# Patient Record
Sex: MALE | Race: WHITE | Employment: OTHER | URBAN - METROPOLITAN AREA
[De-identification: names, ages, dates, MRNs, and addresses within clinical notes are randomized per-mention and may not be internally consistent; named-entity substitution may affect disease eponyms.]

---

## 2017-11-09 ENCOUNTER — HOSPITAL ENCOUNTER (OUTPATIENT)
Dept: GENERAL RADIOLOGY | Age: 37
Discharge: HOME | End: 2017-11-09
Attending: PODIATRIST | Admitting: PODIATRIST

## 2017-11-09 DIAGNOSIS — L97.511 RIGHT FOOT ULCER, LIMITED TO BREAKDOWN OF SKIN (HCC): ICD-10-CM

## 2017-11-09 DIAGNOSIS — M79.671 RIGHT FOOT PAIN: ICD-10-CM

## 2017-11-09 DIAGNOSIS — M77.41 METATARSALGIA OF RIGHT FOOT: ICD-10-CM

## 2017-12-16 PROBLEM — N49.3 FOURNIER'S DISEASE: Status: ACTIVE | Noted: 2017-12-16

## 2018-02-22 NOTE — ANESTHESIA PRE-OP
CARDIOVASCULAR: denies dizziness, palpitations, or CP    GASTROINTESTINAL:  Occ Reflux    GENITOURINARY:  Neg     INTEGUMENT: abscess on left anus. 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 HEMATOLOGIC/LYMPHATIC: neg     ENDOCRINE: hx uncontrolled DM. Recent admission started on insulin, last A1c  10.5, 12/2017. A1c today: 5.9. MUSCULOSKELETAL: right foot metatarsal deformity with over pronation. Wearing a boot. NEUROLOGICAL:  Neg     BEHAVIOR/PSYCH:  Alert and oriented x 4. Questions answered. Understanding verbalized    PHYSICAL EXAM:  Airway Exam:  Head/Neck movement: full  Thyromental Distance: >3 FB  History of difficult intubation:  None  Mallampati Classification:  Class II  Teeth: missing upper and lower molars. Chipped and broken molars. Denies loose teeth        LUNGS:  No increased work of breathing, good air exchange, clear to auscultation bilaterally, no crackles or wheezing  CARDIOVASCULAR:  Normal apical impulse, regular rate and rhythm, normal S1 and S2, no S3 or S4, and no murmur noted    Testing Results:    EKG:  None     LABS:    CBC  Lab Results   Component Value Date/Time    WBC 7.0 02/23/2018 01:26 PM    HGB 12.2 (L) 02/23/2018 01:26 PM    HCT 37.2 (L) 02/23/2018 01:26 PM     02/23/2018 01:26 PM     RENAL  Lab Results   Component Value Date/Time     02/23/2018 01:26 PM    K 4.9 02/23/2018 01:26 PM    CL 99 02/23/2018 01:26 PM    CO2 28 02/23/2018 01:26 PM    BUN 13 02/23/2018 01:26 PM    CREATININE 0.7 (L) 02/23/2018 01:26 PM    GLUCOSE 120 (H) 02/23/2018 01:26 PM       Summary:  Chart reviewed, pt. Interviewed and examined. Planned surgical procedure:  Achilles Tendon Lengthening Right Foot per Dr. Miladys Mayer    1. No known cardiac condition. Denies CP or SOB. Limited activity r/t foot pain. 2.  Current smoker, hx 1ppd x 23 years. Counseled on smoking cessation. Able to lie flat. 3.  Uncontrolled reflux. 4.  Abscess on left anus.   oli's

## 2018-02-23 ENCOUNTER — HOSPITAL ENCOUNTER (OUTPATIENT)
Dept: PREADMISSION TESTING | Age: 38
Discharge: HOME | End: 2018-02-23
Attending: PODIATRIST | Admitting: PODIATRIST

## 2018-02-23 VITALS
HEIGHT: 71 IN | TEMPERATURE: 97.8 F | SYSTOLIC BLOOD PRESSURE: 118 MMHG | HEART RATE: 72 BPM | DIASTOLIC BLOOD PRESSURE: 71 MMHG | WEIGHT: 195 LBS | OXYGEN SATURATION: 100 % | RESPIRATION RATE: 16 BRPM | BODY MASS INDEX: 27.3 KG/M2

## 2018-02-23 LAB
ANION GAP SERPL CALCULATED.3IONS-SCNC: 9 MMOL/L (ref 4–16)
BUN BLDV-MCNC: 13 MG/DL (ref 6–23)
CALCIUM SERPL-MCNC: 10 MG/DL (ref 8.3–10.6)
CHLORIDE BLD-SCNC: 99 MMOL/L (ref 99–110)
CO2: 28 MMOL/L (ref 21–32)
CREAT SERPL-MCNC: 0.7 MG/DL (ref 0.9–1.3)
ESTIMATED AVERAGE GLUCOSE: 123 MG/DL
GFR AFRICAN AMERICAN: >60 ML/MIN/1.73M2
GFR NON-AFRICAN AMERICAN: >60 ML/MIN/1.73M2
GLUCOSE BLD-MCNC: 120 MG/DL (ref 70–99)
HBA1C MFR BLD: 5.9 % (ref 4.2–6.3)
HCT VFR BLD CALC: 37.2 % (ref 42–52)
HEMOGLOBIN: 12.2 GM/DL (ref 13.5–18)
MCH RBC QN AUTO: 31.3 PG (ref 27–31)
MCHC RBC AUTO-ENTMCNC: 32.8 % (ref 32–36)
MCV RBC AUTO: 95.4 FL (ref 78–100)
PDW BLD-RTO: 13.5 % (ref 11.7–14.9)
PLATELET # BLD: 226 K/CU MM (ref 140–440)
PMV BLD AUTO: 10.3 FL (ref 7.5–11.1)
POTASSIUM SERPL-SCNC: 4.9 MMOL/L (ref 3.5–5.1)
RBC # BLD: 3.9 M/CU MM (ref 4.6–6.2)
SODIUM BLD-SCNC: 136 MMOL/L (ref 135–145)
WBC # BLD: 7 K/CU MM (ref 4–10.5)

## 2018-02-23 RX ORDER — KETOROLAC TROMETHAMINE 30 MG/ML
20 INJECTION, SOLUTION INTRAMUSCULAR; INTRAVENOUS ONCE
Status: CANCELLED | OUTPATIENT
Start: 2018-02-28 | End: 2018-03-05

## 2018-02-23 NOTE — PRE-PROCEDURE INSTRUCTIONS
See scanned instruction form, instructed to take half dose Lantus Insulin night before surgery per anesthesia department

## 2018-04-05 ENCOUNTER — HOSPITAL ENCOUNTER (OUTPATIENT)
Dept: GENERAL RADIOLOGY | Age: 38
Discharge: HOME | End: 2018-04-05
Attending: PODIATRIST | Admitting: PODIATRIST

## 2018-04-05 DIAGNOSIS — M77.41 METATARSALGIA OF RIGHT FOOT: ICD-10-CM

## 2018-04-05 DIAGNOSIS — M79.671 RIGHT FOOT PAIN: ICD-10-CM

## 2019-04-22 ENCOUNTER — HOSPITAL ENCOUNTER (EMERGENCY)
Age: 39
Discharge: HOME | End: 2019-04-22
Attending: EMERGENCY MEDICINE
Payer: COMMERCIAL

## 2019-04-22 VITALS
HEIGHT: 72 IN | HEART RATE: 80 BPM | OXYGEN SATURATION: 100 % | TEMPERATURE: 97.8 F | RESPIRATION RATE: 17 BRPM | DIASTOLIC BLOOD PRESSURE: 85 MMHG | WEIGHT: 170 LBS | SYSTOLIC BLOOD PRESSURE: 141 MMHG | BODY MASS INDEX: 23.03 KG/M2

## 2019-04-22 DIAGNOSIS — R10.13 ABDOMINAL PAIN, EPIGASTRIC: Primary | ICD-10-CM

## 2019-04-22 LAB
ALBUMIN SERPL-MCNC: 3.4 GM/DL (ref 3.4–5)
ALP BLD-CCNC: 131 IU/L (ref 40–128)
ALT SERPL-CCNC: 11 U/L (ref 10–40)
ANION GAP SERPL CALCULATED.3IONS-SCNC: 11 MMOL/L (ref 4–16)
AST SERPL-CCNC: 12 IU/L (ref 15–37)
BACTERIA: ABNORMAL /HPF
BASOPHILS ABSOLUTE: 0.1 K/CU MM
BASOPHILS RELATIVE PERCENT: 0.9 % (ref 0–1)
BILIRUB SERPL-MCNC: 0.4 MG/DL (ref 0–1)
BILIRUBIN URINE: NEGATIVE MG/DL
BLOOD, URINE: ABNORMAL
BUN BLDV-MCNC: 11 MG/DL (ref 6–23)
CALCIUM SERPL-MCNC: 10.2 MG/DL (ref 8.3–10.6)
CHLORIDE BLD-SCNC: 95 MMOL/L (ref 99–110)
CLARITY: CLEAR
CO2: 23 MMOL/L (ref 21–32)
COLOR: YELLOW
CREAT SERPL-MCNC: 0.8 MG/DL (ref 0.9–1.3)
DIFFERENTIAL TYPE: ABNORMAL
EOSINOPHILS ABSOLUTE: 0.1 K/CU MM
EOSINOPHILS RELATIVE PERCENT: 1.7 % (ref 0–3)
GFR AFRICAN AMERICAN: >60 ML/MIN/1.73M2
GFR NON-AFRICAN AMERICAN: >60 ML/MIN/1.73M2
GLUCOSE BLD-MCNC: 263 MG/DL (ref 70–99)
GLUCOSE, URINE: >500 MG/DL
HCT VFR BLD CALC: 37.4 % (ref 42–52)
HEMOGLOBIN: 12 GM/DL (ref 13.5–18)
HYALINE CASTS: 5 /LPF
IMMATURE NEUTROPHIL %: 0.3 % (ref 0–0.43)
KETONES, URINE: NEGATIVE MG/DL
LEUKOCYTE ESTERASE, URINE: NEGATIVE
LIPASE: 58 IU/L (ref 13–60)
LYMPHOCYTES ABSOLUTE: 1.1 K/CU MM
LYMPHOCYTES RELATIVE PERCENT: 18.3 % (ref 24–44)
MCH RBC QN AUTO: 29.1 PG (ref 27–31)
MCHC RBC AUTO-ENTMCNC: 32.1 % (ref 32–36)
MCV RBC AUTO: 90.8 FL (ref 78–100)
MONOCYTES ABSOLUTE: 0.4 K/CU MM
MONOCYTES RELATIVE PERCENT: 6.4 % (ref 0–4)
MUCUS: ABNORMAL HPF
NITRITE URINE, QUANTITATIVE: NEGATIVE
NUCLEATED RBC %: 0 %
PDW BLD-RTO: 12.1 % (ref 11.7–14.9)
PH, URINE: 5 (ref 5–8)
PLATELET # BLD: 275 K/CU MM (ref 140–440)
PMV BLD AUTO: 9.3 FL (ref 7.5–11.1)
POTASSIUM SERPL-SCNC: 4.4 MMOL/L (ref 3.5–5.1)
PROTEIN UA: 30 MG/DL
RBC # BLD: 4.12 M/CU MM (ref 4.6–6.2)
RBC URINE: 1 /HPF (ref 0–3)
SEGMENTED NEUTROPHILS ABSOLUTE COUNT: 4.2 K/CU MM
SEGMENTED NEUTROPHILS RELATIVE PERCENT: 72.4 % (ref 36–66)
SODIUM BLD-SCNC: 129 MMOL/L (ref 135–145)
SPECIFIC GRAVITY UA: 1.02 (ref 1–1.03)
TOTAL IMMATURE NEUTOROPHIL: 0.02 K/CU MM
TOTAL NUCLEATED RBC: 0 K/CU MM
TOTAL PROTEIN: 7.8 GM/DL (ref 6.4–8.2)
TRICHOMONAS: ABNORMAL /HPF
TROPONIN T: <0.01 NG/ML
UROBILINOGEN, URINE: NORMAL MG/DL (ref 0.2–1)
WBC # BLD: 5.8 K/CU MM (ref 4–10.5)
WBC UA: 2 /HPF (ref 0–2)

## 2019-04-22 PROCEDURE — 84484 ASSAY OF TROPONIN QUANT: CPT

## 2019-04-22 PROCEDURE — 81001 URINALYSIS AUTO W/SCOPE: CPT

## 2019-04-22 PROCEDURE — 80053 COMPREHEN METABOLIC PANEL: CPT

## 2019-04-22 PROCEDURE — 93010 ELECTROCARDIOGRAM REPORT: CPT | Performed by: INTERNAL MEDICINE

## 2019-04-22 PROCEDURE — 85025 COMPLETE CBC W/AUTO DIFF WBC: CPT

## 2019-04-22 PROCEDURE — 99284 EMERGENCY DEPT VISIT MOD MDM: CPT

## 2019-04-22 PROCEDURE — 93005 ELECTROCARDIOGRAM TRACING: CPT | Performed by: EMERGENCY MEDICINE

## 2019-04-22 PROCEDURE — 83690 ASSAY OF LIPASE: CPT
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
## 2019-04-22 NOTE — LETTER
[facility] Emergency Department  [address]  Phone: [phone]  Fax: [phone]    No name on file. April 22, 2019     Patient: Lowell Levy   YOB: 1980   Date of Visit: 4/22/2019       To Whom it May Concern:    Gopal Doan was seen in my clinic on 4/22/2019. He may return to work on 4/22/19. If you have any questions or concerns, please don't hesitate to call. Sincerely,         No name on file.

## 2019-04-22 NOTE — LETTER
[facility] Emergency Department  [address] [address]  Phone: [phone]  Fax: [phone]               April 22, 2019    Patient: Stephany Mckay   YOB: 1980   Date of Visit: 4/22/2019       To Whom It May Concern:    Leonid Salcido was seen and treated in our emergency department on 4/22/2019. He may return to work on 4/23/19.       Sincerely,       Abhishek Lilly RN         Signature:__________________________________

## 2019-04-22 NOTE — ED NOTES
Dr. Tyler Blount at bedside.       Worley Dandy, RN  04/22/19 [address], [place]  04/22/19 7550

## 2019-04-23 LAB
EKG ATRIAL RATE: 81 BPM
EKG DIAGNOSIS: NORMAL
EKG P AXIS: 68 DEGREES
EKG P-R INTERVAL: 158 MS
EKG Q-T INTERVAL: 358 MS
EKG QRS DURATION: 90 MS
EKG QTC CALCULATION (BAZETT): 415 MS
EKG R AXIS: 71 DEGREES
EKG T AXIS: 41 DEGREES
EKG VENTRICULAR RATE: 81 BPM

## 2019-04-24 ENCOUNTER — OFFICE VISIT (OUTPATIENT)
Dept: FAMILY MEDICINE CLINIC | Age: 39
End: 2019-04-24
Payer: COMMERCIAL

## 2019-04-24 VITALS
WEIGHT: 179.4 LBS | HEIGHT: 73 IN | HEART RATE: 91 BPM | OXYGEN SATURATION: 98 % | SYSTOLIC BLOOD PRESSURE: 94 MMHG | BODY MASS INDEX: 23.78 KG/M2 | DIASTOLIC BLOOD PRESSURE: 64 MMHG | TEMPERATURE: 97.6 F

## 2019-04-24 DIAGNOSIS — L08.9 INFECTED CYST OF SKIN: Primary | ICD-10-CM

## 2019-04-24 DIAGNOSIS — L72.9 INFECTED CYST OF SKIN: Primary | ICD-10-CM

## 2019-04-24 DIAGNOSIS — K21.9 GASTROESOPHAGEAL REFLUX DISEASE, ESOPHAGITIS PRESENCE NOT SPECIFIED: ICD-10-CM

## 2019-04-24 PROCEDURE — 99214 OFFICE O/P EST MOD 30 MIN: CPT | Performed by: NURSE PRACTITIONER

## 2019-04-24 RX ORDER — OMEPRAZOLE 20 MG/1
20 CAPSULE, DELAYED RELEASE ORAL
Qty: 30 CAPSULE | Refills: 0 | Status: SHIPPED | OUTPATIENT
Start: 2019-04-24 | End: 2019-04-29 | Stop reason: SDUPTHER

## 2019-04-24 RX ORDER — SULFAMETHOXAZOLE AND TRIMETHOPRIM 800; 160 MG/1; MG/1
1 TABLET ORAL 2 TIMES DAILY
Qty: 20 TABLET | Refills: 0 | Status: SHIPPED | OUTPATIENT
Start: 2019-04-24 | End: 2019-05-04

## 2019-04-24 ASSESSMENT — ENCOUNTER SYMPTOMS
COUGH: 0
NAUSEA: 0
SORE THROAT: 0
ABDOMINAL PAIN: 0

## 2019-04-24 ASSESSMENT — PATIENT HEALTH QUESTIONNAIRE - PHQ9
1. LITTLE INTEREST OR PLEASURE IN DOING THINGS: 0
SUM OF ALL RESPONSES TO PHQ QUESTIONS 1-9: 0
2. FEELING DOWN, DEPRESSED OR HOPELESS: 0
SUM OF ALL RESPONSES TO PHQ9 QUESTIONS 1 & 2: 0
SUM OF ALL RESPONSES TO PHQ QUESTIONS 1-9: 0
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
## 2019-04-24 NOTE — LETTER
[address] [place] [address]  [address] [facility] [address]  Phone: [phone]  Fax: [phone]    BARBARA Willis CNP        April 24, 2019     Patient: Angela Gonzalez   YOB: 1980   Date of Visit: 4/24/2019       To Whom it May Concern:    Aamir Rico was seen in my clinic on 4/24/2019. He may return to work on Monday, April 29, 2019. If you have any questions or concerns, please don't hesitate to call.     Sincerely,           BARBARA Willis CNP

## 2019-04-25 ASSESSMENT — ENCOUNTER SYMPTOMS
CHOKING: 0
CHEST TIGHTNESS: 0
SHORTNESS OF BREATH: 0
BACK PAIN: 0
TROUBLE SWALLOWING: 0

## 2019-04-27 LAB
GRAM STAIN RESULT: ABNORMAL
ORGANISM: ABNORMAL
WOUND/ABSCESS: ABNORMAL
WOUND/ABSCESS: ABNORMAL

## 2019-04-29 ENCOUNTER — OFFICE VISIT (OUTPATIENT)
Dept: INTERNAL MEDICINE CLINIC | Age: 39
End: 2019-04-29
Payer: COMMERCIAL

## 2019-04-29 VITALS
HEART RATE: 85 BPM | WEIGHT: 174 LBS | SYSTOLIC BLOOD PRESSURE: 118 MMHG | BODY MASS INDEX: 23.06 KG/M2 | DIASTOLIC BLOOD PRESSURE: 64 MMHG | HEIGHT: 73 IN | OXYGEN SATURATION: 98 %

## 2019-04-29 DIAGNOSIS — Z00.00 ENCOUNTER FOR MEDICAL EXAMINATION TO ESTABLISH CARE: Primary | ICD-10-CM

## 2019-04-29 DIAGNOSIS — K21.9 GASTROESOPHAGEAL REFLUX DISEASE, ESOPHAGITIS PRESENCE NOT SPECIFIED: ICD-10-CM

## 2019-04-29 DIAGNOSIS — Z79.4 TYPE 2 DIABETES MELLITUS WITHOUT COMPLICATION, WITH LONG-TERM CURRENT USE OF INSULIN (HCC): ICD-10-CM

## 2019-04-29 DIAGNOSIS — Z23 NEED FOR PNEUMOCOCCAL VACCINATION: ICD-10-CM

## 2019-04-29 DIAGNOSIS — E11.9 TYPE 2 DIABETES MELLITUS WITHOUT COMPLICATION, WITH LONG-TERM CURRENT USE OF INSULIN (HCC): ICD-10-CM

## 2019-04-29 PROCEDURE — 90471 IMMUNIZATION ADMIN: CPT | Performed by: NURSE PRACTITIONER

## 2019-04-29 PROCEDURE — 99203 OFFICE O/P NEW LOW 30 MIN: CPT | Performed by: NURSE PRACTITIONER

## 2019-04-29 PROCEDURE — 90732 PPSV23 VACC 2 YRS+ SUBQ/IM: CPT | Performed by: NURSE PRACTITIONER

## 2019-04-29 RX ORDER — BLOOD-GLUCOSE METER
1 EACH MISCELLANEOUS ONCE
Qty: 1 KIT | Refills: 0 | Status: SHIPPED | OUTPATIENT
Start: 2019-04-29 | End: 2020-04-16 | Stop reason: SDUPTHER

## 2019-04-29 RX ORDER — ASPIRIN 325 MG
325 TABLET ORAL DAILY
COMMUNITY
End: 2021-11-22 | Stop reason: ALTCHOICE

## 2019-04-29 RX ORDER — OMEPRAZOLE 20 MG/1
20 CAPSULE, DELAYED RELEASE ORAL
Qty: 30 CAPSULE | Refills: 0 | Status: SHIPPED | OUTPATIENT
Start: 2019-04-29 | End: 2019-05-13 | Stop reason: SDUPTHER

## 2019-04-29 RX ORDER — INSULIN GLARGINE 100 [IU]/ML
45 INJECTION, SOLUTION SUBCUTANEOUS NIGHTLY
Qty: 1 VIAL | Refills: 3 | Status: SHIPPED | OUTPATIENT
Start: 2019-04-29 | End: 2019-05-29

## 2019-04-29 ASSESSMENT — ENCOUNTER SYMPTOMS
ABDOMINAL DISTENTION: 0
SHORTNESS OF BREATH: 0
WHEEZING: 0
SINUS PAIN: 0
CONSTIPATION: 0
COLOR CHANGE: 0
SINUS PRESSURE: 0
COUGH: 0
NAUSEA: 0
ALLERGIC/IMMUNOLOGIC NEGATIVE: 1
CHEST TIGHTNESS: 0
DIARRHEA: 0
SORE THROAT: 0
ABDOMINAL PAIN: 0
EYES NEGATIVE: 1
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
## 2019-04-29 NOTE — LETTER
[facility]  [place] [address]  Phone: [phone]  Fax: [phone]    BARBARA Lawson CNP        April 29, 2019     Patient: Sonia Calhoun   YOB: 1980   Date of Visit: 4/29/2019       To Whom it May Concern:    Vicente Duque was seen in my clinic on 4/29/2019. He may return to work on 4/30/2019. If you have any questions or concerns, please don't hesitate to call.     Sincerely,         BARBARA Lawson CNP

## 2019-05-13 ENCOUNTER — OFFICE VISIT (OUTPATIENT)
Dept: INTERNAL MEDICINE CLINIC | Age: 39
End: 2019-05-13
Payer: COMMERCIAL

## 2019-05-13 VITALS
HEART RATE: 103 BPM | DIASTOLIC BLOOD PRESSURE: 62 MMHG | SYSTOLIC BLOOD PRESSURE: 102 MMHG | WEIGHT: 179.4 LBS | BODY MASS INDEX: 23.98 KG/M2 | OXYGEN SATURATION: 99 %

## 2019-05-13 DIAGNOSIS — K21.9 GASTROESOPHAGEAL REFLUX DISEASE, ESOPHAGITIS PRESENCE NOT SPECIFIED: ICD-10-CM

## 2019-05-13 DIAGNOSIS — Z79.4 TYPE 2 DIABETES MELLITUS WITHOUT COMPLICATION, WITH LONG-TERM CURRENT USE OF INSULIN (HCC): ICD-10-CM

## 2019-05-13 DIAGNOSIS — L03.115 CELLULITIS OF RIGHT LOWER EXTREMITY: ICD-10-CM

## 2019-05-13 DIAGNOSIS — E11.9 TYPE 2 DIABETES MELLITUS WITHOUT COMPLICATION, WITH LONG-TERM CURRENT USE OF INSULIN (HCC): ICD-10-CM

## 2019-05-13 DIAGNOSIS — S99.921A INJURY OF RIGHT FOOT, INITIAL ENCOUNTER: ICD-10-CM

## 2019-05-13 DIAGNOSIS — M79.89 RIGHT LEG SWELLING: Primary | ICD-10-CM

## 2019-05-13 PROCEDURE — 99213 OFFICE O/P EST LOW 20 MIN: CPT | Performed by: NURSE PRACTITIONER

## 2019-05-13 RX ORDER — OMEPRAZOLE 20 MG/1
20 CAPSULE, DELAYED RELEASE ORAL
Qty: 30 CAPSULE | Refills: 0 | Status: SHIPPED | OUTPATIENT
Start: 2019-05-13 | End: 2020-04-27 | Stop reason: SDUPTHER

## 2019-05-13 RX ORDER — IBUPROFEN 200 MG
1 TABLET ORAL DAILY
Qty: 100 EACH | Refills: 3 | Status: SHIPPED | OUTPATIENT
Start: 2019-05-13 | End: 2019-06-05 | Stop reason: SDUPTHER

## 2019-05-13 RX ORDER — CLINDAMYCIN HYDROCHLORIDE 300 MG/1
300 CAPSULE ORAL 3 TIMES DAILY
Qty: 30 CAPSULE | Refills: 0 | Status: ON HOLD | OUTPATIENT
Start: 2019-05-13 | End: 2019-05-22 | Stop reason: HOSPADM

## 2019-05-13 RX ORDER — GLUCOSAMINE HCL/CHONDROITIN SU 500-400 MG
CAPSULE ORAL
Qty: 270 STRIP | Refills: 2 | Status: SHIPPED | OUTPATIENT
Start: 2019-05-13 | End: 2020-04-16 | Stop reason: SDUPTHER

## 2019-05-13 ASSESSMENT — ENCOUNTER SYMPTOMS
ABDOMINAL DISTENTION: 0
WHEEZING: 0
SINUS PRESSURE: 0
SINUS PAIN: 0
COUGH: 0
SHORTNESS OF BREATH: 0
CONSTIPATION: 0
ABDOMINAL PAIN: 0
COLOR CHANGE: 1
SORE THROAT: 0
NAUSEA: 0
DIARRHEA: 0
ALLERGIC/IMMUNOLOGIC NEGATIVE: 1
CHEST TIGHTNESS: 0
EYES NEGATIVE: 1

## 2019-05-13 NOTE — PROGRESS NOTES
Mike Holder  1980 05/13/19    Chief Complaint   Patient presents with    Foot Swelling     Right foot is swollen and red, it is moving up the ankle, back of the calf. States that he fell about three wks ago.  Medication Problem     Patient needs syringes, testing strips, prilosec is not on file at pharmacy. Humalog and Lantus took four days to get. Prescription are becoming to expensive. SUBJECTIVE:      Maribell Roe patients the office this afternoon for complications with his right lower extremity. He states over the last week and a half he has been experiencing significant redness and swelling to this extremity. Of note he does report falling and injuring this foot approximately 3 weeks ago and does have a history of surgery to this same foot 1 year ago with metal melissa placement to correct a tarsal fracture. He denies any chest pain, fevers, chills, shortness of breath, or palpitations. He states over the last few days he has developed significant pain is his calf, however. Unfortunately, the patient does continue to smoke. He has not yet had his lab work completed from his last appointment. He also states that he needs assistance with his insulin as the costs are currently more than he can afford. I have offered to send his information to the care coordinator, Janusz Jones, and he is agreeable to this. He needs more test strips and insulin syringes. Patient's reflux well controlled on current medications. Patient denies any blood in stool black stool, heartburn, reflux. Denies issues with frequent coughing or reflux while sleeping. Able to eat and drink appropriately without complications. Review of Systems   Constitutional: Negative for activity change, appetite change, fatigue and fever. HENT: Negative for congestion, sinus pressure, sinus pain and sore throat. Eyes: Negative. Respiratory: Negative for cough, chest tightness, shortness of breath and wheezing.     Cardiovascular: Positive for leg swelling. Negative for chest pain and palpitations. Gastrointestinal: Negative for abdominal distention, abdominal pain, constipation, diarrhea and nausea. Endocrine: Negative. Genitourinary: Negative for difficulty urinating, dysuria, flank pain, frequency and hematuria. Musculoskeletal: Positive for arthralgias (right foot). Negative for gait problem, joint swelling and myalgias. Skin: Positive for color change. Negative for rash. Allergic/Immunologic: Negative. Neurological: Negative for dizziness, light-headedness and numbness. Hematological: Negative. Psychiatric/Behavioral: Negative. OBJECTIVE:    /62   Pulse 103   Wt 179 lb 6.4 oz (81.4 kg)   SpO2 99%   BMI 23.98 kg/m²     Physical Exam   Constitutional: He is oriented to person, place, and time. He appears well-developed and well-nourished. No distress. HENT:   Head: Normocephalic and atraumatic. Eyes: Pupils are equal, round, and reactive to light. Conjunctivae and EOM are normal.   Neck: Normal range of motion. Neck supple. Cardiovascular: Normal rate, regular rhythm and normal heart sounds. No murmur heard.  +2 pitting of RLE   Pulmonary/Chest: Effort normal and breath sounds normal.   Abdominal: Soft. Bowel sounds are normal. He exhibits no distension. There is no tenderness. Musculoskeletal: Normal range of motion. He exhibits tenderness. Right lower leg: He exhibits tenderness and swelling. Right foot: There is tenderness and swelling. Neurological: He is alert and oriented to person, place, and time. No cranial nerve deficit or sensory deficit. Coordination normal. GCS eye subscore is 4. GCS verbal subscore is 5. GCS motor subscore is 6. Skin: Skin is warm and dry. Capillary refill takes less than 2 seconds. No rash noted. He is not diaphoretic. Psychiatric: He has a normal mood and affect. His behavior is normal. Thought content normal.       ASSESSMENT:    1.  Right leg swelling    2. Cellulitis of right lower extremity    3. Injury of right foot, initial encounter    4. Gastroesophageal reflux disease, esophagitis presence not specified    5. Type 2 diabetes mellitus without complication, with long-term current use of insulin ([address]        PLAN:    Marcial Rios was seen today for foot swelling and medication problem. Diagnoses and all orders for this visit:    Right leg swelling- ultimately I feel there is obvious cellulitis to extremity and will treat empirically with course of Clindamycin. However, given swelling, smoking history, and pain in calf region, I must obtain US to rule out DVT. In addition, with unmanaged diabetes and previous surgery, I will order X-ray to r/o osteo and/or hardware rejection. Ultimately, if fails oral ATB therapy, may need IV treatment. -     VL LOWER EXTREMITY VENOUS RIGHT; Future    Cellulitis of right lower extremity  -     clindamycin (CLEOCIN) 300 MG capsule; Take 1 capsule by mouth 3 times daily for 10 days    Injury of right foot, initial encounter  -     XR FOOT RIGHT (MIN 3 VIEWS); Future    Gastroesophageal reflux disease, esophagitis presence not specified  -     omeprazole (PRILOSEC) 20 MG delayed release capsule; Take 1 capsule by mouth every morning (before breakfast)    Type 2 diabetes mellitus without complication, with long-term current use of insulin ([address]- I have forwarded patient's information to Kuldip Mendiola our care coordinator for information about financial assistance and general information for better diabetes management. -     blood glucose monitor strips; Test 3 times a day & as needed for symptoms of irregular blood glucose. -     INSULIN SYRINGE .5CC/29G ([employer] INS SYR .5CC/29G) 29G X 1/2\" 0.5 ML MISC; 1 each by Does not apply route daily    Course of treatment, including any medications, possible imaging, referrals, and follow ups discussed with patient.  All risks and benefits and possible side effects discussed with patient who agrees to plan of care and verbalizes understanding. All labs and imaging reviewed. No flowsheet data found. Return in about 1 week (around 5/20/2019).

## 2019-05-15 ENCOUNTER — HOSPITAL ENCOUNTER (OUTPATIENT)
Dept: GENERAL RADIOLOGY | Age: 39
Discharge: HOME | End: 2019-05-15
Payer: COMMERCIAL

## 2019-05-15 ENCOUNTER — HOSPITAL ENCOUNTER (OUTPATIENT)
Age: 39
Discharge: HOME | End: 2019-05-15
Payer: COMMERCIAL

## 2019-05-15 DIAGNOSIS — S99.921A INJURY OF RIGHT FOOT, INITIAL ENCOUNTER: ICD-10-CM

## 2019-05-17 ENCOUNTER — HOSPITAL ENCOUNTER (OUTPATIENT)
Age: 39
Discharge: HOME | DRG: 443 | End: 2019-05-17
Payer: COMMERCIAL

## 2019-05-17 ENCOUNTER — HOSPITAL ENCOUNTER (OUTPATIENT)
Dept: GENERAL RADIOLOGY | Age: 39
Discharge: HOME | DRG: 443 | End: 2019-05-17
Payer: COMMERCIAL

## 2019-05-17 ENCOUNTER — HOSPITAL ENCOUNTER (OUTPATIENT)
Dept: ULTRASOUND IMAGING | Age: 39
Discharge: HOME | DRG: 443 | End: 2019-05-17
Payer: COMMERCIAL

## 2019-05-17 DIAGNOSIS — M79.89 RIGHT LEG SWELLING: ICD-10-CM

## 2019-05-17 DIAGNOSIS — S99.921A INJURY OF RIGHT FOOT, INITIAL ENCOUNTER: ICD-10-CM

## 2019-05-17 DIAGNOSIS — Z00.00 ENCOUNTER FOR MEDICAL EXAMINATION TO ESTABLISH CARE: ICD-10-CM

## 2019-05-17 DIAGNOSIS — Z79.4 TYPE 2 DIABETES MELLITUS WITHOUT COMPLICATION, WITH LONG-TERM CURRENT USE OF INSULIN (HCC): ICD-10-CM

## 2019-05-17 DIAGNOSIS — E11.9 TYPE 2 DIABETES MELLITUS WITHOUT COMPLICATION, WITH LONG-TERM CURRENT USE OF INSULIN (HCC): ICD-10-CM

## 2019-05-17 LAB
ALBUMIN SERPL-MCNC: 3.2 GM/DL (ref 3.4–5)
ALP BLD-CCNC: 447 IU/L (ref 40–128)
ALT SERPL-CCNC: 1424 U/L (ref 10–40)
ANION GAP SERPL CALCULATED.3IONS-SCNC: 11 MMOL/L (ref 4–16)
AST SERPL-CCNC: 1224 IU/L (ref 15–37)
BANDED NEUTROPHILS ABSOLUTE COUNT: 0.27 K/CU MM
BANDED NEUTROPHILS RELATIVE PERCENT: 5 % (ref 5–11)
BASOPHILS ABSOLUTE: 0.1 K/CU MM
BASOPHILS RELATIVE PERCENT: 1 % (ref 0–1)
BILIRUB SERPL-MCNC: 2.3 MG/DL (ref 0–1)
BUN BLDV-MCNC: 9 MG/DL (ref 6–23)
CALCIUM SERPL-MCNC: 8.6 MG/DL (ref 8.3–10.6)
CHLORIDE BLD-SCNC: 95 MMOL/L (ref 99–110)
CHOLESTEROL, FASTING: 70 MG/DL
CO2: 25 MMOL/L (ref 21–32)
CREAT SERPL-MCNC: 0.7 MG/DL (ref 0.9–1.3)
DIFFERENTIAL TYPE: ABNORMAL
EOSINOPHILS ABSOLUTE: 0.1 K/CU MM
EOSINOPHILS RELATIVE PERCENT: 1 % (ref 0–3)
ESTIMATED AVERAGE GLUCOSE: 194 MG/DL
GFR AFRICAN AMERICAN: >60 ML/MIN/1.73M2
GFR NON-AFRICAN AMERICAN: >60 ML/MIN/1.73M2
GLUCOSE BLD-MCNC: 173 MG/DL (ref 70–99)
HBA1C MFR BLD: 8.4 % (ref 4.2–6.3)
HCT VFR BLD CALC: 36.6 % (ref 42–52)
HDLC SERPL-MCNC: 18 MG/DL
HEMOGLOBIN: 11.3 GM/DL (ref 13.5–18)
LDL CHOLESTEROL DIRECT: 15 MG/DL
LYMPHOCYTES ABSOLUTE: 1.7 K/CU MM
LYMPHOCYTES RELATIVE PERCENT: 32 % (ref 24–44)
MCH RBC QN AUTO: 28 PG (ref 27–31)
MCHC RBC AUTO-ENTMCNC: 30.9 % (ref 32–36)
MCV RBC AUTO: 90.6 FL (ref 78–100)
MONOCYTES ABSOLUTE: 0.3 K/CU MM
MONOCYTES RELATIVE PERCENT: 6 % (ref 0–4)
PDW BLD-RTO: 13.7 % (ref 11.7–14.9)
PLATELET # BLD: 253 K/CU MM (ref 140–440)
PLT MORPHOLOGY: ABNORMAL
PMV BLD AUTO: 10.1 FL (ref 7.5–11.1)
POTASSIUM SERPL-SCNC: 4.2 MMOL/L (ref 3.5–5.1)
RBC # BLD: 4.04 M/CU MM (ref 4.6–6.2)
SEGMENTED NEUTROPHILS ABSOLUTE COUNT: 2.9 K/CU MM
SEGMENTED NEUTROPHILS RELATIVE PERCENT: 55 % (ref 36–66)
SODIUM BLD-SCNC: 131 MMOL/L (ref 135–145)
TOTAL PROTEIN: 7.3 GM/DL (ref 6.4–8.2)
TOXIC GRANULATION: PRESENT
TRIGLYCERIDE, FASTING: 118 MG/DL
TSH HIGH SENSITIVITY: 0.67 UIU/ML (ref 0.27–4.2)
WBC # BLD: 5.4 K/CU MM (ref 4–10.5)

## 2019-05-17 PROCEDURE — 93971 EXTREMITY STUDY: CPT

## 2019-05-17 PROCEDURE — 83036 HEMOGLOBIN GLYCOSYLATED A1C: CPT

## 2019-05-17 PROCEDURE — 85007 BL SMEAR W/DIFF WBC COUNT: CPT

## 2019-05-17 PROCEDURE — 80053 COMPREHEN METABOLIC PANEL: CPT

## 2019-05-17 PROCEDURE — 36415 COLL VENOUS BLD VENIPUNCTURE: CPT

## 2019-05-17 PROCEDURE — 84443 ASSAY THYROID STIM HORMONE: CPT

## 2019-05-17 PROCEDURE — 80061 LIPID PANEL: CPT

## 2019-05-17 PROCEDURE — 73630 X-RAY EXAM OF FOOT: CPT

## 2019-05-17 PROCEDURE — 85027 COMPLETE CBC AUTOMATED: CPT

## 2019-05-18 ENCOUNTER — HOSPITAL ENCOUNTER (INPATIENT)
Age: 39
LOS: 4 days | Discharge: HOME | DRG: 443 | End: 2019-05-22
Attending: EMERGENCY MEDICINE | Admitting: HOSPITALIST
Payer: COMMERCIAL

## 2019-05-18 DIAGNOSIS — B15.9 VIRAL HEPATITIS A WITHOUT HEPATIC COMA: Primary | ICD-10-CM

## 2019-05-18 PROBLEM — Z72.0 TOBACCO ABUSE: Status: ACTIVE | Noted: 2019-05-18

## 2019-05-18 PROBLEM — K85.00 IDIOPATHIC ACUTE PANCREATITIS: Status: ACTIVE | Noted: 2019-05-18

## 2019-05-18 LAB
ACETAMINOPHEN LEVEL: <5 UG/ML (ref 15–30)
ALBUMIN SERPL-MCNC: 3.2 GM/DL (ref 3.4–5)
ALP BLD-CCNC: 458 IU/L (ref 40–128)
ALT SERPL-CCNC: 2458 U/L (ref 10–40)
AMMONIA: 38 UMOL/L (ref 16–60)
ANION GAP SERPL CALCULATED.3IONS-SCNC: 15 MMOL/L (ref 4–16)
AST SERPL-CCNC: 2313 IU/L (ref 15–37)
ATYPICAL LYMPHOCYTE ABSOLUTE COUNT: ABNORMAL
BANDED NEUTROPHILS ABSOLUTE COUNT: 0.4 K/CU MM
BANDED NEUTROPHILS RELATIVE PERCENT: 8 % (ref 5–11)
BILIRUB SERPL-MCNC: 3.5 MG/DL (ref 0–1)
BUN BLDV-MCNC: 11 MG/DL (ref 6–23)
CALCIUM SERPL-MCNC: 8.7 MG/DL (ref 8.3–10.6)
CHLORIDE BLD-SCNC: 93 MMOL/L (ref 99–110)
CO2: 26 MMOL/L (ref 21–32)
CREAT SERPL-MCNC: 0.7 MG/DL (ref 0.9–1.3)
DIFFERENTIAL TYPE: ABNORMAL
DOSE AMOUNT: ABNORMAL
DOSE TIME: ABNORMAL
EOSINOPHILS ABSOLUTE: 0.1 K/CU MM
EOSINOPHILS RELATIVE PERCENT: 1 % (ref 0–3)
GFR AFRICAN AMERICAN: >60 ML/MIN/1.73M2
GFR NON-AFRICAN AMERICAN: >60 ML/MIN/1.73M2
GLUCOSE BLD-MCNC: 110 MG/DL (ref 70–99)
GLUCOSE BLD-MCNC: 82 MG/DL (ref 70–99)
GLUCOSE BLD-MCNC: 92 MG/DL (ref 70–99)
GLUCOSE BLD-MCNC: 94 MG/DL (ref 70–99)
HAV IGM SER IA-ACNC: ABNORMAL
HCT VFR BLD CALC: 38.3 % (ref 42–52)
HEMOGLOBIN: 12 GM/DL (ref 13.5–18)
HEPATITIS B CORE IGM ANTIBODY: NON REACTIVE
HEPATITIS B SURFACE ANTIGEN: NON REACTIVE
HEPATITIS C ANTIBODY: NON REACTIVE
INR BLD: 1.21 INDEX
LIPASE: 64 IU/L (ref 13–60)
LYMPHOCYTES ABSOLUTE: 2.3 K/CU MM
LYMPHOCYTES RELATIVE PERCENT: 49 % (ref 24–44)
MCH RBC QN AUTO: 28.4 PG (ref 27–31)
MCHC RBC AUTO-ENTMCNC: 31.3 % (ref 32–36)
MCV RBC AUTO: 90.5 FL (ref 78–100)
MONOCYTES ABSOLUTE: 0.2 K/CU MM
MONOCYTES RELATIVE PERCENT: 3 % (ref 0–4)
PDW BLD-RTO: 13.8 % (ref 11.7–14.9)
PLATELET # BLD: 247 K/CU MM (ref 140–440)
PMV BLD AUTO: 9.8 FL (ref 7.5–11.1)
POLYCHROMASIA: ABNORMAL
POTASSIUM SERPL-SCNC: 3.8 MMOL/L (ref 3.5–5.1)
PROTHROMBIN TIME: 13.8 SECONDS (ref 9.12–12.5)
RBC # BLD: 4.23 M/CU MM (ref 4.6–6.2)
SEGMENTED NEUTROPHILS ABSOLUTE COUNT: 2 K/CU MM
SEGMENTED NEUTROPHILS RELATIVE PERCENT: 39 % (ref 36–66)
SODIUM BLD-SCNC: 134 MMOL/L (ref 135–145)
TOTAL PROTEIN: 7.8 GM/DL (ref 6.4–8.2)
TOXIC GRANULATION: PRESENT
TSH HIGH SENSITIVITY: 1.51 UIU/ML (ref 0.27–4.2)
WBC # BLD: 5 K/CU MM (ref 4–10.5)

## 2019-05-18 PROCEDURE — 85610 PROTHROMBIN TIME: CPT

## 2019-05-18 PROCEDURE — 85027 COMPLETE CBC AUTOMATED: CPT

## 2019-05-18 PROCEDURE — 36415 COLL VENOUS BLD VENIPUNCTURE: CPT

## 2019-05-18 PROCEDURE — 6360000002 HC RX W HCPCS: Performed by: NURSE PRACTITIONER

## 2019-05-18 PROCEDURE — 2500000003 HC RX 250 WO HCPCS: Performed by: NURSE PRACTITIONER

## 2019-05-18 PROCEDURE — 96374 THER/PROPH/DIAG INJ IV PUSH: CPT

## 2019-05-18 PROCEDURE — 80074 ACUTE HEPATITIS PANEL: CPT

## 2019-05-18 PROCEDURE — 2580000003 HC RX 258: Performed by: EMERGENCY MEDICINE

## 2019-05-18 PROCEDURE — G0480 DRUG TEST DEF 1-7 CLASSES: HCPCS

## 2019-05-18 PROCEDURE — 6360000002 HC RX W HCPCS: Performed by: HOSPITALIST

## 2019-05-18 PROCEDURE — C9113 INJ PANTOPRAZOLE SODIUM, VIA: HCPCS | Performed by: EMERGENCY MEDICINE

## 2019-05-18 PROCEDURE — 84443 ASSAY THYROID STIM HORMONE: CPT

## 2019-05-18 PROCEDURE — 6360000002 HC RX W HCPCS: Performed by: EMERGENCY MEDICINE

## 2019-05-18 PROCEDURE — 99284 EMERGENCY DEPT VISIT MOD MDM: CPT

## 2019-05-18 PROCEDURE — 85007 BL SMEAR W/DIFF WBC COUNT: CPT

## 2019-05-18 PROCEDURE — 82962 GLUCOSE BLOOD TEST: CPT

## 2019-05-18 PROCEDURE — 6370000000 HC RX 637 (ALT 250 FOR IP): Performed by: NURSE PRACTITIONER

## 2019-05-18 PROCEDURE — 82140 ASSAY OF AMMONIA: CPT

## 2019-05-18 PROCEDURE — 83690 ASSAY OF LIPASE: CPT

## 2019-05-18 PROCEDURE — 80053 COMPREHEN METABOLIC PANEL: CPT

## 2019-05-18 PROCEDURE — 2580000003 HC RX 258: Performed by: NURSE PRACTITIONER

## 2019-05-18 PROCEDURE — 1200000000 HC SEMI PRIVATE

## 2019-05-18 RX ORDER — DEXTROSE MONOHYDRATE 50 MG/ML
100 INJECTION, SOLUTION INTRAVENOUS PRN
Status: DISCONTINUED | OUTPATIENT
Start: 2019-05-18 | End: 2019-05-22 | Stop reason: HOSPADM

## 2019-05-18 RX ORDER — INSULIN GLARGINE 100 [IU]/ML
45 INJECTION, SOLUTION SUBCUTANEOUS NIGHTLY
Status: DISCONTINUED | OUTPATIENT
Start: 2019-05-18 | End: 2019-05-18

## 2019-05-18 RX ORDER — SODIUM CHLORIDE 0.9 % (FLUSH) 0.9 %
10 SYRINGE (ML) INJECTION PRN
Status: DISCONTINUED | OUTPATIENT
Start: 2019-05-18 | End: 2019-05-22 | Stop reason: HOSPADM

## 2019-05-18 RX ORDER — SODIUM CHLORIDE, SODIUM LACTATE, POTASSIUM CHLORIDE, CALCIUM CHLORIDE 600; 310; 30; 20 MG/100ML; MG/100ML; MG/100ML; MG/100ML
INJECTION, SOLUTION INTRAVENOUS CONTINUOUS
Status: DISCONTINUED | OUTPATIENT
Start: 2019-05-18 | End: 2019-05-22 | Stop reason: HOSPADM

## 2019-05-18 RX ORDER — ONDANSETRON 2 MG/ML
4 INJECTION INTRAMUSCULAR; INTRAVENOUS EVERY 6 HOURS PRN
Status: DISCONTINUED | OUTPATIENT
Start: 2019-05-18 | End: 2019-05-22 | Stop reason: HOSPADM

## 2019-05-18 RX ORDER — SODIUM CHLORIDE 9 MG/ML
INJECTION, SOLUTION INTRAVENOUS CONTINUOUS
Status: CANCELLED | OUTPATIENT
Start: 2019-05-18

## 2019-05-18 RX ORDER — INSULIN GLARGINE 100 [IU]/ML
45 INJECTION, SOLUTION SUBCUTANEOUS NIGHTLY
Status: DISCONTINUED | OUTPATIENT
Start: 2019-05-19 | End: 2019-05-19

## 2019-05-18 RX ORDER — 0.9 % SODIUM CHLORIDE 0.9 %
1000 INTRAVENOUS SOLUTION INTRAVENOUS ONCE
Status: COMPLETED | OUTPATIENT
Start: 2019-05-18 | End: 2019-05-18

## 2019-05-18 RX ORDER — ASPIRIN 325 MG
325 TABLET ORAL DAILY
Status: DISCONTINUED | OUTPATIENT
Start: 2019-05-18 | End: 2019-05-22 | Stop reason: HOSPADM

## 2019-05-18 RX ORDER — DEXTROSE MONOHYDRATE 25 G/50ML
12.5 INJECTION, SOLUTION INTRAVENOUS PRN
Status: DISCONTINUED | OUTPATIENT
Start: 2019-05-18 | End: 2019-05-22 | Stop reason: HOSPADM

## 2019-05-18 RX ORDER — KETOROLAC TROMETHAMINE 30 MG/ML
30 INJECTION, SOLUTION INTRAMUSCULAR; INTRAVENOUS ONCE
Status: COMPLETED | OUTPATIENT
Start: 2019-05-18 | End: 2019-05-18

## 2019-05-18 RX ORDER — NICOTINE POLACRILEX 4 MG
15 LOZENGE BUCCAL PRN
Status: DISCONTINUED | OUTPATIENT
Start: 2019-05-18 | End: 2019-05-22 | Stop reason: HOSPADM

## 2019-05-18 RX ORDER — SODIUM CHLORIDE 0.9 % (FLUSH) 0.9 %
10 SYRINGE (ML) INJECTION EVERY 12 HOURS SCHEDULED
Status: DISCONTINUED | OUTPATIENT
Start: 2019-05-18 | End: 2019-05-22 | Stop reason: HOSPADM

## 2019-05-18 RX ORDER — PANTOPRAZOLE SODIUM 40 MG/10ML
40 INJECTION, POWDER, LYOPHILIZED, FOR SOLUTION INTRAVENOUS ONCE
Status: COMPLETED | OUTPATIENT
Start: 2019-05-18 | End: 2019-05-18

## 2019-05-18 RX ORDER — NICOTINE 21 MG/24HR
1 PATCH, TRANSDERMAL 24 HOURS TRANSDERMAL DAILY
Status: DISCONTINUED | OUTPATIENT
Start: 2019-05-18 | End: 2019-05-22 | Stop reason: HOSPADM

## 2019-05-18 RX ADMIN — ENOXAPARIN SODIUM 40 MG: 40 INJECTION SUBCUTANEOUS at 15:49

## 2019-05-18 RX ADMIN — ASPIRIN 325 MG ORAL TABLET 325 MG: 325 PILL ORAL at 15:49

## 2019-05-18 RX ADMIN — PANTOPRAZOLE SODIUM 40 MG: 40 INJECTION, POWDER, FOR SOLUTION INTRAVENOUS at 14:48

## 2019-05-18 RX ADMIN — SODIUM CHLORIDE 1000 ML: 9 INJECTION, SOLUTION INTRAVENOUS at 14:48

## 2019-05-18 RX ADMIN — SODIUM CHLORIDE, POTASSIUM CHLORIDE, SODIUM LACTATE AND CALCIUM CHLORIDE: 600; 310; 30; 20 INJECTION, SOLUTION INTRAVENOUS at 17:45

## 2019-05-18 RX ADMIN — KETOROLAC TROMETHAMINE 30 MG: 30 INJECTION, SOLUTION INTRAMUSCULAR; INTRAVENOUS at 22:39

## 2019-05-18 RX ADMIN — ONDANSETRON 4 MG: 2 INJECTION INTRAMUSCULAR; INTRAVENOUS at 15:48

## 2019-05-18 RX ADMIN — FAMOTIDINE 20 MG: 10 INJECTION, SOLUTION INTRAVENOUS at 20:26

## 2019-05-18 ASSESSMENT — PAIN SCALES - GENERAL
PAINLEVEL_OUTOF10: 6
PAINLEVEL_OUTOF10: 8
PAINLEVEL_OUTOF10: 6
PAINLEVEL_OUTOF10: 8

## 2019-05-18 ASSESSMENT — PAIN DESCRIPTION - LOCATION
LOCATION: BACK
LOCATION: ABDOMEN

## 2019-05-18 ASSESSMENT — PAIN - FUNCTIONAL ASSESSMENT: PAIN_FUNCTIONAL_ASSESSMENT: ACTIVITIES ARE NOT PREVENTED

## 2019-05-18 ASSESSMENT — PAIN DESCRIPTION - PAIN TYPE
TYPE: ACUTE PAIN
TYPE: ACUTE PAIN

## 2019-05-18 ASSESSMENT — PAIN DESCRIPTION - FREQUENCY: FREQUENCY: CONTINUOUS

## 2019-05-18 ASSESSMENT — PAIN DESCRIPTION - ORIENTATION
ORIENTATION: RIGHT
ORIENTATION: RIGHT

## 2019-05-18 ASSESSMENT — PAIN DESCRIPTION - DESCRIPTORS: DESCRIPTORS: CONSTANT;SHARP

## 2019-05-18 ASSESSMENT — PAIN DESCRIPTION - ONSET: ONSET: ON-GOING
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
## 2019-05-18 NOTE — ED NOTES
Phone report called to Bea Swan RN for Room 9019.      Ext. [phone]     Melina Harmon RN  05/18/19 2201

## 2019-05-18 NOTE — ED TRIAGE NOTES
Pt presents to ED for abnormal liver enzymes. Blood work was drawn yesterday and called by PCP and told to come to ED to be admitted.
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
## 2019-05-18 NOTE — ED NOTES
[place],lactic acid,type & screen and ammonia was drawn on patient     Rose Mary CLEMENTE  Karen  05/18/19 3368

## 2019-05-19 LAB
ALBUMIN SERPL-MCNC: 2.6 GM/DL (ref 3.4–5)
ALP BLD-CCNC: 355 IU/L (ref 40–129)
ALT SERPL-CCNC: 2967 U/L (ref 10–40)
ANION GAP SERPL CALCULATED.3IONS-SCNC: 9 MMOL/L (ref 4–16)
AST SERPL-CCNC: 2992 IU/L (ref 15–37)
ATYPICAL LYMPHOCYTE ABSOLUTE COUNT: ABNORMAL
BANDED NEUTROPHILS ABSOLUTE COUNT: 0.84 K/CU MM
BANDED NEUTROPHILS RELATIVE PERCENT: 21 % (ref 5–11)
BILIRUB SERPL-MCNC: 3.4 MG/DL (ref 0–1)
BILIRUBIN DIRECT: 2.9 MG/DL (ref 0–0.3)
BILIRUBIN, INDIRECT: 0.5 MG/DL (ref 0–0.7)
BUN BLDV-MCNC: 11 MG/DL (ref 6–23)
CALCIUM SERPL-MCNC: 7.9 MG/DL (ref 8.3–10.6)
CHLORIDE BLD-SCNC: 100 MMOL/L (ref 99–110)
CHOLESTEROL: 72 MG/DL
CO2: 24 MMOL/L (ref 21–32)
CREAT SERPL-MCNC: 0.7 MG/DL (ref 0.9–1.3)
DIFFERENTIAL TYPE: ABNORMAL
GFR AFRICAN AMERICAN: >60 ML/MIN/1.73M2
GFR NON-AFRICAN AMERICAN: >60 ML/MIN/1.73M2
GLUCOSE BLD-MCNC: 63 MG/DL (ref 70–99)
GLUCOSE BLD-MCNC: 70 MG/DL (ref 70–99)
GLUCOSE BLD-MCNC: 71 MG/DL (ref 70–99)
GLUCOSE BLD-MCNC: 82 MG/DL (ref 70–99)
GLUCOSE BLD-MCNC: 83 MG/DL (ref 70–99)
GLUCOSE BLD-MCNC: 83 MG/DL (ref 70–99)
GLUCOSE BLD-MCNC: 89 MG/DL (ref 70–99)
HCT VFR BLD CALC: 34.5 % (ref 42–52)
HDLC SERPL-MCNC: 16 MG/DL
HEMOGLOBIN: 10.6 GM/DL (ref 13.5–18)
LDL CHOLESTEROL DIRECT: 8 MG/DL
LYMPHOCYTES ABSOLUTE: 1.1 K/CU MM
LYMPHOCYTES RELATIVE PERCENT: 27 % (ref 24–44)
MCH RBC QN AUTO: 28 PG (ref 27–31)
MCHC RBC AUTO-ENTMCNC: 30.7 % (ref 32–36)
MCV RBC AUTO: 91.3 FL (ref 78–100)
MONOCYTES ABSOLUTE: 0.2 K/CU MM
MONOCYTES RELATIVE PERCENT: 6 % (ref 0–4)
PDW BLD-RTO: 14.1 % (ref 11.7–14.9)
PLATELET # BLD: 196 K/CU MM (ref 140–440)
PLT MORPHOLOGY: ABNORMAL
PMV BLD AUTO: 9.8 FL (ref 7.5–11.1)
POLYCHROMASIA: ABNORMAL
POTASSIUM SERPL-SCNC: 4.2 MMOL/L (ref 3.5–5.1)
RBC # BLD: 3.78 M/CU MM (ref 4.6–6.2)
SEGMENTED NEUTROPHILS ABSOLUTE COUNT: 1.9 K/CU MM
SEGMENTED NEUTROPHILS RELATIVE PERCENT: 46 % (ref 36–66)
SODIUM BLD-SCNC: 133 MMOL/L (ref 135–145)
TOTAL PROTEIN: 6.1 GM/DL (ref 6.4–8.2)
TOXIC GRANULATION: PRESENT
TRIGL SERPL-MCNC: 164 MG/DL
WBC # BLD: 4 K/CU MM (ref 4–10.5)

## 2019-05-19 PROCEDURE — 1200000000 HC SEMI PRIVATE

## 2019-05-19 PROCEDURE — 2580000003 HC RX 258: Performed by: NURSE PRACTITIONER

## 2019-05-19 PROCEDURE — 80061 LIPID PANEL: CPT

## 2019-05-19 PROCEDURE — 6370000000 HC RX 637 (ALT 250 FOR IP): Performed by: NURSE PRACTITIONER

## 2019-05-19 PROCEDURE — 6360000002 HC RX W HCPCS: Performed by: HOSPITALIST

## 2019-05-19 PROCEDURE — 82248 BILIRUBIN DIRECT: CPT

## 2019-05-19 PROCEDURE — 82962 GLUCOSE BLOOD TEST: CPT

## 2019-05-19 PROCEDURE — 36415 COLL VENOUS BLD VENIPUNCTURE: CPT

## 2019-05-19 PROCEDURE — 2500000003 HC RX 250 WO HCPCS: Performed by: NURSE PRACTITIONER

## 2019-05-19 PROCEDURE — 6360000002 HC RX W HCPCS: Performed by: NURSE PRACTITIONER

## 2019-05-19 PROCEDURE — 83721 ASSAY OF BLOOD LIPOPROTEIN: CPT

## 2019-05-19 PROCEDURE — 85027 COMPLETE CBC AUTOMATED: CPT

## 2019-05-19 PROCEDURE — 80053 COMPREHEN METABOLIC PANEL: CPT

## 2019-05-19 PROCEDURE — 85007 BL SMEAR W/DIFF WBC COUNT: CPT

## 2019-05-19 RX ORDER — INSULIN GLARGINE 100 [IU]/ML
30 INJECTION, SOLUTION SUBCUTANEOUS NIGHTLY
Status: DISCONTINUED | OUTPATIENT
Start: 2019-05-19 | End: 2019-05-22 | Stop reason: HOSPADM

## 2019-05-19 RX ORDER — MORPHINE SULFATE 4 MG/ML
4 INJECTION, SOLUTION INTRAMUSCULAR; INTRAVENOUS EVERY 4 HOURS PRN
Status: DISCONTINUED | OUTPATIENT
Start: 2019-05-19 | End: 2019-05-22 | Stop reason: HOSPADM

## 2019-05-19 RX ADMIN — ENOXAPARIN SODIUM 40 MG: 40 INJECTION SUBCUTANEOUS at 08:21

## 2019-05-19 RX ADMIN — MORPHINE SULFATE 4 MG: 4 INJECTION, SOLUTION INTRAMUSCULAR; INTRAVENOUS at 01:41

## 2019-05-19 RX ADMIN — ASPIRIN 325 MG ORAL TABLET 325 MG: 325 PILL ORAL at 08:20

## 2019-05-19 RX ADMIN — SODIUM CHLORIDE, POTASSIUM CHLORIDE, SODIUM LACTATE AND CALCIUM CHLORIDE: 600; 310; 30; 20 INJECTION, SOLUTION INTRAVENOUS at 13:41

## 2019-05-19 RX ADMIN — SODIUM CHLORIDE, POTASSIUM CHLORIDE, SODIUM LACTATE AND CALCIUM CHLORIDE: 600; 310; 30; 20 INJECTION, SOLUTION INTRAVENOUS at 03:42

## 2019-05-19 RX ADMIN — MORPHINE SULFATE 4 MG: 4 INJECTION, SOLUTION INTRAMUSCULAR; INTRAVENOUS at 17:25

## 2019-05-19 RX ADMIN — DEXTROSE 15 G: 15 GEL ORAL at 16:28

## 2019-05-19 RX ADMIN — FAMOTIDINE 20 MG: 10 INJECTION, SOLUTION INTRAVENOUS at 08:21

## 2019-05-19 RX ADMIN — FAMOTIDINE 20 MG: 10 INJECTION, SOLUTION INTRAVENOUS at 20:48

## 2019-05-19 ASSESSMENT — PAIN DESCRIPTION - FREQUENCY: FREQUENCY: CONTINUOUS

## 2019-05-19 ASSESSMENT — PAIN DESCRIPTION - DESCRIPTORS: DESCRIPTORS: HEADACHE

## 2019-05-19 ASSESSMENT — PAIN SCALES - GENERAL
PAINLEVEL_OUTOF10: 0
PAINLEVEL_OUTOF10: 8
PAINLEVEL_OUTOF10: 8
PAINLEVEL_OUTOF10: 0

## 2019-05-19 ASSESSMENT — PAIN DESCRIPTION - LOCATION: LOCATION: ABDOMEN;HEAD

## 2019-05-19 ASSESSMENT — PAIN DESCRIPTION - PAIN TYPE: TYPE: ACUTE PAIN

## 2019-05-19 ASSESSMENT — PAIN DESCRIPTION - PROGRESSION: CLINICAL_PROGRESSION: GRADUALLY WORSENING

## 2019-05-19 NOTE — PROGRESS NOTES
HOSPITALIST PROGRESS NOTE  Date: 5/19/2019   Name: Tin Paiz   MRN: [id number]   YOB: 1980      Subjective/Interval Hx:   Patient is sleeping and stated that he is doing better but he still feels very tired and weak at this time. His abdominal pain no nausea vomiting.     Objective:   Physical Exam:   /62   Pulse 76   Temp 98.3 °F (36.8 °C) (Oral)   Resp 14   Ht 6' 1\" (1.854 m)   Wt 175 lb (79.4 kg)   SpO2 97%   BMI 23.09 kg/m²   General: no acute distress, well nourished and well hydrated  HEENT: NCAT  Heart: S1S2 RRR  Lungs: Clear to ascultation bilaterally, respiratory effort normal  Abdomen: soft, NT/ND, positive bowel sounds  Extremities: no pitting edema, nontender   Neuro: patient is awake, alert and orientated times 3, no gross deficits  Skin: no rashes or ecchymosis        Meds:   Meds:    sodium chloride flush  10 mL Intravenous 2 times per day    enoxaparin  40 mg Subcutaneous Daily    nicotine  1 patch Transdermal Daily    insulin lispro  0-6 Units Subcutaneous Q4H    aspirin  325 mg Oral Daily    famotidine (PEPCID) injection  20 mg Intravenous BID    insulin glargine  45 Units Subcutaneous Nightly      Infusions:    dextrose      lactated ringers 100 mL/hr at 05/19/19 0342     PRN Meds:   morphine 4 mg Q4H PRN   sodium chloride flush 10 mL PRN   ondansetron 4 mg Q6H PRN   glucose 15 g PRN   dextrose 12.5 g PRN   glucagon (rDNA) 1 mg PRN   dextrose 100 mL/hr PRN       Data/Labs:     Recent Labs     05/17/19 1700 05/18/19  1132 05/19/19  0619   WBC 5.4 5.0 4.0   HGB 11.3* 12.0* 10.6*   HCT 36.6* 38.3* 34.5*    247 196      Recent Labs     05/17/19 1700 05/18/19  1132 05/19/19  0619   * 134* 133*   K 4.2 3.8 4.2   CL 95* 93* 100   CO2 25 26 24   BUN 9 11 11   CREATININE 0.7* 0.7* 0.7*     Recent Labs     05/17/19 1700 05/18/19  1132 05/19/19  0619   AST 1,224* 2,313* 2,992*   ALT 1,424* 2,458* 2,967*   BILIDIR  --   --  2.9*   BILITOT 2.3*

## 2019-05-19 NOTE — CARE COORDINATION
CM provided list of tobacco and marijuana cessation programs in AVS.  Pt has PCP and health insurance that covers meds. CM available PRN.   Electronically signed by KYMBERLY Finley on 5/19/2019 at 8:17 AM

## 2019-05-19 NOTE — PLAN OF CARE
Problem:  Activity:  Goal: Risk for activity intolerance will decrease  Description  Risk for activity intolerance will decrease  Outcome: Ongoing     Problem: Health Behavior:  Goal: Ability to state signs and symptoms to report to health care provider will improve  Description  Ability to state signs and symptoms to report to health care provider will improve  Outcome: Ongoing     Problem: Physical Regulation:  Goal: Complications related to the disease process, condition or treatment will be avoided or minimized  Description  Complications related to the disease process, condition or treatment will be avoided or minimized  Outcome: Ongoing  Goal: Ability to maintain clinical measurements within normal limits will improve  Description  Ability to maintain clinical measurements within normal limits will improve  Outcome: Ongoing     Problem: Pain:  Goal: Pain level will decrease  Description  Pain level will decrease  Outcome: Ongoing  Goal: Control of acute pain  Description  Control of acute pain  Outcome: Ongoing  Goal: Control of chronic pain  Description  Control of chronic pain  Outcome: Ongoing
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
## 2019-05-20 LAB
ALBUMIN SERPL-MCNC: 2.7 GM/DL (ref 3.4–5)
ALP BLD-CCNC: 366 IU/L (ref 40–129)
ALT SERPL-CCNC: 3856 U/L (ref 10–40)
AST SERPL-CCNC: 3838 IU/L (ref 15–37)
BILIRUB SERPL-MCNC: 4.9 MG/DL (ref 0–1)
BILIRUBIN DIRECT: 4.3 MG/DL (ref 0–0.3)
BILIRUBIN, INDIRECT: 0.6 MG/DL (ref 0–0.7)
GLUCOSE BLD-MCNC: 142 MG/DL (ref 70–99)
GLUCOSE BLD-MCNC: 152 MG/DL (ref 70–99)
GLUCOSE BLD-MCNC: 170 MG/DL (ref 70–99)
GLUCOSE BLD-MCNC: 93 MG/DL (ref 70–99)
GLUCOSE BLD-MCNC: 93 MG/DL (ref 70–99)
INR BLD: 1.25 INDEX
LIPASE: 25 IU/L (ref 13–60)
PROTHROMBIN TIME: 14.2 SECONDS (ref 9.12–12.5)
TOTAL PROTEIN: 6.3 GM/DL (ref 6.4–8.2)

## 2019-05-20 PROCEDURE — 6360000002 HC RX W HCPCS: Performed by: HOSPITALIST

## 2019-05-20 PROCEDURE — 36415 COLL VENOUS BLD VENIPUNCTURE: CPT

## 2019-05-20 PROCEDURE — 83690 ASSAY OF LIPASE: CPT

## 2019-05-20 PROCEDURE — 6370000000 HC RX 637 (ALT 250 FOR IP): Performed by: NURSE PRACTITIONER

## 2019-05-20 PROCEDURE — 82962 GLUCOSE BLOOD TEST: CPT

## 2019-05-20 PROCEDURE — 85610 PROTHROMBIN TIME: CPT

## 2019-05-20 PROCEDURE — 2580000003 HC RX 258

## 2019-05-20 PROCEDURE — 6370000000 HC RX 637 (ALT 250 FOR IP): Performed by: SPECIALIST

## 2019-05-20 PROCEDURE — 1200000000 HC SEMI PRIVATE

## 2019-05-20 PROCEDURE — 2580000003 HC RX 258: Performed by: NURSE PRACTITIONER

## 2019-05-20 PROCEDURE — 80076 HEPATIC FUNCTION PANEL: CPT

## 2019-05-20 PROCEDURE — 6370000000 HC RX 637 (ALT 250 FOR IP): Performed by: HOSPITALIST

## 2019-05-20 PROCEDURE — 6360000002 HC RX W HCPCS: Performed by: NURSE PRACTITIONER

## 2019-05-20 RX ORDER — FAMOTIDINE 20 MG/1
20 TABLET, FILM COATED ORAL 2 TIMES DAILY
Status: DISCONTINUED | OUTPATIENT
Start: 2019-05-20 | End: 2019-05-22 | Stop reason: HOSPADM

## 2019-05-20 RX ADMIN — MORPHINE SULFATE 4 MG: 4 INJECTION, SOLUTION INTRAMUSCULAR; INTRAVENOUS at 17:14

## 2019-05-20 RX ADMIN — SODIUM CHLORIDE, POTASSIUM CHLORIDE, SODIUM LACTATE AND CALCIUM CHLORIDE: 600; 310; 30; 20 INJECTION, SOLUTION INTRAVENOUS at 21:18

## 2019-05-20 RX ADMIN — ENOXAPARIN SODIUM 40 MG: 40 INJECTION SUBCUTANEOUS at 08:24

## 2019-05-20 RX ADMIN — INSULIN GLARGINE 30 UNITS: 100 INJECTION, SOLUTION SUBCUTANEOUS at 21:20

## 2019-05-20 RX ADMIN — MORPHINE SULFATE 4 MG: 4 INJECTION, SOLUTION INTRAMUSCULAR; INTRAVENOUS at 23:17

## 2019-05-20 RX ADMIN — SODIUM CHLORIDE, POTASSIUM CHLORIDE, SODIUM LACTATE AND CALCIUM CHLORIDE: 600; 310; 30; 20 INJECTION, SOLUTION INTRAVENOUS at 01:35

## 2019-05-20 RX ADMIN — MORPHINE SULFATE 4 MG: 4 INJECTION, SOLUTION INTRAMUSCULAR; INTRAVENOUS at 02:00

## 2019-05-20 RX ADMIN — SODIUM CHLORIDE, PRESERVATIVE FREE 10 ML: 5 INJECTION INTRAVENOUS at 08:25

## 2019-05-20 RX ADMIN — FAMOTIDINE 20 MG: 20 TABLET ORAL at 08:24

## 2019-05-20 RX ADMIN — ASPIRIN 325 MG ORAL TABLET 325 MG: 325 PILL ORAL at 08:24

## 2019-05-20 RX ADMIN — FAMOTIDINE 20 MG: 20 TABLET ORAL at 21:18

## 2019-05-20 ASSESSMENT — PAIN DESCRIPTION - DESCRIPTORS: DESCRIPTORS: ACHING;DISCOMFORT;DULL

## 2019-05-20 ASSESSMENT — PAIN DESCRIPTION - PROGRESSION: CLINICAL_PROGRESSION: GRADUALLY IMPROVING

## 2019-05-20 ASSESSMENT — PAIN SCALES - GENERAL
PAINLEVEL_OUTOF10: 8
PAINLEVEL_OUTOF10: 0
PAINLEVEL_OUTOF10: 9
PAINLEVEL_OUTOF10: 0
PAINLEVEL_OUTOF10: 6

## 2019-05-20 ASSESSMENT — PAIN DESCRIPTION - LOCATION: LOCATION: BACK;ABDOMEN

## 2019-05-20 ASSESSMENT — PAIN DESCRIPTION - ORIENTATION: ORIENTATION: LOWER;MID

## 2019-05-20 ASSESSMENT — PAIN DESCRIPTION - PAIN TYPE: TYPE: ACUTE PAIN

## 2019-05-20 ASSESSMENT — PAIN DESCRIPTION - ONSET: ONSET: ON-GOING

## 2019-05-20 ASSESSMENT — PAIN - FUNCTIONAL ASSESSMENT: PAIN_FUNCTIONAL_ASSESSMENT: PREVENTS OR INTERFERES SOME ACTIVE ACTIVITIES AND ADLS

## 2019-05-20 ASSESSMENT — PAIN DESCRIPTION - FREQUENCY: FREQUENCY: CONTINUOUS

## 2019-05-20 NOTE — PLAN OF CARE
Problem:  Activity:  Goal: Risk for activity intolerance will decrease  Description  Risk for activity intolerance will decrease  5/19/2019 2222 by Javier Yuan RN  Outcome: Ongoing  5/19/2019 1424 by Pepe Natarajan RN  Outcome: Ongoing     Problem: Health Behavior:  Goal: Ability to state signs and symptoms to report to health care provider will improve  Description  Ability to state signs and symptoms to report to health care provider will improve  5/19/2019 2222 by Javier Yuan RN  Outcome: Ongoing  5/19/2019 1424 by Pepe Natarajan RN  Outcome: Ongoing     Problem: Physical Regulation:  Goal: Complications related to the disease process, condition or treatment will be avoided or minimized  Description  Complications related to the disease process, condition or treatment will be avoided or minimized  5/19/2019 2222 by Javier Yuan RN  Outcome: Ongoing  5/19/2019 1424 by Pepe Natarajan RN  Outcome: Ongoing  Goal: Ability to maintain clinical measurements within normal limits will improve  Description  Ability to maintain clinical measurements within normal limits will improve  5/19/2019 2222 by Javier Yuan RN  Outcome: Ongoing  5/19/2019 1424 by Pepe Natarajan RN  Outcome: Ongoing     Problem: Pain:  Goal: Pain level will decrease  Description  Pain level will decrease  5/19/2019 2222 by Javier Yuan RN  Outcome: Ongoing  5/19/2019 1424 by Pepe Natarajan RN  Outcome: Ongoing  Goal: Control of acute pain  Description  Control of acute pain  5/19/2019 2222 by Javier Yuan RN  Outcome: Ongoing  5/19/2019 1424 by Pepe Natarajan RN  Outcome: Ongoing  Goal: Control of chronic pain  Description  Control of chronic pain  5/19/2019 2222 by Javier Yuan RN  Outcome: Ongoing  5/19/2019 1424 by Pepe Natarajan RN  Outcome: Ongoing

## 2019-05-20 NOTE — CARE COORDINATION
CM into see pt to initiate a safe discharge plan. Cm into see, introduced self and explained role of CM. Pt and his mother in the room. Permission obtained to speak in her presence  Pt is kind, alert and oriented. Pt lives alone. Pt is able to drive, independent. Pt has a PCP. Pt has insurance prescriptions are obtainable most of the time. Pt shared that at times has a difficult time with co- pays with meter supplies. Pt also shared that he plans to change his pharmacy after being questioned as to why he needs needles. CM collaborated with Danny Ramsey RN regarding having the diabetic educator to see pt regarding his supplies and the financial strain. Pt is well supported by his mother. CM provided card and encouraged to call for any needs or concern. CM is available if any needs arise.    [address]

## 2019-05-20 NOTE — PROGRESS NOTES
Clinically he is feeling better and appetite improving  LFT's have not yet peaked though  INR 1.25  Impression:    1) acute Hep A    2) doubt significant pancreatitis      Suggest:   1) regular diet   2) keep in house at least another day

## 2019-05-20 NOTE — PROGRESS NOTES
HOSPITALIST PROGRESS NOTE  Date: 5/20/2019   Name: Dann Womack   MRN: [id number]   YOB: 1980      Subjective/Interval Hx:   Patient is awake and a lot more alert, states that he has been eating and has not had any abdominal pain or nausea vomiting he is becoming more jaundiced, and informed patient that we need to continue to watch his liver function tests since still trending up at this time.   Objective:   Physical Exam:   BP (!) 99/58   Pulse 78   Temp 98.4 °F (36.9 °C)   Resp 16   Ht 6' 1\" (1.854 m)   Wt 175 lb (79.4 kg)   SpO2 98%   BMI 23.09 kg/m²   General: no acute distress, well nourished and well hydrated  HEENT: NCAT  Heart: S1S2 RRR  Lungs: Clear to ascultation bilaterally, respiratory effort normal  Abdomen: soft, NT/ND, positive bowel sounds  Extremities: no pitting edema, nontender   Neuro: patient is awake, alert and orientated times 3, no gross deficits  Skin: no rashes or ecchymosis        Meds:   Meds:    famotidine  20 mg Oral BID    insulin lispro  0-6 Units Subcutaneous 4x Daily AC & HS    insulin glargine  30 Units Subcutaneous Nightly    sodium chloride flush  10 mL Intravenous 2 times per day    enoxaparin  40 mg Subcutaneous Daily    nicotine  1 patch Transdermal Daily    aspirin  325 mg Oral Daily      Infusions:    dextrose      lactated ringers 100 mL/hr at 05/20/19 0135     PRN Meds:     morphine 4 mg Q4H PRN   sodium chloride flush 10 mL PRN   ondansetron 4 mg Q6H PRN   glucose 15 g PRN   dextrose 12.5 g PRN   glucagon (rDNA) 1 mg PRN   dextrose 100 mL/hr PRN       Data/Labs:     Recent Labs     05/17/19  1700 05/18/19  1132 05/19/19  0619   WBC 5.4 5.0 4.0   HGB 11.3* 12.0* 10.6*   HCT 36.6* 38.3* 34.5*    247 196      Recent Labs     05/17/19  1700 05/18/19  1132 05/19/19  0619   * 134* 133*   K 4.2 3.8 4.2   CL 95* 93* 100   CO2 25 26 24   BUN 9 11 11   CREATININE 0.7* 0.7* 0.7*     Recent Labs     05/18/19  1132 05/19/19  9549 05/20/19  0544   AST 2,313* 2,992* 3,838*   ALT 2,458* 2,967* 3,856*   BILIDIR  --  2.9* 4.3*   BILITOT 3.5* 3.4* 4.9*   ALKPHOS 458* 355* 366*     Recent Labs     05/18/19  1132 05/20/19  0544   INR 1.21 1.25     No results for input(s): CKTOTAL, CKMB, CKMBINDEX, TROPONINT in the last 72 hours. I/O last 3 completed shifts: In: 9479 [I.V.:5345]  Out: 400 [Urine:400]    Intake/Output Summary (Last 24 hours) at 5/20/2019 1412  Last data filed at 5/20/2019 0957  Gross per 24 hour   Intake 5465 ml   Output 400 ml   Net 5065 ml        Assessment/Plan:    Acute hepatitis A - elevated liver enzymes + positive Hep A IgM. - admit inpatient, telemetry monitoring          - consult GI         - IVF, LR         - NPO effective now         - symptomatic care         - prn Zofran         - check lab works in AM  05/20/19-patient liver enzymes are continuing to elevate every day, GI is following, patient is feeling better today denies any nausea or vomiting or abdominal  pain is able to tolerate regular foods. If LFT continues to increase may need to have patient transferred to liver specialist at  or [facility] tomorrow        Suspected, acute pancreatitis - idiopathic, cont treatment above, GI for further evaluation.      DM type 2 - c/w Lantus + Sliding scale. Hypoglycemia protocol. Accucheck. Hold oral hypoglycemic agents for now.       Tobacco abuse - reports smoking cigarettes 1 PPD; Tobacco cessation education, Nicotine patch.         DVT Prophylaxis: lovenox  Diet: DIET GENERAL; Carb Control: 4 carb choices (60 gms)/meal  Code Status: Full Code    Dispo:may need to be transferred to liver specialist at [facility] or  if LFT continues to elevated    Electronically signed by Skip Louis MD on 5/20/2019 at 2:12 PM

## 2019-05-21 LAB
ALBUMIN SERPL-MCNC: 2.6 GM/DL (ref 3.4–5)
ALP BLD-CCNC: 304 IU/L (ref 40–129)
ALT SERPL-CCNC: 3631 U/L (ref 10–40)
AST SERPL-CCNC: 3092 IU/L (ref 15–37)
BILIRUB SERPL-MCNC: 5.5 MG/DL (ref 0–1)
BILIRUBIN DIRECT: 4.7 MG/DL (ref 0–0.3)
BILIRUBIN, INDIRECT: 0.8 MG/DL (ref 0–0.7)
GLUCOSE BLD-MCNC: 133 MG/DL (ref 70–99)
GLUCOSE BLD-MCNC: 159 MG/DL (ref 70–99)
GLUCOSE BLD-MCNC: 162 MG/DL (ref 70–99)
GLUCOSE BLD-MCNC: 254 MG/DL (ref 70–99)
INR BLD: 1.36 INDEX
PROTHROMBIN TIME: 15.5 SECONDS (ref 9.12–12.5)
TOTAL PROTEIN: 6.2 GM/DL (ref 6.4–8.2)

## 2019-05-21 PROCEDURE — 2580000003 HC RX 258: Performed by: NURSE PRACTITIONER

## 2019-05-21 PROCEDURE — 82962 GLUCOSE BLOOD TEST: CPT

## 2019-05-21 PROCEDURE — 6370000000 HC RX 637 (ALT 250 FOR IP): Performed by: HOSPITALIST

## 2019-05-21 PROCEDURE — 2580000003 HC RX 258

## 2019-05-21 PROCEDURE — 1200000000 HC SEMI PRIVATE

## 2019-05-21 PROCEDURE — 6360000002 HC RX W HCPCS: Performed by: HOSPITALIST

## 2019-05-21 PROCEDURE — 6370000000 HC RX 637 (ALT 250 FOR IP): Performed by: NURSE PRACTITIONER

## 2019-05-21 PROCEDURE — 36415 COLL VENOUS BLD VENIPUNCTURE: CPT

## 2019-05-21 PROCEDURE — 6360000002 HC RX W HCPCS: Performed by: NURSE PRACTITIONER

## 2019-05-21 PROCEDURE — 85610 PROTHROMBIN TIME: CPT

## 2019-05-21 PROCEDURE — 80076 HEPATIC FUNCTION PANEL: CPT

## 2019-05-21 PROCEDURE — 6370000000 HC RX 637 (ALT 250 FOR IP): Performed by: SPECIALIST

## 2019-05-21 RX ADMIN — FAMOTIDINE 20 MG: 20 TABLET ORAL at 21:15

## 2019-05-21 RX ADMIN — MORPHINE SULFATE 4 MG: 4 INJECTION, SOLUTION INTRAMUSCULAR; INTRAVENOUS at 14:09

## 2019-05-21 RX ADMIN — SODIUM CHLORIDE, POTASSIUM CHLORIDE, SODIUM LACTATE AND CALCIUM CHLORIDE: 600; 310; 30; 20 INJECTION, SOLUTION INTRAVENOUS at 06:44

## 2019-05-21 RX ADMIN — FAMOTIDINE 20 MG: 20 TABLET ORAL at 09:26

## 2019-05-21 RX ADMIN — SODIUM CHLORIDE, PRESERVATIVE FREE 10 ML: 5 INJECTION INTRAVENOUS at 21:16

## 2019-05-21 RX ADMIN — INSULIN LISPRO 1 UNITS: 100 INJECTION, SOLUTION INTRAVENOUS; SUBCUTANEOUS at 18:53

## 2019-05-21 RX ADMIN — MORPHINE SULFATE 4 MG: 4 INJECTION, SOLUTION INTRAMUSCULAR; INTRAVENOUS at 21:15

## 2019-05-21 RX ADMIN — ASPIRIN 325 MG ORAL TABLET 325 MG: 325 PILL ORAL at 09:26

## 2019-05-21 RX ADMIN — INSULIN GLARGINE 30 UNITS: 100 INJECTION, SOLUTION SUBCUTANEOUS at 21:16

## 2019-05-21 RX ADMIN — INSULIN LISPRO 1 UNITS: 100 INJECTION, SOLUTION INTRAVENOUS; SUBCUTANEOUS at 14:11

## 2019-05-21 RX ADMIN — INSULIN LISPRO 3 UNITS: 100 INJECTION, SOLUTION INTRAVENOUS; SUBCUTANEOUS at 21:15

## 2019-05-21 RX ADMIN — ENOXAPARIN SODIUM 40 MG: 40 INJECTION SUBCUTANEOUS at 09:26

## 2019-05-21 RX ADMIN — MORPHINE SULFATE 4 MG: 4 INJECTION, SOLUTION INTRAMUSCULAR; INTRAVENOUS at 09:27

## 2019-05-21 ASSESSMENT — PAIN - FUNCTIONAL ASSESSMENT
PAIN_FUNCTIONAL_ASSESSMENT: PREVENTS OR INTERFERES SOME ACTIVE ACTIVITIES AND ADLS
PAIN_FUNCTIONAL_ASSESSMENT: PREVENTS OR INTERFERES SOME ACTIVE ACTIVITIES AND ADLS

## 2019-05-21 ASSESSMENT — PAIN DESCRIPTION - PROGRESSION
CLINICAL_PROGRESSION: GRADUALLY IMPROVING
CLINICAL_PROGRESSION: RAPIDLY IMPROVING
CLINICAL_PROGRESSION: GRADUALLY IMPROVING
CLINICAL_PROGRESSION: NOT CHANGED
CLINICAL_PROGRESSION: GRADUALLY IMPROVING

## 2019-05-21 ASSESSMENT — PAIN DESCRIPTION - FREQUENCY
FREQUENCY: CONTINUOUS

## 2019-05-21 ASSESSMENT — PAIN SCALES - GENERAL
PAINLEVEL_OUTOF10: 7
PAINLEVEL_OUTOF10: 3
PAINLEVEL_OUTOF10: 3
PAINLEVEL_OUTOF10: 6
PAINLEVEL_OUTOF10: 6

## 2019-05-21 ASSESSMENT — PAIN DESCRIPTION - LOCATION
LOCATION: LEG
LOCATION: ABDOMEN;BACK
LOCATION: ABDOMEN;BACK

## 2019-05-21 ASSESSMENT — PAIN DESCRIPTION - DESCRIPTORS
DESCRIPTORS: ACHING;DISCOMFORT;DULL
DESCRIPTORS: ACHING
DESCRIPTORS: ACHING;DISCOMFORT;DULL

## 2019-05-21 ASSESSMENT — PAIN DESCRIPTION - ORIENTATION
ORIENTATION: LOWER;MID
ORIENTATION: LOWER;MID

## 2019-05-21 ASSESSMENT — PAIN DESCRIPTION - ONSET
ONSET: ON-GOING

## 2019-05-21 ASSESSMENT — PAIN DESCRIPTION - PAIN TYPE
TYPE: ACUTE PAIN

## 2019-05-21 NOTE — PROGRESS NOTES
.Progress Note (Hospitalist, Internal Medicine)  IDENTIFYING INFORMATION   PATIENT:  Jann Lucio  MRN:  [id number]  ADMIT DATE: 5/18/2019  TIME OF EVALUATION: 5/21/2019 9:39 AM      HISTORY OF PRESENT ILLNESS   Jann Lucio is a 44 y.o. male who presents tto the ED with abnormal liver enzymes, nausea and vomiting after a referral from his PCP. He then tested positive for hepatitis A. He initially had no abdominal pain but subsequently developed right  Upper quadrants pain which is beginning to subside. Patient also demonstrating progressively worsening transaminitis with worsening generalized weakness and poor appetite  SUBJECTIVE     He complains of malaise,, weakness, poor appetite and cramping in his lower extremities. MEDICATIONS   Medications Prior to Admission  Medications Prior to Admission: clindamycin (CLEOCIN) 300 MG capsule, Take 1 capsule by mouth 3 times daily for 10 days  omeprazole (PRILOSEC) 20 MG delayed release capsule, Take 1 capsule by mouth every morning (before breakfast)  aspirin 325 MG tablet, Take 325 mg by mouth daily  insulin glargine (LANTUS) 100 UNIT/ML injection vial, Inject 45 Units into the skin nightly  metFORMIN (GLUCOPHAGE) 1000 MG tablet, Take 1 tablet by mouth 2 times daily (with meals)  insulin lispro (HUMALOG) 100 UNIT/ML injection vial, Inject 15 Units into the skin 3 times daily (with meals)  insulin lispro (HUMALOG) 100 UNIT/ML injection vial, Inject 0-12 Units into the skin 3 times daily (with meals)  blood glucose monitor strips, Test 3 times a day & as needed for symptoms of irregular blood glucose.   INSULIN SYRINGE .5CC/29G ([employer] INS SYR .5CC/29G) 29G X 1/2\" 0.5 ML MISC, 1 each by Does not apply route daily  Blood Glucose Monitoring Suppl (ONE TOUCH ULTRA 2) w/Device KIT, 1 kit by Does not apply route once for 1 dose    Current Medications  Current Facility-Administered Medications   Medication Dose Route Frequency Provider Last Rate Last Dose    famotidine (PEPCID) tablet 20 mg  20 mg Oral BID Jayla Mott MD   20 mg at 05/21/19 0926    morphine sulfate (PF) injection 4 mg  4 mg Intravenous Q4H PRN Leydi Lemons MD   4 mg at 05/21/19 0927    insulin lispro (HUMALOG) injection vial 0-6 Units  0-6 Units Subcutaneous 4x Daily AC & HS Som Talbert MD   Stopped at 05/20/19 2124    insulin glargine (LANTUS) injection vial 30 Units  30 Units Subcutaneous Nightly Som Talbert MD   30 Units at 05/20/19 2120    sodium chloride flush 0.9 % injection 10 mL  10 mL Intravenous 2 times per day BARBARA Brown CNP   Stopped at 05/20/19 2216    sodium chloride flush 0.9 % injection 10 mL  10 mL Intravenous PRN BARBARA Brown CNP        ondansetron (ZOFRAN) injection 4 mg  4 mg Intravenous Q6H PRN BARBARA Brown CNP   4 mg at 05/18/19 1548    enoxaparin (LOVENOX) injection 40 mg  40 mg Subcutaneous Daily Ninfa Leblanc APRN - CNP   40 mg at 05/21/19 0926    nicotine (NICODERM CQ) 21 MG/24HR 1 patch  1 patch Transdermal Daily BARBARA Brown CNP   1 patch at 05/21/19 0933    glucose (GLUTOSE) 40 % oral gel 15 g  15 g Oral PRN BARBARA Brown - CNP   15 g at 05/19/19 1628    dextrose 50 % solution 12.5 g  12.5 g Intravenous PRN Ninfa Leblanc APRN - CNP        glucagon (rDNA) injection 1 mg  1 mg Intramuscular PRN Ninfa Leblanc APRN - CNP        dextrose 5 % solution  100 mL/hr Intravenous PRN BARBARA Brown - CNP        aspirin tablet 325 mg  325 mg Oral Daily Ninfa LeblancMAISHA casasN - CNP   325 mg at 05/21/19 5002    lactated ringers infusion   Intravenous Continuous BARBARA Escobar -  mL/hr at 05/21/19 1763           Allergies  No Known Allergies    REVIEW OF SYSTEMS     Within above limitations. 14 point review of systems reviewed. Pertinent positive or negative as per HPI or otherwise negative per 14 point systems review.      Reviewed 5/21/2019 at 9:39 AM    PHYSICAL EXAM       Blood pressure 119/66, pulse 83, temperature 98.6 °F (37 °C), temperature source Oral, resp. rate 14, height 6' 1\" (1.854 m), weight 175 lb (79.4 kg), SpO2 96 %. General - AAO x 3  Psych - Appropriate affect/speech. No agitation  Eyes - Eye lids intact. scleral icterus present  ENT - Lips wnl. External ear clear/dry/intact. No thyromegaly on inspection  Neuro - No gross peripheral or central neuro deficits on inspection  Heart - Sinus. RRR. S1 and S2 present. No added HS/murmurs appreciated. No elevated JVD appreciated  Lung - Adequate air entry b/l, No crackles/wheezes appreciated  GI -  Soft. No guarding/rigidity. No hepatosplenomegaly/ascites. BS+   - No CVA/suprapubic tenderness or palpable bladder distension  Skin - Intact. No rash/petechiae/ecchymosis. Warm extremities, general icterus  MSK - Joints with normal ROM.  No joint swellings      Lines/Drains/Airways/Wounds:  [unfilled]    LABS AND IMAGING   CBC  [unfilled]    Last 3 Hemoglobin  Lab Results   Component Value Date    HGB 10.6 05/19/2019    HGB 12.0 05/18/2019    HGB 11.3 05/17/2019     Last 3 WBC/ANC  Lab Results   Component Value Date    WBC 4.0 05/19/2019    WBC 5.0 05/18/2019    WBC 5.4 05/17/2019     No components found for: GRNLOCTYABS  Last 3 Platelets  No results found for: PLATELET  Chemistry  [unfilled]  [unfilled]  No results found for: LDH  Coagulation Studies  Lab Results   Component Value Date    INR 1.25 05/20/2019     Liver Function Studies  Lab Results   Component Value Date    ALT 3,856 05/20/2019    AST 3,838 05/20/2019    ALKPHOS 366 05/20/2019       Recent Imaging        Relevant labs and imaging reviewed    ASSESSMENT AND PLAN     Hepatitis A  Continue conservative management  Continue IV fluids  Starts by mouth diet as tolerated  When necessary Zofran for nausea  Transaminitis monitoring  Also monitor INR    Acute pancreatitis  Abdominal pain improved  Continue IV fluids and pain control    DM type II  Continue blood sugar management with insulin.     DVT prophylaxis  [facility], Internal Medicine  5/21/2019 at 9:39 AM

## 2019-05-21 NOTE — CONSULTS
Reason for Consult? needs assistance with supplies and insurance issues  Patient seen for diabetic educational needs. Tin Paiz is a 44y.o. year old male admitted with Acute hepatitis A  Patient Active Problem List   Diagnosis    Indu's disease    Type 2 diabetes mellitus without complication, with long-term current use of insulin ([facility] [address]    Gastroesophageal reflux disease    Acute hepatitis A    Idiopathic acute pancreatitis    Tobacco abuse      HgBA1c:    Lab Results   Component Value Date    LABA1C 8.4 05/17/2019   Reports previous Dx Type 2. Reports that BG was under better control at one time but has recently had problems with  covering cost of test strips. States that he has meter but needs test strips and lancets. \"Can't afford\"  Reports that he does have vials of Lantus and Humalog at home. Has \"10 insulin syringes\" Reports that pharmacy questions \"if he really needs them\". Reports that he was injecting 45 units of Humalog at bedtime and 15 units of Lantus 3 times a day with meals. Reports that he is  certain that was the directions for insulin dosing from [facility]. Has not attended Diabetes education and receptive to educator visit. Diabetes Self Management Education provided.:  Insulin administration: Discussed insulin with patient. Explained normal insulin physiology. Explained Lantus and Humalog. Explained prandial and correction insulin with Humalog. Explained Lantus as basal insulin. Discussed insulin peaks and duration. Discussed correction scale for appropriate dosage.  Discussed current insulin regimen (Basal correction) and how meals/BG monitoring and insulin dosing will be coordinated. Discussed that he will be provided clear instructions on how to Manage insulin at discharge  Identification of appropriate insulin injection sites by the patient verabized.   · Glucose Monitoring:Verbalizesd understanding of BG targets A1C targets and Sick day mgt.  Verbalized understanding of when to call provider. · Meal planning: Reviewed importance of eating three meals per day and plate method for consistent carb intake. Provided copy \"Carbohydrate Counting For People With Diabetes/Reading Food Labels\"  Verbalized understanding. · Hypoglycemia: Reviewed symptoms, prevention and treatment. Verbalized understanding. · Hyperglycemia:  Reviewed symptoms, prevention and treatment. Verbalized understanding. · Discussed strategies for obtaining affordable medications and Diabetes supplies. Med Assist consulted. CAse Mgt following. Printed information Diabetes,  contact number for Diabetes Educator and resources for ongoing education and support provided. Verbalized interest in attending diabetes classes as outpatient. Jeannette Green RN, BSN, CDE

## 2019-05-22 ENCOUNTER — TELEPHONE (OUTPATIENT)
Dept: INTERNAL MEDICINE CLINIC | Age: 39
End: 2019-05-22

## 2019-05-22 VITALS
HEIGHT: 73 IN | RESPIRATION RATE: 19 BRPM | BODY MASS INDEX: 23.19 KG/M2 | SYSTOLIC BLOOD PRESSURE: 115 MMHG | TEMPERATURE: 98.6 F | WEIGHT: 175 LBS | OXYGEN SATURATION: 98 % | DIASTOLIC BLOOD PRESSURE: 69 MMHG | HEART RATE: 78 BPM

## 2019-05-22 LAB
ALBUMIN SERPL-MCNC: 2.4 GM/DL (ref 3.4–5)
ALP BLD-CCNC: 270 IU/L (ref 40–129)
ALT SERPL-CCNC: 3340 U/L (ref 10–40)
AST SERPL-CCNC: 2557 IU/L (ref 15–37)
BILIRUB SERPL-MCNC: 5.6 MG/DL (ref 0–1)
BILIRUBIN DIRECT: 4.9 MG/DL (ref 0–0.3)
BILIRUBIN, INDIRECT: 0.7 MG/DL (ref 0–0.7)
GLUCOSE BLD-MCNC: 116 MG/DL (ref 70–99)
GLUCOSE BLD-MCNC: 155 MG/DL (ref 70–99)
GLUCOSE BLD-MCNC: 97 MG/DL (ref 70–99)
INR BLD: 1.39 INDEX
PROTHROMBIN TIME: 15.8 SECONDS (ref 9.12–12.5)
TOTAL PROTEIN: 6.1 GM/DL (ref 6.4–8.2)

## 2019-05-22 PROCEDURE — 2580000003 HC RX 258: Performed by: NURSE PRACTITIONER

## 2019-05-22 PROCEDURE — 6370000000 HC RX 637 (ALT 250 FOR IP): Performed by: HOSPITALIST

## 2019-05-22 PROCEDURE — 6360000002 HC RX W HCPCS: Performed by: NURSE PRACTITIONER

## 2019-05-22 PROCEDURE — 82962 GLUCOSE BLOOD TEST: CPT

## 2019-05-22 PROCEDURE — 80076 HEPATIC FUNCTION PANEL: CPT

## 2019-05-22 PROCEDURE — 36415 COLL VENOUS BLD VENIPUNCTURE: CPT

## 2019-05-22 PROCEDURE — 94761 N-INVAS EAR/PLS OXIMETRY MLT: CPT

## 2019-05-22 PROCEDURE — 6360000002 HC RX W HCPCS: Performed by: HOSPITALIST

## 2019-05-22 PROCEDURE — 6370000000 HC RX 637 (ALT 250 FOR IP): Performed by: NURSE PRACTITIONER

## 2019-05-22 PROCEDURE — 85610 PROTHROMBIN TIME: CPT

## 2019-05-22 PROCEDURE — 6370000000 HC RX 637 (ALT 250 FOR IP): Performed by: SPECIALIST

## 2019-05-22 RX ADMIN — INSULIN LISPRO 1 UNITS: 100 INJECTION, SOLUTION INTRAVENOUS; SUBCUTANEOUS at 13:41

## 2019-05-22 RX ADMIN — FAMOTIDINE 20 MG: 20 TABLET ORAL at 08:54

## 2019-05-22 RX ADMIN — ASPIRIN 325 MG ORAL TABLET 325 MG: 325 PILL ORAL at 08:54

## 2019-05-22 RX ADMIN — MORPHINE SULFATE 4 MG: 4 INJECTION, SOLUTION INTRAMUSCULAR; INTRAVENOUS at 05:24

## 2019-05-22 RX ADMIN — ENOXAPARIN SODIUM 40 MG: 40 INJECTION SUBCUTANEOUS at 08:54

## 2019-05-22 RX ADMIN — SODIUM CHLORIDE, POTASSIUM CHLORIDE, SODIUM LACTATE AND CALCIUM CHLORIDE: 600; 310; 30; 20 INJECTION, SOLUTION INTRAVENOUS at 04:51

## 2019-05-22 ASSESSMENT — PAIN DESCRIPTION - DESCRIPTORS: DESCRIPTORS: ACHING

## 2019-05-22 ASSESSMENT — PAIN DESCRIPTION - PROGRESSION
CLINICAL_PROGRESSION: GRADUALLY IMPROVING
CLINICAL_PROGRESSION: RAPIDLY WORSENING
CLINICAL_PROGRESSION: GRADUALLY IMPROVING
CLINICAL_PROGRESSION: GRADUALLY IMPROVING

## 2019-05-22 ASSESSMENT — PAIN DESCRIPTION - FREQUENCY: FREQUENCY: CONTINUOUS

## 2019-05-22 ASSESSMENT — PAIN DESCRIPTION - LOCATION: LOCATION: ABDOMEN

## 2019-05-22 ASSESSMENT — PAIN SCALES - GENERAL: PAINLEVEL_OUTOF10: 8

## 2019-05-22 ASSESSMENT — PAIN DESCRIPTION - PAIN TYPE: TYPE: ACUTE PAIN

## 2019-05-22 ASSESSMENT — PAIN DESCRIPTION - ONSET: ONSET: ON-GOING

## 2019-05-22 NOTE — PROGRESS NOTES
LFT's have peaked and he is feeling better  OK with me to discharge  Suggested he have his PCP follow LFT's every week until normalization  He does not need to see me unless having a problem

## 2019-05-22 NOTE — PROGRESS NOTES
Pt is discharged and his Mom is his ride home, she lives two hours away and he is waiting for her to arrive.

## 2019-05-24 ENCOUNTER — TELEPHONE (OUTPATIENT)
Dept: INTERNAL MEDICINE CLINIC | Age: 39
End: 2019-05-24

## 2019-05-29 ENCOUNTER — CARE COORDINATION (OUTPATIENT)
Dept: CARE COORDINATION | Age: 39
End: 2019-05-29

## 2019-05-29 ENCOUNTER — HOSPITAL ENCOUNTER (OUTPATIENT)
Age: 39
Discharge: HOME | End: 2019-05-29
Payer: COMMERCIAL

## 2019-05-29 ENCOUNTER — OFFICE VISIT (OUTPATIENT)
Dept: INTERNAL MEDICINE CLINIC | Age: 39
End: 2019-05-29
Payer: COMMERCIAL

## 2019-05-29 VITALS
WEIGHT: 175 LBS | HEIGHT: 73 IN | DIASTOLIC BLOOD PRESSURE: 72 MMHG | SYSTOLIC BLOOD PRESSURE: 112 MMHG | BODY MASS INDEX: 23.19 KG/M2 | HEART RATE: 71 BPM | RESPIRATION RATE: 22 BRPM | OXYGEN SATURATION: 99 %

## 2019-05-29 DIAGNOSIS — B15.9 ACUTE HEPATITIS A: ICD-10-CM

## 2019-05-29 DIAGNOSIS — Z76.89 ENCOUNTER FOR SUPPORT AND COORDINATION OF TRANSITION OF CARE: Primary | ICD-10-CM

## 2019-05-29 DIAGNOSIS — Z79.4 TYPE 2 DIABETES MELLITUS WITHOUT COMPLICATION, WITH LONG-TERM CURRENT USE OF INSULIN (HCC): ICD-10-CM

## 2019-05-29 DIAGNOSIS — E11.9 TYPE 2 DIABETES MELLITUS WITHOUT COMPLICATION, WITH LONG-TERM CURRENT USE OF INSULIN (HCC): ICD-10-CM

## 2019-05-29 LAB
ALBUMIN SERPL-MCNC: 2.9 GM/DL (ref 3.4–5)
ALP BLD-CCNC: 328 IU/L (ref 40–129)
ALT SERPL-CCNC: 447 U/L (ref 10–40)
AST SERPL-CCNC: 131 IU/L (ref 15–37)
BILIRUB SERPL-MCNC: 5.8 MG/DL (ref 0–1)
BILIRUBIN DIRECT: 4.1 MG/DL (ref 0–0.3)
BILIRUBIN, INDIRECT: 1.7 MG/DL (ref 0–0.7)
INR BLD: 1.02 INDEX
PROTHROMBIN TIME: 11.8 SECONDS (ref 9.12–12.5)
TOTAL PROTEIN: 8.3 GM/DL (ref 6.4–8.2)

## 2019-05-29 PROCEDURE — 85610 PROTHROMBIN TIME: CPT

## 2019-05-29 PROCEDURE — 36415 COLL VENOUS BLD VENIPUNCTURE: CPT

## 2019-05-29 PROCEDURE — 80076 HEPATIC FUNCTION PANEL: CPT

## 2019-05-29 PROCEDURE — 1111F DSCHRG MED/CURRENT MED MERGE: CPT | Performed by: NURSE PRACTITIONER

## 2019-05-29 PROCEDURE — 99495 TRANSJ CARE MGMT MOD F2F 14D: CPT | Performed by: NURSE PRACTITIONER

## 2019-05-29 RX ORDER — INSULIN GLARGINE 100 [IU]/ML
25 INJECTION, SOLUTION SUBCUTANEOUS NIGHTLY
Qty: 1 VIAL | Refills: 3 | Status: SHIPPED | OUTPATIENT
Start: 2019-05-29 | End: 2020-03-24

## 2019-05-29 NOTE — PROGRESS NOTES
Post-Discharge Transitional Care Management Services or Hospital Follow Up      Anish Cornejo   YOB: 1980    Date of Office Visit:  5/29/2019  Date of Hospital Admission: 5/18/19  Date of Hospital Discharge: 5/22/19  Risk of hospital readmission (high >=14%. Medium >=10%) :Readmission Risk Score: 10      Care management risk score Rising risk (score 2-5) and Complex Care (Scores >=6): 2     Non face to face  following discharge, date last encounter closed (first attempt may have been earlier): 5/24/2019  3:51 PM    Call initiated 2 business days of discharge: Yes     Patient was recently admitted to [facility] through ED after being sent by our office due to severely elevated LFT's. Over the course of the patient's stay he was found to have Hepatitis A and his LFT's spiked as high as nearly 4000. Exact cause is unknown but patient does report eating frequently at [facility] on [facility] and [facility]. They considered transfer to [facility], however, his LFTs peaked and are now slowly declining back towards normal. He was followed closely by Dr Catia Mccormick, giving strict clear liquid diet precautions and hydrated by IV. He continues to remains fatigued and mildly jaundiced, however, states he is feeling better overall. His appetite is poor, but improving. We need to continue monitoring his liver function weekly until his levels normalize. Patient also states his blood sugars have been averaging 120-150 first thing in the morning while holding his Lantus. It was resumed at 45 units which is what he was on previously. He also needs education on properly using his sliding scale.      Patient Active Problem List   Diagnosis    Indu's disease    Type 2 diabetes mellitus without complication, with long-term current use of insulin ([facility]    Gastroesophageal reflux disease    Acute hepatitis A    Idiopathic acute pancreatitis    Tobacco abuse       No Known Allergies    Medications listed as ordered at the time of discharge from hospital   Caty Schuler   Home Medication Instructions HASMUKH:    Printed on:05/29/19 0995   Medication Information                      aspirin 325 MG tablet  Take 325 mg by mouth daily             blood glucose monitor strips  Test 3 times a day & as needed for symptoms of irregular blood glucose. Blood Glucose Monitoring Suppl (ONE TOUCH ULTRA 2) w/Device KIT  1 kit by Does not apply route once for 1 dose             insulin glargine (LANTUS) 100 UNIT/ML injection vial  Inject 25 Units into the skin nightly             insulin lispro (HUMALOG) 100 UNIT/ML injection vial  Inject 15 Units into the skin 3 times daily (with meals)             insulin lispro (HUMALOG) 100 UNIT/ML injection vial  Inject 0-12 Units into the skin 3 times daily (with meals)             INSULIN SYRINGE .5CC/29G ([employer] INS SYR .5CC/29G) 29G X 1/2\" 0.5 ML MISC  1 each by Does not apply route daily             metFORMIN (GLUCOPHAGE) 1000 MG tablet  Take 1 tablet by mouth 2 times daily (with meals)             omeprazole (PRILOSEC) 20 MG delayed release capsule  Take 1 capsule by mouth every morning (before breakfast)                   Medications marked \"taking\" at this time  Outpatient Medications Marked as Taking for the 5/29/19 encounter (Office Visit) with BARBARA Damon CNP   Medication Sig Dispense Refill    insulin glargine (LANTUS) 100 UNIT/ML injection vial Inject 25 Units into the skin nightly 1 vial 3    omeprazole (PRILOSEC) 20 MG delayed release capsule Take 1 capsule by mouth every morning (before breakfast) 30 capsule 0    blood glucose monitor strips Test 3 times a day & as needed for symptoms of irregular blood glucose.  270 strip 2    INSULIN SYRINGE .5CC/29G ([employer] INS SYR .5CC/29G) 29G X 1/2\" 0.5 ML MISC 1 each by Does not apply route daily 100 each 3    aspirin 325 MG tablet Take 325 mg by mouth daily      metFORMIN (GLUCOPHAGE) 1000 MG tablet Take 1 tablet by mouth 2 times daily (with meals) 60 tablet 3    insulin lispro (HUMALOG) 100 UNIT/ML injection vial Inject 15 Units into the skin 3 times daily (with meals) 1 vial 3    insulin lispro (HUMALOG) 100 UNIT/ML injection vial Inject 0-12 Units into the skin 3 times daily (with meals) 1 vial 3        Medications patient taking as of now reconciled against medications ordered at time of hospital discharge: Yes    Chief Complaint   Patient presents with    Follow-Up from Hospital     fatigue     Diabetes     discuss medications        History of Present illness - Follow up of Hospital diagnosis(es): Acute Hepatitis A    Inpatient course: Discharge summary reviewed- see chart. Interval history/Current status: improving    A comprehensive review of systems was negative except for what was noted in the HPI. Vitals:    05/29/19 0836   BP: 112/72   Pulse: 71   Resp: 22   SpO2: 99%   Weight: 175 lb (79.4 kg)   Height: 6' 1\" (1.854 m)     Body mass index is 23.09 kg/m².    Wt Readings from Last 3 Encounters:   05/29/19 175 lb (79.4 kg)   05/18/19 175 lb (79.4 kg)   05/13/19 179 lb 6.4 oz (81.4 kg)     BP Readings from Last 3 Encounters:   05/29/19 112/72   05/22/19 115/69   05/13/19 102/62        Physical Exam:  General Appearance: alert and oriented to person, place and time, well developed and well- nourished, in no acute distress  Skin: warm and dry, no rash or erythema  Head: normocephalic and atraumatic  Eyes: pupils equal, round, and reactive to light, extraocular eye movements intact, conjunctivae normal  ENT: tympanic membrane, external ear and ear canal normal bilaterally, nose without deformity, nasal mucosa and turbinates normal without polyps  Neck: supple and non-tender without mass, no thyromegaly or thyroid nodules, no cervical lymphadenopathy  Pulmonary/Chest: clear to auscultation bilaterally- no wheezes, rales or rhonchi, normal air movement, no respiratory distress  Cardiovascular: normal rate, regular rhythm, normal S1 and S2, no murmurs, rubs, clicks, or gallops, distal pulses intact, no carotid bruits  Abdomen: soft, non-tender, non-distended, normal bowel sounds, no masses or organomegaly  Extremities: no cyanosis, clubbing or edema  Musculoskeletal: normal range of motion, no joint swelling, deformity or tenderness  Neurologic: reflexes normal and symmetric, no cranial nerve deficit, gait, coordination and speech normal    Assessment/Plan:  1. Encounter for support and coordination of transition of care  - NM DISCHARGE MEDS RECONCILED W/ CURRENT OUTPATIENT MED LIST    2. Acute hepatitis A- will continue to monitor liver function test, as well as synthetic function weekly until normalizes. If they elevate again, will have him see Dr Neto Platt. - HEPATIC FUNCTION PANEL; Future  - Protime-INR; Future    3. Type 2 diabetes mellitus without complication, with long-term current use of insulin (HCC)- 45 units of Lantus may be too much and will decrease this down to 25 units at this time. Will also ask Dr Yari Lema to see patient on consultation for closer management of insulin titration and also send patient's information to our diabetic educator for classes on proper insulin self-administration.   - insulin glargine (LANTUS) 100 UNIT/ML injection vial; Inject 25 Units into the skin nightly  Dispense: 1 vial; Refill: 3  - External Referral To Endocrinology        Medical Decision Making: moderate complexity

## 2019-05-29 NOTE — LETTER
5/29/2019    [address] [facility] [address]  [address]      Dear Angela Gonzalez,    My name is Nicci Llanes and I am a registered nurse who partners with BARBARA Kerns CNP to improve patients' health. BARBARA Kerns CNP believes you would benefit from working with me. As a member of your health care team, I would work with other providers involved in your care, offer education for your specific health conditions, and connect you with additional resources as needed. I will collaborate with BARBARA Kerns CNP to support you in following your treatment plan. The additional support I provide is no additional cost to you. My primary focus is to help you achieve specific goals and improve your health. We are committed to walk with you on this journey and look forward to working with you. Please call me to further discuss your healthcare needs. I am available by phone or for appointments at the office. You can reach me at [phone]. In good health,           Nicci Llanes, [address] [place] [address]  [phone] [email or web address]

## 2019-05-29 NOTE — CARE COORDINATION
ACC received referral from PCP to assist with diabetes management. Will initiate Introduction letter this date & will also send flyer for Nutrition Class. Will continue to attempt outreach. Eldon Nicholson RN  Nurse Care Coordinator  [phone] [email or web address]

## 2019-06-05 ENCOUNTER — OFFICE VISIT (OUTPATIENT)
Dept: INTERNAL MEDICINE CLINIC | Age: 39
End: 2019-06-05
Payer: COMMERCIAL

## 2019-06-05 VITALS
BODY MASS INDEX: 22.26 KG/M2 | WEIGHT: 168 LBS | SYSTOLIC BLOOD PRESSURE: 112 MMHG | RESPIRATION RATE: 14 BRPM | DIASTOLIC BLOOD PRESSURE: 78 MMHG | HEART RATE: 76 BPM | HEIGHT: 73 IN | OXYGEN SATURATION: 99 %

## 2019-06-05 DIAGNOSIS — Z79.4 TYPE 2 DIABETES MELLITUS WITHOUT COMPLICATION, WITH LONG-TERM CURRENT USE OF INSULIN (HCC): ICD-10-CM

## 2019-06-05 DIAGNOSIS — B15.9 ACUTE HEPATITIS A: Primary | ICD-10-CM

## 2019-06-05 DIAGNOSIS — B15.9 ACUTE HEPATITIS A: ICD-10-CM

## 2019-06-05 DIAGNOSIS — E11.9 TYPE 2 DIABETES MELLITUS WITHOUT COMPLICATION, WITH LONG-TERM CURRENT USE OF INSULIN (HCC): ICD-10-CM

## 2019-06-05 LAB
ALBUMIN SERPL-MCNC: 3.7 G/DL (ref 3.4–5)
ALP BLD-CCNC: 232 U/L (ref 40–129)
ALT SERPL-CCNC: 117 U/L (ref 10–40)
AST SERPL-CCNC: 66 U/L (ref 15–37)
BILIRUB SERPL-MCNC: 2.1 MG/DL (ref 0–1)
BILIRUBIN DIRECT: 1.2 MG/DL (ref 0–0.3)
BILIRUBIN, INDIRECT: 0.9 MG/DL (ref 0–1)
INR BLD: 0.94 (ref 0.86–1.14)
PROTHROMBIN TIME: 10.7 SEC (ref 9.8–13)
TOTAL PROTEIN: 9.5 G/DL (ref 6.4–8.2)

## 2019-06-05 PROCEDURE — 99213 OFFICE O/P EST LOW 20 MIN: CPT | Performed by: NURSE PRACTITIONER

## 2019-06-05 RX ORDER — IBUPROFEN 200 MG
1 TABLET ORAL DAILY
Qty: 100 EACH | Refills: 3 | Status: SHIPPED | OUTPATIENT
Start: 2019-06-05 | End: 2020-04-03 | Stop reason: SDUPTHER

## 2019-06-05 ASSESSMENT — ENCOUNTER SYMPTOMS
ABDOMINAL PAIN: 0
COUGH: 0
SORE THROAT: 0
CONSTIPATION: 0
ALLERGIC/IMMUNOLOGIC NEGATIVE: 1
COLOR CHANGE: 0
SHORTNESS OF BREATH: 0
NAUSEA: 0
SINUS PAIN: 0
SINUS PRESSURE: 0
WHEEZING: 0
ABDOMINAL DISTENTION: 0
EYES NEGATIVE: 1
CHEST TIGHTNESS: 0
DIARRHEA: 0

## 2019-06-05 NOTE — PROGRESS NOTES
Danis Karlo  1980 06/05/19    Chief Complaint   Patient presents with    Follow-up     1 week       SUBJECTIVE:      Patient presents for weekly follow up of his liver function studies d/t recent Hepatitis A. While inpatient his AST/ALT peaked just around 4000 and since discharge has continued to slowly return to normal. Two weeks ago AST/ALT were 2557/3340, then 1 week ago were 131/447. Synthetic function has been normal. Overall, he is non-jaundiced, he appetite has been better, and his energy levels are slowly improving. He feels ready to return to work. He also has been unable to afford his insulin recently and has been out for the last week. I did put him in contact with our , Rockford Saint, and he is calling her back today to schedule for diabetic education class and discuss financial assistance options available to him. He did start the paperwork for [employer] while inpatient and he is calling today as well to follow up on this. Review of Systems   Constitutional: Positive for fatigue (mild, but improving). Negative for activity change, appetite change and fever. HENT: Negative for congestion, sinus pressure, sinus pain and sore throat. Eyes: Negative. Respiratory: Negative for cough, chest tightness, shortness of breath and wheezing. Cardiovascular: Negative for chest pain and palpitations. Gastrointestinal: Negative for abdominal distention, abdominal pain, constipation, diarrhea and nausea. Endocrine: Negative. Genitourinary: Negative for difficulty urinating, dysuria, flank pain, frequency and hematuria. Musculoskeletal: Negative for arthralgias, gait problem, joint swelling and myalgias. Skin: Negative for color change and rash. Allergic/Immunologic: Negative. Neurological: Negative for dizziness, light-headedness and numbness. Hematological: Negative. Psychiatric/Behavioral: Negative.         OBJECTIVE:    /78 (Site: Left Upper Arm, Position: Sitting, Cuff Size: Medium Adult)   Pulse 76   Resp 14   Ht 6' 1\" (1.854 m)   Wt 168 lb (76.2 kg)   SpO2 99%   BMI 22.16 kg/m²     Physical Exam   Constitutional: He is oriented to person, place, and time. He appears well-developed and well-nourished. No distress. HENT:   Head: Normocephalic and atraumatic. Eyes: Pupils are equal, round, and reactive to light. Conjunctivae and EOM are normal.   Neck: Normal range of motion. Neck supple. Cardiovascular: Normal rate, regular rhythm and normal heart sounds. No murmur heard. Pulmonary/Chest: Effort normal and breath sounds normal.   Abdominal: Soft. Bowel sounds are normal. He exhibits no distension. There is no tenderness. There is no guarding. Musculoskeletal: Normal range of motion. Neurological: He is alert and oriented to person, place, and time. Coordination normal. GCS eye subscore is 4. GCS verbal subscore is 5. GCS motor subscore is 6. Skin: Skin is warm and dry. Capillary refill takes less than 2 seconds. No rash noted. He is not diaphoretic. Psychiatric: He has a normal mood and affect. His behavior is normal. Thought content normal.       ASSESSMENT:    1. Acute hepatitis A    2. Type 2 diabetes mellitus without complication, with long-term current use of insulin ([facility]        PLAN:    Amanda Sanders was seen today for follow-up. Diagnoses and all orders for this visit:    Acute hepatitis A- LFT's improving and synthetic function holding steady. If steady/continually improving, he can return to work. -     Protime-INR; Future  -     HEPATIC FUNCTION PANEL; Future    Type 2 diabetes mellitus without complication, with long-term current use of insulin (HCC)  -     INSULIN SYRINGE .5CC/29G ([employer] INS SYR .5CC/29G) 29G X 1/2\" 0.5 ML MISC; 1 each by Does not apply route daily    Course of treatment, including any medications, possible imaging, referrals, and follow ups discussed with patient.  All risks and benefits and possible side effects discussed with patient who agrees to plan of care and verbalizes understanding. All labs and imaging reviewed. No flowsheet data found. Return in about 1 week (around 6/12/2019).

## 2019-06-10 DIAGNOSIS — Z79.4 TYPE 2 DIABETES MELLITUS WITHOUT COMPLICATION, WITH LONG-TERM CURRENT USE OF INSULIN (HCC): Primary | ICD-10-CM

## 2019-06-10 DIAGNOSIS — E11.9 TYPE 2 DIABETES MELLITUS WITHOUT COMPLICATION, WITH LONG-TERM CURRENT USE OF INSULIN (HCC): Primary | ICD-10-CM

## 2019-06-12 ENCOUNTER — CARE COORDINATION (OUTPATIENT)
Dept: CARE COORDINATION | Age: 39
End: 2019-06-12

## 2019-06-12 ENCOUNTER — OFFICE VISIT (OUTPATIENT)
Dept: INTERNAL MEDICINE CLINIC | Age: 39
End: 2019-06-12
Payer: COMMERCIAL

## 2019-06-12 VITALS
HEIGHT: 73 IN | BODY MASS INDEX: 22.87 KG/M2 | DIASTOLIC BLOOD PRESSURE: 60 MMHG | WEIGHT: 172.54 LBS | SYSTOLIC BLOOD PRESSURE: 110 MMHG | OXYGEN SATURATION: 98 % | HEART RATE: 94 BPM

## 2019-06-12 DIAGNOSIS — B15.9 ACUTE HEPATITIS A: ICD-10-CM

## 2019-06-12 DIAGNOSIS — B15.9 ACUTE HEPATITIS A: Primary | ICD-10-CM

## 2019-06-12 LAB
ALBUMIN SERPL-MCNC: 3.9 G/DL (ref 3.4–5)
ALP BLD-CCNC: 195 U/L (ref 40–129)
ALT SERPL-CCNC: 73 U/L (ref 10–40)
AST SERPL-CCNC: 54 U/L (ref 15–37)
BILIRUB SERPL-MCNC: 1.6 MG/DL (ref 0–1)
BILIRUBIN DIRECT: 0.9 MG/DL (ref 0–0.3)
BILIRUBIN, INDIRECT: 0.7 MG/DL (ref 0–1)
INR BLD: 0.92 (ref 0.86–1.14)
PROTHROMBIN TIME: 10.5 SEC (ref 9.8–13)
TOTAL PROTEIN: 9.9 G/DL (ref 6.4–8.2)

## 2019-06-12 PROCEDURE — 99213 OFFICE O/P EST LOW 20 MIN: CPT | Performed by: NURSE PRACTITIONER

## 2019-06-12 ASSESSMENT — ENCOUNTER SYMPTOMS
COUGH: 0
SHORTNESS OF BREATH: 0
COLOR CHANGE: 0
EYES NEGATIVE: 1
WHEEZING: 0
ALLERGIC/IMMUNOLOGIC NEGATIVE: 1
NAUSEA: 0
ABDOMINAL PAIN: 0
ABDOMINAL DISTENTION: 0
CONSTIPATION: 0
DIARRHEA: 0
CHEST TIGHTNESS: 0

## 2019-06-12 NOTE — PROGRESS NOTES
Ramy Orion  1980 06/12/19    Chief Complaint   Patient presents with    Other     1 week follow up       SUBJECTIVE:      Patient presents to the office this morning as a 1 week follow up for routine monitoring of his liver function s/p his recent Dx of Hepatitis A. Overall his ALT/AST levels have been steadily trending down, almost to normal limits. His appetite has improved significantly, he is no longer jaundiced, and his energy is significantly improved. He is ready to return to work this Sunday. His ALT/AST have trended as below:    5/22/19:   2557/3340  5/29/19:   131/447  6/5/19:     117/66    Review of Systems   Constitutional: Negative for activity change, appetite change, fatigue and fever. HENT: Negative. Eyes: Negative. Respiratory: Negative for cough, chest tightness, shortness of breath and wheezing. Cardiovascular: Negative for chest pain and palpitations. Gastrointestinal: Negative for abdominal distention, abdominal pain, constipation, diarrhea and nausea. Endocrine: Negative. Genitourinary: Negative for difficulty urinating, dysuria, flank pain, frequency and hematuria. Musculoskeletal: Negative for arthralgias, gait problem, joint swelling and myalgias. Skin: Negative for color change and rash. Allergic/Immunologic: Negative. Neurological: Negative for dizziness, light-headedness and numbness. Hematological: Negative. Psychiatric/Behavioral: Negative. OBJECTIVE:    /60 (Site: Left Upper Arm, Position: Sitting, Cuff Size: Medium Adult)   Pulse 94   Ht 6' 0.99\" (1.854 m)   Wt 172 lb 8.6 oz (78.3 kg)   SpO2 98%   BMI 22.77 kg/m²     Physical Exam   Constitutional: He is oriented to person, place, and time. He appears well-developed and well-nourished. No distress. HENT:   Head: Normocephalic and atraumatic. Eyes: Pupils are equal, round, and reactive to light. Conjunctivae and EOM are normal.   Neck: Normal range of motion.  Neck supple. Cardiovascular: Normal rate, regular rhythm and normal heart sounds. No murmur heard. Pulmonary/Chest: Effort normal and breath sounds normal.   Abdominal: Soft. Bowel sounds are normal. He exhibits no distension. There is no tenderness. There is no rebound and no guarding. Musculoskeletal: Normal range of motion. Neurological: He is alert and oriented to person, place, and time. Coordination normal. GCS eye subscore is 4. GCS verbal subscore is 5. GCS motor subscore is 6. Skin: Skin is warm and dry. Capillary refill takes less than 2 seconds. No rash noted. He is not diaphoretic. Psychiatric: He has a normal mood and affect. His behavior is normal. Thought content normal.       ASSESSMENT:    1. Acute hepatitis A        PLAN:    Mohit Carlson was seen today for other. Diagnoses and all orders for this visit:    Acute hepatitis A- continues to steadily improve. Energy levels and appetite are back to baseline and patient feels ready to return to work this Sunday and I will allow this. Will recheck LFT and synthetic function today. I suspect this will be within normal range, if not very close to it.   -     HEPATIC FUNCTION PANEL; Future  -     Protime-INR; Future    Course of treatment, including any medications, possible imaging, referrals, and follow ups discussed with patient. All risks and benefits and possible side effects discussed with patient who agrees to plan of care and verbalizes understanding. All labs and imaging reviewed. No flowsheet data found. Return in about 2 months (around 8/12/2019).

## 2019-06-12 NOTE — CARE COORDINATION
Ambulatory Care Coordination Note  6/12/2019  CM Risk Score: 2  Riri Mortality Risk Score:      ACC: Katalina Caraballo RN    Summary Note: Pt is agreeable to CCP and ACC interaction and education assistance. Pt's A1C in 5/19 was 8.4. Pt states he does eat small frequent meals but does not watch carb intake. Pt states he has been drinking Ensure at times. ACC advised pt to drink Glucerna to keep sugar down. ACC gave pt 4 sample bottles of Glucerna Shakes. ACC provided pt with info for counting carbs. ACC provided contact info and encouraged pt to call w/ any questions or needs. Katalina Caraballo RN  Ambulatory Care Coordinator  [phone]  [email or web address]        Ambulatory Care Coordination Assessment    Care Coordination Protocol  Program Enrollment:  Rising Risk  Referral from Primary Care Provider:  Yes  Week 1 - Initial Assessment     Do you have all of your prescriptions and are they filled?:  Yes  Are you able to afford your medications?:  Yes  How often do you have trouble taking your medications the way you have been told to take them?:  I always take them as prescribed.      Do you have Home O2 Therapy?:  No      Is patient able to live independently?:  Yes     Current Housing:  Apartment        Per the Fall Risk Screening, did the patient have 2 or more falls or 1 fall with injury in the past year?:  No     Frequent urination at night?:  No  Do you use rails/bars?:  No  Do you have a non-slip tub mat?:  No     Are you experiencing loss of meaning?:  No  Are you experiencing loss of hope and peace?:  No     Thinking about your patient's physical health needs, are there any symptoms or problems (risk indicators) you are unsure about that require further investigation?:  No identified areas of uncertainly or problems already being investigated   Are the patients physical health problems impacting on their mental well-being?:  No identified areas of concern   Are there any problems with your patients lifestyle 1/2\" 0.5 ML MISC 1 each by Does not apply route daily 6/5/19   BARBARA Kay CNP   insulin glargine (LANTUS) 100 UNIT/ML injection vial Inject 25 Units into the skin nightly 5/29/19   BARBARA Kay CNP   omeprazole (PRILOSEC) 20 MG delayed release capsule Take 1 capsule by mouth every morning (before breakfast) 5/13/19   BARBARA Kay CNP   blood glucose monitor strips Test 3 times a day & as needed for symptoms of irregular blood glucose. 5/13/19   BARBARA Kay CNP   aspirin 325 MG tablet Take 325 mg by mouth daily    Historical Provider, MD   metFORMIN (GLUCOPHAGE) 1000 MG tablet Take 1 tablet by mouth 2 times daily (with meals) 4/29/19   BARBARA Kay CNP   insulin lispro (HUMALOG) 100 UNIT/ML injection vial Inject 15 Units into the skin 3 times daily (with meals) 4/29/19   BARBARA Kay CNP   insulin lispro (HUMALOG) 100 UNIT/ML injection vial Inject 0-12 Units into the skin 3 times daily (with meals) 4/29/19   BARBARA Kay CNP   Blood Glucose Monitoring Suppl (ONE TOUCH ULTRA 2) w/Device KIT 1 kit by Does not apply route once for 1 dose 4/29/19 4/29/19  BARBARA Kay CNP       Future Appointments   Date Time Provider Kamaljit Henry   8/12/2019 11:00 AM BARBARA Kay  [facility]      and   Diabetes Assessment    Medic Alert ID:  No  Meal Planning:  Avoidance of concentrated sweets   How often do you test your blood sugar?:  Meals   Do you have barriers with adherence to non-pharmacologic self-management interventions?  (Nutrition/Exercise/Self-Monitoring):  No   Have you ever had to go to the ED for symptoms of low blood sugar?:  No       No patient-reported symptoms

## 2019-07-10 DIAGNOSIS — E11.9 TYPE 2 DIABETES MELLITUS WITHOUT COMPLICATION, WITH LONG-TERM CURRENT USE OF INSULIN (HCC): ICD-10-CM

## 2019-07-10 DIAGNOSIS — Z79.4 TYPE 2 DIABETES MELLITUS WITHOUT COMPLICATION, WITH LONG-TERM CURRENT USE OF INSULIN (HCC): ICD-10-CM

## 2019-08-12 ENCOUNTER — OFFICE VISIT (OUTPATIENT)
Dept: INTERNAL MEDICINE CLINIC | Age: 39
End: 2019-08-12
Payer: COMMERCIAL

## 2019-08-12 VITALS
OXYGEN SATURATION: 99 % | DIASTOLIC BLOOD PRESSURE: 71 MMHG | HEART RATE: 79 BPM | HEIGHT: 72 IN | WEIGHT: 181.4 LBS | BODY MASS INDEX: 24.57 KG/M2 | RESPIRATION RATE: 20 BRPM | SYSTOLIC BLOOD PRESSURE: 120 MMHG

## 2019-08-12 DIAGNOSIS — E11.9 TYPE 2 DIABETES MELLITUS WITHOUT COMPLICATION, WITH LONG-TERM CURRENT USE OF INSULIN (HCC): ICD-10-CM

## 2019-08-12 DIAGNOSIS — Z23 NEED FOR TDAP VACCINATION: ICD-10-CM

## 2019-08-12 DIAGNOSIS — B15.9 ACUTE HEPATITIS A: Primary | ICD-10-CM

## 2019-08-12 DIAGNOSIS — Z79.4 TYPE 2 DIABETES MELLITUS WITHOUT COMPLICATION, WITH LONG-TERM CURRENT USE OF INSULIN (HCC): ICD-10-CM

## 2019-08-12 DIAGNOSIS — K21.9 GASTROESOPHAGEAL REFLUX DISEASE, ESOPHAGITIS PRESENCE NOT SPECIFIED: ICD-10-CM

## 2019-08-12 PROCEDURE — 99214 OFFICE O/P EST MOD 30 MIN: CPT | Performed by: NURSE PRACTITIONER

## 2019-08-12 PROCEDURE — 90715 TDAP VACCINE 7 YRS/> IM: CPT | Performed by: NURSE PRACTITIONER

## 2019-08-12 PROCEDURE — 90471 IMMUNIZATION ADMIN: CPT | Performed by: NURSE PRACTITIONER

## 2019-08-12 ASSESSMENT — ENCOUNTER SYMPTOMS
SHORTNESS OF BREATH: 0
ABDOMINAL DISTENTION: 0
WHEEZING: 0
ABDOMINAL PAIN: 0
ALLERGIC/IMMUNOLOGIC NEGATIVE: 1
CHEST TIGHTNESS: 0
EYES NEGATIVE: 1
SINUS PRESSURE: 0
NAUSEA: 0
CONSTIPATION: 0
SORE THROAT: 0
COUGH: 0
DIARRHEA: 0
SINUS PAIN: 0
COLOR CHANGE: 0

## 2019-08-12 NOTE — PROGRESS NOTES
Negative. Psychiatric/Behavioral: Negative. OBJECTIVE:    /71   Pulse 79   Resp 20   Ht 6' (1.829 m)   Wt 181 lb 6.4 oz (82.3 kg)   SpO2 99%   BMI 24.60 kg/m²     Physical Exam   Constitutional: He is oriented to person, place, and time. He appears well-developed and well-nourished. No distress. HENT:   Head: Normocephalic and atraumatic. Eyes: Pupils are equal, round, and reactive to light. Conjunctivae and EOM are normal.   Neck: Normal range of motion. Neck supple. No thyromegaly present. Cardiovascular: Normal rate, regular rhythm and normal heart sounds. No murmur heard. Pulmonary/Chest: Effort normal and breath sounds normal.   Abdominal: Soft. Bowel sounds are normal. He exhibits no distension. There is no tenderness. Musculoskeletal: Normal range of motion. Lymphadenopathy:     He has no cervical adenopathy. Neurological: He is alert and oriented to person, place, and time. No sensory deficit. Coordination normal. GCS eye subscore is 4. GCS verbal subscore is 5. GCS motor subscore is 6. Skin: Skin is warm and dry. Capillary refill takes less than 2 seconds. No rash noted. He is not diaphoretic. Psychiatric: He has a normal mood and affect. His behavior is normal. Thought content normal.       ASSESSMENT:    1. Acute hepatitis A    2. Type 2 diabetes mellitus without complication, with long-term current use of insulin ([address]    3. Gastroesophageal reflux disease, esophagitis presence not specified    4. Need for Tdap vaccination        PLAN:    Yady Santana was seen today for follow-up. Diagnoses and all orders for this visit:    Acute hepatitis A- LFT's almost to normal, will recheck today. -     Comprehensive Metabolic Panel; Future  -     Lipid, Fasting; Future  -     CBC Auto Differential; Future    Type 2 diabetes mellitus without complication, with long-term current use of insulin ([address]- overall, sugars and trending more in normal range.  Will recheck A1C and adjust

## 2019-08-18 DIAGNOSIS — E11.9 TYPE 2 DIABETES MELLITUS WITHOUT COMPLICATION, WITH LONG-TERM CURRENT USE OF INSULIN (HCC): ICD-10-CM

## 2019-08-18 DIAGNOSIS — Z79.4 TYPE 2 DIABETES MELLITUS WITHOUT COMPLICATION, WITH LONG-TERM CURRENT USE OF INSULIN (HCC): ICD-10-CM

## 2020-01-23 ENCOUNTER — CARE COORDINATION (OUTPATIENT)
Dept: CARE COORDINATION | Age: 40
End: 2020-01-23

## 2020-01-23 NOTE — CARE COORDINATION
Ambulatory Care Coordination Note  1/23/2020  CM Risk Score: 2  Charlson 10 Year Mortality Risk Score: 4%     ACC: Edin Mari RN    Summary Note: ACM rec'd referral from Kelly Pham NP to speak to pt for medication assistance options. ACM attempted to call the pt but his VM box was full. ACM sent pt My Chart message with info regarding [employer] and [address]. ACM encouraged pt to call ACM with any questions or other needs. Contact number provided in message. Plan:  F/U with pt with his contacting [facility] and CM Med Assist.  F/U on blood sugars and monitor as he may run out of his meds,  [address] [facility]  [phone]  [email or web address]        Ambulatory Care Coordination Assessment    Care Coordination Protocol  Program Enrollment:  Complex Care  Referral from Primary Care Provider:  Yes  Week 1 - Initial Assessment     Do you have all of your prescriptions and are they filled?:  No  Are you able to afford your medications?:  No  How often do you have trouble taking your medications the way you have been told to take them?:  I always take them as prescribed.      Do you have Home O2 Therapy?:  No      Is patient able to live independently?:  Yes     Current Housing:  Apartment        Per the Fall Risk Screening, did the patient have 2 or more falls or 1 fall with injury in the past year?:  No     Frequent urination at night?:  No  Do you use rails/bars?:  No  Do you have a non-slip tub mat?:  No     Are you experiencing loss of meaning?:  No  Are you experiencing loss of hope and peace?:  No     Thinking about your patient's physical health needs, are there any symptoms or problems (risk indicators) you are unsure about that require further investigation?:  No identified areas of uncertainly or problems already being investigated   Are the patients physical health problems impacting on their mental well-being?:  Mild impact on mental well-being e.g. and [employer]  Diabetes Education: In Process (Comment: 1/23/20)    Other Services or Interventions:  1/23/20 Advised pt to go to [employer] and apply for Medicaid. Medication Assistance Program:  In Process   Zone Management Tools:  Completed         Set up/Review an Education Plan, Set up/Review Goals              Prior to Admission medications    Medication Sig Start Date End Date Taking? Authorizing Provider   metFORMIN (GLUCOPHAGE) 1000 MG tablet Take 1 tablet by mouth 2 times daily (with meals) 11/11/19   BARBARA Powell CNP   insulin lispro (HUMALOG) 100 UNIT/ML injection vial Inject 0-12 Units into the skin 3 times daily (with meals) 8/19/19   BARBARA Powell CNP   Insulin Syringes, Disposable, U-100 1 ML MISC 1 each by Does not apply route daily 6/10/19   BARBARA Powell CNP   INSULIN SYRINGE .5CC/29G ([employer] INS SYR .5CC/29G) 29G X 1/2\" 0.5 ML MISC 1 each by Does not apply route daily 6/5/19   BARBARA Powell CNP   insulin glargine (LANTUS) 100 UNIT/ML injection vial Inject 25 Units into the skin nightly 5/29/19   BARBARA Powell CNP   omeprazole (PRILOSEC) 20 MG delayed release capsule Take 1 capsule by mouth every morning (before breakfast) 5/13/19   BARBARA Powell CNP   blood glucose monitor strips Test 3 times a day & as needed for symptoms of irregular blood glucose. 5/13/19   BARBARA Powell CNP   aspirin 325 MG tablet Take 325 mg by mouth daily    Historical Provider, MD   insulin lispro (HUMALOG) 100 UNIT/ML injection vial Inject 15 Units into the skin 3 times daily (with meals) 4/29/19   BARBARA Powell CNP   Blood Glucose Monitoring Suppl (ONE TOUCH ULTRA 2) w/Device KIT 1 kit by Does not apply route once for 1 dose 4/29/19 4/29/19  BARBARA Powell CNP       No future appointments.   ,   Diabetes Assessment    Medic Alert ID:  No  Meal Planning:  Avoidance of concentrated sweets, Carb counting   How often do you test

## 2020-01-23 NOTE — LETTER
1/23/2020    [address] [facility] [address]  [address]    Dear Jeni Saleh,    I enjoyed speaking with you and wanted to send some additional information. BARBARA Gomez CNP and I will work together to ensure your needs are met and help you achieve your health goals. We are committed to walk with you on this journey and look forward to working with you. Please feel free to contact me with any questions or concerns. I am available by phone or for appointments at the office. You can reach me at [phone].       In good health,     Robby Angel RN

## 2020-03-23 ENCOUNTER — OFFICE VISIT (OUTPATIENT)
Dept: INTERNAL MEDICINE CLINIC | Age: 40
End: 2020-03-23
Payer: MEDICAID

## 2020-03-23 VITALS
SYSTOLIC BLOOD PRESSURE: 122 MMHG | HEIGHT: 73 IN | RESPIRATION RATE: 20 BRPM | OXYGEN SATURATION: 99 % | HEART RATE: 112 BPM | BODY MASS INDEX: 24.65 KG/M2 | WEIGHT: 186 LBS | DIASTOLIC BLOOD PRESSURE: 88 MMHG

## 2020-03-23 PROBLEM — K85.00 IDIOPATHIC ACUTE PANCREATITIS: Status: RESOLVED | Noted: 2019-05-18 | Resolved: 2020-03-23

## 2020-03-23 LAB
A/G RATIO: 0.6 (ref 1.1–2.2)
ALBUMIN SERPL-MCNC: 3.3 G/DL (ref 3.4–5)
ALP BLD-CCNC: 124 U/L (ref 40–129)
ALT SERPL-CCNC: 14 U/L (ref 10–40)
ANION GAP SERPL CALCULATED.3IONS-SCNC: 12 MMOL/L (ref 3–16)
AST SERPL-CCNC: 15 U/L (ref 15–37)
BASOPHILS ABSOLUTE: 0 K/UL (ref 0–0.2)
BASOPHILS RELATIVE PERCENT: 0.6 %
BILIRUB SERPL-MCNC: 0.7 MG/DL (ref 0–1)
BUN BLDV-MCNC: 17 MG/DL (ref 7–20)
C-REACTIVE PROTEIN: 89.2 MG/L (ref 0–5.1)
CALCIUM SERPL-MCNC: 9.3 MG/DL (ref 8.3–10.6)
CHLORIDE BLD-SCNC: 92 MMOL/L (ref 99–110)
CO2: 24 MMOL/L (ref 21–32)
CREAT SERPL-MCNC: 0.7 MG/DL (ref 0.9–1.3)
EOSINOPHILS ABSOLUTE: 0.1 K/UL (ref 0–0.6)
EOSINOPHILS RELATIVE PERCENT: 1 %
GFR AFRICAN AMERICAN: >60
GFR NON-AFRICAN AMERICAN: >60
GLOBULIN: 5.6 G/DL
GLUCOSE BLD-MCNC: 187 MG/DL (ref 70–99)
HCT VFR BLD CALC: 36.1 % (ref 40.5–52.5)
HEMOGLOBIN: 12.4 G/DL (ref 13.5–17.5)
LYMPHOCYTES ABSOLUTE: 1 K/UL (ref 1–5.1)
LYMPHOCYTES RELATIVE PERCENT: 15.8 %
MCH RBC QN AUTO: 28.8 PG (ref 26–34)
MCHC RBC AUTO-ENTMCNC: 34.3 G/DL (ref 31–36)
MCV RBC AUTO: 84.1 FL (ref 80–100)
MONOCYTES ABSOLUTE: 0.4 K/UL (ref 0–1.3)
MONOCYTES RELATIVE PERCENT: 5.6 %
NEUTROPHILS ABSOLUTE: 5 K/UL (ref 1.7–7.7)
NEUTROPHILS RELATIVE PERCENT: 77 %
PDW BLD-RTO: 13.1 % (ref 12.4–15.4)
PLATELET # BLD: 277 K/UL (ref 135–450)
PMV BLD AUTO: 7.9 FL (ref 5–10.5)
POTASSIUM SERPL-SCNC: 5.2 MMOL/L (ref 3.5–5.1)
RBC # BLD: 4.3 M/UL (ref 4.2–5.9)
SEDIMENTATION RATE, ERYTHROCYTE: 109 MM/HR (ref 0–15)
SODIUM BLD-SCNC: 128 MMOL/L (ref 136–145)
TOTAL PROTEIN: 8.9 G/DL (ref 6.4–8.2)
WBC # BLD: 6.5 K/UL (ref 4–11)

## 2020-03-23 PROCEDURE — 36415 COLL VENOUS BLD VENIPUNCTURE: CPT | Performed by: NURSE PRACTITIONER

## 2020-03-23 PROCEDURE — 99214 OFFICE O/P EST MOD 30 MIN: CPT | Performed by: NURSE PRACTITIONER

## 2020-03-23 RX ORDER — AMOXICILLIN AND CLAVULANATE POTASSIUM 875; 125 MG/1; MG/1
1 TABLET, FILM COATED ORAL 2 TIMES DAILY
Qty: 14 TABLET | Refills: 0 | Status: ON HOLD | OUTPATIENT
Start: 2020-03-23 | End: 2020-03-31 | Stop reason: HOSPADM

## 2020-03-23 RX ORDER — CLINDAMYCIN HYDROCHLORIDE 300 MG/1
300 CAPSULE ORAL 3 TIMES DAILY
Qty: 21 CAPSULE | Refills: 0 | Status: ON HOLD | OUTPATIENT
Start: 2020-03-23 | End: 2020-03-31 | Stop reason: HOSPADM

## 2020-03-23 RX ORDER — PRAVASTATIN SODIUM 20 MG
20 TABLET ORAL DAILY
Qty: 30 TABLET | Refills: 3 | Status: SHIPPED | OUTPATIENT
Start: 2020-03-23 | End: 2020-04-03 | Stop reason: SDUPTHER

## 2020-03-23 RX ORDER — HYDROCODONE BITARTRATE AND ACETAMINOPHEN 5; 325 MG/1; MG/1
1 TABLET ORAL EVERY 8 HOURS PRN
Qty: 15 TABLET | Refills: 0 | Status: ON HOLD | OUTPATIENT
Start: 2020-03-23 | End: 2020-03-31 | Stop reason: HOSPADM

## 2020-03-23 ASSESSMENT — ENCOUNTER SYMPTOMS
SINUS PAIN: 0
SHORTNESS OF BREATH: 0
EYES NEGATIVE: 1
ABDOMINAL DISTENTION: 0
NAUSEA: 0
WHEEZING: 0
CHEST TIGHTNESS: 0
DIARRHEA: 0
ABDOMINAL PAIN: 0
COUGH: 0
CONSTIPATION: 0
SORE THROAT: 0
COLOR CHANGE: 1
SINUS PRESSURE: 0

## 2020-03-23 ASSESSMENT — PATIENT HEALTH QUESTIONNAIRE - PHQ9
SUM OF ALL RESPONSES TO PHQ9 QUESTIONS 1 & 2: 0
1. LITTLE INTEREST OR PLEASURE IN DOING THINGS: 0
2. FEELING DOWN, DEPRESSED OR HOPELESS: 0
DEPRESSION UNABLE TO ASSESS: FUNCTIONAL CAPACITY MOTIVATION LIMITS ACCURACY
SUM OF ALL RESPONSES TO PHQ QUESTIONS 1-9: 0
SUM OF ALL RESPONSES TO PHQ QUESTIONS 1-9: 0

## 2020-03-24 LAB
ESTIMATED AVERAGE GLUCOSE: 208.7 MG/DL
HBA1C MFR BLD: 8.9 %

## 2020-03-24 RX ORDER — INSULIN GLARGINE 100 [IU]/ML
30 INJECTION, SOLUTION SUBCUTANEOUS NIGHTLY
Qty: 1 VIAL | Refills: 3 | Status: SHIPPED | OUTPATIENT
Start: 2020-03-24 | End: 2020-04-03 | Stop reason: SDUPTHER

## 2020-03-25 ENCOUNTER — HOSPITAL ENCOUNTER (OUTPATIENT)
Age: 40
Discharge: HOME | DRG: 314 | End: 2020-03-25
Payer: MEDICAID

## 2020-03-25 ENCOUNTER — HOSPITAL ENCOUNTER (INPATIENT)
Age: 40
LOS: 5 days | Discharge: HOME HEALTH | DRG: 314 | End: 2020-03-31
Attending: EMERGENCY MEDICINE | Admitting: HOSPITALIST
Payer: MEDICAID

## 2020-03-25 ENCOUNTER — HOSPITAL ENCOUNTER (OUTPATIENT)
Dept: GENERAL RADIOLOGY | Age: 40
Discharge: HOME | DRG: 314 | End: 2020-03-25
Payer: MEDICAID

## 2020-03-25 ENCOUNTER — APPOINTMENT (OUTPATIENT)
Dept: GENERAL RADIOLOGY | Age: 40
DRG: 314 | End: 2020-03-25
Payer: MEDICAID

## 2020-03-25 LAB
BASOPHILS ABSOLUTE: 0 K/CU MM
BASOPHILS RELATIVE PERCENT: 0.4 % (ref 0–1)
DIFFERENTIAL TYPE: ABNORMAL
EOSINOPHILS ABSOLUTE: 0.1 K/CU MM
EOSINOPHILS RELATIVE PERCENT: 1 % (ref 0–3)
HCT VFR BLD CALC: 35.4 % (ref 42–52)
HEMOGLOBIN: 11.8 GM/DL (ref 13.5–18)
IMMATURE NEUTROPHIL %: 0.1 % (ref 0–0.43)
LYMPHOCYTES ABSOLUTE: 1.2 K/CU MM
LYMPHOCYTES RELATIVE PERCENT: 16.9 % (ref 24–44)
MCH RBC QN AUTO: 28.5 PG (ref 27–31)
MCHC RBC AUTO-ENTMCNC: 33.3 % (ref 32–36)
MCV RBC AUTO: 85.5 FL (ref 78–100)
MONOCYTES ABSOLUTE: 0.4 K/CU MM
MONOCYTES RELATIVE PERCENT: 5.6 % (ref 0–4)
NUCLEATED RBC %: 0 %
PDW BLD-RTO: 12.2 % (ref 11.7–14.9)
PLATELET # BLD: 289 K/CU MM (ref 140–440)
PMV BLD AUTO: 9.6 FL (ref 7.5–11.1)
RBC # BLD: 4.14 M/CU MM (ref 4.6–6.2)
SEGMENTED NEUTROPHILS ABSOLUTE COUNT: 5.4 K/CU MM
SEGMENTED NEUTROPHILS RELATIVE PERCENT: 76 % (ref 36–66)
TOTAL IMMATURE NEUTOROPHIL: 0.01 K/CU MM
TOTAL NUCLEATED RBC: 0 K/CU MM
WBC # BLD: 7.2 K/CU MM (ref 4–10.5)

## 2020-03-25 PROCEDURE — 99284 EMERGENCY DEPT VISIT MOD MDM: CPT

## 2020-03-25 PROCEDURE — 85025 COMPLETE CBC W/AUTO DIFF WBC: CPT

## 2020-03-25 PROCEDURE — 36415 COLL VENOUS BLD VENIPUNCTURE: CPT

## 2020-03-25 PROCEDURE — 73630 X-RAY EXAM OF FOOT: CPT

## 2020-03-25 PROCEDURE — 73620 X-RAY EXAM OF FOOT: CPT

## 2020-03-25 PROCEDURE — 80053 COMPREHEN METABOLIC PANEL: CPT

## 2020-03-25 PROCEDURE — 87040 BLOOD CULTURE FOR BACTERIA: CPT

## 2020-03-25 PROCEDURE — 96375 TX/PRO/DX INJ NEW DRUG ADDON: CPT

## 2020-03-25 PROCEDURE — 85652 RBC SED RATE AUTOMATED: CPT

## 2020-03-25 PROCEDURE — 86141 C-REACTIVE PROTEIN HS: CPT

## 2020-03-25 PROCEDURE — 6360000002 HC RX W HCPCS: Performed by: EMERGENCY MEDICINE

## 2020-03-25 RX ORDER — ONDANSETRON 2 MG/ML
4 INJECTION INTRAMUSCULAR; INTRAVENOUS ONCE
Status: COMPLETED | OUTPATIENT
Start: 2020-03-25 | End: 2020-03-25

## 2020-03-25 RX ORDER — FENTANYL CITRATE 50 UG/ML
25 INJECTION, SOLUTION INTRAMUSCULAR; INTRAVENOUS ONCE
Status: COMPLETED | OUTPATIENT
Start: 2020-03-25 | End: 2020-03-25

## 2020-03-25 RX ADMIN — ONDANSETRON 4 MG: 2 INJECTION INTRAMUSCULAR; INTRAVENOUS at 23:43

## 2020-03-25 RX ADMIN — FENTANYL CITRATE 25 MCG: 50 INJECTION INTRAMUSCULAR; INTRAVENOUS at 23:43

## 2020-03-25 ASSESSMENT — PAIN SCALES - GENERAL
PAINLEVEL_OUTOF10: 9
PAINLEVEL_OUTOF10: 9

## 2020-03-26 ENCOUNTER — APPOINTMENT (OUTPATIENT)
Dept: MRI IMAGING | Age: 40
DRG: 314 | End: 2020-03-26
Payer: MEDICAID

## 2020-03-26 ENCOUNTER — HOSPITAL ENCOUNTER (OUTPATIENT)
Dept: WOUND CARE | Age: 40
Discharge: HOME | End: 2020-03-26
Payer: MEDICAID

## 2020-03-26 ENCOUNTER — APPOINTMENT (OUTPATIENT)
Dept: ULTRASOUND IMAGING | Age: 40
DRG: 314 | End: 2020-03-26
Payer: MEDICAID

## 2020-03-26 PROBLEM — L97.509 DIABETIC FOOT ULCER (HCC): Status: ACTIVE | Noted: 2020-03-25

## 2020-03-26 PROBLEM — M86.172 ACUTE OSTEOMYELITIS OF PHALANX OF LEFT FOOT (HCC): Status: ACTIVE | Noted: 2020-03-26

## 2020-03-26 PROBLEM — E11.621 DIABETIC FOOT ULCER (HCC): Status: ACTIVE | Noted: 2020-03-25

## 2020-03-26 PROBLEM — M86.9 OSTEOMYELITIS OF THIRD TOE OF LEFT FOOT (HCC): Status: ACTIVE | Noted: 2020-03-25

## 2020-03-26 LAB
ALBUMIN SERPL-MCNC: 3.3 GM/DL (ref 3.4–5)
ALP BLD-CCNC: 111 IU/L (ref 40–129)
ALT SERPL-CCNC: 11 U/L (ref 10–40)
ANION GAP SERPL CALCULATED.3IONS-SCNC: 12 MMOL/L (ref 4–16)
AST SERPL-CCNC: 13 IU/L (ref 15–37)
BILIRUB SERPL-MCNC: 0.4 MG/DL (ref 0–1)
BUN BLDV-MCNC: 19 MG/DL (ref 6–23)
CALCIUM SERPL-MCNC: 8.7 MG/DL (ref 8.3–10.6)
CHLORIDE BLD-SCNC: 93 MMOL/L (ref 99–110)
CO2: 22 MMOL/L (ref 21–32)
CREAT SERPL-MCNC: 0.9 MG/DL (ref 0.9–1.3)
ERYTHROCYTE SEDIMENTATION RATE: 111 MM/HR (ref 0–15)
GFR AFRICAN AMERICAN: >60 ML/MIN/1.73M2
GFR NON-AFRICAN AMERICAN: >60 ML/MIN/1.73M2
GLUCOSE BLD-MCNC: 151 MG/DL (ref 70–99)
GLUCOSE BLD-MCNC: 169 MG/DL (ref 70–99)
GLUCOSE BLD-MCNC: 175 MG/DL (ref 70–99)
GLUCOSE BLD-MCNC: 254 MG/DL (ref 70–99)
GLUCOSE BLD-MCNC: 68 MG/DL (ref 70–99)
GRAM STAIN RESULT: ABNORMAL
HIGH SENSITIVE C-REACTIVE PROTEIN: 125 MG/L
ORGANISM: ABNORMAL
POTASSIUM SERPL-SCNC: 4.2 MMOL/L (ref 3.5–5.1)
SODIUM BLD-SCNC: 127 MMOL/L (ref 135–145)
TOTAL PROTEIN: 8.6 GM/DL (ref 6.4–8.2)
WOUND/ABSCESS: ABNORMAL
WOUND/ABSCESS: ABNORMAL

## 2020-03-26 PROCEDURE — 94761 N-INVAS EAR/PLS OXIMETRY MLT: CPT

## 2020-03-26 PROCEDURE — 99211 OFF/OP EST MAY X REQ PHY/QHP: CPT

## 2020-03-26 PROCEDURE — 2580000003 HC RX 258: Performed by: HOSPITALIST

## 2020-03-26 PROCEDURE — A9579 GAD-BASE MR CONTRAST NOS,1ML: HCPCS | Performed by: HOSPITALIST

## 2020-03-26 PROCEDURE — 99253 IP/OBS CNSLTJ NEW/EST LOW 45: CPT | Performed by: SURGERY

## 2020-03-26 PROCEDURE — 2580000003 HC RX 258: Performed by: EMERGENCY MEDICINE

## 2020-03-26 PROCEDURE — 6360000002 HC RX W HCPCS: Performed by: HOSPITALIST

## 2020-03-26 PROCEDURE — 87040 BLOOD CULTURE FOR BACTERIA: CPT

## 2020-03-26 PROCEDURE — 6360000004 HC RX CONTRAST MEDICATION: Performed by: HOSPITALIST

## 2020-03-26 PROCEDURE — 6370000000 HC RX 637 (ALT 250 FOR IP): Performed by: HOSPITALIST

## 2020-03-26 PROCEDURE — 82962 GLUCOSE BLOOD TEST: CPT

## 2020-03-26 PROCEDURE — 6370000000 HC RX 637 (ALT 250 FOR IP): Performed by: NURSE PRACTITIONER

## 2020-03-26 PROCEDURE — 6360000002 HC RX W HCPCS: Performed by: EMERGENCY MEDICINE

## 2020-03-26 PROCEDURE — 2500000003 HC RX 250 WO HCPCS: Performed by: NURSE PRACTITIONER

## 2020-03-26 PROCEDURE — 96365 THER/PROPH/DIAG IV INF INIT: CPT

## 2020-03-26 PROCEDURE — 93926 LOWER EXTREMITY STUDY: CPT

## 2020-03-26 PROCEDURE — 96375 TX/PRO/DX INJ NEW DRUG ADDON: CPT

## 2020-03-26 PROCEDURE — 73720 MRI LWR EXTREMITY W/O&W/DYE: CPT

## 2020-03-26 PROCEDURE — 99254 IP/OBS CNSLTJ NEW/EST MOD 60: CPT | Performed by: INTERNAL MEDICINE

## 2020-03-26 PROCEDURE — 1200000000 HC SEMI PRIVATE

## 2020-03-26 PROCEDURE — APPSS60 APP SPLIT SHARED TIME 46-60 MINUTES: Performed by: NURSE PRACTITIONER

## 2020-03-26 PROCEDURE — 6370000000 HC RX 637 (ALT 250 FOR IP): Performed by: STUDENT IN AN ORGANIZED HEALTH CARE EDUCATION/TRAINING PROGRAM

## 2020-03-26 RX ORDER — ASPIRIN 325 MG
325 TABLET ORAL DAILY
Status: DISCONTINUED | OUTPATIENT
Start: 2020-03-26 | End: 2020-03-31 | Stop reason: HOSPADM

## 2020-03-26 RX ORDER — HYDROMORPHONE HCL 110MG/55ML
0.5 PATIENT CONTROLLED ANALGESIA SYRINGE INTRAVENOUS ONCE
Status: COMPLETED | OUTPATIENT
Start: 2020-03-26 | End: 2020-03-26

## 2020-03-26 RX ORDER — DEXTROSE MONOHYDRATE 50 MG/ML
100 INJECTION, SOLUTION INTRAVENOUS PRN
Status: DISCONTINUED | OUTPATIENT
Start: 2020-03-26 | End: 2020-03-31 | Stop reason: HOSPADM

## 2020-03-26 RX ORDER — SODIUM CHLORIDE 0.9 % (FLUSH) 0.9 %
10 SYRINGE (ML) INJECTION EVERY 12 HOURS SCHEDULED
Status: DISCONTINUED | OUTPATIENT
Start: 2020-03-26 | End: 2020-03-31 | Stop reason: HOSPADM

## 2020-03-26 RX ORDER — DEXTROSE MONOHYDRATE 25 G/50ML
12.5 INJECTION, SOLUTION INTRAVENOUS PRN
Status: DISCONTINUED | OUTPATIENT
Start: 2020-03-26 | End: 2020-03-31 | Stop reason: HOSPADM

## 2020-03-26 RX ORDER — ACETAMINOPHEN 325 MG/1
650 TABLET ORAL EVERY 6 HOURS PRN
Status: DISCONTINUED | OUTPATIENT
Start: 2020-03-26 | End: 2020-03-31 | Stop reason: HOSPADM

## 2020-03-26 RX ORDER — SODIUM CHLORIDE 9 MG/ML
INJECTION, SOLUTION INTRAVENOUS CONTINUOUS
Status: ACTIVE | OUTPATIENT
Start: 2020-03-26 | End: 2020-03-26

## 2020-03-26 RX ORDER — ONDANSETRON 2 MG/ML
4 INJECTION INTRAMUSCULAR; INTRAVENOUS EVERY 6 HOURS PRN
Status: DISCONTINUED | OUTPATIENT
Start: 2020-03-26 | End: 2020-03-31 | Stop reason: HOSPADM

## 2020-03-26 RX ORDER — SODIUM CHLORIDE 0.9 % (FLUSH) 0.9 %
10 SYRINGE (ML) INJECTION PRN
Status: DISCONTINUED | OUTPATIENT
Start: 2020-03-26 | End: 2020-03-31 | Stop reason: HOSPADM

## 2020-03-26 RX ORDER — PRAVASTATIN SODIUM 10 MG
20 TABLET ORAL DAILY
Status: DISCONTINUED | OUTPATIENT
Start: 2020-03-26 | End: 2020-03-31 | Stop reason: HOSPADM

## 2020-03-26 RX ORDER — NICOTINE POLACRILEX 4 MG
15 LOZENGE BUCCAL PRN
Status: DISCONTINUED | OUTPATIENT
Start: 2020-03-26 | End: 2020-03-31 | Stop reason: HOSPADM

## 2020-03-26 RX ORDER — PROMETHAZINE HYDROCHLORIDE 12.5 MG/1
12.5 TABLET ORAL EVERY 6 HOURS PRN
Status: DISCONTINUED | OUTPATIENT
Start: 2020-03-26 | End: 2020-03-31 | Stop reason: HOSPADM

## 2020-03-26 RX ORDER — POLYETHYLENE GLYCOL 3350 17 G/17G
17 POWDER, FOR SOLUTION ORAL DAILY PRN
Status: DISCONTINUED | OUTPATIENT
Start: 2020-03-26 | End: 2020-03-31 | Stop reason: HOSPADM

## 2020-03-26 RX ORDER — ACETAMINOPHEN 650 MG/1
650 SUPPOSITORY RECTAL EVERY 6 HOURS PRN
Status: DISCONTINUED | OUTPATIENT
Start: 2020-03-26 | End: 2020-03-31 | Stop reason: HOSPADM

## 2020-03-26 RX ORDER — HYDROCODONE BITARTRATE AND ACETAMINOPHEN 5; 325 MG/1; MG/1
1 TABLET ORAL EVERY 8 HOURS PRN
Status: DISCONTINUED | OUTPATIENT
Start: 2020-03-26 | End: 2020-03-31 | Stop reason: HOSPADM

## 2020-03-26 RX ORDER — HYDROCODONE BITARTRATE AND ACETAMINOPHEN 5; 325 MG/1; MG/1
1 TABLET ORAL ONCE
Status: COMPLETED | OUTPATIENT
Start: 2020-03-27 | End: 2020-03-26

## 2020-03-26 RX ORDER — NICOTINE 21 MG/24HR
1 PATCH, TRANSDERMAL 24 HOURS TRANSDERMAL DAILY
Status: DISCONTINUED | OUTPATIENT
Start: 2020-03-26 | End: 2020-03-31 | Stop reason: HOSPADM

## 2020-03-26 RX ORDER — INSULIN GLARGINE 100 [IU]/ML
30 INJECTION, SOLUTION SUBCUTANEOUS NIGHTLY
Status: DISCONTINUED | OUTPATIENT
Start: 2020-03-26 | End: 2020-03-31 | Stop reason: HOSPADM

## 2020-03-26 RX ADMIN — CEFEPIME 2 G: 2 INJECTION, POWDER, FOR SOLUTION INTRAVENOUS at 23:58

## 2020-03-26 RX ADMIN — HYDROCODONE BITARTRATE AND ACETAMINOPHEN 1 TABLET: 5; 325 TABLET ORAL at 10:54

## 2020-03-26 RX ADMIN — HYDROCODONE BITARTRATE AND ACETAMINOPHEN 1 TABLET: 5; 325 TABLET ORAL at 18:41

## 2020-03-26 RX ADMIN — ASPIRIN 325 MG ORAL TABLET 325 MG: 325 PILL ORAL at 08:41

## 2020-03-26 RX ADMIN — SODIUM CHLORIDE: 9 INJECTION, SOLUTION INTRAVENOUS at 04:05

## 2020-03-26 RX ADMIN — VANCOMYCIN HYDROCHLORIDE 1250 MG: 5 INJECTION, POWDER, LYOPHILIZED, FOR SOLUTION INTRAVENOUS at 12:36

## 2020-03-26 RX ADMIN — GADOTERIDOL 15 ML: 279.3 INJECTION, SOLUTION INTRAVENOUS at 10:20

## 2020-03-26 RX ADMIN — HYDROCODONE BITARTRATE AND ACETAMINOPHEN 1 TABLET: 5; 325 TABLET ORAL at 23:58

## 2020-03-26 RX ADMIN — VANCOMYCIN HYDROCHLORIDE 2000 MG: 1 INJECTION, POWDER, LYOPHILIZED, FOR SOLUTION INTRAVENOUS at 01:23

## 2020-03-26 RX ADMIN — HYDROCODONE BITARTRATE AND ACETAMINOPHEN 1 TABLET: 5; 325 TABLET ORAL at 04:04

## 2020-03-26 RX ADMIN — CEFEPIME HYDROCHLORIDE 2 G: 2 INJECTION, POWDER, FOR SOLUTION INTRAVENOUS at 00:18

## 2020-03-26 RX ADMIN — METRONIDAZOLE 500 MG: 500 INJECTION, SOLUTION INTRAVENOUS at 17:18

## 2020-03-26 RX ADMIN — PRAVASTATIN SODIUM 20 MG: 10 TABLET ORAL at 08:41

## 2020-03-26 RX ADMIN — CEFEPIME 2 G: 2 INJECTION, POWDER, FOR SOLUTION INTRAVENOUS at 11:44

## 2020-03-26 RX ADMIN — ENOXAPARIN SODIUM 40 MG: 40 INJECTION SUBCUTANEOUS at 08:40

## 2020-03-26 RX ADMIN — HYDROMORPHONE HYDROCHLORIDE 0.5 MG: 2 INJECTION, SOLUTION INTRAMUSCULAR; INTRAVENOUS; SUBCUTANEOUS at 01:00

## 2020-03-26 RX ADMIN — INSULIN GLARGINE 30 UNITS: 100 INJECTION, SOLUTION SUBCUTANEOUS at 20:53

## 2020-03-26 ASSESSMENT — ENCOUNTER SYMPTOMS
EYE REDNESS: 0
ABDOMINAL PAIN: 0
BACK PAIN: 0
ABDOMINAL DISTENTION: 0
COLOR CHANGE: 0
DIARRHEA: 0
BLOOD IN STOOL: 0
NAUSEA: 0
SORE THROAT: 0
CHEST TIGHTNESS: 0
RHINORRHEA: 0
EYE PAIN: 0
EYE ITCHING: 0
COUGH: 0
SHORTNESS OF BREATH: 0
EYE DISCHARGE: 0
VOMITING: 0

## 2020-03-26 ASSESSMENT — PAIN SCALES - GENERAL
PAINLEVEL_OUTOF10: 10
PAINLEVEL_OUTOF10: 8
PAINLEVEL_OUTOF10: 9
PAINLEVEL_OUTOF10: 8
PAINLEVEL_OUTOF10: 5
PAINLEVEL_OUTOF10: 8
PAINLEVEL_OUTOF10: 9

## 2020-03-26 ASSESSMENT — PAIN DESCRIPTION - ORIENTATION: ORIENTATION: LEFT

## 2020-03-26 ASSESSMENT — PAIN DESCRIPTION - LOCATION: LOCATION: FOOT

## 2020-03-26 ASSESSMENT — PAIN DESCRIPTION - DESCRIPTORS: DESCRIPTORS: CONSTANT;THROBBING

## 2020-03-26 ASSESSMENT — PAIN DESCRIPTION - PAIN TYPE: TYPE: ACUTE PAIN;CHRONIC PAIN

## 2020-03-26 NOTE — ED TRIAGE NOTES
Pt presents to ED c/o left foot pain. States he has a diabetic ulcer on the bottom of it. States he had an xray done. They called him back with results stating that the infection had gone to the bone and that he needed to be admitted to the hospital. Pt rates pain 9/10. Pt is alert and oriented.

## 2020-03-26 NOTE — PROGRESS NOTES
Nutrition Assessment (Low Risk)    Type and Reason for Visit: Initial, Positive Nutrition Screen(wound)    Nutrition Recommendations:   · Modify Carb Control 4 to 5 to meet energy needs d/t pt's stature of 6' 1\"   · Begin wound healing oral nutrition supplement bid  · Begin low taty high protein oral nutrition supplement daily to optimize intake dos    Nutrition Assessment:  Diabetic foot infection for possible toe amputation noted. No reported decreased intake nor wt loss PTA. Wt appears stable over past yr per Epic history, BMI 22.1. Patient assessed for nutritional risk. Deemed to be at low risk at this time. Will continue to monitor for changes in status.     Malnutrition Assessment:  · Malnutrition Status: No malnutrition    Nutrition Risk Level   Risk Level: Low    Nutrition Diagnosis:   · Problem: Increased nutrient needs  · Etiology: Acute injury/trauma, Endocrine dysfunction    Signs and symptoms: Presence of wounds    Nutrition Intervention:  Food and/or Delivery: Modify current diet, Start ONS  Nutrition Education/Counseling/Coordination of Care:  Continued Inpatient Monitoring, Education not appropriate at this time, Coordination of Care      Electronically signed by Urban Menjivar RD, LD on 3/26/20 at 1:23 PM EDT    Contact Number: [phone]

## 2020-03-26 NOTE — H&P
Admission medications    Medication Sig Start Date End Date Taking? Authorizing Provider   insulin glargine (LANTUS) 100 UNIT/ML injection vial Inject 30 Units into the skin nightly 3/24/20  Yes BARBARA Quinn CNP   pravastatin (PRAVACHOL) 20 MG tablet Take 1 tablet by mouth daily 3/23/20  Yes BARBARA Quinn CNP   clindamycin (CLEOCIN) 300 MG capsule Take 1 capsule by mouth 3 times daily for 7 days 3/23/20 3/30/20 Yes BARBARA Quinn CNP   HYDROcodone-acetaminophen (NORCO) 5-325 MG per tablet Take 1 tablet by mouth every 8 hours as needed for Pain for up to 5 days. Intended supply: 5 days. Take lowest dose possible to manage pain 3/23/20 3/28/20 Yes BARBARA Quinn CNP   amoxicillin-clavulanate (AUGMENTIN) 875-125 MG per tablet Take 1 tablet by mouth 2 times daily for 7 days 3/23/20 3/30/20 Yes BARBARA Quinn CNP   metFORMIN (GLUCOPHAGE) 1000 MG tablet Take 1 tablet by mouth 2 times daily (with meals) 11/11/19  Yes BARBARA Quinn CNP   insulin lispro (HUMALOG) 100 UNIT/ML injection vial Inject 0-12 Units into the skin 3 times daily (with meals) 8/19/19  Yes BARBARA Quinn CNP   Insulin Syringes, Disposable, U-100 1 ML MISC 1 each by Does not apply route daily 6/10/19  Yes BARBARA Quinn CNP   INSULIN SYRINGE .5CC/29G ([employer] INS SYR .5CC/29G) 29G X 1/2\" 0.5 ML MISC 1 each by Does not apply route daily 6/5/19  Yes BARBARA Quinn CNP   omeprazole (PRILOSEC) 20 MG delayed release capsule Take 1 capsule by mouth every morning (before breakfast) 5/13/19  Yes BARBARA Quinn CNP   blood glucose monitor strips Test 3 times a day & as needed for symptoms of irregular blood glucose.  5/13/19  Yes BARBARA Quinn CNP   insulin lispro (HUMALOG) 100 UNIT/ML injection vial Inject 15 Units into the skin 3 times daily (with meals) 4/29/19  Yes BARBARA Quinn - CNP   aspirin 325 MG tablet Take 325 mg by mouth daily    Historical Provider, MD   Blood CREATININE 0.7* 0.9     LFT'S  Recent Labs     03/23/20  1545 03/25/20  2253   AST 15 13*   ALT 14 11   BILITOT 0.7 0.4   ALKPHOS 124 111     COAG  No results for input(s): INR in the last 72 hours. CARDIAC ENZYMES  No results for input(s): CKTOTAL, CKMB, CKMBINDEX, TROPONINT in the last 72 hours. U/A:    Lab Results   Component Value Date    COLORU YELLOW 04/22/2019    WBCUA 2 04/22/2019    RBCUA 1 04/22/2019    MUCUS MANY 04/22/2019    BACTERIA RARE 04/22/2019    CLARITYU CLEAR 04/22/2019    SPECGRAV 1.018 04/22/2019    LEUKOCYTESUR NEGATIVE 04/22/2019    BLOODU SMALL 04/22/2019       ABG  No results found for: RCQ5MTH, BEART, R5KCJMMS, PHART, THGBART, ZDG3FYU, PO2ART, TSL3JQO        Active Hospital Problems    Diagnosis Date Noted    Acute osteomyelitis of phalanx of left foot [facility]) [M86.172] 87/60/9178       CERTIFICATION    I certify that Ramona Whaley is expected to be hospitalized for >2 midnights based on the following assessment and plan:    ASSESSMENT/PLAN:    1. Osteomyelitis of left third phalanx  - start vancomycin and cefepime. Wound culture 3/23 from foot appears to show staph aureus. Sensitivities are still pending. Consult ID. Consult general surgery. Check arterial Doppler for circulation and foot. Check MRI with contrast of left foot. Continue Norco for pain. Serial sed rates to check for improvement. 2. Diabetes mellitus  -hemoglobin A1c 3/23: 8.9. Resume home Lantus and prandial insulin. Placed on sliding scale insulin. Diabetic diet. 3. Hyponatremia  -may be due to dehydration. Patient denies alcohol use. Patient appears to have dry mucous membranes. Give 1 L of normal saline. Recheck sodium levels tomorrow. 4. Tobacco use  - cessation education.       DVT Prophylaxis: Lovenox  Diet: No diet orders on file  Code Status: Full Code   POA: Viji Hooker MD

## 2020-03-26 NOTE — CONSULTS
(76 kg)   SpO2 99%   BMI 22.10 kg/m²   Bryon Risk Score: Bryon Scale Score: 20    LABS    CBC:   Lab Results   Component Value Date    WBC 7.2 03/25/2020    RBC 4.14 03/25/2020    HGB 11.8 03/25/2020    HCT 35.4 03/25/2020    MCV 85.5 03/25/2020    MCH 28.5 03/25/2020    MCHC 33.3 03/25/2020    RDW 12.2 03/25/2020     03/25/2020    MPV 9.6 03/25/2020     CMP:    Lab Results   Component Value Date     03/25/2020    K 4.2 03/25/2020    CL 93 03/25/2020    CO2 22 03/25/2020    BUN 19 03/25/2020    CREATININE 0.9 03/25/2020    GFRAA >60 03/25/2020    AGRATIO 0.6 03/23/2020    LABGLOM >60 03/25/2020    GLUCOSE 254 03/25/2020    PROT 8.6 03/25/2020    LABALBU 3.3 03/25/2020    CALCIUM 8.7 03/25/2020    BILITOT 0.4 03/25/2020    ALKPHOS 111 03/25/2020    AST 13 03/25/2020    ALT 11 03/25/2020     Albumin:    Lab Results   Component Value Date    LABALBU 3.3 03/25/2020     PT/INR:    Lab Results   Component Value Date    PROTIME 10.5 06/12/2019    INR 0.92 06/12/2019     HgBA1c:    Lab Results   Component Value Date    LABA1C 8.9 03/23/2020         Assessment:     Patient Active Problem List   Diagnosis    Indu's disease    Type 2 diabetes mellitus without complication, with long-term current use of insulin ([facility] [address]    Gastroesophageal reflux disease    Acute hepatitis A    Tobacco abuse    Acute osteomyelitis of phalanx of left foot ([facility] [address] [facility]    Diabetic foot infection ([facility] [address]       Measurements:  Wound 12/16/17 Other (Comment) Foot Right;Dorsal unstageable, round  (Active)   Number of days: 831       Incision 12/16/17 Perineum (Active)   Number of days: 831       Incision 02/28/18 Foot Right (Active)   Number of days: 757       Wound 03/26/20 Foot Left;Plantar (Active)   Wound Diabetic [place] 3 3/26/2020  3:00 PM   Dressing Status Changed 3/26/2020  3:00 PM   Dressing Changed Changed/New 3/26/2020  3:00 PM   Wound Cleansed Rinsed/Irrigated with saline 3/26/2020  3:00 PM   Wound Length (cm) 1.2 cm 3/26/2020  3:00 PM   Wound Width (cm) 0.8 cm 3/26/2020  3:00 PM   Wound Depth (cm) 1.4 cm 3/26/2020  3:00 PM   Wound Surface Area (cm^2) 0.96 cm^2 3/26/2020  3:00 PM   Wound Volume (cm^3) 1.34 cm^3 3/26/2020  3:00 PM   Distance Tunneling (cm) 0 cm 3/26/2020  3:00 PM   Tunneling Position ___ O'Clock 0 3/26/2020  3:00 PM   Undermining Starts ___ O'Clock 12 3/26/2020  3:00 PM   Undermining Ends___ O'Clock 12 3/26/2020  3:00 PM   Undermining Maxium Distance (cm) 1.3 3/26/2020  3:00 PM   Wound Assessment Red;Black 3/26/2020  3:00 PM   Drainage Amount Moderate 3/26/2020  3:00 PM   Drainage Description Serosanguinous 3/26/2020  3:00 PM   Odor Strong 3/26/2020  3:00 PM   Margins Defined edges 3/26/2020  3:00 PM   Dee-wound Assessment Black; Maceration 3/26/2020  3:00 PM   Non-staged Wound Description Full thickness 3/26/2020  3:00 PM   [place]%Wound Bed 10 3/26/2020  4:35 AM   Red%Wound Bed 40 3/26/2020  3:00 PM   Yellow%Wound Bed 30 3/26/2020  3:00 PM   Black%Wound Bed 30 3/26/2020  3:00 PM   Number of days: 0       Response to treatment:  Well tolerated by patient. Pain Assessment:  Severity:  0  Quality of pain: none  Wound Pain Timing/Severity:   Premedicated:no    Plan:     Plan of Care: Wound 03/26/20 Foot Left;Plantar-Dressing/Treatment: (painted with betadine abd kerlix tape )     Left plantar diabetic wound assessed and pictured wound has strong odor area cleansed with ns dressing as above. Dr Kike Anne saw pt today and told pt he will likely need left 3rd toe amputation done has osteomyelits. Rt foot is Intact. Pt is generally not at risk for skin breakdown AEB semaj score.      Specialty Bed Required : no  [] Low Air Loss   [] Pressure Redistribution  [] Fluid Immersion  [] Bariatric  [] Total Pressure Relief  [] Other:     Discharge Plan:  Placement for patient upon discharge: no  Hospice Care: no  Patient appropriate for Outpatient [address]: [facility]    Patient/Caregiver Teaching:  Level of patient/caregiver understanding able to:  Cares explained as given pt voiced understanding. Electronically signed by Analisa Barber.  OWEN Moulton,  on 3/26/2020 at 3:49 PM

## 2020-03-26 NOTE — CONSULTS
Infectious Disease Consult Note  3/26/2020   Patient Name: Anthony Sun : 1980   Impression   Left Foot OM with DFI -Abscess:  · Afebrile, no leukocytosis  · Blood cultures 3/25-pending  · Left foot DFU culture 3/26-pending  · Outpatient Left foot wound culture 3/23-Staph Aureus   · CRP 3/  · ESR 3/  · General surgery onboard, Dr. Jose Feliciano, DW dependent upon MRI, will likely need amputation left LE   Type I DM:  AIC 8.9 on 3/23/20   Multi-morbidity: per PMHx:  DM, Kidney stones, Marijuana use, Tobacco abuse, ETOH use,  Plan:   Continue vancomycin and cefepime  · Start Flagyl   Follow with Dr. Jose Feliciano for surgery plans tentative possible amputation LE    Thank you for allowing me to consult in the care of this patient.  ------------------------  REASON FOR CONSULT: Infective syndrome     Requested by: Dr. Jeovany Cuevas is a 44 y.o.  male who was admitted 3/25/2020 for further evaluation and management of left foot pain. States noticed having pain starting 2 weeks prior on the bottom of his left foot. He had a prior callus. He did visit his PCP who prescribed oral ABX therapy of clindamycin and Augmentin. The callused area then opened up into an ulceration which then started draining. He then obtained an XR ordered by his PCP which showed an impression of possible OM and he was then sent to the ED. States he had fever/chills prior to arrival.  ?  Infectious diseases service was consulted to evaluate the pt, and recommend further investigative and therapeutic measures. ROS: Other systems reviewed Including eyes, ENT, respiratory, cardiovascular, GI, , dermatologic, neurologic, psych, hem/lymphatic, musculoskeletal and endocrine were negative other than what is mentioned above.      Patient Active Problem List    Diagnosis Date Noted    Acute osteomyelitis of phalanx of left foot ([facility] [address] 2020    Acute hepatitis A 2019    Tobacco abuse 2019

## 2020-03-26 NOTE — PLAN OF CARE
Problem: Falls - Risk of:  Goal: Will remain free from falls  Description: Will remain free from falls  Outcome: Ongoing  Goal: Absence of physical injury  Description: Absence of physical injury  Outcome: Ongoing     Problem: Pain:  Goal: Pain level will decrease  Description: Pain level will decrease  Outcome: Ongoing  Goal: Control of acute pain  Description: Control of acute pain  Outcome: Ongoing

## 2020-03-26 NOTE — ED PROVIDER NOTES
Yes BARBARA Cueto CNP   insulin lispro (HUMALOG) 100 UNIT/ML injection vial Inject 0-12 Units into the skin 3 times daily (with meals) 8/19/19  Yes BARBARA Cueto CNP   Insulin Syringes, Disposable, U-100 1 ML MISC 1 each by Does not apply route daily 6/10/19  Yes BARBARA Cueto CNP   INSULIN SYRINGE .5CC/29G ([employer] INS SYR .5CC/29G) 29G X 1/2\" 0.5 ML MISC 1 each by Does not apply route daily 6/5/19  Yes BARBARA Cueto CNP   omeprazole (PRILOSEC) 20 MG delayed release capsule Take 1 capsule by mouth every morning (before breakfast) 5/13/19  Yes BARBARA Cueto CNP   blood glucose monitor strips Test 3 times a day & as needed for symptoms of irregular blood glucose.  5/13/19  Yes BARBARA Cueto CNP   insulin lispro (HUMALOG) 100 UNIT/ML injection vial Inject 15 Units into the skin 3 times daily (with meals) 4/29/19  Yes BARBARA Cueto CNP   aspirin 325 MG tablet Take 325 mg by mouth daily    Historical Provider, MD   Blood Glucose Monitoring Suppl (ONE TOUCH ULTRA 2) w/Device KIT 1 kit by Does not apply route once for 1 dose 4/29/19 4/29/19  BARBARA Cueto CNP        Patient Active Problem List   Diagnosis    Indu's disease    Type 2 diabetes mellitus without complication, with long-term current use of insulin ([facility] [address]    Gastroesophageal reflux disease    Acute hepatitis A    Tobacco abuse         SURGICAL HISTORY       Past Surgical History:   Procedure Laterality Date    OTHER SURGICAL HISTORY  12/16/2017    I & D Perineal Abscess    OTHER SURGICAL HISTORY Right 02/28/2018    tendoachilles lengthenig of right foot dorsiflexory 3rd metarsal right foot debridement of hyperkerototic lesion     WISDOM TOOTH EXTRACTION      4 [place] Teeth Extracted In Past         CURRENT MEDICATIONS       Previous Medications    AMOXICILLIN-CLAVULANATE (AUGMENTIN) 875-125 MG PER TABLET    Take 1 tablet by mouth 2 times daily for 7 days    ASPIRIN 325 MG TABLET    Take 325 mg by mouth daily    BLOOD GLUCOSE MONITOR STRIPS    Test 3 times a day & as needed for symptoms of irregular blood glucose. BLOOD GLUCOSE MONITORING SUPPL (ONE TOUCH ULTRA 2) W/DEVICE KIT    1 kit by Does not apply route once for 1 dose    CLINDAMYCIN (CLEOCIN) 300 MG CAPSULE    Take 1 capsule by mouth 3 times daily for 7 days    HYDROCODONE-ACETAMINOPHEN (NORCO) 5-325 MG PER TABLET    Take 1 tablet by mouth every 8 hours as needed for Pain for up to 5 days. Intended supply: 5 days. Take lowest dose possible to manage pain    INSULIN GLARGINE (LANTUS) 100 UNIT/ML INJECTION VIAL    Inject 30 Units into the skin nightly    INSULIN LISPRO (HUMALOG) 100 UNIT/ML INJECTION VIAL    Inject 15 Units into the skin 3 times daily (with meals)    INSULIN LISPRO (HUMALOG) 100 UNIT/ML INJECTION VIAL    Inject 0-12 Units into the skin 3 times daily (with meals)    INSULIN SYRINGE .5CC/29G ([employer] INS SYR .5CC/29G) 29G X 1/2\" 0.5 ML MISC    1 each by Does not apply route daily    INSULIN SYRINGES, DISPOSABLE, U-100 1 ML MISC    1 each by Does not apply route daily    METFORMIN (GLUCOPHAGE) 1000 MG TABLET    Take 1 tablet by mouth 2 times daily (with meals)    OMEPRAZOLE (PRILOSEC) 20 MG DELAYED RELEASE CAPSULE    Take 1 capsule by mouth every morning (before breakfast)    PRAVASTATIN (PRAVACHOL) 20 MG TABLET    Take 1 tablet by mouth daily       ALLERGIES     Patient has no known allergies.     FAMILY HISTORY       Family History   Problem Relation Age of Onset    Obesity Mother     Heart Disease Father         Open Heart Surgery    Diabetes Father     Allergy (Severe) Brother           SOCIAL HISTORY       Social History     Socioeconomic History    Marital status: Single     Spouse name: None    Number of children: None    Years of education: None    Highest education level: None   Occupational History    None   Social Needs    Financial resource strain: None    Food insecurity     Worry: None     Inability: None prescribed by his primary care physician. He does endorse some chills. No fevers. Upon presentation emergency department, he is mildly tachycardic. He is afebrile. Vitals are otherwise unremarkable. On exam he does have an ulcer on the plantar surface of his left foot with some surrounding erythema. No significant malodor or drainage noted at this time. I did obtain labs including inflammatory markers. I did review the work-up from his primary care physician. X-ray was obtained and is concerning for osteomyelitis. He will be started on IV vancomycin and cefepime. He is given pain control. He will be admitted for further evaluation and management. Amount and/or Complexity of Data Reviewed  Clinical lab tests: reviewed  Tests in the radiology section of CPT®: reviewed        CONSULTS:  PHARMACY TO DOSE VANCOMYCIN  IP CONSULT TO HOSPITALIST    PROCEDURES:  None performed unless otherwise noted below     Procedures        FINAL IMPRESSION      1. Diabetic foot infection ([facility] [address]          DISPOSITION/PLAN   DISPOSITION        PATIENT REFERRED TO:  No follow-up provider specified. DISCHARGE MEDICATIONS:  New Prescriptions    No medications on file       ED Provider Disposition Time  DISPOSITION        The Patient was instructed to read the package inserts with any medication that was prescribed. Major potential reactions and medication interactions were discussed. The Patient understands that there are numerous possible adverse reactions not covered. The patient was also instructed to arrange follow-up with his or her primary care provider for review of any pending labwork or incidental findings on any radiology results that were obtained. All efforts were made to discuss any incidental findings that require further monitoring. Controlled Substances Monitoring:     No flowsheet data found.     (Please note that portions of this note were completed with a voice recognition program.  Efforts were made to edit the dictations but occasionally words are mis-transcribed.)    Ramez Lim MD (electronically signed)  Attending Emergency Physician           Ramez Lim MD  03/26/20 [id number]       Ramez Lim MD  03/26/20 [id number]
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
## 2020-03-27 ENCOUNTER — ANESTHESIA EVENT (OUTPATIENT)
Dept: OPERATING ROOM | Age: 40
DRG: 314 | End: 2020-03-27
Payer: MEDICAID

## 2020-03-27 ENCOUNTER — ANESTHESIA (OUTPATIENT)
Dept: OPERATING ROOM | Age: 40
DRG: 314 | End: 2020-03-27
Payer: MEDICAID

## 2020-03-27 VITALS
OXYGEN SATURATION: 100 % | TEMPERATURE: 98.6 F | SYSTOLIC BLOOD PRESSURE: 107 MMHG | RESPIRATION RATE: 3 BRPM | DIASTOLIC BLOOD PRESSURE: 64 MMHG

## 2020-03-27 LAB
ANION GAP SERPL CALCULATED.3IONS-SCNC: 11 MMOL/L (ref 4–16)
BASOPHILS ABSOLUTE: 0 K/CU MM
BASOPHILS RELATIVE PERCENT: 0.5 % (ref 0–1)
BUN BLDV-MCNC: 10 MG/DL (ref 6–23)
CALCIUM SERPL-MCNC: 8.4 MG/DL (ref 8.3–10.6)
CHLORIDE BLD-SCNC: 96 MMOL/L (ref 99–110)
CO2: 22 MMOL/L (ref 21–32)
CREAT SERPL-MCNC: 0.6 MG/DL (ref 0.9–1.3)
DIFFERENTIAL TYPE: ABNORMAL
EOSINOPHILS ABSOLUTE: 0.1 K/CU MM
EOSINOPHILS RELATIVE PERCENT: 3.1 % (ref 0–3)
ERYTHROCYTE SEDIMENTATION RATE: 118 MM/HR (ref 0–15)
GFR AFRICAN AMERICAN: >60 ML/MIN/1.73M2
GFR NON-AFRICAN AMERICAN: >60 ML/MIN/1.73M2
GLUCOSE BLD-MCNC: 136 MG/DL (ref 70–99)
GLUCOSE BLD-MCNC: 138 MG/DL (ref 70–99)
GLUCOSE BLD-MCNC: 170 MG/DL (ref 70–99)
GLUCOSE BLD-MCNC: 217 MG/DL (ref 70–99)
GLUCOSE BLD-MCNC: 276 MG/DL (ref 70–99)
HCT VFR BLD CALC: 31.4 % (ref 42–52)
HEMOGLOBIN: 10.4 GM/DL (ref 13.5–18)
IMMATURE NEUTROPHIL %: 0.3 % (ref 0–0.43)
LYMPHOCYTES ABSOLUTE: 0.9 K/CU MM
LYMPHOCYTES RELATIVE PERCENT: 22.8 % (ref 24–44)
MCH RBC QN AUTO: 28 PG (ref 27–31)
MCHC RBC AUTO-ENTMCNC: 33.1 % (ref 32–36)
MCV RBC AUTO: 84.6 FL (ref 78–100)
MONOCYTES ABSOLUTE: 0.2 K/CU MM
MONOCYTES RELATIVE PERCENT: 6.1 % (ref 0–4)
NUCLEATED RBC %: 0 %
PDW BLD-RTO: 12 % (ref 11.7–14.9)
PLATELET # BLD: 239 K/CU MM (ref 140–440)
PMV BLD AUTO: 8.9 FL (ref 7.5–11.1)
POTASSIUM SERPL-SCNC: 4.1 MMOL/L (ref 3.5–5.1)
RBC # BLD: 3.71 M/CU MM (ref 4.6–6.2)
SEGMENTED NEUTROPHILS ABSOLUTE COUNT: 2.6 K/CU MM
SEGMENTED NEUTROPHILS RELATIVE PERCENT: 67.2 % (ref 36–66)
SODIUM BLD-SCNC: 129 MMOL/L (ref 135–145)
TOTAL IMMATURE NEUTOROPHIL: 0.01 K/CU MM
TOTAL NUCLEATED RBC: 0 K/CU MM
WBC # BLD: 3.9 K/CU MM (ref 4–10.5)

## 2020-03-27 PROCEDURE — 87186 SC STD MICRODIL/AGAR DIL: CPT

## 2020-03-27 PROCEDURE — 7100000001 HC PACU RECOVERY - ADDTL 15 MIN: Performed by: SURGERY

## 2020-03-27 PROCEDURE — 94761 N-INVAS EAR/PLS OXIMETRY MLT: CPT

## 2020-03-27 PROCEDURE — 99232 SBSQ HOSP IP/OBS MODERATE 35: CPT | Performed by: NURSE PRACTITIONER

## 2020-03-27 PROCEDURE — 6360000002 HC RX W HCPCS: Performed by: SURGERY

## 2020-03-27 PROCEDURE — 2580000003 HC RX 258: Performed by: SURGERY

## 2020-03-27 PROCEDURE — 2580000003 HC RX 258: Performed by: HOSPITALIST

## 2020-03-27 PROCEDURE — 97607 NEG PRS WND THR NDME<=50SQCM: CPT | Performed by: SURGERY

## 2020-03-27 PROCEDURE — 7100000000 HC PACU RECOVERY - FIRST 15 MIN: Performed by: SURGERY

## 2020-03-27 PROCEDURE — 88311 DECALCIFY TISSUE: CPT

## 2020-03-27 PROCEDURE — 2709999900 HC NON-CHARGEABLE SUPPLY: Performed by: SURGERY

## 2020-03-27 PROCEDURE — 87071 CULTURE AEROBIC QUANT OTHER: CPT

## 2020-03-27 PROCEDURE — 6370000000 HC RX 637 (ALT 250 FOR IP): Performed by: HOSPITALIST

## 2020-03-27 PROCEDURE — 3600000002 HC SURGERY LEVEL 2 BASE: Performed by: SURGERY

## 2020-03-27 PROCEDURE — 6360000002 HC RX W HCPCS: Performed by: NURSE ANESTHETIST, CERTIFIED REGISTERED

## 2020-03-27 PROCEDURE — 2720000010 HC SURG SUPPLY STERILE: Performed by: SURGERY

## 2020-03-27 PROCEDURE — 6370000000 HC RX 637 (ALT 250 FOR IP): Performed by: SURGERY

## 2020-03-27 PROCEDURE — 36415 COLL VENOUS BLD VENIPUNCTURE: CPT

## 2020-03-27 PROCEDURE — 6370000000 HC RX 637 (ALT 250 FOR IP): Performed by: STUDENT IN AN ORGANIZED HEALTH CARE EDUCATION/TRAINING PROGRAM

## 2020-03-27 PROCEDURE — 3700000001 HC ADD 15 MINUTES (ANESTHESIA): Performed by: SURGERY

## 2020-03-27 PROCEDURE — 3700000000 HC ANESTHESIA ATTENDED CARE: Performed by: SURGERY

## 2020-03-27 PROCEDURE — 88305 TISSUE EXAM BY PATHOLOGIST: CPT

## 2020-03-27 PROCEDURE — 87073 CULTURE BACTERIA ANAEROBIC: CPT

## 2020-03-27 PROCEDURE — 02HV33Z INSERTION OF INFUSION DEVICE INTO SUPERIOR VENA CAVA, PERCUTANEOUS APPROACH: ICD-10-PCS | Performed by: STUDENT IN AN ORGANIZED HEALTH CARE EDUCATION/TRAINING PROGRAM

## 2020-03-27 PROCEDURE — 0JBR0ZZ EXCISION OF LEFT FOOT SUBCUTANEOUS TISSUE AND FASCIA, OPEN APPROACH: ICD-10-PCS | Performed by: SURGERY

## 2020-03-27 PROCEDURE — 85652 RBC SED RATE AUTOMATED: CPT

## 2020-03-27 PROCEDURE — 82565 ASSAY OF CREATININE: CPT

## 2020-03-27 PROCEDURE — 2580000003 HC RX 258: Performed by: NURSE PRACTITIONER

## 2020-03-27 PROCEDURE — 11044 DBRDMT BONE 1ST 20 SQ CM/<: CPT | Performed by: SURGERY

## 2020-03-27 PROCEDURE — 2500000003 HC RX 250 WO HCPCS: Performed by: NURSE ANESTHETIST, CERTIFIED REGISTERED

## 2020-03-27 PROCEDURE — 1200000000 HC SEMI PRIVATE

## 2020-03-27 PROCEDURE — 36569 INSJ PICC 5 YR+ W/O IMAGING: CPT

## 2020-03-27 PROCEDURE — 28810 AMPUTATION TOE & METATARSAL: CPT | Performed by: SURGERY

## 2020-03-27 PROCEDURE — 80048 BASIC METABOLIC PNL TOTAL CA: CPT

## 2020-03-27 PROCEDURE — 2500000003 HC RX 250 WO HCPCS: Performed by: NURSE PRACTITIONER

## 2020-03-27 PROCEDURE — 3600000012 HC SURGERY LEVEL 2 ADDTL 15MIN: Performed by: SURGERY

## 2020-03-27 PROCEDURE — 82962 GLUCOSE BLOOD TEST: CPT

## 2020-03-27 PROCEDURE — 80202 ASSAY OF VANCOMYCIN: CPT

## 2020-03-27 PROCEDURE — 76937 US GUIDE VASCULAR ACCESS: CPT

## 2020-03-27 PROCEDURE — C1751 CATH, INF, PER/CENT/MIDLINE: HCPCS

## 2020-03-27 PROCEDURE — 87205 SMEAR GRAM STAIN: CPT

## 2020-03-27 PROCEDURE — 0Y6U0Z0 DETACHMENT AT LEFT 3RD TOE, COMPLETE, OPEN APPROACH: ICD-10-PCS | Performed by: SURGERY

## 2020-03-27 PROCEDURE — 2500000003 HC RX 250 WO HCPCS: Performed by: SURGERY

## 2020-03-27 PROCEDURE — 6360000002 HC RX W HCPCS: Performed by: HOSPITALIST

## 2020-03-27 PROCEDURE — 85025 COMPLETE CBC W/AUTO DIFF WBC: CPT

## 2020-03-27 RX ORDER — ONDANSETRON 2 MG/ML
4 INJECTION INTRAMUSCULAR; INTRAVENOUS
Status: DISCONTINUED | OUTPATIENT
Start: 2020-03-27 | End: 2020-03-27 | Stop reason: HOSPADM

## 2020-03-27 RX ORDER — OXYCODONE HYDROCHLORIDE 5 MG/1
5 TABLET ORAL EVERY 4 HOURS PRN
Status: DISCONTINUED | OUTPATIENT
Start: 2020-03-27 | End: 2020-03-31 | Stop reason: HOSPADM

## 2020-03-27 RX ORDER — GLYCOPYRROLATE 1 MG/5 ML
SYRINGE (ML) INTRAVENOUS PRN
Status: DISCONTINUED | OUTPATIENT
Start: 2020-03-27 | End: 2020-03-27 | Stop reason: SDUPTHER

## 2020-03-27 RX ORDER — HYDROMORPHONE HCL 110MG/55ML
0.25 PATIENT CONTROLLED ANALGESIA SYRINGE INTRAVENOUS EVERY 5 MIN PRN
Status: DISCONTINUED | OUTPATIENT
Start: 2020-03-27 | End: 2020-03-27 | Stop reason: HOSPADM

## 2020-03-27 RX ORDER — LIDOCAINE HYDROCHLORIDE 10 MG/ML
5 INJECTION, SOLUTION EPIDURAL; INFILTRATION; INTRACAUDAL; PERINEURAL ONCE
Status: COMPLETED | OUTPATIENT
Start: 2020-03-27 | End: 2020-03-27

## 2020-03-27 RX ORDER — SODIUM CHLORIDE, SODIUM LACTATE, POTASSIUM CHLORIDE, CALCIUM CHLORIDE 600; 310; 30; 20 MG/100ML; MG/100ML; MG/100ML; MG/100ML
INJECTION, SOLUTION INTRAVENOUS CONTINUOUS
Status: DISCONTINUED | OUTPATIENT
Start: 2020-03-27 | End: 2020-03-27

## 2020-03-27 RX ORDER — SODIUM CHLORIDE 0.9 % (FLUSH) 0.9 %
10 SYRINGE (ML) INJECTION PRN
Status: DISCONTINUED | OUTPATIENT
Start: 2020-03-27 | End: 2020-03-31 | Stop reason: HOSPADM

## 2020-03-27 RX ORDER — FENTANYL CITRATE 50 UG/ML
INJECTION, SOLUTION INTRAMUSCULAR; INTRAVENOUS PRN
Status: DISCONTINUED | OUTPATIENT
Start: 2020-03-27 | End: 2020-03-27 | Stop reason: SDUPTHER

## 2020-03-27 RX ORDER — HYDRALAZINE HYDROCHLORIDE 20 MG/ML
5 INJECTION INTRAMUSCULAR; INTRAVENOUS EVERY 10 MIN PRN
Status: DISCONTINUED | OUTPATIENT
Start: 2020-03-27 | End: 2020-03-27 | Stop reason: HOSPADM

## 2020-03-27 RX ORDER — PROPOFOL 10 MG/ML
INJECTION, EMULSION INTRAVENOUS PRN
Status: DISCONTINUED | OUTPATIENT
Start: 2020-03-27 | End: 2020-03-27 | Stop reason: SDUPTHER

## 2020-03-27 RX ORDER — OXYCODONE HYDROCHLORIDE 10 MG/1
10 TABLET ORAL EVERY 4 HOURS PRN
Status: DISCONTINUED | OUTPATIENT
Start: 2020-03-27 | End: 2020-03-31 | Stop reason: HOSPADM

## 2020-03-27 RX ORDER — PHENYLEPHRINE HYDROCHLORIDE 10 MG/ML
INJECTION INTRAVENOUS PRN
Status: DISCONTINUED | OUTPATIENT
Start: 2020-03-27 | End: 2020-03-27 | Stop reason: SDUPTHER

## 2020-03-27 RX ORDER — ROCURONIUM BROMIDE 10 MG/ML
INJECTION, SOLUTION INTRAVENOUS PRN
Status: DISCONTINUED | OUTPATIENT
Start: 2020-03-27 | End: 2020-03-27 | Stop reason: SDUPTHER

## 2020-03-27 RX ORDER — ONDANSETRON 2 MG/ML
INJECTION INTRAMUSCULAR; INTRAVENOUS PRN
Status: DISCONTINUED | OUTPATIENT
Start: 2020-03-27 | End: 2020-03-27 | Stop reason: SDUPTHER

## 2020-03-27 RX ORDER — SODIUM CHLORIDE 0.9 % (FLUSH) 0.9 %
10 SYRINGE (ML) INJECTION EVERY 12 HOURS SCHEDULED
Status: DISCONTINUED | OUTPATIENT
Start: 2020-03-27 | End: 2020-03-31 | Stop reason: HOSPADM

## 2020-03-27 RX ORDER — SODIUM CHLORIDE 0.9 % (FLUSH) 0.9 %
10 SYRINGE (ML) INJECTION EVERY 12 HOURS SCHEDULED
Status: DISCONTINUED | OUTPATIENT
Start: 2020-03-27 | End: 2020-03-27 | Stop reason: HOSPADM

## 2020-03-27 RX ORDER — DEXAMETHASONE SODIUM PHOSPHATE 4 MG/ML
INJECTION, SOLUTION INTRA-ARTICULAR; INTRALESIONAL; INTRAMUSCULAR; INTRAVENOUS; SOFT TISSUE PRN
Status: DISCONTINUED | OUTPATIENT
Start: 2020-03-27 | End: 2020-03-27 | Stop reason: SDUPTHER

## 2020-03-27 RX ORDER — FENTANYL CITRATE 50 UG/ML
25 INJECTION, SOLUTION INTRAMUSCULAR; INTRAVENOUS EVERY 5 MIN PRN
Status: DISCONTINUED | OUTPATIENT
Start: 2020-03-27 | End: 2020-03-27 | Stop reason: HOSPADM

## 2020-03-27 RX ORDER — FENTANYL CITRATE 50 UG/ML
50 INJECTION, SOLUTION INTRAMUSCULAR; INTRAVENOUS EVERY 5 MIN PRN
Status: DISCONTINUED | OUTPATIENT
Start: 2020-03-27 | End: 2020-03-27 | Stop reason: HOSPADM

## 2020-03-27 RX ORDER — LIDOCAINE HYDROCHLORIDE 20 MG/ML
INJECTION, SOLUTION INTRAVENOUS PRN
Status: DISCONTINUED | OUTPATIENT
Start: 2020-03-27 | End: 2020-03-27 | Stop reason: SDUPTHER

## 2020-03-27 RX ORDER — HYDROMORPHONE HCL 110MG/55ML
0.5 PATIENT CONTROLLED ANALGESIA SYRINGE INTRAVENOUS EVERY 5 MIN PRN
Status: DISCONTINUED | OUTPATIENT
Start: 2020-03-27 | End: 2020-03-27 | Stop reason: HOSPADM

## 2020-03-27 RX ORDER — PROMETHAZINE HYDROCHLORIDE 25 MG/ML
6.25 INJECTION, SOLUTION INTRAMUSCULAR; INTRAVENOUS
Status: DISCONTINUED | OUTPATIENT
Start: 2020-03-27 | End: 2020-03-27 | Stop reason: HOSPADM

## 2020-03-27 RX ORDER — LABETALOL 20 MG/4 ML (5 MG/ML) INTRAVENOUS SYRINGE
5 EVERY 10 MIN PRN
Status: DISCONTINUED | OUTPATIENT
Start: 2020-03-27 | End: 2020-03-27 | Stop reason: HOSPADM

## 2020-03-27 RX ORDER — NEOSTIGMINE METHYLSULFATE 5 MG/5 ML
SYRINGE (ML) INTRAVENOUS PRN
Status: DISCONTINUED | OUTPATIENT
Start: 2020-03-27 | End: 2020-03-27 | Stop reason: SDUPTHER

## 2020-03-27 RX ORDER — SODIUM CHLORIDE 0.9 % (FLUSH) 0.9 %
10 SYRINGE (ML) INJECTION PRN
Status: DISCONTINUED | OUTPATIENT
Start: 2020-03-27 | End: 2020-03-27 | Stop reason: HOSPADM

## 2020-03-27 RX ADMIN — PRAVASTATIN SODIUM 20 MG: 10 TABLET ORAL at 13:38

## 2020-03-27 RX ADMIN — METRONIDAZOLE 500 MG: 500 INJECTION, SOLUTION INTRAVENOUS at 02:05

## 2020-03-27 RX ADMIN — PHENYLEPHRINE HYDROCHLORIDE 100 MCG: 10 INJECTION INTRAVENOUS at 10:19

## 2020-03-27 RX ADMIN — Medication 2 MG: at 10:54

## 2020-03-27 RX ADMIN — CEFEPIME 2 G: 2 INJECTION, POWDER, FOR SOLUTION INTRAVENOUS at 16:15

## 2020-03-27 RX ADMIN — SODIUM CHLORIDE, PRESERVATIVE FREE 10 ML: 5 INJECTION INTRAVENOUS at 13:42

## 2020-03-27 RX ADMIN — Medication 0.4 MG: at 10:54

## 2020-03-27 RX ADMIN — PROPOFOL 200 MG: 10 INJECTION, EMULSION INTRAVENOUS at 09:50

## 2020-03-27 RX ADMIN — OXYCODONE HYDROCHLORIDE 10 MG: 10 TABLET ORAL at 19:12

## 2020-03-27 RX ADMIN — INSULIN GLARGINE 30 UNITS: 100 INJECTION, SOLUTION SUBCUTANEOUS at 21:39

## 2020-03-27 RX ADMIN — PHENYLEPHRINE HYDROCHLORIDE 150 MCG: 10 INJECTION INTRAVENOUS at 09:59

## 2020-03-27 RX ADMIN — FENTANYL CITRATE 100 MCG: 50 INJECTION INTRAMUSCULAR; INTRAVENOUS at 09:48

## 2020-03-27 RX ADMIN — METRONIDAZOLE 500 MG: 500 INJECTION, SOLUTION INTRAVENOUS at 10:00

## 2020-03-27 RX ADMIN — SODIUM CHLORIDE, PRESERVATIVE FREE 10 ML: 5 INJECTION INTRAVENOUS at 21:39

## 2020-03-27 RX ADMIN — ASPIRIN 325 MG ORAL TABLET 325 MG: 325 PILL ORAL at 13:41

## 2020-03-27 RX ADMIN — BISACODYL 5 MG: 5 TABLET, COATED ORAL at 16:24

## 2020-03-27 RX ADMIN — ONDANSETRON 4 MG: 2 INJECTION INTRAMUSCULAR; INTRAVENOUS at 10:29

## 2020-03-27 RX ADMIN — VANCOMYCIN HYDROCHLORIDE 1250 MG: 5 INJECTION, POWDER, LYOPHILIZED, FOR SOLUTION INTRAVENOUS at 13:41

## 2020-03-27 RX ADMIN — VANCOMYCIN HYDROCHLORIDE 1250 MG: 5 INJECTION, POWDER, LYOPHILIZED, FOR SOLUTION INTRAVENOUS at 00:35

## 2020-03-27 RX ADMIN — LIDOCAINE HYDROCHLORIDE 5 ML: 10 INJECTION, SOLUTION EPIDURAL; INFILTRATION; INTRACAUDAL; PERINEURAL at 13:11

## 2020-03-27 RX ADMIN — METRONIDAZOLE 500 MG: 500 INJECTION, SOLUTION INTRAVENOUS at 17:44

## 2020-03-27 RX ADMIN — LIDOCAINE HYDROCHLORIDE ANHYDROUS 5 ML: 10 INJECTION, SOLUTION INFILTRATION at 15:45

## 2020-03-27 RX ADMIN — PHENYLEPHRINE HYDROCHLORIDE 100 MCG: 10 INJECTION INTRAVENOUS at 10:44

## 2020-03-27 RX ADMIN — CEFEPIME HYDROCHLORIDE 2 G: 2 INJECTION, POWDER, FOR SOLUTION INTRAVENOUS at 09:57

## 2020-03-27 RX ADMIN — LIDOCAINE HYDROCHLORIDE 100 MG: 20 INJECTION, SOLUTION INTRAVENOUS at 09:50

## 2020-03-27 RX ADMIN — ROCURONIUM BROMIDE 50 MG: 10 INJECTION INTRAVENOUS at 09:50

## 2020-03-27 RX ADMIN — HYDROMORPHONE HYDROCHLORIDE 0.5 MG: 1 INJECTION, SOLUTION INTRAMUSCULAR; INTRAVENOUS; SUBCUTANEOUS at 16:16

## 2020-03-27 RX ADMIN — SODIUM CHLORIDE, POTASSIUM CHLORIDE, SODIUM LACTATE AND CALCIUM CHLORIDE: 600; 310; 30; 20 INJECTION, SOLUTION INTRAVENOUS at 08:26

## 2020-03-27 RX ADMIN — FENTANYL CITRATE 25 MCG: 50 INJECTION INTRAMUSCULAR; INTRAVENOUS at 10:53

## 2020-03-27 RX ADMIN — FENTANYL CITRATE 25 MCG: 50 INJECTION INTRAMUSCULAR; INTRAVENOUS at 10:29

## 2020-03-27 RX ADMIN — DEXAMETHASONE SODIUM PHOSPHATE 4 MG: 4 INJECTION, SOLUTION INTRAMUSCULAR; INTRAVENOUS at 09:54

## 2020-03-27 RX ADMIN — HYDROMORPHONE HYDROCHLORIDE 0.5 MG: 1 INJECTION, SOLUTION INTRAMUSCULAR; INTRAVENOUS; SUBCUTANEOUS at 20:57

## 2020-03-27 ASSESSMENT — PULMONARY FUNCTION TESTS
PIF_VALUE: 14
PIF_VALUE: 0
PIF_VALUE: 0
PIF_VALUE: 8
PIF_VALUE: 14
PIF_VALUE: 7
PIF_VALUE: 0
PIF_VALUE: 1
PIF_VALUE: 13
PIF_VALUE: 14
PIF_VALUE: 1
PIF_VALUE: 14
PIF_VALUE: 3
PIF_VALUE: 9
PIF_VALUE: 14
PIF_VALUE: 7
PIF_VALUE: 13
PIF_VALUE: 5
PIF_VALUE: 0
PIF_VALUE: 14
PIF_VALUE: 13
PIF_VALUE: 8
PIF_VALUE: 14
PIF_VALUE: 3
PIF_VALUE: 13
PIF_VALUE: 14
PIF_VALUE: 2
PIF_VALUE: 7
PIF_VALUE: 9
PIF_VALUE: 9
PIF_VALUE: 7
PIF_VALUE: 2
PIF_VALUE: 9
PIF_VALUE: 9
PIF_VALUE: 14
PIF_VALUE: 2
PIF_VALUE: 14
PIF_VALUE: 14
PIF_VALUE: 9
PIF_VALUE: 2
PIF_VALUE: 14
PIF_VALUE: 7
PIF_VALUE: 14
PIF_VALUE: 6
PIF_VALUE: 7
PIF_VALUE: 14
PIF_VALUE: 14
PIF_VALUE: 2
PIF_VALUE: 9
PIF_VALUE: 2
PIF_VALUE: 14
PIF_VALUE: 6
PIF_VALUE: 9
PIF_VALUE: 7
PIF_VALUE: 14
PIF_VALUE: 13
PIF_VALUE: 3
PIF_VALUE: 2
PIF_VALUE: 9
PIF_VALUE: 7
PIF_VALUE: 0
PIF_VALUE: 7
PIF_VALUE: 9
PIF_VALUE: 2
PIF_VALUE: 7
PIF_VALUE: 14
PIF_VALUE: 0
PIF_VALUE: 5
PIF_VALUE: 9
PIF_VALUE: 14
PIF_VALUE: 13
PIF_VALUE: 7
PIF_VALUE: 3
PIF_VALUE: 1
PIF_VALUE: 14
PIF_VALUE: 7
PIF_VALUE: 9
PIF_VALUE: 7
PIF_VALUE: 9
PIF_VALUE: 7

## 2020-03-27 ASSESSMENT — LIFESTYLE VARIABLES: SMOKING_STATUS: 0

## 2020-03-27 ASSESSMENT — PAIN SCALES - GENERAL
PAINLEVEL_OUTOF10: 7
PAINLEVEL_OUTOF10: 6
PAINLEVEL_OUTOF10: 10
PAINLEVEL_OUTOF10: 7

## 2020-03-27 ASSESSMENT — PAIN - FUNCTIONAL ASSESSMENT: PAIN_FUNCTIONAL_ASSESSMENT: 0-10

## 2020-03-27 NOTE — PROGRESS NOTES
1117 - transferred from [address] [place] [address] on bed, monitor applied, alarms on and verified, bedside handoff received from [facility], Margie WEAVER, and [employer] [place]. Patient turned, linens changed, and repositioned. Heels elevated off bed on pillow. Patient tolerated movements well. 1145 - patient awakened from sleep, denies pain but states feels pressure from wound vac.  1158 - patient given an ice chip for complaint of dry mouth  1205 - phase one care complete, attempt made to call report to floor, awaiting return call prior to transport.   1225 - report called to [place] [id number] - transferred to [id number] on bed

## 2020-03-27 NOTE — CARE COORDINATION
CM reviewed notes and pt is poss medicaid pending. Concerns for difficulty obtaining home care due to no insurance. Also concerns for IV antibiotics at home due to not having insurance. ? PO antibiotics or the infusion clinic. Poss for wound care clinic? CM will continue to follow to help with discharge plan.  [address]

## 2020-03-27 NOTE — OP NOTE
[facility]    PATIENT: Marie Shaw 1980   MRN: [id number]    DATE: 3/27/2020    SURGEON: Fransico Kanner, MD    CASE ID: [id number]     PROCEDURE(S) PERFORMED:      TOE AMPUTATION - LEFT THIRD TOE AND METATARSAL HEAD, DEBRIDEMENT PLANTAR FOOT ULCER, WOUND VAC PLACEMENT: 51667 (CPT®)    PREOPERATIVE DIAGNOSIS:  osteomyelitis left 3rd toe, diabetic foot ulcer    POSTOPERATIVE DIAGNOSIS:   same    INDICATIONS: Marie Shaw is a 44 y.o. male presenting with a diabetic foot ulcer with associated osteomyelitis of the left 3rd toe for which amputation of the left third toe and metatarsal with debridement of the ulcer and wound vac placement has been discussed. The risks, benefits, potential complications and possible alternatives of the procedure were discussed in detail, including complications of but not limited to infection, bleeding, anesthesia-related complications, death, injury to surrounding structures, pain, recurrent or continued infection, gait instability, or need for additional interventions. All questions were answered. The patient wished to proceed and consent was documented in the medical record. FINDINGS:   Diabetic foot ulcer with osteomyelitis of the left 3rd toe    ANESTHESIA:   General endotracheal anesthesia  Anesthesiologist: Carrie Callaway MD  CRNA: BARBARA Cordero CRNA;  BARBARA Tim CRNA    STAFF:   Scrub Person First: Rubin Torres  Scrub Person Second: Eveline Johnson RN; Nickie Perez RN    ESTIMATED BLOOD LOSS: Minimal    SPECIMEN(S):   ID Type Source Tests Collected by Time Destination   1 : fluid from third left toe Body Fluid Fluid CULTURE, SURGICAL Author MD Lalitha 3/27/2020 1027    B : third left toe and metatarsal head Specimen Toe SURGICAL PATHOLOGY Author MD Lalitha 3/27/2020 1019        DRAINS & IMPLANTS:  * No implants in log *     COMPLICATIONS: none    DISPOSITION: PACU - hemodynamically stable. CONDITION: stable    PROCEDURE IN DETAIL:  Prior to beginning the procedure, informed consent was obtained and consent was documented in the medical record. The patient was brought to the operating room and positioned supine on the operating table. Anesthesia was initiated and a time out was performed in which all were in agreement. Appropriate perioperative antibiotics were administered and the patient was prepped and draped in the usual sterile fashion. The left foot was inspected. There was an approximately 2 x 1.5cm ulcer on the plantar foot near the metatarsal head of the 3rd toe with purulent drainage. Local anesthetic was used medially and laterally along the toe with good effect. An elliptical incision was made near the base of the 3rd toe, and dissection was carried down to the metatarsal head, using a periosteal elevator to expose the bone. The proximal aspect of the phalanx and the metatarsophalangeal joint appeared to be involved in the infection, therefore the metatarsal was divided proximal to the metatarsal head in unaffected bone. A bone cutter was used to transect the metatarsal, and the toe and metatarsal head were sent for pathology. A wound culture was also taken. Sharp edges were smoothed with a rongeur and file, bone wax was applied, and hemostasis was verified. The wound was thoroughly irrigated and dried. There did not appear to be any residual infection. The ulcer was partially excised with the toe, and the remaining tissue was debrided into the subcutaneous tissue and fascia using a curette. The area of debridement was approximately 2x1.5cm, and extending to the bone in depth. Fascia and deep tissue was closed over the bone stump with interrupted 3-0 Vicryl sutures. A silver wound vac sponge was cut to size and was placed subcutaneously into the wound.  The drape film was used to cover, and a bridge was made to facilitate track pad placement on the dorsum

## 2020-03-27 NOTE — ANESTHESIA PRE PROCEDURE
Department of Anesthesiology  Preprocedure Note       Name:  Carrington Collins   Age:  44 y.o.  :  1980                                          MRN:  [id number]         Date:  3/27/2020      Surgeon: Ignacio Kapoor):  Steve Hauser MD    Procedure: TOE AMPUTATION - LEFT THIRD TOE AND METATARSAL HEAD, DEBRIDEMENT PLANTAR FOOT ULCER, POSSIBLE WOUND VAC PLACEMENT (Left )    Medications prior to admission:   Prior to Admission medications    Medication Sig Start Date End Date Taking? Authorizing Provider   insulin glargine (LANTUS) 100 UNIT/ML injection vial Inject 30 Units into the skin nightly 3/24/20  Yes BARBARA Meredith CNP   pravastatin (PRAVACHOL) 20 MG tablet Take 1 tablet by mouth daily 3/23/20  Yes BARBARA Meredith CNP   clindamycin (CLEOCIN) 300 MG capsule Take 1 capsule by mouth 3 times daily for 7 days 3/23/20 3/30/20 Yes BARBARA Meredith CNP   HYDROcodone-acetaminophen (NORCO) 5-325 MG per tablet Take 1 tablet by mouth every 8 hours as needed for Pain for up to 5 days. Intended supply: 5 days.  Take lowest dose possible to manage pain 3/23/20 3/28/20 Yes BARBARA Meredith CNP   amoxicillin-clavulanate (AUGMENTIN) 875-125 MG per tablet Take 1 tablet by mouth 2 times daily for 7 days 3/23/20 3/30/20 Yes BARBARA Meredith CNP   metFORMIN (GLUCOPHAGE) 1000 MG tablet Take 1 tablet by mouth 2 times daily (with meals) 19  Yes BARBARA Meredith CNP   insulin lispro (HUMALOG) 100 UNIT/ML injection vial Inject 0-12 Units into the skin 3 times daily (with meals) 19  Yes BARBARA Meredith CNP   Insulin Syringes, Disposable, U-100 1 ML MISC 1 each by Does not apply route daily 6/10/19  Yes BARBARA Meredith CNP   INSULIN SYRINGE .5CC/29G (Fredderick Knack INS SYR .5CC/29G) 29G X 1/2\" 0.5 ML MISC 1 each by Does not apply route daily 19  Yes Rubia Maidens, APRN - CNP   omeprazole (PRILOSEC) 20 MG delayed release capsule Take 1 capsule by mouth every morning (before 650 mg Oral Q6H PRN Roland Walker MD        Or   Delfino Fruits acetaminophen (TYLENOL) suppository 650 mg  650 mg Rectal Q6H PRN Roland Walker MD        polyethylene glycol (GLYCOLAX) packet 17 g  17 g Oral Daily PRN Roland Walker MD        promethazine (PHENERGAN) tablet 12.5 mg  12.5 mg Oral Q6H PRN Roland Walker MD        Or    ondansetron [facility]) injection 4 mg  4 mg Intravenous Q6H PRN Roland Walker MD        [Held by provider] enoxaparin (LOVENOX) injection 40 mg  40 mg Subcutaneous Daily Roland Walker MD   40 mg at 03/26/20 0840    vancomycin (VANCOCIN) 1,250 mg in dextrose 5 % 250 mL IVPB  15 mg/kg Intravenous Q12H Roland Walker MD   Stopped at 03/27/20 0205    cefepime (MAXIPIME) 2 g IVPB minibag  2 g Intravenous Q12H Roland Walker MD   Stopped at 03/27/20 0035    HYDROcodone-acetaminophen (NORCO) 5-325 MG per tablet 1 tablet  1 tablet Oral Q8H PRN Roland Walker MD   1 tablet at 03/26/20 1841    glucose (GLUTOSE) 40 % oral gel 15 g  15 g Oral PRN Roland Walker MD        dextrose 50 % IV solution  12.5 g Intravenous PRN Roland Walker MD        glucagon (rDNA) injection 1 mg  1 mg Intramuscular PRN Roland Walker MD        dextrose 5 % solution  100 mL/hr Intravenous PRN Roland Walker MD        nicotine (NICODERM CQ) 21 MG/24HR 1 patch  1 patch Transdermal Daily Jamia Bauman DO   1 patch at 03/26/20 1219    metronidazole (FLAGYL) 500 mg in NaCl 100 mL IVPB premix  500 mg Intravenous Q8H Alyssa Laguna - CNP   Stopped at 03/27/20 0633       Allergies:  No Known Allergies    Problem List:    Patient Active Problem List   Diagnosis Code    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    Type 2 diabetes mellitus without complication, with long-term current use of insulin (HCC) E11.9, Z79.4    Gastroesophageal reflux disease K21.9    Acute hepatitis A B15.9    Tobacco abuse Z72.0    Acute osteomyelitis of phalanx of left foot ([address] M86.172    Diabetic foot infection ([facility] E11.628, L08.9    Osteomyelitis of third toe of Time of last solid consumption: 0716                        Date of last liquid consumption: 03/26/20                        Date of last solid food consumption: 03/26/20    BMI:   Wt Readings from Last 3 Encounters:   03/27/20 176 lb (79.8 kg)   03/23/20 186 lb (84.4 kg)   08/12/19 181 lb 6.4 oz (82.3 kg)     Body mass index is 23.22 kg/m². CBC:   Lab Results   Component Value Date    WBC 3.9 03/27/2020    RBC 3.71 03/27/2020    HGB 10.4 03/27/2020    HCT 31.4 03/27/2020    MCV 84.6 03/27/2020    RDW 12.0 03/27/2020     03/27/2020       CMP:   Lab Results   Component Value Date     03/27/2020    K 4.1 03/27/2020    CL 96 03/27/2020    CO2 22 03/27/2020    BUN 10 03/27/2020    CREATININE 0.6 03/27/2020    GFRAA >60 03/27/2020    AGRATIO 0.6 03/23/2020    LABGLOM >60 03/27/2020    GLUCOSE 136 03/27/2020    PROT 8.6 03/25/2020    CALCIUM 8.4 03/27/2020    BILITOT 0.4 03/25/2020    ALKPHOS 111 03/25/2020    AST 13 03/25/2020    ALT 11 03/25/2020       POC Tests:   Recent Labs     03/26/20 2041   POCGLU 151*       Coags:   Lab Results   Component Value Date    PROTIME 10.5 06/12/2019    INR 0.92 06/12/2019    APTT 27.8 12/16/2017       HCG (If Applicable): No results found for: PREGTESTUR, PREGSERUM, HCG, HCGQUANT     ABGs: No results found for: PHART, PO2ART, CUU1FTF, CUP6JEZ, BEART, C8FLYSNV     Type & Screen (If Applicable):  No results found for: LABABO, [address]    Anesthesia Evaluation  Patient summary reviewed and Nursing notes reviewed  Airway: Mallampati: II  TM distance: >3 FB   Neck ROM: full  Mouth opening: > = 3 FB Dental:          Pulmonary:       (-) COPD, asthma and not a current smoker          Patient did not smoke on day of surgery.                  Cardiovascular:  Exercise tolerance: good (>4 METS),       (-) hypertension       Beta Blocker:  Not on Beta Blocker         Neuro/Psych:               GI/Hepatic/Renal:   (+) GERD:, hepatitis:, liver disease:,           Endo/Other: (+) DiabetesType II DM, , .                 Abdominal:           Vascular:                                      Anesthesia Plan      general     ASA 2       Induction: intravenous. Anesthetic plan and risks discussed with patient. Plan discussed with CRNA and attending.     Attending anesthesiologist reviewed and agrees with Pre Eval content              Doug Iglesias MD   3/27/2020

## 2020-03-27 NOTE — BRIEF OP NOTE
GENERAL SURGERY BRIEF OPERATIVE NOTE  [facility]    PATIENT: Chely Harkins 1980   MRN: [id number]    DATE: 3/27/2020    SURGEON: Will Hernadez MD    CASE ID: [id number]     PROCEDURE(S) PERFORMED:      TOE AMPUTATION - LEFT THIRD TOE AND METATARSAL HEAD, DEBRIDEMENT PLANTAR FOOT ULCER, WOUND VAC PLACEMENT: 92081 (CPT®)    PREOPERATIVE DIAGNOSIS:  osteomyelitis left 3rd toe, diabetic foot ulcer    POSTOPERATIVE DIAGNOSIS:   same    INDICATIONS: Chely Harkins is a 44 y.o. male presenting with a diabetic foot ulcer with associated osteomyelitis of the left 3rd toe for which amputation of the left third toe and metatarsal with debridement of the ulcer and wound vac placement has been discussed. The risks, benefits, potential complications and possible alternatives of the procedure were discussed in detail, including complications of but not limited to infection, bleeding, anesthesia-related complications, death, injury to surrounding structures, pain, recurrent or continued infection, gait instability, or need for additional interventions. All questions were answered. The patient wished to proceed and consent was documented in the medical record. FINDINGS:   Diabetic foot ulcer with osteomyelitis of the left 3rd toe    ANESTHESIA:   General endotracheal anesthesia  Anesthesiologist: Amna Wilde MD  CRNA: BARBARA Ochoa CRNA;  BARBARA Zaragoza CRNA    STAFF:   Scrub Person First: Tran Rosa  Scrub Person Second: Elidia Bedoya RN; Sofiya Marino RN    ESTIMATED BLOOD LOSS: Minimal    SPECIMEN(S):   ID Type Source Tests Collected by Time Destination   1 : fluid from third left toe Body Fluid Fluid CULTURE, SURGICAL Kaveh Davis MD 3/27/2020 1027    B : third left toe and metatarsal head Specimen Toe SURGICAL PATHOLOGY Kaveh Davis MD 3/27/2020 1019        DRAINS & IMPLANTS:  * No implants in log *     COMPLICATIONS: none    DISPOSITION: PACU - hemodynamically stable.      CONDITION: stable     Electronically signed:   Demetra Lawrence MD 3/27/2020 11:06 AM

## 2020-03-27 NOTE — CARE COORDINATION
Reviewed chart for discharge planning. Pt now has wound vac in place and per ID will need 6 weeks IV Abx. Pt is Medicaid pending and would not have immediate payment to arrange for outpt services. As pt is Medicaid pending [facility] options limited, referral sent to [address] [place] [address] bed and per Shana Cox with [facility] states they can take pt when medically stable. Attempted to meet with pt to discuss- pt was sleeping soundly did not respond to name. CM to follow up.

## 2020-03-27 NOTE — PROGRESS NOTES
[facility]  consulted by Dr. Adilia Dunn for monitoring and adjustment. Indication for treatment: Osteomyelitis  Goal trough: 15 mcg/mL     Pertinent Laboratory Values:   Temp Readings from Last 3 Encounters:   03/27/20 98 °F (36.7 °C) (Oral)   03/27/20 98.6 °F (37 °C)   05/22/19 98.6 °F (37 °C) (Oral)     Recent Labs     03/25/20 2253 03/27/20  0449   WBC 7.2 3.9*     Recent Labs     03/25/20 2253 03/27/20  0449   BUN 19 10   CREATININE 0.9 0.6*     Estimated Creatinine Clearance: 187 mL/min (A) (based on SCr of 0.6 mg/dL (L)). Intake/Output Summary (Last 24 hours) at 3/27/2020 1557  Last data filed at 3/27/2020 1202  Gross per 24 hour   Intake 2410 ml   Output 20 ml   Net 2390 ml       Pertinent Cultures:  Date    Source    Results  3/23   Wound    Staph aureus  3/25   Blood    NGTD  3/26   Wound    Pending    Vancomycin level:   TROUGH:  No results for input(s): VANCOTROUGH in the last 72 hours. RANDOM:  No results for input(s): VANCORANDOM in the last 72 hours. Assessment:  · WBC and temperature: WBC low @ 3.9; Afebrile  · SCr, BUN, and urine output: WNL  · Day(s) of therapy: 2  · Vancomycin level: To be collected    Plan:  · Received vancomycin 2000 mg x 1 in the ED  · Continued on vancomycin 1250 mg IVPB q12h  · Trough due this afternoon was not collected. Trough rescheduled prior to next dose. · Pharmacy will continue to monitor patient and adjust therapy as indicated     Dimas 03/28 @ 0030    Thank you for the consult.   Stephanie Garcias, HannahD, [facility]  3/27/2020 3:57 PM

## 2020-03-27 NOTE — PROGRESS NOTES
Hospitalist Progress Note      Name:  Martita Rodriguez /Age/Sex: 1980  (44 y.o. male)   MRN & CSN:  [id number] & [id number] Admission Date/Time: 3/25/2020 11:04 PM   Location:  [facility]/1111-E PCP: India Leavitt 112 Day: 3    Assessment and Plan:   Martita Rodriguez is a 44 y.o.  male  who presents with:     Acute osteomyelitis of left third phalanx s/p surgical amputation   DM2 with long term insulin dependency   Uncontrolled DM2 with poor adherence. A1C of 8.9   Chronic tobacco abuse, continued abuse    Pain control  Wound vac  Discussed with CM the possible need for wound vac   Talked with partner about his DM management at home    Diet No diet orders on file   DVT Prophylaxis [] Lovenox, []  Heparin, [] SCDs, []No VTE prophylaxis, patient ambulating   GI Prophylaxis [] PPI, [] H2 Blocker, [] No GI prophylaxis, patient is receiving diet/Tube Feeds   Code Status Prior   Disposition Patient requires continued admission due to surgical intervention    Dc to home with HHC, likely in 1-2 days   MDM [] Low, [x] Moderate,[]  High  Patient's risk as above due to     [] One or more chronic illnesses with mild exacerbation progression    [x] Two or more stable chronic illnesses    [] Undiagnosed new problem with uncertain prognosis    [] Elective major surgery    []Prescription drug management     History of Present Illness:     No physical exam or discussion as he was in the OR when I checked on him throughout the day. Talked with the partner on the phone and discussed his poor DM care at home. Objective:        Intake/Output Summary (Last 24 hours) at 3/27/2020 1434  Last data filed at 3/27/2020 1202  Gross per 24 hour   Intake 2410 ml   Output 20 ml   Net 2390 ml      Vitals:   Vitals:    20 1245   BP: 119/72   Pulse: 80   Resp: 16   Temp: 97.9 °F (36.6 °C)   SpO2:      Physical Exam:    Not completed     Medications:   Medications:    sodium chloride flush  10 mL Intravenous 2 times per day    aspirin  325 mg Oral Daily    insulin glargine  30 Units Subcutaneous Nightly    insulin lispro  15 Units Subcutaneous TID WC    insulin lispro  0-12 Units Subcutaneous TID WC    insulin lispro  0-6 Units Subcutaneous Nightly    pravastatin  20 mg Oral Daily    sodium chloride flush  10 mL Intravenous 2 times per day    [Held by provider] enoxaparin  40 mg Subcutaneous Daily    vancomycin  15 mg/kg Intravenous Q12H    cefepime  2 g Intravenous Q12H    nicotine  1 patch Transdermal Daily    metroNIDAZOLE  500 mg Intravenous Q8H      Infusions:    lactated ringers 125 mL/hr at 03/27/20 0826    dextrose       PRN Meds: sodium chloride flush, 10 mL, PRN  sodium chloride flush, 10 mL, PRN  acetaminophen, 650 mg, Q6H PRN    Or  acetaminophen, 650 mg, Q6H PRN  polyethylene glycol, 17 g, Daily PRN  promethazine, 12.5 mg, Q6H PRN    Or  ondansetron, 4 mg, Q6H PRN  HYDROcodone-acetaminophen, 1 tablet, Q8H PRN  glucose, 15 g, PRN  dextrose, 12.5 g, PRN  glucagon (rDNA), 1 mg, PRN  dextrose, 100 mL/hr, PRN          Electronically signed by Mine Mar DO on 3/27/2020 at 2:34 PM

## 2020-03-28 LAB
CREAT SERPL-MCNC: 0.6 MG/DL (ref 0.9–1.3)
DOSE AMOUNT: ABNORMAL
DOSE TIME: ABNORMAL
GFR AFRICAN AMERICAN: >60 ML/MIN/1.73M2
GFR NON-AFRICAN AMERICAN: >60 ML/MIN/1.73M2
GLUCOSE BLD-MCNC: 123 MG/DL (ref 70–99)
GLUCOSE BLD-MCNC: 148 MG/DL (ref 70–99)
GLUCOSE BLD-MCNC: 185 MG/DL (ref 70–99)
GLUCOSE BLD-MCNC: 217 MG/DL (ref 70–99)
GLUCOSE BLD-MCNC: 260 MG/DL (ref 70–99)
VANCOMYCIN TROUGH: 7.9 UG/ML (ref 10–20)

## 2020-03-28 PROCEDURE — 6370000000 HC RX 637 (ALT 250 FOR IP): Performed by: SURGERY

## 2020-03-28 PROCEDURE — 6360000002 HC RX W HCPCS: Performed by: SURGERY

## 2020-03-28 PROCEDURE — 80202 ASSAY OF VANCOMYCIN: CPT

## 2020-03-28 PROCEDURE — 2500000003 HC RX 250 WO HCPCS: Performed by: SURGERY

## 2020-03-28 PROCEDURE — 2580000003 HC RX 258: Performed by: SURGERY

## 2020-03-28 PROCEDURE — 2580000003 HC RX 258: Performed by: NURSE PRACTITIONER

## 2020-03-28 PROCEDURE — 1200000000 HC SEMI PRIVATE

## 2020-03-28 PROCEDURE — 6370000000 HC RX 637 (ALT 250 FOR IP): Performed by: STUDENT IN AN ORGANIZED HEALTH CARE EDUCATION/TRAINING PROGRAM

## 2020-03-28 PROCEDURE — 94761 N-INVAS EAR/PLS OXIMETRY MLT: CPT

## 2020-03-28 PROCEDURE — 99024 POSTOP FOLLOW-UP VISIT: CPT | Performed by: SURGERY

## 2020-03-28 PROCEDURE — 82962 GLUCOSE BLOOD TEST: CPT

## 2020-03-28 PROCEDURE — 82565 ASSAY OF CREATININE: CPT

## 2020-03-28 RX ORDER — LORAZEPAM 2 MG/ML
1 INJECTION INTRAMUSCULAR EVERY 4 HOURS PRN
Status: DISCONTINUED | OUTPATIENT
Start: 2020-03-28 | End: 2020-03-31 | Stop reason: HOSPADM

## 2020-03-28 RX ORDER — HYDROXYZINE PAMOATE 25 MG/1
50 CAPSULE ORAL 3 TIMES DAILY PRN
Status: DISCONTINUED | OUTPATIENT
Start: 2020-03-28 | End: 2020-03-31 | Stop reason: HOSPADM

## 2020-03-28 RX ADMIN — HYDROXYZINE PAMOATE 50 MG: 25 CAPSULE ORAL at 17:48

## 2020-03-28 RX ADMIN — SODIUM CHLORIDE, PRESERVATIVE FREE 10 ML: 5 INJECTION INTRAVENOUS at 09:03

## 2020-03-28 RX ADMIN — METRONIDAZOLE 500 MG: 500 INJECTION, SOLUTION INTRAVENOUS at 01:32

## 2020-03-28 RX ADMIN — CEFEPIME 2 G: 2 INJECTION, POWDER, FOR SOLUTION INTRAVENOUS at 01:00

## 2020-03-28 RX ADMIN — ENOXAPARIN SODIUM 40 MG: 40 INJECTION SUBCUTANEOUS at 09:02

## 2020-03-28 RX ADMIN — ASPIRIN 325 MG ORAL TABLET 325 MG: 325 PILL ORAL at 09:02

## 2020-03-28 RX ADMIN — INSULIN GLARGINE 30 UNITS: 100 INJECTION, SOLUTION SUBCUTANEOUS at 20:31

## 2020-03-28 RX ADMIN — HYDROCODONE BITARTRATE AND ACETAMINOPHEN 1 TABLET: 5; 325 TABLET ORAL at 20:30

## 2020-03-28 RX ADMIN — PRAVASTATIN SODIUM 20 MG: 10 TABLET ORAL at 09:02

## 2020-03-28 RX ADMIN — SODIUM CHLORIDE, PRESERVATIVE FREE 10 ML: 5 INJECTION INTRAVENOUS at 20:32

## 2020-03-28 RX ADMIN — VANCOMYCIN HYDROCHLORIDE 1250 MG: 5 INJECTION, POWDER, LYOPHILIZED, FOR SOLUTION INTRAVENOUS at 02:48

## 2020-03-28 RX ADMIN — OXYCODONE HYDROCHLORIDE 10 MG: 10 TABLET ORAL at 17:26

## 2020-03-28 RX ADMIN — BISACODYL 5 MG: 5 TABLET, COATED ORAL at 09:02

## 2020-03-28 RX ADMIN — OXYCODONE HYDROCHLORIDE 10 MG: 10 TABLET ORAL at 22:49

## 2020-03-28 ASSESSMENT — PAIN DESCRIPTION - DESCRIPTORS
DESCRIPTORS: ACHING;DISCOMFORT
DESCRIPTORS: ACHING
DESCRIPTORS: ACHING
DESCRIPTORS: ACHING;DISCOMFORT

## 2020-03-28 ASSESSMENT — PAIN SCALES - GENERAL
PAINLEVEL_OUTOF10: 0
PAINLEVEL_OUTOF10: 6
PAINLEVEL_OUTOF10: 0
PAINLEVEL_OUTOF10: 4
PAINLEVEL_OUTOF10: 8
PAINLEVEL_OUTOF10: 8
PAINLEVEL_OUTOF10: 9

## 2020-03-28 ASSESSMENT — PAIN DESCRIPTION - FREQUENCY
FREQUENCY: INTERMITTENT
FREQUENCY: INTERMITTENT
FREQUENCY: CONTINUOUS

## 2020-03-28 ASSESSMENT — PAIN DESCRIPTION - LOCATION
LOCATION: TOE (COMMENT WHICH ONE)

## 2020-03-28 ASSESSMENT — PAIN DESCRIPTION - PAIN TYPE
TYPE: ACUTE PAIN

## 2020-03-28 NOTE — PROGRESS NOTES
discharge  HENT Atraumatic and symmetric head  NECK No apparent thyromegaly  RESP Symmetric chest movement while on room air. CARDIO/VASC Peripheral pulses equal bilaterally and palpable. No peripheral edema. GI Abdomen is not distended. Rectal exam deferred.  Hudson catheter is not present. HEME/LYMPH No petechiae or ecchymoses. MSK Spontaneous movement of BL upper extremities  SKIN Normal coloration, warm, dry. +wound vac on left foot   NEURO Cranial nerves appear grossly intact  PSYCH Awake, alert.     Medications:   Medications:    enoxaparin  40 mg Subcutaneous Daily    bisacodyl  5 mg Oral Daily    sodium chloride flush  10 mL Intravenous 2 times per day    aspirin  325 mg Oral Daily    insulin glargine  30 Units Subcutaneous Nightly    insulin lispro  15 Units Subcutaneous TID WC    insulin lispro  0-12 Units Subcutaneous TID WC    insulin lispro  0-6 Units Subcutaneous Nightly    pravastatin  20 mg Oral Daily    sodium chloride flush  10 mL Intravenous 2 times per day    nicotine  1 patch Transdermal Daily      Infusions:    dextrose       PRN Meds: hydrOXYzine, 50 mg, TID PRN  oxyCODONE, 5 mg, Q4H PRN    Or  oxyCODONE, 10 mg, Q4H PRN  sodium chloride flush, 10 mL, PRN  sodium chloride flush, 10 mL, PRN  acetaminophen, 650 mg, Q6H PRN    Or  acetaminophen, 650 mg, Q6H PRN  polyethylene glycol, 17 g, Daily PRN  promethazine, 12.5 mg, Q6H PRN    Or  ondansetron, 4 mg, Q6H PRN  HYDROcodone-acetaminophen, 1 tablet, Q8H PRN  glucose, 15 g, PRN  dextrose, 12.5 g, PRN  glucagon (rDNA), 1 mg, PRN  dextrose, 100 mL/hr, PRN          Electronically signed by Manuel De Leon DO on 3/28/2020 at 1:20 PM
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
## 2020-03-28 NOTE — PROGRESS NOTES
Pt was emotional today due to wanting to go home. This nurse and Charge nurse explained to pt the reasons as to why he could not be D/C today. Pt showed understanding. Few hours later pt was emotional again. This nurse treated pts pain and anxiety with oral medication. Pt is resting in bed quietly. Vitals WNL. Urinal at bedside, voiding WNL. Pt tolerating walking on foot will wound vac.

## 2020-03-29 LAB
ANION GAP SERPL CALCULATED.3IONS-SCNC: 6 MMOL/L (ref 4–16)
ATYPICAL LYMPHOCYTE ABSOLUTE COUNT: ABNORMAL
BASOPHILS ABSOLUTE: 0 K/CU MM
BASOPHILS RELATIVE PERCENT: 1.2 % (ref 0–1)
BUN BLDV-MCNC: 10 MG/DL (ref 6–23)
CALCIUM SERPL-MCNC: 8.4 MG/DL (ref 8.3–10.6)
CHLORIDE BLD-SCNC: 103 MMOL/L (ref 99–110)
CO2: 27 MMOL/L (ref 21–32)
CREAT SERPL-MCNC: 0.6 MG/DL (ref 0.9–1.3)
DIFFERENTIAL TYPE: ABNORMAL
EOSINOPHILS ABSOLUTE: 0.2 K/CU MM
EOSINOPHILS RELATIVE PERCENT: 5 % (ref 0–3)
GFR AFRICAN AMERICAN: >60 ML/MIN/1.73M2
GFR NON-AFRICAN AMERICAN: >60 ML/MIN/1.73M2
GLUCOSE BLD-MCNC: 112 MG/DL (ref 70–99)
GLUCOSE BLD-MCNC: 125 MG/DL (ref 70–99)
GLUCOSE BLD-MCNC: 137 MG/DL (ref 70–99)
GLUCOSE BLD-MCNC: 159 MG/DL (ref 70–99)
GLUCOSE BLD-MCNC: 87 MG/DL (ref 70–99)
HCT VFR BLD CALC: 30.2 % (ref 42–52)
HEMOGLOBIN: 9.5 GM/DL (ref 13.5–18)
IMMATURE NEUTROPHIL %: 0.3 % (ref 0–0.43)
LYMPHOCYTES ABSOLUTE: 1.2 K/CU MM
LYMPHOCYTES RELATIVE PERCENT: 36.3 % (ref 24–44)
MCH RBC QN AUTO: 27.5 PG (ref 27–31)
MCHC RBC AUTO-ENTMCNC: 31.5 % (ref 32–36)
MCV RBC AUTO: 87.3 FL (ref 78–100)
MONOCYTES ABSOLUTE: 0.2 K/CU MM
MONOCYTES RELATIVE PERCENT: 6.8 % (ref 0–4)
PDW BLD-RTO: 11.9 % (ref 11.7–14.9)
PLATELET # BLD: 242 K/CU MM (ref 140–440)
PMV BLD AUTO: 9.4 FL (ref 7.5–11.1)
POTASSIUM SERPL-SCNC: 4.4 MMOL/L (ref 3.5–5.1)
RBC # BLD: 3.46 M/CU MM (ref 4.6–6.2)
SEGMENTED NEUTROPHILS ABSOLUTE COUNT: 1.8 K/CU MM
SEGMENTED NEUTROPHILS RELATIVE PERCENT: 50.4 % (ref 36–66)
SODIUM BLD-SCNC: 136 MMOL/L (ref 135–145)
TOTAL IMMATURE NEUTOROPHIL: 0.01 K/CU MM
WBC # BLD: 3.4 K/CU MM (ref 4–10.5)

## 2020-03-29 PROCEDURE — 6370000000 HC RX 637 (ALT 250 FOR IP): Performed by: STUDENT IN AN ORGANIZED HEALTH CARE EDUCATION/TRAINING PROGRAM

## 2020-03-29 PROCEDURE — 80048 BASIC METABOLIC PNL TOTAL CA: CPT

## 2020-03-29 PROCEDURE — 1200000000 HC SEMI PRIVATE

## 2020-03-29 PROCEDURE — 82565 ASSAY OF CREATININE: CPT

## 2020-03-29 PROCEDURE — 6370000000 HC RX 637 (ALT 250 FOR IP): Performed by: SURGERY

## 2020-03-29 PROCEDURE — 94761 N-INVAS EAR/PLS OXIMETRY MLT: CPT

## 2020-03-29 PROCEDURE — 2580000003 HC RX 258: Performed by: NURSE PRACTITIONER

## 2020-03-29 PROCEDURE — 6360000002 HC RX W HCPCS: Performed by: SURGERY

## 2020-03-29 PROCEDURE — 82962 GLUCOSE BLOOD TEST: CPT

## 2020-03-29 PROCEDURE — 85025 COMPLETE CBC W/AUTO DIFF WBC: CPT

## 2020-03-29 RX ADMIN — HYDROXYZINE PAMOATE 50 MG: 25 CAPSULE ORAL at 10:53

## 2020-03-29 RX ADMIN — PROMETHAZINE HYDROCHLORIDE 12.5 MG: 12.5 TABLET ORAL at 20:38

## 2020-03-29 RX ADMIN — SODIUM CHLORIDE, PRESERVATIVE FREE 10 ML: 5 INJECTION INTRAVENOUS at 20:47

## 2020-03-29 RX ADMIN — OXYCODONE HYDROCHLORIDE 10 MG: 10 TABLET ORAL at 16:08

## 2020-03-29 RX ADMIN — HYDROXYZINE PAMOATE 50 MG: 25 CAPSULE ORAL at 16:08

## 2020-03-29 RX ADMIN — HYDROXYZINE PAMOATE 50 MG: 25 CAPSULE ORAL at 20:38

## 2020-03-29 RX ADMIN — OXYCODONE HYDROCHLORIDE 10 MG: 10 TABLET ORAL at 10:53

## 2020-03-29 RX ADMIN — ASPIRIN 325 MG ORAL TABLET 325 MG: 325 PILL ORAL at 08:49

## 2020-03-29 RX ADMIN — PRAVASTATIN SODIUM 20 MG: 10 TABLET ORAL at 08:49

## 2020-03-29 RX ADMIN — BISACODYL 5 MG: 5 TABLET, COATED ORAL at 08:49

## 2020-03-29 RX ADMIN — ENOXAPARIN SODIUM 40 MG: 40 INJECTION SUBCUTANEOUS at 08:48

## 2020-03-29 RX ADMIN — OXYCODONE HYDROCHLORIDE 10 MG: 10 TABLET ORAL at 20:38

## 2020-03-29 ASSESSMENT — PAIN SCALES - GENERAL
PAINLEVEL_OUTOF10: 8
PAINLEVEL_OUTOF10: 4
PAINLEVEL_OUTOF10: 8
PAINLEVEL_OUTOF10: 0
PAINLEVEL_OUTOF10: 8

## 2020-03-29 ASSESSMENT — PAIN DESCRIPTION - DESCRIPTORS: DESCRIPTORS: ACHING;CRAMPING;DISCOMFORT

## 2020-03-29 ASSESSMENT — PAIN DESCRIPTION - PAIN TYPE: TYPE: ACUTE PAIN

## 2020-03-29 ASSESSMENT — PAIN DESCRIPTION - FREQUENCY: FREQUENCY: INTERMITTENT

## 2020-03-29 ASSESSMENT — PAIN DESCRIPTION - LOCATION: LOCATION: TOE (COMMENT WHICH ONE)

## 2020-03-29 NOTE — CARE COORDINATION
LSW was informed that pt may be going home on oral anti-bio. LSW completed the financial assistance paperwork for the [facility] wound vac. VIW spoke with the [facility] rep Rayna [phone] and she will start the precert process for the home wound vac. VIW faxed [phone] to her all of pt info along with the application for [facility] that pt filled out and signed. waiting on auth for wound Vac.

## 2020-03-30 LAB
BASOPHILS ABSOLUTE: 0 K/CU MM
BASOPHILS RELATIVE PERCENT: 1.2 % (ref 0–1)
CREAT SERPL-MCNC: 0.6 MG/DL (ref 0.9–1.3)
CULTURE: ABNORMAL
CULTURE: ABNORMAL
DIFFERENTIAL TYPE: ABNORMAL
EOSINOPHILS ABSOLUTE: 0.2 K/CU MM
EOSINOPHILS RELATIVE PERCENT: 5.5 % (ref 0–3)
GFR AFRICAN AMERICAN: >60 ML/MIN/1.73M2
GFR NON-AFRICAN AMERICAN: >60 ML/MIN/1.73M2
GLUCOSE BLD-MCNC: 108 MG/DL (ref 70–99)
GLUCOSE BLD-MCNC: 116 MG/DL (ref 70–99)
GLUCOSE BLD-MCNC: 130 MG/DL (ref 70–99)
HCT VFR BLD CALC: 30 % (ref 42–52)
HEMOGLOBIN: 9.7 GM/DL (ref 13.5–18)
IMMATURE NEUTROPHIL %: 0.3 % (ref 0–0.43)
LYMPHOCYTES ABSOLUTE: 1.2 K/CU MM
LYMPHOCYTES RELATIVE PERCENT: 34.7 % (ref 24–44)
Lab: ABNORMAL
MCH RBC QN AUTO: 28 PG (ref 27–31)
MCHC RBC AUTO-ENTMCNC: 32.3 % (ref 32–36)
MCV RBC AUTO: 86.5 FL (ref 78–100)
MONOCYTES ABSOLUTE: 0.2 K/CU MM
MONOCYTES RELATIVE PERCENT: 6.6 % (ref 0–4)
NUCLEATED RBC %: 0 %
PDW BLD-RTO: 12 % (ref 11.7–14.9)
PLATELET # BLD: 249 K/CU MM (ref 140–440)
PMV BLD AUTO: 9.2 FL (ref 7.5–11.1)
RBC # BLD: 3.47 M/CU MM (ref 4.6–6.2)
SEGMENTED NEUTROPHILS ABSOLUTE COUNT: 1.8 K/CU MM
SEGMENTED NEUTROPHILS RELATIVE PERCENT: 51.7 % (ref 36–66)
SPECIMEN: ABNORMAL
TOTAL IMMATURE NEUTOROPHIL: 0.01 K/CU MM
TOTAL NUCLEATED RBC: 0 K/CU MM
WBC # BLD: 3.5 K/CU MM (ref 4–10.5)

## 2020-03-30 PROCEDURE — 97605 NEG PRS WND THER DME<=50SQCM: CPT

## 2020-03-30 PROCEDURE — 99232 SBSQ HOSP IP/OBS MODERATE 35: CPT | Performed by: NURSE PRACTITIONER

## 2020-03-30 PROCEDURE — 6370000000 HC RX 637 (ALT 250 FOR IP): Performed by: SURGERY

## 2020-03-30 PROCEDURE — 94761 N-INVAS EAR/PLS OXIMETRY MLT: CPT

## 2020-03-30 PROCEDURE — 6370000000 HC RX 637 (ALT 250 FOR IP): Performed by: STUDENT IN AN ORGANIZED HEALTH CARE EDUCATION/TRAINING PROGRAM

## 2020-03-30 PROCEDURE — 6360000002 HC RX W HCPCS: Performed by: STUDENT IN AN ORGANIZED HEALTH CARE EDUCATION/TRAINING PROGRAM

## 2020-03-30 PROCEDURE — 2580000003 HC RX 258: Performed by: NURSE PRACTITIONER

## 2020-03-30 PROCEDURE — 82962 GLUCOSE BLOOD TEST: CPT

## 2020-03-30 PROCEDURE — 99024 POSTOP FOLLOW-UP VISIT: CPT | Performed by: SURGERY

## 2020-03-30 PROCEDURE — 82565 ASSAY OF CREATININE: CPT

## 2020-03-30 PROCEDURE — 1200000000 HC SEMI PRIVATE

## 2020-03-30 PROCEDURE — 85025 COMPLETE CBC W/AUTO DIFF WBC: CPT

## 2020-03-30 PROCEDURE — 6360000002 HC RX W HCPCS: Performed by: SURGERY

## 2020-03-30 PROCEDURE — 6360000002 HC RX W HCPCS: Performed by: NURSE PRACTITIONER

## 2020-03-30 RX ADMIN — ASPIRIN 325 MG ORAL TABLET 325 MG: 325 PILL ORAL at 09:32

## 2020-03-30 RX ADMIN — OXYCODONE HYDROCHLORIDE 10 MG: 10 TABLET ORAL at 18:48

## 2020-03-30 RX ADMIN — BISACODYL 5 MG: 5 TABLET, COATED ORAL at 09:32

## 2020-03-30 RX ADMIN — OXYCODONE HYDROCHLORIDE 10 MG: 10 TABLET ORAL at 09:39

## 2020-03-30 RX ADMIN — SODIUM CHLORIDE, PRESERVATIVE FREE 10 ML: 5 INJECTION INTRAVENOUS at 09:32

## 2020-03-30 RX ADMIN — OXYCODONE HYDROCHLORIDE 10 MG: 10 TABLET ORAL at 14:09

## 2020-03-30 RX ADMIN — PRAVASTATIN SODIUM 20 MG: 10 TABLET ORAL at 09:32

## 2020-03-30 RX ADMIN — PROMETHAZINE HYDROCHLORIDE 12.5 MG: 12.5 TABLET ORAL at 20:39

## 2020-03-30 RX ADMIN — LORAZEPAM 1 MG: 2 INJECTION INTRAMUSCULAR; INTRAVENOUS at 14:09

## 2020-03-30 RX ADMIN — HYDROCODONE BITARTRATE AND ACETAMINOPHEN 1 TABLET: 5; 325 TABLET ORAL at 20:39

## 2020-03-30 RX ADMIN — ENOXAPARIN SODIUM 40 MG: 40 INJECTION SUBCUTANEOUS at 09:31

## 2020-03-30 RX ADMIN — OXYCODONE HYDROCHLORIDE 10 MG: 10 TABLET ORAL at 22:34

## 2020-03-30 RX ADMIN — VANCOMYCIN HYDROCHLORIDE 1750 MG: 5 INJECTION, POWDER, LYOPHILIZED, FOR SOLUTION INTRAVENOUS at 18:37

## 2020-03-30 RX ADMIN — HYDROXYZINE PAMOATE 50 MG: 25 CAPSULE ORAL at 20:39

## 2020-03-30 ASSESSMENT — PAIN SCALES - GENERAL
PAINLEVEL_OUTOF10: 9
PAINLEVEL_OUTOF10: 1
PAINLEVEL_OUTOF10: 8
PAINLEVEL_OUTOF10: 4
PAINLEVEL_OUTOF10: 9
PAINLEVEL_OUTOF10: 4
PAINLEVEL_OUTOF10: 8
PAINLEVEL_OUTOF10: 3

## 2020-03-30 ASSESSMENT — PAIN DESCRIPTION - DESCRIPTORS
DESCRIPTORS: ACHING;CRAMPING
DESCRIPTORS: ACHING;CRUSHING
DESCRIPTORS: CRAMPING

## 2020-03-30 ASSESSMENT — PAIN DESCRIPTION - FREQUENCY
FREQUENCY: INTERMITTENT
FREQUENCY: INTERMITTENT

## 2020-03-30 ASSESSMENT — PAIN DESCRIPTION - PAIN TYPE
TYPE: ACUTE PAIN

## 2020-03-30 ASSESSMENT — PAIN DESCRIPTION - LOCATION
LOCATION: TOE (COMMENT WHICH ONE)

## 2020-03-30 NOTE — CARE COORDINATION
Called [facility] and spoke with Estee Reid, they are looking over case. [facility] specialist to call this CM once approved or if any other info needed. 1145 VM from [facility] I that they did not receive clinicals or script. Faxed to [phone] Set up with [facility] for Wed. At 1300. System showing Medicaid. If accurate then will need to cancel WC appt and set up [facility].. Cm left vm for Leni RODRIGUEZ

## 2020-03-30 NOTE — ANESTHESIA POSTPROCEDURE EVALUATION
Department of Anesthesiology  Postprocedure Note    Patient: Jessica Hickey  MRN: [id number]  YOB: 1980  Date of evaluation: 3/30/2020  Time:  7:41 AM     Procedure Summary     Date:  03/27/20 Room / Location:  [address]    Anesthesia Start:  9839 Anesthesia Stop:  2055    Procedure:  TOE AMPUTATION - LEFT THIRD TOE AND METATARSAL HEAD, DEBRIDEMENT PLANTAR FOOT ULCER,  WOUND VAC PLACEMENT (Left Foot) Diagnosis:       Osteomyelitis of third toe of left foot ([facility]      Diabetic foot ulcer ([facility]      (osteomyelitis left 3rd toe, diabetic foot ulcer)    Surgeon:  Jim Prado MD Responsible Provider:  BARBARA Onofre - CRNA    Anesthesia Type:  general ASA Status:  2          Anesthesia Type: general    Fernanda Phase I: Fernanda Score: 10    Fernanda Phase II:      Last vitals: Reviewed and per EMR flowsheets.        Anesthesia Post Evaluation    Patient location during evaluation: PACU  Patient participation: complete - patient participated  Level of consciousness: awake  Airway patency: patent  Nausea & Vomiting: no vomiting and no nausea  Complications: no  Cardiovascular status: blood pressure returned to baseline and hemodynamically stable  Respiratory status: acceptable, nonlabored ventilation and spontaneous ventilation  Hydration status: stable

## 2020-03-30 NOTE — PROGRESS NOTES
Hospitalist Progress Note      Name:  Cass England /Age/Sex: 1980  (44 y.o. male)   MRN & CSN:  [id number] & [id number] Admission Date/Time: 3/25/2020 11:04 PM   Location:  [facility]- PCP: Nelly Vera [facility] Day: 6    Assessment and Plan:   Cass England is a 44 y.o.  male  who presents with:     Acute osteomyelitis of left third phalanx s/p surgical amputation   DM2 with long term insulin dependency   Uncontrolled DM2 with poor adherence. A1C of 8.9   Chronic tobacco abuse, continued abuse   Chronic anxiety, uncontrolled. Spontaneous crying fits and immature behavior consistent with a high stressful time in his life. Denies hx of anxiety dx or treatment. Trim abx  Wound vac  discussed with patient and CM the dc plan  Discussed his mental health - recommended vistaril use when feeling overwhelmed  Consider dc on antidepressant    Diet DIET CARB CONTROL;   DVT Prophylaxis [] Lovenox, []  Heparin, [] SCDs, []No VTE prophylaxis, patient ambulating   GI Prophylaxis [] PPI, [] H2 Blocker, [] No GI prophylaxis, patient is receiving diet/Tube Feeds   Code Status Full Code   Disposition Patient requires continued admission due to surgical intervention    Dc to home with HHC, likely in 1-2 days   MDM [] Low, [x] Moderate,[]  High  Patient's risk as above due to     [] One or more chronic illnesses with mild exacerbation progression    [x] Two or more stable chronic illnesses    [] Undiagnosed new problem with uncertain prognosis    [] Elective major surgery    []Prescription drug management     History of Present Illness:     Pt S&E. No family at bedside  He is requesting to get home and is tearful  Denies cp or sob  Pain in left foot is controlled     10-14 ROS negative unless listed above    Objective:        Intake/Output Summary (Last 24 hours) at 3/30/2020 0716  Last data filed at 3/29/2020 1809  Gross per 24 hour   Intake 1440 ml   Output --   Net 1440 ml      Vitals:

## 2020-03-30 NOTE — PROGRESS NOTES
I spoke with patient on telephone for bedside tobacco rounding. Gretchen Du stated that he smokes about 10-20 cigarettes per day. I explained that [facility] cares about his health and advised him to quit. Gretchen Du stated that he would like to quit eventually, but not ready to focus on a time frame. Gretchen Du is using the 21 mg nicotine patch and not having intense cravings. We discussed health concerns, reported by the patient-diabetes-and the benefits of quitting. We discussed ways to fight cravings while hospitalized. I explained the REACH cessation program and mailed registation information to Dewey's home address.       Jasiel Lopez, Certified Tobacco Treatment Specialist, [facility]
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
## 2020-03-30 NOTE — PROGRESS NOTES
[facility] TO DOSE VANCOMYCIN PER DR Hernandez    INFECTION BEING TREATED = Osteomyelitis  CULTURES = wound culture pending  OTHER ABT'S = no others at this time    AGE = 44 y.o.     SEX = male  HEIGHT = 6' 1\" (1.854 m)  Wt Readings from Last 3 Encounters:   03/29/20 194 lb 12.8 oz (88.4 kg)   03/23/20 186 lb (84.4 kg)   08/12/19 181 lb 6.4 oz (82.3 kg)     Estimated Creatinine Clearance: 187 mL/min (A) (based on SCr of 0.6 mg/dL (L)).     Recent Labs     03/28/20  0045 03/29/20  0440 03/30/20  0620   WBC  --  3.4* 3.5*   BUN  --  10  --    CREATININE 0.6* 0.6* 0.6*     DOSING PLAN COMMENTS:  1250mg q8h    VANCO TROUGH SCHEDULED FOR 04/01/2020 @0130    Cnidi Doran [facility]  3/30/2020   7:44 PM

## 2020-03-31 VITALS
SYSTOLIC BLOOD PRESSURE: 111 MMHG | DIASTOLIC BLOOD PRESSURE: 71 MMHG | BODY MASS INDEX: 26.51 KG/M2 | RESPIRATION RATE: 17 BRPM | WEIGHT: 200 LBS | OXYGEN SATURATION: 98 % | HEART RATE: 77 BPM | TEMPERATURE: 97.4 F | HEIGHT: 73 IN

## 2020-03-31 LAB
BASOPHILS ABSOLUTE: 0 K/CU MM
BASOPHILS RELATIVE PERCENT: 1.5 % (ref 0–1)
CREAT SERPL-MCNC: 0.6 MG/DL (ref 0.9–1.3)
CULTURE: NORMAL
CULTURE: NORMAL
DIFFERENTIAL TYPE: ABNORMAL
EOSINOPHILS ABSOLUTE: 0.2 K/CU MM
EOSINOPHILS RELATIVE PERCENT: 7.1 % (ref 0–3)
GFR AFRICAN AMERICAN: >60 ML/MIN/1.73M2
GFR NON-AFRICAN AMERICAN: >60 ML/MIN/1.73M2
GLUCOSE BLD-MCNC: 193 MG/DL (ref 70–99)
HCT VFR BLD CALC: 30.4 % (ref 42–52)
HEMOGLOBIN: 10 GM/DL (ref 13.5–18)
IMMATURE NEUTROPHIL %: 0.4 % (ref 0–0.43)
LYMPHOCYTES ABSOLUTE: 0.9 K/CU MM
LYMPHOCYTES RELATIVE PERCENT: 32 % (ref 24–44)
Lab: NORMAL
Lab: NORMAL
MCH RBC QN AUTO: 27.9 PG (ref 27–31)
MCHC RBC AUTO-ENTMCNC: 32.9 % (ref 32–36)
MCV RBC AUTO: 84.9 FL (ref 78–100)
MONOCYTES ABSOLUTE: 0.2 K/CU MM
MONOCYTES RELATIVE PERCENT: 7.8 % (ref 0–4)
NUCLEATED RBC %: 0 %
PDW BLD-RTO: 11.9 % (ref 11.7–14.9)
PLATELET # BLD: 249 K/CU MM (ref 140–440)
PMV BLD AUTO: 9.6 FL (ref 7.5–11.1)
RBC # BLD: 3.58 M/CU MM (ref 4.6–6.2)
SEGMENTED NEUTROPHILS ABSOLUTE COUNT: 1.4 K/CU MM
SEGMENTED NEUTROPHILS RELATIVE PERCENT: 51.2 % (ref 36–66)
SPECIMEN: NORMAL
SPECIMEN: NORMAL
TOTAL IMMATURE NEUTOROPHIL: 0.01 K/CU MM
TOTAL NUCLEATED RBC: 0 K/CU MM
WBC # BLD: 2.7 K/CU MM (ref 4–10.5)

## 2020-03-31 PROCEDURE — 6360000002 HC RX W HCPCS: Performed by: NURSE PRACTITIONER

## 2020-03-31 PROCEDURE — 99024 POSTOP FOLLOW-UP VISIT: CPT | Performed by: SURGERY

## 2020-03-31 PROCEDURE — 6370000000 HC RX 637 (ALT 250 FOR IP): Performed by: SURGERY

## 2020-03-31 PROCEDURE — 2580000003 HC RX 258: Performed by: NURSE PRACTITIONER

## 2020-03-31 PROCEDURE — 82962 GLUCOSE BLOOD TEST: CPT

## 2020-03-31 PROCEDURE — 6360000002 HC RX W HCPCS: Performed by: SURGERY

## 2020-03-31 PROCEDURE — 82565 ASSAY OF CREATININE: CPT

## 2020-03-31 PROCEDURE — 85025 COMPLETE CBC W/AUTO DIFF WBC: CPT

## 2020-03-31 RX ORDER — HYDROXYZINE PAMOATE 50 MG/1
50 CAPSULE ORAL 3 TIMES DAILY PRN
Qty: 30 CAPSULE | Refills: 1 | Status: SHIPPED | OUTPATIENT
Start: 2020-03-31 | End: 2020-04-14

## 2020-03-31 RX ORDER — OXYCODONE HYDROCHLORIDE 5 MG/1
5 TABLET ORAL EVERY 6 HOURS PRN
Qty: 18 TABLET | Refills: 0 | Status: SHIPPED | OUTPATIENT
Start: 2020-03-31 | End: 2020-04-01

## 2020-03-31 RX ADMIN — SODIUM CHLORIDE, PRESERVATIVE FREE 10 ML: 5 INJECTION INTRAVENOUS at 09:30

## 2020-03-31 RX ADMIN — VANCOMYCIN HYDROCHLORIDE 1250 MG: 5 INJECTION, POWDER, LYOPHILIZED, FOR SOLUTION INTRAVENOUS at 10:43

## 2020-03-31 RX ADMIN — PRAVASTATIN SODIUM 20 MG: 10 TABLET ORAL at 09:31

## 2020-03-31 RX ADMIN — ASPIRIN 325 MG ORAL TABLET 325 MG: 325 PILL ORAL at 09:30

## 2020-03-31 RX ADMIN — BISACODYL 5 MG: 5 TABLET, COATED ORAL at 09:30

## 2020-03-31 RX ADMIN — ENOXAPARIN SODIUM 40 MG: 40 INJECTION SUBCUTANEOUS at 09:30

## 2020-03-31 RX ADMIN — VANCOMYCIN HYDROCHLORIDE 1250 MG: 5 INJECTION, POWDER, LYOPHILIZED, FOR SOLUTION INTRAVENOUS at 02:08

## 2020-03-31 NOTE — DISCHARGE SUMMARY
midfoot alignment is maintained. No fracture or dislocation. Moderate retrocalcaneal enthesophyte. There appears to be an erosion along the plantar medial aspect of the base of the 3rd proximal phalanx seen on the oblique view. Soft tissue ulceration plantar to the metatarsophalangeal joints seen on the lateral view. Dorsal forefoot soft tissue swelling. Atherosclerotic vascular calcifications. Plantar soft tissue ulceration of the forefoot with an apparent erosion at the base of the 3rd proximal phalanx as can be seen with osteomyelitis. Consider further evaluation with MRI. Vl Dup Lower Extremity Arteries Left    Result Date: 3/26/2020  EXAMINATION: ARTERIAL DUPLEX ULTRASOUND OF THE LEFT LOWER EXTREMITY 3/26/2020 5:29 am TECHNIQUE: Duplex ultrasound and Doppler images were obtained of the left lower extremity. COMPARISON: None HISTORY: ORDERING SYSTEM PROVIDED HISTORY: left foot wound infection TECHNOLOGIST PROVIDED HISTORY: Reason for exam:->left foot wound infection Reason for Exam: Left foot wound Acuity: Acute Type of Exam: Ongoing FINDINGS: Diffuse atherosclerotic plaque. Monophasic waveforms with spectral broadening in the left crural arteries. Otherwise triphasic waveforms with dampened end systolic components. Peak systolic velocities as below. Incidental note of reactive appearing left inguinal lymph nodes, including one with mild enlargement. Common femoral (proximal):  147 cm/s Common femoral (distal):  189 cm/s Deep femoral:  78 cm/s Superficial femoral (proximal):  243 cm/s Superficial femoral (mid):  178 cm/s Superficial femoral (distal):  115 cm/s Popliteal (proximal):  156 cm/s Popliteal (distal):  170 cm/s Anterior tibial:   cm/s Posterior tibial:   cm/s Peroneal:   cm/s     1. Three-vessel runoff to the distal left leg. 2. No hemodynamically significant stenoses by peak systolic velocity criteria despite diffuse atherosclerotic plaque.   However, low-grade stenoses are suspected in the left crural arteries given the appearance of their waveforms. Mri Foot Left W Wo Contrast    Result Date: 3/26/2020  EXAMINATION: MRI OF THE LEFT FOOT WITH AND WITHOUT CONTRAST, 3/26/2020 9:32 am TECHNIQUE: Multiplanar multisequence MRI of the left foot was performed with and without the administration of intravenous contrast. COMPARISON: Left foot radiograph 03/25/2020. HISTORY: ORDERING SYSTEM PROVIDED HISTORY: osteomyelitis of left 3rd phalanx base, wound on plantar surface TECHNOLOGIST PROVIDED HISTORY: Reason for exam:->osteomyelitis of left 3rd phalanx base, wound on plantar surface What is the area of interest?->Forefoot Reason for Exam: osteomyelitis base of 3rd phalanx x 2 weeks, hx Diabetes, No Surg, 15ml ProHance, best images obtained FINDINGS: Soft tissues: The 2nd and 3rd digits demonstrate circumferential enhancing skin thickening and amorphous subcutaneous edema consistent with cellulitis. The dorsal lateral forefoot demonstrates mild-moderate amorphous enhancing subcutaneous edema consistent with cellulitis. There is some circumferential mild subcutaneous amorphous nonenhancing edema consistent with bland edema. The dorsal musculature demonstrates nonenhancing interstitial edema with no focal abnormality of the extensor tendons. Intraosseous and plantar compartments demonstrate amorphous edema-like increased T2 signal intensity with diffuse enhancement which is most intense involving the 2nd and 3rd intraosseous compartments as well as the plantar lateral and lateral aspect of the plantar central compartments. There is no intramuscular rim enhancing fluid collection. The 3rd intraosseous compartment at the level the metatarsal heads there is a well-circumscribed undulating rim enhancing 1.0 x 2.2 x 3.3 cm fluid collection which may represent a soft tissue abscess or infectious intermetatarsal bursitis.   Extending from the lateral margin of the 3rd digit to the plantar

## 2020-03-31 NOTE — PROGRESS NOTES
[address] [facility] Liaison spoke with pt and pt is agreeable to [facility] at discharge.  Please place inpatient consult to home health needs order in [facility] [place]. Pt lives with pt and has no pets. Infusion all set-up. Delivery between 4 & 6 tonight. Spoke with [facility] Gilliland [facility] to plan a visit tonight. Pt in agreement and   co-pay is $0. Jesús Pac aware & pt nurse aware pt has to leave no later than 2p for IV delivery.

## 2020-04-01 RX ORDER — OXYCODONE HYDROCHLORIDE 5 MG/1
5 TABLET ORAL EVERY 6 HOURS PRN
Qty: 9 TABLET | Refills: 0 | Status: SHIPPED | OUTPATIENT
Start: 2020-04-01 | End: 2020-04-04

## 2020-04-03 ENCOUNTER — TELEPHONE (OUTPATIENT)
Dept: SURGERY | Age: 40
End: 2020-04-03

## 2020-04-03 NOTE — TELEPHONE ENCOUNTER
Yoli Egan called from [facility] asking if we called switch wound vac foams from white and silver to white and black due to silver being on backorder from [facility]. Allowed for the use of both white and black.

## 2020-04-06 ENCOUNTER — TELEMEDICINE (OUTPATIENT)
Dept: INTERNAL MEDICINE CLINIC | Age: 40
End: 2020-04-06
Payer: MEDICAID

## 2020-04-06 ENCOUNTER — HOSPITAL ENCOUNTER (OUTPATIENT)
Age: 40
Setting detail: SPECIMEN
Discharge: HOME | End: 2020-04-06
Payer: MEDICAID

## 2020-04-06 PROBLEM — B15.9 ACUTE HEPATITIS A: Status: RESOLVED | Noted: 2019-05-18 | Resolved: 2020-04-06

## 2020-04-06 LAB
ALBUMIN SERPL-MCNC: 3.6 GM/DL (ref 3.4–5)
ALP BLD-CCNC: 115 IU/L (ref 40–128)
ALT SERPL-CCNC: 27 U/L (ref 10–40)
ANION GAP SERPL CALCULATED.3IONS-SCNC: 12 MMOL/L (ref 4–16)
AST SERPL-CCNC: 21 IU/L (ref 15–37)
BASOPHILS ABSOLUTE: 0.1 K/CU MM
BASOPHILS RELATIVE PERCENT: 1.3 % (ref 0–1)
BILIRUB SERPL-MCNC: 0.3 MG/DL (ref 0–1)
BUN BLDV-MCNC: 15 MG/DL (ref 6–23)
C-REACTIVE PROTEIN, HIGH SENSITIVITY: 1.9 MG/L
CALCIUM SERPL-MCNC: 9.4 MG/DL (ref 8.3–10.6)
CHLORIDE BLD-SCNC: 97 MMOL/L (ref 99–110)
CO2: 25 MMOL/L (ref 21–32)
CREAT SERPL-MCNC: 0.7 MG/DL (ref 0.9–1.3)
DIFFERENTIAL TYPE: ABNORMAL
DOSE AMOUNT: NORMAL
DOSE TIME: NORMAL
EOSINOPHILS ABSOLUTE: 0.3 K/CU MM
EOSINOPHILS RELATIVE PERCENT: 6.2 % (ref 0–3)
ERYTHROCYTE SEDIMENTATION RATE: 112 MM/HR (ref 0–15)
GFR AFRICAN AMERICAN: >60 ML/MIN/1.73M2
GFR NON-AFRICAN AMERICAN: >60 ML/MIN/1.73M2
GLUCOSE BLD-MCNC: 235 MG/DL (ref 70–99)
HCT VFR BLD CALC: 32.3 % (ref 42–52)
HEMOGLOBIN: 10.3 GM/DL (ref 13.5–18)
IMMATURE NEUTROPHIL %: 0.4 % (ref 0–0.43)
LYMPHOCYTES ABSOLUTE: 1.4 K/CU MM
LYMPHOCYTES RELATIVE PERCENT: 29.1 % (ref 24–44)
MCH RBC QN AUTO: 27.8 PG (ref 27–31)
MCHC RBC AUTO-ENTMCNC: 31.9 % (ref 32–36)
MCV RBC AUTO: 87.3 FL (ref 78–100)
MONOCYTES ABSOLUTE: 0.3 K/CU MM
MONOCYTES RELATIVE PERCENT: 6.2 % (ref 0–4)
NUCLEATED RBC %: 0 %
PDW BLD-RTO: 12.8 % (ref 11.7–14.9)
PLATELET # BLD: 241 K/CU MM (ref 140–440)
PMV BLD AUTO: 10.3 FL (ref 7.5–11.1)
POTASSIUM SERPL-SCNC: 4.8 MMOL/L (ref 3.5–5.1)
RBC # BLD: 3.7 M/CU MM (ref 4.6–6.2)
SEGMENTED NEUTROPHILS ABSOLUTE COUNT: 2.7 K/CU MM
SEGMENTED NEUTROPHILS RELATIVE PERCENT: 56.8 % (ref 36–66)
SODIUM BLD-SCNC: 134 MMOL/L (ref 135–145)
TOTAL IMMATURE NEUTOROPHIL: 0.02 K/CU MM
TOTAL NUCLEATED RBC: 0 K/CU MM
TOTAL PROTEIN: 8.2 GM/DL (ref 6.4–8.2)
VANCOMYCIN TROUGH: 11.6 UG/ML (ref 10–20)
WBC # BLD: 4.7 K/CU MM (ref 4–10.5)

## 2020-04-06 PROCEDURE — 99215 OFFICE O/P EST HI 40 MIN: CPT | Performed by: NURSE PRACTITIONER

## 2020-04-06 PROCEDURE — 80202 ASSAY OF VANCOMYCIN: CPT

## 2020-04-06 PROCEDURE — 86140 C-REACTIVE PROTEIN: CPT

## 2020-04-06 PROCEDURE — 86141 C-REACTIVE PROTEIN HS: CPT

## 2020-04-06 PROCEDURE — 85025 COMPLETE CBC W/AUTO DIFF WBC: CPT

## 2020-04-06 PROCEDURE — 80053 COMPREHEN METABOLIC PANEL: CPT

## 2020-04-06 PROCEDURE — 85652 RBC SED RATE AUTOMATED: CPT

## 2020-04-06 PROCEDURE — 36415 COLL VENOUS BLD VENIPUNCTURE: CPT

## 2020-04-06 PROCEDURE — 1111F DSCHRG MED/CURRENT MED MERGE: CPT | Performed by: NURSE PRACTITIONER

## 2020-04-06 NOTE — PROGRESS NOTES
(PRAVACHOL) 20 MG tablet  Take 1 tablet by mouth daily             vancomycin (VANCOCIN) infusion  Infuse 1,250 mg intravenously every 8 hours Compound per protocol. Medications marked \"taking\" at this time  No outpatient medications have been marked as taking for the 4/6/20 encounter (Telemedicine) with BARBARA Lindo CNP. Medications patient taking as of now reconciled against medications ordered at time of hospital discharge: Yes    No chief complaint on file. History of Present illness - Follow up of Hospital diagnosis(es): osteomyelitis of third toe of left foot; diabetic foot ulcer    Inpatient course: Discharge summary reviewed- see chart. Interval history/Current status: stable    A comprehensive review of systems was negative except for what was noted in the HPI. There were no vitals filed for this visit. There is no height or weight on file to calculate BMI. Wt Readings from Last 3 Encounters:   03/31/20 200 lb (90.7 kg)   03/23/20 186 lb (84.4 kg)   08/12/19 181 lb 6.4 oz (82.3 kg)     BP Readings from Last 3 Encounters:   03/31/20 111/71   03/27/20 107/64   03/23/20 122/88        Physical Exam:  General Appearance: alert and oriented to person, place and time, well developed and well- nourished, in no acute distress  Head: normocephalic and atraumatic  ENT: tympanic membrane, external ear and ear canal normal bilaterally, nose without deformity,   Neck: supple without obvious mass  Pulmonary/Chest: no apparent respiratory distress; respirations equal and unlabored  Abdomen:  non-distended  Musculoskeletal: normal range of motion; wound vac in place-left foot  Neurologic: reflexes normal and symmetric, no cranial nerve deficit, gait, coordination and speech normal    Assessment/Plan:  1. Encounter for support and coordination of transition of care- overall, patient is transitioning well back into the home setting.  He is able to administer home IV ATB ball without issue and is compliant with [facility] recommendation/dressing changes/wound vac settings. He has appointment for follow up with Gen Surg tomorrow, however, I have noticed he does not have ID follow up yet. Per their last note, they want labs (as below) drawn weekly with office follow up in 2 week. I personally called ID office and they are contacting the doc for further info to arrange this. I will forward lab results to their office as well. - [place] DISCHARGE MEDS RECONCILED W/ CURRENT OUTPATIENT MED LIST  - CBC; Future  - COMPREHENSIVE METABOLIC PANEL; Future  - SEDIMENTATION RATE; Future    2. Osteomyelitis of third toe of left foot ([facility]- as per gen surg. Continue IV ATB, wound vac without issue thus far. - CBC; Future  - COMPREHENSIVE METABOLIC PANEL; Future  - SEDIMENTATION RATE; Future  - C-REACTIVE PROTEIN; Future  - VANCOMYCIN, TROUGH; Future    3. Diabetic ulcer of left foot associated with type 2 diabetes mellitus, unspecified part of foot, unspecified ulcer stage ([facility]- as above    4. Type 2 diabetes mellitus with other specified complication, with long-term current use of insulin ([facility]- overall, the biggest barrier is adherance 2/2 financial limitations. He is in the process of setting of [place] [address] with [employer] services to get insulin covered. If any issues he is STRONGLY encouraged to get ahold of us for further assistance. It is imperative he not go without his insulin given uncontrolled state. Medical Decision Making: moderate complexity      Karma Garcia is a 44year old male being evaluated by a Virtual Visit (video visit) encounter to address concerns as mentioned above. A caregiver was present when appropriate. Due to this being a TeleHealth encounter ([facility]), evaluation of the following organ systems was limited: Vitals/Constitutional/EENT/Resp/CV/GI//MS/Neuro/Skin/Heme-Lymph-Imm.   Pursuant to the emergency declaration under the

## 2020-04-07 ENCOUNTER — OFFICE VISIT (OUTPATIENT)
Dept: SURGERY | Age: 40
End: 2020-04-07

## 2020-04-07 ENCOUNTER — TELEPHONE (OUTPATIENT)
Dept: SURGERY | Age: 40
End: 2020-04-07

## 2020-04-07 VITALS
TEMPERATURE: 97.4 F | DIASTOLIC BLOOD PRESSURE: 80 MMHG | BODY MASS INDEX: 24.65 KG/M2 | HEART RATE: 83 BPM | WEIGHT: 186 LBS | SYSTOLIC BLOOD PRESSURE: 124 MMHG | HEIGHT: 73 IN | OXYGEN SATURATION: 98 %

## 2020-04-07 PROCEDURE — 99024 POSTOP FOLLOW-UP VISIT: CPT | Performed by: SURGERY

## 2020-04-07 RX ORDER — OXYCODONE HYDROCHLORIDE AND ACETAMINOPHEN 5; 325 MG/1; MG/1
1 TABLET ORAL EVERY 6 HOURS PRN
Qty: 28 TABLET | Refills: 0 | Status: SHIPPED | OUTPATIENT
Start: 2020-04-07 | End: 2020-04-14

## 2020-04-07 ASSESSMENT — ENCOUNTER SYMPTOMS
SHORTNESS OF BREATH: 0
NAUSEA: 0
ABDOMINAL PAIN: 0
VOMITING: 0

## 2020-04-07 NOTE — PROGRESS NOTES
No current facility-administered medications for this visit. Allergies:  Patient has no known allergies. Social History:   Social History     Socioeconomic History    Marital status: Single     Spouse name: None    Number of children: None    Years of education: None    Highest education level: None   Occupational History    None   Social Needs    Financial resource strain: None    Food insecurity     Worry: None     Inability: None    Transportation needs     Medical: None     Non-medical: None   Tobacco Use    Smoking status: Current Every Day Smoker     Packs/day: 0.50     Years: 24.00     Pack years: 12.00     Types: Cigarettes, Cigars     Start date: 1995    Smokeless tobacco: Never Used   Substance and Sexual Activity    Alcohol use: Yes     Comment: once a year    Drug use: Yes     Types: Marijuana     Comment: pt states once/week    Sexual activity: Yes     Partners: Male   Lifestyle    Physical activity     Days per week: None     Minutes per session: None    Stress: None   Relationships    Social connections     Talks on phone: None     Gets together: None     Attends Yazidism service: None     Active member of club or organization: None     Attends meetings of clubs or organizations: None     Relationship status: None    Intimate partner violence     Fear of current or ex partner: None     Emotionally abused: None     Physically abused: None     Forced sexual activity: None   Other Topics Concern    None   Social History Narrative    None       Family History:   Family History   Problem Relation Age of Onset    Obesity Mother     Heart Disease Father         Open Heart Surgery    Diabetes Father     Allergy (Severe) Brother        REVIEW OF SYSTEMS:    Review of Systems   Constitutional: Negative for chills and fever. Respiratory: Negative for shortness of breath. Cardiovascular: Negative for chest pain.    Gastrointestinal: Negative for abdominal pain, nausea and  Please  correlate with clinical and operative findings. Labs:  Lab Results   Component Value Date    WBC 4.7 04/06/2020    HGB 10.3 (L) 04/06/2020    HCT 32.3 (L) 04/06/2020     04/06/2020     (L) 04/06/2020    K 4.8 04/06/2020    CL 97 (L) 04/06/2020    CO2 25 04/06/2020    BUN 15 04/06/2020    CREATININE 0.7 (L) 04/06/2020    GLUCOSE 235 (H) 04/06/2020    CALCIUM 9.4 04/06/2020    PROT 8.2 04/06/2020    BILITOT 0.3 04/06/2020    AST 21 04/06/2020    ALT 27 04/06/2020    ALKPHOS 115 04/06/2020    LIPASE 25 05/20/2019    INR 0.92 06/12/2019    LABA1C 8.9 03/23/2020       Imaging:   Pertinent laboratory and imaging studies were personally reviewed if available. IMPRESSION:    Ruby Pino is a 44 y.o. male following-up postoperatively from 3/27/20 amputation of the left 3rd toe and metatarsal head with debridement of plantar ulcer and wound vac placement. Visit Diagnoses:  1. Acute osteomyelitis of phalanx of left foot (HCC)    2. 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 Refilled x1. PDMP checked. Discussed risks/benefits of narcotic pain medication, cautions with use such as not taking concurrently with Tylenol and risk of constipation, addictive potential, and advised to take the minimum amount necessary for adequate pain control. He expressed understanding and agreement with this plan.     Increase activity as

## 2020-04-07 NOTE — PROGRESS NOTES
Radiology completed.
Haley Beverly MD, Wound Care, [place]  -     HYDROcodone-acetaminophen [facility]) 5-325 MG per tablet; Take 1 tablet by mouth every 8 hours as needed for Pain for up to 5 days. Intended supply: 5 days. Take lowest dose possible to manage pain  -     XR FOOT LEFT (MIN 3 VIEWS); Future  -     amoxicillin-clavulanate (AUGMENTIN) 875-125 MG per tablet; Take 1 tablet by mouth 2 times daily for 7 days  -     Culture, Wound  -     C-REACTIVE PROTEIN; Future  -     SEDIMENTATION RATE; Future    Type 2 diabetes mellitus without complication, with long-term current use of insulin ([facility]- check labs and adjust therapy as warranted. Please see above for current acute concerns. -     pravastatin (PRAVACHOL) 20 MG tablet; Take 1 tablet by mouth daily    Course of treatment, including any medications, possible imaging, referrals, and follow ups discussed with patient. All risks and benefits and possible side effects discussed with patient who agrees to plan of care and verbalizes understanding. All labs and imaging reviewed. No flowsheet data found. Return in about 1 week (around 3/30/2020).

## 2020-04-13 ENCOUNTER — HOSPITAL ENCOUNTER (OUTPATIENT)
Age: 40
Setting detail: SPECIMEN
Discharge: HOME | End: 2020-04-13
Payer: MEDICAID

## 2020-04-13 LAB
ALBUMIN SERPL-MCNC: 4 GM/DL (ref 3.4–5)
ALP BLD-CCNC: 153 IU/L (ref 40–128)
ALT SERPL-CCNC: 26 U/L (ref 10–40)
ANION GAP SERPL CALCULATED.3IONS-SCNC: 8 MMOL/L (ref 4–16)
AST SERPL-CCNC: 24 IU/L (ref 15–37)
BASOPHILS ABSOLUTE: 0 K/CU MM
BASOPHILS RELATIVE PERCENT: 0.9 % (ref 0–1)
BILIRUB SERPL-MCNC: 0.4 MG/DL (ref 0–1)
BUN BLDV-MCNC: 10 MG/DL (ref 6–23)
C-REACTIVE PROTEIN, HIGH SENSITIVITY: 3 MG/L
CALCIUM SERPL-MCNC: 10.2 MG/DL (ref 8.3–10.6)
CHLORIDE BLD-SCNC: 100 MMOL/L (ref 99–110)
CO2: 22 MMOL/L (ref 21–32)
CREAT SERPL-MCNC: 0.7 MG/DL (ref 0.9–1.3)
DIFFERENTIAL TYPE: ABNORMAL
DOSE AMOUNT: NORMAL
DOSE TIME: NORMAL
EOSINOPHILS ABSOLUTE: 0.2 K/CU MM
EOSINOPHILS RELATIVE PERCENT: 4 % (ref 0–3)
ERYTHROCYTE SEDIMENTATION RATE: 102 MM/HR (ref 0–15)
GFR AFRICAN AMERICAN: >60 ML/MIN/1.73M2
GFR NON-AFRICAN AMERICAN: >60 ML/MIN/1.73M2
GLUCOSE BLD-MCNC: 119 MG/DL (ref 70–99)
HCT VFR BLD CALC: 34.8 % (ref 42–52)
HEMOGLOBIN: 11.1 GM/DL (ref 13.5–18)
IMMATURE NEUTROPHIL %: 0.2 % (ref 0–0.43)
LYMPHOCYTES ABSOLUTE: 1.4 K/CU MM
LYMPHOCYTES RELATIVE PERCENT: 30.3 % (ref 24–44)
MCH RBC QN AUTO: 27.9 PG (ref 27–31)
MCHC RBC AUTO-ENTMCNC: 31.9 % (ref 32–36)
MCV RBC AUTO: 87.4 FL (ref 78–100)
MONOCYTES ABSOLUTE: 0.3 K/CU MM
MONOCYTES RELATIVE PERCENT: 6.2 % (ref 0–4)
NUCLEATED RBC %: 0 %
PDW BLD-RTO: 13.4 % (ref 11.7–14.9)
PLATELET # BLD: 189 K/CU MM (ref 140–440)
PMV BLD AUTO: 11 FL (ref 7.5–11.1)
POTASSIUM SERPL-SCNC: 4.8 MMOL/L (ref 3.5–5.1)
RBC # BLD: 3.98 M/CU MM (ref 4.6–6.2)
SEGMENTED NEUTROPHILS ABSOLUTE COUNT: 2.6 K/CU MM
SEGMENTED NEUTROPHILS RELATIVE PERCENT: 58.4 % (ref 36–66)
SODIUM BLD-SCNC: 130 MMOL/L (ref 135–145)
TOTAL IMMATURE NEUTOROPHIL: 0.01 K/CU MM
TOTAL NUCLEATED RBC: 0 K/CU MM
TOTAL PROTEIN: 8.4 GM/DL (ref 6.4–8.2)
VANCOMYCIN TROUGH: 19.1 UG/ML (ref 10–20)
WBC # BLD: 4.5 K/CU MM (ref 4–10.5)

## 2020-04-13 PROCEDURE — 80053 COMPREHEN METABOLIC PANEL: CPT

## 2020-04-13 PROCEDURE — 85025 COMPLETE CBC W/AUTO DIFF WBC: CPT

## 2020-04-13 PROCEDURE — 85652 RBC SED RATE AUTOMATED: CPT

## 2020-04-13 PROCEDURE — 80202 ASSAY OF VANCOMYCIN: CPT

## 2020-04-13 PROCEDURE — 86140 C-REACTIVE PROTEIN: CPT

## 2020-04-16 RX ORDER — IBUPROFEN 200 MG
1 TABLET ORAL DAILY
Qty: 100 EACH | Refills: 3 | Status: SHIPPED | OUTPATIENT
Start: 2020-04-16 | End: 2020-08-05 | Stop reason: SDUPTHER

## 2020-04-16 RX ORDER — BLOOD-GLUCOSE METER
1 EACH MISCELLANEOUS ONCE
Qty: 1 KIT | Refills: 0 | Status: SHIPPED | OUTPATIENT
Start: 2020-04-16 | End: 2022-08-01

## 2020-04-16 RX ORDER — GLUCOSAMINE HCL/CHONDROITIN SU 500-400 MG
CAPSULE ORAL
Qty: 270 STRIP | Refills: 2 | Status: SHIPPED | OUTPATIENT
Start: 2020-04-16

## 2020-04-17 ENCOUNTER — HOSPITAL ENCOUNTER (OUTPATIENT)
Dept: WOUND CARE | Age: 40
Discharge: HOME | End: 2020-04-17
Payer: MEDICAID

## 2020-04-17 PROCEDURE — 99423 OL DIG E/M SVC 21+ MIN: CPT | Performed by: NURSE PRACTITIONER

## 2020-04-17 PROCEDURE — 99214 OFFICE O/P EST MOD 30 MIN: CPT

## 2020-04-17 NOTE — PLAN OF CARE
Problem: Wound:  Goal: Will show signs of wound healing; wound closure and no evidence of infection  Description: Will show signs of wound healing; wound closure and no evidence of infection  Outcome: Ongoing  Note: See Flowsheet     Problem: Wound:  Intervention: Assess ankle, calf, or foot circumference blilaterally  Note: See Flowsheet  Intervention: Assess pain status  Note: See Flowsheet  Intervention: Assess wound size, appearance and drainage  Note: See Flowsheet  Intervention: Assess pedal pulses bilaterally if patient has a foot or leg ulcer  Note: See Flowsheet  Intervention: Doppler if unable to palpate pedal pulse  Note: See Flowsheet

## 2020-04-17 NOTE — PROGRESS NOTES
Images of the wound(s) is obtained and annotated along with completed description in [address] [place] [address]. []   3     Education Points   No Education completed by nursing staff.    []   0   Patient/caregiver is educated on 1-4 topics. Nursing staff identifies learner, confirms understanding of information (verbal, demonstration, written) and documents details. May include Discharge Instructions/AVS, available documents in My Chart, or Web-based learning.  []   1   Patient/caregiver is educated on 5-9 topics. Nursing staff identifies learner, confirms understanding of information (verbal, demonstration, written) and documents details. May include Discharge Instructions/AVS, available documents in My Chart, or Web-based learning. []   2   Patient/caregiver is educated on 10 or more topics. Nursing staff identifies learner, confirms understanding of information (verbal, demonstration, written) and documents details. May include Discharge Instructions/AVS, available documents in My Chart, or Web-based learning. [x]   3     Follow-up Virtual Visit Points   No contact with outside resources made. []   0   Nursing staff contacts 1-2 outside resource. For example, telephone call made to home health, primary care provider, pharmacy, or DME. May include filling out forms and writing letters, arranging transportation, communication with insurance , vendors, etc.  Discharge, instructions and/or After Visit Summary given to patient/caregiver and instructions completed. []   1   Nursing staff contacts 3-4 outside resource. For example, telephone calls made to home health, primary care provider, pharmacy, or DME. May include filling out forms and writing letters, arranging transportation, communication with insurance , vendors, etc.  Discharge, instructions and/or After Visit Summary given to patient/caregiver and instructions completed. [x]   2   Nursing contacts 5 or more outside resource.  For example,
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
## 2020-04-20 ENCOUNTER — HOSPITAL ENCOUNTER (OUTPATIENT)
Age: 40
Setting detail: SPECIMEN
Discharge: HOME | End: 2020-04-20
Payer: MEDICAID

## 2020-04-20 LAB
ALBUMIN SERPL-MCNC: 3.5 GM/DL (ref 3.4–5)
ALP BLD-CCNC: 142 IU/L (ref 40–128)
ALT SERPL-CCNC: 23 U/L (ref 10–40)
ANION GAP SERPL CALCULATED.3IONS-SCNC: 12 MMOL/L (ref 4–16)
AST SERPL-CCNC: 18 IU/L (ref 15–37)
BASOPHILS ABSOLUTE: 0 K/CU MM
BASOPHILS RELATIVE PERCENT: 0.8 % (ref 0–1)
BILIRUB SERPL-MCNC: 0.4 MG/DL (ref 0–1)
BUN BLDV-MCNC: 16 MG/DL (ref 6–23)
C-REACTIVE PROTEIN, HIGH SENSITIVITY: 57.6 MG/L
CALCIUM SERPL-MCNC: 9.1 MG/DL (ref 8.3–10.6)
CHLORIDE BLD-SCNC: 96 MMOL/L (ref 99–110)
CO2: 23 MMOL/L (ref 21–32)
CREAT SERPL-MCNC: 1 MG/DL (ref 0.9–1.3)
DIFFERENTIAL TYPE: ABNORMAL
DOSE AMOUNT: NORMAL
DOSE TIME: NORMAL
EOSINOPHILS ABSOLUTE: 0.4 K/CU MM
EOSINOPHILS RELATIVE PERCENT: 8.3 % (ref 0–3)
ERYTHROCYTE SEDIMENTATION RATE: 110 MM/HR (ref 0–15)
GFR AFRICAN AMERICAN: >60 ML/MIN/1.73M2
GFR NON-AFRICAN AMERICAN: >60 ML/MIN/1.73M2
GLUCOSE BLD-MCNC: 228 MG/DL (ref 70–99)
HCT VFR BLD CALC: 30.8 % (ref 42–52)
HEMOGLOBIN: 10 GM/DL (ref 13.5–18)
IMMATURE NEUTROPHIL %: 0.2 % (ref 0–0.43)
LYMPHOCYTES ABSOLUTE: 1.3 K/CU MM
LYMPHOCYTES RELATIVE PERCENT: 26.4 % (ref 24–44)
MCH RBC QN AUTO: 27.9 PG (ref 27–31)
MCHC RBC AUTO-ENTMCNC: 32.5 % (ref 32–36)
MCV RBC AUTO: 86 FL (ref 78–100)
MONOCYTES ABSOLUTE: 0.4 K/CU MM
MONOCYTES RELATIVE PERCENT: 9.1 % (ref 0–4)
NUCLEATED RBC %: 0 %
PDW BLD-RTO: 13.6 % (ref 11.7–14.9)
PLATELET # BLD: 187 K/CU MM (ref 140–440)
PMV BLD AUTO: 10.7 FL (ref 7.5–11.1)
POTASSIUM SERPL-SCNC: 4.5 MMOL/L (ref 3.5–5.1)
RBC # BLD: 3.58 M/CU MM (ref 4.6–6.2)
SEGMENTED NEUTROPHILS ABSOLUTE COUNT: 2.7 K/CU MM
SEGMENTED NEUTROPHILS RELATIVE PERCENT: 55.2 % (ref 36–66)
SODIUM BLD-SCNC: 131 MMOL/L (ref 135–145)
TOTAL IMMATURE NEUTOROPHIL: 0.01 K/CU MM
TOTAL NUCLEATED RBC: 0 K/CU MM
TOTAL PROTEIN: 7.7 GM/DL (ref 6.4–8.2)
VANCOMYCIN TROUGH: 14.6 UG/ML (ref 10–20)
WBC # BLD: 4.8 K/CU MM (ref 4–10.5)

## 2020-04-20 PROCEDURE — 80202 ASSAY OF VANCOMYCIN: CPT

## 2020-04-20 PROCEDURE — 80053 COMPREHEN METABOLIC PANEL: CPT

## 2020-04-20 PROCEDURE — 85025 COMPLETE CBC W/AUTO DIFF WBC: CPT

## 2020-04-20 PROCEDURE — 86140 C-REACTIVE PROTEIN: CPT

## 2020-04-20 PROCEDURE — 85652 RBC SED RATE AUTOMATED: CPT

## 2020-04-21 ENCOUNTER — OFFICE VISIT (OUTPATIENT)
Dept: SURGERY | Age: 40
End: 2020-04-21

## 2020-04-21 VITALS
HEART RATE: 89 BPM | SYSTOLIC BLOOD PRESSURE: 112 MMHG | BODY MASS INDEX: 24.66 KG/M2 | TEMPERATURE: 97.5 F | WEIGHT: 186.07 LBS | HEIGHT: 73 IN | OXYGEN SATURATION: 99 % | DIASTOLIC BLOOD PRESSURE: 64 MMHG

## 2020-04-21 PROCEDURE — 99024 POSTOP FOLLOW-UP VISIT: CPT | Performed by: SURGERY

## 2020-04-21 NOTE — PROGRESS NOTES
VAC PLACEMENT performed by Nano Tatum MD at [facility] EXTRACTION      4 [place] Teeth Extracted In Past       Current Medications:   Current Outpatient Medications   Medication Sig Dispense Refill    blood glucose monitor strips Test 3 times a day & as needed for symptoms of irregular blood glucose. 270 strip 2    INSULIN SYRINGE .5CC/29G ([employer] INS SYR .5CC/29G) 29G X 1/2\" 0.5 ML MISC 1 each by Does not apply route daily 100 each 3    Insulin Syringes, Disposable, U-100 1 ML MISC 1 each by Does not apply route daily 100 each 3    insulin lispro (HUMALOG) 100 UNIT/ML injection vial Inject 0-12 Units into the skin 3 times daily (with meals) 1 vial 3    metFORMIN (GLUCOPHAGE) 1000 MG tablet Take 1 tablet by mouth 2 times daily (with meals) 60 tablet 2    pravastatin (PRAVACHOL) 20 MG tablet Take 1 tablet by mouth daily 30 tablet 3    insulin glargine (LANTUS) 100 UNIT/ML injection vial Inject 30 Units into the skin nightly 1 vial 3    vancomycin (VANCOCIN) infusion Infuse 1,250 mg intravenously every 8 hours Compound per protocol. [id number] mg 1    omeprazole (PRILOSEC) 20 MG delayed release capsule Take 1 capsule by mouth every morning (before breakfast) 30 capsule 0    aspirin 325 MG tablet Take 325 mg by mouth daily      insulin lispro (HUMALOG) 100 UNIT/ML injection vial Inject 15 Units into the skin 3 times daily (with meals) 1 vial 3    Blood Glucose Monitoring Suppl (ONE TOUCH ULTRA 2) w/Device KIT 1 kit by Does not apply route once for 1 dose 1 kit 0     No current facility-administered medications for this visit. Allergies:  Patient has no known allergies.     Social History:   Social History     Socioeconomic History    Marital status: Single     Spouse name: None    Number of children: None    Years of education: None    Highest education level: None   Occupational History    None   Social Needs    Financial resource strain: None    Food insecurity     Worry: 1\" (1.854 m)       Physical Exam  Constitutional:       General: He is not in acute distress. Appearance: He is well-developed. He is not diaphoretic. HENT:      Head: Normocephalic and atraumatic. Eyes:      Pupils: Pupils are equal, round, and reactive to light. Cardiovascular:      Rate and Rhythm: Normal rate and regular rhythm. Pulmonary:      Effort: Pulmonary effort is normal. No respiratory distress. Abdominal:      Palpations: Abdomen is soft. Tenderness: There is no abdominal tenderness. Skin:     Comments: Wound vac to left 3rd toe amputation site. Film opened, and wound examined. Clean base with good granulation tissue, no purulence. Debrided a trace amount of fibrinous material. Vac sponge replaced and film repaired, good seal noted. Neurological:      Mental Status: He is alert and oriented to person, place, and time. Psychiatric:         Behavior: Behavior normal.         DATA:    Pathology Results:   Labs:  Lab Results   Component Value Date    WBC 4.8 04/20/2020    HGB 10.0 (L) 04/20/2020    HCT 30.8 (L) 04/20/2020     04/20/2020     (L) 04/20/2020    K 4.5 04/20/2020    CL 96 (L) 04/20/2020    CO2 23 04/20/2020    BUN 16 04/20/2020    CREATININE 1.0 04/20/2020    GLUCOSE 228 (H) 04/20/2020    CALCIUM 9.1 04/20/2020    PROT 7.7 04/20/2020    BILITOT 0.4 04/20/2020    AST 18 04/20/2020    ALT 23 04/20/2020    ALKPHOS 142 (H) 04/20/2020    LIPASE 25 05/20/2019    INR 0.92 06/12/2019    LABA1C 8.9 03/23/2020       Imaging:   Pertinent laboratory and imaging studies were personally reviewed if available. IMPRESSION:    Anum Araiza is a 44 y.o. male following-up postoperatively from amputation of the left 3rd toe for osteomyelitis and associated diabetic foot ulcer. Visit Diagnoses:  1. History of partial ray amputation of third toe of left foot ([address]    2. Acute osteomyelitis of phalanx of left foot (HCC)    3.  Postop check        Patient Active Problem

## 2020-04-22 ENCOUNTER — OFFICE VISIT (OUTPATIENT)
Dept: INFECTIOUS DISEASES | Age: 40
End: 2020-04-22
Payer: MEDICAID

## 2020-04-22 VITALS
HEART RATE: 84 BPM | RESPIRATION RATE: 18 BRPM | DIASTOLIC BLOOD PRESSURE: 78 MMHG | WEIGHT: 185.6 LBS | TEMPERATURE: 97.2 F | BODY MASS INDEX: 24.49 KG/M2 | SYSTOLIC BLOOD PRESSURE: 117 MMHG

## 2020-04-22 PROBLEM — Z79.2 RECEIVING INTRAVENOUS ANTIBIOTIC TREATMENT AS OUTPATIENT: Status: ACTIVE | Noted: 2020-04-22

## 2020-04-22 PROCEDURE — G8420 CALC BMI NORM PARAMETERS: HCPCS | Performed by: INTERNAL MEDICINE

## 2020-04-22 PROCEDURE — G8427 DOCREV CUR MEDS BY ELIG CLIN: HCPCS | Performed by: INTERNAL MEDICINE

## 2020-04-22 PROCEDURE — 3052F HG A1C>EQUAL 8.0%<EQUAL 9.0%: CPT | Performed by: INTERNAL MEDICINE

## 2020-04-22 PROCEDURE — 99214 OFFICE O/P EST MOD 30 MIN: CPT | Performed by: INTERNAL MEDICINE

## 2020-04-22 PROCEDURE — 4004F PT TOBACCO SCREEN RCVD TLK: CPT | Performed by: INTERNAL MEDICINE

## 2020-04-22 PROCEDURE — 2022F DILAT RTA XM EVC RTNOPTHY: CPT | Performed by: INTERNAL MEDICINE

## 2020-04-22 PROCEDURE — 1111F DSCHRG MED/CURRENT MED MERGE: CPT | Performed by: INTERNAL MEDICINE

## 2020-04-22 ASSESSMENT — ENCOUNTER SYMPTOMS
VOMITING: 0
NAUSEA: 0
SHORTNESS OF BREATH: 0
ABDOMINAL PAIN: 0

## 2020-04-23 ENCOUNTER — HOSPITAL ENCOUNTER (OUTPATIENT)
Dept: WOUND CARE | Age: 40
Discharge: HOME | End: 2020-04-23
Payer: MEDICAID

## 2020-04-23 VITALS
HEART RATE: 83 BPM | SYSTOLIC BLOOD PRESSURE: 119 MMHG | DIASTOLIC BLOOD PRESSURE: 74 MMHG | RESPIRATION RATE: 18 BRPM | TEMPERATURE: 97.5 F

## 2020-04-23 PROCEDURE — 11042 DBRDMT SUBQ TIS 1ST 20SQCM/<: CPT | Performed by: NURSE PRACTITIONER

## 2020-04-23 PROCEDURE — 97605 NEG PRS WND THER DME<=50SQCM: CPT

## 2020-04-23 PROCEDURE — 11042 DBRDMT SUBQ TIS 1ST 20SQCM/<: CPT

## 2020-04-23 NOTE — PROGRESS NOTES
Pressure Wound Therapy Foot Left (Active)   Wound Type Surgical 4/23/2020  2:34 PM   Dressing Type White foam;Black foam 4/23/2020  2:34 PM   Cycle Continuous 4/23/2020  2:34 PM   Target Pressure (mmHg) 125 4/23/2020  2:34 PM   Canister changed? No 3/30/2020 11:00 AM   Dressing Status Changed 3/30/2020 11:00 AM   Dressing Changed Changed/New 3/30/2020 11:00 AM   Drainage Amount Moderate 3/30/2020 11:00 AM   Drainage Description Serosanguinous 3/30/2020 11:00 AM   Output (ml) 0 ml 3/27/2020 12:02 PM   Wound Assessment Black; Pink 3/30/2020 11:00 AM   Dee-wound Assessment Calloused 3/30/2020 11:00 AM   Odor None 3/30/2020 11:00 AM   Number of days: 27           Wound 03/26/20 Left 3rd toe amp site  (Active)   Wound Image   4/17/2020  9:36 AM   Wound Non-Healing Surgical 4/23/2020  2:13 PM   Dressing Status Clean;Dry; Intact 4/23/2020  2:34 PM   Dressing Changed Changed/New 4/23/2020  2:34 PM   Wound Cleansed Rinsed/Irrigated with saline 4/23/2020  2:13 PM   Wound Length (cm) 4.6 cm 4/23/2020  2:13 PM   Wound Width (cm) 1.3 cm 4/23/2020  2:13 PM   Wound Depth (cm) 4.7 cm 4/23/2020  2:13 PM   Wound Surface Area (cm^2) 5.98 cm^2 4/23/2020  2:13 PM   Change in Wound Size % (l*w) -522.92 4/23/2020  2:13 PM   Wound Volume (cm^3) 28.11 cm^3 4/23/2020  2:13 PM   Wound Healing % -1998 4/23/2020  2:13 PM   Distance Tunneling (cm) 0 cm 4/23/2020  2:13 PM   Tunneling Position ___ O'Clock 0 4/23/2020  2:13 PM   Undermining Starts ___ O'Clock 0 4/23/2020  2:13 PM   Undermining Ends___ O'Clock 0 4/23/2020  2:13 PM   Undermining Maxium Distance (cm) 0 4/23/2020  2:13 PM   Wound Assessment Red;Yellow;Black 4/23/2020  2:13 PM   Drainage Amount Moderate 4/23/2020  2:13 PM   Drainage Description Serosanguinous 4/23/2020  2:13 PM   Odor None 4/23/2020  2:13 PM   Margins Defined edges 4/23/2020  2:13 PM   Exposed structure Bone 4/23/2020  2:13 PM   Dee-wound Assessment Maceration 4/23/2020  2:13 PM   Non-staged Wound Description Full thickness 4/23/2020  2:13 PM   [place]%Wound Bed 0 4/23/2020  2:13 PM   Red%Wound Bed 60 4/23/2020  2:13 PM   Yellow%Wound Bed 30 4/23/2020  2:13 PM   Black%Wound Bed 10 4/23/2020  2:13 PM   Purple%Wound Bed 0 4/23/2020  2:13 PM   Other%Wound Bed 0 4/23/2020  2:13 PM   Number of days: 28       Percent of Wound(s) Debrided: approximately 100%    Total  Area  Debrided:  5.98 sq cm     Bleeding:  Minimal    Hemostasis Achieved:  by pressure    Procedural Pain:  0  / 10     Post Procedural Pain:  0 / 10     Response to treatment:  Well tolerated by patient. Status of wound progress and description from last visit: Wound looks good, no measurements to compare as he has only had a virtual visit  4/17/2020 at the wound center. Plan:       Discharge Instructions       PHYSICIAN ORDERS AND DISCHARGE INSTRUCTIONS     NOTE: Upon discharge from the [address], you will receive a patient experience survey. We would be grateful if you would take the time to fill this survey out.     Wound care order history:                 BRITANY's   Right       Left               Date:              Cultures:                Grafts:                Antibiotics:           Continuing wound care orders and information:                 Residence:  Home              Continue home health care with: [facility]              Your wound-care supplies will be provided by:      Wound cleansing:               Do not scrub or use excessive force. Wash hands with soap and water before and after dressing changes. Prior to applying a clean dressing, cleanse wound with normal saline, wound cleanser, or mild soap and water. Ask the physician or nurse before getting the wound(s) wet in a shower     Daily Wound management:              Keep weight off wounds and reposition every 2 hours. Avoid standing for long periods of time. Apply wraps/stockings in AM and remove at bedtime.               If

## 2020-04-27 ENCOUNTER — HOSPITAL ENCOUNTER (OUTPATIENT)
Age: 40
Setting detail: SPECIMEN
Discharge: HOME | End: 2020-04-27
Payer: MEDICAID

## 2020-04-27 LAB
ALBUMIN SERPL-MCNC: 3.6 GM/DL (ref 3.4–5)
ALP BLD-CCNC: 146 IU/L (ref 40–128)
ALT SERPL-CCNC: 16 U/L (ref 10–40)
ANION GAP SERPL CALCULATED.3IONS-SCNC: 10 MMOL/L (ref 4–16)
AST SERPL-CCNC: 16 IU/L (ref 15–37)
BASOPHILS ABSOLUTE: 0 K/CU MM
BASOPHILS RELATIVE PERCENT: 0.5 % (ref 0–1)
BILIRUB SERPL-MCNC: 0.2 MG/DL (ref 0–1)
BUN BLDV-MCNC: 12 MG/DL (ref 6–23)
C-REACTIVE PROTEIN, HIGH SENSITIVITY: 11.3 MG/L
CALCIUM SERPL-MCNC: 9.3 MG/DL (ref 8.3–10.6)
CHLORIDE BLD-SCNC: 96 MMOL/L (ref 99–110)
CO2: 23 MMOL/L (ref 21–32)
CREAT SERPL-MCNC: 0.9 MG/DL (ref 0.9–1.3)
DIFFERENTIAL TYPE: ABNORMAL
DOSE AMOUNT: NORMAL
DOSE TIME: NORMAL
EOSINOPHILS ABSOLUTE: 0.2 K/CU MM
EOSINOPHILS RELATIVE PERCENT: 4.8 % (ref 0–3)
ERYTHROCYTE SEDIMENTATION RATE: 105 MM/HR (ref 0–15)
GFR AFRICAN AMERICAN: >60 ML/MIN/1.73M2
GFR NON-AFRICAN AMERICAN: >60 ML/MIN/1.73M2
GLUCOSE BLD-MCNC: 210 MG/DL (ref 70–99)
HCT VFR BLD CALC: 30.2 % (ref 42–52)
HEMOGLOBIN: 9.6 GM/DL (ref 13.5–18)
IMMATURE NEUTROPHIL %: 0.2 % (ref 0–0.43)
LYMPHOCYTES ABSOLUTE: 1.1 K/CU MM
LYMPHOCYTES RELATIVE PERCENT: 24.6 % (ref 24–44)
MCH RBC QN AUTO: 27.4 PG (ref 27–31)
MCHC RBC AUTO-ENTMCNC: 31.8 % (ref 32–36)
MCV RBC AUTO: 86 FL (ref 78–100)
MONOCYTES ABSOLUTE: 0.3 K/CU MM
MONOCYTES RELATIVE PERCENT: 6.2 % (ref 0–4)
NUCLEATED RBC %: 0 %
PDW BLD-RTO: 13.3 % (ref 11.7–14.9)
PLATELET # BLD: 213 K/CU MM (ref 140–440)
PMV BLD AUTO: 10.4 FL (ref 7.5–11.1)
POTASSIUM SERPL-SCNC: 4.8 MMOL/L (ref 3.5–5.1)
RBC # BLD: 3.51 M/CU MM (ref 4.6–6.2)
SEGMENTED NEUTROPHILS ABSOLUTE COUNT: 2.8 K/CU MM
SEGMENTED NEUTROPHILS RELATIVE PERCENT: 63.7 % (ref 36–66)
SODIUM BLD-SCNC: 129 MMOL/L (ref 135–145)
TOTAL IMMATURE NEUTOROPHIL: 0.01 K/CU MM
TOTAL NUCLEATED RBC: 0 K/CU MM
TOTAL PROTEIN: 7.6 GM/DL (ref 6.4–8.2)
VANCOMYCIN TROUGH: 17.5 UG/ML (ref 10–20)
WBC # BLD: 4.4 K/CU MM (ref 4–10.5)

## 2020-04-27 PROCEDURE — 80053 COMPREHEN METABOLIC PANEL: CPT

## 2020-04-27 PROCEDURE — 80202 ASSAY OF VANCOMYCIN: CPT

## 2020-04-27 PROCEDURE — 85025 COMPLETE CBC W/AUTO DIFF WBC: CPT

## 2020-04-27 PROCEDURE — 85652 RBC SED RATE AUTOMATED: CPT

## 2020-04-27 PROCEDURE — 86140 C-REACTIVE PROTEIN: CPT

## 2020-04-29 ENCOUNTER — OFFICE VISIT (OUTPATIENT)
Dept: PRIMARY CARE CLINIC | Age: 40
End: 2020-04-29
Payer: MEDICAID

## 2020-04-29 ENCOUNTER — OFFICE VISIT (OUTPATIENT)
Dept: INFECTIOUS DISEASES | Age: 40
End: 2020-04-29
Payer: MEDICAID

## 2020-04-29 ENCOUNTER — HOSPITAL ENCOUNTER (OUTPATIENT)
Age: 40
Setting detail: SPECIMEN
Discharge: HOME | End: 2020-04-29
Payer: MEDICAID

## 2020-04-29 VITALS
DIASTOLIC BLOOD PRESSURE: 67 MMHG | WEIGHT: 188.4 LBS | SYSTOLIC BLOOD PRESSURE: 118 MMHG | TEMPERATURE: 97.6 F | HEART RATE: 87 BPM | BODY MASS INDEX: 24.86 KG/M2

## 2020-04-29 VITALS — OXYGEN SATURATION: 97 % | TEMPERATURE: 97.1 F | HEART RATE: 60 BPM

## 2020-04-29 PROCEDURE — G8420 CALC BMI NORM PARAMETERS: HCPCS | Performed by: INTERNAL MEDICINE

## 2020-04-29 PROCEDURE — 1111F DSCHRG MED/CURRENT MED MERGE: CPT | Performed by: NURSE PRACTITIONER

## 2020-04-29 PROCEDURE — 4004F PT TOBACCO SCREEN RCVD TLK: CPT | Performed by: NURSE PRACTITIONER

## 2020-04-29 PROCEDURE — G8428 CUR MEDS NOT DOCUMENT: HCPCS | Performed by: NURSE PRACTITIONER

## 2020-04-29 PROCEDURE — 99215 OFFICE O/P EST HI 40 MIN: CPT | Performed by: INTERNAL MEDICINE

## 2020-04-29 PROCEDURE — 4004F PT TOBACCO SCREEN RCVD TLK: CPT | Performed by: INTERNAL MEDICINE

## 2020-04-29 PROCEDURE — 99213 OFFICE O/P EST LOW 20 MIN: CPT | Performed by: NURSE PRACTITIONER

## 2020-04-29 PROCEDURE — G8420 CALC BMI NORM PARAMETERS: HCPCS | Performed by: NURSE PRACTITIONER

## 2020-04-29 PROCEDURE — G8427 DOCREV CUR MEDS BY ELIG CLIN: HCPCS | Performed by: INTERNAL MEDICINE

## 2020-04-29 PROCEDURE — U0002 COVID-19 LAB TEST NON-CDC: HCPCS

## 2020-04-29 PROCEDURE — 1111F DSCHRG MED/CURRENT MED MERGE: CPT | Performed by: INTERNAL MEDICINE

## 2020-04-29 NOTE — PROGRESS NOTES
foot debridement of hyperkerototic lesion     TOE AMPUTATION Left 3/27/2020    TOE AMPUTATION - LEFT THIRD TOE AND METATARSAL HEAD, DEBRIDEMENT PLANTAR FOOT ULCER,  WOUND VAC PLACEMENT performed by Raul Marti MD at [facility] EXTRACTION      4 [place] Teeth Extracted In Past       Social History     Socioeconomic History    Marital status: Single     Spouse name: Not on file    Number of children: Not on file    Years of education: Not on file    Highest education level: Not on file   Occupational History    Not on file   Social Needs    Financial resource strain: Not on file    Food insecurity     Worry: Not on file     Inability: Not on file    Transportation needs     Medical: Not on file     Non-medical: Not on file   Tobacco Use    Smoking status: Current Every Day Smoker     Packs/day: 0.50     Years: 24.00     Pack years: 12.00     Types: Cigarettes, Cigars     Start date: 1995    Smokeless tobacco: Never Used   Substance and Sexual Activity    Alcohol use: Yes     Comment: once a year    Drug use: Yes     Types: Marijuana     Comment: pt states once/week    Sexual activity: Yes     Partners: Male   Lifestyle    Physical activity     Days per week: Not on file     Minutes per session: Not on file    Stress: Not on file   Relationships    Social connections     Talks on phone: Not on file     Gets together: Not on file     Attends [employer] service: Not on file     Active member of club or organization: Not on file     Attends meetings of clubs or organizations: Not on file     Relationship status: Not on file    Intimate partner violence     Fear of current or ex partner: Not on file     Emotionally abused: Not on file     Physically abused: Not on file     Forced sexual activity: Not on file   Other Topics Concern    Not on file   Social History Narrative    Not on file       Family History   Problem Relation Age of Onset    Obesity Mother     Heart Disease Father Open Heart Surgery    Diabetes Father     Allergy (Severe) Brother        Vital Signs:  Vitals:    04/29/20 1537   BP: 118/67   Site: Left Upper Arm   Position: Sitting   Cuff Size: Medium Adult   Pulse: 87   Temp: 97.6 °F (36.4 °C)   TempSrc: Tympanic   Weight: 188 lb 6.4 oz (85.5 kg)        Wt Readings from Last 3 Encounters:   04/29/20 188 lb 6.4 oz (85.5 kg)   04/22/20 185 lb 9.6 oz (84.2 kg)   04/21/20 186 lb 1.1 oz (84.4 kg)        Physical Exam:   Gen: alert and NAD  HEENT: sclera clear, pupils equal and reactive, extra ocular muscles intact, oropharynx clear, mucus membranes moist, tympanic membranes clear bilaterally, no cervical lymphadenopathy noted and neck supple  Neck: supple, no significant adenopathy  Chest: clear to auscultation, no wheezes, rales or rhonchi, symmetric air entry  Heart: regular rate and rhythm, no murmurs  ABD: abdomen is soft without significant tenderness, masses, organomegaly or guarding. EXT:peripheral pulses normal, no pedal edema, no clubbing or cyanosis. Right arm PICC line, no erythema or tenderness.    NEURO: alert, oriented, normal speech, no focal findings or movement disorder noted  Skin: well hydrated, no lesions, surgical site examined  Wounds: Left foot wound VAC  Labs:   WBC   Date Value Ref Range Status   04/27/2020 4.4 4.0 - 10.5 K/CU MM Final   04/20/2020 4.8 4.0 - 10.5 K/CU MM Final   04/13/2020 4.5 4.0 - 10.5 K/CU MM Final     CREATININE   Date Value Ref Range Status   04/27/2020 0.9 0.9 - 1.3 MG/DL Final   04/20/2020 1.0 0.9 - 1.3 MG/DL Final   04/13/2020 0.7 (L) 0.9 - 1.3 MG/DL Final       Cultures:  Culture   Date Value Ref Range Status   03/27/2020 STAPHYLOCOCCUS-COAG NEG VERY SCANTY GROWTH (A)  Final   03/27/2020 NO ANAEROBIC GROWTH AT 72 HOURS  Final   03/26/2020 NO GROWTH AT 5 DAYS  Final       Imaging Studies:

## 2020-04-29 NOTE — PATIENT INSTRUCTIONS
aren't available, use an alcohol-based hand . · Avoid touching your mouth, nose, and eyes. What can you do to avoid spreading the virus to others? To help avoid spreading the virus to others:  · Cover your mouth with a tissue when you cough or sneeze. Then throw the tissue in the trash. · Use a disinfectant to clean things that you touch often. · Wear a cloth face cover if you have to go to public areas. · Stay home if you are sick or have been exposed to the virus. Don't go to school, work, or public areas. And don't use public transportation. · If you are sick:  ? Leave your home only if you need to get medical care. But call the doctor's office first so they know you're coming. And wear a face cover. ? Wear the face cover whenever you're around other people. It can help stop the spread of the virus when you cough or sneeze. ? Clean and disinfect your home every day. Use household  and disinfectant wipes or sprays. Take special care to clean things that you grab with your hands. These include doorknobs, remote controls, phones, and handles on your refrigerator and microwave. And don't forget countertops, tabletops, bathrooms, and computer keyboards. When to call for help  [id number] anytime you think you may need emergency care. For example, call if:  · You have severe trouble breathing. (You can't talk at all.)  · You have constant chest pain or pressure. · You are severely dizzy or lightheaded. · You are confused or can't think clearly. · Your face and lips have a blue color. · You pass out (lose consciousness) or are very hard to wake up. Call your doctor now if you develop symptoms such as:  · Shortness of breath. · Fever. · Cough. If you need to get care, call ahead to the doctor's office for instructions before you go. Make sure you wear a face cover to prevent exposing other people to the virus. Where can you get the latest information?   The following health organizations constant chest pain or pressure. · You are severely dizzy or lightheaded. · You are confused or can't think clearly. · Your face and lips have a blue color. · You pass out (lose consciousness) or are very hard to wake up. Call your doctor now or seek immediate medical care if:  · You have moderate trouble breathing. (You can't speak a full sentence.)  · You are coughing up blood (more than about 1 teaspoon). · You have signs of low blood pressure. These include feeling lightheaded; being too weak to stand; and having cold, pale, clammy skin. Watch closely for changes in your health, and be sure to contact your doctor if:  · Your symptoms get worse. · You are not getting better as expected. Call before you go to the doctor's office. Follow their instructions. And wear a cloth face cover. Current as of: April 24, 2020               Content Version: 12.4  © 3803-0440 [employer]. Care instructions adapted under license by your healthcare professional. If you have questions about a medical condition or this instruction, always ask your healthcare professional. [address] any warranty or liability for your use of this information.

## 2020-05-02 LAB
SARS-COV-2: NOT DETECTED
SOURCE: NORMAL

## 2020-05-03 PROBLEM — R53.1 WEAKNESS: Status: ACTIVE | Noted: 2020-05-03

## 2020-05-03 PROBLEM — M79.671 RIGHT FOOT PAIN: Status: ACTIVE | Noted: 2020-05-03

## 2020-05-04 ENCOUNTER — HOSPITAL ENCOUNTER (OUTPATIENT)
Dept: WOUND CARE | Age: 40
Discharge: HOME | End: 2020-05-04
Payer: MEDICAID

## 2020-05-04 ENCOUNTER — HOSPITAL ENCOUNTER (OUTPATIENT)
Dept: GENERAL RADIOLOGY | Age: 40
Discharge: HOME | End: 2020-05-04
Payer: MEDICAID

## 2020-05-04 ENCOUNTER — HOSPITAL ENCOUNTER (OUTPATIENT)
Age: 40
Discharge: HOME | End: 2020-05-04
Payer: MEDICAID

## 2020-05-04 ENCOUNTER — HOSPITAL ENCOUNTER (OUTPATIENT)
Age: 40
Setting detail: SPECIMEN
Discharge: HOME | End: 2020-05-04
Payer: MEDICAID

## 2020-05-04 VITALS
DIASTOLIC BLOOD PRESSURE: 69 MMHG | RESPIRATION RATE: 16 BRPM | HEART RATE: 87 BPM | SYSTOLIC BLOOD PRESSURE: 109 MMHG | TEMPERATURE: 97 F

## 2020-05-04 PROBLEM — L97.426 DIABETIC ULCER OF LEFT MIDFOOT ASSOCIATED WITH TYPE 2 DIABETES MELLITUS, WITH BONE INVOLVEMENT WITHOUT EVIDENCE OF NECROSIS (HCC): Status: ACTIVE | Noted: 2020-05-04

## 2020-05-04 PROBLEM — E11.621 DIABETIC ULCER OF LEFT MIDFOOT ASSOCIATED WITH TYPE 2 DIABETES MELLITUS, WITH BONE INVOLVEMENT WITHOUT EVIDENCE OF NECROSIS (HCC): Status: ACTIVE | Noted: 2020-05-04

## 2020-05-04 LAB
ALBUMIN SERPL-MCNC: 3.8 GM/DL (ref 3.4–5)
ALBUMIN SERPL-MCNC: 3.8 GM/DL (ref 3.4–5)
ALP BLD-CCNC: 150 IU/L (ref 40–128)
ALP BLD-CCNC: 150 IU/L (ref 40–129)
ALT SERPL-CCNC: 22 U/L (ref 10–40)
ALT SERPL-CCNC: 22 U/L (ref 10–40)
ANION GAP SERPL CALCULATED.3IONS-SCNC: 11 MMOL/L (ref 4–16)
AST SERPL-CCNC: 21 IU/L (ref 15–37)
AST SERPL-CCNC: 21 IU/L (ref 15–37)
BASOPHILS ABSOLUTE: 0 K/CU MM
BASOPHILS RELATIVE PERCENT: 0.6 % (ref 0–1)
BILIRUB SERPL-MCNC: 0.3 MG/DL (ref 0–1)
BILIRUB SERPL-MCNC: 0.3 MG/DL (ref 0–1)
BILIRUBIN DIRECT: 0.2 MG/DL (ref 0–0.3)
BILIRUBIN, INDIRECT: 0.1 MG/DL (ref 0–0.7)
BUN BLDV-MCNC: 20 MG/DL (ref 6–23)
C-REACTIVE PROTEIN, HIGH SENSITIVITY: 10.9 MG/L
CALCIUM SERPL-MCNC: 9.3 MG/DL (ref 8.3–10.6)
CHLORIDE BLD-SCNC: 96 MMOL/L (ref 99–110)
CO2: 24 MMOL/L (ref 21–32)
CREAT SERPL-MCNC: 1 MG/DL (ref 0.9–1.3)
DIFFERENTIAL TYPE: ABNORMAL
DOSE AMOUNT: NORMAL
DOSE TIME: NORMAL
EOSINOPHILS ABSOLUTE: 0.3 K/CU MM
EOSINOPHILS RELATIVE PERCENT: 4.9 % (ref 0–3)
ERYTHROCYTE SEDIMENTATION RATE: 82 MM/HR (ref 0–15)
GFR AFRICAN AMERICAN: >60 ML/MIN/1.73M2
GFR NON-AFRICAN AMERICAN: >60 ML/MIN/1.73M2
GLUCOSE BLD-MCNC: 232 MG/DL (ref 70–99)
HCT VFR BLD CALC: 27.7 % (ref 42–52)
HEMOGLOBIN: 9 GM/DL (ref 13.5–18)
IMMATURE NEUTROPHIL %: 0.4 % (ref 0–0.43)
LYMPHOCYTES ABSOLUTE: 1.4 K/CU MM
LYMPHOCYTES RELATIVE PERCENT: 27.1 % (ref 24–44)
MCH RBC QN AUTO: 27.8 PG (ref 27–31)
MCHC RBC AUTO-ENTMCNC: 32.5 % (ref 32–36)
MCV RBC AUTO: 85.5 FL (ref 78–100)
MONOCYTES ABSOLUTE: 0.4 K/CU MM
MONOCYTES RELATIVE PERCENT: 7.6 % (ref 0–4)
NUCLEATED RBC %: 0 %
PDW BLD-RTO: 13.4 % (ref 11.7–14.9)
PLATELET # BLD: 244 K/CU MM (ref 140–440)
PMV BLD AUTO: 10.3 FL (ref 7.5–11.1)
POTASSIUM SERPL-SCNC: 4.4 MMOL/L (ref 3.5–5.1)
RBC # BLD: 3.24 M/CU MM (ref 4.6–6.2)
SEGMENTED NEUTROPHILS ABSOLUTE COUNT: 3 K/CU MM
SEGMENTED NEUTROPHILS RELATIVE PERCENT: 59.4 % (ref 36–66)
SODIUM BLD-SCNC: 131 MMOL/L (ref 135–145)
TOTAL IMMATURE NEUTOROPHIL: 0.02 K/CU MM
TOTAL NUCLEATED RBC: 0 K/CU MM
TOTAL PROTEIN: 8 GM/DL (ref 6.4–8.2)
TOTAL PROTEIN: 8 GM/DL (ref 6.4–8.2)
TOTAL RETICULOCYTE COUNT: 0.03 K/CU MM
VANCOMYCIN RANDOM: 10.3 UG/ML
WBC # BLD: 5.1 K/CU MM (ref 4–10.5)

## 2020-05-04 PROCEDURE — 80202 ASSAY OF VANCOMYCIN: CPT

## 2020-05-04 PROCEDURE — 80053 COMPREHEN METABOLIC PANEL: CPT

## 2020-05-04 PROCEDURE — 11043 DBRDMT MUSC&/FSCA 1ST 20/<: CPT | Performed by: NURSE PRACTITIONER

## 2020-05-04 PROCEDURE — 97597 DBRDMT OPN WND 1ST 20 CM/<: CPT | Performed by: NURSE PRACTITIONER

## 2020-05-04 PROCEDURE — 73630 X-RAY EXAM OF FOOT: CPT

## 2020-05-04 PROCEDURE — 86140 C-REACTIVE PROTEIN: CPT

## 2020-05-04 PROCEDURE — 82248 BILIRUBIN DIRECT: CPT

## 2020-05-04 PROCEDURE — 11043 DBRDMT MUSC&/FSCA 1ST 20/<: CPT

## 2020-05-04 PROCEDURE — 85652 RBC SED RATE AUTOMATED: CPT

## 2020-05-04 PROCEDURE — 97597 DBRDMT OPN WND 1ST 20 CM/<: CPT

## 2020-05-04 PROCEDURE — 85025 COMPLETE CBC W/AUTO DIFF WBC: CPT

## 2020-05-04 RX ORDER — LIDOCAINE HYDROCHLORIDE 40 MG/ML
SOLUTION TOPICAL ONCE
Status: DISCONTINUED | OUTPATIENT
Start: 2020-05-04 | End: 2020-05-05 | Stop reason: HOSPADM

## 2020-05-04 NOTE — PROGRESS NOTES
Response to treatment:  Well tolerated by patient. Procedure Note    Indications:  Based on my examination of this patient's wound(s) today, sharp excision into necrotic devitalized tissue is required to promote healing and evaluate the extent of previous healing. Performed by: Pete Cushing, APRN - CNP    Consent obtained: Yes    Time out taken:  Yes    Pain Control: None needed    Debridement:Excisional Debridement    Using #15 blade scalpel the wound(s) was/were sharply debrided down through and including the removal of devitalized tissue. Devitalized Tissue Debrided:  callus    Pre Debridement Measurements:  Are located in the Wound Documentation Flow Sheet    All active wounds listed below with today's date are evaluated  Wound(s)    debrided this date include # :Right plantar foot  Post  Debridement Measurements:  Negative Pressure Wound Therapy Foot Left (Active)   Wound Type Surgical 4/23/2020  2:34 PM   Dressing Type White foam;Black foam 4/23/2020  2:34 PM   Cycle Continuous 4/23/2020  2:34 PM   Target Pressure (mmHg) 125 4/23/2020  2:34 PM   Number of days: 38       Wound 12/16/17 Other (Comment) Foot Right;Dorsal unstageable, round  (Active)   Number of days: 869       Incision 12/16/17 Perineum (Active)   Number of days: 686       Incision 02/28/18 Foot Right (Active)   Number of days: 376       Wound 03/26/20 Left  #1  3rd toe amp site  (Active)   Wound Image   5/4/2020 11:06 AM   Wound Non-Healing Surgical 5/4/2020 11:06 AM   Dressing Status Clean;Dry; Intact 4/23/2020  2:34 PM   Dressing Changed Changed/New 4/23/2020  2:34 PM   Wound Cleansed Rinsed/Irrigated with saline 4/23/2020  2:13 PM   Wound Length (cm) 4.6 cm 5/4/2020 11:06 AM   Wound Width (cm) 1 cm 5/4/2020 11:06 AM   Wound Depth (cm) 3.5 cm 5/4/2020 11:06 AM   Wound Surface Area (cm^2) 4.6 cm^2 5/4/2020 11:06 AM   Change in Wound Size % (l*w) -379.17 5/4/2020 11:06 AM   Wound Volume (cm^3) 16.1 cm^3 5/4/2020 11:06 AM

## 2020-05-04 NOTE — PROGRESS NOTES
use with the last 12 months. No current chemotherapy use. No history of chronic sinusitis or upper respiratory infections, no reconstructive ear surgery or significant auditory impairment. No history of recent or uncontrolled seizure disorder. No history of significant cognitive impairment or psychiatric illnesses. No history of severe COPD with CO2 retention or bullous emphysema. No history of recent chest surgery. No history of pneumothorax. No history of pacemaker or AICD. No history of sudden CHF. No spherocytoses, sickle cell disease, or other hemoglobinopathy known. No history of optic neuritis or significant visual impairment. Past Surgical History:        Procedure Laterality Date    OTHER SURGICAL HISTORY  12/16/2017    I & D Perineal Abscess    OTHER SURGICAL HISTORY Right 02/28/2018    tendoachilles lengthenig of right foot dorsiflexory 3rd metarsal right foot debridement of hyperkerototic lesion     TOE AMPUTATION Left 3/27/2020    TOE AMPUTATION - LEFT THIRD TOE AND METATARSAL HEAD, DEBRIDEMENT PLANTAR FOOT ULCER,  WOUND VAC PLACEMENT performed by Carola Duenas MD at [address]      4 [place] Teeth Extracted In Past         Medications Prior to Admission:    Prior to Admission medications    Medication Sig Start Date End Date Taking? Authorizing Provider   omeprazole (PRILOSEC) 20 MG delayed release capsule Take 1 capsule by mouth every morning (before breakfast) 4/27/20   BARBARA Pina CNP   Blood Glucose Monitoring Suppl (ONE TOUCH ULTRA 2) w/Device KIT 1 kit by Does not apply route once for 1 dose 4/16/20 4/16/20  BARBARA Pina CNP   blood glucose monitor strips Test 3 times a day & as needed for symptoms of irregular blood glucose.  4/16/20   BARBARA Pina CNP   INSULIN SYRINGE .5CC/29G ([employer] INS SYR .5CC/29G) 29G X 1/2\" 0.5 ML MISC 1 each by Does not apply route daily 4/16/20   BARBARA Pina CNP   Insulin Syringes, O'Clock 0 5/4/2020 11:06 AM   Undermining Ends___ O'Clock 0 5/4/2020 11:06 AM   Undermining Maxium Distance (cm) 0 5/4/2020 11:06 AM   Wound Assessment Red;Yellow 5/4/2020 11:06 AM   Drainage Amount Moderate 4/23/2020  2:13 PM   Drainage Description Serosanguinous 5/4/2020 11:06 AM   Odor None 5/4/2020 11:06 AM   Margins Defined edges 5/4/2020 11:06 AM   Exposed structure Bone 5/4/2020 11:06 AM   Dee-wound Assessment Maceration 5/4/2020 11:06 AM   Non-staged Wound Description Full thickness 5/4/2020 11:06 AM   [place]%Wound Bed 0 5/4/2020 11:06 AM   Red%Wound Bed 60 5/4/2020 11:06 AM   Yellow%Wound Bed 40 5/4/2020 11:06 AM   Black%Wound Bed 0 5/4/2020 11:06 AM   Purple%Wound Bed 0 5/4/2020 11:06 AM   Other%Wound Bed 0 5/4/2020 11:06 AM   Number of days: 39      Measurements shown are from today's visit. Assessment       Anthony Murrays  appears to have a non-healing wound of the  Left mid foot, status post amputation left 3rd toe. The etiology of the wound is felt to be diabetic welsh 3, nonhealing surgical. There are multiple complicating factors including  diabetes. Hyperbaric oxygen would be an appropriate and essential adjunct in the treatment and resolution of left foot wound. A comprehensive wound management program should also be continued. This patient has no contraindications to HBO therapy and would be considered appropriate candidate. This patient appears to have sufficient physiologic and psychologic stamina to undergo Hyperbaric Oxygen Therapy. Plan     Initiate HBO per order sheet        Diagnostic Impression:   Diabetic foot ulcer Welsh's stage 3  Non-healing wound of the  Left midfoot, amputation site left 3rd toe   Osteomyelitis of the left 3rd metatarsal head neck through the proximal phalanx neck.   Diabetes      Drew Kelly MD    5/4/2020

## 2020-05-05 ENCOUNTER — OFFICE VISIT (OUTPATIENT)
Dept: SURGERY | Age: 40
End: 2020-05-05

## 2020-05-05 VITALS
DIASTOLIC BLOOD PRESSURE: 74 MMHG | HEIGHT: 73 IN | WEIGHT: 188.49 LBS | TEMPERATURE: 97.8 F | OXYGEN SATURATION: 98 % | BODY MASS INDEX: 24.98 KG/M2 | HEART RATE: 76 BPM | SYSTOLIC BLOOD PRESSURE: 124 MMHG

## 2020-05-05 PROCEDURE — 99024 POSTOP FOLLOW-UP VISIT: CPT | Performed by: SURGERY

## 2020-05-05 ASSESSMENT — ENCOUNTER SYMPTOMS
SHORTNESS OF BREATH: 0
ABDOMINAL PAIN: 0
VOMITING: 0
NAUSEA: 0
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
## 2020-05-06 ENCOUNTER — OFFICE VISIT (OUTPATIENT)
Dept: INTERNAL MEDICINE CLINIC | Age: 40
End: 2020-05-06
Payer: MEDICAID

## 2020-05-06 ENCOUNTER — OFFICE VISIT (OUTPATIENT)
Dept: INFECTIOUS DISEASES | Age: 40
End: 2020-05-06
Payer: MEDICAID

## 2020-05-06 VITALS
HEIGHT: 73 IN | BODY MASS INDEX: 25.2 KG/M2 | DIASTOLIC BLOOD PRESSURE: 69 MMHG | SYSTOLIC BLOOD PRESSURE: 131 MMHG | HEART RATE: 96 BPM | OXYGEN SATURATION: 99 % | WEIGHT: 190.1 LBS | RESPIRATION RATE: 22 BRPM

## 2020-05-06 VITALS
HEART RATE: 92 BPM | WEIGHT: 192.6 LBS | DIASTOLIC BLOOD PRESSURE: 82 MMHG | SYSTOLIC BLOOD PRESSURE: 114 MMHG | BODY MASS INDEX: 25.41 KG/M2 | TEMPERATURE: 97.2 F

## 2020-05-06 PROCEDURE — 99214 OFFICE O/P EST MOD 30 MIN: CPT | Performed by: INTERNAL MEDICINE

## 2020-05-06 PROCEDURE — 2022F DILAT RTA XM EVC RTNOPTHY: CPT | Performed by: NURSE PRACTITIONER

## 2020-05-06 PROCEDURE — 4004F PT TOBACCO SCREEN RCVD TLK: CPT | Performed by: NURSE PRACTITIONER

## 2020-05-06 PROCEDURE — G8419 CALC BMI OUT NRM PARAM NOF/U: HCPCS | Performed by: NURSE PRACTITIONER

## 2020-05-06 PROCEDURE — 3052F HG A1C>EQUAL 8.0%<EQUAL 9.0%: CPT | Performed by: NURSE PRACTITIONER

## 2020-05-06 PROCEDURE — G8419 CALC BMI OUT NRM PARAM NOF/U: HCPCS | Performed by: INTERNAL MEDICINE

## 2020-05-06 PROCEDURE — 99214 OFFICE O/P EST MOD 30 MIN: CPT | Performed by: NURSE PRACTITIONER

## 2020-05-06 PROCEDURE — 4004F PT TOBACCO SCREEN RCVD TLK: CPT | Performed by: INTERNAL MEDICINE

## 2020-05-06 PROCEDURE — G8427 DOCREV CUR MEDS BY ELIG CLIN: HCPCS | Performed by: INTERNAL MEDICINE

## 2020-05-06 PROCEDURE — G8427 DOCREV CUR MEDS BY ELIG CLIN: HCPCS | Performed by: NURSE PRACTITIONER

## 2020-05-06 RX ORDER — DOXYCYCLINE HYCLATE 100 MG
100 TABLET ORAL 2 TIMES DAILY
Qty: 84 TABLET | Refills: 0 | Status: SHIPPED | OUTPATIENT
Start: 2020-05-06 | End: 2020-06-03 | Stop reason: SDUPTHER

## 2020-05-06 RX ORDER — INSULIN GLARGINE 100 [IU]/ML
40 INJECTION, SOLUTION SUBCUTANEOUS NIGHTLY
Qty: 1 VIAL | Refills: 3 | Status: SHIPPED | OUTPATIENT
Start: 2020-05-06 | End: 2021-01-20 | Stop reason: SDUPTHER

## 2020-05-06 RX ORDER — GABAPENTIN 100 MG/1
100 CAPSULE ORAL 3 TIMES DAILY
Qty: 90 CAPSULE | Refills: 1 | Status: SHIPPED | OUTPATIENT
Start: 2020-05-06 | End: 2020-06-03 | Stop reason: SDUPTHER

## 2020-05-06 ASSESSMENT — ENCOUNTER SYMPTOMS
SHORTNESS OF BREATH: 0
EYES NEGATIVE: 1
COUGH: 0
CONSTIPATION: 0
SINUS PRESSURE: 0
NAUSEA: 0
WHEEZING: 0
ABDOMINAL PAIN: 0
ABDOMINAL DISTENTION: 0
COLOR CHANGE: 0
CHEST TIGHTNESS: 0
DIARRHEA: 0
SORE THROAT: 0
SINUS PAIN: 0

## 2020-05-06 NOTE — PROGRESS NOTES
PLAN:    Kiet Doran was seen today for 1 month follow-up and foot pain. Diagnoses and all orders for this visit:    Type 2 diabetes mellitus without complication, with long-term current use of insulin ([address]- morning readings are improving, but still not at goal; we will continue Metformin and sliding scale, but also titrate up on basal to 40 units nightly and recheck A1C in 6-8 weeks. Pt to call in 1 week with morning sugar levels. -     insulin lispro (HUMALOG) 100 UNIT/ML injection vial; Inject 0-12 Units into the skin 3 times daily (with meals)  -     gabapentin (NEURONTIN) 100 MG capsule; Take 1 capsule by mouth 3 times daily for 30 days. Intended supply: 30 days  -     insulin glargine (LANTUS) 100 UNIT/ML injection vial; Inject 40 Units into the skin nightly    Bilateral foot pain- some post surgical pain, but with underlying features consistent with neuropathic pain. Given DM hx this is not surprising. We will work on closer glycemic control and also start on low dose gabapentin as below.   -     gabapentin (NEURONTIN) 100 MG capsule; Take 1 capsule by mouth 3 times daily for 30 days. 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 All risks and benefits and possible side effects discussed with patient who agrees to plan of care and verbalizes understanding. All labs and imaging reviewed. No flowsheet data found. Return in about 2 months (around 7/6/2020).

## 2020-05-06 NOTE — ASSESSMENT & PLAN NOTE
Pictures reviewed healing well. ESR and CRP are on a downward trend. Complete abx on 5/10/2020. PICC line could be removed. Plans for HBO noted. Will treat with six weeks of doxycycline as bone is still felt.

## 2020-05-06 NOTE — PROGRESS NOTES
5/30/2020         Referring Physician: No ref. provider found  Primary Care Physician: Cyril Jaimes, APRN - CNP    Impression/Plan:   Diagnosis Orders   1. Osteomyelitis of third toe of left foot (HCC)  doxycycline hyclate (VIBRA-TABS) 100 MG tablet   2. Receiving intravenous antibiotic treatment as outpatient  Remove central line   3. Weakness         Discussion:  Osteomyelitis of third toe of left foot ([address]  Pictures reviewed healing well. ESR and CRP are on a downward trend. Complete abx on 5/10/2020. PICC line could be removed. Plans for HBO noted. Will treat with six weeks of doxycycline as bone is still felt. Return in about 4 weeks (around 6/3/2020). History: Bindu Chery is a 36 y.o.  male presenting today for follow-up for left DFO MR-CoNS s/p left 3rd toe and metatarsal head, debridement and wound VAC placement on 3/27. Denies n/v/d/f. Tolerating IV vancomycin. He has a plantar callus and had a lot of pain walking on it. This has improved after it was shaved off. It feels  No issues with the PICC line. COVID test was negative. Review of Systems   All other systems reviewed and are negative.       No Known Allergies    Patient Active Problem List   Diagnosis    Indu's disease    Type 2 diabetes mellitus without complication, with long-term current use of insulin ([address]    Gastroesophageal reflux disease    Tobacco abuse    Acute osteomyelitis of phalanx of left foot ([address]    Diabetic foot infection ([address]    Osteomyelitis of third toe of left foot ([address]    Diabetic foot ulcer ([address]    Receiving intravenous antibiotic treatment as outpatient    Weakness    Right foot pain    HBO-Diabetic ulcer of left midfoot associated with type 2 diabetes mellitus, with bone involvement without evidence of necrosis (HCC), guerra 3       Current Outpatient Medications   Medication Sig Dispense Refill    insulin lispro (HUMALOG) 100 UNIT/ML injection vial Inject 0-12 Units Procedure Laterality Date    OTHER SURGICAL HISTORY  12/16/2017    I & D Perineal Abscess    OTHER SURGICAL HISTORY Right 02/28/2018    tendoachilles lengthenig of right foot dorsiflexory 3rd metarsal right foot debridement of hyperkerototic lesion     TOE AMPUTATION Left 3/27/2020    TOE AMPUTATION - LEFT THIRD TOE AND METATARSAL HEAD, DEBRIDEMENT PLANTAR FOOT ULCER,  WOUND VAC PLACEMENT performed by Angelica Lawson MD at [facility]      4 [place] Teeth Extracted In Past       Social History     Socioeconomic History    Marital status: Single     Spouse name: Not on file    Number of children: Not on file    Years of education: Not on file    Highest education level: Not on file   Occupational History    Not on file   Social Needs    Financial resource strain: Not on file    Food insecurity     Worry: Not on file     Inability: Not on file    Transportation needs     Medical: Not on file     Non-medical: Not on file   Tobacco Use    Smoking status: Current Every Day Smoker     Packs/day: 0.50     Years: 24.00     Pack years: 12.00     Types: Cigarettes, Cigars     Start date: 1995    Smokeless tobacco: Never Used   Substance and Sexual Activity    Alcohol use: Yes     Comment: once a year    Drug use: Yes     Types: Marijuana     Comment: pt states once/week    Sexual activity: Yes     Partners: Male   Lifestyle    Physical activity     Days per week: Not on file     Minutes per session: Not on file    Stress: Not on file   Relationships    Social connections     Talks on phone: Not on file     Gets together: Not on file     Attends Zoroastrian service: Not on file     Active member of club or organization: Not on file     Attends meetings of clubs or organizations: Not on file     Relationship status: Not on file    Intimate partner violence     Fear of current or ex partner: Not on file     Emotionally abused: Not on file     Physically abused: Not on file HOURS  Final   03/26/2020 NO GROWTH AT 5 DAYS  Final       Imaging Studies:

## 2020-05-11 ENCOUNTER — HOSPITAL ENCOUNTER (OUTPATIENT)
Dept: WOUND CARE | Age: 40
Discharge: HOME | End: 2020-05-11
Payer: MEDICAID

## 2020-05-11 ENCOUNTER — TELEPHONE (OUTPATIENT)
Dept: INFECTIOUS DISEASES | Age: 40
End: 2020-05-11

## 2020-05-11 VITALS
HEART RATE: 80 BPM | RESPIRATION RATE: 20 BRPM | DIASTOLIC BLOOD PRESSURE: 73 MMHG | SYSTOLIC BLOOD PRESSURE: 116 MMHG | TEMPERATURE: 97 F

## 2020-05-11 PROCEDURE — 97605 NEG PRS WND THER DME<=50SQCM: CPT

## 2020-05-11 PROCEDURE — 11042 DBRDMT SUBQ TIS 1ST 20SQCM/<: CPT

## 2020-05-11 PROCEDURE — 11042 DBRDMT SUBQ TIS 1ST 20SQCM/<: CPT | Performed by: NURSE PRACTITIONER
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
## 2020-05-12 ENCOUNTER — HOSPITAL ENCOUNTER (OUTPATIENT)
Age: 40
Discharge: HOME | End: 2020-05-12
Payer: MEDICAID

## 2020-05-12 ENCOUNTER — HOSPITAL ENCOUNTER (OUTPATIENT)
Dept: GENERAL RADIOLOGY | Age: 40
Discharge: HOME | End: 2020-05-12
Payer: MEDICAID

## 2020-05-12 LAB — PREALBUMIN: 24 MG/DL (ref 20–40)

## 2020-05-12 PROCEDURE — 36415 COLL VENOUS BLD VENIPUNCTURE: CPT

## 2020-05-12 PROCEDURE — 71046 X-RAY EXAM CHEST 2 VIEWS: CPT

## 2020-05-12 PROCEDURE — 84134 ASSAY OF PREALBUMIN: CPT

## 2020-05-13 ENCOUNTER — HOSPITAL ENCOUNTER (OUTPATIENT)
Dept: HYPERBARIC MEDICINE | Age: 40
Discharge: HOME | End: 2020-05-13
Payer: MEDICAID

## 2020-05-13 VITALS
DIASTOLIC BLOOD PRESSURE: 85 MMHG | HEART RATE: 87 BPM | TEMPERATURE: 97.4 F | SYSTOLIC BLOOD PRESSURE: 152 MMHG | RESPIRATION RATE: 18 BRPM

## 2020-05-13 LAB
GLUCOSE BLD-MCNC: 100 MG/DL (ref 70–99)
GLUCOSE BLD-MCNC: 139 MG/DL (ref 70–99)

## 2020-05-13 PROCEDURE — G0277 HBOT, FULL BODY CHAMBER, 30M: HCPCS

## 2020-05-13 PROCEDURE — 99183 HYPERBARIC OXYGEN THERAPY: CPT | Performed by: SURGERY

## 2020-05-13 PROCEDURE — 82962 GLUCOSE BLOOD TEST: CPT

## 2020-05-13 NOTE — PROGRESS NOTES
condition. Supervision and attendance of Hyperbaric Oxygen Therapy provided. Continue HBO treatment as outlined in the treatment plan. Hyperbaric Oxygen: Ruby Pino tolerated Treatment Number: 1 well today without complications.      Electronically signed by Israel Gibbons MD on 5/13/2020 at 9:43 AM

## 2020-05-14 ENCOUNTER — HOSPITAL ENCOUNTER (OUTPATIENT)
Dept: HYPERBARIC MEDICINE | Age: 40
Discharge: HOME | End: 2020-05-14
Payer: MEDICAID

## 2020-05-14 VITALS
HEART RATE: 75 BPM | DIASTOLIC BLOOD PRESSURE: 83 MMHG | RESPIRATION RATE: 16 BRPM | TEMPERATURE: 96.7 F | SYSTOLIC BLOOD PRESSURE: 132 MMHG

## 2020-05-14 LAB
GLUCOSE BLD-MCNC: 152 MG/DL (ref 70–99)
GLUCOSE BLD-MCNC: 64 MG/DL (ref 70–99)
GLUCOSE BLD-MCNC: 80 MG/DL (ref 70–99)

## 2020-05-14 PROCEDURE — G0277 HBOT, FULL BODY CHAMBER, 30M: HCPCS

## 2020-05-14 PROCEDURE — 99183 HYPERBARIC OXYGEN THERAPY: CPT | Performed by: NURSE PRACTITIONER

## 2020-05-14 PROCEDURE — 82962 GLUCOSE BLOOD TEST: CPT

## 2020-05-14 NOTE — PROGRESS NOTES
HBO therapy is  necessary at this time, given a threat to patient function, limb or life from the current condition. Supervision and attendance of Hyperbaric Oxygen Therapy provided. Continue HBO treatment as outlined in the treatment plan. Hyperbaric Oxygen: Anum Common tolerated Treatment Number: 2 well today without complications.      Electronically signed by BARBARA Cabrera CNP on 5/14/2020 at 5:52 PM

## 2020-05-15 ENCOUNTER — HOSPITAL ENCOUNTER (OUTPATIENT)
Dept: HYPERBARIC MEDICINE | Age: 40
Discharge: HOME | End: 2020-05-15
Payer: MEDICAID

## 2020-05-15 VITALS
SYSTOLIC BLOOD PRESSURE: 116 MMHG | TEMPERATURE: 98 F | RESPIRATION RATE: 16 BRPM | HEART RATE: 89 BPM | DIASTOLIC BLOOD PRESSURE: 76 MMHG

## 2020-05-15 LAB
GLUCOSE BLD-MCNC: 152 MG/DL (ref 70–99)
GLUCOSE BLD-MCNC: 86 MG/DL (ref 70–99)

## 2020-05-15 PROCEDURE — 82962 GLUCOSE BLOOD TEST: CPT

## 2020-05-15 PROCEDURE — G0277 HBOT, FULL BODY CHAMBER, 30M: HCPCS

## 2020-05-15 NOTE — PLAN OF CARE
Problem: Physical Regulation:  Goal: Tolerate HBO therapy and barotrauma will be prevented  Description: Tolerate HBO therapy and barotrauma will be prevented  Outcome: Ongoing     Problem: Physical Regulation:  Intervention: Assess for signs and symptoms of adverse events including confinement anxiety, pneumothorax, oxygen toxicity, and barotrauma  Note: Pt tolerated treatment well. Intervention: Assess knowledge and expectations of HBO therapy  Note: Pt tolerated treatment well. Intervention: Assess for history of confinement anxiety  Note: Pt tolerated treatment well. Intervention: Provide comfort related to the HBO environment and equalizaton of middle ear  Note: Pt tolerated treatment well. Intervention: Monitor for symptoms of seizure  Note: Pt tolerated treatment well.
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
## 2020-05-18 ENCOUNTER — HOSPITAL ENCOUNTER (OUTPATIENT)
Dept: WOUND CARE | Age: 40
Discharge: HOME | End: 2020-05-18
Payer: MEDICAID

## 2020-05-18 ENCOUNTER — HOSPITAL ENCOUNTER (OUTPATIENT)
Dept: HYPERBARIC MEDICINE | Age: 40
Discharge: HOME | End: 2020-05-18
Payer: MEDICAID

## 2020-05-18 VITALS
RESPIRATION RATE: 16 BRPM | DIASTOLIC BLOOD PRESSURE: 78 MMHG | SYSTOLIC BLOOD PRESSURE: 138 MMHG | HEART RATE: 96 BPM | TEMPERATURE: 98.5 F

## 2020-05-18 VITALS
RESPIRATION RATE: 16 BRPM | TEMPERATURE: 98.2 F | HEART RATE: 98 BPM | DIASTOLIC BLOOD PRESSURE: 83 MMHG | SYSTOLIC BLOOD PRESSURE: 130 MMHG

## 2020-05-18 LAB
GLUCOSE BLD-MCNC: 136 MG/DL (ref 70–99)
GLUCOSE BLD-MCNC: 66 MG/DL (ref 70–99)
GLUCOSE BLD-MCNC: 85 MG/DL (ref 70–99)

## 2020-05-18 PROCEDURE — 11042 DBRDMT SUBQ TIS 1ST 20SQCM/<: CPT

## 2020-05-18 PROCEDURE — G0277 HBOT, FULL BODY CHAMBER, 30M: HCPCS

## 2020-05-18 PROCEDURE — 82962 GLUCOSE BLOOD TEST: CPT

## 2020-05-18 PROCEDURE — 11042 DBRDMT SUBQ TIS 1ST 20SQCM/<: CPT | Performed by: NURSE PRACTITIONER

## 2020-05-18 PROCEDURE — 99183 HYPERBARIC OXYGEN THERAPY: CPT | Performed by: NURSE PRACTITIONER

## 2020-05-18 PROCEDURE — 97605 NEG PRS WND THER DME<=50SQCM: CPT

## 2020-05-18 NOTE — PROGRESS NOTES
Wound vac applied per discharge instructions and documented in flowsheet  Electronically signed by Sakshi Miles RN on 5/18/2020 at 1:55 PM

## 2020-05-18 NOTE — PROGRESS NOTES
Abelardo Rubio MD at [facility] EXTRACTION      4 [place] Teeth Extracted In Past       FAMILY HISTORY    Family History   Problem Relation Age of Onset    Obesity Mother     Heart Disease Father         Open Heart Surgery    Diabetes Father     Allergy (Severe) Brother        SOCIAL HISTORY    Social History     Tobacco Use    Smoking status: Current Every Day Smoker     Packs/day: 0.50     Years: 24.00     Pack years: 12.00     Types: Cigarettes, Cigars     Start date: 1995    Smokeless tobacco: Never Used   Substance Use Topics    Alcohol use: Yes     Comment: once a year    Drug use: Yes     Types: Marijuana     Comment: pt states once/week       ALLERGIES    No Known Allergies    MEDICATIONS    Current Outpatient Medications on File Prior to Encounter   Medication Sig Dispense Refill    insulin lispro (HUMALOG) 100 UNIT/ML injection vial Inject 0-12 Units into the skin 3 times daily (with meals) 1 vial 3    gabapentin (NEURONTIN) 100 MG capsule Take 1 capsule by mouth 3 times daily for 30 days. Intended supply: 30 days 90 capsule 1    insulin glargine (LANTUS) 100 UNIT/ML injection vial Inject 40 Units into the skin nightly 1 vial 3    doxycycline hyclate (VIBRA-TABS) 100 MG tablet Take 1 tablet by mouth 2 times daily 84 tablet 0    omeprazole (PRILOSEC) 20 MG delayed release capsule Take 1 capsule by mouth every morning (before breakfast) 30 capsule 2    blood glucose monitor strips Test 3 times a day & as needed for symptoms of irregular blood glucose.  270 strip 2    INSULIN SYRINGE .5CC/29G ([employer] INS SYR .5CC/29G) 29G X 1/2\" 0.5 ML MISC 1 each by Does not apply route daily 100 each 3    Insulin Syringes, Disposable, U-100 1 ML MISC 1 each by Does not apply route daily 100 each 3    metFORMIN (GLUCOPHAGE) 1000 MG tablet Take 1 tablet by mouth 2 times daily (with meals) 60 tablet 2    pravastatin (PRAVACHOL) 20 MG tablet Take 1 tablet by mouth daily 30 tablet 3    aspirin 325 MG Dressing Type White foam;Black foam 5/18/2020  1:39 PM   Cycle Continuous 5/18/2020  1:39 PM   Target Pressure (mmHg) 125 5/18/2020  1:39 PM   Dressing Status Clean;Dry; Intact 5/18/2020  1:39 PM   Dressing Changed Changed/New 5/18/2020  1:39 PM   Drainage Amount Moderate 5/18/2020  1:39 PM   Drainage Description Sanguinous; Serosanguinous 5/18/2020  1:39 PM   Dressing Change Due 05/20/20 5/18/2020  1:39 PM   Wound Assessment Clean;Dry; Intact 5/18/2020  1:39 PM   Number of days: 52       Wound 03/26/20 Left  #1  3rd toe amp site  (Active)   Wound Image   5/4/2020 11:06 AM   Wound Non-Healing Surgical 5/18/2020  1:15 PM   Dressing Status Clean;Dry; Intact 5/18/2020  1:39 PM   Dressing Changed Changed/New 5/18/2020  1:39 PM   Wound Cleansed Rinsed/Irrigated with saline 4/23/2020  2:13 PM   Wound Length (cm) 2.2 cm 5/18/2020  1:15 PM   Wound Width (cm) 1.5 cm 5/18/2020  1:15 PM   Wound Depth (cm) 2 cm 5/18/2020  1:15 PM   Wound Surface Area (cm^2) 3.3 cm^2 5/18/2020  1:15 PM   Change in Wound Size % (l*w) -243.75 5/18/2020  1:15 PM   Wound Volume (cm^3) 6.6 cm^3 5/18/2020  1:15 PM   Wound Healing % -393 5/18/2020  1:15 PM   Post-Procedure Length (cm) 2.2 cm 5/18/2020  1:29 PM   Post-Procedure Width (cm) 1.5 cm 5/18/2020  1:29 PM   Post-Procedure Depth (cm) 2 cm 5/18/2020  1:29 PM   Post-Procedure Surface Area (cm^2) 3.3 cm^2 5/18/2020  1:29 PM   Post-Procedure Volume (cm^3) 6.6 cm^3 5/18/2020  1:29 PM   Distance Tunneling (cm) 0 cm 5/18/2020  1:15 PM   Tunneling Position ___ O'Clock 0 5/18/2020  1:15 PM   Undermining Starts ___ O'Clock 0 5/18/2020  1:15 PM   Undermining Ends___ O'Clock 0 5/18/2020  1:15 PM   Undermining Maxium Distance (cm) 0 5/18/2020  1:15 PM   Wound Assessment Pink;Red;Yellow 5/18/2020  1:15 PM   Drainage Amount Moderate 5/18/2020  1:15 PM   Drainage Description Serosanguinous 5/18/2020  1:15 PM   Odor None 5/18/2020  1:15 PM   Margins Defined edges 5/18/2020  1:15 PM   Exposed structure Bone

## 2020-05-19 NOTE — PROGRESS NOTES
you have questions or develop any of the symptoms mentioned, please contact the Kylah Foster at [address] [place] [address] Place 8:00 am - 5:00 pm. If you need help outside these hours and cannot wait until we are again available, contact your PCP or go to the hospital emergency room. Patient Signature:_______________________Date:_________Time:________    [] Patient unable to sign Discharge Instructions given to ECF/Transportation/POA    The information contained in the After Visit Summary has been reviewed with me, the patient and/or responsible adult, by my health care provider(s). I had the opportunity to ask questions regarding this information.   I have elected to receive  [x]  After Visit Summary        Nurse Signature:_______________________Date:_________Time:_________    Electronically signed by Osmany Brand LPN on 9/09/7449 at 08:64 PM

## 2020-05-20 ENCOUNTER — HOSPITAL ENCOUNTER (OUTPATIENT)
Dept: HYPERBARIC MEDICINE | Age: 40
Discharge: HOME | End: 2020-05-20
Payer: MEDICAID

## 2020-05-20 VITALS
RESPIRATION RATE: 16 BRPM | DIASTOLIC BLOOD PRESSURE: 73 MMHG | HEART RATE: 82 BPM | TEMPERATURE: 97.6 F | SYSTOLIC BLOOD PRESSURE: 119 MMHG

## 2020-05-20 LAB
GLUCOSE BLD-MCNC: 218 MG/DL (ref 70–99)
GLUCOSE BLD-MCNC: 232 MG/DL (ref 70–99)

## 2020-05-20 PROCEDURE — G0277 HBOT, FULL BODY CHAMBER, 30M: HCPCS

## 2020-05-20 PROCEDURE — 82962 GLUCOSE BLOOD TEST: CPT

## 2020-05-20 PROCEDURE — 99183 HYPERBARIC OXYGEN THERAPY: CPT | Performed by: SURGERY

## 2020-05-20 RX ORDER — PRAVASTATIN SODIUM 20 MG
20 TABLET ORAL DAILY
Qty: 30 TABLET | Refills: 3 | Status: SHIPPED | OUTPATIENT
Start: 2020-05-20 | End: 2020-05-26

## 2020-05-20 RX ORDER — OMEPRAZOLE 20 MG/1
20 CAPSULE, DELAYED RELEASE ORAL
Qty: 30 CAPSULE | Refills: 2 | Status: SHIPPED | OUTPATIENT
Start: 2020-05-20 | End: 2020-06-21 | Stop reason: SDUPTHER

## 2020-05-20 NOTE — PROGRESS NOTES
[facility]  Hyperbaric Oxygen Therapy   Progress Note      NAME: Duke Yuen RECORD NUMBER:  [id number]  AGE: 36 y.o. GENDER: male  : 1980  EPISODE DATE:  2020     Subjective     HBO Treatment Number: 5 out of Total Treatments: 40    HBO Diagnosis:     Indications: Lower Extremity Diabetic Wound ___(site)(left foot)  Oleksandr Hou Catching 3     Safety checks performed prior to treatment. See doc flowsheets for documentation. Objective           Recent Labs     20  1254 20  1526   POCGLU 218* 232*       Pre treatment Vital Signs       Temp: 97.7 °F (36.5 °C)     Pulse: 88     Resp: 16     BP: 127/78       Post treatment Vital Signs  Temp: 97.6 °F (36.4 °C)  Pulse: 82  Resp: 16  BP: 119/73      Assessment        Physical Exam:  General Appearance:  alert and oriented to person, place and time, well-developed and well-nourished, in no acute distress    Pre Tympanic Membrane Assessment:  tympanic membranes intact bilaterally    Post Tympanic Membrane Assessment:  Right: Normal(Per Pt.)  Left: Normal(Per Pt.)    Pulmonary/Chest:  clear to auscultation bilaterally- no wheezes, rales or rhonchi, normal air movement, no respiratory distress    Cardiovascular:  normal    Chamber #: [id number]       Treatment Start Time: 1319     Pressure Reached Time: 1332  SHARRI : 2  Number of Air Breaks:  Treatment Status: No Air break      Decompression Time: 1502   Treatment End Time: 1510    Symptoms Noted During Treatment: None    Adverse Event: no      I was present on these premises and immediately available to furnish assistance & direction throughout the procedure. Veronika          Radha Mari is a 36 y.o. male  did successfully complete today's hyperbaric oxygen treatment at [address].     In my clinical judgement, ongoing HBO therapy is  necessary at this time, given a threat to patient function, limb or life from the current condition. Supervision and attendance of Hyperbaric Oxygen Therapy provided. Continue HBO treatment as outlined in the treatment plan. Hyperbaric Oxygen: Mario Mary Alice tolerated Treatment Number: 5 well today without complications.      Electronically signed by Sloan Cronin MD on 5/20/2020 at 4:49 PM

## 2020-05-21 ENCOUNTER — HOSPITAL ENCOUNTER (OUTPATIENT)
Dept: HYPERBARIC MEDICINE | Age: 40
Discharge: HOME | End: 2020-05-21
Payer: MEDICAID

## 2020-05-21 VITALS
RESPIRATION RATE: 16 BRPM | HEART RATE: 77 BPM | TEMPERATURE: 97.5 F | SYSTOLIC BLOOD PRESSURE: 116 MMHG | DIASTOLIC BLOOD PRESSURE: 78 MMHG

## 2020-05-21 LAB
GLUCOSE BLD-MCNC: 182 MG/DL (ref 70–99)
GLUCOSE BLD-MCNC: 190 MG/DL (ref 70–99)

## 2020-05-21 PROCEDURE — 82962 GLUCOSE BLOOD TEST: CPT

## 2020-05-21 PROCEDURE — 99183 HYPERBARIC OXYGEN THERAPY: CPT | Performed by: NURSE PRACTITIONER

## 2020-05-21 PROCEDURE — G0277 HBOT, FULL BODY CHAMBER, 30M: HCPCS

## 2020-05-22 ENCOUNTER — HOSPITAL ENCOUNTER (OUTPATIENT)
Dept: HYPERBARIC MEDICINE | Age: 40
Discharge: HOME | End: 2020-05-22
Payer: MEDICAID

## 2020-05-22 VITALS
TEMPERATURE: 97.7 F | HEART RATE: 84 BPM | DIASTOLIC BLOOD PRESSURE: 83 MMHG | SYSTOLIC BLOOD PRESSURE: 130 MMHG | RESPIRATION RATE: 16 BRPM

## 2020-05-22 LAB
GLUCOSE BLD-MCNC: 146 MG/DL (ref 70–99)
GLUCOSE BLD-MCNC: 173 MG/DL (ref 70–99)

## 2020-05-22 PROCEDURE — 82962 GLUCOSE BLOOD TEST: CPT

## 2020-05-22 PROCEDURE — G0277 HBOT, FULL BODY CHAMBER, 30M: HCPCS

## 2020-05-22 NOTE — PLAN OF CARE
Problem: Physical Regulation:  Goal: Tolerate HBO therapy and barotrauma will be prevented  Description: Tolerate HBO therapy and barotrauma will be prevented  Outcome: Ongoing     Problem: Physical Regulation:  Intervention: Assess for signs and symptoms of adverse events including confinement anxiety, pneumothorax, oxygen toxicity, and barotrauma  Note: Pt tolerated treatment well  Intervention: Assess for history of confinement anxiety  Note: Pt tolerated treatment well  Intervention: Provide comfort related to the HBO environment and equalizaton of middle ear  Note: Pt tolerated treatment well  Intervention: Monitor for symptoms of seizure  Note: Pt tolerated treatment well

## 2020-05-22 NOTE — PROGRESS NOTES
from the current condition. Supervision and attendance of Hyperbaric Oxygen Therapy provided. Continue HBO treatment as outlined in the treatment plan. Hyperbaric Oxygen: Cass England tolerated Treatment Number: 7 well today without complications.      Electronically signed by Gina Hdz MD on 5/22/2020 at 4:59 PM

## 2020-05-26 ENCOUNTER — HOSPITAL ENCOUNTER (OUTPATIENT)
Dept: HYPERBARIC MEDICINE | Age: 40
Discharge: HOME | End: 2020-05-26
Payer: MEDICAID

## 2020-05-26 ENCOUNTER — HOSPITAL ENCOUNTER (OUTPATIENT)
Dept: WOUND CARE | Age: 40
Discharge: HOME | End: 2020-05-26
Payer: MEDICAID

## 2020-05-26 VITALS
DIASTOLIC BLOOD PRESSURE: 71 MMHG | TEMPERATURE: 97.6 F | SYSTOLIC BLOOD PRESSURE: 121 MMHG | HEART RATE: 79 BPM | RESPIRATION RATE: 15 BRPM

## 2020-05-26 VITALS
HEART RATE: 78 BPM | DIASTOLIC BLOOD PRESSURE: 83 MMHG | RESPIRATION RATE: 16 BRPM | SYSTOLIC BLOOD PRESSURE: 131 MMHG | TEMPERATURE: 97.6 F

## 2020-05-26 LAB
GLUCOSE BLD-MCNC: 178 MG/DL (ref 70–99)
GLUCOSE BLD-MCNC: 209 MG/DL (ref 70–99)

## 2020-05-26 PROCEDURE — G0277 HBOT, FULL BODY CHAMBER, 30M: HCPCS

## 2020-05-26 PROCEDURE — 11042 DBRDMT SUBQ TIS 1ST 20SQCM/<: CPT | Performed by: NURSE PRACTITIONER

## 2020-05-26 PROCEDURE — 82962 GLUCOSE BLOOD TEST: CPT

## 2020-05-26 PROCEDURE — 99183 HYPERBARIC OXYGEN THERAPY: CPT | Performed by: NURSE PRACTITIONER

## 2020-05-26 PROCEDURE — 97605 NEG PRS WND THER DME<=50SQCM: CPT

## 2020-05-26 PROCEDURE — 11042 DBRDMT SUBQ TIS 1ST 20SQCM/<: CPT

## 2020-05-26 RX ORDER — LIDOCAINE HYDROCHLORIDE 40 MG/ML
SOLUTION TOPICAL ONCE
Status: DISCONTINUED | OUTPATIENT
Start: 2020-05-26 | End: 2020-05-27 | Stop reason: HOSPADM

## 2020-05-26 NOTE — PROGRESS NOTES
[facility]  Hyperbaric Oxygen Therapy   Progress Note      NAME: Duke Yuen RECORD NUMBER:  [id number]  AGE: 36 y.o. GENDER: male  : 1980  EPISODE DATE:  2020     Subjective     HBO Treatment Number: 8 out of Total Treatments: 40    HBO Diagnosis:     Indications: Lower Extremity Diabetic Wound ___(site)(left foot)  Gui Hargrove General 3     Safety checks performed prior to treatment. See doc flowsheets for documentation. Objective           Recent Labs     20  1358 20  1555   POCGLU 209* 178*       Pre treatment Vital Signs       Temp: 97.6 °F (36.4 °C)     Pulse: 79     Resp: 15     BP: 121/71     Blood Sugar 209    Post treatment Vital Signs  Temp: 97.6 °F (36.4 °C)  Pulse: 78  Resp: 16  BP: 131/83  Blood Sugar 178    Assessment        Physical Exam:  General Appearance:  alert and oriented to person, place and time, well-developed and well-nourished, in no acute distress    Pre Tympanic Membrane Assessment:  tympanic membranes intact bilaterally    Post Tympanic Membrane Assessment:  Right: Normal(per pt)  Left: Normal(per pt)    Pulmonary/Chest:  clear to auscultation bilaterally- no wheezes, rales or rhonchi, normal air movement, no respiratory distress    Cardiovascular:  normal, regular rate and rhythm    Chamber #: [id number]       Treatment Start Time: 1405     Pressure Reached Time: 1413  SHARRI : 2  Number of Air Breaks:  Treatment Status: No Air break      Decompression Time: 1543   Treatment End Time: 1551    Symptoms Noted During Treatment: None    Adverse Event: no    Total time in chamber: 106  Minutes at depth: 90    I was present on these premises and immediately available to furnish assistance & direction throughout the procedure. Veronika Cornejo is a 36 y.o. male  did successfully complete today's hyperbaric oxygen treatment at [address] [place] [address].     In my clinical judgement, ongoing HBO

## 2020-05-26 NOTE — PLAN OF CARE
Problem: Physical Regulation:  Goal: Tolerate HBO therapy and barotrauma will be prevented  Description: Tolerate HBO therapy and barotrauma will be prevented  Outcome: Ongoing     Problem: Physical Regulation:  Intervention: Assess for signs and symptoms of adverse events including confinement anxiety, pneumothorax, oxygen toxicity, and barotrauma  Note: Pt tolerated treatment well  Intervention: Assess knowledge and expectations of HBO therapy  Note: Pt tolerated treatment well  Intervention: Assess for history of confinement anxiety  Note: Pt tolerated treatment well  Intervention: Provide comfort related to the HBO environment and equalizaton of middle ear  Note: Pt tolerated treatment well
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
## 2020-05-26 NOTE — PROGRESS NOTES
Wound vac applied per discharge instructions. Operating at 125, with no leaks. Pt tolerated procedure well.      Electronically signed by Sal Wilson RN on 5/26/2020 at 1:44 PM

## 2020-05-27 ENCOUNTER — TELEPHONE (OUTPATIENT)
Dept: INTERNAL MEDICINE CLINIC | Age: 40
End: 2020-05-27

## 2020-05-27 ENCOUNTER — HOSPITAL ENCOUNTER (OUTPATIENT)
Dept: HYPERBARIC MEDICINE | Age: 40
Discharge: HOME | End: 2020-05-27
Payer: MEDICAID

## 2020-05-27 VITALS
TEMPERATURE: 98 F | DIASTOLIC BLOOD PRESSURE: 77 MMHG | RESPIRATION RATE: 16 BRPM | SYSTOLIC BLOOD PRESSURE: 117 MMHG | HEART RATE: 69 BPM

## 2020-05-27 LAB
GLUCOSE BLD-MCNC: 127 MG/DL (ref 70–99)
GLUCOSE BLD-MCNC: 195 MG/DL (ref 70–99)

## 2020-05-27 PROCEDURE — 99183 HYPERBARIC OXYGEN THERAPY: CPT | Performed by: SURGERY

## 2020-05-27 PROCEDURE — G0277 HBOT, FULL BODY CHAMBER, 30M: HCPCS

## 2020-05-27 PROCEDURE — 82962 GLUCOSE BLOOD TEST: CPT

## 2020-05-27 NOTE — PROGRESS NOTES
current condition. Supervision and attendance of Hyperbaric Oxygen Therapy provided. Continue HBO treatment as outlined in the treatment plan. Hyperbaric Oxygen: Anum Common tolerated Treatment Number: 9 well today without complications.      Electronically signed by Marilynn Garrido MD on 5/27/2020 at 3:39 PM

## 2020-05-28 ENCOUNTER — HOSPITAL ENCOUNTER (OUTPATIENT)
Dept: HYPERBARIC MEDICINE | Age: 40
Discharge: HOME | End: 2020-05-28
Payer: MEDICAID

## 2020-05-28 VITALS
HEART RATE: 71 BPM | RESPIRATION RATE: 16 BRPM | TEMPERATURE: 98.1 F | DIASTOLIC BLOOD PRESSURE: 80 MMHG | SYSTOLIC BLOOD PRESSURE: 138 MMHG

## 2020-05-28 LAB
GLUCOSE BLD-MCNC: 190 MG/DL (ref 70–99)
GLUCOSE BLD-MCNC: 250 MG/DL (ref 70–99)

## 2020-05-28 PROCEDURE — 99183 HYPERBARIC OXYGEN THERAPY: CPT | Performed by: NURSE PRACTITIONER

## 2020-05-28 PROCEDURE — 82962 GLUCOSE BLOOD TEST: CPT

## 2020-05-28 PROCEDURE — G0277 HBOT, FULL BODY CHAMBER, 30M: HCPCS

## 2020-05-29 ENCOUNTER — HOSPITAL ENCOUNTER (OUTPATIENT)
Dept: HYPERBARIC MEDICINE | Age: 40
Discharge: HOME | End: 2020-05-29
Payer: MEDICAID

## 2020-05-29 VITALS
SYSTOLIC BLOOD PRESSURE: 126 MMHG | TEMPERATURE: 97.4 F | RESPIRATION RATE: 16 BRPM | DIASTOLIC BLOOD PRESSURE: 81 MMHG | HEART RATE: 75 BPM

## 2020-05-29 LAB
GLUCOSE BLD-MCNC: 183 MG/DL (ref 70–99)
GLUCOSE BLD-MCNC: 196 MG/DL (ref 70–99)

## 2020-05-29 PROCEDURE — 82962 GLUCOSE BLOOD TEST: CPT

## 2020-05-29 PROCEDURE — G0277 HBOT, FULL BODY CHAMBER, 30M: HCPCS

## 2020-05-29 PROCEDURE — 99183 HYPERBARIC OXYGEN THERAPY: CPT | Performed by: NURSE PRACTITIONER

## 2020-05-29 RX ORDER — PRAVASTATIN SODIUM 20 MG
20 TABLET ORAL NIGHTLY
COMMUNITY
End: 2021-08-16

## 2020-05-29 ASSESSMENT — PAIN DESCRIPTION - LOCATION: LOCATION: FOOT

## 2020-05-29 ASSESSMENT — PAIN DESCRIPTION - ORIENTATION: ORIENTATION: LEFT

## 2020-05-29 ASSESSMENT — PAIN DESCRIPTION - PAIN TYPE: TYPE: ACUTE PAIN

## 2020-05-29 ASSESSMENT — PAIN DESCRIPTION - PROGRESSION: CLINICAL_PROGRESSION: NOT CHANGED

## 2020-05-29 ASSESSMENT — PAIN DESCRIPTION - FREQUENCY: FREQUENCY: INTERMITTENT

## 2020-05-29 ASSESSMENT — PAIN SCALES - GENERAL: PAINLEVEL_OUTOF10: 4

## 2020-05-29 ASSESSMENT — PAIN DESCRIPTION - DESCRIPTORS: DESCRIPTORS: ACHING

## 2020-05-29 ASSESSMENT — PAIN DESCRIPTION - ONSET: ONSET: GRADUAL

## 2020-05-29 ASSESSMENT — PAIN - FUNCTIONAL ASSESSMENT: PAIN_FUNCTIONAL_ASSESSMENT: ACTIVITIES ARE NOT PREVENTED

## 2020-06-01 ENCOUNTER — APPOINTMENT (OUTPATIENT)
Dept: GENERAL RADIOLOGY | Age: 40
End: 2020-06-01
Payer: MEDICAID

## 2020-06-01 ENCOUNTER — HOSPITAL ENCOUNTER (EMERGENCY)
Age: 40
Discharge: HOME | End: 2020-06-01
Attending: EMERGENCY MEDICINE
Payer: MEDICAID

## 2020-06-01 ENCOUNTER — HOSPITAL ENCOUNTER (OUTPATIENT)
Dept: HYPERBARIC MEDICINE | Age: 40
Discharge: HOME | End: 2020-06-01
Payer: MEDICAID

## 2020-06-01 VITALS
RESPIRATION RATE: 18 BRPM | TEMPERATURE: 97.7 F | HEIGHT: 73 IN | WEIGHT: 180 LBS | DIASTOLIC BLOOD PRESSURE: 87 MMHG | BODY MASS INDEX: 23.86 KG/M2 | OXYGEN SATURATION: 100 % | HEART RATE: 72 BPM | SYSTOLIC BLOOD PRESSURE: 138 MMHG

## 2020-06-01 VITALS
DIASTOLIC BLOOD PRESSURE: 87 MMHG | TEMPERATURE: 97.6 F | HEART RATE: 73 BPM | SYSTOLIC BLOOD PRESSURE: 144 MMHG | RESPIRATION RATE: 16 BRPM

## 2020-06-01 LAB
ALBUMIN SERPL-MCNC: 3.6 GM/DL (ref 3.4–5)
ALP BLD-CCNC: 115 IU/L (ref 40–128)
ALT SERPL-CCNC: 17 U/L (ref 10–40)
ANION GAP SERPL CALCULATED.3IONS-SCNC: 10 MMOL/L (ref 4–16)
AST SERPL-CCNC: 17 IU/L (ref 15–37)
BASOPHILS ABSOLUTE: 0 K/CU MM
BASOPHILS RELATIVE PERCENT: 1 % (ref 0–1)
BILIRUB SERPL-MCNC: 0.3 MG/DL (ref 0–1)
BUN BLDV-MCNC: 15 MG/DL (ref 6–23)
CALCIUM SERPL-MCNC: 9.1 MG/DL (ref 8.3–10.6)
CHLORIDE BLD-SCNC: 102 MMOL/L (ref 99–110)
CO2: 22 MMOL/L (ref 21–32)
CREAT SERPL-MCNC: 1 MG/DL (ref 0.9–1.3)
DIFFERENTIAL TYPE: ABNORMAL
EOSINOPHILS ABSOLUTE: 0.8 K/CU MM
EOSINOPHILS RELATIVE PERCENT: 20.2 % (ref 0–3)
GFR AFRICAN AMERICAN: >60 ML/MIN/1.73M2
GFR NON-AFRICAN AMERICAN: >60 ML/MIN/1.73M2
GLUCOSE BLD-MCNC: 138 MG/DL (ref 70–99)
GLUCOSE BLD-MCNC: 143 MG/DL (ref 70–99)
GLUCOSE BLD-MCNC: 213 MG/DL (ref 70–99)
HCT VFR BLD CALC: 28.3 % (ref 42–52)
HEMOGLOBIN: 8.8 GM/DL (ref 13.5–18)
HYPOCHROMIA: ABNORMAL
IMMATURE NEUTROPHIL %: 0.7 % (ref 0–0.43)
LACTIC ACID, SEPSIS: 0.9 MMOL/L (ref 0.5–1.9)
LYMPHOCYTES ABSOLUTE: 1 K/CU MM
LYMPHOCYTES RELATIVE PERCENT: 24.2 % (ref 24–44)
MCH RBC QN AUTO: 28.3 PG (ref 27–31)
MCHC RBC AUTO-ENTMCNC: 31.1 % (ref 32–36)
MCV RBC AUTO: 91 FL (ref 78–100)
MONOCYTES ABSOLUTE: 0.3 K/CU MM
MONOCYTES RELATIVE PERCENT: 6.2 % (ref 0–4)
OVALOCYTES: ABNORMAL
PDW BLD-RTO: 15.3 % (ref 11.7–14.9)
PLATELET # BLD: 136 K/CU MM (ref 140–440)
PMV BLD AUTO: 10.5 FL (ref 7.5–11.1)
POTASSIUM SERPL-SCNC: 4 MMOL/L (ref 3.5–5.1)
RBC # BLD: 3.11 M/CU MM (ref 4.6–6.2)
RBC # BLD: ABNORMAL 10*6/UL
SEGMENTED NEUTROPHILS ABSOLUTE COUNT: 2.1 K/CU MM
SEGMENTED NEUTROPHILS RELATIVE PERCENT: 47.7 % (ref 36–66)
SODIUM BLD-SCNC: 134 MMOL/L (ref 135–145)
TOTAL IMMATURE NEUTOROPHIL: 0.03 K/CU MM
TOTAL PROTEIN: 8 GM/DL (ref 6.4–8.2)
WBC # BLD: 4.2 K/CU MM (ref 4–10.5)
WBC # BLD: ABNORMAL 10*3/UL

## 2020-06-01 PROCEDURE — 6360000002 HC RX W HCPCS: Performed by: EMERGENCY MEDICINE

## 2020-06-01 PROCEDURE — 96375 TX/PRO/DX INJ NEW DRUG ADDON: CPT

## 2020-06-01 PROCEDURE — 6370000000 HC RX 637 (ALT 250 FOR IP): Performed by: EMERGENCY MEDICINE

## 2020-06-01 PROCEDURE — 82962 GLUCOSE BLOOD TEST: CPT

## 2020-06-01 PROCEDURE — 83605 ASSAY OF LACTIC ACID: CPT

## 2020-06-01 PROCEDURE — 85025 COMPLETE CBC W/AUTO DIFF WBC: CPT

## 2020-06-01 PROCEDURE — 96367 TX/PROPH/DG ADDL SEQ IV INF: CPT

## 2020-06-01 PROCEDURE — 99283 EMERGENCY DEPT VISIT LOW MDM: CPT

## 2020-06-01 PROCEDURE — 99183 HYPERBARIC OXYGEN THERAPY: CPT | Performed by: NURSE PRACTITIONER

## 2020-06-01 PROCEDURE — 73630 X-RAY EXAM OF FOOT: CPT

## 2020-06-01 PROCEDURE — 96365 THER/PROPH/DIAG IV INF INIT: CPT

## 2020-06-01 PROCEDURE — 87040 BLOOD CULTURE FOR BACTERIA: CPT

## 2020-06-01 PROCEDURE — G0277 HBOT, FULL BODY CHAMBER, 30M: HCPCS

## 2020-06-01 PROCEDURE — 93005 ELECTROCARDIOGRAM TRACING: CPT | Performed by: EMERGENCY MEDICINE

## 2020-06-01 PROCEDURE — 80053 COMPREHEN METABOLIC PANEL: CPT

## 2020-06-01 PROCEDURE — 2580000003 HC RX 258: Performed by: EMERGENCY MEDICINE

## 2020-06-01 RX ORDER — MORPHINE SULFATE 4 MG/ML
4 INJECTION, SOLUTION INTRAMUSCULAR; INTRAVENOUS EVERY 30 MIN PRN
Status: DISCONTINUED | OUTPATIENT
Start: 2020-06-01 | End: 2020-06-01 | Stop reason: HOSPADM

## 2020-06-01 RX ORDER — SODIUM CHLORIDE 9 MG/ML
INJECTION, SOLUTION INTRAVENOUS CONTINUOUS
Status: DISCONTINUED | OUTPATIENT
Start: 2020-06-01 | End: 2020-06-01 | Stop reason: HOSPADM

## 2020-06-01 RX ORDER — OXYCODONE HYDROCHLORIDE AND ACETAMINOPHEN 5; 325 MG/1; MG/1
1 TABLET ORAL ONCE
Status: COMPLETED | OUTPATIENT
Start: 2020-06-01 | End: 2020-06-01

## 2020-06-01 RX ADMIN — CEFEPIME 2 G: 2 INJECTION, POWDER, FOR SOLUTION INTRAVENOUS at 17:40

## 2020-06-01 RX ADMIN — SODIUM CHLORIDE: 9 INJECTION, SOLUTION INTRAVENOUS at 17:38

## 2020-06-01 RX ADMIN — VANCOMYCIN HYDROCHLORIDE 1750 MG: 5 INJECTION, POWDER, LYOPHILIZED, FOR SOLUTION INTRAVENOUS at 18:14

## 2020-06-01 RX ADMIN — OXYCODONE HYDROCHLORIDE AND ACETAMINOPHEN 1 TABLET: 5; 325 TABLET ORAL at 19:38

## 2020-06-01 RX ADMIN — MORPHINE SULFATE 4 MG: 4 INJECTION, SOLUTION INTRAMUSCULAR; INTRAVENOUS at 17:42

## 2020-06-01 ASSESSMENT — PAIN DESCRIPTION - FREQUENCY: FREQUENCY: INTERMITTENT

## 2020-06-01 ASSESSMENT — PAIN - FUNCTIONAL ASSESSMENT: PAIN_FUNCTIONAL_ASSESSMENT: PREVENTS OR INTERFERES SOME ACTIVE ACTIVITIES AND ADLS

## 2020-06-01 ASSESSMENT — PAIN DESCRIPTION - PROGRESSION: CLINICAL_PROGRESSION: GRADUALLY WORSENING

## 2020-06-01 ASSESSMENT — PAIN DESCRIPTION - ONSET: ONSET: GRADUAL

## 2020-06-01 ASSESSMENT — PAIN DESCRIPTION - ORIENTATION: ORIENTATION: LEFT

## 2020-06-01 ASSESSMENT — PAIN DESCRIPTION - LOCATION: LOCATION: FOOT

## 2020-06-01 ASSESSMENT — PAIN SCALES - GENERAL
PAINLEVEL_OUTOF10: 9
PAINLEVEL_OUTOF10: 8
PAINLEVEL_OUTOF10: 9

## 2020-06-01 ASSESSMENT — PAIN DESCRIPTION - PAIN TYPE: TYPE: ACUTE PAIN

## 2020-06-01 ASSESSMENT — PAIN DESCRIPTION - DESCRIPTORS: DESCRIPTORS: SHOOTING

## 2020-06-01 NOTE — ED PROVIDER NOTES
stones 2005    Passed Kidney Stones In 2005    Marijuana use     \"Maybe Twice A Year\"    Shortness of breath on exertion     Teeth missing     Upper And Lower    [place] teeth extracted     4 [place] Teeth Extracted In Past     Past Surgical History:   Procedure Laterality Date    OTHER SURGICAL HISTORY  12/16/2017    I & D Perineal Abscess    OTHER SURGICAL HISTORY Right 02/28/2018    tendoachilles lengthenig of right foot dorsiflexory 3rd metarsal right foot debridement of hyperkerototic lesion     TOE AMPUTATION Left 3/27/2020    TOE AMPUTATION - LEFT THIRD TOE AND METATARSAL HEAD, DEBRIDEMENT PLANTAR FOOT ULCER,  WOUND VAC PLACEMENT performed by Evans Castro MD [facility]      4 [place] Teeth Extracted In Past     Family History   Problem Relation Age of Onset    Obesity Mother     Heart Disease Father         Open Heart Surgery    Diabetes Father     Allergy (Severe) Brother      Social History     Socioeconomic History    Marital status: Single     Spouse name: Not on file    Number of children: Not on file    Years of education: Not on file    Highest education level: Not on file   Occupational History    Not on file   Social Needs    Financial resource strain: Not on file    Food insecurity     Worry: Not on file     Inability: Not on file    Transportation needs     Medical: Not on file     Non-medical: Not on file   Tobacco Use    Smoking status: Current Every Day Smoker     Packs/day: 0.50     Years: 24.00     Pack years: 12.00     Types: Cigarettes, Cigars     Start date: 1995    Smokeless tobacco: Never Used   Substance and Sexual Activity    Alcohol use: Yes     Comment: once a year    Drug use: Yes     Types: Marijuana     Comment: pt states once/week    Sexual activity: Yes     Partners: Male   Lifestyle    Physical activity     Days per week: Not on file     Minutes per session: Not on file    Stress: Not on file   Relationships    Social connections     Talks on phone: Not on file     Gets together: Not on file     Attends Jainism service: Not on file     Active member of club or organization: Not on file     Attends meetings of clubs or organizations: Not on file     Relationship status: Not on file    Intimate partner violence     Fear of current or ex partner: Not on file     Emotionally abused: Not on file     Physically abused: Not on file     Forced sexual activity: Not on file   Other Topics Concern    Not on file   Social History Narrative    Not on file     Current Facility-Administered Medications   Medication Dose Route Frequency Provider Last Rate Last Dose    0.9 % sodium chloride infusion   Intravenous Continuous Sylvester Coreas MD   Stopped at 06/01/20 1948    morphine sulfate (PF) injection 4 mg  4 mg Intravenous Q30 Min PRN Sylvester Coreas MD   4 mg at 06/01/20 1742     Current Outpatient Medications   Medication Sig Dispense Refill    pravastatin (PRAVACHOL) 20 MG tablet Take 20 mg by mouth nightly      metFORMIN (GLUCOPHAGE) 1000 MG tablet Take 1 tablet by mouth 2 times daily (with meals) 60 tablet 2    Insulin Syringes, Disposable, U-100 1 ML MISC 1 each by Does not apply route daily 100 each 3    omeprazole (PRILOSEC) 20 MG delayed release capsule Take 1 capsule by mouth every morning (before breakfast) 30 capsule 2    insulin lispro (HUMALOG) 100 UNIT/ML injection vial Inject 0-12 Units into the skin 3 times daily (with meals) 1 vial 3    gabapentin (NEURONTIN) 100 MG capsule Take 1 capsule by mouth 3 times daily for 30 days.  Intended supply: 30 days 90 capsule 1    insulin glargine (LANTUS) 100 UNIT/ML injection vial Inject 40 Units into the skin nightly 1 vial 3    doxycycline hyclate (VIBRA-TABS) 100 MG tablet Take 1 tablet by mouth 2 times daily 84 tablet 0    Blood Glucose Monitoring Suppl (ONE TOUCH ULTRA 2) w/Device KIT 1 kit by Does not apply route once for 1 dose 1 kit 0    blood glucose monitor strips Normal sinus rhythm  Normal ECG  When compared with ECG of 22-APR-2019 01:46,  premature ventricular complexes are no longer present  Nonspecific T wave abnormality now evident in Lateral leads        Radiographs (if obtained):  Radiologist's Report Reviewed:  No results found. EKG (if obtained): (All EKG's are interpreted by myself in the absence of a cardiologist)  Normal sinus rhythm with rate of 73 bpm, normal intervals. No ST elevation. No previous to compare. MDM:  51-year-old male with history as above presents with complaint of left foot pain. Is been ongoing for the last 2 days. Was sent from the wound center. He is in no acute distress. No evidence of infection on my exam on the outer aspect and when I reviewed the notes it appears that things have been improving well, he is on hyperbarics daily, following with the wound care center and actually has an appointment with his surgeon tomorrow. As he did have osteomyelitis and is continued on antibiotics at this time I did order a sepsis work-up with lactic acid and blood cultures. Lactic acid is normal, white count is normal.  X-ray does show what appears to be some chronic osteomyelitis changes, questionable periosteitis at the fourth metatarsal.  He is in no distress here, completely normal vitals. He received 1 dose of broad-spectrum IV antibiotics given his initial complaint. I called and spoke with Dr. Paris Joe who will see him in the clinic tomorrow for his already set up appointment, given normal labs, normal vitals, no drainage and no obvious changes to the wound to suggest worsening or acute infection plan would be to follow-up in their office tomorrow. He is agreeable to plan. Did not want to complete the dose of vancomycin. Discharged in stable condition. Clinical Impression:  1.  Chronic osteomyelitis ([facility]      Disposition referral (if applicable):  Nghia Padgett MD  [address] [place] [address]  [phone]    Go on 6/2/2020      [facility] Emergency Department  [phone]  [phone]    If symptoms worsen    Disposition medications (if applicable):  Discharge Medication List as of 6/1/2020  7:33 PM        ED Provider Disposition Time  DISPOSITION Decision To Discharge 06/01/2020 07:15:52 PM      Comment: Please note this report has been produced using speech recognition software and may contain errors related to that system including errors in grammar, punctuation, and spelling, as well as words and phrases that may be inappropriate. Efforts were made to edit the dictations.         Nehemias Randlal MD  06/01/20 8465

## 2020-06-02 ENCOUNTER — OFFICE VISIT (OUTPATIENT)
Dept: SURGERY | Age: 40
End: 2020-06-02

## 2020-06-02 VITALS
BODY MASS INDEX: 27.04 KG/M2 | SYSTOLIC BLOOD PRESSURE: 128 MMHG | WEIGHT: 204 LBS | HEART RATE: 82 BPM | HEIGHT: 73 IN | DIASTOLIC BLOOD PRESSURE: 76 MMHG | OXYGEN SATURATION: 98 %

## 2020-06-02 PROCEDURE — 99024 POSTOP FOLLOW-UP VISIT: CPT | Performed by: SURGERY

## 2020-06-02 RX ORDER — HYDROCODONE BITARTRATE AND ACETAMINOPHEN 5; 325 MG/1; MG/1
1 TABLET ORAL EVERY 6 HOURS PRN
Qty: 20 TABLET | Refills: 0 | Status: SHIPPED | OUTPATIENT
Start: 2020-06-02 | End: 2020-06-07

## 2020-06-02 RX ORDER — TRAZODONE HYDROCHLORIDE 50 MG/1
50 TABLET ORAL NIGHTLY PRN
Qty: 30 TABLET | Refills: 2 | Status: SHIPPED | OUTPATIENT
Start: 2020-06-02 | End: 2020-06-21 | Stop reason: SDUPTHER

## 2020-06-02 ASSESSMENT — ENCOUNTER SYMPTOMS
SHORTNESS OF BREATH: 0
VOMITING: 0
ABDOMINAL PAIN: 0
NAUSEA: 0

## 2020-06-02 NOTE — PROGRESS NOTES
foot pain. Evaluate for any abnormality given history of partial left foot amputation. FINDINGS: Stable orthopedic screw within the 3rd metatarsal.  Vascular calcifications. No acute fracture or dislocation. Mild degenerative changes in the midfoot. Joint spaces and alignment otherwise maintained. Calcaneal enthesophyte at the Achilles tendon insertion. Soft tissues are unremarkable. No acute osseous abnormality. Mild midfoot degenerative changes. Pertinent laboratory and imaging studies were personally reviewed if available. IMPRESSION:    Janna Baltazar is a 36 y.o. male following-up postoperatively from 4th visit for left 3rd toe amputation. Visit Diagnoses:  1. History of partial ray amputation of third toe of left foot ([address]    2. Postop check        Patient Active Problem List    Diagnosis Date Noted    HBO-Diabetic ulcer of left midfoot associated with type 2 diabetes mellitus, with bone involvement without evidence of necrosis ([address] guerra 3 05/04/2020    Weakness 05/03/2020    Right foot pain 05/03/2020    Receiving intravenous antibiotic treatment as outpatient 04/22/2020    Acute osteomyelitis of phalanx of left foot ([address] 03/26/2020    Diabetic foot infection ([address]     Osteomyelitis of third toe of left foot ([address] 03/25/2020    Diabetic foot ulcer ([address] 03/25/2020    Tobacco abuse 05/18/2019    Type 2 diabetes mellitus without complication, with long-term current use of insulin ([address] 04/29/2019    Gastroesophageal reflux disease 04/29/2019    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 Wound vac changes MWF, HBO per protocol.  Discussed XR findings and evaluation for his left foot pain. His pain may be neuropathic. Referral to PCP for possible Gabapentin (needs to be monitored and tapered on/off)   Norco for temporary pain relief. PDMP reviewed.  Discussed risks/benefits of narcotic pain medication, cautions with use such as not taking

## 2020-06-03 ENCOUNTER — HOSPITAL ENCOUNTER (OUTPATIENT)
Dept: HYPERBARIC MEDICINE | Age: 40
Discharge: HOME | End: 2020-06-03
Payer: MEDICAID

## 2020-06-03 ENCOUNTER — OFFICE VISIT (OUTPATIENT)
Dept: INFECTIOUS DISEASES | Age: 40
End: 2020-06-03
Payer: MEDICAID

## 2020-06-03 VITALS
TEMPERATURE: 97.3 F | BODY MASS INDEX: 27.05 KG/M2 | SYSTOLIC BLOOD PRESSURE: 135 MMHG | HEART RATE: 88 BPM | WEIGHT: 205 LBS | DIASTOLIC BLOOD PRESSURE: 84 MMHG

## 2020-06-03 LAB
GLUCOSE BLD-MCNC: 180 MG/DL (ref 70–99)
GLUCOSE BLD-MCNC: 196 MG/DL (ref 70–99)

## 2020-06-03 PROCEDURE — G0277 HBOT, FULL BODY CHAMBER, 30M: HCPCS

## 2020-06-03 PROCEDURE — 99183 HYPERBARIC OXYGEN THERAPY: CPT | Performed by: SURGERY

## 2020-06-03 PROCEDURE — 99213 OFFICE O/P EST LOW 20 MIN: CPT | Performed by: INTERNAL MEDICINE

## 2020-06-03 PROCEDURE — G8419 CALC BMI OUT NRM PARAM NOF/U: HCPCS | Performed by: INTERNAL MEDICINE

## 2020-06-03 PROCEDURE — 4004F PT TOBACCO SCREEN RCVD TLK: CPT | Performed by: INTERNAL MEDICINE

## 2020-06-03 PROCEDURE — 93010 ELECTROCARDIOGRAM REPORT: CPT | Performed by: INTERNAL MEDICINE

## 2020-06-03 PROCEDURE — G8427 DOCREV CUR MEDS BY ELIG CLIN: HCPCS | Performed by: INTERNAL MEDICINE

## 2020-06-03 PROCEDURE — 82962 GLUCOSE BLOOD TEST: CPT

## 2020-06-03 RX ORDER — DOXYCYCLINE HYCLATE 100 MG
100 TABLET ORAL 2 TIMES DAILY
Qty: 84 TABLET | Refills: 1 | Status: SHIPPED | OUTPATIENT
Start: 2020-06-18 | End: 2020-07-27 | Stop reason: SDUPTHER

## 2020-06-03 NOTE — ASSESSMENT & PLAN NOTE
6/1 foot x-ray showed findings suggestive of periostitis. Tolerating doxycycline. Will need to to extend therapy for 6 months. Abx to end in 9/2020. See monthly.

## 2020-06-03 NOTE — PROGRESS NOTES
6/7/2020         Referring Physician: No ref. provider found  Primary Care Physician: BARBARA Milner - CNP    Impression/Plan:   Diagnosis Orders   1. Osteomyelitis of third toe of left foot (HCC)  doxycycline hyclate (VIBRA-TABS) 100 MG tablet       Discussion:  Osteomyelitis of third toe of left foot ([address]  6/1 foot x-ray showed findings suggestive of periostitis. Tolerating doxycycline. Will need to to extend therapy for 6 months. Abx to end in 9/2020. See monthly. Return in about 4 weeks (around 7/1/2020). History: Alireza Severino is a 36 y.o.  male presenting today for follow-up for left DFO MR-CoNS s/p left 3rd toe and metatarsal head, debridement and wound VAC placement on 3/27. Denies n/v/d/f. Tolerating IV vancomycin. He has a plantar callus and had a lot of pain walking on it. This has improved after it was shaved off. It feels  No issues with the PICC line. COVID test was negative. 6/3: Afebrile. Was sent to ED by wound care  on 6/1 for increased pain. Labs were normal. Repeat X-ray showed periostitis and osteomyelitis at amputation site  Review of Systems   All other systems reviewed and are negative.       No Known Allergies    Patient Active Problem List   Diagnosis    Indu's disease    Type 2 diabetes mellitus without complication, with long-term current use of insulin ([address]    Gastroesophageal reflux disease    Tobacco abuse    Acute osteomyelitis of phalanx of left foot ([address]    Diabetic foot infection ([address]    Osteomyelitis of third toe of left foot ([address]    Diabetic foot ulcer ([address]    Receiving intravenous antibiotic treatment as outpatient    Weakness    Right foot pain    HBO-Diabetic ulcer of left midfoot associated with type 2 diabetes mellitus, with bone involvement without evidence of necrosis ([address] guerra 3       Current Outpatient Medications   Medication Sig Dispense Refill    [START ON 6/18/2020] doxycycline hyclate (VIBRA-TABS) 100 MG Judaism service: Not on file     Active member of club or organization: Not on file     Attends meetings of clubs or organizations: Not on file     Relationship status: Not on file    Intimate partner violence     Fear of current or ex partner: Not on file     Emotionally abused: Not on file     Physically abused: Not on file     Forced sexual activity: Not on file   Other Topics Concern    Not on file   Social History Narrative    Not on file       Family History   Problem Relation Age of Onset    Obesity Mother     Heart Disease Father         Open Heart Surgery    Diabetes Father     Allergy (Severe) Brother        Vital Signs:  Vitals:    06/03/20 1006   BP: 135/84   Site: Left Upper Arm   Position: Sitting   Cuff Size: Medium Adult   Pulse: 88   Temp: 97.3 °F (36.3 °C)   TempSrc: Infrared   Weight: 205 lb (93 kg)        Wt Readings from Last 3 Encounters:   06/03/20 205 lb (93 kg)   06/02/20 204 lb (92.5 kg)   06/01/20 180 lb (81.6 kg)        Physical Exam:   Gen: alert and NAD  HEENT: sclera clear, pupils equal and reactive, extra ocular muscles intact, oropharynx clear, mucus membranes moist, tympanic membranes clear bilaterally, no cervical lymphadenopathy noted and neck supple  Neck: supple, no significant adenopathy  Chest: clear to auscultation, no wheezes, rales or rhonchi, symmetric air entry  Heart: regular rate and rhythm, no murmurs  ABD: abdomen is soft without significant tenderness, masses, organomegaly or guarding. EXT:peripheral pulses normal, no pedal edema, no clubbing or cyanosis. Right arm PICC line, no erythema or tenderness.    NEURO: alert, oriented, normal speech, no focal findings or movement disorder noted  Skin: well hydrated, no lesions, surgical site examined  Wounds: Left foot dressing is C/D/I  Labs:   WBC   Date Value Ref Range Status   06/01/2020 4.2 4.0 - 10.5 K/CU MM Final   05/04/2020 5.1 4.0 - 10.5 K/CU MM Final   04/27/2020 4.4 4.0 - 10.5 K/CU MM Final CREATININE   Date Value Ref Range Status   06/01/2020 1.0 0.9 - 1.3 MG/DL Final   05/04/2020 1.0 0.9 - 1.3 MG/DL Final   04/27/2020 0.9 0.9 - 1.3 MG/DL Final       Cultures:  Culture   Date Value Ref Range Status   06/01/2020 NO GROWTH AT 6 DAYS  Final   06/01/2020 NO GROWTH AT 6 DAYS  Final   03/27/2020 STAPHYLOCOCCUS-COAG NEG VERY SCANTY GROWTH (A)  Final   03/27/2020 NO ANAEROBIC GROWTH AT 72 HOURS  Final       Imaging Studies:   Left foot x-ray on 6/1/2020  . Changes of interval transmetatarsal 3rd ray amputation with cortical erosion at the amputation site and exuberant surrounding heterotopic bone formation and/or periostitis concerning for osteomyelitis.  2. Subtle periostitis along the lateral aspect of the distal 4th metatarsal diaphysis concerning for underlying osteomyelitis, although there is no associated underlying cortical erosion

## 2020-06-03 NOTE — PROGRESS NOTES
Patient instructed to report signs and/or symptoms of chest pain, eye or ear pain, SOB, or any other issues or concerns. Patient verbalized understanding. Patient tolerated treatment well, no complaint of pain or discomfort pre or post treatment.

## 2020-06-03 NOTE — PROGRESS NOTES
[facility]  Hyperbaric Oxygen Therapy   Progress Note      NAME: Duke Yuen RECORD NUMBER:  [id number]  AGE: 36 y.o. GENDER: male  : 1980  EPISODE DATE:  6/3/2020     Subjective     HBO Treatment Number: 13 out of Total Treatments: 40    HBO Diagnosis:     Indications: Lower Extremity Diabetic Wound ___(site)(left foot)  Kristine LinaresEmilia [address]     Safety checks performed prior to treatment. See doc flowsheets for documentation. Objective           Recent Labs     20  1302 20  1519   POCGLU 196* 180*       Pre treatment Vital Signs       Temp: 98.1 °F (36.7 °C)     Pulse: 83     Resp: 16     BP: 133/81       Post treatment Vital Signs  Temp: 97.1 °F (36.2 °C)  Pulse: 76  Resp: 16  BP: 135/82      Assessment        Physical Exam:  General Appearance:  alert and oriented to person, place and time, well-developed and well-nourished, in no acute distress    Pre Tympanic Membrane Assessment:  tympanic membranes intact bilaterally    Post Tympanic Membrane Assessment:  Right: Normal(per pt)  Left: Normal(per pt)    Pulmonary/Chest:  clear to auscultation bilaterally- no wheezes, rales or rhonchi, normal air movement, no respiratory distress    Cardiovascular:  normal    Chamber #: [id number]       Treatment Start Time: 1307     Pressure Reached Time: 1316  SHARRI : 2  Number of Air Breaks:  Treatment Status: No Air break      Decompression Time: 1446   Treatment End Time: 1454    Symptoms Noted During Treatment: None    Adverse Event: no      I was present on these premises and immediately available to furnish assistance & direction throughout the procedure. Veronika Roberts is a 36 y.o. male  did successfully complete today's hyperbaric oxygen treatment at [address].     In my clinical judgement, ongoing HBO therapy is  necessary at this time, given a threat to patient function, limb or life from the current condition. Supervision and attendance of Hyperbaric Oxygen Therapy provided. Continue HBO treatment as outlined in the treatment plan. Hyperbaric Oxygen: Manpreet Lechuga tolerated Treatment Number: 21 well today without complications.      Electronically signed by William Sawyer MD on 6/3/2020 at 3:41 PM

## 2020-06-04 ENCOUNTER — HOSPITAL ENCOUNTER (OUTPATIENT)
Dept: HYPERBARIC MEDICINE | Age: 40
Discharge: HOME | End: 2020-06-04
Payer: MEDICAID

## 2020-06-04 VITALS
HEART RATE: 76 BPM | SYSTOLIC BLOOD PRESSURE: 135 MMHG | TEMPERATURE: 97.1 F | DIASTOLIC BLOOD PRESSURE: 82 MMHG | RESPIRATION RATE: 16 BRPM

## 2020-06-04 VITALS
HEART RATE: 76 BPM | DIASTOLIC BLOOD PRESSURE: 80 MMHG | RESPIRATION RATE: 16 BRPM | TEMPERATURE: 97.7 F | SYSTOLIC BLOOD PRESSURE: 132 MMHG

## 2020-06-04 LAB
GLUCOSE BLD-MCNC: 137 MG/DL (ref 70–99)
GLUCOSE BLD-MCNC: 159 MG/DL (ref 70–99)

## 2020-06-04 PROCEDURE — G0277 HBOT, FULL BODY CHAMBER, 30M: HCPCS

## 2020-06-04 PROCEDURE — 82962 GLUCOSE BLOOD TEST: CPT

## 2020-06-04 PROCEDURE — 99183 HYPERBARIC OXYGEN THERAPY: CPT | Performed by: NURSE PRACTITIONER

## 2020-06-04 RX ORDER — GABAPENTIN 100 MG/1
100 CAPSULE ORAL 3 TIMES DAILY
Qty: 90 CAPSULE | Refills: 1 | Status: SHIPPED | OUTPATIENT
Start: 2020-06-04 | End: 2020-06-27 | Stop reason: SDUPTHER

## 2020-06-04 NOTE — PROGRESS NOTES
therapy is  necessary at this time, given a threat to patient function, limb or life from the current condition. Supervision and attendance of Hyperbaric Oxygen Therapy provided. Continue HBO treatment as outlined in the treatment plan. Hyperbaric Oxygen: Yaw Reeder tolerated Treatment Number: 14 well today without complications.      Electronically signed by BARBARA Pedro CNP on 6/4/2020 at 5:50 PM

## 2020-06-05 ENCOUNTER — HOSPITAL ENCOUNTER (OUTPATIENT)
Dept: HYPERBARIC MEDICINE | Age: 40
Discharge: HOME | End: 2020-06-05
Payer: MEDICAID

## 2020-06-05 LAB
GLUCOSE BLD-MCNC: 115 MG/DL (ref 70–99)
GLUCOSE BLD-MCNC: 115 MG/DL (ref 70–99)

## 2020-06-05 PROCEDURE — G0277 HBOT, FULL BODY CHAMBER, 30M: HCPCS

## 2020-06-05 PROCEDURE — 82962 GLUCOSE BLOOD TEST: CPT

## 2020-06-05 PROCEDURE — 99183 HYPERBARIC OXYGEN THERAPY: CPT | Performed by: NURSE PRACTITIONER

## 2020-06-05 NOTE — PLAN OF CARE
Problem: Physical Regulation:  Goal: Tolerate HBO therapy and barotrauma will be prevented  Description: Tolerate HBO therapy and barotrauma will be prevented  Outcome: Ongoing     Problem: Physical Regulation:  Intervention: Assess for signs and symptoms of adverse events including confinement anxiety, pneumothorax, oxygen toxicity, and barotrauma  Note: Pt tolerated treatment well  Intervention: Assess for history of confinement anxiety  Note: Pt tolerated treatment well  Intervention: Provide comfort related to the HBO environment and equalizaton of middle ear  Note: Pt tolerated treatment well

## 2020-06-07 LAB
CULTURE: NORMAL
CULTURE: NORMAL
Lab: NORMAL
Lab: NORMAL
SPECIMEN: NORMAL
SPECIMEN: NORMAL

## 2020-06-08 ENCOUNTER — HOSPITAL ENCOUNTER (OUTPATIENT)
Dept: HYPERBARIC MEDICINE | Age: 40
Discharge: HOME | End: 2020-06-08
Payer: MEDICAID

## 2020-06-08 VITALS
HEART RATE: 74 BPM | RESPIRATION RATE: 16 BRPM | SYSTOLIC BLOOD PRESSURE: 134 MMHG | DIASTOLIC BLOOD PRESSURE: 84 MMHG | TEMPERATURE: 97.9 F

## 2020-06-08 LAB
GLUCOSE BLD-MCNC: 153 MG/DL (ref 70–99)
GLUCOSE BLD-MCNC: 153 MG/DL (ref 70–99)

## 2020-06-08 PROCEDURE — G0277 HBOT, FULL BODY CHAMBER, 30M: HCPCS

## 2020-06-08 PROCEDURE — 82962 GLUCOSE BLOOD TEST: CPT

## 2020-06-08 PROCEDURE — 99183 HYPERBARIC OXYGEN THERAPY: CPT | Performed by: NURSE PRACTITIONER

## 2020-06-09 ENCOUNTER — HOSPITAL ENCOUNTER (OUTPATIENT)
Dept: HYPERBARIC MEDICINE | Age: 40
Discharge: HOME | End: 2020-06-09
Payer: MEDICAID

## 2020-06-09 VITALS
HEART RATE: 76 BPM | RESPIRATION RATE: 16 BRPM | TEMPERATURE: 98.1 F | DIASTOLIC BLOOD PRESSURE: 87 MMHG | SYSTOLIC BLOOD PRESSURE: 135 MMHG

## 2020-06-09 LAB
EKG ATRIAL RATE: 73 BPM
EKG DIAGNOSIS: NORMAL
EKG P AXIS: 14 DEGREES
EKG P-R INTERVAL: 172 MS
EKG Q-T INTERVAL: 402 MS
EKG QRS DURATION: 88 MS
EKG QTC CALCULATION (BAZETT): 442 MS
EKG R AXIS: 31 DEGREES
EKG T AXIS: 64 DEGREES
EKG VENTRICULAR RATE: 73 BPM
GLUCOSE BLD-MCNC: 116 MG/DL (ref 70–99)
GLUCOSE BLD-MCNC: 127 MG/DL (ref 70–99)

## 2020-06-09 PROCEDURE — G0277 HBOT, FULL BODY CHAMBER, 30M: HCPCS

## 2020-06-09 PROCEDURE — 99183 HYPERBARIC OXYGEN THERAPY: CPT | Performed by: NURSE PRACTITIONER

## 2020-06-09 PROCEDURE — 82962 GLUCOSE BLOOD TEST: CPT

## 2020-06-09 NOTE — PROGRESS NOTES
[address]  [address]  Hyperbaric Oxygen Therapy   Progress Note      NAME: Duke Yuen RECORD NUMBER:  [id number]  AGE: 36 y.o. GENDER: male  : 1980  EPISODE DATE:  2020     Subjective     HBO Treatment Number: 16 out of Total Treatments: 40    HBO Diagnosis:     Indications: Lower Extremity Diabetic Wound ___(site)(left foot)  [employer] 3     Safety checks performed prior to treatment. See doc flowsheets for documentation. Objective           Recent Labs     20  0949 20  1223   POCGLU 153 153       Pre treatment Vital Signs       Temp: 97.9 °F (36.6 °C)     Pulse: 80     Resp: 16     BP: 128/81     Blood Sugar 153    Post treatment Vital Signs  Temp: 97.9 °F (36.6 °C)  Pulse: 74  Resp: 16  BP: 134/84  Blood Sugar 153    Assessment        Physical Exam:  General Appearance:  alert and oriented to person, place and time, well-developed and well-nourished, in no acute distress    Pre Tympanic Membrane Assessment:  tympanic membranes intact bilaterally    Post Tympanic Membrane Assessment:  Right: Normal(Per Pt.)  Left: Normal(Per Pt.)    Pulmonary/Chest:  clear to auscultation bilaterally- no wheezes, rales or rhonchi, normal air movement, no respiratory distress    Cardiovascular:  normal, regular rate and rhythm    Chamber #: [id number]       Treatment Start Time: 1022     Pressure Reached Time: 1031  SHARRI : 2  Number of Air Breaks:  Treatment Status: No Air break      Decompression Time: 1201   Treatment End Time: 1210    Symptoms Noted During Treatment: None    Adverse Event: no    Total time in chamber: 108  Minutes at depth: 90    I was present on these premises and immediately available to furnish assistance & direction throughout the procedure. Veronika Harkins is a 36 y.o. male  did successfully complete today's hyperbaric oxygen treatment at [address] [place] [address] [facility]     In my clinical judgement, ongoing HBO therapy is  necessary at this time, given a threat to patient function, limb or life from the current condition. Supervision and attendance of Hyperbaric Oxygen Therapy provided. Continue HBO treatment as outlined in the treatment plan. Hyperbaric Oxygen: Luz Lemos tolerated Treatment Number: 19 well today without complications.      Electronically signed by BARBARA Solorzano CNP on 6/8/2020 at 1:16 PM

## 2020-06-10 ENCOUNTER — HOSPITAL ENCOUNTER (OUTPATIENT)
Dept: HYPERBARIC MEDICINE | Age: 40
Discharge: HOME | End: 2020-06-10
Payer: MEDICAID

## 2020-06-10 VITALS
TEMPERATURE: 97.8 F | HEART RATE: 82 BPM | SYSTOLIC BLOOD PRESSURE: 128 MMHG | RESPIRATION RATE: 16 BRPM | DIASTOLIC BLOOD PRESSURE: 87 MMHG

## 2020-06-10 VITALS
TEMPERATURE: 97.5 F | SYSTOLIC BLOOD PRESSURE: 128 MMHG | RESPIRATION RATE: 16 BRPM | DIASTOLIC BLOOD PRESSURE: 85 MMHG | HEART RATE: 75 BPM

## 2020-06-10 LAB
GLUCOSE BLD-MCNC: 127 MG/DL (ref 70–99)
GLUCOSE BLD-MCNC: 129 MG/DL (ref 70–99)

## 2020-06-10 PROCEDURE — G0277 HBOT, FULL BODY CHAMBER, 30M: HCPCS

## 2020-06-10 PROCEDURE — 82962 GLUCOSE BLOOD TEST: CPT

## 2020-06-10 NOTE — PROGRESS NOTES
therapy is  necessary at this time, given a threat to patient function, limb or life from the current condition. Supervision and attendance of Hyperbaric Oxygen Therapy provided. Continue HBO treatment as outlined in the treatment plan. Hyperbaric Oxygen: Anthony Nails tolerated Treatment Number: 49 well today without complications.      Electronically signed by BARBARA Andrade CNP on 6/9/2020 at 7:13 AM

## 2020-06-11 ENCOUNTER — HOSPITAL ENCOUNTER (OUTPATIENT)
Dept: WOUND CARE | Age: 40
Discharge: HOME | End: 2020-06-11
Payer: MEDICAID

## 2020-06-11 ENCOUNTER — HOSPITAL ENCOUNTER (OUTPATIENT)
Dept: HYPERBARIC MEDICINE | Age: 40
Discharge: HOME | End: 2020-06-11
Payer: MEDICAID

## 2020-06-11 VITALS
HEART RATE: 77 BPM | DIASTOLIC BLOOD PRESSURE: 89 MMHG | TEMPERATURE: 97.5 F | RESPIRATION RATE: 16 BRPM | SYSTOLIC BLOOD PRESSURE: 125 MMHG

## 2020-06-11 LAB
GLUCOSE BLD-MCNC: 136 MG/DL (ref 70–99)
GLUCOSE BLD-MCNC: 188 MG/DL (ref 70–99)

## 2020-06-11 PROCEDURE — 11042 DBRDMT SUBQ TIS 1ST 20SQCM/<: CPT | Performed by: NURSE PRACTITIONER

## 2020-06-11 PROCEDURE — G0277 HBOT, FULL BODY CHAMBER, 30M: HCPCS

## 2020-06-11 PROCEDURE — 11042 DBRDMT SUBQ TIS 1ST 20SQCM/<: CPT

## 2020-06-11 NOTE — PROGRESS NOTES
Patient instructed to report signs and/or symptoms of chest pain, eye or ear pain, SOB, or any other issues or concerns. Patient verbalized understanding. Patient tolerated treatment well, no complaint of pain or discomfort pre or post treatment.
[place]. In my clinical judgement, ongoing HBO therapy is  necessary at this time, given a threat to patient function, limb or life from the current condition. Supervision and attendance of Hyperbaric Oxygen Therapy provided. Continue HBO treatment as outlined in the treatment plan. Hyperbaric Oxygen: Luz Lemos tolerated Treatment Number: 06 well today without complications.      Electronically signed by Mino Lock MD on 6/11/2020 at 12:18 PM

## 2020-06-11 NOTE — PROGRESS NOTES
[address] [place] [address],  [address]. In my clinical judgement, ongoing HBO therapy is  necessary at this time, given a threat to patient function, limb or life from the current condition. Supervision and attendance of Hyperbaric Oxygen Therapy provided. Continue HBO treatment as outlined in the treatment plan. Hyperbaric Oxygen: Luz Lemos tolerated Treatment Number: 68 well today without complications.      Electronically signed by Jairo Villanueva MD on 6/10/2020 at 4:11 PM

## 2020-06-11 NOTE — PROGRESS NOTES
Yuliana Rodriguez MD at [address] EXTRACTION      4 [place] Teeth Extracted In Past       FAMILY HISTORY    Family History   Problem Relation Age of Onset    Obesity Mother     Heart Disease Father         Open Heart Surgery    Diabetes Father     Allergy (Severe) Brother        SOCIAL HISTORY    Social History     Tobacco Use    Smoking status: Current Every Day Smoker     Packs/day: 0.50     Years: 24.00     Pack years: 12.00     Types: Cigarettes, Cigars     Start date: 1995    Smokeless tobacco: Never Used   Substance Use Topics    Alcohol use: Yes     Comment: once a year    Drug use: Yes     Types: Marijuana     Comment: pt states once/week       ALLERGIES    No Known Allergies    MEDICATIONS    Current Outpatient Medications on File Prior to Encounter   Medication Sig Dispense Refill    insulin lispro (HUMALOG) 100 UNIT/ML injection vial Inject 0-12 Units into the skin 3 times daily (with meals) 1 vial 3    gabapentin (NEURONTIN) 100 MG capsule Take 1 capsule by mouth 3 times daily for 30 days. Intended supply: 30 days 90 capsule 1    [START ON 6/18/2020] doxycycline hyclate (VIBRA-TABS) 100 MG tablet Take 1 tablet by mouth 2 times daily 84 tablet 1    traZODone (DESYREL) 50 MG tablet Take 1 tablet by mouth nightly as needed for Sleep 30 tablet 2    pravastatin (PRAVACHOL) 20 MG tablet Take 20 mg by mouth nightly      metFORMIN (GLUCOPHAGE) 1000 MG tablet Take 1 tablet by mouth 2 times daily (with meals) 60 tablet 2    Insulin Syringes, Disposable, U-100 1 ML MISC 1 each by Does not apply route daily 100 each 3    omeprazole (PRILOSEC) 20 MG delayed release capsule Take 1 capsule by mouth every morning (before breakfast) 30 capsule 2    insulin glargine (LANTUS) 100 UNIT/ML injection vial Inject 40 Units into the skin nightly 1 vial 3    blood glucose monitor strips Test 3 times a day & as needed for symptoms of irregular blood glucose.  270 strip 2    INSULIN SYRINGE .5CC/29G (Vane Corpus INS SYR .5CC/29G) 29G X 1/2\" 0.5 ML MISC 1 each by Does not apply route daily 100 each 3    aspirin 325 MG tablet Take 325 mg by mouth daily      insulin lispro (HUMALOG) 100 UNIT/ML injection vial Inject 15 Units into the skin 3 times daily (with meals) 1 vial 3    Blood Glucose Monitoring Suppl (ONE TOUCH ULTRA 2) w/Device KIT 1 kit by Does not apply route once for 1 dose 1 kit 0     No current facility-administered medications on file prior to encounter. REVIEW OF SYSTEMS    Pertinent items are noted in HPI. Constitutional: Negative for systemic symptoms including fever, chills and malaise. Objective: There were no vitals taken for this visit. PHYSICAL EXAM  General: The patient is in no acute distress. Mental status:  Patient is appropriate, is  oriented to place and plan of care. Dermatologic exam: Visual inspection of the periwound reveals the skin to be normal in turgor and texture  Wound exam: see wound description below in procedure note      Assessment:     Problem List Items Addressed This Visit     HBO-Diabetic ulcer of left midfoot associated with type 2 diabetes mellitus, with bone involvement without evidence of necrosis ([address] guerra 3 - Primary        Procedure Note    Indications:  Based on my examination of this patient's wound(s) today, sharp excision into necrotic subcutaneous tissue is required to promote healing and evaluate the extent of previous healing. Performed by: BARBARA Alicia - CNP    Consent obtained: Yes    Time out taken:  Yes    Pain Control: Anesthetic  Anesthetic: 4% Lidocaine Liquid Topical     Debridement:Excisional Debridement    Using curette the wound(s) was/were sharply debrided down through and including the removal of subcutaneous tissue.         Devitalized Tissue Debrided:  slough, exudate and callus    Pre Debridement Measurements:  Are located in the Wound Documentation Flow Sheet    All active wounds listed below with today's physician or nurse before getting the wound(s) wet in a shower     Daily Wound management:              Keep weight off wounds and reposition every 2 hours.              Avoid standing for long periods of time.              Apply wraps/stockings in AM and remove at bedtime.              If swelling is present, elevate legs to the level of the heart or above for 30 minutes 4-5 times a day and/or when sitting.                                      When taking antibiotics take entire prescription as ordered by physician do not stop taking until medicine is all gone.                                                         Orders for this week:  6/11/20                Left 3rd Toe--Clean with soap and water, pat dry, then     WOUND VAC THERAPY: L 3rd Toe      DUODERM TO PERIWOUND FOR PROTECTION. APPLY BLACK FOAM TO WOUND  BED. SECURE VAC DRESSING WITH DRAPE.     SET WOUND VAC  CONTINUOUS SUCTION. CANISTER CHANGE WITH EACH DRESSING CHANGE OR ACCORDING TO VOLUME OF DRAINAGE.     WOUND VAC DRESSING TO BE CHANGED MON, WED, FRI           Follow up with Chayito Guy CNP  Next Tuesday in the wound care center  Call [phone] for any questions or concerns.   Date__________   Time____________             Treatment Note      Written Patient Dismissal Instructions Given            Electronically signed by BARBARA Quiñonez CNP on 6/11/2020 at 1:34 PM

## 2020-06-15 ENCOUNTER — HOSPITAL ENCOUNTER (OUTPATIENT)
Dept: HYPERBARIC MEDICINE | Age: 40
Discharge: HOME | End: 2020-06-15
Payer: MEDICAID

## 2020-06-15 VITALS
DIASTOLIC BLOOD PRESSURE: 84 MMHG | RESPIRATION RATE: 16 BRPM | HEART RATE: 69 BPM | SYSTOLIC BLOOD PRESSURE: 126 MMHG | TEMPERATURE: 97 F

## 2020-06-15 LAB
GLUCOSE BLD-MCNC: 168 MG/DL (ref 70–99)
GLUCOSE BLD-MCNC: 83 MG/DL (ref 70–99)

## 2020-06-15 PROCEDURE — G0277 HBOT, FULL BODY CHAMBER, 30M: HCPCS

## 2020-06-15 PROCEDURE — 99183 HYPERBARIC OXYGEN THERAPY: CPT | Performed by: NURSE PRACTITIONER

## 2020-06-15 PROCEDURE — 82962 GLUCOSE BLOOD TEST: CPT

## 2020-06-15 RX ORDER — LANCETS 30 GAUGE
1 EACH MISCELLANEOUS 3 TIMES DAILY
Qty: 600 EACH | Refills: 1 | Status: SHIPPED | OUTPATIENT
Start: 2020-06-15

## 2020-06-15 NOTE — PROGRESS NOTES
[facility]  Hyperbaric Oxygen Therapy   Progress Note      NAME: Duke Yuen RECORD NUMBER:  [id number]  AGE: 36 y.o. GENDER: male  : 1980  EPISODE DATE:  6/15/2020     Subjective     HBO Treatment Number: 20 out of Total Treatments: 40    HBO Diagnosis:     Indications: Lower Extremity Diabetic Wound ___(site)(left foot)  [facility] [place] 3     Safety checks performed prior to treatment. See doc flowsheets for documentation. Objective           Recent Labs     06/15/20  1211   POCGLU 83       Pre treatment Vital Signs       Temp: 96.9 °F (36.1 °C)     Pulse: 79     Resp: 16     BP: (!) 143/79(SN will inform JAYME Camacho)     Blood Sugar 168    Post treatment Vital Signs  Temp: 97 °F (36.1 °C)  Pulse: 69  Resp: 16  BP: 126/84  Blood Sugar 83    Assessment        Physical Exam:  General Appearance:  alert and oriented to person, place and time, well-developed and well-nourished, in no acute distress    Pre Tympanic Membrane Assessment:  tympanic membranes intact bilaterally    Post Tympanic Membrane Assessment:  Right: Normal(per Pt.)  Left: Normal(per Pt.)    Pulmonary/Chest:  clear to auscultation bilaterally- no wheezes, rales or rhonchi, normal air movement, no respiratory distress    Cardiovascular:  normal, regular rate and rhythm    Chamber #: [id number]       Treatment Start Time: 1017     Pressure Reached Time: 1025  SHARRI : 2  Number of Air Breaks:  Treatment Status: No Air break      Decompression Time: 1155   Treatment End Time: 1204    Symptoms Noted During Treatment: None    Adverse Event: no    Total time in chamber: 107  Minutes at depth: 90    I was present on these premises and immediately available to furnish assistance & direction throughout the procedure. Veronika Rodrigeze Colin is a 36 y.o. male  did successfully complete today's hyperbaric oxygen treatment at [address] [place] [address].     In my clinical judgement,

## 2020-06-16 ENCOUNTER — HOSPITAL ENCOUNTER (OUTPATIENT)
Dept: HYPERBARIC MEDICINE | Age: 40
Discharge: HOME | End: 2020-06-16
Payer: MEDICAID

## 2020-06-16 ENCOUNTER — OFFICE VISIT (OUTPATIENT)
Dept: SURGERY | Age: 40
End: 2020-06-16

## 2020-06-16 ENCOUNTER — HOSPITAL ENCOUNTER (OUTPATIENT)
Dept: WOUND CARE | Age: 40
Discharge: HOME | End: 2020-06-16
Payer: MEDICAID

## 2020-06-16 VITALS
BODY MASS INDEX: 27.53 KG/M2 | RESPIRATION RATE: 16 BRPM | DIASTOLIC BLOOD PRESSURE: 72 MMHG | HEIGHT: 73 IN | SYSTOLIC BLOOD PRESSURE: 120 MMHG | TEMPERATURE: 97.9 F | OXYGEN SATURATION: 99 % | HEART RATE: 77 BPM | WEIGHT: 207.7 LBS

## 2020-06-16 VITALS
RESPIRATION RATE: 16 BRPM | DIASTOLIC BLOOD PRESSURE: 83 MMHG | HEART RATE: 68 BPM | TEMPERATURE: 97.5 F | SYSTOLIC BLOOD PRESSURE: 129 MMHG

## 2020-06-16 VITALS
SYSTOLIC BLOOD PRESSURE: 140 MMHG | RESPIRATION RATE: 15 BRPM | HEART RATE: 76 BPM | DIASTOLIC BLOOD PRESSURE: 82 MMHG | TEMPERATURE: 97.4 F

## 2020-06-16 LAB
GLUCOSE BLD-MCNC: 133 MG/DL (ref 70–99)
GLUCOSE BLD-MCNC: 154 MG/DL (ref 70–99)

## 2020-06-16 PROCEDURE — 15275 SKIN SUB GRAFT FACE/NK/HF/G: CPT | Performed by: NURSE PRACTITIONER

## 2020-06-16 PROCEDURE — 82962 GLUCOSE BLOOD TEST: CPT

## 2020-06-16 PROCEDURE — G0277 HBOT, FULL BODY CHAMBER, 30M: HCPCS

## 2020-06-16 PROCEDURE — 15275 SKIN SUB GRAFT FACE/NK/HF/G: CPT

## 2020-06-16 PROCEDURE — 15276 SKIN SUB GRAFT F/N/HF/G ADDL: CPT

## 2020-06-16 PROCEDURE — 99183 HYPERBARIC OXYGEN THERAPY: CPT | Performed by: NURSE PRACTITIONER

## 2020-06-16 PROCEDURE — 99024 POSTOP FOLLOW-UP VISIT: CPT | Performed by: SURGERY

## 2020-06-16 ASSESSMENT — PAIN - FUNCTIONAL ASSESSMENT: PAIN_FUNCTIONAL_ASSESSMENT: PREVENTS OR INTERFERES SOME ACTIVE ACTIVITIES AND ADLS

## 2020-06-16 ASSESSMENT — PAIN DESCRIPTION - ORIENTATION: ORIENTATION: RIGHT

## 2020-06-16 ASSESSMENT — ENCOUNTER SYMPTOMS
SHORTNESS OF BREATH: 0
VOMITING: 0
NAUSEA: 0
ABDOMINAL PAIN: 0

## 2020-06-16 ASSESSMENT — PAIN DESCRIPTION - DESCRIPTORS: DESCRIPTORS: NAGGING

## 2020-06-16 ASSESSMENT — PAIN DESCRIPTION - FREQUENCY: FREQUENCY: CONTINUOUS

## 2020-06-16 ASSESSMENT — PAIN DESCRIPTION - PAIN TYPE: TYPE: ACUTE PAIN

## 2020-06-16 ASSESSMENT — PAIN SCALES - GENERAL: PAINLEVEL_OUTOF10: 5

## 2020-06-16 ASSESSMENT — PAIN DESCRIPTION - LOCATION: LOCATION: FOOT

## 2020-06-16 ASSESSMENT — PAIN DESCRIPTION - PROGRESSION: CLINICAL_PROGRESSION: NOT CHANGED

## 2020-06-16 ASSESSMENT — PAIN DESCRIPTION - ONSET: ONSET: GRADUAL

## 2020-06-16 NOTE — PROGRESS NOTES
[facility],4[address]  Hyperbaric Oxygen Therapy   Progress Note      NAME: Duke Yuen RECORD NUMBER:  [id number]  AGE: 36 y.o. GENDER: male  : 1980  EPISODE DATE:  2020     Subjective     HBO Treatment Number: 21 out of Total Treatments: 40    HBO Diagnosis:     Indications: Lower Extremity Diabetic Wound ___(site)(left foot)  [facility] 3     Safety checks performed prior to treatment. See doc flowsheets for documentation. Objective           Recent Labs     20  1008 20  1212   POCGLU 154* 133*       Pre treatment Vital Signs       Temp: 97.6 °F (36.4 °C)     Pulse: 86     Resp: 16     BP: 134/84     Blood Sugar 154    Post treatment Vital Signs  Temp: 97.5 °F (36.4 °C)  Pulse: 68  Resp: 16  BP: 129/83  Blood Sugar 133    Assessment        Physical Exam:  General Appearance:  alert and oriented to person, place and time, well-developed and well-nourished, in no acute distress    Pre Tympanic Membrane Assessment:  tympanic membranes intact bilaterally    Post Tympanic Membrane Assessment:  Right: Normal(Per Pt.)  Left: Normal(Per Pt.)    Pulmonary/Chest:  clear to auscultation bilaterally- no wheezes, rales or rhonchi, normal air movement, no respiratory distress    Cardiovascular:  normal, regular rate and rhythm    Chamber #: [id number]       Treatment Start Time: 1017     Pressure Reached Time: 1026  SHARRI : 2  Number of Air Breaks:  Treatment Status: No Air break      Decompression Time: 1156   Treatment End Time: 1205    Symptoms Noted During Treatment: None    Adverse Event: no    Total time in chamber: 108  Minutes at depth: 90    I was present on these premises and immediately available to furnish assistance & direction throughout the procedure. Veronika          Alireza Severino is a 36 y.o. male  did successfully complete today's hyperbaric oxygen treatment at [address] [place] [address].     In my clinical judgement, ongoing HBO
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
## 2020-06-17 ENCOUNTER — HOSPITAL ENCOUNTER (OUTPATIENT)
Dept: HYPERBARIC MEDICINE | Age: 40
Discharge: HOME | End: 2020-06-17
Payer: MEDICAID

## 2020-06-17 VITALS
TEMPERATURE: 97.6 F | DIASTOLIC BLOOD PRESSURE: 83 MMHG | RESPIRATION RATE: 16 BRPM | HEART RATE: 76 BPM | SYSTOLIC BLOOD PRESSURE: 126 MMHG

## 2020-06-17 LAB
GLUCOSE BLD-MCNC: 141 MG/DL (ref 70–99)
GLUCOSE BLD-MCNC: 96 MG/DL (ref 70–99)

## 2020-06-17 PROCEDURE — G0277 HBOT, FULL BODY CHAMBER, 30M: HCPCS

## 2020-06-17 PROCEDURE — 82962 GLUCOSE BLOOD TEST: CPT

## 2020-06-17 NOTE — PROGRESS NOTES
[address] [place] [address],  [address]. In my clinical judgement, ongoing HBO therapy is  necessary at this time, given a threat to patient function, limb or life from the current condition. Supervision and attendance of Hyperbaric Oxygen Therapy provided. Continue HBO treatment as outlined in the treatment plan. Hyperbaric Oxygen: Chester Moran tolerated Treatment Number: 22 well today without complications.      Electronically signed by Isabela Mcdonnell MD on 6/17/2020 at 12:33 PM
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
## 2020-06-22 ENCOUNTER — HOSPITAL ENCOUNTER (OUTPATIENT)
Dept: HYPERBARIC MEDICINE | Age: 40
Discharge: HOME | End: 2020-06-22
Payer: MEDICAID

## 2020-06-22 VITALS
RESPIRATION RATE: 16 BRPM | SYSTOLIC BLOOD PRESSURE: 114 MMHG | TEMPERATURE: 97.8 F | DIASTOLIC BLOOD PRESSURE: 77 MMHG | HEART RATE: 82 BPM

## 2020-06-22 LAB
GLUCOSE BLD-MCNC: 111 MG/DL (ref 70–99)
GLUCOSE BLD-MCNC: 167 MG/DL (ref 70–99)
GLUCOSE BLD-MCNC: 169 MG/DL (ref 70–99)

## 2020-06-22 PROCEDURE — 82962 GLUCOSE BLOOD TEST: CPT

## 2020-06-22 PROCEDURE — G0277 HBOT, FULL BODY CHAMBER, 30M: HCPCS

## 2020-06-22 RX ORDER — OMEPRAZOLE 20 MG/1
20 CAPSULE, DELAYED RELEASE ORAL
Qty: 30 CAPSULE | Refills: 2 | Status: SHIPPED | OUTPATIENT
Start: 2020-06-22 | End: 2020-07-27 | Stop reason: SDUPTHER

## 2020-06-22 RX ORDER — TRAZODONE HYDROCHLORIDE 50 MG/1
50 TABLET ORAL NIGHTLY PRN
Qty: 30 TABLET | Refills: 2 | Status: SHIPPED | OUTPATIENT
Start: 2020-06-22 | End: 2020-08-17 | Stop reason: SDUPTHER

## 2020-06-22 NOTE — PROGRESS NOTES
General acute hospital  Hyperbaric Oxygen Therapy   Progress Note      NAME: Duke Yuen RECORD NUMBER:  [id number]  AGE: 36 y.o. GENDER: male  : 1980  EPISODE DATE:  2020     Subjective     HBO Treatment Number: 23 out of Total Treatments: 40    HBO Diagnosis:     Indications: Lower Extremity Diabetic Wound ___(site)(left foot)  [address]     Safety checks performed prior to treatment. See doc flowsheets for documentation. Objective           Recent Labs     20  1500 20  1657   POCGLU 169* 111*       Pre treatment Vital Signs       Temp: 98.1 °F (36.7 °C)     Pulse: 78     Resp: 16     BP: 122/75     Blood Sugar 167    Post treatment Vital Signs  Temp: 97.8 °F (36.6 °C)  Pulse: 82  Resp: 16  BP: 114/77  Blood Sugar 111    Assessment        Physical Exam:  General Appearance:  alert and oriented to person, place and time, well-developed and well-nourished, in no acute distress    Pre Tympanic Membrane Assessment:  tympanic membranes intact bilaterally, normal color    Post Tympanic Membrane Assessment:  Right: Normal(per Pt.)  Left: Normal(per Pt.)    Pulmonary/Chest:  clear to auscultation bilaterally- no wheezes, rales or rhonchi, normal air movement, no respiratory distress and no chest wall tenderness    Cardiovascular:  normal, regular rate and rhythm    Chamber #: [id number]       Treatment Start Time: 1503     Pressure Reached Time: 1512  SHARRI : 2  Number of Air Breaks:  Treatment Status: No Air break      Decompression Time: 1642   Treatment End Time: 1650    Symptoms Noted During Treatment: None    TOTAL TIME AT DEPTH-90    TOTAL TIME IN CHAMBER-107    Adverse Event: no      I was present on these premises and immediately available to furnish assistance & direction throughout the procedure.        Veronika De Guzman is a 36 y.o. male  did successfully complete today's hyperbaric oxygen treatment at [place] [address]

## 2020-06-23 ENCOUNTER — HOSPITAL ENCOUNTER (OUTPATIENT)
Dept: HYPERBARIC MEDICINE | Age: 40
Discharge: HOME | End: 2020-06-23
Payer: MEDICAID

## 2020-06-23 VITALS
TEMPERATURE: 96.9 F | HEART RATE: 74 BPM | RESPIRATION RATE: 16 BRPM | SYSTOLIC BLOOD PRESSURE: 112 MMHG | DIASTOLIC BLOOD PRESSURE: 76 MMHG

## 2020-06-23 LAB
GLUCOSE BLD-MCNC: 100 MG/DL (ref 70–99)
GLUCOSE BLD-MCNC: 96 MG/DL (ref 70–99)

## 2020-06-23 PROCEDURE — G0277 HBOT, FULL BODY CHAMBER, 30M: HCPCS

## 2020-06-23 PROCEDURE — 99183 HYPERBARIC OXYGEN THERAPY: CPT | Performed by: NURSE PRACTITIONER

## 2020-06-24 ENCOUNTER — HOSPITAL ENCOUNTER (OUTPATIENT)
Dept: HYPERBARIC MEDICINE | Age: 40
Discharge: HOME | End: 2020-06-24
Payer: MEDICAID

## 2020-06-24 VITALS
DIASTOLIC BLOOD PRESSURE: 71 MMHG | TEMPERATURE: 97.3 F | HEART RATE: 64 BPM | SYSTOLIC BLOOD PRESSURE: 111 MMHG | RESPIRATION RATE: 16 BRPM

## 2020-06-24 LAB
GLUCOSE BLD-MCNC: 127 MG/DL (ref 70–99)
GLUCOSE BLD-MCNC: 136 MG/DL (ref 70–99)

## 2020-06-24 PROCEDURE — 99183 HYPERBARIC OXYGEN THERAPY: CPT | Performed by: SURGERY

## 2020-06-24 PROCEDURE — G0277 HBOT, FULL BODY CHAMBER, 30M: HCPCS

## 2020-06-24 PROCEDURE — 82962 GLUCOSE BLOOD TEST: CPT

## 2020-06-25 ENCOUNTER — HOSPITAL ENCOUNTER (OUTPATIENT)
Dept: WOUND CARE | Age: 40
Discharge: HOME | End: 2020-06-25
Payer: MEDICAID

## 2020-06-25 ENCOUNTER — HOSPITAL ENCOUNTER (OUTPATIENT)
Dept: HYPERBARIC MEDICINE | Age: 40
Discharge: HOME | End: 2020-06-25
Payer: MEDICAID

## 2020-06-25 VITALS
RESPIRATION RATE: 16 BRPM | DIASTOLIC BLOOD PRESSURE: 78 MMHG | SYSTOLIC BLOOD PRESSURE: 118 MMHG | HEART RATE: 63 BPM | TEMPERATURE: 97.4 F

## 2020-06-25 LAB
GLUCOSE BLD-MCNC: 132 MG/DL (ref 70–99)
GLUCOSE BLD-MCNC: 161 MG/DL (ref 70–99)

## 2020-06-25 PROCEDURE — 15275 SKIN SUB GRAFT FACE/NK/HF/G: CPT

## 2020-06-25 PROCEDURE — G0277 HBOT, FULL BODY CHAMBER, 30M: HCPCS

## 2020-06-25 PROCEDURE — 82962 GLUCOSE BLOOD TEST: CPT

## 2020-06-25 PROCEDURE — 15275 SKIN SUB GRAFT FACE/NK/HF/G: CPT | Performed by: NURSE PRACTITIONER

## 2020-06-25 NOTE — PROGRESS NOTES
[facility]  Hyperbaric Oxygen Therapy   Progress Note      NAME: Duke Yuen RECORD NUMBER:  [id number]  AGE: 36 y.o. GENDER: male  : 1980  EPISODE DATE:  2020     Subjective     HBO Treatment Number: 26 out of Total Treatments: 40    HBO Diagnosis:     Indications: Lower Extremity Diabetic Wound ___(site)(Left foot)  [address]     Safety checks performed prior to treatment. See doc flowsheets for documentation. Objective           Recent Labs     20  1507 20  1010   POCGLU 127* 161*       Pre treatment Vital Signs       Temp: 97.5 °F (36.4 °C)     Pulse: 69     Resp: 16     BP: 124/80     Blood Sugar 161    Post treatment Vital Signs  Temp: 97.4 °F (36.3 °C)  Pulse: 63  Resp: 16  BP: 118/78  Blood Sugar 132    Assessment        Physical Exam:  General Appearance:  alert and oriented to person, place and time, well-developed and well-nourished, in no acute distress and alert and oriented to person, place and time    Pre Tympanic Membrane Assessment:  tympanic membranes intact bilaterally, normal color    Post Tympanic Membrane Assessment:  Right: Normal(per Pt.)  Left: Normal(per Pt.)    Pulmonary/Chest:  clear to auscultation bilaterally- no wheezes, rales or rhonchi, normal air movement, no respiratory distress    Cardiovascular:  normal, regular rate and rhythm    Chamber #: [id number]       Treatment Start Time: 1014     Pressure Reached Time: 1022  SHARRI : 2  Number of Air Breaks:  Treatment Status: No Air break      Decompression Time: 1152   Treatment End Time: 1201    Symptoms Noted During Treatment: None    TOTAL TIME AT DEPTH-90    TOTAL TIME IN CHAMBER-107    Adverse Event: no      I was present on these premises and immediately available to furnish assistance & direction throughout the procedure.        Veronika Moran is a 36 y.o. male  did successfully complete today's hyperbaric oxygen treatment at [facility]

## 2020-06-25 NOTE — PROGRESS NOTES
1 kit by Does not apply route once for 1 dose 1 kit 0    blood glucose monitor strips Test 3 times a day & as needed for symptoms of irregular blood glucose. 270 strip 2    INSULIN SYRINGE .5CC/29G ([employer] INS SYR .5CC/29G) 29G X 1/2\" 0.5 ML MISC 1 each by Does not apply route daily 100 each 3    aspirin 325 MG tablet Take 325 mg by mouth daily      insulin lispro (HUMALOG) 100 UNIT/ML injection vial Inject 15 Units into the skin 3 times daily (with meals) 1 vial 3     No current facility-administered medications on file prior to encounter. REVIEW OF SYSTEMS    Pertinent items are noted in HPI. Constitutional: Negative for systemic symptoms including fever, chills and malaise. Objective: There were no vitals taken for this visit. PHYSICAL EXAM  General: The patient is in no acute distress. Mental status:  Patient is appropriate, is  oriented to place and plan of care. Dermatologic exam: Visual inspection of the periwound reveals the skin to be normal in turgor and texture  Wound exam: see wound description below in procedure note      Assessment:     Problem List Items Addressed This Visit     Diabetic foot ulcer ([address]    Right foot pain    HBO-Diabetic ulcer of left midfoot associated with type 2 diabetes mellitus, with bone involvement without evidence of necrosis ([address] guerra 3 - Primary          Conditions above and those listed in the past history are being treated appropriately and are considered under adequate control so as not to preclude the use of a graft. SKIN SUBSTITUTES/GRAFT APPLICATIONS PROCEDURE NOTE    Indicaton:     Chronic ulcer(s) of the left foot 3rd toe amputation site that has(have) failed to heal with the usual wound protocol used for greater than 30 days. See Epic chart for previous modalities and details of previous treatment. The patient has no contraindications or evidence of infection.   The patient's competency and support system is appropriate for proper care and follow up for the use of this graft, therefore a graft was placed as below. Procedure: The wound bed was cleansed and prepared as necessary to accept the graft. Debridement was required. Consent obtained: Yes    Time out taken: Yes    Using curette sharp Excisional Debridement of the wound(s) was/were performed down through and including the removal of subcutaneous tissue. Devitalized Tissue Debrided:  slough, exudate and callus      Bleeding:  Minimal    Hemostasis Achieved:  by pressure    Procedural Pain:  0  / 10     Post Procedural Pain:  0 / 10     Having prepared the wound bed, the skin graft was completed as below.     Product Utilized:          [] APLIGRAF   []44 sq cm   []88 sq cm    []132 sq cm  []176 sq cm           [x] DERMAGRAFT  [x] 38 sq cm   []76 sq cm    []114 sq cm  []152 sq cm      [] NUSHIELD  [] 1.6 sq/cm disc   [] (2X3) 6 sq/cm    [] (2X4) 8 sq/cm         [] (3X4) 12 sq/cm  [] (4x4) 16 sq/cm   [] (4X6) 24 sq/cm         [] (6X6) 36 sq/cm        [] AFFINITY    [] (1.5 cm x 1.5cm) 2.25 cm   [] (2.5 cm x 2.5 cm) 6.25 cm         [] THERASKIN  [] 13 sq cm   []37.34 sq cm             [] EpiFix   [] 1.54 sq cm disc    [] 2 pieces (3.08 sq cm)    [] 3  pieces (4.62 sq  cm)   [] 6 sq/cm    [] 16 sq cm     [] 18 sq cm   Fenestrated        [] OASIS   [] 10.5 sq cm   []21 sq cm    []35 sq cm Tri-Matrix  []70 sq cm          Tri-Matrix                    [] PURAPLY   [] 1.6 sq cm disc     [] 4 sq cm   [] 8 sq cm   [] 25sq cm  [] 54 sq cm                  [] Grafix      [] 1.54 sq cm disc    [] 3 sq cm    [] 6 sq cm   [] 12 sq cm   [] 25 sq cm        Skin Substitute Applied:    Performed by: BARBARA Booker CNP    Consent obtained: Yes    Time out taken: Yes     Fenestrated: No    Skin Substitute was Applied to Wound Number(s):     1      Wound 03/26/20 Left  #1  3rd toe amp site  (Active)   Wound Image   6/16/2020 12:59 PM   Wound Non-Healing Surgical 6/25/2020  1:17 PM   Dressing Status Clean;Dry; Intact 6/16/2020  1:42 PM   Dressing Changed Changed/New 6/16/2020  1:42 PM   Wound Cleansed Soap and water 6/25/2020  1:17 PM   Wound Length (cm) 2 cm 6/25/2020  1:17 PM   Wound Width (cm) 0.4 cm 6/25/2020  1:17 PM   Wound Depth (cm) 1 cm 6/25/2020  1:17 PM   Wound Surface Area (cm^2) 0.8 cm^2 6/25/2020  1:17 PM   Change in Wound Size % (l*w) 16.67 6/25/2020  1:17 PM   Wound Volume (cm^3) 0.8 cm^3 6/25/2020  1:17 PM   Wound Healing % 40 6/25/2020  1:17 PM   Post-Procedure Length (cm) 2 cm 6/25/2020  1:40 PM   Post-Procedure Width (cm) 0.4 cm 6/25/2020  1:40 PM   Post-Procedure Depth (cm) 1 cm 6/25/2020  1:40 PM   Post-Procedure Surface Area (cm^2) 0.8 cm^2 6/25/2020  1:40 PM   Post-Procedure Volume (cm^3) 0.8 cm^3 6/25/2020  1:40 PM   Distance Tunneling (cm) 0 cm 6/25/2020  1:17 PM   Tunneling Position ___ O'Clock 0 6/25/2020  1:17 PM   Undermining Starts ___ O'Clock 0 6/25/2020  1:17 PM   Undermining Ends___ O'Clock 0 6/25/2020  1:17 PM   Undermining Maxium Distance (cm) 0 6/25/2020  1:17 PM   Wound Assessment Red;Pink 6/25/2020  1:17 PM   Drainage Amount Moderate 6/25/2020  1:17 PM   Drainage Description Serosanguinous 6/25/2020  1:17 PM   Odor None 6/25/2020  1:17 PM   Margins Defined edges 6/25/2020  1:17 PM   Exposed structure Bone 5/26/2020  1:01 PM   Dee-wound Assessment Pink 6/25/2020  1:17 PM   Non-staged Wound Description Full thickness 6/25/2020  1:17 PM   [place]%Wound Bed 50 6/25/2020  1:17 PM   Red%Wound Bed 50 6/25/2020  1:17 PM   Yellow%Wound Bed 0 6/25/2020  1:17 PM   Black%Wound Bed 0 6/25/2020  1:17 PM   Purple%Wound Bed 0 6/25/2020  1:17 PM   Other%Wound Bed 0 6/25/2020  1:17 PM   Number of days: 91         Total Surface Area Covered 0.8 sq/cm    Was the Product Layered  Yes      Amount Wasted 0 sq/cm    Reason for Waste: material provided was greater than wound size      Secured: Yes    Instruments used to complete placement of graft::scissors and forceps    Secured

## 2020-06-29 RX ORDER — GABAPENTIN 100 MG/1
100 CAPSULE ORAL 3 TIMES DAILY
Qty: 90 CAPSULE | Refills: 1 | Status: SHIPPED | OUTPATIENT
Start: 2020-06-29 | End: 2020-07-31 | Stop reason: SDUPTHER

## 2020-06-30 ENCOUNTER — HOSPITAL ENCOUNTER (OUTPATIENT)
Dept: HYPERBARIC MEDICINE | Age: 40
Discharge: HOME | End: 2020-06-30
Payer: MEDICAID

## 2020-06-30 ENCOUNTER — HOSPITAL ENCOUNTER (OUTPATIENT)
Dept: WOUND CARE | Age: 40
Discharge: HOME | End: 2020-06-30
Payer: MEDICAID

## 2020-06-30 VITALS
TEMPERATURE: 97.2 F | RESPIRATION RATE: 16 BRPM | DIASTOLIC BLOOD PRESSURE: 72 MMHG | SYSTOLIC BLOOD PRESSURE: 144 MMHG | HEART RATE: 88 BPM

## 2020-06-30 LAB
GLUCOSE BLD-MCNC: 69 MG/DL (ref 70–99)
GLUCOSE BLD-MCNC: 74 MG/DL (ref 70–99)
GLUCOSE BLD-MCNC: 85 MG/DL (ref 70–99)

## 2020-06-30 PROCEDURE — 82962 GLUCOSE BLOOD TEST: CPT

## 2020-06-30 PROCEDURE — 99212 OFFICE O/P EST SF 10 MIN: CPT

## 2020-06-30 ASSESSMENT — PAIN DESCRIPTION - PAIN TYPE: TYPE: ACUTE PAIN

## 2020-06-30 ASSESSMENT — PAIN DESCRIPTION - LOCATION: LOCATION: LEG

## 2020-06-30 ASSESSMENT — PAIN DESCRIPTION - FREQUENCY: FREQUENCY: CONTINUOUS

## 2020-06-30 ASSESSMENT — PAIN DESCRIPTION - ORIENTATION: ORIENTATION: RIGHT;LEFT

## 2020-06-30 ASSESSMENT — PAIN - FUNCTIONAL ASSESSMENT: PAIN_FUNCTIONAL_ASSESSMENT: PREVENTS OR INTERFERES SOME ACTIVE ACTIVITIES AND ADLS

## 2020-06-30 ASSESSMENT — PAIN DESCRIPTION - PROGRESSION: CLINICAL_PROGRESSION: GRADUALLY WORSENING

## 2020-06-30 ASSESSMENT — PAIN DESCRIPTION - ONSET: ONSET: GRADUAL

## 2020-06-30 ASSESSMENT — PAIN DESCRIPTION - DESCRIPTORS: DESCRIPTORS: TINGLING

## 2020-06-30 NOTE — PROGRESS NOTES
Pt. Arrived for HBO approximately 0948; changed clothes for treatment. SN obtained VS and first  BS-85  approximately 0956. Patient reports had a decent breakfast that did include protein; SN gave pt. O.J. & Peanutbutter crackers to try to raise Blood sugar for HBO ; JAYME Camacho informed. SN rechecked Blood sugar at 1025-74. Pt. Given more O.J., waited more time; patient reports had eaten at 0900; SN rechecked blood sugar again at 1048-69. Patient reports feels full ; discussed with patient possibilities of causes for BS  going down; patient reports he possibly took more Lantus last evening as he  was not given sliding scale dosage and maybe took more than needed. SN instructed patient to follow up with his PCP for clarification on this. Patient instructed and agreed; took sips of Glucerna before leaving the [facility] ; instructed patient  need to eat to keep BS form going too low. JAYME Camacho informed. Patient  brought up needing clarification on appointment times for podiatrist and ID doctor; SN called 's office to get the time . SN instructed and wrote down appointments for Deyvi Smith, Dr. Jenni Palomino, and  [address] for this week ; given to patient. SN called patient's S.O. approximately 1155 to inform Pt. Ready for . Pt. Denied the need for stand by assist. to ambulate to lobby to wait for ride; stated \" I'm okay, I'll see you tomorrow\". HBO treatment held today due to hypoglycemia as agreed per JAYME Camacho & this SN.

## 2020-07-06 ENCOUNTER — TELEPHONE (OUTPATIENT)
Dept: INTERNAL MEDICINE CLINIC | Age: 40
End: 2020-07-06

## 2020-07-06 ENCOUNTER — OFFICE VISIT (OUTPATIENT)
Dept: INTERNAL MEDICINE CLINIC | Age: 40
End: 2020-07-06
Payer: MEDICAID

## 2020-07-06 VITALS
DIASTOLIC BLOOD PRESSURE: 89 MMHG | SYSTOLIC BLOOD PRESSURE: 141 MMHG | OXYGEN SATURATION: 98 % | RESPIRATION RATE: 20 BRPM | BODY MASS INDEX: 25.31 KG/M2 | HEIGHT: 73 IN | HEART RATE: 101 BPM | WEIGHT: 191 LBS

## 2020-07-06 PROBLEM — R53.1 WEAKNESS: Status: RESOLVED | Noted: 2020-05-03 | Resolved: 2020-07-06

## 2020-07-06 LAB — HBA1C MFR BLD: 5.8 %

## 2020-07-06 PROCEDURE — G8419 CALC BMI OUT NRM PARAM NOF/U: HCPCS | Performed by: NURSE PRACTITIONER

## 2020-07-06 PROCEDURE — 83036 HEMOGLOBIN GLYCOSYLATED A1C: CPT | Performed by: NURSE PRACTITIONER

## 2020-07-06 PROCEDURE — G8427 DOCREV CUR MEDS BY ELIG CLIN: HCPCS | Performed by: NURSE PRACTITIONER

## 2020-07-06 PROCEDURE — 4004F PT TOBACCO SCREEN RCVD TLK: CPT | Performed by: NURSE PRACTITIONER

## 2020-07-06 PROCEDURE — 2022F DILAT RTA XM EVC RTNOPTHY: CPT | Performed by: NURSE PRACTITIONER

## 2020-07-06 PROCEDURE — 99214 OFFICE O/P EST MOD 30 MIN: CPT | Performed by: NURSE PRACTITIONER

## 2020-07-06 PROCEDURE — 3044F HG A1C LEVEL LT 7.0%: CPT | Performed by: NURSE PRACTITIONER

## 2020-07-06 RX ORDER — BUSPIRONE HYDROCHLORIDE 5 MG/1
5 TABLET ORAL 2 TIMES DAILY
Qty: 60 TABLET | Refills: 1 | Status: SHIPPED | OUTPATIENT
Start: 2020-07-06 | End: 2020-08-31

## 2020-07-06 ASSESSMENT — ENCOUNTER SYMPTOMS
CONSTIPATION: 0
ABDOMINAL PAIN: 0
WHEEZING: 0
COLOR CHANGE: 0
COUGH: 0
EYES NEGATIVE: 1
SHORTNESS OF BREATH: 0
SORE THROAT: 0
NAUSEA: 0
DIARRHEA: 0
ABDOMINAL DISTENTION: 0
CHEST TIGHTNESS: 0
SINUS PAIN: 0
SINUS PRESSURE: 0
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
## 2020-07-13 ENCOUNTER — HOSPITAL ENCOUNTER (OUTPATIENT)
Dept: WOUND CARE | Age: 40
Discharge: HOME | End: 2020-07-13
Payer: MEDICAID

## 2020-07-13 VITALS
RESPIRATION RATE: 20 BRPM | HEART RATE: 68 BPM | DIASTOLIC BLOOD PRESSURE: 75 MMHG | SYSTOLIC BLOOD PRESSURE: 134 MMHG | TEMPERATURE: 98.9 F

## 2020-07-13 PROCEDURE — 11042 DBRDMT SUBQ TIS 1ST 20SQCM/<: CPT

## 2020-07-13 PROCEDURE — 11042 DBRDMT SUBQ TIS 1ST 20SQCM/<: CPT | Performed by: NURSE PRACTITIONER
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
## 2020-07-14 ENCOUNTER — HOSPITAL ENCOUNTER (OUTPATIENT)
Dept: HYPERBARIC MEDICINE | Age: 40
Discharge: HOME | End: 2020-07-14
Payer: MEDICAID

## 2020-07-14 VITALS
HEART RATE: 61 BPM | RESPIRATION RATE: 16 BRPM | SYSTOLIC BLOOD PRESSURE: 120 MMHG | TEMPERATURE: 97.4 F | DIASTOLIC BLOOD PRESSURE: 80 MMHG

## 2020-07-14 LAB
GLUCOSE BLD-MCNC: 172 MG/DL (ref 70–99)
GLUCOSE BLD-MCNC: 220 MG/DL (ref 70–99)

## 2020-07-14 PROCEDURE — G0277 HBOT, FULL BODY CHAMBER, 30M: HCPCS

## 2020-07-14 PROCEDURE — 82962 GLUCOSE BLOOD TEST: CPT

## 2020-07-14 PROCEDURE — 99183 HYPERBARIC OXYGEN THERAPY: CPT | Performed by: NURSE PRACTITIONER

## 2020-07-14 NOTE — PLAN OF CARE
Patient tolerated treatment well; rested with eyes closed at times during the treatment with without signs of distress noted.

## 2020-07-15 ENCOUNTER — OFFICE VISIT (OUTPATIENT)
Dept: INFECTIOUS DISEASES | Age: 40
End: 2020-07-15
Payer: MEDICAID

## 2020-07-15 VITALS
BODY MASS INDEX: 26.36 KG/M2 | HEART RATE: 80 BPM | SYSTOLIC BLOOD PRESSURE: 135 MMHG | TEMPERATURE: 99.1 F | WEIGHT: 199.8 LBS | DIASTOLIC BLOOD PRESSURE: 78 MMHG

## 2020-07-15 PROBLEM — B35.3 TINEA PEDIS OF BOTH FEET: Status: ACTIVE | Noted: 2020-07-15

## 2020-07-15 PROCEDURE — 99213 OFFICE O/P EST LOW 20 MIN: CPT | Performed by: INTERNAL MEDICINE

## 2020-07-15 PROCEDURE — G8419 CALC BMI OUT NRM PARAM NOF/U: HCPCS | Performed by: INTERNAL MEDICINE

## 2020-07-15 PROCEDURE — G8427 DOCREV CUR MEDS BY ELIG CLIN: HCPCS | Performed by: INTERNAL MEDICINE

## 2020-07-15 PROCEDURE — 4004F PT TOBACCO SCREEN RCVD TLK: CPT | Performed by: INTERNAL MEDICINE

## 2020-07-15 RX ORDER — CLOTRIMAZOLE 1 %
CREAM (GRAM) TOPICAL
Qty: 60 G | Refills: 1 | Status: SHIPPED
Start: 2020-07-15 | End: 2021-06-11 | Stop reason: CLARIF

## 2020-07-15 NOTE — PROGRESS NOTES
[address] [facility],[address]  Hyperbaric Oxygen Therapy   Progress Note      NAME: Duke Yuen RECORD NUMBER:  [id number]  AGE: 36 y.o. GENDER: male  : 1980  EPISODE DATE:  2020     Subjective     HBO Treatment Number: 27 out of Total Treatments: 40    HBO Diagnosis:     Indications: Lower Extremity Diabetic Wound ___(site)(left foot)  [place] [address]     Safety checks performed prior to treatment. See doc flowsheets for documentation. Objective           Recent Labs     20  1013 20  1204   POCGLU 172* 220*       Pre treatment Vital Signs       Temp: 97.7 °F (36.5 °C)     Pulse: 69     Resp: 16     BP: 116/75     Blood Sugar 172    Post treatment Vital Signs  Temp: 97.4 °F (36.3 °C)  Pulse: 61  Resp: 16  BP: 120/80  Blood Sugar 220    Assessment        Physical Exam:  General Appearance:  alert and oriented to person, place and time, well-developed and well-nourished, in no acute distress    Pre Tympanic Membrane Assessment:  tympanic membranes intact bilaterally    Post Tympanic Membrane Assessment:  Right: Normal(per Pt.)  Left: Normal(per Pt.)    Pulmonary/Chest:  clear to auscultation bilaterally- no wheezes, rales or rhonchi, normal air movement, no respiratory distress    Cardiovascular:  normal, regular rate and rhythm    Chamber #: [id number]       Treatment Start Time: 1027(Treatment was started late because patient arrived late ,also waited for provider to check Pt. and clear for HBO.)     Pressure Reached Time: 1036  SHARRI : 2  Number of Air Breaks:  Treatment Status: No Air break      Decompression Time: 1147   Treatment End Time: 1156    Symptoms Noted During Treatment: None    Adverse Event: no    Total time in chamber: 89  Minutes at depth: 71    I was present on these premises and immediately available to furnish assistance & direction throughout the procedure.        Plan          Latonia Khan is a 36 y.o. male  did successfully complete today's hyperbaric oxygen treatment at [address] [place] [address] Vel Adams. In my clinical judgement, ongoing HBO therapy is  necessary at this time, given a threat to patient function, limb or life from the current condition. Supervision and attendance of Hyperbaric Oxygen Therapy provided. Continue HBO treatment as outlined in the treatment plan. Hyperbaric Oxygen: Monica Pedrazayared tolerated Treatment Number: 27 well today without complications.      Electronically signed by BARBARA Jackson CNP on 7/14/2020 at 6:54 AM

## 2020-07-15 NOTE — ASSESSMENT & PLAN NOTE
Left middle toe surgical site has healed but the crevice seems to be keeping moisture and clearly has tinea pedis. Start miconazole ointment.
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
## 2020-07-20 ENCOUNTER — HOSPITAL ENCOUNTER (OUTPATIENT)
Dept: WOUND CARE | Age: 40
Discharge: HOME | End: 2020-07-20
Payer: MEDICAID

## 2020-07-20 ENCOUNTER — HOSPITAL ENCOUNTER (OUTPATIENT)
Dept: HYPERBARIC MEDICINE | Age: 40
Discharge: HOME | End: 2020-07-20
Payer: MEDICAID

## 2020-07-20 VITALS
HEART RATE: 87 BPM | SYSTOLIC BLOOD PRESSURE: 152 MMHG | RESPIRATION RATE: 16 BRPM | DIASTOLIC BLOOD PRESSURE: 90 MMHG | TEMPERATURE: 96.1 F

## 2020-07-20 VITALS
TEMPERATURE: 97.6 F | SYSTOLIC BLOOD PRESSURE: 148 MMHG | RESPIRATION RATE: 18 BRPM | DIASTOLIC BLOOD PRESSURE: 83 MMHG | HEART RATE: 81 BPM

## 2020-07-20 LAB
GLUCOSE BLD-MCNC: 227 MG/DL (ref 70–99)
GLUCOSE BLD-MCNC: 244 MG/DL (ref 70–99)

## 2020-07-20 PROCEDURE — 82962 GLUCOSE BLOOD TEST: CPT

## 2020-07-20 PROCEDURE — G0277 HBOT, FULL BODY CHAMBER, 30M: HCPCS

## 2020-07-20 PROCEDURE — 99212 OFFICE O/P EST SF 10 MIN: CPT

## 2020-07-20 ASSESSMENT — PAIN DESCRIPTION - DESCRIPTORS
DESCRIPTORS: ACHING
DESCRIPTORS: BURNING

## 2020-07-20 ASSESSMENT — PAIN DESCRIPTION - ORIENTATION
ORIENTATION: LEFT;RIGHT
ORIENTATION: LEFT

## 2020-07-20 ASSESSMENT — PAIN SCALES - GENERAL
PAINLEVEL_OUTOF10: 2
PAINLEVEL_OUTOF10: 3

## 2020-07-20 ASSESSMENT — PAIN DESCRIPTION - PROGRESSION
CLINICAL_PROGRESSION: NOT CHANGED
CLINICAL_PROGRESSION: NOT CHANGED

## 2020-07-20 ASSESSMENT — PAIN DESCRIPTION - PAIN TYPE
TYPE: SURGICAL PAIN
TYPE: ACUTE PAIN

## 2020-07-20 ASSESSMENT — PAIN DESCRIPTION - ONSET
ONSET: ON-GOING
ONSET: ON-GOING

## 2020-07-20 ASSESSMENT — PAIN - FUNCTIONAL ASSESSMENT: PAIN_FUNCTIONAL_ASSESSMENT: ACTIVITIES ARE NOT PREVENTED

## 2020-07-20 ASSESSMENT — PAIN DESCRIPTION - FREQUENCY: FREQUENCY: CONTINUOUS

## 2020-07-20 ASSESSMENT — PAIN DESCRIPTION - LOCATION
LOCATION: FOOT
LOCATION: LEG;KNEE

## 2020-07-20 NOTE — PROGRESS NOTES
Betadine applied to healed wound to help protect new epithelization tissue from moisture. Pt tolerated well. Pt verbalized understanding of how to care for newly healed wound at home.     Electronically signed by Daija Rodriguez RN on 7/20/2020 at 2:17 PM

## 2020-07-21 ENCOUNTER — HOSPITAL ENCOUNTER (OUTPATIENT)
Dept: HYPERBARIC MEDICINE | Age: 40
Discharge: HOME | End: 2020-07-21
Payer: MEDICAID

## 2020-07-21 NOTE — PROGRESS NOTES
you have questions or develop any of the symptoms mentioned, please contact the [facility] at [address] [place] [address] Place 8:00 am - 5:00 pm. If you need help outside these hours and cannot wait until we are again available, contact your PCP or go to the hospital emergency room. Patient Signature:_______________________Date:_________Time:________    [] Patient unable to sign Discharge Instructions given to ECF/Transportation/POA    The information contained in the After Visit Summary has been reviewed with me, the patient and/or responsible adult, by my health care provider(s). I had the opportunity to ask questions regarding this information.   I have elected to receive  [x]  After Visit Summary        Nurse Signature:_______________________Date:_________Time:_________    Electronically signed by Miranda Garcia LPN on 0/82/1795 at 3:87 PM

## 2020-07-22 PROBLEM — L84 CORNS AND CALLOSITIES: Status: ACTIVE | Noted: 2020-07-22

## 2020-07-22 PROBLEM — E11.42 DIABETIC POLYNEUROPATHY (HCC): Status: ACTIVE | Noted: 2020-07-22
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
## 2020-07-22 NOTE — PROGRESS NOTES
[address] [facility],[address]  Hyperbaric Oxygen Therapy   Progress Note      NAME: Duke Yuen RECORD NUMBER:  [id number]  AGE: 36 y.o. GENDER: male  : 1980  EPISODE DATE:  2020     Subjective     HBO Treatment Number: 28 out of Total Treatments: 40    HBO Diagnosis:     Indications: Lower Extremity Diabetic Wound ___(site)(left foot)  Dino Lal Plate 3     Safety checks performed prior to treatment. See doc flowsheets for documentation.     Objective           Recent Labs     20  1020 20  1214   POCGLU 244* 227*       Pre treatment Vital Signs       Temp: 98 °F (36.7 °C)     Pulse: 97     Resp: 16     BP: (!) 137/92(SN will inform )     Blood Sugar 244    Post treatment Vital Signs  Temp: 96.1 °F (35.6 °C)  Pulse: 87  Resp: 16  BP: (!) 152/90  Blood Sugar 227    Assessment        Physical Exam:  General Appearance:  alert and oriented to person, place and time, well-developed and well-nourished, in no acute distress    Pre Tympanic Membrane Assessment:  tympanic membranes intact bilaterally, normal color, normal light reflex bilaterally    Post Tympanic Membrane Assessment:  Right: Normal(per Pt.)  Left: Normal(per Pt.)    Pulmonary/Chest:  clear to auscultation bilaterally- no wheezes, rales or rhonchi, normal air movement, no respiratory distress    Cardiovascular:  regular rate and rhythm, no murmurs rubs or gallops    Chamber #: [id number]       Treatment Start Time: 1040(Treatment was started late because patient arrived late , then waited for the provider ( was with a clinic Pt.) to check  for HBO.)     Pressure Reached Time: 1050  SHARRI : 2  Number of Air Breaks:  Treatment Status: No Air break      Decompression Time: 1200   Treatment End Time: 1209    Symptoms Noted During Treatment: None    TOTAL TIME AT DEPTH-70    TOTAL TIME IN CHAMBER-89    Adverse Event: no      I was present on these premises and immediately available to furnish assistance & direction throughout the procedure. Plan          Huey Babinski is a 36 y.o. male  did successfully complete today's hyperbaric oxygen treatment at [address] [place] [address]. In my clinical judgement, ongoing HBO therapy is  necessary at this time, given a threat to patient function, limb or life from the current condition. Supervision and attendance of Hyperbaric Oxygen Therapy provided. Continue HBO treatment as outlined in the treatment plan. Hyperbaric Oxygen: Huey Babinski tolerated Treatment Number: 28 well today without complications.      Electronically signed by Mario Singleton MD on 7/20/2020 at 12:21 PM

## 2020-07-27 DIAGNOSIS — M86.9 OSTEOMYELITIS OF THIRD TOE OF LEFT FOOT (HCC): ICD-10-CM

## 2020-07-27 LAB
ALBUMIN SERPL-MCNC: 4.2 G/DL (ref 3.4–5)
ALP BLD-CCNC: 90 U/L (ref 40–129)
ALT SERPL-CCNC: 16 U/L (ref 10–40)
ANION GAP SERPL CALCULATED.3IONS-SCNC: 13 MMOL/L (ref 3–16)
AST SERPL-CCNC: 20 U/L (ref 15–37)
BILIRUB SERPL-MCNC: 0.3 MG/DL (ref 0–1)
BILIRUBIN DIRECT: <0.2 MG/DL (ref 0–0.3)
BILIRUBIN, INDIRECT: NORMAL MG/DL (ref 0–1)
BUN BLDV-MCNC: 15 MG/DL (ref 7–20)
C-REACTIVE PROTEIN: 0.6 MG/L (ref 0–5.1)
CALCIUM SERPL-MCNC: 9.3 MG/DL (ref 8.3–10.6)
CHLORIDE BLD-SCNC: 101 MMOL/L (ref 99–110)
CO2: 24 MMOL/L (ref 21–32)
CREAT SERPL-MCNC: 0.9 MG/DL (ref 0.9–1.3)
GFR AFRICAN AMERICAN: >60
GFR NON-AFRICAN AMERICAN: >60
GLUCOSE BLD-MCNC: 159 MG/DL (ref 70–99)
HCT VFR BLD CALC: 33.3 % (ref 40.5–52.5)
HEMOGLOBIN: 11.3 G/DL (ref 13.5–17.5)
MCH RBC QN AUTO: 29.5 PG (ref 26–34)
MCHC RBC AUTO-ENTMCNC: 34.1 G/DL (ref 31–36)
MCV RBC AUTO: 86.7 FL (ref 80–100)
PDW BLD-RTO: 15.3 % (ref 12.4–15.4)
PLATELET # BLD: 147 K/UL (ref 135–450)
PMV BLD AUTO: 9.2 FL (ref 5–10.5)
POTASSIUM SERPL-SCNC: 4.4 MMOL/L (ref 3.5–5.1)
RBC # BLD: 3.84 M/UL (ref 4.2–5.9)
SEDIMENTATION RATE, ERYTHROCYTE: 50 MM/HR (ref 0–15)
SODIUM BLD-SCNC: 138 MMOL/L (ref 136–145)
TOTAL PROTEIN: 8.1 G/DL (ref 6.4–8.2)
WBC # BLD: 4.3 K/UL (ref 4–11)

## 2020-07-27 RX ORDER — DOXYCYCLINE HYCLATE 100 MG
100 TABLET ORAL 2 TIMES DAILY
Qty: 84 TABLET | Refills: 1 | Status: CANCELLED | OUTPATIENT
Start: 2020-07-27 | End: 2020-10-19

## 2020-07-27 RX ORDER — OMEPRAZOLE 20 MG/1
20 CAPSULE, DELAYED RELEASE ORAL
Qty: 30 CAPSULE | Refills: 2 | Status: SHIPPED | OUTPATIENT
Start: 2020-07-27 | End: 2020-08-14 | Stop reason: SDUPTHER

## 2020-07-28 RX ORDER — DOXYCYCLINE HYCLATE 100 MG
100 TABLET ORAL 2 TIMES DAILY
Qty: 84 TABLET | Refills: 1 | Status: SHIPPED | OUTPATIENT
Start: 2020-07-28 | End: 2020-09-01 | Stop reason: SDUPTHER

## 2020-08-03 RX ORDER — GABAPENTIN 100 MG/1
100 CAPSULE ORAL 3 TIMES DAILY
Qty: 90 CAPSULE | Refills: 1 | Status: SHIPPED | OUTPATIENT
Start: 2020-08-03 | End: 2020-08-14 | Stop reason: SDUPTHER

## 2020-08-04 ENCOUNTER — TELEPHONE (OUTPATIENT)
Dept: INTERNAL MEDICINE CLINIC | Age: 40
End: 2020-08-04

## 2020-08-04 NOTE — TELEPHONE ENCOUNTER
Pt states that he is still experiencing chest pain. Pt agreed to go to the ER for chest pain. Pt also made an appt to be seen tonight.

## 2020-08-04 NOTE — TELEPHONE ENCOUNTER
Dr. Garrett Dietrich   Since Friday evening I have had extreme pain in my back and shoulders that has gone all the way down my legs. It is difficult for me to move or even get out of bed. Yesterday and today I have had some pain in my chest. I have started using a curiel because of the pain I am in from my back and legs. Is there any way I can get in for an appointment? I have been in pain for almost a week and can't stand much more.    Thank you   Jose Paulino     Please advise

## 2020-08-04 NOTE — TELEPHONE ENCOUNTER
Yes we can see patient this week for his back pain. However, for acute chest pain he needs to be seen in ED if this returns.

## 2020-08-05 ENCOUNTER — OFFICE VISIT (OUTPATIENT)
Dept: INTERNAL MEDICINE CLINIC | Age: 40
End: 2020-08-05
Payer: MEDICAID

## 2020-08-05 ENCOUNTER — HOSPITAL ENCOUNTER (OUTPATIENT)
Dept: GENERAL RADIOLOGY | Age: 40
Discharge: HOME | End: 2020-08-05
Payer: MEDICAID

## 2020-08-05 ENCOUNTER — HOSPITAL ENCOUNTER (OUTPATIENT)
Age: 40
Discharge: HOME | End: 2020-08-05
Payer: MEDICAID

## 2020-08-05 VITALS
OXYGEN SATURATION: 98 % | WEIGHT: 212.6 LBS | BODY MASS INDEX: 28.18 KG/M2 | DIASTOLIC BLOOD PRESSURE: 79 MMHG | HEART RATE: 67 BPM | SYSTOLIC BLOOD PRESSURE: 128 MMHG | HEIGHT: 73 IN | RESPIRATION RATE: 22 BRPM

## 2020-08-05 PROCEDURE — 4004F PT TOBACCO SCREEN RCVD TLK: CPT | Performed by: NURSE PRACTITIONER

## 2020-08-05 PROCEDURE — 72100 X-RAY EXAM L-S SPINE 2/3 VWS: CPT

## 2020-08-05 PROCEDURE — G8427 DOCREV CUR MEDS BY ELIG CLIN: HCPCS | Performed by: NURSE PRACTITIONER

## 2020-08-05 PROCEDURE — G8419 CALC BMI OUT NRM PARAM NOF/U: HCPCS | Performed by: NURSE PRACTITIONER

## 2020-08-05 PROCEDURE — 73522 X-RAY EXAM HIPS BI 3-4 VIEWS: CPT

## 2020-08-05 PROCEDURE — 99213 OFFICE O/P EST LOW 20 MIN: CPT | Performed by: NURSE PRACTITIONER

## 2020-08-05 RX ORDER — IBUPROFEN 200 MG
1 TABLET ORAL DAILY
Qty: 100 EACH | Refills: 3 | Status: SHIPPED | OUTPATIENT
Start: 2020-08-05 | End: 2020-09-05 | Stop reason: SDUPTHER

## 2020-08-05 RX ORDER — HYDROCODONE BITARTRATE AND ACETAMINOPHEN 5; 325 MG/1; MG/1
1 TABLET ORAL EVERY 8 HOURS PRN
Qty: 15 TABLET | Refills: 0 | Status: SHIPPED | OUTPATIENT
Start: 2020-08-05 | End: 2020-08-12 | Stop reason: SDUPTHER

## 2020-08-05 RX ORDER — TIZANIDINE 2 MG/1
2 TABLET ORAL 3 TIMES DAILY PRN
Qty: 30 TABLET | Refills: 0 | Status: SHIPPED | OUTPATIENT
Start: 2020-08-05 | End: 2020-08-14 | Stop reason: SDUPTHER

## 2020-08-05 ASSESSMENT — ENCOUNTER SYMPTOMS
SORE THROAT: 0
NAUSEA: 0
ABDOMINAL DISTENTION: 0
CONSTIPATION: 0
SINUS PRESSURE: 0
COLOR CHANGE: 0
COUGH: 0
SINUS PAIN: 0
WHEEZING: 0
SHORTNESS OF BREATH: 0
BACK PAIN: 1
EYES NEGATIVE: 1
ABDOMINAL PAIN: 0
CHEST TIGHTNESS: 0
DIARRHEA: 0

## 2020-08-05 NOTE — PROGRESS NOTES
Mac Dubin  1980 08/05/20    Chief Complaint   Patient presents with    Back Pain     pain in both hips sx started 5 days ago Pt denies injury. Pt states that he cannot get comfortable at night and his feet and legs become cold to the touch     Shoulder Pain     Pt states that he is having pain in both shoulders and arms. SUBJECTIVE:      Estefany Lazar presents to the office this morning for c/o worsening lower back and bilateral hip pain which radiates down his BLE. This has been ongoing over the last 5-7 days. He denies any recent falls, injuries, or trauma. No loss of bowel or bladder control, or other red flag warning signs. Sensation intact and denies any weakness or other focal neuro deficits. Review of Systems   Constitutional: Negative for activity change, appetite change, fatigue and fever. HENT: Negative for congestion, sinus pressure, sinus pain and sore throat. Eyes: Negative. Respiratory: Negative for cough, chest tightness, shortness of breath and wheezing. Cardiovascular: Negative for chest pain and palpitations. Gastrointestinal: Negative for abdominal distention, abdominal pain, constipation, diarrhea and nausea. Genitourinary: Negative for difficulty urinating, dysuria, flank pain, frequency and hematuria. Musculoskeletal: Positive for arthralgias and back pain. Negative for gait problem, joint swelling and myalgias. Skin: Negative for color change and rash. Neurological: Negative for dizziness, light-headedness and numbness. Hematological: Negative. Psychiatric/Behavioral: Negative. OBJECTIVE:    /79   Pulse 67   Resp 22   Ht 6' 1\" (1.854 m)   Wt 212 lb 9.6 oz (96.4 kg)   SpO2 98%   BMI 28.05 kg/m²     Physical Exam  Constitutional:       General: He is not in acute distress. Appearance: He is well-developed. HENT:      Head: Normocephalic and atraumatic. Neck:      Thyroid: No thyromegaly.    Cardiovascular:      Rate and Rhythm: Normal rate and regular rhythm. Pulmonary:      Effort: Pulmonary effort is normal.      Breath sounds: Normal breath sounds. Abdominal:      General: Bowel sounds are normal.      Palpations: Abdomen is soft. Musculoskeletal: Normal range of motion. General: Tenderness present. Lumbar back: He exhibits pain and spasm. Comments: High sensitivity Neuro Exam for Lumbar spine  -(L1-L2)  inner thigh sensation intact bilaterally  -(S3,4,5) No loss of bowel/bladder control     -Lower extremity ROM:       -L2: Adduct thighs       -L3/S1: extend/ flex knees       -L4: dorsiflex ankles       -L5: point great toes upward & plantar flex toes (S2)       -L2,3,4: Knee reflexes intact bilaterally     Skin:     General: Skin is warm and dry. Capillary Refill: Capillary refill takes less than 2 seconds. Neurological:      Mental Status: He is alert and oriented to person, place, and time. GCS: GCS eye subscore is 4. GCS verbal subscore is 5. GCS motor subscore is 6. Cranial Nerves: No cranial nerve deficit. Sensory: No sensory deficit. Coordination: Coordination normal.      Deep Tendon Reflexes: Reflexes normal.   Psychiatric:         Behavior: Behavior normal.         Thought Content: Thought content normal.         ASSESSMENT:    1. Acute midline low back pain with bilateral sciatica        PLAN:    Robe Roger was seen today for back pain and shoulder pain. Diagnoses and all orders for this visit:    Acute midline low back pain with bilateral sciatica- seems muscular in nature although I am concerned with the amount of tenderness on exam; no focal neuro deficits noted; however, given this and his hx, will obtain imaging to r/o acute issue and osteo. PRN Zanflex and Norco briefly for pain. Possibly fibro and could consider increased Gabapentin later on.  Also if improvement slight over next several days, PT would likely be beneficial.   -     XR LUMBAR SPINE (2-3 VIEWS); Future  -     XR HIP 3-4 VW W PELVIS BILATERAL; Future  -     HYDROcodone-acetaminophen (NORCO) 5-325 MG per tablet; Take 1 tablet by mouth every 8 hours as needed for Pain for up to 5 days. Intended supply: 5 days. Take lowest dose possible to manage pain  -     tiZANidine (ZANAFLEX) 2 MG tablet; Take 1 tablet by mouth 3 times daily as needed (back pain/spasm)    Course of treatment, including any medications, possible imaging, referrals, and follow ups discussed with patient. All risks and benefits and possible side effects discussed with patient who agrees to plan of care and verbalizes understanding. All labs and imaging reviewed. No flowsheet data found. No follow-ups on file.

## 2020-08-12 ENCOUNTER — OFFICE VISIT (OUTPATIENT)
Dept: INFECTIOUS DISEASES | Age: 40
End: 2020-08-12
Payer: MEDICAID

## 2020-08-12 ENCOUNTER — TELEPHONE (OUTPATIENT)
Dept: INTERNAL MEDICINE CLINIC | Age: 40
End: 2020-08-12

## 2020-08-12 VITALS
TEMPERATURE: 97.5 F | WEIGHT: 210 LBS | BODY MASS INDEX: 27.71 KG/M2 | SYSTOLIC BLOOD PRESSURE: 136 MMHG | DIASTOLIC BLOOD PRESSURE: 78 MMHG | HEART RATE: 70 BPM

## 2020-08-12 DIAGNOSIS — M86.9 OSTEOMYELITIS OF THIRD TOE OF LEFT FOOT (HCC): ICD-10-CM

## 2020-08-12 PROBLEM — M54.50 ACUTE MIDLINE LOW BACK PAIN WITHOUT SCIATICA: Status: ACTIVE | Noted: 2020-08-12

## 2020-08-12 PROCEDURE — 4004F PT TOBACCO SCREEN RCVD TLK: CPT | Performed by: INTERNAL MEDICINE

## 2020-08-12 PROCEDURE — 99213 OFFICE O/P EST LOW 20 MIN: CPT | Performed by: INTERNAL MEDICINE

## 2020-08-12 PROCEDURE — G8419 CALC BMI OUT NRM PARAM NOF/U: HCPCS | Performed by: INTERNAL MEDICINE

## 2020-08-12 PROCEDURE — G8427 DOCREV CUR MEDS BY ELIG CLIN: HCPCS | Performed by: INTERNAL MEDICINE

## 2020-08-12 RX ORDER — HYDROCODONE BITARTRATE AND ACETAMINOPHEN 5; 325 MG/1; MG/1
1 TABLET ORAL EVERY 8 HOURS PRN
Qty: 15 TABLET | Refills: 0 | Status: SHIPPED | OUTPATIENT
Start: 2020-08-12 | End: 2020-08-21 | Stop reason: SDUPTHER
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
## 2020-08-12 NOTE — ASSESSMENT & PLAN NOTE
Labs from 7/27/2020 and 7/29/2020 were reviewed. CRP and sed rate sounded clear downward trend. Sed rate is 50.  Continue doxycycline (end-date 10/20/2020)

## 2020-08-12 NOTE — ASSESSMENT & PLAN NOTE
Unlikely to be infectious, but it is worthy of consideration. Check labs. If ESR and CRP are elevated then will get MRI. ? Spondyloarthropathy.

## 2020-08-12 NOTE — TELEPHONE ENCOUNTER
Dr. Crow Cueva   Is there anyway you could give me a referral to the physical therapy you were talking about whenever I was in your office last week? I am also still experiencing pain in my back and feet. Is there any way I could get something for pain until I could be assessed by physical therapy?    Thank you   Marisol Santamaria     Please advise

## 2020-08-13 LAB
ALBUMIN SERPL-MCNC: 4.2 G/DL (ref 3.4–5)
ALP BLD-CCNC: 96 U/L (ref 40–129)
ALT SERPL-CCNC: 18 U/L (ref 10–40)
ANION GAP SERPL CALCULATED.3IONS-SCNC: 11 MMOL/L (ref 3–16)
AST SERPL-CCNC: 22 U/L (ref 15–37)
BASOPHILS ABSOLUTE: 0.1 K/UL (ref 0–0.2)
BASOPHILS RELATIVE PERCENT: 1.3 %
BILIRUB SERPL-MCNC: 0.3 MG/DL (ref 0–1)
BILIRUBIN DIRECT: <0.2 MG/DL (ref 0–0.3)
BILIRUBIN, INDIRECT: NORMAL MG/DL (ref 0–1)
BUN BLDV-MCNC: 14 MG/DL (ref 7–20)
C-REACTIVE PROTEIN: 1.1 MG/L (ref 0–5.1)
CALCIUM SERPL-MCNC: 8.9 MG/DL (ref 8.3–10.6)
CHLORIDE BLD-SCNC: 102 MMOL/L (ref 99–110)
CO2: 26 MMOL/L (ref 21–32)
CREAT SERPL-MCNC: 0.9 MG/DL (ref 0.9–1.3)
EOSINOPHILS ABSOLUTE: 1.4 K/UL (ref 0–0.6)
EOSINOPHILS RELATIVE PERCENT: 29 %
GFR AFRICAN AMERICAN: >60
GFR NON-AFRICAN AMERICAN: >60
GLUCOSE BLD-MCNC: 110 MG/DL (ref 70–99)
HCT VFR BLD CALC: 32.6 % (ref 40.5–52.5)
HEMOGLOBIN: 11 G/DL (ref 13.5–17.5)
LYMPHOCYTES ABSOLUTE: 1.1 K/UL (ref 1–5.1)
LYMPHOCYTES RELATIVE PERCENT: 22.7 %
MCH RBC QN AUTO: 29.8 PG (ref 26–34)
MCHC RBC AUTO-ENTMCNC: 33.7 G/DL (ref 31–36)
MCV RBC AUTO: 88.6 FL (ref 80–100)
MONOCYTES ABSOLUTE: 0.3 K/UL (ref 0–1.3)
MONOCYTES RELATIVE PERCENT: 5.6 %
NEUTROPHILS ABSOLUTE: 2.1 K/UL (ref 1.7–7.7)
NEUTROPHILS RELATIVE PERCENT: 41.4 %
PDW BLD-RTO: 14.6 % (ref 12.4–15.4)
PLATELET # BLD: 147 K/UL (ref 135–450)
PMV BLD AUTO: 9.2 FL (ref 5–10.5)
POTASSIUM SERPL-SCNC: 4.4 MMOL/L (ref 3.5–5.1)
RBC # BLD: 3.69 M/UL (ref 4.2–5.9)
SEDIMENTATION RATE, ERYTHROCYTE: 41 MM/HR (ref 0–15)
SODIUM BLD-SCNC: 139 MMOL/L (ref 136–145)
TOTAL PROTEIN: 7.9 G/DL (ref 6.4–8.2)
WBC # BLD: 5 K/UL (ref 4–11)

## 2020-08-14 RX ORDER — TIZANIDINE 2 MG/1
2 TABLET ORAL 3 TIMES DAILY PRN
Qty: 30 TABLET | Refills: 0 | Status: SHIPPED | OUTPATIENT
Start: 2020-08-14 | End: 2020-09-01 | Stop reason: SDUPTHER

## 2020-08-14 RX ORDER — OMEPRAZOLE 20 MG/1
20 CAPSULE, DELAYED RELEASE ORAL
Qty: 30 CAPSULE | Refills: 2 | Status: SHIPPED | OUTPATIENT
Start: 2020-08-14 | End: 2020-09-14 | Stop reason: SDUPTHER

## 2020-08-14 RX ORDER — GABAPENTIN 100 MG/1
100 CAPSULE ORAL 3 TIMES DAILY
Qty: 90 CAPSULE | Refills: 1 | Status: SHIPPED | OUTPATIENT
Start: 2020-08-14 | End: 2020-08-17 | Stop reason: SDUPTHER

## 2020-08-17 RX ORDER — TRAZODONE HYDROCHLORIDE 50 MG/1
50 TABLET ORAL NIGHTLY PRN
Qty: 30 TABLET | Refills: 2 | Status: SHIPPED | OUTPATIENT
Start: 2020-08-17 | End: 2020-09-05 | Stop reason: SDUPTHER

## 2020-08-17 RX ORDER — GABAPENTIN 100 MG/1
100 CAPSULE ORAL 3 TIMES DAILY
Qty: 90 CAPSULE | Refills: 1 | Status: SHIPPED | OUTPATIENT
Start: 2020-08-17 | End: 2020-08-18

## 2020-08-18 ENCOUNTER — TELEPHONE (OUTPATIENT)
Dept: INTERNAL MEDICINE CLINIC | Age: 40
End: 2020-08-18

## 2020-08-18 RX ORDER — GABAPENTIN 300 MG/1
300 CAPSULE ORAL 3 TIMES DAILY
Qty: 90 CAPSULE | Refills: 2 | Status: SHIPPED | OUTPATIENT
Start: 2020-08-18 | End: 2020-09-16 | Stop reason: SDUPTHER

## 2020-08-18 NOTE — TELEPHONE ENCOUNTER
I will increase his dose of the Gabapentin and a new script will be sent today. Please clarify issue with PT as this will be beneficial. If pain fails to improve, we will need to consider MRI, possibly pain management.

## 2020-08-20 ENCOUNTER — CARE COORDINATION (OUTPATIENT)
Dept: CARE COORDINATION | Age: 40
End: 2020-08-20

## 2020-08-20 ENCOUNTER — HOSPITAL ENCOUNTER (OUTPATIENT)
Dept: PHYSICAL THERAPY | Age: 40
Setting detail: THERAPIES SERIES
Discharge: HOME | End: 2020-08-20
Payer: MEDICAID

## 2020-08-20 PROCEDURE — 97110 THERAPEUTIC EXERCISES: CPT

## 2020-08-20 PROCEDURE — 97162 PT EVAL MOD COMPLEX 30 MIN: CPT

## 2020-08-20 PROCEDURE — G0283 ELEC STIM OTHER THAN WOUND: HCPCS

## 2020-08-20 ASSESSMENT — PAIN DESCRIPTION - ORIENTATION: ORIENTATION: RIGHT;LEFT

## 2020-08-20 ASSESSMENT — PAIN - FUNCTIONAL ASSESSMENT: PAIN_FUNCTIONAL_ASSESSMENT: PREVENTS OR INTERFERES WITH ALL ACTIVE AND SOME PASSIVE ACTIVITIES

## 2020-08-20 ASSESSMENT — PAIN SCALES - GENERAL: PAINLEVEL_OUTOF10: 8

## 2020-08-20 ASSESSMENT — PAIN DESCRIPTION - ONSET: ONSET: PROGRESSIVE

## 2020-08-20 ASSESSMENT — PAIN DESCRIPTION - FREQUENCY: FREQUENCY: CONTINUOUS

## 2020-08-20 ASSESSMENT — PAIN DESCRIPTION - PAIN TYPE: TYPE: ACUTE PAIN

## 2020-08-20 ASSESSMENT — PAIN DESCRIPTION - PROGRESSION: CLINICAL_PROGRESSION: GRADUALLY WORSENING

## 2020-08-20 ASSESSMENT — PAIN DESCRIPTION - LOCATION: LOCATION: BACK

## 2020-08-20 NOTE — FLOWSHEET NOTE
Outpatient Physical Therapy  [facility]           [x] Phone: [phone]   Fax: [phone]  [facility]           [] Phone: [phone]   Fax: [phone]        Physical Therapy Daily Treatment Note  Date:  2020    Patient Name:  Alton Montano    :  1980  MRN: [id number]  Restrictions/Precautions: Other position/activity restrictions: none  Diagnosis:   Diagnosis: back pain  Date of Injury/Surgery:   Treatment Diagnosis: Treatment Diagnosis: back pain, radicular pain, numbness B LE's    Insurance/Certification information: PT Insurance Information: [facility] [place] required --POC approved but no Estim   Referring Physician:  Referring Practitioner: Nancy Gregory  Next Doctor Visit:    Plan of care signed (Y/N):  pending  Outcome Measure: Oswestry 56%  Visit# / total visits:       Pain level: 8/10   Goals:       Short term goals  Time Frame for Short term goals: 3 WEEKS  Short term goal 1: Pt will be able to report at least 10% reduction in pain or will return to doc  Short term goal 2: Pt will be able to tolerate hip and trunk ROM and knee ext seated w/o increased pain  Short term goal 3: Pt will be able to report improved sleep  Long term goals  Time Frame for Long term goals : 6 WEEKS  Long term goal 1: Pt will be independent w/ home managment of pain  Long term goal 2: Pt will be able to sleep w/ min pain  Long term goal 3: Pt will report min numbness in leg, but feet may not improve d/t neuropathy  Long term goal 4: Pt will have improved Oswestry score by 10% or more    Summary of Evaluation: Assessment: Pt is a 35 yo male who presents w/ back and B LE pain with numbness and tingling. He demonstrates very limited hip and trunk mobility B w/ decreased DTR's and significant sensation loss in B lower legs. He denies any fever, chills, night sweats. He demonstrates + SLR and slump B w/ significant neural tension and is very TTP in B lumbar paraspinals.  He will benefit from PT to help reduce pain and improve mobility and then restore function, but will return to doc if not making any progress. Pt's limitations are significantly limiting his usual functional activity. Subjective:  See eval         Any changes in Ambulatory Summary Sheet? None        Objective:  See eval   Prior to today's treatment session, patient was screened for signs and symptoms related to COVID-19 including but not limited to verbally answering questions related to feeling ill, cough, or SOB, along with taking temperature via forehead thermometer. Patient presented with all negative signs and symptoms and had no fever >100 degrees Fahrenheit this date. Exercises: (No more than 4 columns)   Exercise/Equipment 8/20/20  #1 Date Date           WARM UP                     TABLE      abdom hold 5x2\"     Glut set  3x2\"     LTR AAROM 3xea           LAQ    instruct                        STANDING                                                     PROPRIOCEPTION                                    MODALITIES IFC                     Other Therapeutic Activities/Education:  8/20/2020: Patient received education on their current pathology and how their condition effects them with their functional activities. Patient understood discussion and questions were answered. Patient understands their activity limitations and understands rational for treatment progression. Home Exercise Program:  Issued, practiced and pt demo ability to perform 8/20/2020         Manual Treatments:        Modalities:  IFC w/ CP to lumbar region w/ pt seated, 10'  (min change LBP but some improved numbness)  (8/25/20: Estim not approved so don't repeat stim)     Communication with other providers:  POC set for cosign 8/20/20      Assessment:  Pt is a 35 yo male who presents w/ back and B LE pain with numbness and tingling. He demonstrates very limited hip and trunk mobility B w/ decreased DTR's and significant sensation loss in B lower legs. He denies any fever, chills, night sweats. He demonstrates + SLR and slump B w/ significant neural tension and is very TTP in B lumbar paraspinals. He will benefit from PT to help reduce pain and improve mobility and then restore function, but will return to doc if not making any progress. Pt's limitations are significantly limiting his usual functional activity.       Plan for Next Session:  ASK ABOUT LOSS OF BOWEL OR BLADDER, NUMBNESS IN GROIN?    work on hip and trunk mobility/ROM, basic core and pain control       Time In / Time Out:     1115/1215          Timed Code/Total Treatment Minutes: (Estim not approved 8/25/20)   20/40   1 TE 10', 1 Estim 10', 1 eval 40'      Next Progress Note due:  Visit 10 or return to doc      Plan of Care Interventions:  [x] Therapeutic Exercise  [x] Modalities:  [x] Therapeutic Activity     [] Ultrasound  [] Estim  [] Gait Training      [] Cervical Traction [x] Lumbar Traction  [x] Neuromuscular Re-education    [x] Cold/hotpack [] Iontophoresis   [x] Instruction in HEP      [] Vasopneumatic   [] Dry Needling    [x] Manual Therapy               [] Aquatic Therapy              Electronically signed by:  Alisa Douglas,  PT, MPT, ATC  8/20/2020, 1:02 PM    8/20/2020, 1:03 PM

## 2020-08-20 NOTE — CARE COORDINATION
ACM called pt w/o succes for f/u on DM. ACM left VM with request to return call. Contact info provided. Mela Bagley RN  Ambulatory Care Manager  [phone]  [email or web address]

## 2020-08-20 NOTE — PROGRESS NOTES
Physical Therapy  Initial Assessment  Date: 2020  Patient Name: Anamika Valdes  MRN: [id number]  : 1980     Treatment Diagnosis: back pain, radicular pain, numbness B LE's    Restrictions  Position Activity Restriction  Other position/activity restrictions: none    Subjective   General  Chart Reviewed: Yes  Patient assessed for rehabilitation services?: Yes  Additional Pertinent Hx: DM Type 1 since  yoa w/ B neuropathy more recently Dx, toe amputation L foot March of this year,started as callous, wound vac needed, in 2018 had surgery R foot for a bone deformity, put pin in and lengthened achilles. Had to go back to work and did ok. The doc thinks altered gait led to back pain which started in about July of this year, had xray which showed some degeneration in lumbar region. No prior Hx back pain, but has also had MRSA and hepatitis this year too. Referring Practitioner: Issa  Diagnosis: back pain  PT Visit Information  PT Insurance Information: [facility], Precert required  Subjective  Subjective: pain is increased first thing in am when gets up, R LE will buckle at times, cannot sit 30 mins, legs go numb, does wake him at night also but meds help. Gets relief w/ meds some, stretching helps some in prone, also fetal position at night time. cannot sleep on stomach. Standing and walking are ok for a little bit but not long. Ice helps some too and Icy Hot type cream(lidocaine)  Pain Screening  Patient Currently in Pain: Yes  Pain Assessment  Pain Assessment: 0-10  Pain Level: 8(max 10/10, every night at least.)  Patient's Stated Pain Goal: No pain  Pain Type: Acute pain  Pain Location: Back  Pain Orientation: Right;Left  Pain Descriptors: Tingling; Sharp;Radiating;Numbness; Aching  Pain Frequency: Continuous  Pain Onset: Progressive  Clinical Progression: Gradually worsening  Functional Pain Assessment: Prevents or interferes with all active and some passive activities  Vital Signs  Patient Currently in Pain: Yes    Vision/Hearing  Vision  Vision: Within Functional Limits    Orientation  Orientation  Overall Orientation Status: Within Normal Limits    Social/Functional History  Social/Functional History  Lives With: Significant other  Type of Home: House  Home Layout: One level  Home Access: Stairs to enter with rails  Type of occupation: applying for disability    Objective     Observation/Palpation  Posture: Poor  Observation: pt visibly uncomfortable sittiing w/ antalgic gait, mostly level pelvis    Spine  Lumbar: flex only reaches distal knee w/ pain and difficulty rising as well, ext only 10° but min pain, rotation 50% limited. Strength RLE  R Hip Flexion: 4+/5  R Knee Flexion: 4-/5  R Knee Extension: 4/5  R Ankle Dorsiflexion: 3+/5  R Ankle Plantar flexion: 3+/5  R Great Toe Extension: 1/5  Strength LLE  L Hip Flexion: 4+/5  L Knee Flexion: 4/5  L Knee Extension: 4/5  L Ankle Dorsiflexion: 4/5  L Ankle Plantar Flexion: 4/5  L Great Toe Extension: 5/5     Additional Measures  Special Tests: + slump B but worse on R.  + SLR B, unable to get KTC or KRIS, FADIR d/t pain and hip hypomobility. Prone some decrease back pain at first, prop is very painful in LB. Other: DTR dim B pat, absent B achilles, neg clonus and Babinski  Sensation  Overall Sensation Status: Impaired  Light Touch: Severe deficits in the LLE; Severe deficits in the RLE     Assessment   Conditions Requiring Skilled Therapeutic Intervention  Body structures, Functions, Activity limitations: Decreased functional mobility ; Decreased ADL status; Decreased ROM; Decreased strength; Increased pain;Decreased sensation  Assessment: Pt is a 37 yo male who presents w/ back and B LE pain with numbness and tingling. He demonstrates very limited hip and trunk mobility B w/ decreased DTR's and significant sensation loss in B lower legs. He denies any fever, chills, night sweats.   He demonstrates + SLR and slump B w/ significant neural tension and is very TTP in B lumbar paraspinals. He will benefit from PT to help reduce pain and improve mobility and then restore function, but will return to doc if not making any progress. Pt's limitations are significantly limiting his usual functional activity. Patient agrees with established plan of care and assisted in the development of their short term and long term goals. Patient had no adverse reaction with initial treatment and there are no barriers to learning. Demonstrates no mental or cognitive disorder. Treatment Diagnosis: back pain, radicular pain, numbness B LE's  Prognosis: Good  Decision Making: Medium Complexity  Barriers to Learning: none  REQUIRES PT FOLLOW UP: Yes  Treatment Initiated : see flow sheet         Plan   Plan  Times per week: 2x/week  Plan weeks: 6 weeks  Current Treatment Recommendations: Strengthening, ROM, Functional Mobility Training, Pain Management, Modalities, Manual Therapy - Joint Manipulation, Patient/Caregiver Education & Training, Neuromuscular Re-education, Home Exercise Program, Manual Therapy - Soft Tissue Mobilization       OutComes Score   Oswestry 56%      Goals  Short term goals  Time Frame for Short term goals: 3 WEEKS  Short term goal 1: Pt will be able to report at least 10% reduction in pain or will return to doc  Short term goal 2: Pt will be able to tolerate hip and trunk ROM and knee ext seated w/o increased pain  Short term goal 3: Pt will be able to report improved sleep  Long term goals  Time Frame for Long term goals : 6 WEEKS  Long term goal 1: Pt will be independent w/ home managment of pain  Long term goal 2: Pt will be able to sleep w/ min pain  Long term goal 3: Pt will report min numbness in leg, but feet may not improve d/t neuropathy  Long term goal 4: Pt will have improved Oswestry score by 10% or more  Patient Goals   Patient goals : get rid of pain and numbness.           Edgardo Mercado, PT  PT, MPT, ATC     8/20/2020, 12:59 PM

## 2020-08-20 NOTE — PLAN OF CARE
Outpatient Physical Therapy           [place]           [x] Phone: [phone]   Fax: [phone]  [facility]           [] Phone: [phone]   Fax: [phone]     To: Referring Practitioner: Ata Lizarraga    From: Lucy Lubin, PT     Patient: Oma Carlin       : 1980  Diagnosis: Diagnosis: back pain   Treatment Diagnosis: Treatment Diagnosis: back pain, radicular pain, numbness B LE's   Date: 2020    Physical Therapy Certification/Re-Certification Form  Dear Basilia Guzman,   The following patient has been evaluated for physical therapy services and for therapy to continue, insurance requires physician review of the treatment plan initially and every 90 days. Please review the attached evaluation and/or summary of the patient's plan of care, and verify that you agree therapy should continue by signing the attached document and sending it back to our office. Assessment:    Assessment: Pt is a 37 yo male who presents w/ back and B LE pain with numbness and tingling. He demonstrates very limited hip and trunk mobility B w/ decreased DTR's and significant sensation loss in B lower legs. He denies any fever, chills, night sweats. He demonstrates + SLR and slump B w/ significant neural tension and is very TTP in B lumbar paraspinals. He will benefit from PT to help reduce pain and improve mobility and then restore function, but will return to doc if not making any progress. Pt's limitations are significantly limiting his usual functional activity. Patient agrees with established plan of care and assisted in the development of their short term and long term goals. Patient had no adverse reaction with initial treatment and there are no barriers to learning. Demonstrates no mental or cognitive disorder.       Plan of Care/Treatment to date:  [x] Therapeutic Exercise  [x] Modalities:  [x] Therapeutic Activity     [] Ultrasound  [x] Electrical Stimulation  [] Gait Training      [] Cervical Traction [x] Lumbar Traction  [x] Neuromuscular Re-education    [x] Cold/hotpack [] Iontophoresis   [x] Instruction in HEP      [] Vasopneumatic     [x] Manual Therapy               [] Aquatic Therapy       Other:          Frequency/Duration:  # Days per week: [] 1 day # Weeks: [] 1 week [] 5 weeks     [x] 2 days   [] 2 weeks [x] 6 weeks     [] 3 days   [] 3 weeks [] 7 weeks     [] 4 days   [] 4 weeks [] 8 weeks         [] 9 weeks [] 10 weeks         [] 11 weeks [] 12 weeks    Rehab Potential/Progress: [] Excellent [x] Good [] Fair  [] Poor     Goals:      Short term goals  Time Frame for Short term goals: 3 WEEKS  Short term goal 1: Pt will be able to report at least 10% reduction in pain or will return to doc  Short term goal 2: Pt will be able to tolerate hip and trunk ROM and knee ext seated w/o increased pain  Short term goal 3: Pt will be able to report improved sleep  Long term goals  Time Frame for Long term goals : 6 WEEKS  Long term goal 1: Pt will be independent w/ home managment of pain  Long term goal 2: Pt will be able to sleep w/ min pain  Long term goal 3: Pt will report min numbness in leg, but feet may not improve d/t neuropathy  Long term goal 4: Pt will have improved Oswestry score by 10% or more      Electronically signed by:  Lane Velez, PT, MPT, ATC  8/20/2020, 1:00 PM    8/20/2020, 1:00 PM  If you have any questions or concerns, please don't hesitate to call.   Thank you for your referral.      Physician Signature:________________________________Date:_________ TIME: _____  By signing above, therapists plan is approved by physician

## 2020-08-21 ENCOUNTER — TELEPHONE (OUTPATIENT)
Dept: INTERNAL MEDICINE CLINIC | Age: 40
End: 2020-08-21

## 2020-08-21 RX ORDER — HYDROCODONE BITARTRATE AND ACETAMINOPHEN 5; 325 MG/1; MG/1
1 TABLET ORAL EVERY 8 HOURS PRN
Qty: 15 TABLET | Refills: 0 | Status: SHIPPED | OUTPATIENT
Start: 2020-08-21 | End: 2020-08-26

## 2020-08-21 NOTE — TELEPHONE ENCOUNTER
1 refill of Norco provided, however, I cannot continue to prescribe narcotics routinely. Continue the increased gabapentin as well and I have also placed a referral to [facility]. Please fax.

## 2020-08-21 NOTE — TELEPHONE ENCOUNTER
Oleg Gupta  To   [facility] Internal Medicine Practice Staff  Sent   8/20/2020  1:06 PM    Dr. Blayne Lopez,   I went to my physical therapy appointment today for my evaluation. My therapist Demetra De Leon said I have a lot of trouble in my hips and legs. She said they have to send their notes to my insurance before they can schedule my for my therapy which could be the end of next week. I asked get what I could do for my pain until then and she said to reach out to you for a script of pain pills until then or ask about pain management. Please let me know if you could do either of these. I have tried Tylenol, ibuprofen, lidocaine and ice packs and am still in a lot of pain.    Thank you   Naresh Lambert     Please advise

## 2020-08-25 NOTE — FLOWSHEET NOTE
Received approval from [facility] on approved # of visits per POC,  However does not cover E stim.   Approved from 8/21/2020-10/2/2020

## 2020-08-27 ENCOUNTER — OFFICE VISIT (OUTPATIENT)
Dept: INFECTIOUS DISEASES | Age: 40
End: 2020-08-27
Payer: MEDICAID

## 2020-08-27 VITALS
TEMPERATURE: 97.7 F | WEIGHT: 212.4 LBS | DIASTOLIC BLOOD PRESSURE: 77 MMHG | HEART RATE: 82 BPM | SYSTOLIC BLOOD PRESSURE: 130 MMHG | BODY MASS INDEX: 28.02 KG/M2

## 2020-08-27 PROCEDURE — 4004F PT TOBACCO SCREEN RCVD TLK: CPT | Performed by: INTERNAL MEDICINE

## 2020-08-27 PROCEDURE — G8427 DOCREV CUR MEDS BY ELIG CLIN: HCPCS | Performed by: INTERNAL MEDICINE

## 2020-08-27 PROCEDURE — G8419 CALC BMI OUT NRM PARAM NOF/U: HCPCS | Performed by: INTERNAL MEDICINE

## 2020-08-27 PROCEDURE — 99213 OFFICE O/P EST LOW 20 MIN: CPT | Performed by: INTERNAL MEDICINE

## 2020-08-27 NOTE — ASSESSMENT & PLAN NOTE
CRP not elevated. ESR elevated but not relatively so. Unlikely to be infectious. Reports that he has started PT and it seems to help.

## 2020-08-27 NOTE — PROGRESS NOTES
8/29/2020         Referring Physician: No ref. provider found  Primary Care Physician: BARBARA Hart - CNP    Impression/Plan:   Diagnosis Orders   1. Osteomyelitis of third toe of left foot ([address]     2. Tinea pedis of both feet     3. Acute midline low back pain without sciatica         Discussion:  Tinea pedis of both feet  Improved. Osteomyelitis of third toe of left foot ([address]  Labs reviewed and are good. Doing well. Continue doxycycline through 10/20/2020. Acute midline low back pain without sciatica  CRP not elevated. ESR elevated but not relatively so. Unlikely to be infectious. Reports that he has started PT and it seems to help. Return in about 2 months (around 10/27/2020). History: Tasha Howard is a 36 y.o.  male presenting today for follow-up for left DFO MR-CoNS s/p left 3rd toe and metatarsal head, debridement and wound VAC placement on 3/27. Denies n/v/d/f. Tolerating IV vancomycin. He has a plantar callus and had a lot of pain walking on it. This has improved after it was shaved off. It feels  No issues with the PICC line. COVID test was negative. 6/3: Afebrile. Was sent to ED by wound care  on 6/1 for increased pain. Labs were normal. Repeat X-ray showed periostitis and osteomyelitis at amputation site  7/15: on hyperbaric and tolerating doxycycline. Reports that wound care is happy with his wound  8/12: has back pain and some lower extremity pain. X-ray of the lumbar spine, no acute findings   8/27: Patient is tolerating doxycycline. Denies nausea, vomiting, diarrhea or fever. Review of Systems   All other systems reviewed and are negative.       No Known Allergies    Patient Active Problem List   Diagnosis    Indu's disease    Type 2 diabetes mellitus without complication, with long-term current use of insulin ([address]    Gastroesophageal reflux disease    Tobacco abuse    Acute osteomyelitis of phalanx of left foot ([address]    Diabetic foot years: 12.00     Types: Cigarettes, Cigars     Start date: 18    Smokeless tobacco: Never Used   Substance and Sexual Activity    Alcohol use: Yes     Comment: once a year    Drug use: Yes     Types: Marijuana     Comment: pt states once/week    Sexual activity: Yes     Partners: Male   Lifestyle    Physical activity     Days per week: Not on file     Minutes per session: Not on file    Stress: Not on file   Relationships    Social connections     Talks on phone: Not on file     Gets together: Not on file     Attends Jew service: Not on file     Active member of club or organization: Not on file     Attends meetings of clubs or organizations: Not on file     Relationship status: Not on file    Intimate partner violence     Fear of current or ex partner: Not on file     Emotionally abused: Not on file     Physically abused: Not on file     Forced sexual activity: Not on file   Other Topics Concern    Not on file   Social History Narrative    Not on file       Family History   Problem Relation Age of Onset    Obesity Mother     Heart Disease Father         Open Heart Surgery    Diabetes Father     Allergy (Severe) Brother        Vital Signs:  Vitals:    08/27/20 1019   BP: 130/77   Site: Right Upper Arm   Position: Sitting   Cuff Size: Large Adult   Pulse: 82   Temp: 97.7 °F (36.5 °C)   TempSrc: Infrared   Weight: 212 lb 6.4 oz (96.3 kg)        Wt Readings from Last 3 Encounters:   08/27/20 212 lb 6.4 oz (96.3 kg)   08/12/20 210 lb (95.3 kg)   08/05/20 212 lb 9.6 oz (96.4 kg)        Physical Exam:   Gen: alert and NAD  HEENT: sclera clear, pupils equal and reactive, extra ocular muscles intact, oropharynx clear, mucus membranes moist, tympanic membranes clear bilaterally, no cervical lymphadenopathy noted and neck supple  Neck: supple, no significant adenopathy  Chest: clear to auscultation, no wheezes, rales or rhonchi, symmetric air entry  Heart: regular rate and rhythm, no murmurs  ABD: abdomen is soft without significant tenderness, masses, organomegaly or guarding. EXT:peripheral pulses normal, no pedal edema, no clubbing or cyanosis. Right arm PICC line, no erythema or tenderness. Lower spine tenderness. NEURO: alert, oriented, normal speech, no focal findings or movement disorder noted  Skin: well hydrated, no lesions, surgical site examined  Wounds: Left foot 3rd toe site is healed. Labs:   WBC   Date Value Ref Range Status   08/12/2020 5.0 4.0 - 11.0 K/uL Final   07/27/2020 4.3 4.0 - 11.0 K/uL Final   06/01/2020 4.2 4.0 - 10.5 K/CU MM Final     CREATININE   Date Value Ref Range Status   08/12/2020 0.9 0.9 - 1.3 mg/dL Final   07/27/2020 0.9 0.9 - 1.3 mg/dL Final   06/01/2020 1.0 0.9 - 1.3 MG/DL Final       Cultures:  Culture   Date Value Ref Range Status   06/01/2020 NO GROWTH AT 6 DAYS  Final   06/01/2020 NO GROWTH AT 6 DAYS  Final   03/27/2020 STAPHYLOCOCCUS-COAG NEG VERY SCANTY GROWTH (A)  Final   03/27/2020 NO ANAEROBIC GROWTH AT 72 HOURS  Final       Imaging Studies:   Left foot x-ray on 6/1/2020  . Changes of interval transmetatarsal 3rd ray amputation with cortical erosion at the amputation site and exuberant surrounding heterotopic bone formation and/or periostitis concerning for osteomyelitis. 2. Subtle periostitis along the lateral aspect of the distal 4th metatarsal diaphysis concerning for underlying osteomyelitis, although there is no associated underlying cortical erosion         This dictation was created with voice recognition software. While attempts have been made to review the dictation as it is transcribed, on occasion the spoken word can be misinterpreted by the technology leading to omissions or inappropriate words, phrases or sentences.     Electronically signed by: Electronically signed by Aurora Sneed MD on 8/29/2020 at 3:12 PM, 8/29/2020 3:12 PM

## 2020-08-28 ENCOUNTER — HOSPITAL ENCOUNTER (OUTPATIENT)
Dept: PHYSICAL THERAPY | Age: 40
Setting detail: THERAPIES SERIES
Discharge: HOME | End: 2020-08-28
Payer: MEDICAID

## 2020-08-28 ENCOUNTER — CARE COORDINATION (OUTPATIENT)
Dept: CARE COORDINATION | Age: 40
End: 2020-08-28

## 2020-08-28 PROCEDURE — 97110 THERAPEUTIC EXERCISES: CPT

## 2020-08-28 PROCEDURE — 97012 MECHANICAL TRACTION THERAPY: CPT

## 2020-08-28 NOTE — CARE COORDINATION
This is [facility]'s final attemp to call the pt in hopes to f/u with Chronic conditions. [facility] did leave the pt a VM. No further attempts will be made. Jerry Harkins RN  Ambulatory Care Manager  [phone]  [email or web address]

## 2020-08-28 NOTE — FLOWSHEET NOTE
Outpatient Physical Therapy  [place]           [x] Phone: [phone]   Fax: [phone]  [facility]           [] Phone: [phone]   Fax: [phone]        Physical Therapy Daily Treatment Note  Date:  2020    Patient Name:  Mac Dubin    :  1980  MRN: [id number]  Restrictions/Precautions: Other position/activity restrictions: none  Diagnosis:   Diagnosis: back pain  Date of Injury/Surgery:   Treatment Diagnosis: Treatment Diagnosis: back pain, radicular pain, numbness B LE's    Insurance/Certification information: PT Insurance Information: [facility], Precert required --POC approved but no Estim   Referring Physician:  Referring Practitioner: Celeste Davidson Doctor Visit:    Plan of care signed (Y/N):  pending  Outcome Measure: Oswestry 56%  Visit# / total visits:    approved precert   Pain level: 4-   Goals:       Short term goals  Time Frame for Short term goals: 3 WEEKS  Short term goal 1: Pt will be able to report at least 10% reduction in pain or will return to doc  Short term goal 2: Pt will be able to tolerate hip and trunk ROM and knee ext seated w/o increased pain  Short term goal 3: Pt will be able to report improved sleep  Long term goals  Time Frame for Long term goals : 6 WEEKS  Long term goal 1: Pt will be independent w/ home managment of pain  Long term goal 2: Pt will be able to sleep w/ min pain  Long term goal 3: Pt will report min numbness in leg, but feet may not improve d/t neuropathy  Long term goal 4: Pt will have improved Oswestry score by 10% or more    Summary of Evaluation: Assessment: Pt is a 37 yo male who presents w/ back and B LE pain with numbness and tingling. He demonstrates very limited hip and trunk mobility B w/ decreased DTR's and significant sensation loss in B lower legs. He denies any fever, chills, night sweats. He demonstrates + SLR and slump B w/ significant neural tension and is very TTP in B lumbar paraspinals.  He will benefit from PT to help reduce pain and improve mobility and then restore function, but will return to doc if not making any progress. Pt's limitations are significantly limiting his usual functional activity. Subjective:  Doctor did send him for pain management but hasn't heard from them yet. HEP went ok, stopped if hurt and can get knees a little straighter. Feet numb this am.  Denies any saddle paraesthesia or loss of bowel or bladder. Any changes in Ambulatory Summary Sheet? None        Objective:  Prior to today's treatment session, patient was screened for signs and symptoms related to COVID-19 including but not limited to verbally answering questions related to feeling ill, cough, or SOB, along with taking temperature via forehead thermometer. Patient presented with all negative signs and symptoms and had no fever >100 degrees Fahrenheit this date. Pt is more limited on L w/ supine nerve glides. He has good form w/ glut and ab holds w/ longer hold tolerated today. LTR still pretty limited ROM and painful if go too far. Exercises: (No more than 4 columns)   Exercise/Equipment 8/20/20  #1 8/28/20  #2 Date           WARM UP                     TABLE      abdom hold 5x2\" 5\" x10     Glut set  3x2\" 5\" x10     LTR AAROM 3xea AAROM 10xea           LAQ    instruct  HEP, manual nerve glides supine 20x2 ea LE                      STANDING                                                     PROPRIOCEPTION                                    MODALITIES IFC Lumb Tx                    Other Therapeutic Activities/Education:  8/20/2020: Patient received education on their current pathology and how their condition effects them with their functional activities. Patient understood discussion and questions were answered. Patient understands their activity limitations and understands rational for treatment progression.         Home Exercise Program:  Issued, practiced and pt demo ability to perform

## 2020-08-31 ENCOUNTER — HOSPITAL ENCOUNTER (OUTPATIENT)
Dept: PHYSICAL THERAPY | Age: 40
Setting detail: THERAPIES SERIES
Discharge: HOME | End: 2020-08-31
Payer: MEDICAID

## 2020-08-31 PROCEDURE — 97110 THERAPEUTIC EXERCISES: CPT

## 2020-08-31 PROCEDURE — 97012 MECHANICAL TRACTION THERAPY: CPT

## 2020-08-31 NOTE — FLOWSHEET NOTE
Outpatient Physical Therapy  [place]           [x] Phone: [phone]   Fax: [phone]  [facility]           [] Phone: [phone]   Fax: [phone]        Physical Therapy Daily Treatment Note  Date:  2020    Patient Name:  John Bergman    :  1980  MRN: [id number]  Restrictions/Precautions: Other position/activity restrictions: none  Diagnosis:   Diagnosis: back pain  Date of Injury/Surgery:   Treatment Diagnosis: Treatment Diagnosis: back pain, radicular pain, numbness B LE's    Insurance/Certification information: PT Insurance Information: [address] [facility], Precert required --POC approved but no Estim   Referring Physician:  Referring Practitioner: Jarett Davidson Doctor Visit:    Plan of care signed (Y/N):  pending  Outcome Measure: Oswestry 56%  Visit# / total visits:    approved precert   Pain level: 2-   Goals:       Short term goals  Time Frame for Short term goals: 3 WEEKS  Short term goal 1: Pt will be able to report at least 10% reduction in pain or will return to doc  Short term goal 2: Pt will be able to tolerate hip and trunk ROM and knee ext seated w/o increased pain  Short term goal 3: Pt will be able to report improved sleep  Long term goals  Time Frame for Long term goals : 6 WEEKS  Long term goal 1: Pt will be independent w/ home managment of pain  Long term goal 2: Pt will be able to sleep w/ min pain  Long term goal 3: Pt will report min numbness in leg, but feet may not improve d/t neuropathy  Long term goal 4: Pt will have improved Oswestry score by 10% or more    Summary of Evaluation: Assessment: Pt is a 37 yo male who presents w/ back and B LE pain with numbness and tingling. He demonstrates very limited hip and trunk mobility B w/ decreased DTR's and significant sensation loss in B lower legs. He denies any fever, chills, night sweats. He demonstrates + SLR and slump B w/ significant neural tension and is very TTP in B lumbar paraspinals.  He will benefit from PT to help reduce pain and improve mobility and then restore function, but will return to doc if not making any progress. Pt's limitations are significantly limiting his usual functional activity. Subjective:   Patient reports of 5-6/10 pain upon arrival and reports he's not heard from the pain management doctor but plans on calling today. Any changes in Ambulatory Summary Sheet? None        Objective:  Improved sleep by 15-20% since starting therapy       Prior to today's treatment session, patient was screened for signs and symptoms related to COVID-19 including but not limited to verbally answering questions related to feeling ill, cough, or SOB, along with taking temperature via forehead thermometer. Patient presented with all negative signs and symptoms and had no fever >100 degrees Fahrenheit this date. Exercises: (No more than 4 columns)   Exercise/Equipment 8/20/20  #1 8/28/20  #2 Date 8/31/2020 #3           WARM UP                     TABLE      abdom hold 5x2\" 5\" x10  10x5\"   Glut set  3x2\" 5\" x10  10x5\"   LTR AAROM 3xea AAROM 10xea  10x5\"         LAQ    instruct  HEP, manual nerve glides supine 20x2 ea LE 2x20 B                     STANDING                                                     PROPRIOCEPTION                                    MODALITIES IFC Lumb Tx Lumbar traction                   Other Therapeutic Activities/Education:  8/20/2020: Patient received education on their current pathology and how their condition effects them with their functional activities. Patient understood discussion and questions were answered. Patient understands their activity limitations and understands rational for treatment progression.         Home Exercise Program:  Issued, practiced and pt demo ability to perform 8/20/2020         Manual Treatments:        Modalities:  (pt weighs 210)  Mech lumbar traction, max 107#, min 75#, on 60 off 20, 3 steps up/down, 10 mins Rx so 15' total Tx    CP to lumbar region w/ pt seated, 10' - declined this date       Communication with other providers:  POC set for cosign 8/20/20      Assessment:  Pt tolerated Rx well and having some improvements. He continues w/ back pain and leg numbness and will benefit from PT to help reduce pain and numbness and restore functional activity tolerance.   Pain after Rx  4/10           Plan for Next Session:   work on hip and trunk mobility/ROM, basic core and pain control       Time In / Time Out:   0815/0900      Timed Code/Total Treatment Minutes:  45 25te 20trac      Next Progress Note due:  Visit 10 or return to doc      Plan of Care Interventions:  [x] Therapeutic Exercise  [x] Modalities:  [x] Therapeutic Activity     [] Ultrasound  [] Estim  [] Gait Training      [] Cervical Traction [x] Lumbar Traction  [x] Neuromuscular Re-education    [x] Cold/hotpack [] Iontophoresis   [x] Instruction in HEP      [] Vasopneumatic   [] Dry Needling    [x] Manual Therapy               [] Aquatic Therapy              Electronically signed by:  Maame Finn PTA  8/31/2020, 8:15 AM       8/31/2020, 8:15 AM

## 2020-09-01 RX ORDER — TIZANIDINE 2 MG/1
2 TABLET ORAL 3 TIMES DAILY PRN
Qty: 30 TABLET | Refills: 0 | Status: SHIPPED | OUTPATIENT
Start: 2020-09-01 | End: 2020-10-07 | Stop reason: SDUPTHER

## 2020-09-02 RX ORDER — DOXYCYCLINE HYCLATE 100 MG
100 TABLET ORAL 2 TIMES DAILY
Qty: 84 TABLET | Refills: 1 | Status: SHIPPED
Start: 2020-09-02 | End: 2020-09-09 | Stop reason: CLARIF

## 2020-09-04 ENCOUNTER — HOSPITAL ENCOUNTER (OUTPATIENT)
Dept: PHYSICAL THERAPY | Age: 40
Setting detail: THERAPIES SERIES
Discharge: HOME | End: 2020-09-04
Payer: MEDICAID

## 2020-09-04 PROCEDURE — 97012 MECHANICAL TRACTION THERAPY: CPT

## 2020-09-04 PROCEDURE — 97110 THERAPEUTIC EXERCISES: CPT

## 2020-09-04 NOTE — FLOWSHEET NOTE
Outpatient Physical Therapy  [place]           [x] Phone: [phone]   Fax: [phone]  [place]           [] Phone: [phone]   Fax: [phone]        Physical Therapy Daily Treatment Note  Date:  2020    Patient Name:  Yessi Osorio    :  1980  MRN: [id number]  Restrictions/Precautions: Other position/activity restrictions: none  Diagnosis:   Diagnosis: back pain  Date of Injury/Surgery:   Treatment Diagnosis: Treatment Diagnosis: back pain, radicular pain, numbness B LE's    Insurance/Certification information: PT Insurance Information: [address] [facility], Precert required --POC approved but no Estim   Referring Physician:  Referring Practitioner: Ori Davidson Doctor Visit:    Plan of care signed (Y/N):  pending  Outcome Measure: Oswestry 56%  Visit# / total visits:    approved precert   Pain level:    Goals:       Short term goals  Time Frame for Short term goals: 3 WEEKS  Short term goal 1: Pt will be able to report at least 10% reduction in pain or will return to doc  Short term goal 2: Pt will be able to tolerate hip and trunk ROM and knee ext seated w/o increased pain  Short term goal 3: Pt will be able to report improved sleep  Long term goals  Time Frame for Long term goals : 6 WEEKS  Long term goal 1: Pt will be independent w/ home managment of pain  Long term goal 2: Pt will be able to sleep w/ min pain  Long term goal 3: Pt will report min numbness in leg, but feet may not improve d/t neuropathy  Long term goal 4: Pt will have improved Oswestry score by 10% or more    Summary of Evaluation: Assessment: Pt is a 35 yo male who presents w/ back and B LE pain with numbness and tingling. He demonstrates very limited hip and trunk mobility B w/ decreased DTR's and significant sensation loss in B lower legs. He denies any fever, chills, night sweats. He demonstrates + SLR and slump B w/ significant neural tension and is very TTP in B lumbar paraspinals.  He will benefit from PT to help reduce pain and improve mobility and then restore function, but will return to doc if not making any progress. Pt's limitations are significantly limiting his usual functional activity. Subjective:   Patient reports of 4/10 pain upon arrival  today. Pt has appt with pain management doctor on 9/9/20 with [facility]. Pt notes that he feels looser today after being up and moving compared with earlier timed tx session last visit. Pt noted that he feels benefit from traction and looser and increased sensation. Any changes in Ambulatory Summary Sheet? None        Objective:  Improved sleep by 15-20% since starting therapy       Prior to today's treatment session, patient was screened for signs and symptoms related to COVID-19 including but not limited to verbally answering questions related to feeling ill, cough, or SOB, along with taking temperature via forehead thermometer. Patient presented with all negative signs and symptoms and had no fever >100 degrees Fahrenheit this date. Exercises: (No more than 4 columns)   Exercise/Equipment 8/20/20  #1 8/28/20  #2 Date 8/31/2020 #3 9/4/20  #4            WARM UP                        TABLE       abdom hold 5x2\" 5\" x10  10x5\" 10 x 5\"   Glut set  3x2\" 5\" x10  10x5\" 10x5\"   LTR AAROM 3xea AAROM 10xea  10x5\" 10 x 5\"          LAQ    instruct  HEP, manual nerve glides supine 20x2 ea LE 2x20 B 10 B   Supine alt UE with stab    10   Supine alt LE with stab    10          STANDING                                                             PROPRIOCEPTION                                          MODALITIES IFC Lumb Tx Lumbar traction Lumbar traction                     Other Therapeutic Activities/Education:  8/20/2020: Patient received education on their current pathology and how their condition effects them with their functional activities. Patient understood discussion and questions were answered.  Patient understands their activity limitations and understands rational for treatment progression. Home Exercise Program:  Issued, practiced and pt demo ability to perform 8/20/2020         Manual Treatments:        Modalities:  (pt weighs 210)  Mech lumbar traction, max 107#, min 75#, on 60 off 20, 3 steps up/down, 10 mins Rx so 15' total Tx    CP to lumbar region w/ pt seated, 10' - declined this date       Communication with other providers:  POC set for cosign 8/20/20      Assessment: Pt noted no pain with exercises today. He noted that previous numb areas in feet were becoming tingling in feet with traction. Pt also noted that LAQ with sciatic nerve glide was able to extend further. Pt educated in spinal stab with supine position. Tolerated these increased exercises well without any radicular symptoms. Pt performed additional supine abd stabs after traction with tilt x 10 and rested in position x 5 min. Pt reported as traction back to start that he noticed that improved sensation continued, but not as noticeable as when pulled during the traction. After session, pt reported pain level 2/10 and mostly in R hip. Pt amb following traction with slight antalgic gait. Pt noted that he has only slight numbness and mostly tingling in feet following session today.  Pt continues to require physical therapy to increase strength, core stability, decrease numbness, and improve gait to prevent falls and increase functional abilities for amb and ADLs      Plan for Next Session:   work on hip and trunk mobility/ROM, basic core and pain control, increase traction 112#      Time In / Time Out:   1605/1700      Timed Code/Total Treatment Minutes:  55 35te 20trac      Next Progress Note due:  Visit 10 or return to doc      Plan of Care Interventions:  [x] Therapeutic Exercise  [x] Modalities:  [x] Therapeutic Activity     [] Ultrasound  [] Estim  [] Gait Training      [] Cervical Traction [x] Lumbar Traction  [x] Neuromuscular Re-education    [x] Cold/hotpack [] Iontophoresis   [x] Instruction in HEP      [] Vasopneumatic   [] Dry Needling    [x] Manual Therapy               [] Aquatic Therapy              Electronically signed by:  Jose Salguero PTA  9/4/2020, 1:48 PM       9/4/2020, 1:48 PM

## 2020-09-08 ENCOUNTER — HOSPITAL ENCOUNTER (OUTPATIENT)
Dept: PHYSICAL THERAPY | Age: 40
Setting detail: THERAPIES SERIES
Discharge: HOME | End: 2020-09-08
Payer: MEDICAID

## 2020-09-08 PROCEDURE — 97012 MECHANICAL TRACTION THERAPY: CPT

## 2020-09-08 PROCEDURE — 97110 THERAPEUTIC EXERCISES: CPT

## 2020-09-08 RX ORDER — IBUPROFEN 200 MG
1 TABLET ORAL DAILY
Qty: 100 EACH | Refills: 3 | Status: SHIPPED | OUTPATIENT
Start: 2020-09-08 | End: 2021-02-03

## 2020-09-08 RX ORDER — TRAZODONE HYDROCHLORIDE 50 MG/1
50 TABLET ORAL NIGHTLY PRN
Qty: 30 TABLET | Refills: 2 | Status: SHIPPED | OUTPATIENT
Start: 2020-09-08 | End: 2020-10-07 | Stop reason: SDUPTHER

## 2020-09-08 NOTE — FLOWSHEET NOTE
Outpatient Physical Therapy  [place]           [x] Phone: [phone]   Fax: [phone]  [place]           [] Phone: [phone]   Fax: [phone]        Physical Therapy Daily Treatment Note  Date:  2020    Patient Name:  Tasha Howard    :  1980  MRN: [id number]  Restrictions/Precautions: Other position/activity restrictions: none  Diagnosis:   Diagnosis: back pain  Date of Injury/Surgery:   Treatment Diagnosis: Treatment Diagnosis: back pain, radicular pain, numbness B LE's    Insurance/Certification information: PT Insurance Information: [address] [facility], Precert required --POC approved but no Estim   Referring Physician:  Referring Practitioner: Philly Davidson Doctor Visit:    Plan of care signed (Y/N):  pending  Outcome Measure: Oswestry 56%  Visit# / total visits:    approved precert   Pain level: -   Goals:       Short term goals  Time Frame for Short term goals: 3 WEEKS  Short term goal 1: Pt will be able to report at least 10% reduction in pain or will return to doc  Short term goal 2: Pt will be able to tolerate hip and trunk ROM and knee ext seated w/o increased pain  Short term goal 3: Pt will be able to report improved sleep  Long term goals  Time Frame for Long term goals : 6 WEEKS  Long term goal 1: Pt will be independent w/ home managment of pain  Long term goal 2: Pt will be able to sleep w/ min pain  Long term goal 3: Pt will report min numbness in leg, but feet may not improve d/t neuropathy  Long term goal 4: Pt will have improved Oswestry score by 10% or more    Summary of Evaluation: Assessment: Pt is a 35 yo male who presents w/ back and B LE pain with numbness and tingling. He demonstrates very limited hip and trunk mobility B w/ decreased DTR's and significant sensation loss in B lower legs. He denies any fever, chills, night sweats. He demonstrates + SLR and slump B w/ significant neural tension and is very TTP in B lumbar paraspinals.  He will benefit from PT to help reduce pain and improve mobility and then restore function, but will return to doc if not making any progress. Pt's limitations are significantly limiting his usual functional activity. Subjective:   Patient states his pain is staying around 3-4/10. Pt has appt with pain management doctor on 9/9/20 with [facility]. Any changes in Ambulatory Summary Sheet? None        Objective:  Improved sleep by 15-20% since starting therapy  Pain has decreased 20-25% since starting therapy     Prior to today's treatment session, patient was screened for signs and symptoms related to COVID-19 including but not limited to verbally answering questions related to feeling ill, cough, or SOB, along with taking temperature via forehead thermometer. Patient presented with all negative signs and symptoms and had no fever >100 degrees Fahrenheit this date. Exercises: (No more than 4 columns)   Exercise/Equipment 8/28/20  #2 Date 8/31/2020 #3 9/4/20  #4 9/8/20 #5            WARM UP                        TABLE       abdom hold 5\" x10  10x5\" 10 x 5\" 10 x 5\"   Glut set  5\" x10  10x5\" 10x5\" 10x5\"   LTR AAROM 10xea  10x5\" 10 x 5\" 10 x 5\"          LAQ    HEP, manual nerve glides supine 20x2 ea LE 2x20 B 10 B  10x R/L   Supine alt UE with stab   10 10x   Supine alt LE with stab   10 10x   Supine alt UE/LE with stab    10x   STANDING                                                             PROPRIOCEPTION                                          MODALITIES Lumb Tx Lumbar traction Lumbar traction Lumbar traction                     Other Therapeutic Activities/Education:  8/20/2020: Patient received education on their current pathology and how their condition effects them with their functional activities. Patient understood discussion and questions were answered. Patient understands their activity limitations and understands rational for treatment progression.         Home Exercise Program:  Issued, practiced and pt demo ability to perform 8/20/2020         Manual Treatments:        Modalities:  (pt weighs 210)  [facility] lumbar traction, max 107#, min 75#, on 60 off 20, 3 steps up/down, 10 mins Rx so 15' total Tx    CP to lumbar region w/ pt seated, 10' - declined this date       Communication with other providers:  POC set for cosign 8/20/20      Assessment: Pt noted no pain with exercises today. He noted that previous numb areas in feet were becoming tingling in feet with traction. Pt also noted that LAQ with sciatic nerve glide was able to extend further. Continuing with spinal stab in supine position. Pt performed additional supine abd stabs after traction with tilt x 10 and rested in position ~3 min. After session, pt reported pain level 2/10 and mostly in R hip. Also noted that he has only slight numbness and mostly tingling in feet following session today.  Pt continues to require physical therapy to increase strength, core stability, decrease numbness, and improve gait to prevent falls and increase functional abilities for amb and ADLs      Plan for Next Session:   work on hip and trunk mobility/ROM, basic core and pain control, increase traction 112#      Time In / Time Out:   0815/0855      Timed Code/Total Treatment Minutes:  35/55 (35)TE, (20)Traction      Next Progress Note due:  Visit 10 or return to doc      Plan of Care Interventions:  [x] Therapeutic Exercise  [x] Modalities:  [x] Therapeutic Activity     [] Ultrasound  [] Estim  [] Gait Training      [] Cervical Traction [x] Lumbar Traction  [x] Neuromuscular Re-education    [x] Cold/hotpack [] Iontophoresis   [x] Instruction in HEP      [] Vasopneumatic   [] Dry Needling    [x] Manual Therapy               [] Aquatic Therapy              Electronically signed by:  Cristian Ac PTA  9/8/2020, 8:15 AM       9/8/2020, 8:15 AM

## 2020-09-09 ENCOUNTER — OFFICE VISIT (OUTPATIENT)
Dept: INTERNAL MEDICINE CLINIC | Age: 40
End: 2020-09-09
Payer: MEDICAID

## 2020-09-09 VITALS
SYSTOLIC BLOOD PRESSURE: 125 MMHG | BODY MASS INDEX: 27.91 KG/M2 | DIASTOLIC BLOOD PRESSURE: 80 MMHG | HEIGHT: 73 IN | RESPIRATION RATE: 18 BRPM | HEART RATE: 70 BPM | OXYGEN SATURATION: 98 % | WEIGHT: 210.6 LBS

## 2020-09-09 PROCEDURE — 99214 OFFICE O/P EST MOD 30 MIN: CPT | Performed by: NURSE PRACTITIONER

## 2020-09-09 PROCEDURE — G8419 CALC BMI OUT NRM PARAM NOF/U: HCPCS | Performed by: NURSE PRACTITIONER

## 2020-09-09 PROCEDURE — 3044F HG A1C LEVEL LT 7.0%: CPT | Performed by: NURSE PRACTITIONER

## 2020-09-09 PROCEDURE — 2022F DILAT RTA XM EVC RTNOPTHY: CPT | Performed by: NURSE PRACTITIONER

## 2020-09-09 PROCEDURE — 4004F PT TOBACCO SCREEN RCVD TLK: CPT | Performed by: NURSE PRACTITIONER

## 2020-09-09 PROCEDURE — G8427 DOCREV CUR MEDS BY ELIG CLIN: HCPCS | Performed by: NURSE PRACTITIONER

## 2020-09-09 ASSESSMENT — ENCOUNTER SYMPTOMS
CONSTIPATION: 0
CHEST TIGHTNESS: 0
ABDOMINAL PAIN: 0
SINUS PRESSURE: 0
EYES NEGATIVE: 1
BACK PAIN: 1
COUGH: 0
SORE THROAT: 0
ABDOMINAL DISTENTION: 0
SHORTNESS OF BREATH: 0
WHEEZING: 0
COLOR CHANGE: 0
DIARRHEA: 0
SINUS PAIN: 0
NAUSEA: 0

## 2020-09-09 NOTE — PROGRESS NOTES
referrals, and follow ups discussed with patient. All risks and benefits and possible side effects discussed with patient who agrees to plan of care and verbalizes understanding. All labs and imaging reviewed. No flowsheet data found. Return in about 3 months (around 12/9/2020).

## 2020-09-10 ENCOUNTER — HOSPITAL ENCOUNTER (OUTPATIENT)
Dept: PHYSICAL THERAPY | Age: 40
Setting detail: THERAPIES SERIES
Discharge: HOME | End: 2020-09-10
Payer: MEDICAID

## 2020-09-10 PROCEDURE — 97110 THERAPEUTIC EXERCISES: CPT

## 2020-09-10 PROCEDURE — 97012 MECHANICAL TRACTION THERAPY: CPT

## 2020-09-10 NOTE — FLOWSHEET NOTE
Outpatient Physical Therapy  [place]           [x] Phone: [phone]   Fax: [phone]  [facility]           [] Phone: [phone]   Fax: [phone]        Physical Therapy Daily Treatment Note  Date:  9/10/2020    Patient Name:  John Bergman    :  1980  MRN: [id number]  Restrictions/Precautions: Other position/activity restrictions: none  Diagnosis:   Diagnosis: back pain  Date of Injury/Surgery:   Treatment Diagnosis: Treatment Diagnosis: back pain, radicular pain, numbness B LE's    Insurance/Certification information: PT Insurance Information: [address] [facility], Precert required --POC approved but no Estim   Referring Physician:  Referring Practitioner: Jarett Davidson Doctor Visit:    Plan of care signed (Y/N):  pending  Outcome Measure: Oswestry 56%  Visit# / total visits:    approved precert   Pain level: 3/83   Goals:       Short term goals  Time Frame for Short term goals: 3 WEEKS  Short term goal 1: Pt will be able to report at least 10% reduction in pain or will return to doc/ 9/10/20 pain has improved 20-25%  Short term goal 2: Pt will be able to tolerate hip and trunk ROM and knee ext seated w/o increased pain/ 9/10/20 improved seated knee ext ROM with only pulling sensation back of leg  Short term goal 3: Pt will be able to report improved sleep/ 9/10/20 sleeping better 15-20%  Long term goals  Time Frame for Long term goals : 6 WEEKS  Long term goal 1: Pt will be independent w/ home managment of pain  Long term goal 2: Pt will be able to sleep w/ min pain  Long term goal 3: Pt will report min numbness in leg, but feet may not improve d/t neuropathy  Long term goal 4: Pt will have improved Oswestry score by 10% or more    Summary of Evaluation: Assessment: Pt is a 37 yo male who presents w/ back and B LE pain with numbness and tingling. He demonstrates very limited hip and trunk mobility B w/ decreased DTR's and significant sensation loss in B lower legs.   He denies any fever, chills, night sweats. He demonstrates + SLR and slump B w/ significant neural tension and is very TTP in B lumbar paraspinals. He will benefit from PT to help reduce pain and improve mobility and then restore function, but will return to doc if not making any progress. Pt's limitations are significantly limiting his usual functional activity. Subjective:   Patient states he is a little more sore today from having to do a lot of unexpected walking yesterday. Slept fairly well last night. Any changes in Ambulatory Summary Sheet? None        Objective:  Improved sleep by 15-20% since starting therapy  Pain has decreased 20-25% since starting therapy     Prior to today's treatment session, patient was screened for signs and symptoms related to COVID-19 including but not limited to verbally answering questions related to feeling ill, cough, or SOB, along with taking temperature via forehead thermometer. Patient presented with all negative signs and symptoms and had no fever >100 degrees Fahrenheit this date. Exercises: (No more than 4 columns)   Exercise/Equipment Date 8/31/2020 #3 9/4/20  #4 9/8/20 #5 9/10/20 #6            WARM UP                        TABLE       abdom hold 10x5\" 10 x 5\" 10 x 5\" 10 x 5\"   Glut set  10x5\" 10x5\" 10x5\" 10x5\"   LTR 10x5\" 10 x 5\" 10 x 5\" 10 x 5\"          LAQ    2x20 B 10 B  10x R/L 10x R/L   Supine alt UE with stab  10 10x 10x   Supine alt LE with stab  10 10x 10x   Supine alt UE/LE with stab   10x 10x   STANDING                                                             PROPRIOCEPTION                                          MODALITIES Lumbar traction Lumbar traction Lumbar traction Lumbar traction                     Other Therapeutic Activities/Education:  8/20/2020: Patient received education on their current pathology and how their condition effects them with their functional activities. Patient understood discussion and questions were answered.  Patient understands their activity limitations and understands rational for treatment progression. Home Exercise Program:  Issued, practiced and pt demo ability to perform 8/20/2020         Manual Treatments:        Modalities:  (pt weighs 210)  Mech lumbar traction, max 107#, min 75#, on 60 off 20, 3 steps up/down, 10 mins Rx so 15' total Tx    CP to lumbar region w/ pt seated, 10' - declined this date       Communication with other providers:  POC set for cosign 8/20/20      Assessment: Pt noted no pain with exercises today. He noted that previous numb areas in feet were becoming tingling in feet with traction. Pt also noted that LAQ with sciatic nerve glide was able to extend further. Continuing with spinal stab in supine position. Pt performed additional supine abd stabs after traction with tilt x 10 and rested in position ~3 min. After session, pt reported pain level 3/10 and mostly in R hip. Also noted that he mostly just has some tingling in feet following session today.  Pt continues to require physical therapy to increase strength, core stability, decrease numbness, and improve gait to prevent falls and increase functional abilities for amb and ADLs      Plan for Next Session:   work on hip and trunk mobility/ROM, basic core and pain control, increase traction 112#      Time In / Time Out:   0816/0906      Timed Code/Total Treatment Minutes:  30/50 (30)TE, (20)Traction      Next Progress Note due:  Visit 10 or return to doc      Plan of Care Interventions:  [x] Therapeutic Exercise  [x] Modalities:  [x] Therapeutic Activity     [] Ultrasound  [] Estim  [] Gait Training      [] Cervical Traction [x] Lumbar Traction  [x] Neuromuscular Re-education    [x] Cold/hotpack [] Iontophoresis   [x] Instruction in HEP      [] Vasopneumatic   [] Dry Needling    [x] Manual Therapy               [] Aquatic Therapy              Electronically signed by:  Shonda Lau,  PTA  9/10/2020, 8:16 AM       9/10/2020, 8:16 AM

## 2020-09-14 ENCOUNTER — HOSPITAL ENCOUNTER (OUTPATIENT)
Dept: PHYSICAL THERAPY | Age: 40
Setting detail: THERAPIES SERIES
Discharge: HOME | End: 2020-09-14
Payer: MEDICAID

## 2020-09-14 PROCEDURE — 97012 MECHANICAL TRACTION THERAPY: CPT

## 2020-09-14 PROCEDURE — 97110 THERAPEUTIC EXERCISES: CPT

## 2020-09-14 RX ORDER — OMEPRAZOLE 20 MG/1
20 CAPSULE, DELAYED RELEASE ORAL
Qty: 30 CAPSULE | Refills: 2 | Status: SHIPPED | OUTPATIENT
Start: 2020-09-14 | End: 2020-10-07 | Stop reason: SDUPTHER

## 2020-09-14 NOTE — FLOWSHEET NOTE
Outpatient Physical Therapy  [place]           [x] Phone: [phone]   Fax: [phone]  [facility]           [] Phone: [phone]   Fax: [phone]        Physical Therapy Daily Treatment Note  Date:  2020    Patient Name:  Monica Mercado    :  1980  MRN: [id number]  Restrictions/Precautions: Other position/activity restrictions: none  Diagnosis:   Diagnosis: back pain  Date of Injury/Surgery:   Treatment Diagnosis: Treatment Diagnosis: back pain, radicular pain, numbness B LE's    Insurance/Certification information: PT Insurance Information: [id number] [facility], Precert required --POC approved but no Estim   Referring Physician:  Referring Practitioner: Jesus Roque  Next Doctor Visit:    Plan of care signed (Y/N):  pending  Outcome Measure: Oswestry 56%  Visit# / total visits:    approved precert   Pain level: 5-   Goals:       Short term goals  Time Frame for Short term goals: 3 WEEKS  Short term goal 1: Pt will be able to report at least 10% reduction in pain or will return to doc/ 9/10/20 pain has improved 20-25%  Short term goal 2: Pt will be able to tolerate hip and trunk ROM and knee ext seated w/o increased pain/ 9/10/20 improved seated knee ext ROM with only pulling sensation back of leg  Short term goal 3: Pt will be able to report improved sleep/ 9/10/20 sleeping better 15-20%  Long term goals  Time Frame for Long term goals : 6 WEEKS  Long term goal 1: Pt will be independent w/ home managment of pain  Long term goal 2: Pt will be able to sleep w/ min pain  Long term goal 3: Pt will report min numbness in leg, but feet may not improve d/t neuropathy  Long term goal 4: Pt will have improved Oswestry score by 10% or more    Summary of Evaluation: Assessment: Pt is a 37 yo male who presents w/ back and B LE pain with numbness and tingling. He demonstrates very limited hip and trunk mobility B w/ decreased DTR's and significant sensation loss in B lower legs.   He denies any fever, chills, night sweats. He demonstrates + SLR and slump B w/ significant neural tension and is very TTP in B lumbar paraspinals. He will benefit from PT to help reduce pain and improve mobility and then restore function, but will return to doc if not making any progress. Pt's limitations are significantly limiting his usual functional activity. Subjective:   Patient states he is going to be scheduled for injections in his back. Does not have a date yet. Has been having a little less pain since starting new meds that his pain doctor started him on, Naproxen and Norco. No tingling in his legs this morning. States he slept good over the weekend. Any changes in Ambulatory Summary Sheet? Now taking Naproxen and Norco        Objective:  Improved sleep by 15-20% since starting therapy  Pain has decreased 20-25% since starting therapy     Prior to today's treatment session, patient was screened for signs and symptoms related to COVID-19 including but not limited to verbally answering questions related to feeling ill, cough, or SOB, along with taking temperature via forehead thermometer. Patient presented with all negative signs and symptoms and had no fever >100 degrees Fahrenheit this date.              Exercises: (No more than 4 columns)   Exercise/Equipment 9/8/20 #5 9/10/20 #6 9/14/20 #7           WARM UP                     TABLE      abdom hold 10 x 5\" 10 x 5\" 10 x 5\"   Glut set  10x5\" 10x5\" 10x5\"   LTR 10 x 5\" 10 x 5\" 10 x 5\"         LAQ     10x R/L 10x R/L 10x R/L   Supine alt UE with stab 10x 10x 10x   Supine alt LE with stab 10x 10x 10x   Supine alt UE/LE with stab 10x 10x 10x   Bridging   Partial lift 10x         STANDING                                                     PROPRIOCEPTION                                    MODALITIES Lumbar traction Lumbar traction Lumbar traction - increased poundage                    Other Therapeutic Activities/Education:  8/20/2020: Patient received education on their current pathology and how their condition effects them with their functional activities. Patient understood discussion and questions were answered. Patient understands their activity limitations and understands rational for treatment progression. Home Exercise Program:  Issued, practiced and pt demo ability to perform 8/20/2020         Manual Treatments:        Modalities:  (pt weighs 210)  Mech lumbar traction, max 109#, min 75#, on 60 off 20, 3 steps up/down, 10 mins Rx so 15' total Tx    CP to lumbar region w/ pt seated, 10' - declined this date       Communication with other providers:  POC set for cosign 8/20/20      Assessment:   Pt demonstrating improved ROM with LAQ with sciatic nerve glide. Continuing with spinal stab in supine position. Tolerated addition of bridging, partial lift, fairly well. Pt performed additional supine abd stabs after traction with tilt x 10 and rested in position ~3 min. Pain following session 3/10 across LB. Very little to no LE symptoms.    Pt continues to require physical therapy to increase strength, core stability, decrease numbness, and improve gait to prevent falls and increase functional abilities for amb and ADLs      Plan for Next Session:   work on hip and trunk mobility/ROM, basic core and pain control, increase traction 112#      Time In / Time Out:   0816/0906      Timed Code/Total Treatment Minutes:  30/50 (30)TE, (20)Traction      Next Progress Note due:  Visit 10 or return to doc      Plan of Care Interventions:  [x] Therapeutic Exercise  [x] Modalities:  [x] Therapeutic Activity     [] Ultrasound  [] Estim  [] Gait Training      [] Cervical Traction [x] Lumbar Traction  [x] Neuromuscular Re-education    [x] Cold/hotpack [] Iontophoresis   [x] Instruction in HEP      [] Vasopneumatic   [] Dry Needling    [x] Manual Therapy               [] Aquatic Therapy              Electronically signed by:  Livia Sacks, PTA  9/14/2020, 8:16 AM       9/14/2020, 8:16 AM

## 2020-09-16 RX ORDER — GABAPENTIN 300 MG/1
300 CAPSULE ORAL 3 TIMES DAILY
Qty: 90 CAPSULE | Refills: 2 | Status: SHIPPED | OUTPATIENT
Start: 2020-09-16 | End: 2021-02-03

## 2020-09-17 ENCOUNTER — HOSPITAL ENCOUNTER (OUTPATIENT)
Dept: PHYSICAL THERAPY | Age: 40
Setting detail: THERAPIES SERIES
Discharge: HOME | End: 2020-09-17
Payer: MEDICAID

## 2020-09-17 PROCEDURE — 97110 THERAPEUTIC EXERCISES: CPT

## 2020-09-17 PROCEDURE — 97012 MECHANICAL TRACTION THERAPY: CPT

## 2020-09-17 NOTE — FLOWSHEET NOTE
Outpatient Physical Therapy  [place]           [x] Phone: [phone]   Fax: [phone]  [facility] [facility]           [] Phone: [phone]   Fax: [phone]        Physical Therapy Daily Treatment Note  Date:  2020    Patient Name:  John Bergman    :  1980  MRN: [id number]  Restrictions/Precautions: Other position/activity restrictions: none  Diagnosis:   Diagnosis: back pain  Date of Injury/Surgery:   Treatment Diagnosis: Treatment Diagnosis: back pain, radicular pain, numbness B LE's    Insurance/Certification information: PT Insurance Information: [address] [facility], Precert required --POC approved but no Estim   Referring Physician:  Referring Practitioner: Jarett Davidson Doctor Visit:    Plan of care signed (Y/N):  pending  Outcome Measure: Oswestry 56%  Visit# / total visits:    approved precert   Pain level:  \"numbness left foot, 3/10 LB  Goals:       Short term goals  Time Frame for Short term goals: 3 WEEKS  Short term goal 1: Pt will be able to report at least 10% reduction in pain or will return to doc/ 9/10/20 pain has improved 20-25%  Short term goal 2: Pt will be able to tolerate hip and trunk ROM and knee ext seated w/o increased pain/ 9/10/20 improved seated knee ext ROM with only pulling sensation back of leg  Short term goal 3: Pt will be able to report improved sleep/ 9/10/20 sleeping better 15-20%  Long term goals  Time Frame for Long term goals : 6 WEEKS  Long term goal 1: Pt will be independent w/ home managment of pain  Long term goal 2: Pt will be able to sleep w/ min pain  Long term goal 3: Pt will report min numbness in leg, but feet may not improve d/t neuropathy  Long term goal 4: Pt will have improved Oswestry score by 10% or more    Summary of Evaluation: Assessment: Pt is a 35 yo male who presents w/ back and B LE pain with numbness and tingling.   He demonstrates very limited hip and trunk mobility B w/ decreased DTR's and significant sensation loss in B lower legs.  He denies any fever, chills, night sweats. He demonstrates + SLR and slump B w/ significant neural tension and is very TTP in B lumbar paraspinals. He will benefit from PT to help reduce pain and improve mobility and then restore function, but will return to doc if not making any progress. Pt's limitations are significantly limiting his usual functional activity. Subjective:   Patient states he has felt pretty good since previous session but then walked a lot yesterday which he thinks has increased his numbness L foot. Any changes in Ambulatory Summary Sheet? Now taking Naproxen and Norco        Objective:  Improved sleep by 20% since starting therapy  Pain has decreased 20-25% since starting therapy     Prior to today's treatment session, patient was screened for signs and symptoms related to COVID-19 including but not limited to verbally answering questions related to feeling ill, cough, or SOB, along with taking temperature via forehead thermometer. Patient presented with all negative signs and symptoms and had no fever >100 degrees Fahrenheit this date.              Exercises: (No more than 4 columns)   Exercise/Equipment 9/8/20 #5 9/10/20 #6 9/14/20 #7 9/17/20 #8            WARM UP                        TABLE       abdom hold 10 x 5\" 10 x 5\" 10 x 5\" 10 x 5\"   Glut set  10x5\" 10x5\" 10x5\" 10x5\"   LTR 10 x 5\" 10 x 5\" 10 x 5\" 10 x 5\"          LAQ     10x R/L 10x R/L 10x R/L 10x R/L   Supine alt UE with stab 10x 10x 10x 10x   Supine alt LE with stab 10x 10x 10x 10x   Supine alt UE/LE with stab 10x 10x 10x 10x   Bridging   Partial lift 10x Partial lift 10x          STANDING                                                             PROPRIOCEPTION                                          MODALITIES Lumbar traction Lumbar traction Lumbar traction - increased poundage  Lumbar traction - increased poundage                     Other Therapeutic Activities/Education:  8/20/2020: Patient received education on their current pathology and how their condition effects them with their functional activities. Patient understood discussion and questions were answered. Patient understands their activity limitations and understands rational for treatment progression. Home Exercise Program:  Issued, practiced and pt demo ability to perform 8/20/2020         Manual Treatments:        Modalities:  (pt weighs 210)  Mech lumbar traction, max 110#, min 75#, on 60 off 20, 3 steps up/down, 10 mins Rx so 15' total Tx    CP to lumbar region w/ pt seated, 10' - declined this date       Communication with other providers:  POC set for cosign 8/20/20      Assessment:   Pt continuing to demonstrate improved ROM with LAQ with sciatic nerve glide. Sleeping has improved. Continuing with spinal stab in supine position. Pt performed additional supine abd stabs after traction with tilt x 10 and rested in position ~3 min. Reports of only min numbness L foot rating 1/10 following session.     Pt continues to require physical therapy to increase strength, core stability, decrease numbness, and improve gait to prevent falls and increase functional abilities for amb and ADLs      Plan for Next Session:  GOOD-- HAVE Pt SCHEDULE LAST 2 RX PLEASE, LAST ONE W/ ME IF CAN  work on hip and trunk mobility/ROM, basic core and pain control, increase traction 112#      Time In / Time Out:   0900/0948      Timed Code/Total Treatment Minutes:  28/48 (28)TE, (20)Traction      Next Progress Note due:  Visit 10 or return to doc      Plan of Care Interventions:  [x] Therapeutic Exercise  [x] Modalities:  [x] Therapeutic Activity     [] Ultrasound  [] Estim  [] Gait Training      [] Cervical Traction [x] Lumbar Traction  [x] Neuromuscular Re-education    [x] Cold/hotpack [] Iontophoresis   [x] Instruction in HEP      [] Vasopneumatic   [] Dry Needling    [x] Manual Therapy               [] Aquatic Therapy              Electronically signed by:  Sumeet Christianson BENITO Sprague  9/17/2020, 9:00 AM       9/17/2020, 9:00 AM

## 2020-09-23 ENCOUNTER — HOSPITAL ENCOUNTER (OUTPATIENT)
Dept: PHYSICAL THERAPY | Age: 40
Setting detail: THERAPIES SERIES
Discharge: HOME | End: 2020-09-23
Payer: MEDICAID

## 2020-09-23 PROCEDURE — 97110 THERAPEUTIC EXERCISES: CPT

## 2020-09-23 PROCEDURE — 97012 MECHANICAL TRACTION THERAPY: CPT

## 2020-09-23 NOTE — FLOWSHEET NOTE
legs.  He denies any fever, chills, night sweats. He demonstrates + SLR and slump B w/ significant neural tension and is very TTP in B lumbar paraspinals. He will benefit from PT to help reduce pain and improve mobility and then restore function, but will return to doc if not making any progress. Pt's limitations are significantly limiting his usual functional activity. Subjective:   Patient states his pain is about the same as previous session. Did well with traction last visit. Has been sleeping better. Any changes in Ambulatory Summary Sheet? Now taking Naproxen and Norco        Objective:  Improved sleep by 25% since starting therapy  Pain has decreased 20-25% since starting therapy     Prior to today's treatment session, patient was screened for signs and symptoms related to COVID-19 including but not limited to verbally answering questions related to feeling ill, cough, or SOB, along with taking temperature via forehead thermometer. Patient presented with all negative signs and symptoms and had no fever >100 degrees Fahrenheit this date.              Exercises: (No more than 4 columns)   Exercise/Equipment 9/10/20 #6 9/14/20 #7 9/17/20 #8 9/23/20 #9            WARM UP                        TABLE       abdom hold 10 x 5\" 10 x 5\" 10 x 5\" 10 x 5\"   Glut set  10x5\" 10x5\" 10x5\" 10x5\"   LTR 10 x 5\" 10 x 5\" 10 x 5\" 10 x 5\"          LAQ    10x R/L 10x R/L 10x R/L 10x R/L   Supine alt UE with stab 10x 10x 10x 10x   Supine alt LE with stab 10x 10x 10x 10x   Supine alt UE/LE with stab 10x 10x 10x 10x   Bridging  Partial lift 10x Partial lift 10x Partial lift 10x   Heel taps    10x                        STANDING                                                             PROPRIOCEPTION                                          MODALITIES Lumbar traction Lumbar traction - increased poundage  Lumbar traction - increased poundage Lumbar traction                      Other Therapeutic Activities/Education: 8/20/2020: Patient received education on their current pathology and how their condition effects them with their functional activities. Patient understood discussion and questions were answered. Patient understands their activity limitations and understands rational for treatment progression. Home Exercise Program:  Issued, practiced and pt demo ability to perform 8/20/2020         Manual Treatments:        Modalities:  (pt weighs 210)  Mech lumbar traction, max 110#, min 75#, on 60 off 20, 3 steps up/down, x 15 min    CP to lumbar region w/ pt seated, 10' - declined this date       Communication with other providers:  POC set for cosign 8/20/20      Assessment:   Pt continuing to note improvement in symptoms. Currently sleeping better and demonstrating improved ROM with LAQ. Have increased traction to 110# with good tolerance. Continuing with spinal stab in supine position. Pt performed additional supine abd stabs after traction with tilt x 10 and rested in position ~2 min. Reports of 3/10 pain and very little tingling in his feet following session.     Pt continues to require physical therapy to increase strength, core stability, decrease numbness, and improve gait to prevent falls and increase functional abilities for amb and ADLs      Plan for Next Session:    work on hip and trunk mobility/ROM, basic core and pain control, increase traction 112#      Time In / Time Out:   0818/0905      Timed Code/Total Treatment Minutes:  27/47 (27)TE, (20)Traction       Next Progress Note due:  Visit 10 or return to doc      Plan of Care Interventions:  [x] Therapeutic Exercise  [x] Modalities:  [x] Therapeutic Activity     [] Ultrasound  [] Estim  [] Gait Training      [] Cervical Traction [x] Lumbar Traction  [x] Neuromuscular Re-education    [x] Cold/hotpack [] Iontophoresis   [x] Instruction in HEP      [] Vasopneumatic   [] Dry Needling    [x] Manual Therapy               [] Aquatic Therapy Electronically signed by:  Claudene Maywood, PTA  9/23/2020, 8:19 AM       9/23/2020, 8:19 AM

## 2020-09-25 ENCOUNTER — HOSPITAL ENCOUNTER (OUTPATIENT)
Dept: PHYSICAL THERAPY | Age: 40
Setting detail: THERAPIES SERIES
Discharge: HOME | End: 2020-09-25
Payer: MEDICAID

## 2020-09-25 PROCEDURE — 97012 MECHANICAL TRACTION THERAPY: CPT

## 2020-09-25 PROCEDURE — 97530 THERAPEUTIC ACTIVITIES: CPT

## 2020-09-25 NOTE — PROGRESS NOTES
Outpatient Physical Therapy           [place]           [x] Phone: [phone]   Fax: [phone]  [place]           [] Phone: [phone]   Fax: [phone]      To: Choco Funes        From: Hollie Hodgson PT     Patient: Coby Gaucher                  : 1980  Diagnosis: back pain      Date: 2020  Treatment Diagnosis:back pain, radicular pain, numbness B LE's       [x]  Progress Note                []  Discharge Note    Evaluation Date:  2020  Total Visits to date:  10  Cancels/No-shows to date:      Subjective:  Pt feels like he is getting better, overall maybe 25-30% better, can straighten knees better and is having less symptoms. He reports new diabetic shoes and wearing them in and doing ok with this but doesn't seem to impact back or leg symptoms. Plan of Care/Treatment to date:  [x] Therapeutic Exercise    [x] Modalities:  [x] Therapeutic Activity     [] Ultrasound  [] Electrical Stimulation  [] Gait Training      [] Cervical Traction   [x] Lumbar Traction  [x] Neuromuscular Re-education  [] Cold/hotpack [] Iontophoresis  [x] Instruction in HEP      Other:  [] Manual Therapy       []  Vasopneumatic  [] Aquatic Therapy       []                          Objective/Significant Findings At Last Visit/Comments:  Prior to today's treatment session, patient was screened for signs and symptoms related to COVID-19 including but not limited to verbally answering questions related to feeling ill, cough, or SOB, along with taking temperature via forehead thermometer. Patient presented with all negative signs and symptoms and had no fever >100 degrees Fahrenheit this date.      AROM lumbar flex to mid shin, ext WFL, rot 25% limited but all w/ min to no pain now. Seated knee ext R 18 from 0, L 20 from 0 w/ slight increase tingle in feet. MMT: min pain, mostly pulling on muscles.   Ankle DF 4+/5 R, 5/5 L  Ankle PF 5/5 B  Knee flex 4+/5 B w/ some ratcheting  Knee ext 4/5 R, 4+/5 L at eval, 9.25: 46%    Frequency/Duration:  # Days per week: [] 1 day # Weeks: [] 1 week [] 4 weeks [] 8 weeks     [x] 2 days   [] 2 weeks [] 5 weeks [] 10 weeks     [] 3 days   [] 3 weeks [x] 6 weeks [] 12 weeks       Rehab Potential: [] Excellent [x] Good [] Fair  [] Poor         Patient Status: [x] Continue per initial plan of Care     [] Patient now discharged     [x] Additional visits requested, Please re-certify for additional visits:      Requested frequency/duration:  2/week for 4 weeks, pending insurance also    If we are requesting more visits, we fully anticipate the patient's condition is expected to improve within the treatment timeframe we are requesting. Electronically signed by:  Jose David Buckner PT, MPT, ATC  9/25/2020, 6:36 AM    9/25/2020, 10:38 AM    If you have any questions or concerns, please don't hesitate to call.   Thank you for your referral.    Physician Signature:______________________ Date:______ Time: ________  By signing above, therapists plan is approved by physician

## 2020-09-25 NOTE — FLOWSHEET NOTE
Outpatient Physical Therapy  [place]           [x] Phone: [phone]   Fax: [phone]  [facility] [place]           [] Phone: [phone]   Fax: [phone]        Physical Therapy Daily Treatment Note  Date:  2020    Patient Name:  Jimbo Galaviz    :  1980  MRN: [id number]  Restrictions/Precautions:  Other position/activity restrictions: none  Diagnosis:   Diagnosis: back pain  Date of Injury/Surgery:   Treatment Diagnosis: Treatment Diagnosis: back pain, radicular pain, numbness B LE's    Insurance/Certification information: PT Insurance Information: [id number] [facility], Precert required --POC approved but no Estim   Referring Physician:  Referring Practitioner: Claudia Marrero  Next Doctor Visit:    Plan of care signed (Y/N): yes  Outcome Measure: Oswestry 56%, : 46%  Visit# / total visits:   87/ approved precert, pending request for more as of   Pain level: 4/10 \"numbness\" left foot, 3/10 LB  Goals:       Short term goals  Time Frame for Short term goals: 3 WEEKS  Short term goal 1: Pt will be able to report at least 10% reduction in pain or will return to doc/ Met   Short term goal 2: Pt will be able to tolerate hip and trunk ROM and knee ext seated w/o increased pain  Mostly met   Short term goal 3: Pt will be able to report improved sleep/  reports 30% better too, can get 4-5 hours at a time now    Long term goals  Time Frame for Long term goals : 6 WEEKS  Long term goal 1: Pt will be independent w/ home managment of pain Helps some, but not as much as PT does  Long term goal 2: Pt will be able to sleep w/ min pain  Progressing   Long term goal 3: Pt will report min numbness in leg, but feet may not improve d/t neuropathy  Partially met, legs min numbness now  Long term goal 4: Pt will have improved Oswestry score by 10% or more  Met, reset goal for another 5-10%   Oswestry 56% at eval, 25: 46%      Summary of Evaluation: Assessment: Pt is a 37 yo male who presents w/ back and B LE pain with numbness and tingling. He demonstrates very limited hip and trunk mobility B w/ decreased DTR's and significant sensation loss in B lower legs. He denies any fever, chills, night sweats. He demonstrates + SLR and slump B w/ significant neural tension and is very TTP in B lumbar paraspinals. He will benefit from PT to help reduce pain and improve mobility and then restore function, but will return to doc if not making any progress. Pt's limitations are significantly limiting his usual functional activity. Subjective:  Pt feels like he is getting better, overall maybe 25-30% better, can straighten knees better and is having less symptoms. He reports new diabetic shoes and wearing them in and doing ok with this but doesn't seem to impact back or leg symptoms. Any changes in Ambulatory Summary Sheet? Now taking Naproxen and Norco        Objective:  Prior to today's treatment session, patient was screened for signs and symptoms related to COVID-19 including but not limited to verbally answering questions related to feeling ill, cough, or SOB, along with taking temperature via forehead thermometer. Patient presented with all negative signs and symptoms and had no fever >100 degrees Fahrenheit this date. AROM lumbar flex to mid shin, ext WFL, rot 25% limited but all w/ min to no pain now. Seated knee ext R 18 from 0, L 20 from 0 w/ slight increase tingle in feet. MMT: min pain, mostly pulling on muscles. Ankle DF 4+/5 R, 5/5 L  Ankle PF 5/5 B  Knee flex 4+/5 B w/ some ratcheting  Knee ext 4/5 R, 4+/5 L   Hip Flex 4+/5 B  Pt still w/ + slump test B, but can achieve more motion before onset now. + SLR B as well and also noted anterior hip tightness, pain in hip flexor w/ laying supine legs down. KTC still painful anterior hip on R but min on L w/ painful hip rotation as well R > L. Pt lacks full hip ext in S/L B LE's, very tight hip flexors.           Exercises: (No more than 4 columns)   Exercise/Equipment 9/17/20 #8 9/23/20 #9 9/25/20   #10             WARM UP             Recheck today, didn't get to exercises           TABLE       abdom hold 10 x 5\" 10 x 5\" - w/ stab exercies   Glut set  10x5\" 10x5\" - W/ bridge   LTR 10 x 5\" 10 x 5\" -    Sciatic nerve glides   NEXT    LAQ    10x R/L 10x R/L Try more sciatic nerve glides now --   Supine alt UE with stab 10x 10x -    Supine alt LE with stab 10x 10x -    Supine alt UE/LE with stab 10x 10x -    Bridging Partial lift 10x Partial lift 10x -    Heel taps  10x -    Hip flexor stretching   NEXT    Prone prop or press up? NEXT            STANDING                                                             PROPRIOCEPTION                                          MODALITIES Lumbar traction - increased poundage Lumbar traction  Lumbar traction                       Other Therapeutic Activities/Education:  8/20/2020: Patient received education on their current pathology and how their condition effects them with their functional activities. Patient understood discussion and questions were answered. Patient understands their activity limitations and understands rational for treatment progression. Home Exercise Program:  Issued, practiced and pt demo ability to perform 8/20/2020         Manual Treatments:        Modalities:  (pt weighs 210)  [facility] max 110#, min 75#, on 60 off 20, 3 steps up/down, x 15 min     - declined this date       Communication with other providers:  POC set for cosign 8/20/20, See PN 9/25/20,       Assessment:   Pt has been seen for 10 sessions of PT focusing on decreasing neural tension and symptoms as well as core strength and pain reduction. Pt continues to note improvement in symptoms. Currently sleeping better and demonstrating improved ROM with LAQ. He is progressing towards goals but he continues w/ neural symptoms, pain, joint and muscle stiffness.   Reports of 3/10 pain and very little tingling in his feet following session.     Pt continues to require physical therapy to increase strength, core stability, decrease numbness, and improve gait to prevent falls and increase functional abilities for amb and ADLs      Plan for Next Session:    work on hip and trunk mobility/ROM, basic core and pain control, increase traction 112#      Time In / Time Out:   0835/0930       Timed Code/Total Treatment Minutes:   30/50        (30)TA-2, (20)Traction-1       Next Progress Note due:  See PN 9/25/20      Plan of Care Interventions:  [x] Therapeutic Exercise  [x] Modalities:  [x] Therapeutic Activity     [] Ultrasound  [] Estim  [] Gait Training      [] Cervical Traction [x] Lumbar Traction  [x] Neuromuscular Re-education    [x] Cold/hotpack [] Iontophoresis   [x] Instruction in HEP      [] Vasopneumatic   [] Dry Needling    [x] Manual Therapy               [] Aquatic Therapy              Electronically signed by:  Swapna Ross  PT, MPT, ATC   9/25/2020, 6:33 AM       9/25/2020, 10:38 AM

## 2020-10-05 ENCOUNTER — HOSPITAL ENCOUNTER (OUTPATIENT)
Dept: PHYSICAL THERAPY | Age: 40
Setting detail: THERAPIES SERIES
Discharge: HOME | End: 2020-10-05
Payer: MEDICAID

## 2020-10-05 PROCEDURE — 97012 MECHANICAL TRACTION THERAPY: CPT

## 2020-10-05 PROCEDURE — 97530 THERAPEUTIC ACTIVITIES: CPT

## 2020-10-05 PROCEDURE — 97110 THERAPEUTIC EXERCISES: CPT

## 2020-10-05 NOTE — FLOWSHEET NOTE
Outpatient Physical Therapy  [place]           [x] Phone: [phone]   Fax: [phone]  [place]           [] Phone: [phone]   Fax: [phone]        Physical Therapy Daily Treatment Note  Date:  10/5/2020    Patient Name:  Arianna Odonnell    :  1980  MRN: [id number]  Restrictions/Precautions: Other position/activity restrictions: none  Diagnosis:   Diagnosis: back pain  Date of Injury/Surgery:   Treatment Diagnosis: Treatment Diagnosis: back pain, radicular pain, numbness B LE's    Insurance/Certification information: PT Insurance Information: [id number] [facility], Precert required --POC approved but no Estim   Referring Physician:  Referring Practitioner: Enoc Davidson Doctor Visit:    Plan of care signed (Y/N): yes  Outcome Measure: Oswestry 56%, : 46%  Visit# / total visits:    approved precert,   Pain level:  \"numbness\" left foot, 3/10 LB  Goals:       Short term goals  Time Frame for Short term goals: 3 WEEKS  Short term goal 1: Pt will be able to report at least 10% reduction in pain or will return to doc/ Met   Short term goal 2: Pt will be able to tolerate hip and trunk ROM and knee ext seated w/o increased pain  Mostly met   Short term goal 3: Pt will be able to report improved sleep/  reports 30% better too, can get 4-5 hours at a time now    Long term goals  Time Frame for Long term goals : 6 WEEKS  Long term goal 1: Pt will be independent w/ home managment of pain Helps some, but not as much as PT does  Long term goal 2: Pt will be able to sleep w/ min pain  Progressing   Long term goal 3: Pt will report min numbness in leg, but feet may not improve d/t neuropathy  Partially met, legs min numbness now  Long term goal 4: Pt will have improved Oswestry score by 10% or more  Met, reset goal for another 5-10%   Oswestry 56% at eval, : 46%      Summary of Evaluation: Assessment: Pt is a 35 yo male who presents w/ back and B LE pain with numbness and tingling.   He demonstrates very limited hip and trunk mobility B w/ decreased DTR's and significant sensation loss in B lower legs. He denies any fever, chills, night sweats. He demonstrates + SLR and slump B w/ significant neural tension and is very TTP in B lumbar paraspinals. He will benefit from PT to help reduce pain and improve mobility and then restore function, but will return to doc if not making any progress. Pt's limitations are significantly limiting his usual functional activity. Subjective:  Pt continuing to note improvement. Symptoms will increase if he over does it. Overall sleeping better but did not sleep well last night. Any changes in Ambulatory Summary Sheet? Now taking Naproxen and Norco        Objective:  Prior to today's treatment session, patient was screened for signs and symptoms related to COVID-19 including but not limited to verbally answering questions related to feeling ill, cough, or SOB, along with taking temperature via forehead thermometer. Patient presented with all negative signs and symptoms and had no fever >100 degrees Fahrenheit this date. Pt requires cues w/ exercises at times. Exercises: (No more than 4 columns)   Exercise/Equipment 9/17/20 #8 9/23/20 #9 9/25/20   #10 10/5/20 #11            WARM UP             Recheck today, didn't get to exercises           TABLE       abdom hold 10 x 5\" 10 x 5\" - ---   Glut set  10x5\" 10x5\" - ---   LTR 10 x 5\" 10 x 5\" - 10 x 5\"   Sciatic nerve glides   NEXT 10x   LAQ    10x R/L 10x R/L Try more sciatic nerve glides now --   Supine alt UE with stab 10x 10x - 10x   Supine alt LE with stab 10x 10x - ----   Supine alt UE/LE with stab 10x 10x - 10x   Bridging Partial lift 10x Partial lift 10x - 3ct 10x   Heel taps  10x - 10x   Hip flexor stretching   NEXT 30sec x 2 R/L   Prone prop or press up?    NEXT  Prop x 3 min          STANDING                                                             PROPRIOCEPTION 10/5/2020, 2:33 PM

## 2020-10-07 RX ORDER — TRAZODONE HYDROCHLORIDE 50 MG/1
50 TABLET ORAL NIGHTLY PRN
Qty: 30 TABLET | Refills: 2 | Status: SHIPPED | OUTPATIENT
Start: 2020-10-07 | End: 2021-02-03

## 2020-10-07 RX ORDER — TIZANIDINE 2 MG/1
2 TABLET ORAL 3 TIMES DAILY PRN
Qty: 30 TABLET | Refills: 0 | Status: SHIPPED | OUTPATIENT
Start: 2020-10-07 | End: 2020-10-27 | Stop reason: SDUPTHER

## 2020-10-07 RX ORDER — OMEPRAZOLE 20 MG/1
20 CAPSULE, DELAYED RELEASE ORAL
Qty: 30 CAPSULE | Refills: 2 | Status: SHIPPED | OUTPATIENT
Start: 2020-10-07 | End: 2021-03-05

## 2020-10-07 RX ORDER — BUSPIRONE HYDROCHLORIDE 5 MG/1
5 TABLET ORAL 2 TIMES DAILY
Qty: 60 TABLET | Refills: 1 | Status: SHIPPED | OUTPATIENT
Start: 2020-10-07 | End: 2020-12-07

## 2020-10-08 ENCOUNTER — HOSPITAL ENCOUNTER (OUTPATIENT)
Dept: PHYSICAL THERAPY | Age: 40
Setting detail: THERAPIES SERIES
Discharge: HOME | End: 2020-10-08
Payer: MEDICAID

## 2020-10-08 PROCEDURE — 97012 MECHANICAL TRACTION THERAPY: CPT

## 2020-10-08 PROCEDURE — 97530 THERAPEUTIC ACTIVITIES: CPT

## 2020-10-08 PROCEDURE — 97110 THERAPEUTIC EXERCISES: CPT

## 2020-10-08 NOTE — FLOWSHEET NOTE
Outpatient Physical Therapy  [place]           [x] Phone: [phone]   Fax: [phone]  [place]           [] Phone: [phone]   Fax: [phone]        Physical Therapy Daily Treatment Note  Date:  10/8/2020    Patient Name:  Isis Coe    :  1980  MRN: [id number]  Restrictions/Precautions: Other position/activity restrictions: none  Diagnosis:   Diagnosis: back pain  Date of Injury/Surgery:   Treatment Diagnosis: Treatment Diagnosis: back pain, radicular pain, numbness B LE's    Insurance/Certification information: PT Insurance Information: [id number] [facility], Precert required --POC approved but no Estim   Referring Physician:  Referring Practitioner: Cheyanne Oscar  Next Doctor Visit:    Plan of care signed (Y/N): yes  Outcome Measure: Oswestry 56%, : 46%  Visit# / total visits:   52/00 approved precert,   Pain level: 38-24% \"numbness\" B lower legs, 4/10 LB  Goals:       Short term goals  Time Frame for Short term goals: 3 WEEKS  Short term goal 1: Pt will be able to report at least 10% reduction in pain or will return to doc/ Met   Short term goal 2: Pt will be able to tolerate hip and trunk ROM and knee ext seated w/o increased pain  Mostly met   Short term goal 3: Pt will be able to report improved sleep/  reports 30% better too, can get 4-5 hours at a time now    Long term goals  Time Frame for Long term goals : 6 WEEKS  Long term goal 1: Pt will be independent w/ home managment of pain Helps some, but not as much as PT does  Long term goal 2: Pt will be able to sleep w/ min pain  Progressing   Long term goal 3: Pt will report min numbness in leg, but feet may not improve d/t neuropathy  Partially met, legs min numbness now  Long term goal 4: Pt will have improved Oswestry score by 10% or more  Met, reset goal for another 5-10%   Oswestry 56% at eval, : 46%      Summary of Evaluation: Assessment: Pt is a 35 yo male who presents w/ back and B LE pain with numbness and tingling.   He demonstrates very limited hip and trunk mobility B w/ decreased DTR's and significant sensation loss in B lower legs. He denies any fever, chills, night sweats. He demonstrates + SLR and slump B w/ significant neural tension and is very TTP in B lumbar paraspinals. He will benefit from PT to help reduce pain and improve mobility and then restore function, but will return to doc if not making any progress. Pt's limitations are significantly limiting his usual functional activity. Subjective: Pt is noting a bit more numbness in LE's today, shin into feet and was fine last 2 days and just woke up that way this am.  Pt cannot think of any reason although did carry 2 six packs of pop, but doesn't think that did it. Any changes in Ambulatory Summary Sheet? Now taking Naproxen and Norco        Objective:  Prior to today's treatment session, patient was screened for signs and symptoms related to COVID-19 including but not limited to verbally answering questions related to feeling ill, cough, or SOB, along with taking temperature via forehead thermometer. Patient presented with all negative signs and symptoms and had no fever >100 degrees Fahrenheit this date. Pt does get decreased numbness w/ sciatic nerve glides. He also feels abdominals working w/ exercises. Pt noted to be tighther on R LE w/ all ROM/flexibility.             Exercises: (No more than 4 columns)   Exercise/Equipment 9/25/20   #10 10/5/20 #11 10/8/20  #12             WARM UP           Recheck today, didn't get to exercises             TABLE       abdom hold - ---     Glut set  - ---     LTR - 10 x 5\" 10x5\"    Sciatic nerve glides B  NEXT 10x Supine man A, 20x3 ea LE    LAQ    Try more sciatic nerve glides now -- --    Supine alt UE with stab - 10x 1# DB's 15xea UE    Supine alt LE with stab - ---- --    Supine alt UE/LE with stab - 10x     Bridging - 3ct 10x 10x 3 ct    Heel taps - 10x 10xea LE    Hip flexor stretching NEXT 30sec x 2

## 2020-10-15 ENCOUNTER — OFFICE VISIT (OUTPATIENT)
Dept: INFECTIOUS DISEASES | Age: 40
End: 2020-10-15
Payer: MEDICAID

## 2020-10-15 VITALS
SYSTOLIC BLOOD PRESSURE: 139 MMHG | HEART RATE: 77 BPM | BODY MASS INDEX: 29.16 KG/M2 | TEMPERATURE: 97.1 F | WEIGHT: 221 LBS | DIASTOLIC BLOOD PRESSURE: 83 MMHG

## 2020-10-15 PROCEDURE — G8419 CALC BMI OUT NRM PARAM NOF/U: HCPCS | Performed by: INTERNAL MEDICINE

## 2020-10-15 PROCEDURE — G8427 DOCREV CUR MEDS BY ELIG CLIN: HCPCS | Performed by: INTERNAL MEDICINE

## 2020-10-15 PROCEDURE — G8484 FLU IMMUNIZE NO ADMIN: HCPCS | Performed by: INTERNAL MEDICINE

## 2020-10-15 PROCEDURE — 4004F PT TOBACCO SCREEN RCVD TLK: CPT | Performed by: INTERNAL MEDICINE

## 2020-10-15 PROCEDURE — 99212 OFFICE O/P EST SF 10 MIN: CPT | Performed by: INTERNAL MEDICINE
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
## 2020-10-19 ENCOUNTER — HOSPITAL ENCOUNTER (OUTPATIENT)
Dept: PHYSICAL THERAPY | Age: 40
Setting detail: THERAPIES SERIES
Discharge: HOME | End: 2020-10-19
Payer: MEDICAID

## 2020-10-19 PROCEDURE — 97110 THERAPEUTIC EXERCISES: CPT

## 2020-10-19 PROCEDURE — 97012 MECHANICAL TRACTION THERAPY: CPT

## 2020-10-19 PROCEDURE — 97530 THERAPEUTIC ACTIVITIES: CPT

## 2020-10-19 NOTE — FLOWSHEET NOTE
Outpatient Physical Therapy  [place]           [x] Phone: [phone]   Fax: [phone]  [facility]           [] Phone: [phone]   Fax: [phone]        Physical Therapy Daily Treatment Note  Date:  10/19/2020    Patient Name:  Lucas Nelson    :  1980  MRN: [id number]  Restrictions/Precautions: Other position/activity restrictions: none  Diagnosis:   Diagnosis: back pain  Date of Injury/Surgery:   Treatment Diagnosis: Treatment Diagnosis: back pain, radicular pain, numbness B LE's    Insurance/Certification information: PT Insurance Information: [facility], Precert required --POC approved but no Estim   Referring Physician:  Referring Practitioner: David Reagan  Next Doctor Visit:    Plan of care signed (Y/N): yes  Outcome Measure: Oswestry 56%, : 46%  Visit# / total visits:   54/33 approved precert,   Pain level: , 4/10 LB  Goals:       Short term goals  Time Frame for Short term goals: 3 WEEKS  Short term goal 1: Pt will be able to report at least 10% reduction in pain or will return to doc/ Met   Short term goal 2: Pt will be able to tolerate hip and trunk ROM and knee ext seated w/o increased pain  Mostly met   Short term goal 3: Pt will be able to report improved sleep/  reports 30% better too, can get 4-5 hours at a time now    Long term goals  Time Frame for Long term goals : 6 WEEKS  Long term goal 1: Pt will be independent w/ home managment of pain Helps some, but not as much as PT does  Long term goal 2: Pt will be able to sleep w/ min pain  Progressing   Long term goal 3: Pt will report min numbness in leg, but feet may not improve d/t neuropathy  Partially met, legs min numbness now  Long term goal 4: Pt will have improved Oswestry score by 10% or more  Met, reset goal for another 5-10%   Oswestry 56% at eval, .25: 46%      Summary of Evaluation: Assessment: Pt is a 37 yo male who presents w/ back and B LE pain with numbness and tingling.   He demonstrates very limited hip and trunk mobility B w/ decreased DTR's and significant sensation loss in B lower legs. He denies any fever, chills, night sweats. He demonstrates + SLR and slump B w/ significant neural tension and is very TTP in B lumbar paraspinals. He will benefit from PT to help reduce pain and improve mobility and then restore function, but will return to doc if not making any progress. Pt's limitations are significantly limiting his usual functional activity. Subjective: Pt reports of 4/10 pain upon arrival and reports he would need a doctors script for the inverted table which needs to be turned into medicaid. Any changes in Ambulatory Summary Sheet? Now taking Naproxen and Norco        Objective: able to sleep through the whole night now     Prior to today's treatment session, patient was screened for signs and symptoms related to COVID-19 including but not limited to verbally answering questions related to feeling ill, cough, or SOB, along with taking temperature via forehead thermometer. Patient presented with all negative signs and symptoms and had no fever >100 degrees Fahrenheit this date. Exercises: (No more than 4 columns)   Exercise/Equipment 10/5/20 #11 10/8/20  #12 10/19/2020 #13           WARM UP                     TABLE      abdom hold ---     Glut set  ---     LTR 10 x 5\" 10x5\" 10x5\"   Sciatic nerve glides B  10x Supine man A, 20x3 ea LE Supine man A, 20x3 ea LE   LAQ    -- --    Supine alt UE with stab 10x 1# DB's 15xea UE 1# DB's 15xea UE   Supine alt LE with stab ---- --    Supine alt UE/LE with stab 10x     Bridging 3ct 10x 10x 3 ct 10x5\"   Heel taps 10x 10xea LE 15x B   Hip flexor stretching 30sec x 2 R/L 30sec x 2 R/L 2x30\" B   Prone prop or press up?  Prop x 3 min Prop x 3 min Prop x 3 min         STANDING                                                     PROPRIOCEPTION                                    MODALITIES Lumbar traction Lumbar traction Lumbar traction Other Therapeutic Activities/Education:  8/20/2020: Patient received education on their current pathology and how their condition effects them with their functional activities. Patient understood discussion and questions were answered. Patient understands their activity limitations and understands rational for treatment progression. 10/8/20: discussed home traction w/ pt and he is to call insurance and see if they cover home unit at all  10/19/20: reports he would need a doctors script for the inverted table which needs to be turned into medicaid. Home Exercise Program:  Issued, practiced and pt demo ability to perform 8/20/2020   added prone prop      Manual Treatments:        Modalities:  (pt weighs 210)  Mech lumbar traction, max 110#, min 75#, on 60 off 20, 3 steps up/down, x 15 min           Communication with other providers:  POC set for cosign 8/20/20, See PN 9/25/20,       Assessment:   Pt demonstrated good tolerance of today's session without any adverse reactions. Reports of 4/10 LB,  .      Pt continues to require physical therapy to increase strength, core stability, decrease numbness, and improve gait to prevent falls and increase functional abilities for amb and ADLs      Plan for Next Session:    work on hip and trunk mobility/ROM, basic core and pain control, increase traction 112#      Time In / Time Out:  1115/1207        Timed Code/Total Treatment Minutes:  52  15tx 15ta  22te       Next Progress Note due:  See PN 9/25/20      Plan of Care Interventions:  [x] Therapeutic Exercise  [x] Modalities:  [x] Therapeutic Activity     [] Ultrasound  [] Estim  [] Gait Training      [] Cervical Traction [x] Lumbar Traction  [x] Neuromuscular Re-education    [x] Cold/hotpack [] Iontophoresis   [x] Instruction in HEP      [] Vasopneumatic   [] Dry Needling    [x] Manual Therapy               [] Aquatic Therapy              Electronically signed by:  Maya Neumann PTA  10/19/2020, 11:15 AM

## 2020-10-21 ENCOUNTER — HOSPITAL ENCOUNTER (OUTPATIENT)
Dept: PHYSICAL THERAPY | Age: 40
Setting detail: THERAPIES SERIES
Discharge: HOME | End: 2020-10-21
Payer: MEDICAID

## 2020-10-21 PROCEDURE — 97012 MECHANICAL TRACTION THERAPY: CPT

## 2020-10-21 PROCEDURE — 97110 THERAPEUTIC EXERCISES: CPT

## 2020-10-21 NOTE — FLOWSHEET NOTE
Outpatient Physical Therapy  [place]           [x] Phone: [phone]   Fax: [phone]  [place]           [] Phone: [phone]   Fax: [phone]        Physical Therapy Daily Treatment Note  Date:  10/21/2020    Patient Name:  Anna Samson    :  1980  MRN: [id number]  Restrictions/Precautions: Other position/activity restrictions: none  Diagnosis:   Diagnosis: back pain  Date of Injury/Surgery:   Treatment Diagnosis: Treatment Diagnosis: back pain, radicular pain, numbness B LE's    Insurance/Certification information: PT Insurance Information: [facility], Precert required --POC approved but no Estim   Referring Physician:  Referring Practitioner: Jerri Mohs Next Doctor Visit:    Plan of care signed (Y/N): yes  Outcome Measure: Oswestry 56%, : 46%  Visit# / total visits:   42/89 approved precert,   Pain level: , 4/10 LB  Goals:       Short term goals  Time Frame for Short term goals: 3 WEEKS  Short term goal 1: Pt will be able to report at least 10% reduction in pain or will return to doc/ Met   Short term goal 2: Pt will be able to tolerate hip and trunk ROM and knee ext seated w/o increased pain  Mostly met   Short term goal 3: Pt will be able to report improved sleep/  reports 30% better too, can get 4-5 hours at a time now    Long term goals  Time Frame for Long term goals : 6 WEEKS  Long term goal 1: Pt will be independent w/ home managment of pain Helps some, but not as much as PT does  Long term goal 2: Pt will be able to sleep w/ min pain  Progressing   Long term goal 3: Pt will report min numbness in leg, but feet may not improve d/t neuropathy  Partially met, legs min numbness now  Long term goal 4: Pt will have improved Oswestry score by 10% or more  Met, reset goal for another 5-10%   Oswestry 56% at eval, 25: 46%      Summary of Evaluation: Assessment: Pt is a 37 yo male who presents w/ back and B LE pain with numbness and tingling.   He demonstrates very limited hip and trunk mobility B w/ decreased DTR's and significant sensation loss in B lower legs. He denies any fever, chills, night sweats. He demonstrates + SLR and slump B w/ significant neural tension and is very TTP in B lumbar paraspinals. He will benefit from PT to help reduce pain and improve mobility and then restore function, but will return to doc if not making any progress. Pt's limitations are significantly limiting his usual functional activity. Subjective: Pt stated that his pain was about 3-4/10 today. Pt stated that pain was in middle of low back today. Pt stated that he needed to leave early due to a conflicting appointment. Any changes in Ambulatory Summary Sheet?  no        Objective:  Prior to today's treatment session, patient was screened for signs and symptoms related to COVID-19 including but not limited to verbally answering questions related to feeling ill, cough, or SOB, along with taking temperature via forehead thermometer. Patient presented with all negative signs and symptoms and had no fever >100 degrees Fahrenheit this date. Able to get centralization of pain after traction with prone prop x 3'            Exercises: (No more than 4 columns)   Exercise/Equipment 10/5/20 #11 10/8/20  #12 10/19/2020 #13 10/210/20 #14            WARM UP                        TABLE       abdom hold ---      Glut set  ---      LTR 10 x 5\" 10x5\" 10x5\" 10x 5\"    Sciatic nerve glides B  10x Supine man A, 20x3 ea LE Supine man A, 20x3 ea LE    LAQ    -- --     Supine alt UE with stab 10x 1# DB's 15xea UE 1# DB's 15xea UE 1# DB's 15xea UE   Supine alt LE with stab ---- --     Supine alt UE/LE with stab 10x      Bridging 3ct 10x 10x 3 ct 10x5\" 15x   Heel taps 10x 10xea LE 15x B 15x B   Hip flexor stretching 30sec x 2 R/L 30sec x 2 R/L 2x30\" B    Prone prop or press up?  Prop x 3 min Prop x 3 min Prop x 3 min Prop x 3min after traction          STANDING PROPRIOCEPTION                                          MODALITIES Lumbar traction Lumbar traction Lumbar traction Lumbar traction                     Other Therapeutic Activities/Education:  8/20/2020: Patient received education on their current pathology and how their condition effects them with their functional activities. Patient understood discussion and questions were answered. Patient understands their activity limitations and understands rational for treatment progression. 10/8/20: discussed home traction w/ pt and he is to call insurance and see if they cover home unit at all  10/19/20: reports he would need a doctors script for the inverted table which needs to be turned into medicaid. Home Exercise Program:  Issued, practiced and pt demo ability to perform 8/20/2020   added prone prop      Manual Treatments:        Modalities:  (pt weighs 210)  Mech lumbar traction, max 110#, min 75#, on 60 off 20, 3 steps up/down, x 15 min           Communication with other providers:  [address] set for cosign 8/20/20, See PN 9/25/20,       Assessment:   Pt tolerated treatment fairly well. Pt had limited time today so treatment was modified. Pt rated pain at 3/10 with centralization of pain. Pt will continue to benefit from more strengthening and pain management.    Plan for Next Session:    work on hip and trunk mobility/ROM, basic core and pain control, increase traction 112#      Time In / Time Out:  0827/ 0915      Timed Code/Total Treatment Minutes:  28'/ 50' 1 Tx with set up ( 20') 2 TE ( 28')       Next Progress Note due:  See PN 9/25/20      Plan of Care Interventions:  [x] Therapeutic Exercise  [x] Modalities:  [x] Therapeutic Activity     [] Ultrasound  [] Estim  [] Gait Training      [] Cervical Traction [x] Lumbar Traction  [x] Neuromuscular Re-education    [x] Cold/hotpack [] Iontophoresis   [x] Instruction in HEP      [] Vasopneumatic   [] Dry Needling    [x] Manual Therapy               []

## 2020-10-26 ENCOUNTER — HOSPITAL ENCOUNTER (OUTPATIENT)
Dept: PHYSICAL THERAPY | Age: 40
Discharge: HOME | End: 2020-10-26

## 2020-10-26 NOTE — FLOWSHEET NOTE
Physical Therapy  Cancellation/No-show Note  Patient Name:  Holger Perdue  :  1980   Date:  10/26/2020  Cancelled visits to date: 1  No-shows to date: 0    For today's appointment patient:  [x]  Cancelled  []  Rescheduled appointment  []  No-show     Reason given by patient:  [x]  Patient ill  []  Conflicting appointment  []  No transportation    []  Conflict with work  []  No reason given  []  Other:     Comments:      Electronically signed by:  Ben Carcamo 10/26/2020, 10:55 AM

## 2020-10-27 RX ORDER — TIZANIDINE 2 MG/1
2 TABLET ORAL 3 TIMES DAILY PRN
Qty: 30 TABLET | Refills: 0 | Status: SHIPPED | OUTPATIENT
Start: 2020-10-27 | End: 2020-11-13 | Stop reason: SDUPTHER

## 2020-10-28 ENCOUNTER — HOSPITAL ENCOUNTER (OUTPATIENT)
Dept: PHYSICAL THERAPY | Age: 40
Setting detail: THERAPIES SERIES
Discharge: HOME | End: 2020-10-28
Payer: MEDICAID

## 2020-10-28 PROCEDURE — 97110 THERAPEUTIC EXERCISES: CPT

## 2020-10-28 PROCEDURE — 97012 MECHANICAL TRACTION THERAPY: CPT

## 2020-10-28 NOTE — FLOWSHEET NOTE
Outpatient Physical Therapy  [place]           [x] Phone: [phone]   Fax: [phone]  Jorge Alberto Phelps           [] Phone: [phone]   Fax: [phone]        Physical Therapy Daily Treatment Note  Date:  10/28/2020    Patient Name:  Rapheal Lesch    :  1980  MRN: [id number]  Restrictions/Precautions: Other position/activity restrictions: none  Diagnosis:   Diagnosis: back pain  Date of Injury/Surgery:   Treatment Diagnosis: Treatment Diagnosis: back pain, radicular pain, numbness B LE's    Insurance/Certification information: PT Insurance Information: [address] [facility], Precert required --POC approved but no Estim   Referring Physician:  Referring Practitioner: Gagandeep Davidson Doctor Visit:    Plan of care signed (Y/N): yes  Outcome Measure: Oswestry 56%, : 46%  Visit# / total visits:    approved precert,   Pain level: 2/69 LB  Goals:       Short term goals  Time Frame for Short term goals: 3 WEEKS  Short term goal 1: Pt will be able to report at least 10% reduction in pain or will return to doc/ Met   Short term goal 2: Pt will be able to tolerate hip and trunk ROM and knee ext seated w/o increased pain  Mostly met   Short term goal 3: Pt will be able to report improved sleep/  reports 30% better too, can get 4-5 hours at a time now    Long term goals  Time Frame for Long term goals : 6 WEEKS  Long term goal 1: Pt will be independent w/ home managment of pain Helps some, but not as much as PT does  Long term goal 2: Pt will be able to sleep w/ min pain  Progressing   Long term goal 3: Pt will report min numbness in leg, but feet may not improve d/t neuropathy  Partially met, legs min numbness now  Long term goal 4: Pt will have improved Oswestry score by 10% or more  Met, reset goal for another 5-10%   Oswestry 56% at al, 25: 46%      Summary of Evaluation: Assessment: Pt is a 37 yo male who presents w/ back and B LE pain with numbness and tingling.   He demonstrates very limited hip and trunk mobility B w/ decreased DTR's and significant sensation loss in B lower legs. He denies any fever, chills, night sweats. He demonstrates + SLR and slump B w/ significant neural tension and is very TTP in B lumbar paraspinals. He will benefit from PT to help reduce pain and improve mobility and then restore function, but will return to doc if not making any progress. Pt's limitations are significantly limiting his usual functional activity. Subjective: Pt states he has had a little more pain last couple days. Continues to have intermittent numbness bilat LE's, Right greater than Left. Any changes in Ambulatory Summary Sheet?  no        Objective:  Prior to today's treatment session, patient was screened for signs and symptoms related to COVID-19 including but not limited to verbally answering questions related to feeling ill, cough, or SOB, along with taking temperature via forehead thermometer. Patient presented with all negative signs and symptoms and had no fever >100 degrees Fahrenheit this date. Able to get centralization of pain after traction with prone prop x 3'            Exercises: (No more than 4 columns)   Exercise/Equipment 10/8/20  #12 10/19/2020 #13 10/210/20 #14 10/28/20 #15            WARM UP                        TABLE       abdom hold       Glut set        LTR 10x5\" 10x5\" 10x 5\"  10x 5\"    Sciatic nerve glides B  Supine man A, 20x3 ea LE Supine man A, 20x3 ea LE     LAQ    --      Supine alt UE with stab 1# DB's 15xea UE 1# DB's 15xea UE 1# DB's 15xea UE 2# DB's 15xea UE   Supine alt LE with stab --      Supine alt UE/LE with stab       Bridging 10x 3 ct 10x5\" 15x 15x   Heel taps 10xea LE 15x B 15x B 15x B   Hip flexor stretching 30sec x 2 R/L 2x30\" B     Prone prop or press up?  Prop x 3 min Prop x 3 min Prop x 3min after traction Prop x 3 min   clamshell     10x R/L   STANDING                                                             PROPRIOCEPTION MODALITIES Lumbar traction Lumbar traction Lumbar traction Lumbar traction                     Other Therapeutic Activities/Education:  8/20/2020: Patient received education on their current pathology and how their condition effects them with their functional activities. Patient understood discussion and questions were answered. Patient understands their activity limitations and understands rational for treatment progression. 10/8/20: discussed home traction w/ pt and he is to call insurance and see if they cover home unit at all  10/19/20: reports he would need a doctors script for the inverted table which needs to be turned into medicaid. Home Exercise Program:  Issued, practiced and pt demo ability to perform 8/20/2020   added prone prop      Manual Treatments:        Modalities:  (pt weighs 210)  Mech lumbar traction, max 110#, min 75#, on 60 off 20, 3 steps up/down, x 15 min           Communication with other providers:  [address] set for cosign 8/20/20, See PN 9/25/20,       Assessment:   Pt tolerated treatment fairly well. Pt rated pain at 4/10 with centralization of pain. Pt will continue to benefit from more strengthening and pain management.        Plan for Next Session:    work on hip and trunk mobility/ROM, basic core and pain control, increase traction 112#      Time In / Time Out:  1104/1151       Timed Code/Total Treatment Minutes:  27'/ 52' 1 Tx with set up ( 20') 2 TE ( 27')       Next Progress Note due:  See PN 9/25/20, FIDELINA TO DC ON 11/5/20      Plan of Care Interventions:  [x] Therapeutic Exercise  [x] Modalities:  [x] Therapeutic Activity     [] Ultrasound  [] Estim  [] Gait Training      [] Cervical Traction [x] Lumbar Traction  [x] Neuromuscular Re-education    [x] Cold/hotpack [] Iontophoresis   [x] Instruction in HEP      [] Vasopneumatic   [] Dry Needling    [x] Manual Therapy               [] Aquatic Therapy              Electronically signed by:  Ricardo Han PTA 10/28/2020, 11:04 AM       10/28/2020,11:04 AM

## 2020-11-02 ENCOUNTER — HOSPITAL ENCOUNTER (OUTPATIENT)
Dept: PHYSICAL THERAPY | Age: 40
Setting detail: THERAPIES SERIES
Discharge: HOME | End: 2020-11-02
Payer: MEDICAID

## 2020-11-02 PROCEDURE — 97110 THERAPEUTIC EXERCISES: CPT

## 2020-11-02 PROCEDURE — 97012 MECHANICAL TRACTION THERAPY: CPT

## 2020-11-02 NOTE — FLOWSHEET NOTE
Outpatient Physical Therapy  [place]           [x] Phone: [phone]   Fax: [phone]  Sulemandhirajmarii Natali           [] Phone: [phone]   Fax: [phone]        Physical Therapy Daily Treatment Note  Date:  2020    Patient Name:  Hortencia Jasso    :  1980  MRN: [id number]  Restrictions/Precautions: Other position/activity restrictions: none  Diagnosis:   Diagnosis: back pain  Date of Injury/Surgery:   Treatment Diagnosis: Treatment Diagnosis: back pain, radicular pain, numbness B LE's    Insurance/Certification information: PT Insurance Information: [address] [facility] [address], Precert required --POC approved but no Estim   Referring Physician:  Referring Practitioner: Samantha Carranza  Next Doctor Visit:    Plan of care signed (Y/N): yes  Outcome Measure: Oswestry 56%, : 46%  Visit# / total visits:   96/23 approved precert,   Pain level: 2-4/63 LB, numbness R leg  Goals:       Short term goals  Time Frame for Short term goals: 3 WEEKS  Short term goal 1: Pt will be able to report at least 10% reduction in pain or will return to doc/ Met   Short term goal 2: Pt will be able to tolerate hip and trunk ROM and knee ext seated w/o increased pain  Mostly met   Short term goal 3: Pt will be able to report improved sleep/  reports 30% better too, can get 4-5 hours at a time now    Long term goals  Time Frame for Long term goals : 6 WEEKS  Long term goal 1: Pt will be independent w/ home managment of pain Helps some, but not as much as PT does  Long term goal 2: Pt will be able to sleep w/ min pain  Progressing   Long term goal 3: Pt will report min numbness in leg, but feet may not improve d/t neuropathy  Partially met, legs min numbness now  Long term goal 4: Pt will have improved Oswestry score by 10% or more  Met, reset goal for another 5-10%   Oswestry 56% at eval, .25: 46%      Summary of Evaluation: Assessment: Pt is a 37 yo male who presents w/ back and B LE pain with numbness and tingling.   He demonstrates very limited hip and trunk mobility B w/ decreased DTR's and significant sensation loss in B lower legs. He denies any fever, chills, night sweats. He demonstrates + SLR and slump B w/ significant neural tension and is very TTP in B lumbar paraspinals. He will benefit from PT to help reduce pain and improve mobility and then restore function, but will return to doc if not making any progress. Pt's limitations are significantly limiting his usual functional activity. Subjective: Pt states he is still having a little more pain in his LB. Continues to have intermittent numbness bilat LE's, Right greater than Left. States he napped a lot yesterday and did not perform his HEP. Any changes in Ambulatory Summary Sheet?  no        Objective:  Prior to today's treatment session, patient was screened for signs and symptoms related to COVID-19 including but not limited to verbally answering questions related to feeling ill, cough, or SOB, along with taking temperature via forehead thermometer. Patient presented with all negative signs and symptoms and had no fever >100 degrees Fahrenheit this date. Sleeping at least 50% better. Exercises: (No more than 4 columns)   Exercise/Equipment 10/19/2020 #13 10/210/20 #14 10/28/20 #15 11/2/20 #16            WARM UP                        TABLE       abdom hold       Glut set        LTR 10x5\" 10x 5\"  10x 5\"  10x 5\"   Sciatic nerve glides B  Supine man A, 20x3 ea LE   Performing at home   LAQ          Supine alt UE with stab 1# DB's 15xea UE 1# DB's 15xea UE 2# DB's 15xea UE 2# DB's 15xea UE   Supine alt LE with stab       Supine alt UE/LE with stab       Bridging 10x5\" 15x 15x 15x   Heel taps 15x B 15x B 15x B 15x B   Hip flexor stretching 2x30\" B      Prone prop or press up?  Prop x 3 min Prop x 3min after traction Prop x 3 min Prop x 3 min   clamshell    10x R/L 10x R/L   STANDING                                                             PROPRIOCEPTION MODALITIES Lumbar traction Lumbar traction Lumbar traction Lumbar traction                     Other Therapeutic Activities/Education:  8/20/2020: Patient received education on their current pathology and how their condition effects them with their functional activities. Patient understood discussion and questions were answered. Patient understands their activity limitations and understands rational for treatment progression. 10/8/20: discussed home traction w/ pt and he is to call insurance and see if they cover home unit at all  10/19/20: reports he would need a doctors script for the inverted table which needs to be turned into medicaid. Home Exercise Program:  Issued, practiced and pt demo ability to perform 8/20/2020   added prone prop      Manual Treatments:        Modalities:  (pt weighs 210)  Mech lumbar traction, max 112#, min 80#, on 60 off 20, 3 steps up/down, x 15 min       Communication with other providers:  POC set for cosign 8/20/20, See PN 9/25/20,       Assessment:   Increased traction 2# today to 112#. Rated pain at 5/10 right LB, less numbness RLE following traction. No LLE symptoms. Pt will continue to benefit from more strengthening and pain management.        Plan for Next Session:    work on hip and trunk mobility/ROM, basic core and pain control, increase traction 112#      Time In / Time Out:  5996/5681      Timed Code/Total Treatment Minutes:  27'/ 48' 1 Tx with set up ( 20') 2 TE ( 27')       Next Progress Note due:  See PN 9/25/20, FIDELINA TO DC ON 11/5/20      Plan of Care Interventions:  [x] Therapeutic Exercise  [x] Modalities:  [x] Therapeutic Activity     [] Ultrasound  [] Estim  [] Gait Training      [] Cervical Traction [x] Lumbar Traction  [x] Neuromuscular Re-education    [x] Cold/hotpack [] Iontophoresis   [x] Instruction in HEP      [] Vasopneumatic   [] Dry Needling    [x] Manual Therapy               [] Aquatic Therapy Electronically signed by:  Natali Officer, BENITO  11/2/2020, 9:47 AM       11/2/2020,9:47 AM

## 2020-11-05 ENCOUNTER — HOSPITAL ENCOUNTER (OUTPATIENT)
Dept: PHYSICAL THERAPY | Age: 40
Setting detail: THERAPIES SERIES
Discharge: HOME | End: 2020-11-05
Payer: MEDICAID

## 2020-11-05 PROCEDURE — 97110 THERAPEUTIC EXERCISES: CPT

## 2020-11-05 NOTE — DISCHARGE SUMMARY
Outpatient Physical Therapy           [place]           [x] Phone: [phone]   Fax: [phone]  [place]           [] Phone: [phone]   Fax: [phone]      To: Lisa Santoyo        From: Judy Daley, PT, DPT     Patient: Corbin Hernandez                  : 1980  Diagnosis: back pain      Date: 2020  Treatment Diagnosis:back pain, radicular pain, numbness B LE's       []  Progress Note                [x]  Discharge Note    Evaluation Date:  2020  Total Visits to date:  16  Cancels/No-shows to date:  1    Subjective:  Pt reports that he feels he is overall at least 40% better than on eval day. He reports that his sleep is 50% better and he usually only wakes up to use the bathroom in the middle of the night vs due to pain. He state he is having less numbness (minimal) in leg since therapy started but still has some in his BL feet due to neuropathy. He reports being compliant with HEP. Oswestry: 46%     Plan of Care/Treatment to date:   [x] Therapeutic Exercise    [x] Modalities:  [x] Therapeutic Activity     [] Ultrasound  [x] Electrical Stimulation  [] Gait Training      [] Cervical Traction   [x] Lumbar Traction  [x] Neuromuscular Re-education  [] Cold/hotpack [] Iontophoresis  [x] Instruction in HEP      Other:  [] Manual Therapy       []  Vasopneumatic  [] Aquatic Therapy       []                          Objective/Significant Findings At Last Visit/Comments:        MMT: min pain, mostly pulling on muscles. Ankle DF 4+/5 R, 5/5 L              Ankle PF 5/5 B              Knee flex 4+/5 B              Knee ext 4+/5 B               Hip Flex 4+/5 B  - Slump test for numbness but pt does have significant increase in tightness of BLE in position.  + SLR B as well w/ pain in anterior hip pain. No pain laying supine with both legs extended out straight.   KTC tight in anterior hip but no pain on R, L no pain or tightness.   Pt lacks full hip ext in S/L B LE's, due to

## 2020-11-05 NOTE — FLOWSHEET NOTE
Outpatient Physical Therapy  [place]           [x] Phone: [phone]   Fax: [phone]  Jessica Reeder           [] Phone: [phone]   Fax: [phone]        Physical Therapy Daily Treatment Note  Date:  2020    Patient Name:  Yamilet Soares    :  1980  MRN: [id number]  Restrictions/Precautions: Other position/activity restrictions: none  Diagnosis:   Diagnosis: back pain  Date of Injury/Surgery:   Treatment Diagnosis: Treatment Diagnosis: back pain, radicular pain, numbness B LE's    Insurance/Certification information: PT Insurance Information: [address] required --POC approved but no Estim   Referring Physician:  Referring Practitioner: Nazario Alvarez  Next Doctor Visit:    Plan of care signed (Y/N): yes  Outcome Measure: Oswestry 56%, .: 46%. : 46%  Visit# / total visits:    approved precert,   Pain level: 7/60 LB, mild numbness in BL feet from prior neuropathy   Goals:       Short term goals  Time Frame for Short term goals: 3 WEEKS  Short term goal 1: Pt will be able to report at least 10% reduction in pain or will return to doc ReMet   Short term goal 2: Pt will be able to tolerate hip and trunk ROM and knee ext seated w/o increased pain  Mostly met  - still had mild pain w/ knee ext seated  Short term goal 3: Pt will be able to report improved sleep Met  reports 50% better  Long term goals  Time Frame for Long term goals : 6 WEEKS  Long term goal 1: Pt will be independent w/ home managment of pain MET   Long term goal 2: Pt will be able to sleep w/ min pain  Met   Long term goal 3: Pt will report min numbness in leg, but feet may not improve d/t neuropathy  Met   Long term goal 4: Pt will have improved Oswestry score by 10% or more  Met, original goal   Oswestry 56% at eval, 9.25: 46%. : 46%      Summary of Evaluation: Assessment: Pt is a 37 yo male who presents w/ back and B LE pain with numbness and tingling.   He demonstrates very tightness. Exercises: (No more than 4 columns)   Exercise/Equipment 10/28/20 #15 11/2/20 #16 11/5/20 #17           WARM UP                     TABLE      abdom hold      Glut set       LTR 10x 5\"  10x 5\" X10, 5\" ea side   Sciatic nerve glides B   Performing at home At home   LAQ         Supine alt UE with stab 2# DB's 15xea UE 2# DB's 15xea UE x10 ea UE 2# DBs   Supine alt LE with stab      Supine alt UE/LE with stab      Bridging 15x 15x 15x   Heel taps 15x B 15x B 10x B   Hip flexor stretching      Prone prop or press up? Prop x 3 min Prop x 3 min    clamshell  10x R/L 10x R/L x10 BLE ea   STANDING                                                     PROPRIOCEPTION                                    MODALITIES Lumbar traction Lumbar traction Pt declined lumbar traction this date                   Other Therapeutic Activities/Education: .  11/5: continuation of Hep for max benefits after d/c and reduced risk for future decline    Home Exercise Program:  Issued, practiced and pt demo ability to perform 8/20/2020   added prone prop      Manual Treatments:  none      Modalities:   Pt declined traction this date due to feeling good and that he \"didn't need it\". Communication with other providers:  POC set for cosign 8/20/20, See PN 9/25/20, d/c sent 11/5      Assessment:   Pt tolerated today's treatment without any adverse reactions or complications this date. Pt has shown good improvement in lumbar ROM and BLE strength/ neuromuscular control since eval measures were taken. Pt subjective report has improved in regards to reduced numbness, improved sleep and overall functional mobility improvement since eval as well. Pt has met almost all goals at this time as well. PT educated pt this date on importance of continuation of HEP for max benefits outside of clinic after d/c and for reduced risk for future decline or re-injury.   End pain: 0/10      Plan for Next Session:  D/c      Time In / Time Out: 6466-1367      Timed Code/Total Treatment Minutes:  29':  2 TE ( 29')       Next Progress Note due:  See PN 9/25/20, FIDELINA TO RICHI ON 11/5/20      Plan of Care Interventions:  [x] Therapeutic Exercise  [x] Modalities:  [x] Therapeutic Activity     [] Ultrasound  [] Estim  [] Gait Training      [] Cervical Traction [x] Lumbar Traction  [x] Neuromuscular Re-education    [x] Cold/hotpack [] Iontophoresis   [x] Instruction in HEP      [] Vasopneumatic   [] Dry Needling    [x] Manual Therapy               [] Aquatic Therapy              Electronically signed by:  Gely Joiner PT, TEMO  11/5/2020, 8:48 AM       11/5/2020,8:48 AM

## 2020-11-13 RX ORDER — TIZANIDINE 2 MG/1
2 TABLET ORAL 3 TIMES DAILY PRN
Qty: 30 TABLET | Refills: 0 | Status: SHIPPED | OUTPATIENT
Start: 2020-11-13 | End: 2020-11-30 | Stop reason: SDUPTHER

## 2020-11-23 LAB — DIABETIC RETINOPATHY: POSITIVE

## 2020-11-30 RX ORDER — TIZANIDINE 2 MG/1
2 TABLET ORAL 3 TIMES DAILY PRN
Qty: 30 TABLET | Refills: 0 | Status: SHIPPED | OUTPATIENT
Start: 2020-11-30 | End: 2020-12-14 | Stop reason: SDUPTHER

## 2020-12-07 RX ORDER — BUSPIRONE HYDROCHLORIDE 5 MG/1
TABLET ORAL
Qty: 60 TABLET | Refills: 0 | Status: SHIPPED | OUTPATIENT
Start: 2020-12-07 | End: 2021-02-03

## 2020-12-09 ENCOUNTER — VIRTUAL VISIT (OUTPATIENT)
Dept: INTERNAL MEDICINE CLINIC | Age: 40
End: 2020-12-09
Payer: MEDICAID

## 2020-12-09 PROCEDURE — 99214 OFFICE O/P EST MOD 30 MIN: CPT | Performed by: NURSE PRACTITIONER

## 2020-12-09 PROCEDURE — 3044F HG A1C LEVEL LT 7.0%: CPT | Performed by: NURSE PRACTITIONER

## 2020-12-09 PROCEDURE — G8427 DOCREV CUR MEDS BY ELIG CLIN: HCPCS | Performed by: NURSE PRACTITIONER

## 2020-12-09 PROCEDURE — 2022F DILAT RTA XM EVC RTNOPTHY: CPT | Performed by: NURSE PRACTITIONER

## 2020-12-09 RX ORDER — NAPROXEN 500 MG/1
TABLET ORAL
COMMUNITY
Start: 2020-10-15 | End: 2021-08-19 | Stop reason: ALTCHOICE

## 2020-12-09 RX ORDER — HYDROCODONE BITARTRATE AND ACETAMINOPHEN 5; 325 MG/1; MG/1
TABLET ORAL
COMMUNITY
Start: 2020-10-15 | End: 2021-03-09 | Stop reason: CLARIF

## 2020-12-09 ASSESSMENT — ENCOUNTER SYMPTOMS
CHEST TIGHTNESS: 0
COLOR CHANGE: 0
BACK PAIN: 1
SINUS PAIN: 0
SINUS PRESSURE: 0
EYES NEGATIVE: 1
ABDOMINAL DISTENTION: 0
DIARRHEA: 0
ABDOMINAL PAIN: 0
COUGH: 0
SORE THROAT: 0
WHEEZING: 0
CONSTIPATION: 0
NAUSEA: 0
SHORTNESS OF BREATH: 0

## 2020-12-09 NOTE — PROGRESS NOTES
2020    TELEHEALTH EVALUATION -- Audio/Visual (During QVDSN-65 public health emergency)    HPI:    Jerome Calderón (:  1980) has requested an audio/video evaluation for the following concern(s):    3 month follow up:    Patient compliant with all current medications for diabetes. Blood sugars have been averaging around 110-150, and the patient reports checking their blood sugar 2-3 time daily. Patient has had no episodes of significant hypoglycemia, fainting, dizziness, or loss of consciousness. ON metformin 1000 mg BID; 40 units of Lantus nightly and 10-15 units of Humalog with meals. Lab Results   Component Value Date    LABA1C 5.8 2020     Lab Results   Component Value Date    .7 2020     Patient denies any chest pain, shortness of breath, myalgias, Patient is tolerating cholesterol medications without difficulty. Lab Results   Component Value Date    LDLCALC 133 (H) 2010    LDLDIRECT 8 2019     His CLBP has been stable over the last few months. He has been seeing Dr Christina Mclean at [facility] and received his first set of epidural injections which did help. He continues on TID Norco as well with good relief. Patient's reflux well controlled on current medications. Patient denies any blood in stool black stool, heartburn, reflux. Denies issues with frequent coughing or reflux while sleeping. Able to eat and drink appropriately without complications. Patient's anxiety is reasonably well controlled with current treatment. Patient denies any suicidal ideation, homicidal ideation, hallucinations. Doing well on current Buspar. Review of Systems   Constitutional: Negative for activity change, appetite change, fatigue and fever. HENT: Negative for congestion, sinus pressure, sinus pain and sore throat. Eyes: Negative. Respiratory: Negative for cough, chest tightness, shortness of breath and wheezing. Cardiovascular: Negative for chest pain and palpitations. Gastrointestinal: Negative for abdominal distention, abdominal pain, constipation, diarrhea and nausea. Genitourinary: Negative for difficulty urinating, dysuria, flank pain, frequency and hematuria. Musculoskeletal: Positive for back pain. Negative for arthralgias, gait problem, joint swelling and myalgias. Skin: Negative for color change and rash. Neurological: Negative for dizziness, light-headedness and numbness. Hematological: Negative. Psychiatric/Behavioral: Negative. Prior to Visit Medications    Medication Sig Taking? Authorizing Provider   HYDROcodone-acetaminophen (Jose Lias) 5-325 MG per tablet  Yes Historical Provider, MD   naproxen (NAPROSYN) 500 MG tablet  Yes Historical Provider, MD   busPIRone (BUSPAR) 5 MG tablet TAKE ONE TABLET BY MOUTH TWICE A DAY Yes BARBARA Hill CNP   tiZANidine (ZANAFLEX) 2 MG tablet Take 1 tablet by mouth 3 times daily as needed (back pain/spasm) Yes BARBARA Hill CNP   insulin lispro (HUMALOG) 100 UNIT/ML injection vial Inject 15 Units into the skin 3 times daily (with meals) Yes Atul Adams MD   metFORMIN (GLUCOPHAGE) 1000 MG tablet Take 1 tablet by mouth 2 times daily (with meals) Yes BARBARA Hill CNP   Insulin Syringes, Disposable, U-100 1 ML MISC 1 each by Does not apply route daily Yes BARBARA Hill CNP   traZODone (DESYREL) 50 MG tablet Take 1 tablet by mouth nightly as needed for Sleep Yes BARBARA Hill CNP   omeprazole (PRILOSEC) 20 MG delayed release capsule Take 1 capsule by mouth every morning (before breakfast) Yes BARBARA Hill CNP   INSULIN SYRINGE .5CC/29G (Td Means INS SYR .5CC/29G) 29G X 1/2\" 0.5 ML MISC 1 each by Does not apply route daily Yes BARBARA Hill CNP   clotrimazole (LOTRIMIN) 1 % cream Apply topically 2 times daily to toe interdigital clefts.  Yes Sarabjit Schmidt MD   Lancets MISC 1 each by Does not apply route 3 times daily Yes BARBARA Hill CNP AMPUTATION - LEFT THIRD TOE AND METATARSAL HEAD, DEBRIDEMENT PLANTAR FOOT ULCER,  WOUND VAC PLACEMENT performed by Kati Cobb MD at [facility]      4 [place] Teeth Extracted In Past   ,   Social History     Tobacco Use    Smoking status: Current Every Day Smoker     Packs/day: 0.50     Years: 24.00     Pack years: 12.00     Types: Cigarettes, Cigars     Start date: 1995    Smokeless tobacco: Never Used   Substance Use Topics    Alcohol use: Yes     Comment: once a year    Drug use: Yes     Types: Marijuana     Comment: pt states once/week       PHYSICAL EXAMINATION:  [ INSTRUCTIONS:  \"[x]\" Indicates a positive item  \"[]\" Indicates a negative item  -- DELETE ALL ITEMS NOT EXAMINED]  Vital Signs: (As obtained by patient/caregiver or practitioner observation)    Blood pressure-  Heart rate-    Respiratory rate-    Temperature-  Pulse oximetry-     Constitutional: [x] Appears well-developed and well-nourished [x] No apparent distress      [] Abnormal-   Mental status  [x] Alert and awake  [x] Oriented to person/place/time [x]Able to follow commands      Eyes:  EOM    [x]  Normal  [] Abnormal-  Sclera  [x]  Normal  [] Abnormal -         Discharge [x]  None visible  [] Abnormal -    HENT:   [x] Normocephalic, atraumatic.   [] Abnormal   [x] Mouth/Throat: Mucous membranes are moist.     External Ears [x] Normal  [] Abnormal-     Neck: [x] No visualized mass     Pulmonary/Chest: [x] Respiratory effort normal.  [x] No visualized signs of difficulty breathing or respiratory distress        [] Abnormal-      Musculoskeletal:   [x] Normal gait with no signs of ataxia         [x] Normal range of motion of neck        [] Abnormal-       Neurological:        [x] No Facial Asymmetry (Cranial nerve 7 motor function) (limited exam to video visit)          [x] No gaze palsy        [] Abnormal-         Skin:        [x] No significant exanthematous lesions or discoloration noted on facial skin [] Abnormal-            Psychiatric:       [x] Normal Affect [x] No Hallucinations        [] Abnormal-     Other pertinent observable physical exam findings-     ASSESSMENT/PLAN:  1. Type 2 diabetes mellitus without complication, with long-term current use of insulin ([address]- daily sugars have been at goal; will recheck A1C and adjust regiment as appropriate; pt also on statin. - CBC Auto Differential; Future  - Comprehensive Metabolic Panel; Future  - Hemoglobin A1C; Future  - Lipid, Fasting; Future    2. Elevated LDL cholesterol level- continue statin; recheck lipid panel  - CBC Auto Differential; Future  - Comprehensive Metabolic Panel; Future  - Hemoglobin A1C; Future  - Lipid, Fasting; Future    3. Gastroesophageal reflux disease without esophagitis- controlled; continue current PPI therapy. 4. Anxiety- controlled; continue Buspar    5. Acute midline low back pain without sciatica- as per pain management; on Norco and now established in series of epidural injections; continue as per Dr John Lee. Course of treatment, including any medications, possible imaging, referrals, and follow ups discussed with patient. All risks and benefits and possible side effects discussed with patient who agrees to plan of care and verbalizes understanding. All labs and imaging reviewed. Return in about 3 months (around 3/9/2021). Isis Coe is a 36 y.o. male being evaluated by a Virtual Visit (video visit) encounter to address concerns as mentioned above. A caregiver was present when appropriate. Due to this being a TeleHealth encounter (During [facility] public health emergency), evaluation of the following organ systems was limited: Vitals/Constitutional/EENT/Resp/CV/GI//MS/Neuro/Skin/Heme-Lymph-Imm.   Pursuant to the emergency declaration under the [facility], [address] [place] [address] authority and the [employer] Act, this Virtual

## 2020-12-14 RX ORDER — TIZANIDINE 2 MG/1
2 TABLET ORAL 3 TIMES DAILY PRN
Qty: 30 TABLET | Refills: 0 | Status: SHIPPED | OUTPATIENT
Start: 2020-12-14 | End: 2021-01-20 | Stop reason: SDUPTHER

## 2020-12-29 LAB
A/G RATIO: 1.1 (ref 1.1–2.2)
ALBUMIN SERPL-MCNC: 4.4 G/DL (ref 3.4–5)
ALP BLD-CCNC: 79 U/L (ref 40–129)
ALT SERPL-CCNC: 31 U/L (ref 10–40)
ANION GAP SERPL CALCULATED.3IONS-SCNC: 9 MMOL/L (ref 3–16)
AST SERPL-CCNC: 37 U/L (ref 15–37)
BASOPHILS ABSOLUTE: 0 K/UL (ref 0–0.2)
BASOPHILS RELATIVE PERCENT: 1.1 %
BILIRUB SERPL-MCNC: <0.2 MG/DL (ref 0–1)
BUN BLDV-MCNC: 18 MG/DL (ref 7–20)
CALCIUM SERPL-MCNC: 10.1 MG/DL (ref 8.3–10.6)
CHLORIDE BLD-SCNC: 104 MMOL/L (ref 99–110)
CHOLESTEROL, FASTING: 145 MG/DL (ref 0–199)
CO2: 22 MMOL/L (ref 21–32)
CREAT SERPL-MCNC: 1.1 MG/DL (ref 0.9–1.3)
EOSINOPHILS ABSOLUTE: 0.2 K/UL (ref 0–0.6)
EOSINOPHILS RELATIVE PERCENT: 5 %
GFR AFRICAN AMERICAN: >60
GFR NON-AFRICAN AMERICAN: >60
GLOBULIN: 4 G/DL
GLUCOSE BLD-MCNC: 132 MG/DL (ref 70–99)
HCT VFR BLD CALC: 35 % (ref 40.5–52.5)
HDLC SERPL-MCNC: 25 MG/DL (ref 40–60)
HEMOGLOBIN: 12 G/DL (ref 13.5–17.5)
LDL CHOLESTEROL CALCULATED: 73 MG/DL
LYMPHOCYTES ABSOLUTE: 1.3 K/UL (ref 1–5.1)
LYMPHOCYTES RELATIVE PERCENT: 34.2 %
MCH RBC QN AUTO: 29.7 PG (ref 26–34)
MCHC RBC AUTO-ENTMCNC: 34.4 G/DL (ref 31–36)
MCV RBC AUTO: 86.6 FL (ref 80–100)
MONOCYTES ABSOLUTE: 0.3 K/UL (ref 0–1.3)
MONOCYTES RELATIVE PERCENT: 7.4 %
NEUTROPHILS ABSOLUTE: 1.9 K/UL (ref 1.7–7.7)
NEUTROPHILS RELATIVE PERCENT: 52.3 %
PDW BLD-RTO: 13.3 % (ref 12.4–15.4)
PLATELET # BLD: 161 K/UL (ref 135–450)
PMV BLD AUTO: 9.3 FL (ref 5–10.5)
POTASSIUM SERPL-SCNC: 5.5 MMOL/L (ref 3.5–5.1)
RBC # BLD: 4.04 M/UL (ref 4.2–5.9)
SODIUM BLD-SCNC: 135 MMOL/L (ref 136–145)
TOTAL PROTEIN: 8.4 G/DL (ref 6.4–8.2)
TRIGLYCERIDE, FASTING: 236 MG/DL (ref 0–150)
VLDLC SERPL CALC-MCNC: 47 MG/DL
WBC # BLD: 3.7 K/UL (ref 4–11)

## 2020-12-29 PROCEDURE — 36415 COLL VENOUS BLD VENIPUNCTURE: CPT | Performed by: NURSE PRACTITIONER

## 2020-12-30 LAB
ESTIMATED AVERAGE GLUCOSE: 125.5 MG/DL
HBA1C MFR BLD: 6 %

## 2021-01-05 ENCOUNTER — HOSPITAL ENCOUNTER (OUTPATIENT)
Age: 41
Setting detail: SPECIMEN
Discharge: HOME | End: 2021-01-05
Payer: MEDICAID

## 2021-01-05 ENCOUNTER — OFFICE VISIT (OUTPATIENT)
Dept: PRIMARY CARE CLINIC | Age: 41
End: 2021-01-05
Payer: MEDICAID

## 2021-01-05 VITALS — TEMPERATURE: 97 F

## 2021-01-05 DIAGNOSIS — R09.81 NASAL CONGESTION: ICD-10-CM

## 2021-01-05 DIAGNOSIS — Z20.822 CLOSE EXPOSURE TO COVID-19 VIRUS: Primary | ICD-10-CM

## 2021-01-05 DIAGNOSIS — R50.9 FEVER, UNSPECIFIED FEVER CAUSE: ICD-10-CM

## 2021-01-05 PROCEDURE — G8419 CALC BMI OUT NRM PARAM NOF/U: HCPCS | Performed by: PHYSICIAN ASSISTANT

## 2021-01-05 PROCEDURE — 99213 OFFICE O/P EST LOW 20 MIN: CPT | Performed by: PHYSICIAN ASSISTANT

## 2021-01-05 PROCEDURE — U0002 COVID-19 LAB TEST NON-CDC: HCPCS

## 2021-01-05 PROCEDURE — G8427 DOCREV CUR MEDS BY ELIG CLIN: HCPCS | Performed by: PHYSICIAN ASSISTANT

## 2021-01-05 PROCEDURE — G8484 FLU IMMUNIZE NO ADMIN: HCPCS | Performed by: PHYSICIAN ASSISTANT

## 2021-01-05 PROCEDURE — 4004F PT TOBACCO SCREEN RCVD TLK: CPT | Performed by: PHYSICIAN ASSISTANT
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
## 2021-01-05 NOTE — PATIENT INSTRUCTIONS
Your COVID 19 test can take 3-5 days for the results come back. We ask that you make a Mychart page and view your test results this way. You will need to Self quarantine until you know your results. Increase fluids rest  Saline nasal spray as directed  Warm salt gargles for throat discomfort  Monitor temperature twice a day  Tylenol for fevers and/or discomfort. If symptoms are worse -Go to the ER. Follow up with your primary doctor    To Whom it May Concern:    Mary Cueva has been tested for COVID on 01/05/21. They may NOT return to work until their lab test results back and they been fever free for 3 days. If test is positive they must stay home for 2 weeks or until they test negative or as directed by the [facility] Department.

## 2021-01-05 NOTE — PROGRESS NOTES
1/5/21  Mary Grace Barry  1980    FLU/COVID-19 CLINIC EVALUATION    HPI SYMPTOMS: Pt's grandmother is (+) Covid-19. Employer: Disability    [x] Fevers--\"feeling very hot/burning up\"  [] Chills  [] Cough  [] Coughing up blood  [] Chest Congestion  [x] Nasal Congestion  [] Feeling short of breath  [] Sometimes  [] Frequently  [] All the time  [] Chest pain  [] Headaches  []Tolerable  [] Severe  [] Sore throat  [] Muscle aches  [] Nausea  [] Vomiting  []Unable to keep fluids down  [] Diarrhea  []Severe    [] OTHER SYMPTOMS:      Symptom Duration:   [] 1  [x] 2   [] 3   [] 4    [] 5   [] 6   [] 7   [] 8   [] 9   [] 10   [] 11   [] 12   [] 13   [] 14   [] Longer than 14 days    Symptom course:   [] Worsening     [x] Stable     [] Improving    RISK FACTORS:    [] Pregnant or possibly pregnant  [] Age over 61  [x] Diabetes  [] Heart disease  [] Asthma  [] COPD/Other chronic lung diseases  [] Active Cancer  [] On Chemotherapy  [] Taking oral steroids  [] History Lymphoma/Leukemia  [x] Close contact with a lab confirmed COVID-19 patient within 14 days of symptom onset  [] History of travel from affected geographical areas within 14 days of symptom onset       VITALS:  There were no vitals filed for this visit. TESTS:    POCT FLU:  [] Positive     []Negative    ASSESSMENT:    [] Flu  [] Possible COVID-19  [] Strep    PLAN:    [] Discharge home with written instructions for:  [] Flu management  [] Possible COVID-19 infection with self-quarantine and management of symptoms  [] Follow-up with primary care physician or emergency department if worsens  [] Evaluation per physician/NP/PA in clinic  [] Sent to ER       An  electronic signature was used to authenticate this note.      --Jay Salazar MA on 1/5/2021 at 10:10 AM

## 2021-01-06 LAB
SARS-COV-2: NOT DETECTED
SOURCE: NORMAL

## 2021-01-20 DIAGNOSIS — E11.9 TYPE 2 DIABETES MELLITUS WITHOUT COMPLICATION, WITH LONG-TERM CURRENT USE OF INSULIN (HCC): ICD-10-CM

## 2021-01-20 DIAGNOSIS — M54.41 ACUTE MIDLINE LOW BACK PAIN WITH BILATERAL SCIATICA: ICD-10-CM

## 2021-01-20 DIAGNOSIS — Z79.4 TYPE 2 DIABETES MELLITUS WITHOUT COMPLICATION, WITH LONG-TERM CURRENT USE OF INSULIN (HCC): ICD-10-CM

## 2021-01-20 DIAGNOSIS — M54.42 ACUTE MIDLINE LOW BACK PAIN WITH BILATERAL SCIATICA: ICD-10-CM

## 2021-01-21 RX ORDER — INSULIN GLARGINE 100 [IU]/ML
40 INJECTION, SOLUTION SUBCUTANEOUS NIGHTLY
Qty: 1 VIAL | Refills: 3 | Status: SHIPPED | OUTPATIENT
Start: 2021-01-21 | End: 2021-05-12 | Stop reason: SDUPTHER

## 2021-01-21 RX ORDER — TIZANIDINE 2 MG/1
2 TABLET ORAL 3 TIMES DAILY PRN
Qty: 30 TABLET | Refills: 0 | Status: SHIPPED | OUTPATIENT
Start: 2021-01-21 | End: 2021-03-11 | Stop reason: SDUPTHER

## 2021-02-03 DIAGNOSIS — Z79.4 TYPE 2 DIABETES MELLITUS WITHOUT COMPLICATION, WITH LONG-TERM CURRENT USE OF INSULIN (HCC): ICD-10-CM

## 2021-02-03 DIAGNOSIS — F41.9 ANXIETY: ICD-10-CM

## 2021-02-03 DIAGNOSIS — E11.9 TYPE 2 DIABETES MELLITUS WITHOUT COMPLICATION, WITH LONG-TERM CURRENT USE OF INSULIN (HCC): ICD-10-CM

## 2021-02-03 DIAGNOSIS — M79.672 BILATERAL FOOT PAIN: ICD-10-CM

## 2021-02-03 DIAGNOSIS — M79.671 BILATERAL FOOT PAIN: ICD-10-CM

## 2021-02-03 RX ORDER — GABAPENTIN 300 MG/1
CAPSULE ORAL
Qty: 90 CAPSULE | Refills: 2 | Status: SHIPPED | OUTPATIENT
Start: 2021-02-03 | End: 2021-05-04

## 2021-02-03 RX ORDER — BUSPIRONE HYDROCHLORIDE 5 MG/1
TABLET ORAL
Qty: 60 TABLET | Refills: 2 | Status: SHIPPED | OUTPATIENT
Start: 2021-02-03 | End: 2021-05-04

## 2021-02-03 RX ORDER — IBUPROFEN 200 MG
TABLET ORAL
Qty: 90 EACH | Refills: 2 | Status: SHIPPED | OUTPATIENT
Start: 2021-02-03 | End: 2021-05-04

## 2021-02-03 RX ORDER — TRAZODONE HYDROCHLORIDE 50 MG/1
TABLET ORAL
Qty: 30 TABLET | Refills: 2 | Status: SHIPPED | OUTPATIENT
Start: 2021-02-03 | End: 2021-05-04

## 2021-02-22 NOTE — PROGRESS NOTES
[address] [facility]  Hyperbaric Oxygen Therapy   Progress Note      NAME: Duke Yuen RECORD NUMBER:  [id number]  AGE: 36 y.o. GENDER: male  : 1980  EPISODE DATE:  2020     Subjective     HBO Treatment Number: 6 out of Total Treatments: 40    HBO Diagnosis:     Indications: Lower Extremity Diabetic Wound ___(site)(left foot)  Oleksandr Hou Catching 3     Safety checks performed prior to treatment. See doc flowsheets for documentation. Objective           No results for input(s): POCGLU in the last 72 hours. Pre treatment Vital Signs       Temp: 97.3 °F (36.3 °C)     Pulse: 76     Resp: 16     BP: 119/77     Blood Sugar 182    Post treatment Vital Signs  Temp: 97.5 °F (36.4 °C)  Pulse: 77  Resp: 16  BP: 116/78  Blood Sugar 190    Assessment        Physical Exam:  General Appearance:  alert and oriented to person, place and time, well-developed and well-nourished, in no acute distress    Pre Tympanic Membrane Assessment:  tympanic membranes intact bilaterally    Post Tympanic Membrane Assessment:  Right: Normal(per Pt.)  Left: Normal(per Pt.)    Pulmonary/Chest:  clear to auscultation bilaterally- no wheezes, rales or rhonchi, normal air movement, no respiratory distress    Cardiovascular:  normal, regular rate and rhythm    Chamber #: [id number]       Treatment Start Time: 1258     Pressure Reached Time: 1307  SHARRI : 2  Number of Air Breaks:  Treatment Status: No Air break      Decompression Time: 1437   Treatment End Time: 1445    Symptoms Noted During Treatment: None    Total time in chamber: 107  Minutes at depth: 90    Adverse Event: no      I was present on these premises and immediately available to furnish assistance & direction throughout the procedure. Veronika          Radha Mari is a 36 y.o. male  did successfully complete today's hyperbaric oxygen treatment at [address] [place] [address].     In my clinical judgement, ongoing HBO therapy is Pt c/o chest pain and SOB that started at 5 pm while at work. Pt states that she had too much caffeine today and also has a hx of anxiety.

## 2021-03-05 DIAGNOSIS — Z79.4 TYPE 2 DIABETES MELLITUS WITHOUT COMPLICATION, WITH LONG-TERM CURRENT USE OF INSULIN (HCC): ICD-10-CM

## 2021-03-05 DIAGNOSIS — M79.672 BILATERAL FOOT PAIN: ICD-10-CM

## 2021-03-05 DIAGNOSIS — M79.671 BILATERAL FOOT PAIN: ICD-10-CM

## 2021-03-05 DIAGNOSIS — E11.9 TYPE 2 DIABETES MELLITUS WITHOUT COMPLICATION, WITH LONG-TERM CURRENT USE OF INSULIN (HCC): ICD-10-CM

## 2021-03-05 DIAGNOSIS — K21.9 GASTROESOPHAGEAL REFLUX DISEASE: ICD-10-CM

## 2021-03-05 RX ORDER — GABAPENTIN 100 MG/1
CAPSULE ORAL
Qty: 81 CAPSULE | Refills: 0 | Status: SHIPPED | OUTPATIENT
Start: 2021-03-05 | End: 2021-04-05

## 2021-03-05 RX ORDER — OMEPRAZOLE 20 MG/1
CAPSULE, DELAYED RELEASE ORAL
Qty: 30 CAPSULE | Refills: 1 | Status: SHIPPED | OUTPATIENT
Start: 2021-03-05 | End: 2021-06-07

## 2021-03-09 ENCOUNTER — OFFICE VISIT (OUTPATIENT)
Dept: INTERNAL MEDICINE CLINIC | Age: 41
End: 2021-03-09
Payer: MEDICAID

## 2021-03-09 VITALS
SYSTOLIC BLOOD PRESSURE: 138 MMHG | OXYGEN SATURATION: 99 % | BODY MASS INDEX: 30.87 KG/M2 | HEART RATE: 80 BPM | DIASTOLIC BLOOD PRESSURE: 88 MMHG | RESPIRATION RATE: 20 BRPM | WEIGHT: 234 LBS

## 2021-03-09 DIAGNOSIS — M54.41 CHRONIC MIDLINE LOW BACK PAIN WITH BILATERAL SCIATICA: ICD-10-CM

## 2021-03-09 DIAGNOSIS — K21.9 GASTROESOPHAGEAL REFLUX DISEASE, UNSPECIFIED WHETHER ESOPHAGITIS PRESENT: ICD-10-CM

## 2021-03-09 DIAGNOSIS — Z00.00 ENCOUNTER FOR PREVENTIVE HEALTH EXAMINATION: Primary | ICD-10-CM

## 2021-03-09 DIAGNOSIS — Z79.4 TYPE 2 DIABETES MELLITUS WITHOUT COMPLICATION, WITH LONG-TERM CURRENT USE OF INSULIN (HCC): ICD-10-CM

## 2021-03-09 DIAGNOSIS — G89.29 CHRONIC MIDLINE LOW BACK PAIN WITH BILATERAL SCIATICA: ICD-10-CM

## 2021-03-09 DIAGNOSIS — E11.9 TYPE 2 DIABETES MELLITUS WITHOUT COMPLICATION, WITH LONG-TERM CURRENT USE OF INSULIN (HCC): ICD-10-CM

## 2021-03-09 DIAGNOSIS — M54.42 CHRONIC MIDLINE LOW BACK PAIN WITH BILATERAL SCIATICA: ICD-10-CM

## 2021-03-09 DIAGNOSIS — F41.9 ANXIETY: ICD-10-CM

## 2021-03-09 DIAGNOSIS — E11.42 DIABETIC POLYNEUROPATHY ASSOCIATED WITH TYPE 2 DIABETES MELLITUS (HCC): ICD-10-CM

## 2021-03-09 PROBLEM — E11.621 DIABETIC FOOT ULCER (HCC): Status: RESOLVED | Noted: 2020-03-25 | Resolved: 2021-03-09

## 2021-03-09 PROBLEM — L97.509 DIABETIC FOOT ULCER (HCC): Status: RESOLVED | Noted: 2020-03-25 | Resolved: 2021-03-09

## 2021-03-09 PROCEDURE — G8484 FLU IMMUNIZE NO ADMIN: HCPCS | Performed by: NURSE PRACTITIONER

## 2021-03-09 PROCEDURE — 99396 PREV VISIT EST AGE 40-64: CPT | Performed by: NURSE PRACTITIONER

## 2021-03-09 ASSESSMENT — PATIENT HEALTH QUESTIONNAIRE - PHQ9
2. FEELING DOWN, DEPRESSED OR HOPELESS: 1
1. LITTLE INTEREST OR PLEASURE IN DOING THINGS: 1
SUM OF ALL RESPONSES TO PHQ QUESTIONS 1-9: 2
SUM OF ALL RESPONSES TO PHQ9 QUESTIONS 1 & 2: 2

## 2021-03-09 ASSESSMENT — ENCOUNTER SYMPTOMS
CONSTIPATION: 0
SINUS PRESSURE: 0
EYES NEGATIVE: 1
ABDOMINAL PAIN: 0
CHEST TIGHTNESS: 0
COUGH: 0
NAUSEA: 0
SORE THROAT: 0
DIARRHEA: 0
SINUS PAIN: 0
SHORTNESS OF BREATH: 0
ABDOMINAL DISTENTION: 0
BACK PAIN: 1
WHEEZING: 0
COLOR CHANGE: 0
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
## 2021-03-11 DIAGNOSIS — M54.42 ACUTE MIDLINE LOW BACK PAIN WITH BILATERAL SCIATICA: ICD-10-CM

## 2021-03-11 DIAGNOSIS — M54.41 ACUTE MIDLINE LOW BACK PAIN WITH BILATERAL SCIATICA: Primary | ICD-10-CM

## 2021-03-11 DIAGNOSIS — M54.41 ACUTE MIDLINE LOW BACK PAIN WITH BILATERAL SCIATICA: ICD-10-CM

## 2021-03-11 DIAGNOSIS — M54.42 ACUTE MIDLINE LOW BACK PAIN WITH BILATERAL SCIATICA: Primary | ICD-10-CM

## 2021-03-11 RX ORDER — TIZANIDINE 2 MG/1
2 TABLET ORAL 3 TIMES DAILY PRN
Qty: 30 TABLET | Refills: 0 | Status: SHIPPED | OUTPATIENT
Start: 2021-03-11 | End: 2021-05-12 | Stop reason: SDUPTHER

## 2021-03-19 DIAGNOSIS — G89.29 CHRONIC MIDLINE LOW BACK PAIN WITH BILATERAL SCIATICA: Primary | ICD-10-CM

## 2021-03-19 DIAGNOSIS — M54.42 CHRONIC MIDLINE LOW BACK PAIN WITH BILATERAL SCIATICA: Primary | ICD-10-CM

## 2021-03-19 DIAGNOSIS — M54.41 CHRONIC MIDLINE LOW BACK PAIN WITH BILATERAL SCIATICA: Primary | ICD-10-CM

## 2021-03-19 RX ORDER — HYDROCODONE BITARTRATE AND ACETAMINOPHEN 5; 325 MG/1; MG/1
1 TABLET ORAL EVERY 8 HOURS PRN
Qty: 15 TABLET | Refills: 0 | Status: SHIPPED | OUTPATIENT
Start: 2021-03-19 | End: 2021-03-24

## 2021-03-22 DIAGNOSIS — M54.42 ACUTE MIDLINE LOW BACK PAIN WITH BILATERAL SCIATICA: ICD-10-CM

## 2021-03-22 DIAGNOSIS — G89.29 CHRONIC MIDLINE LOW BACK PAIN WITH BILATERAL SCIATICA: Primary | ICD-10-CM

## 2021-03-22 DIAGNOSIS — M54.42 CHRONIC MIDLINE LOW BACK PAIN WITH BILATERAL SCIATICA: Primary | ICD-10-CM

## 2021-03-22 DIAGNOSIS — M54.41 ACUTE MIDLINE LOW BACK PAIN WITH BILATERAL SCIATICA: ICD-10-CM

## 2021-03-22 DIAGNOSIS — M54.41 CHRONIC MIDLINE LOW BACK PAIN WITH BILATERAL SCIATICA: Primary | ICD-10-CM

## 2021-04-05 DIAGNOSIS — Z79.4 TYPE 2 DIABETES MELLITUS WITHOUT COMPLICATION, WITH LONG-TERM CURRENT USE OF INSULIN (HCC): ICD-10-CM

## 2021-04-05 DIAGNOSIS — M79.671 BILATERAL FOOT PAIN: ICD-10-CM

## 2021-04-05 DIAGNOSIS — E11.9 TYPE 2 DIABETES MELLITUS WITHOUT COMPLICATION, WITH LONG-TERM CURRENT USE OF INSULIN (HCC): ICD-10-CM

## 2021-04-05 DIAGNOSIS — M79.672 BILATERAL FOOT PAIN: ICD-10-CM

## 2021-04-05 RX ORDER — GABAPENTIN 100 MG/1
CAPSULE ORAL
Qty: 81 CAPSULE | Refills: 0 | Status: SHIPPED | OUTPATIENT
Start: 2021-04-05 | End: 2021-06-07

## 2021-05-04 RX ORDER — IBUPROFEN 200 MG
TABLET ORAL
Qty: 90 EACH | Refills: 2 | Status: SHIPPED | OUTPATIENT
Start: 2021-05-04 | End: 2021-08-04

## 2021-05-11 ENCOUNTER — TELEPHONE (OUTPATIENT)
Dept: INTERNAL MEDICINE CLINIC | Age: 41
End: 2021-05-11

## 2021-05-12 DIAGNOSIS — E11.9 TYPE 2 DIABETES MELLITUS WITHOUT COMPLICATION, WITH LONG-TERM CURRENT USE OF INSULIN (HCC): ICD-10-CM

## 2021-05-12 DIAGNOSIS — Z79.4 TYPE 2 DIABETES MELLITUS WITHOUT COMPLICATION, WITH LONG-TERM CURRENT USE OF INSULIN (HCC): ICD-10-CM

## 2021-05-12 DIAGNOSIS — M54.41 ACUTE MIDLINE LOW BACK PAIN WITH BILATERAL SCIATICA: ICD-10-CM

## 2021-05-12 DIAGNOSIS — M54.42 ACUTE MIDLINE LOW BACK PAIN WITH BILATERAL SCIATICA: ICD-10-CM

## 2021-05-12 RX ORDER — INSULIN GLARGINE 100 [IU]/ML
40 INJECTION, SOLUTION SUBCUTANEOUS NIGHTLY
Qty: 1 VIAL | Refills: 3 | Status: SHIPPED | OUTPATIENT
Start: 2021-05-12 | End: 2021-08-08 | Stop reason: SDUPTHER

## 2021-05-12 RX ORDER — TIZANIDINE 2 MG/1
2 TABLET ORAL 3 TIMES DAILY PRN
Qty: 30 TABLET | Refills: 0 | Status: SHIPPED | OUTPATIENT
Start: 2021-05-12 | End: 2021-05-21

## 2021-05-14 ENCOUNTER — TELEPHONE (OUTPATIENT)
Dept: INTERNAL MEDICINE CLINIC | Age: 41
End: 2021-05-14

## 2021-05-14 NOTE — TELEPHONE ENCOUNTER
I am feeling much better since taking Imodium. Is there any way Dr. Edilma Olmedo could write me a statement that says I can't stand for any long period of time and have had an amputation? I going to [place] for my [place] this weekend and will be going to [facility] later this year. Please send me a message back or call me and I can pick it up before you guys leave for the weekend.    Thank you   Dilip Johnosn      Please advise

## 2021-05-20 DIAGNOSIS — M54.41 ACUTE MIDLINE LOW BACK PAIN WITH BILATERAL SCIATICA: ICD-10-CM

## 2021-05-20 DIAGNOSIS — M54.42 ACUTE MIDLINE LOW BACK PAIN WITH BILATERAL SCIATICA: ICD-10-CM

## 2021-05-21 RX ORDER — TIZANIDINE 2 MG/1
TABLET ORAL
Qty: 30 TABLET | Refills: 0 | Status: SHIPPED | OUTPATIENT
Start: 2021-05-21 | End: 2021-11-12 | Stop reason: SDUPTHER

## 2021-05-24 DIAGNOSIS — F41.9 ANXIETY: ICD-10-CM

## 2021-05-24 RX ORDER — BUSPIRONE HYDROCHLORIDE 5 MG/1
5 TABLET ORAL 2 TIMES DAILY
Qty: 60 TABLET | Refills: 1 | Status: SHIPPED | OUTPATIENT
Start: 2021-05-24 | End: 2021-05-26 | Stop reason: SDUPTHER

## 2021-05-26 DIAGNOSIS — M54.42 CHRONIC MIDLINE LOW BACK PAIN WITH BILATERAL SCIATICA: ICD-10-CM

## 2021-05-26 DIAGNOSIS — M79.672 BILATERAL FOOT PAIN: Primary | ICD-10-CM

## 2021-05-26 DIAGNOSIS — F41.9 ANXIETY: ICD-10-CM

## 2021-05-26 DIAGNOSIS — M54.41 CHRONIC MIDLINE LOW BACK PAIN WITH BILATERAL SCIATICA: ICD-10-CM

## 2021-05-26 DIAGNOSIS — G89.29 CHRONIC MIDLINE LOW BACK PAIN WITH BILATERAL SCIATICA: ICD-10-CM

## 2021-05-26 DIAGNOSIS — M79.671 BILATERAL FOOT PAIN: Primary | ICD-10-CM

## 2021-05-26 RX ORDER — BUSPIRONE HYDROCHLORIDE 5 MG/1
5 TABLET ORAL 2 TIMES DAILY
Qty: 60 TABLET | Refills: 1 | Status: SHIPPED | OUTPATIENT
Start: 2021-05-26 | End: 2021-11-02

## 2021-06-03 DIAGNOSIS — M79.671 BILATERAL FOOT PAIN: ICD-10-CM

## 2021-06-03 DIAGNOSIS — M79.672 BILATERAL FOOT PAIN: ICD-10-CM

## 2021-06-03 DIAGNOSIS — K21.9 GASTROESOPHAGEAL REFLUX DISEASE: ICD-10-CM

## 2021-06-03 DIAGNOSIS — Z79.4 TYPE 2 DIABETES MELLITUS WITHOUT COMPLICATION, WITH LONG-TERM CURRENT USE OF INSULIN (HCC): ICD-10-CM

## 2021-06-03 DIAGNOSIS — E11.9 TYPE 2 DIABETES MELLITUS WITHOUT COMPLICATION, WITH LONG-TERM CURRENT USE OF INSULIN (HCC): ICD-10-CM

## 2021-06-07 RX ORDER — GABAPENTIN 100 MG/1
CAPSULE ORAL
Qty: 81 CAPSULE | Refills: 0 | Status: SHIPPED
Start: 2021-06-07 | End: 2021-06-11 | Stop reason: CLARIF

## 2021-06-07 RX ORDER — OMEPRAZOLE 20 MG/1
CAPSULE, DELAYED RELEASE ORAL
Qty: 30 CAPSULE | Refills: 1 | Status: SHIPPED | OUTPATIENT
Start: 2021-06-07 | End: 2021-09-03

## 2021-06-11 ENCOUNTER — OFFICE VISIT (OUTPATIENT)
Dept: INTERNAL MEDICINE CLINIC | Age: 41
End: 2021-06-11
Payer: MEDICAID

## 2021-06-11 VITALS
SYSTOLIC BLOOD PRESSURE: 130 MMHG | HEIGHT: 73 IN | OXYGEN SATURATION: 98 % | WEIGHT: 228 LBS | DIASTOLIC BLOOD PRESSURE: 81 MMHG | BODY MASS INDEX: 30.22 KG/M2 | RESPIRATION RATE: 16 BRPM | HEART RATE: 75 BPM

## 2021-06-11 DIAGNOSIS — K21.9 GASTROESOPHAGEAL REFLUX DISEASE, UNSPECIFIED WHETHER ESOPHAGITIS PRESENT: ICD-10-CM

## 2021-06-11 DIAGNOSIS — Z79.4 TYPE 2 DIABETES MELLITUS WITHOUT COMPLICATION, WITH LONG-TERM CURRENT USE OF INSULIN (HCC): Primary | ICD-10-CM

## 2021-06-11 DIAGNOSIS — E11.9 TYPE 2 DIABETES MELLITUS WITHOUT COMPLICATION, WITH LONG-TERM CURRENT USE OF INSULIN (HCC): Primary | ICD-10-CM

## 2021-06-11 DIAGNOSIS — E78.00 ELEVATED LDL CHOLESTEROL LEVEL: ICD-10-CM

## 2021-06-11 DIAGNOSIS — E11.42 DIABETIC POLYNEUROPATHY ASSOCIATED WITH TYPE 2 DIABETES MELLITUS (HCC): ICD-10-CM

## 2021-06-11 DIAGNOSIS — G47.00 INSOMNIA, UNSPECIFIED TYPE: ICD-10-CM

## 2021-06-11 DIAGNOSIS — F41.9 ANXIETY: ICD-10-CM

## 2021-06-11 PROCEDURE — G8417 CALC BMI ABV UP PARAM F/U: HCPCS | Performed by: NURSE PRACTITIONER

## 2021-06-11 PROCEDURE — 4004F PT TOBACCO SCREEN RCVD TLK: CPT | Performed by: NURSE PRACTITIONER

## 2021-06-11 PROCEDURE — 99214 OFFICE O/P EST MOD 30 MIN: CPT | Performed by: NURSE PRACTITIONER

## 2021-06-11 PROCEDURE — 2022F DILAT RTA XM EVC RTNOPTHY: CPT | Performed by: NURSE PRACTITIONER

## 2021-06-11 PROCEDURE — G8427 DOCREV CUR MEDS BY ELIG CLIN: HCPCS | Performed by: NURSE PRACTITIONER

## 2021-06-11 PROCEDURE — 3046F HEMOGLOBIN A1C LEVEL >9.0%: CPT | Performed by: NURSE PRACTITIONER

## 2021-06-11 ASSESSMENT — PATIENT HEALTH QUESTIONNAIRE - PHQ9
SUM OF ALL RESPONSES TO PHQ QUESTIONS 1-9: 0
2. FEELING DOWN, DEPRESSED OR HOPELESS: 0
SUM OF ALL RESPONSES TO PHQ9 QUESTIONS 1 & 2: 0
1. LITTLE INTEREST OR PLEASURE IN DOING THINGS: 0

## 2021-06-11 ASSESSMENT — ENCOUNTER SYMPTOMS
WHEEZING: 0
SORE THROAT: 0
COUGH: 0
CONSTIPATION: 0
SINUS PRESSURE: 0
CHEST TIGHTNESS: 0
ABDOMINAL DISTENTION: 0
SHORTNESS OF BREATH: 0
DIARRHEA: 0
ABDOMINAL PAIN: 0
EYES NEGATIVE: 1
SINUS PAIN: 0
NAUSEA: 0
COLOR CHANGE: 0

## 2021-06-11 NOTE — PROGRESS NOTES
Fabianyared Cele  1980 06/11/21    Chief Complaint   Patient presents with    3 Month Follow-Up       SUBJECTIVE:      3 month follow up:    Patient compliant with all current medications for diabetes. Blood sugars have been averaging around 140-170, and the patient reports checking their blood sugar 1-2 time daily. Patient has had no episodes of significant hypoglycemia, fainting, dizziness, or loss of consciousness. Continues on Metformin 1000 mg BID, Lantus 40 units nightly and Humalog 15 units plus sliding scale. He is on ASA and statin for ASCVD prevention. Lab Results   Component Value Date    LABA1C 6.0 12/29/2020     Lab Results   Component Value Date    .5 12/29/2020     Patient denies any chest pain, shortness of breath, myalgias, Patient is tolerating cholesterol medications without difficulty. Lab Results   Component Value Date    LDLCALC 73 12/29/2020    LDLDIRECT 8 05/19/2019     Patient's anxiety is reasonably well controlled with current treatment. Patient denies any suicidal ideation, homicidal ideation, hallucinations. Reports mood stable on Buspar. Continues to sleep well throughout the night on current Trazodone regiment. Patient's reflux well controlled on current medications. Patient denies any blood in stool black stool, heartburn, reflux. Denies issues with frequent coughing or reflux while sleeping. Able to eat and drink appropriately without complications. Review of Systems   Constitutional: Negative for activity change, appetite change, fatigue and fever. HENT: Negative for congestion, sinus pressure, sinus pain and sore throat. Eyes: Negative. Respiratory: Negative for cough, chest tightness, shortness of breath and wheezing. Cardiovascular: Negative for chest pain and palpitations. Gastrointestinal: Negative for abdominal distention, abdominal pain, constipation, diarrhea and nausea.    Genitourinary: Negative for difficulty urinating, dysuria, flank pain, frequency and hematuria. Musculoskeletal: Negative for arthralgias, gait problem, joint swelling and myalgias. Skin: Negative for color change and rash. Neurological: Negative for dizziness, light-headedness and numbness. Hematological: Negative. Psychiatric/Behavioral: Negative. OBJECTIVE:    /81   Pulse 75   Resp 16   Ht 6' 1\" (1.854 m)   Wt 228 lb (103.4 kg)   SpO2 98%   BMI 30.08 kg/m²     Physical Exam  Constitutional:       General: He is not in acute distress. Appearance: He is well-developed. He is not diaphoretic. HENT:      Head: Normocephalic and atraumatic. Nose: Nose normal.   Eyes:      Conjunctiva/sclera: Conjunctivae normal.      Pupils: Pupils are equal, round, and reactive to light. Neck:      Thyroid: No thyromegaly. Cardiovascular:      Rate and Rhythm: Normal rate and regular rhythm. Heart sounds: Normal heart sounds. No murmur heard. Pulmonary:      Effort: Pulmonary effort is normal.      Breath sounds: Normal breath sounds. Abdominal:      General: Bowel sounds are normal. There is no distension. Palpations: Abdomen is soft. Tenderness: There is no abdominal tenderness. Musculoskeletal:         General: Normal range of motion. Lymphadenopathy:      Cervical: No cervical adenopathy. Skin:     General: Skin is warm and dry. Capillary Refill: Capillary refill takes less than 2 seconds. Findings: No rash. Neurological:      Mental Status: He is alert and oriented to person, place, and time. GCS: GCS eye subscore is 4. GCS verbal subscore is 5. GCS motor subscore is 6. Cranial Nerves: No cranial nerve deficit. Sensory: No sensory deficit. Coordination: Coordination normal.      Deep Tendon Reflexes: Reflexes normal.   Psychiatric:         Behavior: Behavior normal.         Thought Content: Thought content normal.         ASSESSMENT:    1.  Type 2 diabetes mellitus without complication, with long-term current use of insulin (HCC)    2. Elevated LDL cholesterol level    3. Anxiety    4. Insomnia, unspecified type    5. Diabetic polyneuropathy associated with type 2 diabetes mellitus ([facility]    6. Gastroesophageal reflux disease, unspecified whether esophagitis present        PLAN:    Mercy Loya was seen today for 3 month follow-up. Diagnoses and all orders for this visit:    Type 2 diabetes mellitus without complication, with long-term current use of insulin ([facility]- remains well controlled; no change to current oral/insulin therapy; recheck labs as below as well as Microalbumin. -     Comprehensive Metabolic Panel; Future  -     CBC Auto Differential; Future  -     Hemoglobin A1C; Future  -     Lipid, Fasting; Future  -     MICROALBUMIN / CREATININE URINE RATIO; Future    Elevated LDL cholesterol level- at goal; continue statin, especially given DM; recheck hepatic function and lipid panel today. -     Comprehensive Metabolic Panel; Future  -     CBC Auto Differential; Future  -     Hemoglobin A1C; Future  -     Lipid, Fasting; Future    Anxiety- controlled; continue Buspar without change. Insomnia, unspecified type- much improved; continue Trazodone. Diabetic polyneuropathy associated with type 2 diabetes mellitus ([facility]- well controlled; continue gabapentin without change. Gastroesophageal reflux disease, unspecified whether esophagitis preset- controlled; continue Omeprazole. Course of treatment, including any medications, possible imaging, referrals, and follow ups discussed with patient. All risks and benefits and possible side effects discussed with patient who agrees to plan of care and verbalizes understanding. All labs and imaging reviewed. No flowsheet data found. Return in about 3 months (around 9/11/2021).     Leah note this reports has been produced speech recognition software and may contain errors related to that system including errors in grammar,

## 2021-06-17 DIAGNOSIS — E11.9 TYPE 2 DIABETES MELLITUS WITHOUT COMPLICATION, WITH LONG-TERM CURRENT USE OF INSULIN (HCC): ICD-10-CM

## 2021-06-17 DIAGNOSIS — Z79.4 TYPE 2 DIABETES MELLITUS WITHOUT COMPLICATION, WITH LONG-TERM CURRENT USE OF INSULIN (HCC): ICD-10-CM

## 2021-06-17 DIAGNOSIS — E78.00 ELEVATED LDL CHOLESTEROL LEVEL: ICD-10-CM

## 2021-06-17 LAB
A/G RATIO: 1 (ref 1.1–2.2)
ALBUMIN SERPL-MCNC: 4 G/DL (ref 3.4–5)
ALP BLD-CCNC: 77 U/L (ref 40–129)
ALT SERPL-CCNC: 27 U/L (ref 10–40)
ANION GAP SERPL CALCULATED.3IONS-SCNC: 11 MMOL/L (ref 3–16)
AST SERPL-CCNC: 27 U/L (ref 15–37)
BASOPHILS ABSOLUTE: 0 K/UL (ref 0–0.2)
BASOPHILS RELATIVE PERCENT: 1 %
BILIRUB SERPL-MCNC: <0.2 MG/DL (ref 0–1)
BUN BLDV-MCNC: 17 MG/DL (ref 7–20)
CALCIUM SERPL-MCNC: 9.2 MG/DL (ref 8.3–10.6)
CHLORIDE BLD-SCNC: 104 MMOL/L (ref 99–110)
CHOLESTEROL, FASTING: 147 MG/DL (ref 0–199)
CO2: 24 MMOL/L (ref 21–32)
CREAT SERPL-MCNC: 1.4 MG/DL (ref 0.9–1.3)
CREATININE URINE: 82.8 MG/DL (ref 39–259)
EOSINOPHILS ABSOLUTE: 0.2 K/UL (ref 0–0.6)
EOSINOPHILS RELATIVE PERCENT: 5.3 %
GFR AFRICAN AMERICAN: >60
GFR NON-AFRICAN AMERICAN: 56
GLOBULIN: 4 G/DL
GLUCOSE BLD-MCNC: 121 MG/DL (ref 70–99)
HCT VFR BLD CALC: 34.2 % (ref 40.5–52.5)
HDLC SERPL-MCNC: 21 MG/DL (ref 40–60)
HEMOGLOBIN: 12.1 G/DL (ref 13.5–17.5)
LDL CHOLESTEROL CALCULATED: ABNORMAL MG/DL
LDL CHOLESTEROL DIRECT: 61 MG/DL
LYMPHOCYTES ABSOLUTE: 1.3 K/UL (ref 1–5.1)
LYMPHOCYTES RELATIVE PERCENT: 37.9 %
MCH RBC QN AUTO: 30.5 PG (ref 26–34)
MCHC RBC AUTO-ENTMCNC: 35.4 G/DL (ref 31–36)
MCV RBC AUTO: 86 FL (ref 80–100)
MICROALBUMIN UR-MCNC: 388.2 MG/DL
MICROALBUMIN/CREAT UR-RTO: 4688.4 MG/G (ref 0–30)
MONOCYTES ABSOLUTE: 0.3 K/UL (ref 0–1.3)
MONOCYTES RELATIVE PERCENT: 7.3 %
NEUTROPHILS ABSOLUTE: 1.7 K/UL (ref 1.7–7.7)
NEUTROPHILS RELATIVE PERCENT: 48.5 %
PDW BLD-RTO: 13.5 % (ref 12.4–15.4)
PLATELET # BLD: 155 K/UL (ref 135–450)
PMV BLD AUTO: 9.1 FL (ref 5–10.5)
POTASSIUM SERPL-SCNC: 4.8 MMOL/L (ref 3.5–5.1)
RBC # BLD: 3.98 M/UL (ref 4.2–5.9)
SODIUM BLD-SCNC: 139 MMOL/L (ref 136–145)
TOTAL PROTEIN: 8 G/DL (ref 6.4–8.2)
TRIGLYCERIDE, FASTING: 461 MG/DL (ref 0–150)
VLDLC SERPL CALC-MCNC: ABNORMAL MG/DL
WBC # BLD: 3.5 K/UL (ref 4–11)

## 2021-06-17 PROCEDURE — 36415 COLL VENOUS BLD VENIPUNCTURE: CPT | Performed by: NURSE PRACTITIONER

## 2021-06-18 DIAGNOSIS — R80.9 MICROALBUMINURIA: ICD-10-CM

## 2021-06-18 DIAGNOSIS — N17.9 AKI (ACUTE KIDNEY INJURY) (HCC): Primary | ICD-10-CM

## 2021-06-18 LAB
ESTIMATED AVERAGE GLUCOSE: 134.1 MG/DL
HBA1C MFR BLD: 6.3 %

## 2021-07-10 DIAGNOSIS — M79.671 BILATERAL FOOT PAIN: ICD-10-CM

## 2021-07-10 DIAGNOSIS — Z79.4 TYPE 2 DIABETES MELLITUS WITHOUT COMPLICATION, WITH LONG-TERM CURRENT USE OF INSULIN (HCC): ICD-10-CM

## 2021-07-10 DIAGNOSIS — M79.672 BILATERAL FOOT PAIN: ICD-10-CM

## 2021-07-10 DIAGNOSIS — E11.9 TYPE 2 DIABETES MELLITUS WITHOUT COMPLICATION, WITH LONG-TERM CURRENT USE OF INSULIN (HCC): ICD-10-CM

## 2021-07-12 RX ORDER — GABAPENTIN 300 MG/1
CAPSULE ORAL
Qty: 90 CAPSULE | Refills: 0 | Status: SHIPPED | OUTPATIENT
Start: 2021-07-12 | End: 2021-08-04

## 2021-07-23 ENCOUNTER — HOSPITAL ENCOUNTER (EMERGENCY)
Age: 41
Discharge: HOME | End: 2021-07-23
Payer: MEDICAID

## 2021-07-23 VITALS
HEIGHT: 73 IN | SYSTOLIC BLOOD PRESSURE: 113 MMHG | BODY MASS INDEX: 29.82 KG/M2 | RESPIRATION RATE: 15 BRPM | TEMPERATURE: 98.2 F | DIASTOLIC BLOOD PRESSURE: 79 MMHG | OXYGEN SATURATION: 99 % | HEART RATE: 56 BPM | WEIGHT: 225 LBS

## 2021-07-23 DIAGNOSIS — R11.2 NON-INTRACTABLE VOMITING WITH NAUSEA, UNSPECIFIED VOMITING TYPE: ICD-10-CM

## 2021-07-23 DIAGNOSIS — R19.7 DIARRHEA, UNSPECIFIED TYPE: Primary | ICD-10-CM

## 2021-07-23 DIAGNOSIS — R10.9 INTERMITTENT ABDOMINAL PAIN: ICD-10-CM

## 2021-07-23 LAB
ALBUMIN SERPL-MCNC: 3.6 GM/DL (ref 3.4–5)
ALP BLD-CCNC: 91 IU/L (ref 40–128)
ALT SERPL-CCNC: 17 U/L (ref 10–40)
ANION GAP SERPL CALCULATED.3IONS-SCNC: 9 MMOL/L (ref 4–16)
AST SERPL-CCNC: 15 IU/L (ref 15–37)
ATYPICAL LYMPHOCYTE ABSOLUTE COUNT: ABNORMAL
BANDED NEUTROPHILS ABSOLUTE COUNT: 0.22 K/CU MM
BANDED NEUTROPHILS RELATIVE PERCENT: 6 % (ref 5–11)
BASOPHILS ABSOLUTE: 0 K/CU MM
BASOPHILS RELATIVE PERCENT: 1 % (ref 0–1)
BILIRUB SERPL-MCNC: 0.2 MG/DL (ref 0–1)
BUN BLDV-MCNC: 16 MG/DL (ref 6–23)
CALCIUM SERPL-MCNC: 9 MG/DL (ref 8.3–10.6)
CHLORIDE BLD-SCNC: 103 MMOL/L (ref 99–110)
CO2: 23 MMOL/L (ref 21–32)
CREAT SERPL-MCNC: 1.3 MG/DL (ref 0.9–1.3)
DIFFERENTIAL TYPE: ABNORMAL
EOSINOPHILS ABSOLUTE: 0.2 K/CU MM
EOSINOPHILS RELATIVE PERCENT: 6 % (ref 0–3)
GFR AFRICAN AMERICAN: >60 ML/MIN/1.73M2
GFR NON-AFRICAN AMERICAN: >60 ML/MIN/1.73M2
GLUCOSE BLD-MCNC: 154 MG/DL (ref 70–99)
HCT VFR BLD CALC: 34.4 % (ref 42–52)
HEMOGLOBIN: 11.4 GM/DL (ref 13.5–18)
LIPASE: 29 IU/L (ref 13–60)
LYMPHOCYTES ABSOLUTE: 1.3 K/CU MM
LYMPHOCYTES RELATIVE PERCENT: 34 % (ref 24–44)
MCH RBC QN AUTO: 29.6 PG (ref 27–31)
MCHC RBC AUTO-ENTMCNC: 33.1 % (ref 32–36)
MCV RBC AUTO: 89.4 FL (ref 78–100)
MONOCYTES ABSOLUTE: 0.1 K/CU MM
MONOCYTES RELATIVE PERCENT: 2 % (ref 0–4)
PDW BLD-RTO: 12.8 % (ref 11.7–14.9)
PLATELET # BLD: 188 K/CU MM (ref 140–440)
PLT MORPHOLOGY: ADEQUATE
PMV BLD AUTO: 10.4 FL (ref 7.5–11.1)
POTASSIUM SERPL-SCNC: 5.1 MMOL/L (ref 3.5–5.1)
RBC # BLD: 3.85 M/CU MM (ref 4.6–6.2)
RBC # BLD: ABNORMAL 10*6/UL
SEGMENTED NEUTROPHILS ABSOLUTE COUNT: 1.9 K/CU MM
SEGMENTED NEUTROPHILS RELATIVE PERCENT: 51 % (ref 36–66)
SODIUM BLD-SCNC: 135 MMOL/L (ref 135–145)
TOTAL PROTEIN: 7.9 GM/DL (ref 6.4–8.2)
WBC # BLD: 3.7 K/CU MM (ref 4–10.5)

## 2021-07-23 PROCEDURE — 80053 COMPREHEN METABOLIC PANEL: CPT

## 2021-07-23 PROCEDURE — 85007 BL SMEAR W/DIFF WBC COUNT: CPT

## 2021-07-23 PROCEDURE — 83690 ASSAY OF LIPASE: CPT

## 2021-07-23 PROCEDURE — 85027 COMPLETE CBC AUTOMATED: CPT

## 2021-07-23 PROCEDURE — 99285 EMERGENCY DEPT VISIT HI MDM: CPT

## 2021-07-23 PROCEDURE — 36415 COLL VENOUS BLD VENIPUNCTURE: CPT

## 2021-07-23 RX ORDER — DICYCLOMINE HYDROCHLORIDE 10 MG/1
20 CAPSULE ORAL
Qty: 30 CAPSULE | Refills: 0 | Status: SHIPPED | OUTPATIENT
Start: 2021-07-23 | End: 2021-08-16

## 2021-07-23 RX ORDER — ONDANSETRON 4 MG/1
4 TABLET, ORALLY DISINTEGRATING ORAL EVERY 8 HOURS PRN
Qty: 10 TABLET | Refills: 0 | Status: SHIPPED | OUTPATIENT
Start: 2021-07-23 | End: 2021-08-16

## 2021-07-23 ASSESSMENT — PAIN SCALES - GENERAL: PAINLEVEL_OUTOF10: 6

## 2021-07-23 ASSESSMENT — PAIN DESCRIPTION - FREQUENCY: FREQUENCY: CONTINUOUS

## 2021-07-23 ASSESSMENT — PAIN DESCRIPTION - LOCATION: LOCATION: ABDOMEN

## 2021-07-23 ASSESSMENT — PAIN DESCRIPTION - PAIN TYPE: TYPE: ACUTE PAIN
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
## 2021-07-26 ENCOUNTER — TELEPHONE (OUTPATIENT)
Dept: INTERNAL MEDICINE CLINIC | Age: 41
End: 2021-07-26

## 2021-07-26 DIAGNOSIS — R10.84 GENERALIZED ABDOMINAL PAIN: Primary | ICD-10-CM

## 2021-07-26 NOTE — TELEPHONE ENCOUNTER
I went to the [facility] emergency room last Friday. They told me it was not my gallbladder and to set up an appointment with Dr. Terri Burnham. I won't be able to be seen there until September 13th at 10:40 of the doctor that see me in the ER said I might need an ultrasound to see if there is anything bleeding in my intestines. I don't know if Dr Harris Enrique could order that or that would even matter I think everything should be in my chart if you have any questions or could help me out until September and some way I would appreciate it.   Ema Rodriguez  Please advise

## 2021-08-04 DIAGNOSIS — R10.84 GENERALIZED ABDOMINAL PAIN: Primary | ICD-10-CM

## 2021-08-08 DIAGNOSIS — E11.9 TYPE 2 DIABETES MELLITUS WITHOUT COMPLICATION, WITH LONG-TERM CURRENT USE OF INSULIN (HCC): ICD-10-CM

## 2021-08-08 DIAGNOSIS — Z79.4 TYPE 2 DIABETES MELLITUS WITHOUT COMPLICATION, WITH LONG-TERM CURRENT USE OF INSULIN (HCC): ICD-10-CM

## 2021-08-09 ENCOUNTER — APPOINTMENT (OUTPATIENT)
Dept: CT IMAGING | Age: 41
End: 2021-08-09
Payer: MEDICAID

## 2021-08-09 ENCOUNTER — HOSPITAL ENCOUNTER (OUTPATIENT)
Dept: MRI IMAGING | Age: 41
Discharge: HOME | End: 2021-08-09
Payer: MEDICAID

## 2021-08-09 DIAGNOSIS — M48.062 LUMBAR STENOSIS WITH NEUROGENIC CLAUDICATION: ICD-10-CM

## 2021-08-09 PROCEDURE — 72148 MRI LUMBAR SPINE W/O DYE: CPT

## 2021-08-09 RX ORDER — INSULIN GLARGINE 100 [IU]/ML
40 INJECTION, SOLUTION SUBCUTANEOUS NIGHTLY
Qty: 1 VIAL | Refills: 3 | Status: SHIPPED | OUTPATIENT
Start: 2021-08-09 | End: 2021-11-02 | Stop reason: SDUPTHER

## 2021-08-13 ENCOUNTER — HOSPITAL ENCOUNTER (OUTPATIENT)
Dept: CT IMAGING | Age: 41
Discharge: HOME | End: 2021-08-13
Payer: MEDICAID

## 2021-08-13 DIAGNOSIS — R10.84 GENERALIZED ABDOMINAL PAIN: ICD-10-CM

## 2021-08-13 DIAGNOSIS — R59.0 MESENTERIC LYMPHADENOPATHY: Primary | ICD-10-CM

## 2021-08-13 PROCEDURE — 6360000004 HC RX CONTRAST MEDICATION: Performed by: NURSE PRACTITIONER

## 2021-08-13 PROCEDURE — 2580000003 HC RX 258: Performed by: NURSE PRACTITIONER

## 2021-08-13 PROCEDURE — 74177 CT ABD & PELVIS W/CONTRAST: CPT

## 2021-08-13 RX ORDER — SODIUM CHLORIDE 0.9 % (FLUSH) 0.9 %
10 SYRINGE (ML) INJECTION PRN
Status: DISCONTINUED | OUTPATIENT
Start: 2021-08-13 | End: 2021-08-14 | Stop reason: HOSPADM

## 2021-08-13 RX ADMIN — IOPAMIDOL 75 ML: 755 INJECTION, SOLUTION INTRAVENOUS at 08:09

## 2021-08-13 RX ADMIN — SODIUM CHLORIDE, PRESERVATIVE FREE 10 ML: 5 INJECTION INTRAVENOUS at 08:06

## 2021-08-16 PROBLEM — R80.1 PERSISTENT PROTEINURIA: Status: ACTIVE | Noted: 2021-08-16

## 2021-08-19 ENCOUNTER — OFFICE VISIT (OUTPATIENT)
Dept: GASTROENTEROLOGY | Age: 41
End: 2021-08-19
Payer: MEDICAID

## 2021-08-19 VITALS
HEIGHT: 73 IN | HEART RATE: 55 BPM | DIASTOLIC BLOOD PRESSURE: 68 MMHG | TEMPERATURE: 97.4 F | BODY MASS INDEX: 30.14 KG/M2 | OXYGEN SATURATION: 97 % | SYSTOLIC BLOOD PRESSURE: 132 MMHG | WEIGHT: 227.4 LBS

## 2021-08-19 DIAGNOSIS — K92.1 BLACK STOOLS: ICD-10-CM

## 2021-08-19 DIAGNOSIS — R19.7 DIARRHEA, UNSPECIFIED TYPE: ICD-10-CM

## 2021-08-19 DIAGNOSIS — Z01.818 PREOP TESTING: ICD-10-CM

## 2021-08-19 DIAGNOSIS — D64.9 ANEMIA, UNSPECIFIED TYPE: Primary | ICD-10-CM

## 2021-08-19 DIAGNOSIS — K21.9 GASTROESOPHAGEAL REFLUX DISEASE, UNSPECIFIED WHETHER ESOPHAGITIS PRESENT: ICD-10-CM

## 2021-08-19 PROCEDURE — 4004F PT TOBACCO SCREEN RCVD TLK: CPT | Performed by: NURSE PRACTITIONER

## 2021-08-19 PROCEDURE — 99204 OFFICE O/P NEW MOD 45 MIN: CPT | Performed by: NURSE PRACTITIONER

## 2021-08-19 PROCEDURE — G8427 DOCREV CUR MEDS BY ELIG CLIN: HCPCS | Performed by: NURSE PRACTITIONER

## 2021-08-19 PROCEDURE — G8417 CALC BMI ABV UP PARAM F/U: HCPCS | Performed by: NURSE PRACTITIONER

## 2021-08-19 RX ORDER — POLYETHYLENE GLYCOL 3350 17 G/17G
238 POWDER, FOR SOLUTION ORAL ONCE
Qty: 1 BOTTLE | Refills: 0 | Status: SHIPPED | OUTPATIENT
Start: 2021-08-19 | End: 2021-09-13

## 2021-08-19 RX ORDER — BISACODYL 5 MG
TABLET, DELAYED RELEASE (ENTERIC COATED) ORAL
Qty: 4 TABLET | Refills: 0 | Status: SHIPPED | OUTPATIENT
Start: 2021-08-19 | End: 2021-09-13

## 2021-08-19 ASSESSMENT — ENCOUNTER SYMPTOMS
COLOR CHANGE: 0
DIARRHEA: 1
BACK PAIN: 1
BLOOD IN STOOL: 0
COUGH: 0
WHEEZING: 0
VOMITING: 0
EYE DISCHARGE: 0
ABDOMINAL PAIN: 0
EYE PAIN: 0
CONSTIPATION: 0
NAUSEA: 0
SHORTNESS OF BREATH: 0

## 2021-08-19 NOTE — PATIENT INSTRUCTIONS
Patient Education        Upper GI Endoscopy: Before Your Procedure  What is an upper GI endoscopy? An upper gastrointestinal (or GI) endoscopy is a test that allows your doctor to look at the inside of your esophagus, stomach, and the first part of your small intestine, called the duodenum. The esophagus is the tube that carries food to your stomach. The doctor uses a thin, lighted tube that bends. It is called an endoscope, or scope. The doctor puts the tip of the scope in your mouth and gently moves it down your throat. The scope is a flexible video camera. The doctor looks at a monitor (like a TV set or a computer screen) as he or she moves the scope. A doctor may do this procedure to look for ulcers, tumors, infection, or bleeding. It also can be used to look for signs of acid backing up into your esophagus. This is called gastroesophageal reflux disease, or GERD. The doctor can use the scope to take a sample of tissue for study (a biopsy). The doctor also can use the scope to take out growths or stop bleeding. Follow-up care is a key part of your treatment and safety. Be sure to make and go to all appointments, and call your doctor if you are having problems. It's also a good idea to know your test results and keep a list of the medicines you take. How do you prepare for the procedure? Procedures can be stressful. This information will help you understand what you can expect. And it will help you safely prepare for your procedure. Preparing for the procedure    · Do not eat or drink anything for 6 to 8 hours before the test. An empty stomach helps your doctor see your stomach clearly during the test. It also reduces your chances of vomiting. If you vomit, there is a small risk that the vomit could enter your lungs.  (This is called aspiration.) If the test is done in an emergency, a tube may be inserted through your nose or mouth to empty your stomach.     · Do not take sucralfate (Carafate) or antacids on the day of the test. These medicines can make it hard for your doctor to see your upper GI tract.     · If your doctor tells you to, stop taking iron supplements 7 to 14 days before the test.     · Be sure you have someone to take you home. Anesthesia and pain medicine will make it unsafe for you to drive or get home on your own.     · Understand exactly what procedure is planned, along with the risks, benefits, and other options. · Tell your doctor ALL the medicines, vitamins, supplements, and herbal remedies you take. Some may increase the risk of problems during your procedure. Your doctor will tell you if you should stop taking any of them before the procedure and how soon to do it.     · If you take aspirin or some other blood thinner, ask your doctor if you should stop taking it before your procedure. Make sure that you understand exactly what your doctor wants you to do. These medicines increase the risk of bleeding.     · Make sure your doctor and the hospital have a copy of your advance directive. If you don't have one, you may want to prepare one. It lets others know your health care wishes. It's a good thing to have before any type of surgery or procedure. What happens on the day of the procedure? · Follow the instructions exactly about when to stop eating and drinking. If you don't, your procedure may be canceled. If your doctor told you to take your medicines on the day of the procedure, take them with only a sip of water.     · Take a bath or shower before you come in for your procedure. Do not apply lotions, perfumes, deodorants, or nail polish.     · Take off all jewelry and piercings. And take out contact lenses, if you wear them. At the hospital or surgery center   · Bring a picture ID.     · The test may take 15 to 30 minutes.     · The doctor may spray medicine on the back of your throat to numb it.  You also will get medicine to prevent pain and to relax you.     · You will lie on your left side. The doctor will put the scope in your mouth and toward the back of your throat. The doctor will tell you when to swallow. This helps the scope move down your throat. You will be able to breathe normally. The doctor will move the scope down your esophagus into your stomach. The doctor also may look at the duodenum.     · If your doctor wants to take a sample of tissue for a biopsy, he or she may use small surgical tools, which are put into the scope, to cut off some tissue. You will not feel a biopsy, if one is taken. The doctor also can use the tools to stop bleeding or to do other treatments, if needed.     · You will stay at the hospital or surgery center for 1 to 2 hours until the medicine you were given wears off. What happens after an upper GI endoscopy? · After the test, you may belch and feel bloated for a while.     · You may have a tickling, dry throat or mouth. You may feel a bit hoarse, and you may have a mild sore throat. These symptoms may last several days. Throat lozenges and warm saltwater gargles can help relieve the throat symptoms.     · Don't drive or operate machinery for 12 hours after the test.     · Your doctor will tell you when you can go back to your usual diet and activities.     · Don't drink alcohol for 12 to 24 hours after the test.   When should you call your doctor? · You have questions or concerns.     · You don't understand how to prepare for your procedure.     · You become ill before the procedure (such as fever, flu, or a cold).     · You need to reschedule or have changed your mind about having the procedure. Where can you learn more? Go to [email or web address] and sign in to your [employer] account. Enter P790 in the [employer] box to learn more about \"Upper GI Endoscopy: Before Your Procedure. \"     If you do not have an account, please click on the \"Sign Up Now\" link.   Current as of: February 10, 2021               Content Version: 12.9  © 2006-2021 Healthwise, [employer]. Care instructions adapted under license by [facility] ([facility]). If you have questions about a medical condition or this instruction, always ask your healthcare professional. [address] any warranty or liability for your use of this information. Patient Education        Colonoscopy: Before Your Procedure  What is a colonoscopy? A colonoscopy is a test that lets a doctor look inside your colon. The doctor uses a thin, lighted tube called a colonoscope to look for problems. These include small growths called polyps, cancer, or bleeding. During the test, the doctor can take samples of tissue that can be checked for cancer or other problems. This is called a biopsy. The doctor can also take out polyps. Before the test, you will need to stop eating solid foods. You also will be given instructions on how to clean out your colon. This helps your doctor be able to see inside your colon during the test.  How do you prepare for the procedure? Procedures can be stressful. This information will help you understand what you can expect. And it will help you safely prepare for your procedure. Preparing for the procedure    · Be sure you have someone to take you home. Anesthesia and pain medicine will make it unsafe for you to drive or get home on your own. · Understand exactly what procedure is planned, along with the risks, benefits, and other options.     · Tell your doctor ALL the medicines, vitamins, supplements, and herbal remedies you take. Some may increase the risk of problems during your procedure. Your doctor will tell you if you should stop taking any of them before the procedure and how soon to do it.     · If you take aspirin or some other blood thinner, ask your doctor if you should stop taking it before your procedure. Make sure that you understand exactly what your doctor wants you to do.  These medicines increase the risk of bleeding.     · Make sure your doctor and the hospital have a copy of your advance directive. If you don't have one, you may want to prepare one. It lets others know your health care wishes. It's a good thing to have before any type of surgery or procedure. Before the procedure    · Follow your doctor's directions about when to stop eating solid foods and drink only clear liquids. You can drink water, clear juices, clear broths, flavored ice pops, and gelatin (such as Jell-O). Do not eat or drink anything red or purple. This includes grape juice and grape-flavored ice pops. It also includes fruit punch and cherry gelatin.     · Drink the \"colon prep\" liquid as your doctor tells you. You will want to stay home, because the liquid will make you go to the bathroom a lot. Your stools will be loose and watery. It's very important to drink all of the liquid. If you have problems drinking it, call your doctor.     · Do not eat any solid foods after you drink the colon prep.     · Stop drinking clear liquids for a few hours before the test. Your doctor will tell you how many hours this will be. What happens on the day of the procedure? · Follow the instructions exactly about when to stop eating and drinking. If you don't, your procedure may be canceled. If your doctor told you to take your medicines on the day of the procedure, take them with only a sip of water.     · Take a bath or shower before you come in for your procedure. Do not apply lotions, perfumes, deodorants, or nail polish.     · Take off all jewelry and piercings. And take out contact lenses, if you wear them. At the doctor's office or hospital   · Bring a picture ID.     · You will be kept comfortable and safe by your anesthesia provider. The anesthesia may make you sleep.     · You will lie on your back or your side with your knees drawn up toward your belly. The doctor will gently put a gloved finger into your anus. Then the doctor puts the scope in and moves it into your colon. The scope goes in easily because it is lubricated.     · The doctor may also use small tools to take tissue samples for a biopsy or to remove polyps. This does not hurt.     · The test usually takes 30 to 45 minutes. But it may take longer. It depends on what is found and what is done. When should you call your doctor? · You have questions or concerns.     · You don't understand how to prepare for your procedure.     · You are having trouble with the bowel prep.     · You become ill before the procedure (such as fever, flu, or a cold).     · You need to reschedule or have changed your mind about having the procedure. Where can you learn more? Go to [email or web address]://[email or web address].[email or web address] org and sign in to your [employer] account. Enter C315 in the Cloudsnap box to learn more about \"Colonoscopy: Before Your Procedure. \"     If you do not have an account, please click on the \"Sign Up Now\" link. Current as of: December 17, 2020               Content Version: 12.9  © 2006-2021 [employer]. Care instructions adapted under license by [facility] ([facility]). If you have questions about a medical condition or this instruction, always ask your healthcare professional. [place] [address] any warranty or liability for your use of this information. Patient Education        Anemia: Care Instructions  Your Care Instructions     Anemia is a low level of red blood cells, which carry oxygen throughout your body. Many things can cause anemia. Lack of iron is one of the most common causes. Your body needs iron to make hemoglobin, a substance in red blood cells that carries oxygen from the lungs to your body's cells. Without enough iron, the body produces fewer and smaller red blood cells. As a result, your body's cells do not get enough oxygen, and you feel tired and weak. And you may have trouble concentrating.   Bleeding is the most common cause of a lack of iron. You may have heavy menstrual bleeding or bleeding caused by conditions such as ulcers, hemorrhoids, or cancer. Regular use of aspirin or other anti-inflammatory medicines (such as ibuprofen) also can cause bleeding in some people. A lack of iron in your diet also can cause anemia, especially at times when the body needs more iron, such as during pregnancy, infancy, and the teen years. Your doctor may have prescribed iron pills. It may take several months of treatment for your iron levels to return to normal. Your doctor also may suggest that you eat foods that are rich in iron, such as meat and beans. There are many other causes of anemia. It is not always due to a lack of iron. Finding the specific cause of your anemia will help your doctor find the right treatment for you. Follow-up care is a key part of your treatment and safety. Be sure to make and go to all appointments, and call your doctor if you are having problems. It's also a good idea to know your test results and keep a list of the medicines you take. How can you care for yourself at home? · Take your medicines exactly as prescribed. Call your doctor if you think you are having a problem with your medicine. · If your doctor recommends iron pills, take them as directed:  ? Try to take the pills on an empty stomach about 1 hour before or 2 hours after meals. But you may need to take iron with food to avoid an upset stomach. ? Do not take antacids or drink milk or caffeine drinks (such as coffee, tea, or cola) at the same time or within 2 hours of the time that you take your iron. They can make it hard for your body to absorb the iron. ? Vitamin C (from food or supplements) helps your body absorb iron. Try taking iron pills with a glass of orange juice or some other food that is high in vitamin C, such as citrus fruits.   ? Iron pills may cause stomach problems, such as heartburn, nausea, diarrhea, constipation, and cramps. Be sure to drink plenty of fluids, and include fruits, vegetables, and fiber in your diet each day. Iron pills often make your bowel movements dark or green. ? If you forget to take an iron pill, do not take a double dose of iron the next time you take a pill. ? Keep iron pills out of the reach of small children. An overdose of iron can be very dangerous. · Follow your doctor's advice about eating iron-rich foods. These include red meat, shellfish, poultry, eggs, beans, raisins, whole-grain bread, and leafy green vegetables. · Steam vegetables to help them keep their iron content. When should you call for help? Call 911 anytime you think you may need emergency care. For example, call if:    · You have symptoms of a heart attack. These may include:  ? Chest pain or pressure, or a strange feeling in the chest.  ? Sweating. ? Shortness of breath. ? Nausea or vomiting. ? Pain, pressure, or a strange feeling in the back, neck, jaw, or upper belly or in one or both shoulders or arms. ? Lightheadedness or sudden weakness. ? A fast or irregular heartbeat. After you call 911, the  may tell you to chew 1 adult-strength or 2 to 4 low-dose aspirin. Wait for an ambulance. Do not try to drive yourself.     · You passed out (lost consciousness). Call your doctor now or seek immediate medical care if:    · You have new or increased shortness of breath.     · You are dizzy or lightheaded, or you feel like you may faint.     · Your fatigue and weakness continue or get worse.     · You have any abnormal bleeding, such as:  ? Nosebleeds. ? Vaginal bleeding that is different (heavier, more frequent, at a different time of the month) than what you are used to.  ? Bloody or black stools, or rectal bleeding. ? Bloody or pink urine. Watch closely for changes in your health, and be sure to contact your doctor if:    · You do not get better as expected. Where can you learn more?   Go to [email or web address] and sign in to your [employer] account. Enter R301 in the [place] box to learn more about \"Anemia: Care Instructions. \"     If you do not have an account, please click on the \"Sign Up Now\" link. Current as of: September 23, 2020               Content Version: 12.9  © 1577-0587 [employer], [facility]. Care instructions adapted under license by [facility] ([facility]). If you have questions about a medical condition or this instruction, always ask your healthcare professional. [address] any warranty or liability for your use of this information.

## 2021-08-19 NOTE — PROGRESS NOTES
Mariana Montgomery 39 y.o. male was seen by ANDERS Garrett on 8/19/2021     Wt Readings from Last 3 Encounters:   08/19/21 227 lb 6.4 oz (103.1 kg)   08/16/21 227 lb 6.4 oz (103.1 kg)   07/23/21 225 lb (102.1 kg)       HPI  Mariana Montgomery is a pleasant 39 y.o.  male who presents today for acid reflux, altered bowel habits with diarrhea, anemia and black stools. He has a past medical history of accident, acid reflux, broken teeth, diabetes mellitus, kidney stones, marijuana use, shortness of breath on exertion, teeth missing, and wisdom teeth extracted. He was taking Ibuprofen two to three pills per week (over last year and a half) and stopped taking Ibuprofen on Tuesday. He has never had an EGD or colonoscopy. His anemia per chart review was noted on 12- showing RBC 3.82, Hgb 11.7, Hct 34.9, MCV 91.4 and Platelets 764. Lab work on 7- RBC 3.85, Hgb 11.4, Hct 34.4, MCV 89.4, and Platelets. He has fatigue and shortness of breath on exertion. His appetite is good without early satiety. His weight is stable. His CT of abdomen/pelvis done on 8- showed interval development of multiple small mesenteric and retroperitoneal lymph nodes including a 1.5 x 1.9 cm retrocaval lymph node. His abdominal pain started two months ago. He is currently not having abdominal pain today. Last episode of pain was one week ago at his belt line described as a 7/10 sharp stabbing pain. Nothing made it worse and having a bowel movement made it better. No bloating or distention. No nausea or vomiting. His heartburn and acid reflux has been ongoing for three years. His heartburn and acid reflux is controlled with Prilosec. No nocturnal awakenings with acid reflux. No dysphagia or pain with swallowing. No excess belching or flatulence. His bowel pattern has changed to diarrhea in the last two and a half months. Prior to this was having daily soft brown formed stools.  His current bowel pattern is two to four times daily with loose to liquid brown stools. No constipation. No blood in stools. He noticed black tarry stools a few times in last two months. His mother had colostomy bag for multiple episodes of diverticulitis. No family history of stomach or colon cancer. ROS  Review of Systems   Constitutional: Positive for fatigue. Negative for appetite change, chills, diaphoresis, fever and unexpected weight change. HENT: Negative for ear pain, hearing loss and tinnitus. Eyes: Negative for pain, discharge and visual disturbance. Respiratory: Negative for cough, shortness of breath and wheezing. Cardiovascular: Negative for chest pain, palpitations and leg swelling. Gastrointestinal: Positive for diarrhea. Negative for abdominal pain, blood in stool, constipation, nausea and vomiting. Endocrine: Negative for cold intolerance and heat intolerance. Genitourinary: Negative for dysuria, frequency, hematuria and urgency. Musculoskeletal: Positive for back pain. Negative for myalgias and neck pain. Skin: Negative for color change, pallor and rash. Allergic/Immunologic: Negative for environmental allergies and food allergies. Neurological: Negative for dizziness, seizures, weakness and headaches. Hematological: Does not bruise/bleed easily. Psychiatric/Behavioral: Positive for sleep disturbance. Negative for dysphoric mood. The patient is nervous/anxious.         Allergies  No Known Allergies    Medications  Current Outpatient Medications   Medication Sig Dispense Refill    insulin glargine (LANTUS) 100 UNIT/ML injection vial Inject 40 Units into the skin nightly 1 vial 3    insulin lispro (HUMALOG) 100 UNIT/ML injection vial Inject 15 Units into the skin 3 times daily (with meals) 1 vial 3    metFORMIN (GLUCOPHAGE) 1000 MG tablet TAKE ONE TABLET BY MOUTH TWICE A DAY WITH MEALS 60 tablet 2    traZODone (DESYREL) 50 MG tablet TAKE ONE TABLET BY MOUTH ONCE NIGHTLY AS NEEDED FOR SLEEP 30 tablet 2    gabapentin (NEURONTIN) 300 MG capsule TAKE ONE CAPSULE BY MOUTH THREE TIMES A DAY 90 capsule 2    INSULIN SYRINGE .5CC/29G 29G X 1/2\" 0.5 ML MISC USE 3 DAILY 90 each 2    omeprazole (PRILOSEC) 20 MG delayed release capsule TAKE ONE CAPSULE BY MOUTH EVERY MORNING BEFORE BREAKFAST 30 capsule 1    busPIRone (BUSPAR) 5 MG tablet Take 1 tablet by mouth 2 times daily 60 tablet 1    tiZANidine (ZANAFLEX) 2 MG tablet TAKE ONE TABLET BY MOUTH THREE TIMES DAILY AS NEEDED FOR BACK PAIN AND BACK SPASM 30 tablet 0    Insulin Syringes, Disposable, U-100 1 ML MISC 1 each by Does not apply route daily 100 each 3    Lancets MISC 1 each by Does not apply route 3 times daily 600 each 1    blood glucose monitor strips Test 3 times a day & as needed for symptoms of irregular blood glucose. 270 strip 2    aspirin 325 MG tablet Take 325 mg by mouth daily      Blood Glucose Monitoring Suppl (ONE TOUCH ULTRA 2) w/Device KIT 1 kit by Does not apply route once for 1 dose 1 kit 0     No current facility-administered medications for this visit. Past medical history:   He has a past medical history of Accident, Acid reflux, Broken teeth, Diabetes mellitus ([address] Kidney stones, Marijuana use, Shortness of breath on exertion, Teeth missing, and [place] teeth extracted. 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 Social History:  He reports that he has been smoking cigarettes and cigars. He started smoking about 26 years ago. He has a 12.00 pack-year smoking history. He has never used smokeless tobacco. He reports current alcohol use. He reports current drug use. Drug: Marijuana.     Family history:  His family history includes Allergy (Severe) in his brother; Diabetes in his father; Heart Disease in his father; Obesity in his mother. Objective    Vitals:    08/19/21 0953   BP: 132/68   Pulse: 55   Temp: 97.4 °F (36.3 °C)   SpO2: 97%        Physical exam    Physical Exam  Constitutional:       General: He is not in acute distress. Appearance: He is well-developed. He is obese. He is not ill-appearing, toxic-appearing or diaphoretic. HENT:      Head: Normocephalic and atraumatic. Nose: Nose normal.      Mouth/Throat:      Mouth: Mucous membranes are moist.   Cardiovascular:      Rate and Rhythm: Normal rate and regular rhythm. Pulses: Normal pulses. Heart sounds: Normal heart sounds. No murmur heard. No gallop. Pulmonary:      Effort: Pulmonary effort is normal. No respiratory distress. Breath sounds: Normal breath sounds. No stridor. No wheezing or rhonchi. Abdominal:      General: Bowel sounds are normal. There is no distension. Palpations: Abdomen is soft. There is no mass. Tenderness: There is abdominal tenderness (mild RLQ). Hernia: No hernia is present. Musculoskeletal:         General: Normal range of motion. Cervical back: Neck supple. Skin:     General: Skin is warm and dry. Neurological:      Mental Status: He is alert and oriented to person, place, and time.    Psychiatric:         Mood and Affect: Mood normal.         Admission on 07/23/2021, Discharged on 07/23/2021   Component Date Value Ref Range Status    WBC 07/23/2021 3.7* 4.0 - 10.5 K/CU MM Final    RBC 07/23/2021 3.85* 4.6 - 6.2 M/CU MM Final    Hemoglobin 07/23/2021 11.4* 13.5 - 18.0 GM/DL Final    Hematocrit 07/23/2021 34.4* 42 - 52 % Final    MCV 07/23/2021 89.4  78 - 100 FL Final    MCH 07/23/2021 29.6  27 - 31 PG Final    MCHC 07/23/2021 33.1  32.0 - 36.0 % Final    RDW 07/23/2021 12.8  11.7 - 14.9 % Final    Platelets 70/84/8247 188  140 - 440 K/CU MM Final    MPV 07/23/2021 10.4  7.5 - 11.1 FL Final    Bands Relative 07/23/2021 6  5 - 11 % Final    Segs Relative 07/23/2021 51.0  36 - 66 % Final    Eosinophils % 07/23/2021 6.0* 0 - 3 % Final    Basophils % 07/23/2021 1.0  0 - 1 % Final    Lymphocytes % 07/23/2021 34.0  24 - 44 % Final    Monocytes % 07/23/2021 2.0  0 - 4 % Final    Bands Absolute 07/23/2021 0.22  K/CU MM Final    Segs Absolute 07/23/2021 1.9  K/CU MM Final    Eosinophils Absolute 07/23/2021 0.2  K/CU MM Final    Basophils Absolute 07/23/2021 0.0  K/CU MM Final    Lymphocytes Absolute 07/23/2021 1.3  K/CU MM Final    Monocytes Absolute 07/23/2021 0.1  K/CU MM Final    Differential Type 07/23/2021 MANUAL DIFFERENTIAL   Final    RBC Morphology 07/23/2021 RBC MORPHOLOGY NORMAL   Final    Atypical Lymphocytes Absolute 07/23/2021 1+   Final    PLT Morphology 07/23/2021 ADEQUATE   Final    PLT NOTED ON SCAN    Sodium 07/23/2021 135  135 - 145 MMOL/L Final    Potassium 07/23/2021 5.1  3.5 - 5.1 MMOL/L Final    Chloride 07/23/2021 103  99 - 110 mMol/L Final    CO2 07/23/2021 23  21 - 32 MMOL/L Final    BUN 07/23/2021 16  6 - 23 MG/DL Final    CREATININE 07/23/2021 1.3  0.9 - 1.3 MG/DL Final    Glucose 07/23/2021 154* 70 - 99 MG/DL Final    Calcium 07/23/2021 9.0  8.3 - 10.6 MG/DL Final    Albumin 07/23/2021 3.6  3.4 - 5.0 GM/DL Final    Total Protein 07/23/2021 7.9  6.4 - 8.2 GM/DL Final    Total Bilirubin 07/23/2021 0.2  0.0 - 1.0 MG/DL Final    ALT 07/23/2021 17  10 - 40 U/L Final    AST 07/23/2021 15  15 - 37 IU/L Final    Alkaline Phosphatase 07/23/2021 91  40 - 128 IU/L Final    GFR Non- 07/23/2021 >60  >60 mL/min/1.73m2 Final    GFR  07/23/2021 >60  >60 mL/min/1.73m2 Final    Anion Gap 07/23/2021 9  4 - 16 Final    Lipase 07/23/2021 29  13 - 60 IU/L Final       Assessment and Plan:  1. Will plan for an EGD/colonoscopy with MAC anesthesia. The patient was informed of the risks and benefits of the procedures. 2.  Anemia of uncertain etiology; currently the patient did not have signs of GI bleeding.   Will order lab work to check R46, folic acid and iron levels. He has history of black tarry stools; he has stopped taking NSAID's and no current rectal bleeding. Recommend periodic monitoring of H&H. The patient was encouraged to continue taking Prilosec. Will plan for EGD/colonoscopy to rule out GI source for bleeding. May consider small bowel follow-through possible capsule endoscopy to complete the GI evaluation if the finding of colonoscopy cannot explain the iron deficiency anemia. Recommend avoidance of NSAID's and improved glycemic control. 3.  Altered bowel habits with diarrhea will order stool studies to rule out infection. Will order colonoscopy to rule out colitis. The patient was encouraged to increase his fruit, fiber and fluids. 4. Black stools possibly due to upper GI bleeding; he mentioned noticing this on occasion with no recent episode. His recent CMP showed normal BUN and Creatinine. He has stopped taking NSAID's and smoking cessation. Will order EGD to rule out peptic ulcer disease. The patient was encouraged to continue taking Prilosec. 5.  GERD without dysphagia or odynophagia. The patient was encouraged to continue taking Prilosec daily for treatment of acid reflux. The patient was encouraged to continue with anti-reflux measures and avoid foods that worsen. 6.  Further recommendations for follow-up will be determined after the EGD/colonoscopy have been completed.

## 2021-09-03 DIAGNOSIS — K21.9 GASTROESOPHAGEAL REFLUX DISEASE: ICD-10-CM

## 2021-09-03 RX ORDER — OMEPRAZOLE 20 MG/1
CAPSULE, DELAYED RELEASE ORAL
Qty: 30 CAPSULE | Refills: 1 | Status: SHIPPED
Start: 2021-09-03 | End: 2021-10-11 | Stop reason: SDUPTHER

## 2021-09-10 ENCOUNTER — TELEPHONE (OUTPATIENT)
Dept: GASTROENTEROLOGY | Age: 41
End: 2021-09-10

## 2021-09-13 ENCOUNTER — OFFICE VISIT (OUTPATIENT)
Dept: INTERNAL MEDICINE CLINIC | Age: 41
End: 2021-09-13
Payer: MEDICAID

## 2021-09-13 VITALS
BODY MASS INDEX: 30.21 KG/M2 | WEIGHT: 229 LBS | RESPIRATION RATE: 16 BRPM | HEART RATE: 79 BPM | SYSTOLIC BLOOD PRESSURE: 118 MMHG | DIASTOLIC BLOOD PRESSURE: 82 MMHG | OXYGEN SATURATION: 96 %

## 2021-09-13 DIAGNOSIS — E11.9 TYPE 2 DIABETES MELLITUS WITHOUT COMPLICATION, WITH LONG-TERM CURRENT USE OF INSULIN (HCC): Primary | ICD-10-CM

## 2021-09-13 DIAGNOSIS — E11.42 DIABETIC POLYNEUROPATHY ASSOCIATED WITH TYPE 2 DIABETES MELLITUS (HCC): ICD-10-CM

## 2021-09-13 DIAGNOSIS — G47.00 INSOMNIA, UNSPECIFIED TYPE: ICD-10-CM

## 2021-09-13 DIAGNOSIS — Z79.4 TYPE 2 DIABETES MELLITUS WITHOUT COMPLICATION, WITH LONG-TERM CURRENT USE OF INSULIN (HCC): Primary | ICD-10-CM

## 2021-09-13 DIAGNOSIS — Z23 NEED FOR IMMUNIZATION AGAINST INFLUENZA: ICD-10-CM

## 2021-09-13 DIAGNOSIS — F41.9 ANXIETY: ICD-10-CM

## 2021-09-13 DIAGNOSIS — K21.9 GASTROESOPHAGEAL REFLUX DISEASE, UNSPECIFIED WHETHER ESOPHAGITIS PRESENT: ICD-10-CM

## 2021-09-13 DIAGNOSIS — Z12.11 COLON CANCER SCREENING: Primary | ICD-10-CM

## 2021-09-13 PROCEDURE — 90682 RIV4 VACC RECOMBINANT DNA IM: CPT | Performed by: NURSE PRACTITIONER

## 2021-09-13 PROCEDURE — 90471 IMMUNIZATION ADMIN: CPT | Performed by: NURSE PRACTITIONER

## 2021-09-13 PROCEDURE — 99214 OFFICE O/P EST MOD 30 MIN: CPT | Performed by: NURSE PRACTITIONER

## 2021-09-13 PROCEDURE — G8417 CALC BMI ABV UP PARAM F/U: HCPCS | Performed by: NURSE PRACTITIONER

## 2021-09-13 PROCEDURE — G8427 DOCREV CUR MEDS BY ELIG CLIN: HCPCS | Performed by: NURSE PRACTITIONER

## 2021-09-13 PROCEDURE — 3044F HG A1C LEVEL LT 7.0%: CPT | Performed by: NURSE PRACTITIONER

## 2021-09-13 PROCEDURE — 2022F DILAT RTA XM EVC RTNOPTHY: CPT | Performed by: NURSE PRACTITIONER

## 2021-09-13 PROCEDURE — 4004F PT TOBACCO SCREEN RCVD TLK: CPT | Performed by: NURSE PRACTITIONER

## 2021-09-13 RX ORDER — SIMETHICONE 80 MG
80 TABLET,CHEWABLE ORAL ONCE
Qty: 3 TABLET | Refills: 0 | Status: SHIPPED | OUTPATIENT
Start: 2021-09-13 | End: 2021-09-29

## 2021-09-13 RX ORDER — POLYETHYLENE GLYCOL 3350, SODIUM SULFATE, SODIUM CHLORIDE, POTASSIUM CHLORIDE, ASCORBIC ACID, SODIUM ASCORBATE 140-9-5.2G
1 KIT ORAL ONCE
Qty: 1 EACH | Refills: 0 | Status: SHIPPED | OUTPATIENT
Start: 2021-09-13 | End: 2021-09-13

## 2021-09-13 ASSESSMENT — PATIENT HEALTH QUESTIONNAIRE - PHQ9
2. FEELING DOWN, DEPRESSED OR HOPELESS: 0
SUM OF ALL RESPONSES TO PHQ9 QUESTIONS 1 & 2: 0
SUM OF ALL RESPONSES TO PHQ QUESTIONS 1-9: 0
1. LITTLE INTEREST OR PLEASURE IN DOING THINGS: 0
SUM OF ALL RESPONSES TO PHQ QUESTIONS 1-9: 0
SUM OF ALL RESPONSES TO PHQ QUESTIONS 1-9: 0

## 2021-09-13 ASSESSMENT — ENCOUNTER SYMPTOMS
ABDOMINAL PAIN: 0
CHEST TIGHTNESS: 0
EYES NEGATIVE: 1
SORE THROAT: 0
WHEEZING: 0
ABDOMINAL DISTENTION: 0
NAUSEA: 0
COLOR CHANGE: 0
SINUS PAIN: 0
SINUS PRESSURE: 0
CONSTIPATION: 0
DIARRHEA: 0
COUGH: 0
SHORTNESS OF BREATH: 0

## 2021-09-13 NOTE — PROGRESS NOTES
Lucas Nelson  1980 09/13/21    Chief Complaint   Patient presents with    3 Month Follow-Up       SUBJECTIVE:      3 month follow up:    Patient compliant with all current medications for diabetes. Blood sugars have been averaging around 140-160, and the patient reports checking their blood sugar 2-3 time daily. Patient has had no episodes of significant hypoglycemia, fainting, dizziness, or loss of consciousness. Continues on Metformin 1000 mg BID, lantus 40 units nightly, and humalog 15 units with meals. Current Gabapentin 300 mg TID is controlling neuropathy pain. Patient's reflux well controlled on current medications. Patient denies any blood in stool black stool, heartburn, reflux. Denies issues with frequent coughing or reflux while sleeping. Able to eat and drink appropriately without complications. Stable on Omeprazole 20 mg daily. Patient's anxiety is reasonably well controlled with current treatment. Patient denies any suicidal ideation, homicidal ideation, hallucinations. Continues on Buspar 5 mg BID. Sleep is good overall at nightly on current Trazodone regiment. Denies any concerns at this time. Is planning to get repeat CT completed in November for 3 month follow up given multiple mesenteric and retroperitoneal lymph nodes sine on scan on 8/13/21. Review of Systems   Constitutional: Negative for activity change, appetite change, fatigue and fever. HENT: Negative for congestion, sinus pressure, sinus pain and sore throat. Eyes: Negative. Respiratory: Negative for cough, chest tightness, shortness of breath and wheezing. Cardiovascular: Negative for chest pain and palpitations. Gastrointestinal: Negative for abdominal distention, abdominal pain, constipation, diarrhea and nausea. Genitourinary: Negative for difficulty urinating, dysuria, flank pain, frequency and hematuria. Musculoskeletal: Negative for arthralgias, gait problem, joint swelling and myalgias. Skin: Negative for color change and rash. Neurological: Negative for dizziness, light-headedness and numbness. Hematological: Negative. Psychiatric/Behavioral: Negative. OBJECTIVE:    /82 (Site: Left Upper Arm, Position: Sitting, Cuff Size: Large Adult)   Pulse 79   Resp 16   Wt 229 lb (103.9 kg)   SpO2 96%   BMI 30.21 kg/m²     Physical Exam  Constitutional:       General: He is not in acute distress. Appearance: He is well-developed. He is obese. He is not diaphoretic. HENT:      Head: Normocephalic and atraumatic. Nose: Nose normal.   Neck:      Thyroid: No thyromegaly. Cardiovascular:      Rate and Rhythm: Normal rate and regular rhythm. Heart sounds: Normal heart sounds. No murmur heard. Pulmonary:      Effort: Pulmonary effort is normal.      Breath sounds: Normal breath sounds. Abdominal:      General: Bowel sounds are normal. There is no distension. Palpations: Abdomen is soft. Tenderness: There is no abdominal tenderness. Musculoskeletal:         General: Normal range of motion. Lymphadenopathy:      Cervical: No cervical adenopathy. Skin:     General: Skin is warm and dry. Neurological:      Mental Status: He is alert and oriented to person, place, and time. GCS: GCS eye subscore is 4. GCS verbal subscore is 5. GCS motor subscore is 6. Coordination: Coordination normal.   Psychiatric:         Behavior: Behavior normal.         Thought Content: Thought content normal.         ASSESSMENT:    1. Type 2 diabetes mellitus without complication, with long-term current use of insulin ([address]    2. Gastroesophageal reflux disease, unspecified whether esophagitis present    3. Diabetic polyneuropathy associated with type 2 diabetes mellitus ([address]    4. Anxiety    5. Insomnia, unspecified type    6. Need for immunization against influenza        PLAN:    Ermelinda Epley was seen today for 3 month follow-up.     Diagnoses and all orders for this visit:    Type 2 diabetes mellitus without complication, with long-term current use of insulin ([facility] [facility]- sugars at goal overall; recent A1C in range; recheck A1C in 3 months.   -     CBC Auto Differential; Future  -     Comprehensive Metabolic Panel; Future  -     Lipid, Fasting; Future  -     Hemoglobin A1C; Future    Gastroesophageal reflux disease, unspecified whether esophagitis present - well controlled; no changes in management at this time. Continue current PPI therapy with Omeprazole 20 mg daily. Diabetic polyneuropathy associated with type 2 diabetes mellitus ([facility] [address]- improving with better glycemic control. Gabapentin dosing appears appropriate. Will make no changes. Anxiety- controlled; continue Buspar 5 mg BID. Insomnia, unspecified type- controlled; continue Trazodone 50 mg nightly PRN. Need for immunization against influenza- updated today  -     INFLUENZA, QUADV, RECOMBINANT, 18 YRS AND OLDER, IM, PF, PREFILL SYR OR SDV, 0.5ML (FLUBLOK QUADV, PF)    Course of treatment, including any medications, possible imaging, referrals, and follow ups discussed with patient. All risks and benefits and possible side effects discussed with patient who agrees to plan of care and verbalizes understanding. All labs and imaging reviewed. No flowsheet data found. Return in about 3 months (around 12/13/2021). Pleas note this reports has been produced speech recognition software and may contain errors related to that system including errors in grammar, punctuation, and spelling, as well as words and phrases that may be appropriate. If there are any questions or concerns please feel free to contact the dictating provider for clarification.

## 2021-09-14 ENCOUNTER — HOSPITAL ENCOUNTER (OUTPATIENT)
Age: 41
Setting detail: SPECIMEN
Discharge: HOME | End: 2021-09-14
Payer: MEDICAID

## 2021-09-14 PROCEDURE — U0005 INFEC AGEN DETEC AMPLI PROBE: HCPCS

## 2021-09-14 PROCEDURE — U0003 INFECTIOUS AGENT DETECTION BY NUCLEIC ACID (DNA OR RNA); SEVERE ACUTE RESPIRATORY SYNDROME CORONAVIRUS 2 (SARS-COV-2) (CORONAVIRUS DISEASE [COVID-19]), AMPLIFIED PROBE TECHNIQUE, MAKING USE OF HIGH THROUGHPUT TECHNOLOGIES AS DESCRIBED BY CMS-2020-01-R: HCPCS

## 2021-09-15 ENCOUNTER — TELEPHONE (OUTPATIENT)
Dept: GASTROENTEROLOGY | Age: 41
End: 2021-09-15

## 2021-09-15 ENCOUNTER — HOSPITAL ENCOUNTER (OUTPATIENT)
Age: 41
Discharge: HOME | End: 2021-09-15
Payer: MEDICAID

## 2021-09-15 ENCOUNTER — HOSPITAL ENCOUNTER (OUTPATIENT)
Age: 41
Setting detail: SPECIMEN
Discharge: HOME | End: 2021-09-15
Payer: MEDICAID

## 2021-09-15 DIAGNOSIS — E11.9 TYPE 2 DIABETES MELLITUS WITHOUT COMPLICATION, WITH LONG-TERM CURRENT USE OF INSULIN (HCC): ICD-10-CM

## 2021-09-15 DIAGNOSIS — K21.9 GASTROESOPHAGEAL REFLUX DISEASE, UNSPECIFIED WHETHER ESOPHAGITIS PRESENT: ICD-10-CM

## 2021-09-15 DIAGNOSIS — Z79.4 TYPE 2 DIABETES MELLITUS WITHOUT COMPLICATION, WITH LONG-TERM CURRENT USE OF INSULIN (HCC): ICD-10-CM

## 2021-09-15 DIAGNOSIS — E11.42 DIABETIC POLYNEUROPATHY ASSOCIATED WITH TYPE 2 DIABETES MELLITUS (HCC): ICD-10-CM

## 2021-09-15 DIAGNOSIS — R19.7 DIARRHEA, UNSPECIFIED TYPE: ICD-10-CM

## 2021-09-15 DIAGNOSIS — M54.50 ACUTE MIDLINE LOW BACK PAIN WITHOUT SCIATICA: ICD-10-CM

## 2021-09-15 DIAGNOSIS — R80.1 PERSISTENT PROTEINURIA: ICD-10-CM

## 2021-09-15 DIAGNOSIS — F41.9 ANXIETY: ICD-10-CM

## 2021-09-15 DIAGNOSIS — Z72.0 TOBACCO ABUSE: ICD-10-CM

## 2021-09-15 LAB
ALBUMIN SERPL-MCNC: 4.2 GM/DL (ref 3.4–5)
AMYLASE: 111 U/L (ref 25–115)
ANION GAP SERPL CALCULATED.3IONS-SCNC: 11 MMOL/L (ref 4–16)
BACTERIA: NEGATIVE /HPF
BASOPHILS ABSOLUTE: 0 K/CU MM
BASOPHILS RELATIVE PERCENT: 0.9 % (ref 0–1)
BILIRUBIN URINE: NEGATIVE MG/DL
BLOOD, URINE: NEGATIVE
BUN BLDV-MCNC: 12 MG/DL (ref 6–23)
CALCIUM SERPL-MCNC: 9.3 MG/DL (ref 8.3–10.6)
CHLORIDE BLD-SCNC: 101 MMOL/L (ref 99–110)
CLARITY: CLEAR
CO2: 22 MMOL/L (ref 21–32)
COLOR: YELLOW
CREAT SERPL-MCNC: 0.9 MG/DL (ref 0.9–1.3)
CREATININE URINE: 112 MG/DL (ref 39–259)
DIFFERENTIAL TYPE: ABNORMAL
EOSINOPHILS ABSOLUTE: 0.2 K/CU MM
EOSINOPHILS RELATIVE PERCENT: 3.7 % (ref 0–3)
GFR AFRICAN AMERICAN: >60 ML/MIN/1.73M2
GFR NON-AFRICAN AMERICAN: >60 ML/MIN/1.73M2
GLUCOSE BLD-MCNC: 139 MG/DL (ref 70–99)
GLUCOSE, URINE: NEGATIVE MG/DL
HAV IGM SER IA-ACNC: NON REACTIVE
HCT VFR BLD CALC: 35.2 % (ref 42–52)
HEMOGLOBIN: 11.5 GM/DL (ref 13.5–18)
HEPATITIS B CORE IGM ANTIBODY: NON REACTIVE
HEPATITIS B SURFACE ANTIGEN: NON REACTIVE
HEPATITIS C ANTIBODY: NON REACTIVE
HIGH SENSITIVE C-REACTIVE PROTEIN: 1.2 MG/L
IGA: 255 MG/DL (ref 69–382)
IGG,SERUM: 2090 MG/DL (ref 723–1685)
IMMATURE NEUTROPHIL %: 0.2 % (ref 0–0.43)
KETONES, URINE: NEGATIVE MG/DL
LEUKOCYTE ESTERASE, URINE: NEGATIVE
LIPASE: 53 IU/L (ref 13–60)
LYMPHOCYTES ABSOLUTE: 1.3 K/CU MM
LYMPHOCYTES RELATIVE PERCENT: 27.9 % (ref 24–44)
MCH RBC QN AUTO: 28.6 PG (ref 27–31)
MCHC RBC AUTO-ENTMCNC: 32.7 % (ref 32–36)
MCV RBC AUTO: 87.6 FL (ref 78–100)
MONOCYTES ABSOLUTE: 0.3 K/CU MM
MONOCYTES RELATIVE PERCENT: 6 % (ref 0–4)
MUCUS: ABNORMAL HPF
NITRITE URINE, QUANTITATIVE: NEGATIVE
NUCLEATED RBC %: 0 %
PDW BLD-RTO: 13.1 % (ref 11.7–14.9)
PH, URINE: 5 (ref 5–8)
PHOSPHORUS: 3.8 MG/DL (ref 2.5–4.9)
PLATELET # BLD: 191 K/CU MM (ref 140–440)
PMV BLD AUTO: 10.7 FL (ref 7.5–11.1)
POTASSIUM SERPL-SCNC: 4.4 MMOL/L (ref 3.5–5.1)
PROT/CREAT RATIO, UR: ABNORMAL
PROTEIN UA: >500 MG/DL
RBC # BLD: 4.02 M/CU MM (ref 4.6–6.2)
RBC URINE: 1 /HPF (ref 0–3)
SEGMENTED NEUTROPHILS ABSOLUTE COUNT: 2.8 K/CU MM
SEGMENTED NEUTROPHILS RELATIVE PERCENT: 61.3 % (ref 36–66)
SODIUM BLD-SCNC: 134 MMOL/L (ref 135–145)
SPECIFIC GRAVITY UA: 1.02 (ref 1–1.03)
SQUAMOUS EPITHELIAL: <1 /HPF
TOTAL IMMATURE NEUTOROPHIL: 0.01 K/CU MM
TOTAL NUCLEATED RBC: 0 K/CU MM
TRICHOMONAS: ABNORMAL /HPF
TSH HIGH SENSITIVITY: 1.55 UIU/ML (ref 0.27–4.2)
URINE TOTAL PROTEIN: >600 MG/DL
UROBILINOGEN, URINE: NEGATIVE MG/DL (ref 0.2–1)
VITAMIN D 25-HYDROXY: 14.15 NG/ML
WBC # BLD: 4.6 K/CU MM (ref 4–10.5)
WBC UA: <1 /HPF (ref 0–2)

## 2021-09-15 PROCEDURE — 82784 ASSAY IGA/IGD/IGG/IGM EACH: CPT

## 2021-09-15 PROCEDURE — 36415 COLL VENOUS BLD VENIPUNCTURE: CPT

## 2021-09-15 PROCEDURE — 84443 ASSAY THYROID STIM HORMONE: CPT

## 2021-09-15 PROCEDURE — 82150 ASSAY OF AMYLASE: CPT

## 2021-09-15 PROCEDURE — 80074 ACUTE HEPATITIS PANEL: CPT

## 2021-09-15 PROCEDURE — 82040 ASSAY OF SERUM ALBUMIN: CPT

## 2021-09-15 PROCEDURE — 86325 OTHER IMMUNOELECTROPHORESIS: CPT

## 2021-09-15 PROCEDURE — 86141 C-REACTIVE PROTEIN HS: CPT

## 2021-09-15 PROCEDURE — 84156 ASSAY OF PROTEIN URINE: CPT

## 2021-09-15 PROCEDURE — 84165 PROTEIN E-PHORESIS SERUM: CPT

## 2021-09-15 PROCEDURE — 86160 COMPLEMENT ANTIGEN: CPT

## 2021-09-15 PROCEDURE — 86038 ANTINUCLEAR ANTIBODIES: CPT

## 2021-09-15 PROCEDURE — 81001 URINALYSIS AUTO W/SCOPE: CPT

## 2021-09-15 PROCEDURE — 82570 ASSAY OF URINE CREATININE: CPT

## 2021-09-15 PROCEDURE — 80048 BASIC METABOLIC PNL TOTAL CA: CPT

## 2021-09-15 PROCEDURE — 87385 HISTOPLASMA CAPSUL AG IA: CPT

## 2021-09-15 PROCEDURE — 83516 IMMUNOASSAY NONANTIBODY: CPT

## 2021-09-15 PROCEDURE — 84100 ASSAY OF PHOSPHORUS: CPT

## 2021-09-15 PROCEDURE — 82306 VITAMIN D 25 HYDROXY: CPT

## 2021-09-15 PROCEDURE — 83690 ASSAY OF LIPASE: CPT

## 2021-09-15 PROCEDURE — 84166 PROTEIN E-PHORESIS/URINE/CSF: CPT

## 2021-09-15 PROCEDURE — 85025 COMPLETE CBC W/AUTO DIFF WBC: CPT

## 2021-09-15 RX ORDER — ERGOCALCIFEROL 1.25 MG/1
50000 CAPSULE ORAL WEEKLY
Qty: 12 CAPSULE | Refills: 2 | Status: SHIPPED | OUTPATIENT
Start: 2021-09-15 | End: 2022-09-29

## 2021-09-15 NOTE — TELEPHONE ENCOUNTER
Please call and notify patient that his vitamin D levels are low will order Ergocalciferol take once a week for treatment of vitamin D deficiency. This was ordered and sent to his [address]. Thank you.

## 2021-09-16 ENCOUNTER — TELEPHONE (OUTPATIENT)
Dept: GASTROENTEROLOGY | Age: 41
End: 2021-09-16

## 2021-09-16 LAB
SARS-COV-2: NOT DETECTED
SOURCE: NORMAL

## 2021-09-16 NOTE — TELEPHONE ENCOUNTER
Called pt. And informed him of his labs and the prescription that was sent to his pharmacy. Pt. Stated understanding and denies further questions.

## 2021-09-17 LAB
ALBUMIN ELP: 3.5 GM/DL (ref 3.2–5.6)
ALPHA-1-GLOBULIN: 0.3 GM/DL (ref 0.1–0.4)
ALPHA-2-GLOBULIN: 0.9 GM/DL (ref 0.4–1.2)
ANTI-NUCLEAR ANTIBODY (ANA): NORMAL
BETA GLOBULIN: 1 GM/DL (ref 0.5–1.3)
COMPLEMENT C3: 137 MG/DL (ref 88–201)
COMPLEMENT C4: 17 MG/DL (ref 10–40)
GAMMA GLOBULIN: 2.1 GM/DL (ref 0.5–1.6)
HISTOPLASMA ANTIGEN URINE INTERP: NOT DETECTED
HISTOPLASMA ANTIGEN URINE: NOT DETECTED NG/ML
SPEP INTERPRETATION: ABNORMAL
TOTAL PROTEIN: 7.8 GM/DL (ref 6.4–8.2)
TRANSGLUTAMINASE IGA: <2 U/ML (ref 0–3)

## 2021-09-20 ENCOUNTER — ANESTHESIA EVENT (OUTPATIENT)
Dept: OPERATING ROOM | Age: 41
End: 2021-09-20
Payer: MEDICAID

## 2021-09-20 RX ORDER — IBUPROFEN 200 MG
1 TABLET ORAL 3 TIMES DAILY
Qty: 90 EACH | Refills: 2 | Status: SHIPPED | OUTPATIENT
Start: 2021-09-20 | End: 2021-11-02 | Stop reason: SDUPTHER

## 2021-09-20 ASSESSMENT — LIFESTYLE VARIABLES: SMOKING_STATUS: 1
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
## 2021-09-20 NOTE — PROGRESS NOTES
Surgery 09/21/21 @ 1200 arrive @ 1045.               1. Do not eat or drink anything after midnight - unless instructed by your doctor prior to surgery. This includes                   no water, chewing gum or mints. 2. Follow your directions as prescribed by the doctor for your procedure and medications. Take Buspar, Metformin, and Omeprazole morning of with a sip. 3. Check with your Doctor regarding stopping Plavix, Coumadin, Lovenox,Effient,Pradaxa,Xarelto, Fragmin or other blood thinners and                   follow their instructions. 4. Do not smoke, and do not drink any alcoholic beverages 24 hours prior to surgery. This includes NA Beer. 5. You may brush your teeth and gargle the morning of surgery. DO NOT SWALLOW WATER   6. You MUST make arrangements for a responsible adult to take you home after your surgery and be able to check on you every couple                   hours for the day. You will not be allowed to leave alone or drive yourself home. It is strongly suggested someone stay with you the first 24                   hrs. Your surgery will be cancelled if you do not have a ride home. 7. Please wear simple, loose fitting clothing to the hospital.  Swapna Kingsleyk not bring valuables (money, credit cards, checkbooks, etc.) Do not wear any                   makeup (including no eye makeup) or nail polish on your fingers or toes. 8. DO NOT wear any jewelry or piercings on day of surgery. All body piercing jewelry must be removed. 9. If you have dentures, they will be removed before going to the OR; we will provide you a container. If you wear contact lenses or glasses,                  they will be removed; please bring a case for them. 10. If you  have a Living Will and Durable Power of  for Healthcare, please bring in a copy.            11. Please bring picture ID,  insurance card, paperwork from the doctors office    (H & P, Consent, & card for implantable devices). 12. Take a shower the night before or morning of your procedure, do not apply any lotion, oil or powder. 13. Wear a mask covering your nose & mouth when entering the hospital. Have your covid-19 test performed within 2-7 days of your                  Surgery. Covid test done 09/14/21 - Negative. Quarantine yourself after the test until after your surgery.

## 2021-09-20 NOTE — H&P
Original H &P in soft chart. I have examined the patient immediately before the procedure and there is no change in the previous history and physical exam, which has been reviewed. There is no history of sleep apnea, snoring, or stridor. There has been no  previous adverse experience with sedation/anesthesia. There is no increased risk for aspiration of gastric contents. The patient has been instructed that all resuscitative measures (during the operative and immediate perioperative period) will be instituted in the unlikely event that they will be needed. The patient has no pertinent past surgical or family history other than listed in the original H&P. The patient was counseled about the risks of raghav Covid-19 during their perioperative period and any recovery window from their procedure. The patient was made aware that raghav Covid-19  may worsen their prognosis for recovering from their procedure  and lend to a higher morbidity and/or mortality risk. All material risks, benefits, and reasonable alternatives including postponing the procedure were discussed. The patient does wish to proceed with the procedure at this time.     ASA Class: 2  AIRWAY Class: 1

## 2021-09-21 ENCOUNTER — HOSPITAL ENCOUNTER (OUTPATIENT)
Age: 41
Setting detail: OUTPATIENT SURGERY
Discharge: HOME | End: 2021-09-21
Attending: SPECIALIST | Admitting: SPECIALIST
Payer: MEDICAID

## 2021-09-21 ENCOUNTER — ANESTHESIA (OUTPATIENT)
Dept: OPERATING ROOM | Age: 41
End: 2021-09-21
Payer: MEDICAID

## 2021-09-21 VITALS
HEART RATE: 75 BPM | WEIGHT: 225 LBS | RESPIRATION RATE: 16 BRPM | BODY MASS INDEX: 29.82 KG/M2 | DIASTOLIC BLOOD PRESSURE: 91 MMHG | TEMPERATURE: 96.7 F | SYSTOLIC BLOOD PRESSURE: 145 MMHG | OXYGEN SATURATION: 100 % | HEIGHT: 73 IN

## 2021-09-21 VITALS — OXYGEN SATURATION: 100 % | DIASTOLIC BLOOD PRESSURE: 82 MMHG | SYSTOLIC BLOOD PRESSURE: 116 MMHG

## 2021-09-21 DIAGNOSIS — R19.7 DIARRHEA, UNSPECIFIED TYPE: ICD-10-CM

## 2021-09-21 DIAGNOSIS — D64.9 ANEMIA, UNSPECIFIED TYPE: ICD-10-CM

## 2021-09-21 DIAGNOSIS — K92.1 BLACK STOOLS: ICD-10-CM

## 2021-09-21 LAB — GLUCOSE BLD-MCNC: 135 MG/DL (ref 70–99)

## 2021-09-21 PROCEDURE — 7100000011 HC PHASE II RECOVERY - ADDTL 15 MIN: Performed by: SPECIALIST

## 2021-09-21 PROCEDURE — 3700000000 HC ANESTHESIA ATTENDED CARE: Performed by: SPECIALIST

## 2021-09-21 PROCEDURE — 2709999900 HC NON-CHARGEABLE SUPPLY: Performed by: SPECIALIST

## 2021-09-21 PROCEDURE — 88305 TISSUE EXAM BY PATHOLOGIST: CPT | Performed by: PATHOLOGY

## 2021-09-21 PROCEDURE — 3609010600 HC COLONOSCOPY POLYPECTOMY SNARE/COLD BIOPSY: Performed by: SPECIALIST

## 2021-09-21 PROCEDURE — 3609012400 HC EGD TRANSORAL BIOPSY SINGLE/MULTIPLE: Performed by: SPECIALIST

## 2021-09-21 PROCEDURE — 7100000010 HC PHASE II RECOVERY - FIRST 15 MIN: Performed by: SPECIALIST

## 2021-09-21 PROCEDURE — 6360000002 HC RX W HCPCS: Performed by: NURSE ANESTHETIST, CERTIFIED REGISTERED

## 2021-09-21 PROCEDURE — 45380 COLONOSCOPY AND BIOPSY: CPT | Performed by: SPECIALIST

## 2021-09-21 PROCEDURE — 43239 EGD BIOPSY SINGLE/MULTIPLE: CPT | Performed by: SPECIALIST

## 2021-09-21 PROCEDURE — 2580000003 HC RX 258: Performed by: NURSE PRACTITIONER

## 2021-09-21 PROCEDURE — 82962 GLUCOSE BLOOD TEST: CPT

## 2021-09-21 PROCEDURE — 3700000001 HC ADD 15 MINUTES (ANESTHESIA): Performed by: SPECIALIST

## 2021-09-21 PROCEDURE — 45385 COLONOSCOPY W/LESION REMOVAL: CPT | Performed by: SPECIALIST

## 2021-09-21 RX ORDER — SODIUM CHLORIDE, SODIUM LACTATE, POTASSIUM CHLORIDE, CALCIUM CHLORIDE 600; 310; 30; 20 MG/100ML; MG/100ML; MG/100ML; MG/100ML
INJECTION, SOLUTION INTRAVENOUS CONTINUOUS
Status: DISCONTINUED | OUTPATIENT
Start: 2021-09-21 | End: 2021-09-21 | Stop reason: HOSPADM

## 2021-09-21 RX ORDER — SODIUM CHLORIDE 0.9 % (FLUSH) 0.9 %
5-40 SYRINGE (ML) INJECTION EVERY 12 HOURS SCHEDULED
Status: DISCONTINUED | OUTPATIENT
Start: 2021-09-21 | End: 2021-09-21 | Stop reason: HOSPADM

## 2021-09-21 RX ORDER — PROPOFOL 10 MG/ML
INJECTION, EMULSION INTRAVENOUS PRN
Status: DISCONTINUED | OUTPATIENT
Start: 2021-09-21 | End: 2021-09-21 | Stop reason: SDUPTHER

## 2021-09-21 RX ORDER — LIDOCAINE HYDROCHLORIDE 20 MG/ML
INJECTION, SOLUTION INTRAVENOUS PRN
Status: DISCONTINUED | OUTPATIENT
Start: 2021-09-21 | End: 2021-09-21 | Stop reason: SDUPTHER

## 2021-09-21 RX ORDER — SODIUM CHLORIDE 0.9 % (FLUSH) 0.9 %
5-40 SYRINGE (ML) INJECTION PRN
Status: DISCONTINUED | OUTPATIENT
Start: 2021-09-21 | End: 2021-09-21 | Stop reason: HOSPADM

## 2021-09-21 RX ORDER — SODIUM CHLORIDE 9 MG/ML
25 INJECTION, SOLUTION INTRAVENOUS PRN
Status: DISCONTINUED | OUTPATIENT
Start: 2021-09-21 | End: 2021-09-21 | Stop reason: HOSPADM

## 2021-09-21 RX ADMIN — PROPOFOL 700 MG: 10 INJECTION, EMULSION INTRAVENOUS at 12:11

## 2021-09-21 RX ADMIN — LIDOCAINE HYDROCHLORIDE 100 MG: 20 INJECTION, SOLUTION INTRAVENOUS at 12:42

## 2021-09-21 RX ADMIN — SODIUM CHLORIDE, POTASSIUM CHLORIDE, SODIUM LACTATE AND CALCIUM CHLORIDE: 600; 310; 30; 20 INJECTION, SOLUTION INTRAVENOUS at 11:20

## 2021-09-21 ASSESSMENT — PAIN - FUNCTIONAL ASSESSMENT: PAIN_FUNCTIONAL_ASSESSMENT: 0-10

## 2021-09-21 ASSESSMENT — PAIN SCALES - GENERAL
PAINLEVEL_OUTOF10: 0
PAINLEVEL_OUTOF10: 0

## 2021-09-21 NOTE — FLOWSHEET NOTE
Returned to room C. Report received from [facility]. Alert and oriented. Respirations even and unlabored. Color pink. Beverage provided. Abdomen soft and nontender. Beverage provided. Call light in reach.

## 2021-09-21 NOTE — PROGRESS NOTES
RECEIVED REPORT FROM ROSEANN WEAVER PRIOR TO TRANSFER TO ENDO UNIT.  PT IS ALERT AND ORIENTED, DRANK ALL OF PREP AND HAS BEEN NPO APPROPRIATE AMOUNT OF TIME

## 2021-09-21 NOTE — ANESTHESIA POSTPROCEDURE EVALUATION
Department of Anesthesiology  Postprocedure Note    Patient: Anna Samson  MRN: [id number]  YOB: 1980  Date of evaluation: 9/21/2021  Time:  1:03 PM     Procedure Summary     Date: 09/21/21 Room / Location: [address]    Anesthesia Start: 1205 Anesthesia Stop: 3595    Procedures:       COLONOSCOPY POLYPECTOMY SNARE/COLD BIOPSY (N/A )      EGD BIOPSY (N/A ) Diagnosis:       Anemia, unspecified type      Black stools      Diarrhea, unspecified type      (Anemia, unspecified type [D64.9] Black stools [K92.1] Diarrhea, unspecified type [R19.7])    Surgeons: Angeles Barnett MD Responsible Provider: BARBARA Arreguin CRNA    Anesthesia Type: MAC ASA Status: 2          Anesthesia Type: MAC    Fernanda Phase I:  10    Fernanda Phase II:  10    Last vitals: Reviewed and per EMR flowsheets.        Anesthesia Post Evaluation    Patient location during evaluation: bedside  Patient participation: complete - patient participated  Level of consciousness: awake and alert  Pain score: 1  Airway patency: patent  Nausea & Vomiting: no nausea and no vomiting  Complications: no  Cardiovascular status: hemodynamically stable  Respiratory status: acceptable, room air, spontaneous ventilation and nonlabored ventilation  Hydration status: euvolemic

## 2021-09-21 NOTE — BRIEF OP NOTE
BRIEF COLONOSCOPY REPORT:  Photos and full colonoscopy report available by going to \"chart review\" then \"procedures\" then  \"colonoscopy\" then \"View  Report\"     IMPRESSION :   1) 5 mm polyp removed from the sigmoid at 25 cm with the cold snare  2) 3 mm polyp removed from the sigmoid at 20 cm with the cold snare  3) random biopsies taken throughout the colon to look for microscopic colitis  4) otherwise normal colon    PLAN :   1) if either polyp is a sessile serrated polyp, then follow up in 5 years. 2) if either polyp is an adenoma then follow up  colonoscopy suggested in 7-10   3) If there is any villous component or high-grade dysplasia, then follow up in 3 years.   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

## 2021-09-22 ENCOUNTER — TELEPHONE (OUTPATIENT)
Dept: GASTROENTEROLOGY | Age: 41
End: 2021-09-22

## 2021-09-22 ENCOUNTER — HOSPITAL ENCOUNTER (OUTPATIENT)
Dept: ULTRASOUND IMAGING | Age: 41
Discharge: HOME | End: 2021-09-22
Payer: MEDICAID

## 2021-09-22 DIAGNOSIS — E11.9 TYPE 2 DIABETES MELLITUS WITHOUT COMPLICATION, WITH LONG-TERM CURRENT USE OF INSULIN (HCC): ICD-10-CM

## 2021-09-22 DIAGNOSIS — F41.9 ANXIETY: ICD-10-CM

## 2021-09-22 DIAGNOSIS — E11.42 DIABETIC POLYNEUROPATHY ASSOCIATED WITH TYPE 2 DIABETES MELLITUS (HCC): ICD-10-CM

## 2021-09-22 DIAGNOSIS — Z79.4 TYPE 2 DIABETES MELLITUS WITHOUT COMPLICATION, WITH LONG-TERM CURRENT USE OF INSULIN (HCC): ICD-10-CM

## 2021-09-22 DIAGNOSIS — Z72.0 TOBACCO ABUSE: ICD-10-CM

## 2021-09-22 DIAGNOSIS — K21.9 GASTROESOPHAGEAL REFLUX DISEASE, UNSPECIFIED WHETHER ESOPHAGITIS PRESENT: ICD-10-CM

## 2021-09-22 DIAGNOSIS — R80.1 PERSISTENT PROTEINURIA: ICD-10-CM

## 2021-09-22 PROCEDURE — 76775 US EXAM ABDO BACK WALL LIM: CPT

## 2021-09-24 LAB
ALBUMIN, U: 60 %
ALPHA-1-GLOBULIN, U: 5 %
ALPHA-2-GLOBULIN, U: 6 %
BETA GLOBULIN, U: 14 %
GAMMA GLOBULIN, U: 16 %
SPEP INTERPRETATION: NORMAL
URINE TOTAL PROTEIN: >600 MG/DL

## 2021-09-27 LAB
SPEP INTERPRETATION: ABNORMAL
URINE TOTAL PROTEIN: >600 MG/DL

## 2021-09-28 ENCOUNTER — HOSPITAL ENCOUNTER (OUTPATIENT)
Dept: CT IMAGING | Age: 41
Discharge: HOME | End: 2021-09-28
Payer: MEDICAID

## 2021-09-28 PROCEDURE — 2580000003 HC RX 258: Performed by: SPECIALIST

## 2021-09-28 PROCEDURE — 2500000003 HC RX 250 WO HCPCS: Performed by: SPECIALIST

## 2021-09-28 PROCEDURE — 74177 CT ABD & PELVIS W/CONTRAST: CPT

## 2021-09-28 RX ORDER — SODIUM CHLORIDE 0.9 % (FLUSH) 0.9 %
10 SYRINGE (ML) INJECTION PRN
Status: DISCONTINUED | OUTPATIENT
Start: 2021-09-28 | End: 2021-09-29 | Stop reason: HOSPADM

## 2021-09-28 RX ADMIN — SODIUM CHLORIDE, PRESERVATIVE FREE 10 ML: 5 INJECTION INTRAVENOUS at 14:08

## 2021-09-28 RX ADMIN — BARIUM SULFATE 1350 ML: 1 SUSPENSION ORAL at 13:00

## 2021-09-29 PROBLEM — E55.9 VITAMIN D DEFICIENCY: Status: ACTIVE | Noted: 2021-09-29

## 2021-10-11 ENCOUNTER — OFFICE VISIT (OUTPATIENT)
Dept: GASTROENTEROLOGY | Age: 41
End: 2021-10-11
Payer: MEDICAID

## 2021-10-11 VITALS
WEIGHT: 225.2 LBS | HEART RATE: 78 BPM | SYSTOLIC BLOOD PRESSURE: 116 MMHG | TEMPERATURE: 97.3 F | OXYGEN SATURATION: 97 % | BODY MASS INDEX: 29.85 KG/M2 | HEIGHT: 73 IN | DIASTOLIC BLOOD PRESSURE: 76 MMHG

## 2021-10-11 DIAGNOSIS — D64.9 ANEMIA, UNSPECIFIED TYPE: Primary | ICD-10-CM

## 2021-10-11 DIAGNOSIS — D36.9 ADENOMATOUS POLYPS: ICD-10-CM

## 2021-10-11 DIAGNOSIS — K21.9 GASTROESOPHAGEAL REFLUX DISEASE WITHOUT ESOPHAGITIS: ICD-10-CM

## 2021-10-11 PROCEDURE — 4004F PT TOBACCO SCREEN RCVD TLK: CPT | Performed by: NURSE PRACTITIONER

## 2021-10-11 PROCEDURE — 99213 OFFICE O/P EST LOW 20 MIN: CPT | Performed by: NURSE PRACTITIONER

## 2021-10-11 PROCEDURE — G8427 DOCREV CUR MEDS BY ELIG CLIN: HCPCS | Performed by: NURSE PRACTITIONER

## 2021-10-11 PROCEDURE — G8482 FLU IMMUNIZE ORDER/ADMIN: HCPCS | Performed by: NURSE PRACTITIONER

## 2021-10-11 PROCEDURE — G8417 CALC BMI ABV UP PARAM F/U: HCPCS | Performed by: NURSE PRACTITIONER

## 2021-10-11 RX ORDER — OMEPRAZOLE 20 MG/1
20 CAPSULE, DELAYED RELEASE ORAL DAILY
Qty: 30 CAPSULE | Refills: 5 | Status: SHIPPED | OUTPATIENT
Start: 2021-10-11 | End: 2022-05-09 | Stop reason: SDUPTHER

## 2021-10-11 NOTE — PROGRESS NOTES
cancer.       ROS  Review of Systems   Constitutional: Negative for appetite change, chills, diaphoresis, fever and unexpected weight change. HENT: Negative for ear pain, hearing loss and tinnitus. Eyes: Negative for pain, discharge and visual disturbance. Respiratory: Negative for cough, shortness of breath and wheezing. Cardiovascular: Negative for chest pain, palpitations and leg swelling. Gastrointestinal: Negative for abdominal pain, blood in stool, constipation, diarrhea, nausea and vomiting. Endocrine: Negative for cold intolerance and heat intolerance. Genitourinary: Negative for dysuria, frequency, hematuria and urgency. Musculoskeletal: Positive for back pain. Negative for myalgias and neck pain. Skin: Negative for color change, pallor and rash. Allergic/Immunologic: Negative for environmental allergies and food allergies. Neurological: Negative for dizziness, seizures, weakness and headaches. Hematological: Does not bruise/bleed easily. Psychiatric/Behavioral: Positive for sleep disturbance. Negative for dysphoric mood.  The patient is nervous/anxious.         Allergies  No Known Allergies    Medications  Current Outpatient Medications   Medication Sig Dispense Refill    lisinopril (PRINIVIL;ZESTRIL) 10 MG tablet Take 1 tablet by mouth daily 90 tablet 3    INSULIN SYRINGE .5CC/29G 29G X 1/2\" 0.5 ML MISC Inject 1 each into the skin 3 times daily 90 each 2    vitamin D (ERGOCALCIFEROL) 1.25 MG (50745 UT) CAPS capsule Take 1 capsule by mouth once a week 12 capsule 2    omeprazole (PRILOSEC) 20 MG delayed release capsule TAKE ONE CAPSULE BY MOUTH EVERY MORNING BEFORE BREAKFAST 30 capsule 1    insulin glargine (LANTUS) 100 UNIT/ML injection vial Inject 40 Units into the skin nightly 1 vial 3    insulin lispro (HUMALOG) 100 UNIT/ML injection vial Inject 15 Units into the skin 3 times daily (with meals) 1 vial 3    traZODone (DESYREL) 50 MG tablet TAKE ONE TABLET BY MOUTH ONCE NIGHTLY AS NEEDED FOR SLEEP 30 tablet 2    busPIRone (BUSPAR) 5 MG tablet Take 1 tablet by mouth 2 times daily 60 tablet 1    tiZANidine (ZANAFLEX) 2 MG tablet TAKE ONE TABLET BY MOUTH THREE TIMES DAILY AS NEEDED FOR BACK PAIN AND BACK SPASM 30 tablet 0    Insulin Syringes, Disposable, U-100 1 ML MISC 1 each by Does not apply route daily 100 each 3    Lancets MISC 1 each by Does not apply route 3 times daily 600 each 1    blood glucose monitor strips Test 3 times a day & as needed for symptoms of irregular blood glucose. 270 strip 2    aspirin 325 MG tablet Take 325 mg by mouth daily       metFORMIN (GLUCOPHAGE) 1000 MG tablet TAKE ONE TABLET BY MOUTH TWICE A DAY WITH MEALS (Patient not taking: Reported on 10/11/2021) 60 tablet 2    gabapentin (NEURONTIN) 300 MG capsule TAKE ONE CAPSULE BY MOUTH THREE TIMES A DAY 90 capsule 2    Blood Glucose Monitoring Suppl (ONE TOUCH ULTRA 2) w/Device KIT 1 kit by Does not apply route once for 1 dose 1 kit 0     No current facility-administered medications for this visit. Past medical history:   He has a past medical history of Accident, Acid reflux, Anemia, Broken teeth, Diabetes mellitus ([address] Kidney stones, Marijuana use, Shortness of breath on exertion, Teeth missing, and Starlight teeth extracted. 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 Social History:  He reports that he has been smoking cigarettes. He started smoking about 26 years ago. He has a 12.00 pack-year smoking history. He has never used smokeless tobacco. He reports previous alcohol use. He reports current drug use. Drug: Marijuana.     Family history:  His family history includes Allergy (Severe) in his brother; Diabetes in his father; Heart Disease in his father; Obesity in his mother. Objective    Vitals:    10/11/21 1327   BP: 116/76   Pulse: 78   Temp: 97.3 °F (36.3 °C)   SpO2: 97%        Physical exam    Physical Exam  Vitals reviewed. Constitutional:       General: He is not in acute distress. Appearance: Normal appearance. He is well-developed. He is not ill-appearing, toxic-appearing or diaphoretic. HENT:      Head: Normocephalic and atraumatic. Nose: Nose normal.      Mouth/Throat:      Mouth: Mucous membranes are moist.   Eyes:      Conjunctiva/sclera: Conjunctivae normal.      Pupils: Pupils are equal, round, and reactive to light. Neck:      Thyroid: No thyromegaly. Vascular: No JVD. Trachea: No tracheal deviation. Cardiovascular:      Rate and Rhythm: Normal rate and regular rhythm. Pulses: Normal pulses. Heart sounds: Normal heart sounds. No murmur heard. No friction rub. No gallop. Pulmonary:      Effort: Pulmonary effort is normal. No respiratory distress. Breath sounds: Normal breath sounds. No stridor. No wheezing, rhonchi or rales. Chest:      Chest wall: No tenderness. Abdominal:      General: Bowel sounds are normal. There is no distension. Palpations: Abdomen is soft. There is no mass. Tenderness: There is no abdominal tenderness. There is no guarding or rebound. Hernia: No hernia is present. Musculoskeletal:         General: Normal range of motion. Cervical back: Normal range of motion and neck supple. Lymphadenopathy:      Cervical: No cervical adenopathy. Skin:     General: Skin is warm and dry. Neurological:      Mental Status: He is alert and oriented to person, place, and time. Psychiatric:         Mood and Affect: Mood normal.         Hospital Outpatient Visit on 09/22/2021   Component Date Value Ref Range Status    SPEP Interpretation 09/15/2021 INTERPRETATION -  A DEFINITIVE MONOCLONAL PROTEIN IS NOT IDENTIFIED. LP.    Final    Urine Total Protein 09/15/2021 >600* <12 MG/DL Final   Admission on 09/21/2021, Discharged on 09/21/2021   Component Date Value Ref Range Status    POC Glucose 09/21/2021 135* 70 - 99 MG/DL Final   Hospital Outpatient Visit on 09/15/2021   Component Date Value Ref Range Status    Amylase 09/15/2021 111  25 - 115 U/L Final    WBC 09/15/2021 4.6  4.0 - 10.5 K/CU MM Final    RBC 09/15/2021 4.02* 4.6 - 6.2 M/CU MM Final    Hemoglobin 09/15/2021 11.5* 13.5 - 18.0 GM/DL Final    Hematocrit 09/15/2021 35.2* 42 - 52 % Final    MCV 09/15/2021 87.6  78 - 100 FL Final    MCH 09/15/2021 28.6  27 - 31 PG Final    MCHC 09/15/2021 32.7  32.0 - 36.0 % Final    RDW 09/15/2021 13.1  11.7 - 14.9 % Final    Platelets 93/80/2239 191  140 - 440 K/CU MM Final    MPV 09/15/2021 10.7  7.5 - 11.1 FL Final    Differential Type 09/15/2021 AUTOMATED DIFFERENTIAL   Final    Segs Relative 09/15/2021 61.3  36 - 66 % Final    Lymphocytes % 09/15/2021 27.9  24 - 44 % Final    Monocytes % 09/15/2021 6.0* 0 - 4 % Final    Eosinophils % 09/15/2021 3.7* 0 - 3 % Final    Basophils % 09/15/2021 0.9  0 - 1 % Final    Segs Absolute 09/15/2021 2.8  K/CU MM Final    Lymphocytes Absolute 09/15/2021 1.3  K/CU MM Final    Monocytes Absolute 09/15/2021 0.3  K/CU MM Final    Eosinophils Absolute 09/15/2021 0.2  K/CU MM Final    Basophils Absolute 09/15/2021 0.0  K/CU MM Final    Nucleated RBC % 09/15/2021 0.0  % Final    Total Nucleated RBC 09/15/2021 0.0  K/CU MM Final    Total Immature Neutrophil 09/15/2021 0.01  K/CU MM Final    Immature Neutrophil % 09/15/2021 0.2  0 - 0.43 % Final    CRP, High Sensitivity 09/15/2021 1.2  mg/L Final    Comment: Cardiovascular risk  evaluation guidelines  Low Risk         <1.0 mg/L  Average Risk     1.0-3.0 mg/L  High Risk        >3.0 mg/L      Histoplasma Antigen Urine 09/15/2021 NOT DETECTED  ng/mL Final    Histoplasma Ag Interp 09/15/2021 NOT DETECTED  NOT DETECTED Final    Comment: (NOTE)  INTERPRETIVE DATA: Histoplasma Galactomannan Antigen                    Quantitative by EIA, Urine  Less than 0.4 ng/ml = Not Detected  0.4-3.1 ng/mL = Detected (below the limit of quantification)  3.2-20.0 ng/mL = Detected  Greater than 20.0 ng/mL = Detected (above the limit of   quantification)  The quantitative range of this assay is 3.2-20.0 ng/mL. Antigen   concentrations between 0.4-3.1 or >20.0 ng/mL fall outside the   linear range of the assay and cannot be accurately quantified. This EIA test should be used in conjunction with other diagnostic   procedures, including microbiological culture, histological   examination of biopsy samples, and/or radiographic evidence, to   aid in the diagnosis of histoplasmosis. This test was developed and its performance characteristics   determined by [facility]. It has not been cleared or   approved by the [address] and Drug Administration. This test was   performed in a CLIA certified laboratory and is intended for   clinical                            purposes. Performed By: [facility] [address]  [place], [address] [facility]  : Jacki Olguin. Yaw Cabrera MD      IgG, Serum 09/15/2021 2,090* 723 - 1,685 MG/DL Final    IgA 09/15/2021 255  69 - 382 MG/DL Final    Lipase 09/15/2021 53  13 - 60 IU/L Final    Transglutaminase IgA 09/15/2021 <2  0 - 3 U/mL Final    Comment: (NOTE)  INTERPRETIVE INFORMATION: Tissue Transglutaminase (tTG) Antibody,   IgA  3 U/mL or less: Negative  4-10 U/mL: Weak Positive  11 U/mL or greater: Positive  Presence of the tissue transglutaminase (tTG) IgA antibody is   associated with glutensensitive enteropathies such as celiac   disease and dermatitis herpetiformis. tTG IgA antibody   concentrations greater than 40 U/mL usually correlate with results   of duodenal biopsies consistent with a diagnosis of celiac   disease.  For antibody concentrations greater or equal to 4 U/mL   but less than or equal to 40 U/mL, additional testing for   endomysial (EMA) IgA concentrations may improve the positive   predictive value for disease. Performed By: [facility]  [place], [address] [facility]  : Dylon Matthews.  Randa Ramos MD      Vit ANA, 25-Hydroxy 09/15/2021 14.15* >20 NG/ML Final    Color, UA 09/15/2021 YELLOW  YELLOW Final    Clarity, UA 09/15/2021 CLEAR  CLEAR Final    Glucose, Urine 09/15/2021 NEGATIVE  NEGATIVE MG/DL Final    Bilirubin Urine 09/15/2021 NEGATIVE  NEGATIVE MG/DL Final    Ketones, Urine 09/15/2021 NEGATIVE  NEGATIVE MG/DL Final    Specific Gravity, UA 09/15/2021 1.018  1.001 - 1.035 Final    Blood, Urine 09/15/2021 NEGATIVE  NEGATIVE Final    pH, Urine 09/15/2021 5.0  5.0 - 8.0 Final    Protein, UA 09/15/2021 >500* NEGATIVE MG/DL Final    Urobilinogen, Urine 09/15/2021 NEGATIVE  0.2 - 1.0 MG/DL Final    Nitrite Urine, Quantitative 09/15/2021 NEGATIVE  NEGATIVE Final    Leukocyte Esterase, Urine 09/15/2021 NEGATIVE  NEGATIVE Final    RBC, UA 09/15/2021 1  0 - 3 /HPF Final    WBC, UA 09/15/2021 <1  0 - 2 /HPF Final    Bacteria, UA 09/15/2021 NEGATIVE  NEGATIVE /HPF Final    Squam Epithel, UA 09/15/2021 <1  /HPF Final    Mucus, UA 09/15/2021 RARE* NEGATIVE HPF Final    Trichomonas, UA 09/15/2021 NONE SEEN  NONE SEEN /HPF Final    Urine Total Protein 09/15/2021 >600* <12 MG/DL Final    Creatinine, Ur 09/15/2021 112.0  39 - 259 MG/DL Final    Prot/Creat Ratio, Ur 09/15/2021 UNALBE TO CALCULATE DUE TO HIGH URINE TOTAL PROEIN  <0.2 Final    Albumin 09/15/2021 4.2  3.4 - 5.0 GM/DL Final    Phosphorus 09/15/2021 3.8  2.5 - 4.9 MG/DL Final    Sodium 09/15/2021 134* 135 - 145 MMOL/L Final    Potassium 09/15/2021 4.4  3.5 - 5.1 MMOL/L Final    Chloride 09/15/2021 101  99 - 110 mMol/L Final    CO2 09/15/2021 22  21 - 32 MMOL/L Final    Anion Gap 09/15/2021 11  4 - 16 Final    BUN 09/15/2021 12  6 - 23 MG/DL Final    CREATININE 09/15/2021 0.9  0.9 - 1.3 MG/DL Final    Glucose 09/15/2021 139* 70 - 99 MG/DL Final    Calcium 09/15/2021 9.3  8.3 - 10.6 MG/DL Final    GFR Non- 09/15/2021 >60  >60 mL/min/1.73m2 Final    GFR  09/15/2021 >60  >60 mL/min/1.73m2 Final    Total Protein 09/15/2021 7.8  6.4 - 8.2 GM/DL Final    SPEP Interpretation 09/15/2021 INTERPRETATION - Increase in gammaglobulins noted. Divya Carcamo MD   Final    Albumin Electrophoresis 09/15/2021 3.5  3.2 - 5.6 GM/DL Final    Alpha-1-Globulin 09/15/2021 0.3  0.1 - 0.4 GM/DL Final    Alpha-2-Globulin 09/15/2021 0.9  0.4 - 1.2 GM/DL Final    Beta Globulin 09/15/2021 1.0  0.5 - 1.3 GM/DL Final    Gamma Globulin 09/15/2021 2.1* 0.5 - 1.6 GM/DL Final    Albumin, U 09/15/2021 60  % Final    Alpha-1-Globulin, U 09/15/2021 5  % Final    Alpha-2-Globulin, U 09/15/2021 6  % Final    Beta Globulin, U 09/15/2021 14  % Final    Gamma Globulin, U 09/15/2021 16  % Final    SPEP Interpretation 09/15/2021 INTERPRETATION - Prominent Beta globulins. The concurrent CANDE does not reveal a definitive monoclonal protein. RS   Final    Urine Total Protein 09/15/2021 >600  MG/DL Final    CHIDI 09/15/2021 None Detected  None Detected Final    Comment: (NOTE)  If suspicion of connective tissue disease is strong and CHIDI EIA is   negative, consider testing for CHIDI by IFA ([id number]). INTERPRETIVE INFORMATION: Anti-Nuclear Antibodies (CHIDI), IgG by   GERA  Antinuclear Antibodies (CHIDI), IgG by GERA: CHIDI specimens are   screened using enzyme-linked immunosorbent assay (GERA)   methodology. All GERA results reported as Detected are further   tested by indirect fluorescent assay (IFA) using HEp-2 substrate   with an IgG-specific conjugate. The CHIDI GERA screen is designed   to detect antibodies against dsDNA, histones, SS-A (Ro), SS-B   (La), Whitten, Whitten/RNP, Scl-70, Lizeth-1, centromeric proteins, other   antigens extracted from the HEp-2 cell nucleus.  CHIDI GERA assays   have been reported to have lower sensitivities than CHIDI TMA. This test  has not been FDA cleared or approved. This test has been authorized  by FDA under an Emergency Use Authorization (EUA). This test is only  authorized for the duration of time the declaration that  circumstances exist justifying the authorization of the emergency use  of in vitro diagnostic tests for detection of SARS-CoV-2 virus and/or  diagnosis of COVID-19 infection under section 564(b)(1) of the Act,  21 U. S.C. 758GTJ-9(N) (1), unless the authorization is terminated or  revoked sooner. When diagnostic testing is negative, the possibility of a false  negative result should be considered in the context of a patient's  recent exposures and the presence of clinical signs and symptoms  consistent with COVID-19. An individual without symptoms of COVID-  19 and who is not shedding SARS-CoV-2                            virus would expect to have a  negative (not detected) result in this assay. Performed At: [facility]  [address] [facility], [address] [place] [address], [address] [employer] Albin Green PhD [id number]         Assessment and Plan:  1. Adenomatous colon polyps recommend repeat colonoscopy in three years due to serrated adenoma. Recommend smoking cessation. His colonoscopy showed two adenomatous colon polyps that were removed; one noted to be serrated adenoma with low grade dysplasia. His random colon biopsies showed normal tissue with no evidence of microscopic colitis. 2.  Normocytic anemia of uncertain etiology most likely due to chronic disease. His CT enterography done on 9- was normal with no pathologic lymphadenopathy, no CT evidence of Crohn's disease and normal appendix. Currently the patient did not have signs of GI bleeding. His CT of abdomen/pelvis done on 8- showed interval development of multiple small mesenteric and retroperitoneal lymph nodes including a 1.5 x 1.9 cm retrocaval lymph node.   If anemia does not improve may benefit from Oncology referral.  He is currently not symptomatic and Hgb 11.5, Hct 35.2, MCV 87.6 and Platelets 463. His EGD/colonoscopy showed no evidence for source of GI bleeding. Recommend avoidance of NSAID's and improved glycemic control. Recommend periodic monitoring of H&H. The patient was encouraged to continue taking Prilosec. 3.  Altered bowel habits with diarrhea that has resolved with stopping Metformin. He mentioned his bowel pattern has normalized. His colonoscopy showed on evidence of Celiac disease or colitis. The patient was encouraged to continue with diet high in fruit, fiber and fluids. 4.  GERD that is stable without dysphagia or odynophagia. His EGD showed small hiatal hernia otherwise probable Osullivan's esophagus appearance,normal appearing stomach and duodenal bulb. His esophageal biopsies showed mixed inflammation with reactive changes with no evidence of Osullivan's esophagus. His duodenal biopsies showed normal tissue with no evidence of Celiac disease. The patient was encouraged to continue taking Prilosec daily for treatment of acid reflux. The patient was encouraged to continue with anti-reflux measures and avoid foods that worsen. 5.  The patient was encouraged to follow-up in 6 months or sooner if needed.

## 2021-10-12 ENCOUNTER — TELEPHONE (OUTPATIENT)
Dept: GASTROENTEROLOGY | Age: 41
End: 2021-10-12

## 2021-10-12 NOTE — TELEPHONE ENCOUNTER
Patient called and notified his CT enterography done on 9- was normal with no pathologic lymphadenopathy noted so no indication for Oncology referral at this time unless anemia worsens.

## 2021-10-19 ENCOUNTER — OFFICE VISIT (OUTPATIENT)
Dept: INTERNAL MEDICINE CLINIC | Age: 41
End: 2021-10-19
Payer: MEDICAID

## 2021-10-19 ENCOUNTER — HOSPITAL ENCOUNTER (EMERGENCY)
Age: 41
Discharge: LEFT BEFORE DISCHARGE | End: 2021-10-20
Payer: MEDICAID

## 2021-10-19 ENCOUNTER — APPOINTMENT (OUTPATIENT)
Dept: GENERAL RADIOLOGY | Age: 41
End: 2021-10-19
Payer: MEDICAID

## 2021-10-19 VITALS
DIASTOLIC BLOOD PRESSURE: 88 MMHG | BODY MASS INDEX: 29.29 KG/M2 | WEIGHT: 221 LBS | HEART RATE: 81 BPM | OXYGEN SATURATION: 98 % | HEIGHT: 73 IN | TEMPERATURE: 97.9 F | RESPIRATION RATE: 20 BRPM | SYSTOLIC BLOOD PRESSURE: 154 MMHG

## 2021-10-19 VITALS
RESPIRATION RATE: 20 BRPM | DIASTOLIC BLOOD PRESSURE: 91 MMHG | SYSTOLIC BLOOD PRESSURE: 158 MMHG | HEIGHT: 73 IN | BODY MASS INDEX: 30.22 KG/M2 | OXYGEN SATURATION: 99 % | WEIGHT: 228 LBS | HEART RATE: 64 BPM

## 2021-10-19 DIAGNOSIS — I20.0 UNSTABLE ANGINA (HCC): Primary | ICD-10-CM

## 2021-10-19 PROCEDURE — G8417 CALC BMI ABV UP PARAM F/U: HCPCS | Performed by: NURSE PRACTITIONER

## 2021-10-19 PROCEDURE — 99213 OFFICE O/P EST LOW 20 MIN: CPT | Performed by: NURSE PRACTITIONER

## 2021-10-19 PROCEDURE — 93000 ELECTROCARDIOGRAM COMPLETE: CPT | Performed by: NURSE PRACTITIONER

## 2021-10-19 PROCEDURE — 93005 ELECTROCARDIOGRAM TRACING: CPT | Performed by: PHYSICIAN ASSISTANT

## 2021-10-19 PROCEDURE — G8427 DOCREV CUR MEDS BY ELIG CLIN: HCPCS | Performed by: NURSE PRACTITIONER

## 2021-10-19 PROCEDURE — 4004F PT TOBACCO SCREEN RCVD TLK: CPT | Performed by: NURSE PRACTITIONER

## 2021-10-19 PROCEDURE — 71045 X-RAY EXAM CHEST 1 VIEW: CPT

## 2021-10-19 PROCEDURE — G8482 FLU IMMUNIZE ORDER/ADMIN: HCPCS | Performed by: NURSE PRACTITIONER

## 2021-10-19 PROCEDURE — 4500000002 HC ER NO CHARGE

## 2021-10-19 ASSESSMENT — ENCOUNTER SYMPTOMS
WHEEZING: 0
SINUS PRESSURE: 0
SINUS PAIN: 0
SHORTNESS OF BREATH: 1
COLOR CHANGE: 0
DIARRHEA: 0
SORE THROAT: 0
ABDOMINAL PAIN: 0
NAUSEA: 0
CONSTIPATION: 0
COUGH: 0
CHEST TIGHTNESS: 0
ABDOMINAL DISTENTION: 0
EYES NEGATIVE: 1

## 2021-10-19 NOTE — PROGRESS NOTES
Yaz Stanford  1980  10/19/21    Chief Complaint   Patient presents with    Chest Pain     sx started 3 weeks ago. Pt reports that chest pain becomes worse with exertion. Pt reports pain in left side of chest that radiates in the collar bone and shoulder        SUBJECTIVE:      Patient presents to the office this afternoon for c/o left sided chest pain which radiates into the left shoulder/left arm and has been ongoing for approximately 3 weeks. States the pain is sharp in nature and occurs primarily on exertion, even minimal exertion. Also reports associated dyspnea on exertion for the same duration. Does have a strong family hx of heart disease paternally. Review of Systems   Constitutional: Negative for activity change, appetite change, fatigue and fever. HENT: Negative for congestion, sinus pressure, sinus pain and sore throat. Eyes: Negative. Respiratory: Positive for shortness of breath. Negative for cough, chest tightness and wheezing. Cardiovascular: Positive for chest pain. Negative for palpitations. Gastrointestinal: Negative for abdominal distention, abdominal pain, constipation, diarrhea and nausea. Genitourinary: Negative for difficulty urinating, dysuria, flank pain, frequency and hematuria. Musculoskeletal: Negative for arthralgias, gait problem, joint swelling and myalgias. Skin: Negative for color change and rash. Neurological: Negative for dizziness, light-headedness and numbness. Hematological: Negative. Psychiatric/Behavioral: Negative. OBJECTIVE:    BP (!) 158/91   Pulse 64   Resp 20   Ht 6' 1\" (1.854 m)   Wt 228 lb (103.4 kg)   SpO2 99%   BMI 30.08 kg/m²     Physical Exam  Constitutional:       General: He is not in acute distress. Appearance: He is well-developed. He is not diaphoretic. HENT:      Head: Normocephalic and atraumatic. Neck:      Thyroid: No thyromegaly.    Cardiovascular:      Rate and Rhythm: Normal rate and regular rhythm. Heart sounds: Normal heart sounds. No murmur heard. Pulmonary:      Effort: Pulmonary effort is normal.      Breath sounds: Normal breath sounds. Abdominal:      General: Bowel sounds are normal. There is no distension. Palpations: Abdomen is soft. Tenderness: There is no abdominal tenderness. Musculoskeletal:         General: Normal range of motion. Lymphadenopathy:      Cervical: No cervical adenopathy. Skin:     General: Skin is warm and dry. Capillary Refill: Capillary refill takes less than 2 seconds. Neurological:      Mental Status: He is alert and oriented to person, place, and time. GCS: GCS eye subscore is 4. GCS verbal subscore is 5. GCS motor subscore is 6. Coordination: Coordination normal.   Psychiatric:         Behavior: Behavior normal.         Thought Content: Thought content normal.         ASSESSMENT:    1. Chest pain, unspecified type        PLAN:    Analisa was seen today for chest pain. Diagnoses and all orders for this visit:    Chest pain, unspecified type- EKG in office today reviewed with collaborating MD, Dr Radha Edmond and is with noted nonspecific ST depression and T abnormality/ possible anterolateral ischemia. Given his risk factors/family hx and EKG findings today, as well as significant symptoms, this is technically unstable angina by criteria and I did encourage him to proceed directly to the nearest ER. Patient defers EMS and is insistent he cannot go until tomorrow morning or this evening at the soonest. Discussion had a length in regards to risks of prolonged delay in cardiac evaluation and risk for MI/other acute damage, or even death. Patient verbalizes understanding of risks. -     EKG 12 lead; Future  -     EKG 12 lead    Course of treatment, including any medications, possible imaging, referrals, and follow ups discussed with patient.  All risks and benefits and possible side effects discussed with patient who agrees to plan of care and verbalizes understanding. All labs and imaging reviewed. No flowsheet data found. Return in about 1 week (around 10/26/2021). Leah note this reports has been produced speech recognition software and may contain errors related to that system including errors in grammar, punctuation, and spelling, as well as words and phrases that may be appropriate. If there are any questions or concerns please feel free to contact the dictating provider for clarification.

## 2021-10-20 LAB
EKG ATRIAL RATE: 73 BPM
EKG DIAGNOSIS: NORMAL
EKG P AXIS: 43 DEGREES
EKG P-R INTERVAL: 164 MS
EKG Q-T INTERVAL: 398 MS
EKG QRS DURATION: 104 MS
EKG QTC CALCULATION (BAZETT): 438 MS
EKG R AXIS: -13 DEGREES
EKG T AXIS: 113 DEGREES
EKG VENTRICULAR RATE: 73 BPM

## 2021-10-20 PROCEDURE — 93010 ELECTROCARDIOGRAM REPORT: CPT | Performed by: INTERNAL MEDICINE
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
## 2021-10-20 NOTE — ED PROVIDER NOTES
The Ekg interpreted by me shows  normal sinus rhythm with a rate of 73  Axis is   Normal  QTc is  normal  Intervals and Durations are unremarkable.       ST Segments: T Wave inversion in 1 and aVL  No significant change from prior EKG dated 6-1-2020           Donnie Laird MD  10/19/21 2017

## 2021-10-22 DIAGNOSIS — I20.0 UNSTABLE ANGINA (HCC): Primary | ICD-10-CM

## 2021-10-25 ENCOUNTER — INITIAL CONSULT (OUTPATIENT)
Dept: CARDIOLOGY CLINIC | Age: 41
End: 2021-10-25
Payer: MEDICAID

## 2021-10-25 VITALS
SYSTOLIC BLOOD PRESSURE: 122 MMHG | HEART RATE: 68 BPM | BODY MASS INDEX: 30.19 KG/M2 | WEIGHT: 227.8 LBS | DIASTOLIC BLOOD PRESSURE: 88 MMHG | HEIGHT: 73 IN

## 2021-10-25 DIAGNOSIS — Z79.4 TYPE 2 DIABETES MELLITUS WITHOUT COMPLICATION, WITH LONG-TERM CURRENT USE OF INSULIN (HCC): ICD-10-CM

## 2021-10-25 DIAGNOSIS — E11.9 TYPE 2 DIABETES MELLITUS WITHOUT COMPLICATION, WITH LONG-TERM CURRENT USE OF INSULIN (HCC): ICD-10-CM

## 2021-10-25 DIAGNOSIS — R06.02 SOB (SHORTNESS OF BREATH): ICD-10-CM

## 2021-10-25 DIAGNOSIS — R00.2 PALPITATIONS: ICD-10-CM

## 2021-10-25 DIAGNOSIS — R07.9 CHEST PAIN, UNSPECIFIED TYPE: Primary | ICD-10-CM

## 2021-10-25 DIAGNOSIS — L97.426 DIABETIC ULCER OF LEFT MIDFOOT ASSOCIATED WITH TYPE 2 DIABETES MELLITUS, WITH BONE INVOLVEMENT WITHOUT EVIDENCE OF NECROSIS (HCC): ICD-10-CM

## 2021-10-25 DIAGNOSIS — Z72.0 TOBACCO ABUSE: ICD-10-CM

## 2021-10-25 DIAGNOSIS — E11.621 DIABETIC ULCER OF LEFT MIDFOOT ASSOCIATED WITH TYPE 2 DIABETES MELLITUS, WITH BONE INVOLVEMENT WITHOUT EVIDENCE OF NECROSIS (HCC): ICD-10-CM

## 2021-10-25 DIAGNOSIS — Z89.422 HISTORY OF AMPUTATION OF LESSER TOE OF LEFT FOOT (HCC): ICD-10-CM

## 2021-10-25 DIAGNOSIS — E11.42 DIABETIC POLYNEUROPATHY ASSOCIATED WITH TYPE 2 DIABETES MELLITUS (HCC): ICD-10-CM

## 2021-10-25 DIAGNOSIS — R07.2 PRECORDIAL PAIN: ICD-10-CM

## 2021-10-25 PROCEDURE — 93000 ELECTROCARDIOGRAM COMPLETE: CPT | Performed by: INTERNAL MEDICINE

## 2021-10-25 PROCEDURE — G8482 FLU IMMUNIZE ORDER/ADMIN: HCPCS | Performed by: INTERNAL MEDICINE

## 2021-10-25 PROCEDURE — 99204 OFFICE O/P NEW MOD 45 MIN: CPT | Performed by: INTERNAL MEDICINE

## 2021-10-25 PROCEDURE — 2022F DILAT RTA XM EVC RTNOPTHY: CPT | Performed by: INTERNAL MEDICINE

## 2021-10-25 PROCEDURE — G8427 DOCREV CUR MEDS BY ELIG CLIN: HCPCS | Performed by: INTERNAL MEDICINE

## 2021-10-25 PROCEDURE — G8417 CALC BMI ABV UP PARAM F/U: HCPCS | Performed by: INTERNAL MEDICINE

## 2021-10-25 RX ORDER — NITROGLYCERIN 0.4 MG/1
0.4 TABLET SUBLINGUAL EVERY 5 MIN PRN
Qty: 25 TABLET | Refills: 3 | Status: SHIPPED | OUTPATIENT
Start: 2021-10-25

## 2021-10-25 RX ORDER — ISOSORBIDE MONONITRATE 30 MG/1
30 TABLET, EXTENDED RELEASE ORAL DAILY
Qty: 30 TABLET | Refills: 3 | Status: ON HOLD | OUTPATIENT
Start: 2021-10-25 | End: 2022-01-12 | Stop reason: SDUPTHER

## 2021-10-25 NOTE — PATIENT INSTRUCTIONS
Please be informed that if you contact our office outside of normal business hours the physician on call cannot help with any scheduling or rescheduling issues, procedure instruction questions or any type of medication issue. We advise you for any urgent/emergency that you go to the nearest emergency room! PLEASE CALL OUR OFFICE DURING NORMAL BUSINESS HOURS    Monday - Friday   8 am to 5 pm    [facility] [address]: [phone]    [place]:  [phone]    **It is YOUR responsibilty to bring medication bottles and/or updated medication list to [address] [place] [address]. This will allow us to better serve you and all your healthcare needs**    Please hold on to these instructions the  will call you within 1-9 business days when we receive authorization from your insurance. Nuclear Stress Test    WHAT TO EXPECT:   ? You will need to confirm the test or it could be cancelled. ? This test will take approximately 2 hours: 1 hour in the AM &    1 hour in the PM. You will be given a time by the Technologist after the first part is completed to come back. ? You will be given a medication, through an IV in the hand, this will safely simulate exercise. This IV is also needed to inject the radioactive isotope unless you are able toe walk the treadmill. ? You will receive an injection in the AM & PM before the pictures. ? Using a special camera, you will have one set of pictures of your heart taken in the AM and a set of pictures in the PM.     PREPARATION FOR TEST:  ? Eat a light breakfast such as water or juice and toast.  ? If you are DIABETIC: Eat a normal breakfast with NO CAFFEINE and take your insulin as normal.   ? AVOID ALL FOODS & DRINKS containing CAFFEINE 12 HOURS PRIOR TO THE TEST: Including coffee, Tea, Ann and other soft drinks even those labeled  caffeine free or decaffeinated.  ?  HOLD THESE MEDICATIONS Persantine & Theophylline (Theodur)  24 hours prior & bring your inhaler with you.   The physician will specify if the following Beta blockers need to be held for 24 hours prior to test: Lopressor (metoprolol)

## 2021-10-25 NOTE — ASSESSMENT & PLAN NOTE
Believes are very concerning for angina we will start metoprolol 25 mg twice daily and also start Imdur 30 mg daily get a stress test ideally a Cardiolite to assess

## 2021-10-25 NOTE — PROGRESS NOTES
CARDIOLOGY  CONSULT  NOTE    Chief Complaint: Chest Pain     HPI:   Rashawn Little is a 39y.o. year old who has Past medical history as noted below. Very pleasant gentleman who comes in for evaluation due to intermittent chest discomfort which gets worse when he exerts himself or does any strenuous exercise or activity. 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   He says symptoms of chest pain and tightness have been ongoing for about a month now he does smoke a pack a day and has family history of coronary artery disease  He says he feels a pressure in the middle of the chest which does not radiate but is associated with shortness of breath gets better with resting      Current Outpatient Medications   Medication Sig Dispense Refill    isosorbide mononitrate (IMDUR) 30 MG extended release tablet Take 1 tablet by mouth daily 30 tablet 3    metoprolol tartrate (LOPRESSOR) 25 MG tablet Take 1 tablet by mouth 2 times daily 60 tablet 0    nitroGLYCERIN (NITROSTAT) 0.4 MG SL tablet Place 1 tablet under the tongue every 5 minutes as needed for Chest pain 25 tablet 3    omeprazole (PRILOSEC) 20 MG delayed release capsule Take 1 capsule by mouth Daily 30 capsule 5    lisinopril (PRINIVIL;ZESTRIL) 10 MG tablet Take 1 tablet by mouth daily 90 tablet 3    INSULIN SYRINGE .5CC/29G 29G X 1/2\" 0.5 ML MISC Inject 1 each into the skin 3 times daily 90 each 2    vitamin D (ERGOCALCIFEROL) 1.25 MG (84878 UT) CAPS capsule Take 1 capsule by mouth once a week 12 capsule 2    insulin glargine (LANTUS) 100 UNIT/ML injection vial Inject 40 Units into the skin nightly 1 vial 3    insulin lispro (HUMALOG) 100 UNIT/ML injection vial Inject 15 Units into the skin 3 times daily (with meals) 1 vial 3    traZODone (DESYREL) 50 MG tablet TAKE ONE TABLET BY Left 3/27/2020    TOE AMPUTATION - LEFT THIRD TOE AND METATARSAL HEAD, DEBRIDEMENT PLANTAR FOOT ULCER,  WOUND VAC PLACEMENT performed by Christie Hallman MD at [facility] N/A 9/21/2021    EGD BIOPSY performed by Keerthi Mckeon MD at [address] EXTRACTION      4 [place] Teeth Extracted In Past     Family History   Problem Relation Age of Onset    Obesity Mother     Heart Disease Father         Open Heart Surgery    Diabetes Father     Allergy (Severe) Brother      Social History     Tobacco Use    Smoking status: Current Every Day Smoker     Packs/day: 0.50     Years: 24.00     Pack years: 12.00     Types: Cigarettes     Start date: 1995    Smokeless tobacco: Never Used   Substance Use Topics    Alcohol use: Not Currently     Comment: once a year        Review of Systems:   · Constitutional: No Fever or Weight Loss   · Eyes: No Decreased Vision  · ENT: No Headaches, Hearing Loss or Vertigo  · Cardiovascular: as per note above   · Respiratory: No cough or wheezing and as per note above. · Gastrointestinal: No abdominal pain, appetite loss, blood in stools, constipation, diarrhea or heartburn  · Genitourinary: No dysuria, trouble voiding, or hematuria  · Musculoskeletal:  denies any new  joint aches , swelling  or pain   · Integumentary: No rash or pruritis  · Neurological: No TIA or stroke symptoms  · Psychiatric: No anxiety or depression  · Endocrine: No malaise, fatigue or temperature intolerance  · Hematologic/Lymphatic: No bleeding problems, blood clots or swollen lymph nodes  · Allergic/Immunologic: No nasal congestion or hives    Objective:      Physical Exam:  /88   Pulse 68   Ht 6' 1\" (1.854 m)   Wt 227 lb 12.8 oz (103.3 kg)   BMI 30.05 kg/m²   Wt Readings from Last 3 Encounters:   10/25/21 227 lb 12.8 oz (103.3 kg)   10/19/21 228 lb (103.4 kg)   10/11/21 225 lb 3.2 oz (102.2 kg)     Body mass index is 30.05 kg/m².   Vitals:    10/25/21 0934   BP: 122/88   Pulse: 68        General Appearance:  No distress, conversant  Constitutional:  Well developed, Well nourished, No acute distress, Non-toxic appearance. HENT:  Normocephalic, Atraumatic, Bilateral external ears normal, Oropharynx moist, No oral exudates, Nose normal. Neck- Normal range of motion, No tenderness, Supple, No stridor,no apical-carotid delay  Eyes:  PERRL, EOMI, Conjunctiva normal, No discharge. Respiratory:  Normal breath sounds, No respiratory distress, No wheezing, No chest tenderness. ,no use of accessory muscles, NO crackles  Cardiovascular: (PMI) apex non displaced,no lifts no thrills,S1 and S2 audible, No added heart sounds, No signs of ankle edema, or volume overload, No evidence of JVD, No crackles  GI:  Bowel sounds normal, Soft, No tenderness, No masses, No gross visceromegaly   :  No costovertebral angle tenderness   Musculoskeletal:  No edema, no tenderness, no deformities. Back- no tenderness  Integument:  Well hydrated, no rash   Lymphatic:  No lymphadenopathy noted   Neurologic:  Alert & oriented x 3, CN 2-12 normal, normal motor function, normal sensory function, no focal deficits noted   Psychiatric:  Speech and behavior appropriate       Medical decision making and Data review:  DATA:  Lab Results   Component Value Date    TROPONINT <0.010 04/22/2019     BNP:  No results found for: PROBNP  PT/INR:  No results found for: PTINR  Lab Results   Component Value Date    LABA1C 6.3 06/17/2021    LABA1C 6.0 12/29/2020     Lab Results   Component Value Date    CHOL 72 05/19/2019    TRIG 164 (H) 05/19/2019    HDL 21 (L) 06/17/2021    LDLCALC see below 06/17/2021    LDLDIRECT 61 06/17/2021     Lab Results   Component Value Date    ALT 17 07/23/2021    AST 15 07/23/2021     No results for input(s): WBC, HGB, HCT, MCV, PLT in the last 72 hours.   TSH: No results found for: TSH  Lab Results   Component Value Date    AST 15 07/23/2021    ALT 17 07/23/2021    BILIDIR <0.2 08/12/2020    BILITOT 0.2 07/23/2021    ALKPHOS 91 07/23/2021     No results found for: PROBNP  Lab Results   Component Value Date    LABA1C 6.3 06/17/2021    LABA1C 6.0 12/29/2020     Lab Results   Component Value Date    WBC 4.6 09/15/2021    HGB 11.5 (L) 09/15/2021    HCT 35.2 (L) 09/15/2021     09/15/2021     All labs, medications and tests reviewed by myself including data and history from outside source , patient and available family . Assessment & Plan:      1. Chest pain, unspecified type    2. Palpitations    3. SOB (shortness of breath)    4. Type 2 diabetes mellitus without complication, with long-term current use of insulin ([address]    5. Tobacco abuse    6. HBO-Diabetic ulcer of left midfoot associated with type 2 diabetes mellitus, with bone involvement without evidence of necrosis ([address] guerra 3    7. Diabetic polyneuropathy associated with type 2 diabetes mellitus ([address]    8. Precordial pain    9. History of amputation of lesser toe of left foot (HCC)         Precordial pain   Believes are very concerning for angina we will start metoprolol 25 mg twice daily and also start Imdur 30 mg daily get a stress test ideally a Cardiolite to assess  Also get echo to assess EF    History of amputation of lesser toe of left foot ([address]   Get arterial Doppler    HBO-Diabetic ulcer of left midfoot associated with type 2 diabetes mellitus, with bone involvement without evidence of necrosis ([address] guerra 3  Get arterial Doppler diabetes management as per primary physician    Tobacco abuse   Encouraged to quit smoking     Dyslipidemia :  All available lab work was reviewed. Patient was advised to repeat lab work before next visit. Necessary orders were placed , instructions given by myself       Counseled extensively and medication compliance urged. We discussed that for the  prevention of ASCVD our  goal is aggressive risk modification. Patient is encouraged to exercise if they can , educated about  brisk walk for 30 minutes  at least 3 to 4 times a week if there are no physical limitations  Various goals were discussed and questions answered. Continue current medications. Appropriate prescriptions are addressed and refills ordered. Questions answered and patient verbalizes understanding. Call for any problems, questions, or concerns. Greater than 60 % of time spent counseling besides reviewing data and images     Continue all other medications of all above medical condition listed as is. Return in about 1 month (around 11/25/2021). Please note this report has been partially produced using speech recognition software and may contain errors related to that system including errors in grammar, punctuation, and spelling, as well as words and phrases that may be inappropriate. If there are any questions or concerns please feel free to contact the dictating provider for clarification.

## 2021-10-29 ENCOUNTER — PROCEDURE VISIT (OUTPATIENT)
Dept: CARDIOLOGY CLINIC | Age: 41
End: 2021-10-29
Payer: MEDICAID

## 2021-10-29 DIAGNOSIS — Z79.4 TYPE 2 DIABETES MELLITUS WITHOUT COMPLICATION, WITH LONG-TERM CURRENT USE OF INSULIN (HCC): ICD-10-CM

## 2021-10-29 DIAGNOSIS — Z72.0 TOBACCO ABUSE: ICD-10-CM

## 2021-10-29 DIAGNOSIS — E11.9 TYPE 2 DIABETES MELLITUS WITHOUT COMPLICATION, WITH LONG-TERM CURRENT USE OF INSULIN (HCC): ICD-10-CM

## 2021-10-29 DIAGNOSIS — R06.02 SOB (SHORTNESS OF BREATH): ICD-10-CM

## 2021-10-29 DIAGNOSIS — R07.9 CHEST PAIN, UNSPECIFIED TYPE: ICD-10-CM

## 2021-10-29 DIAGNOSIS — R00.2 PALPITATIONS: ICD-10-CM

## 2021-10-29 LAB
LV EF: 23 %
LV EF: 35 %
LVEF MODALITY: NORMAL
LVEF MODALITY: NORMAL

## 2021-10-29 PROCEDURE — 93306 TTE W/DOPPLER COMPLETE: CPT | Performed by: INTERNAL MEDICINE

## 2021-10-29 PROCEDURE — 93018 CV STRESS TEST I&R ONLY: CPT | Performed by: INTERNAL MEDICINE

## 2021-10-29 PROCEDURE — 93017 CV STRESS TEST TRACING ONLY: CPT | Performed by: INTERNAL MEDICINE

## 2021-10-29 PROCEDURE — 93016 CV STRESS TEST SUPVJ ONLY: CPT | Performed by: INTERNAL MEDICINE

## 2021-10-29 PROCEDURE — 78452 HT MUSCLE IMAGE SPECT MULT: CPT | Performed by: INTERNAL MEDICINE

## 2021-10-29 PROCEDURE — A9500 TC99M SESTAMIBI: HCPCS | Performed by: INTERNAL MEDICINE

## 2021-11-02 DIAGNOSIS — F41.9 ANXIETY: ICD-10-CM

## 2021-11-02 DIAGNOSIS — M79.671 BILATERAL FOOT PAIN: ICD-10-CM

## 2021-11-02 DIAGNOSIS — M79.672 BILATERAL FOOT PAIN: ICD-10-CM

## 2021-11-02 DIAGNOSIS — E11.9 TYPE 2 DIABETES MELLITUS WITHOUT COMPLICATION, WITH LONG-TERM CURRENT USE OF INSULIN (HCC): ICD-10-CM

## 2021-11-02 DIAGNOSIS — Z79.4 TYPE 2 DIABETES MELLITUS WITHOUT COMPLICATION, WITH LONG-TERM CURRENT USE OF INSULIN (HCC): ICD-10-CM

## 2021-11-02 DIAGNOSIS — Z01.810 PRE-OPERATIVE CARDIOVASCULAR EXAMINATION: Primary | ICD-10-CM

## 2021-11-02 RX ORDER — GABAPENTIN 300 MG/1
CAPSULE ORAL
Qty: 90 CAPSULE | Refills: 2 | Status: SHIPPED | OUTPATIENT
Start: 2021-11-02 | End: 2021-11-17 | Stop reason: SDUPTHER

## 2021-11-02 RX ORDER — BUSPIRONE HYDROCHLORIDE 5 MG/1
TABLET ORAL
Qty: 60 TABLET | Refills: 1 | Status: SHIPPED | OUTPATIENT
Start: 2021-11-02 | End: 2022-01-03

## 2021-11-02 RX ORDER — IBUPROFEN 200 MG
1 TABLET ORAL 3 TIMES DAILY
Qty: 90 EACH | Refills: 2 | Status: SHIPPED | OUTPATIENT
Start: 2021-11-02 | End: 2022-04-18 | Stop reason: SDUPTHER

## 2021-11-02 RX ORDER — TRAZODONE HYDROCHLORIDE 50 MG/1
50 TABLET ORAL NIGHTLY
Qty: 30 TABLET | Refills: 2 | Status: SHIPPED | OUTPATIENT
Start: 2021-11-02 | End: 2021-11-02

## 2021-11-02 RX ORDER — TRAZODONE HYDROCHLORIDE 50 MG/1
TABLET ORAL
Qty: 30 TABLET | Refills: 2 | Status: SHIPPED | OUTPATIENT
Start: 2021-11-02 | End: 2022-05-03

## 2021-11-02 RX ORDER — INSULIN GLARGINE 100 [IU]/ML
40 INJECTION, SOLUTION SUBCUTANEOUS NIGHTLY
Qty: 5 PEN | Refills: 2 | Status: SHIPPED | OUTPATIENT
Start: 2021-11-02 | End: 2021-11-29 | Stop reason: SDUPTHER

## 2021-11-03 ENCOUNTER — HOSPITAL ENCOUNTER (INPATIENT)
Age: 41
LOS: 2 days | Discharge: HOME | DRG: 174 | End: 2021-11-05
Attending: STUDENT IN AN ORGANIZED HEALTH CARE EDUCATION/TRAINING PROGRAM | Admitting: STUDENT IN AN ORGANIZED HEALTH CARE EDUCATION/TRAINING PROGRAM
Payer: MEDICAID

## 2021-11-03 ENCOUNTER — APPOINTMENT (OUTPATIENT)
Dept: GENERAL RADIOLOGY | Age: 41
DRG: 174 | End: 2021-11-03
Payer: MEDICAID

## 2021-11-03 ENCOUNTER — TELEPHONE (OUTPATIENT)
Dept: CARDIOLOGY CLINIC | Age: 41
End: 2021-11-03

## 2021-11-03 DIAGNOSIS — I21.4 NSTEMI (NON-ST ELEVATED MYOCARDIAL INFARCTION) (HCC): Primary | ICD-10-CM

## 2021-11-03 DIAGNOSIS — F41.9 ANXIETY: ICD-10-CM

## 2021-11-03 PROBLEM — I25.10 ASCVD (ARTERIOSCLEROTIC CARDIOVASCULAR DISEASE): Status: ACTIVE | Noted: 2021-11-03

## 2021-11-03 LAB
ACTIVATED CLOTTING TIME, LOW RANGE: 205 SEC
ACTIVATED CLOTTING TIME, LOW RANGE: 239 SEC
ACTIVATED CLOTTING TIME, LOW RANGE: 290 SEC
ACTIVATED CLOTTING TIME, LOW RANGE: 292 SEC
ANION GAP SERPL CALCULATED.3IONS-SCNC: 10 MMOL/L (ref 4–16)
APTT: 56.1 SECONDS (ref 25.1–37.1)
BASOPHILS ABSOLUTE: 0.1 K/CU MM
BASOPHILS RELATIVE PERCENT: 1.2 % (ref 0–1)
BUN BLDV-MCNC: 20 MG/DL (ref 6–23)
CALCIUM SERPL-MCNC: 8.8 MG/DL (ref 8.3–10.6)
CHLORIDE BLD-SCNC: 103 MMOL/L (ref 99–110)
CHOLESTEROL, FASTING: 162 MG/DL
CO2: 20 MMOL/L (ref 21–32)
CREAT SERPL-MCNC: 1.3 MG/DL (ref 0.9–1.3)
DIFFERENTIAL TYPE: ABNORMAL
EOSINOPHILS ABSOLUTE: 0.2 K/CU MM
EOSINOPHILS RELATIVE PERCENT: 4.7 % (ref 0–3)
GFR AFRICAN AMERICAN: >60 ML/MIN/1.73M2
GFR NON-AFRICAN AMERICAN: >60 ML/MIN/1.73M2
GLUCOSE BLD-MCNC: 124 MG/DL (ref 70–99)
GLUCOSE BLD-MCNC: 161 MG/DL (ref 70–99)
GLUCOSE BLD-MCNC: 179 MG/DL (ref 70–99)
GLUCOSE BLD-MCNC: 235 MG/DL (ref 70–99)
GLUCOSE BLD-MCNC: 273 MG/DL (ref 70–99)
HCT VFR BLD CALC: 34.7 % (ref 42–52)
HDLC SERPL-MCNC: 23 MG/DL
HEMOGLOBIN: 11.8 GM/DL (ref 13.5–18)
IMMATURE NEUTROPHIL %: 0.2 % (ref 0–0.43)
LDL CHOLESTEROL DIRECT: 88 MG/DL
LYMPHOCYTES ABSOLUTE: 1 K/CU MM
LYMPHOCYTES RELATIVE PERCENT: 23.5 % (ref 24–44)
MCH RBC QN AUTO: 29.3 PG (ref 27–31)
MCHC RBC AUTO-ENTMCNC: 34 % (ref 32–36)
MCV RBC AUTO: 86.1 FL (ref 78–100)
MONOCYTES ABSOLUTE: 0.3 K/CU MM
MONOCYTES RELATIVE PERCENT: 6.9 % (ref 0–4)
NUCLEATED RBC %: 0 %
PDW BLD-RTO: 13.4 % (ref 11.7–14.9)
PLATELET # BLD: 173 K/CU MM (ref 140–440)
PMV BLD AUTO: 10.9 FL (ref 7.5–11.1)
POTASSIUM SERPL-SCNC: 4.2 MMOL/L (ref 3.5–5.1)
RBC # BLD: 4.03 M/CU MM (ref 4.6–6.2)
SARS-COV-2, NAAT: NOT DETECTED
SEGMENTED NEUTROPHILS ABSOLUTE COUNT: 2.6 K/CU MM
SEGMENTED NEUTROPHILS RELATIVE PERCENT: 63.5 % (ref 36–66)
SODIUM BLD-SCNC: 133 MMOL/L (ref 135–145)
SOURCE: NORMAL
TOTAL IMMATURE NEUTOROPHIL: 0.01 K/CU MM
TOTAL NUCLEATED RBC: 0 K/CU MM
TRIGLYCERIDE, FASTING: 325 MG/DL
TROPONIN T: 0.11 NG/ML
TROPONIN T: 0.51 NG/ML
WBC # BLD: 4.1 K/CU MM (ref 4–10.5)

## 2021-11-03 PROCEDURE — 6360000002 HC RX W HCPCS

## 2021-11-03 PROCEDURE — 2500000003 HC RX 250 WO HCPCS

## 2021-11-03 PROCEDURE — 6370000000 HC RX 637 (ALT 250 FOR IP): Performed by: STUDENT IN AN ORGANIZED HEALTH CARE EDUCATION/TRAINING PROGRAM

## 2021-11-03 PROCEDURE — 92928 PRQ TCAT PLMT NTRAC ST 1 LES: CPT | Performed by: INTERNAL MEDICINE

## 2021-11-03 PROCEDURE — 93454 CORONARY ARTERY ANGIO S&I: CPT

## 2021-11-03 PROCEDURE — 93005 ELECTROCARDIOGRAM TRACING: CPT | Performed by: STUDENT IN AN ORGANIZED HEALTH CARE EDUCATION/TRAINING PROGRAM

## 2021-11-03 PROCEDURE — 84484 ASSAY OF TROPONIN QUANT: CPT

## 2021-11-03 PROCEDURE — 6370000000 HC RX 637 (ALT 250 FOR IP)

## 2021-11-03 PROCEDURE — 027035Z DILATION OF CORONARY ARTERY, ONE ARTERY WITH TWO DRUG-ELUTING INTRALUMINAL DEVICES, PERCUTANEOUS APPROACH: ICD-10-PCS | Performed by: INTERNAL MEDICINE

## 2021-11-03 PROCEDURE — 1200000000 HC SEMI PRIVATE

## 2021-11-03 PROCEDURE — 6360000004 HC RX CONTRAST MEDICATION

## 2021-11-03 PROCEDURE — 93458 L HRT ARTERY/VENTRICLE ANGIO: CPT | Performed by: INTERNAL MEDICINE

## 2021-11-03 PROCEDURE — 36415 COLL VENOUS BLD VENIPUNCTURE: CPT

## 2021-11-03 PROCEDURE — 2140000000 HC CCU INTERMEDIATE R&B

## 2021-11-03 PROCEDURE — 94761 N-INVAS EAR/PLS OXIMETRY MLT: CPT

## 2021-11-03 PROCEDURE — 6360000002 HC RX W HCPCS: Performed by: NURSE PRACTITIONER

## 2021-11-03 PROCEDURE — 4A023N7 MEASUREMENT OF CARDIAC SAMPLING AND PRESSURE, LEFT HEART, PERCUTANEOUS APPROACH: ICD-10-PCS | Performed by: INTERNAL MEDICINE

## 2021-11-03 PROCEDURE — 80048 BASIC METABOLIC PNL TOTAL CA: CPT

## 2021-11-03 PROCEDURE — 85347 COAGULATION TIME ACTIVATED: CPT

## 2021-11-03 PROCEDURE — 85730 THROMBOPLASTIN TIME PARTIAL: CPT

## 2021-11-03 PROCEDURE — B2111ZZ FLUOROSCOPY OF MULTIPLE CORONARY ARTERIES USING LOW OSMOLAR CONTRAST: ICD-10-PCS | Performed by: INTERNAL MEDICINE

## 2021-11-03 PROCEDURE — 92928 PRQ TCAT PLMT NTRAC ST 1 LES: CPT

## 2021-11-03 PROCEDURE — 85025 COMPLETE CBC W/AUTO DIFF WBC: CPT

## 2021-11-03 PROCEDURE — 80061 LIPID PANEL: CPT

## 2021-11-03 PROCEDURE — 2580000003 HC RX 258: Performed by: INTERNAL MEDICINE

## 2021-11-03 PROCEDURE — C1874 STENT, COATED/COV W/DEL SYS: HCPCS

## 2021-11-03 PROCEDURE — 6370000000 HC RX 637 (ALT 250 FOR IP): Performed by: INTERNAL MEDICINE

## 2021-11-03 PROCEDURE — 87635 SARS-COV-2 COVID-19 AMP PRB: CPT

## 2021-11-03 PROCEDURE — 6370000000 HC RX 637 (ALT 250 FOR IP): Performed by: HOSPITALIST

## 2021-11-03 PROCEDURE — 2709999900 HC NON-CHARGEABLE SUPPLY

## 2021-11-03 PROCEDURE — C1887 CATHETER, GUIDING: HCPCS

## 2021-11-03 PROCEDURE — 93005 ELECTROCARDIOGRAM TRACING: CPT | Performed by: INTERNAL MEDICINE

## 2021-11-03 PROCEDURE — C1769 GUIDE WIRE: HCPCS

## 2021-11-03 PROCEDURE — 99254 IP/OBS CNSLTJ NEW/EST MOD 60: CPT | Performed by: INTERNAL MEDICINE

## 2021-11-03 PROCEDURE — 99285 EMERGENCY DEPT VISIT HI MDM: CPT

## 2021-11-03 PROCEDURE — C1894 INTRO/SHEATH, NON-LASER: HCPCS

## 2021-11-03 PROCEDURE — C1892 INTRO/SHEATH,FIXED,PEEL-AWAY: HCPCS

## 2021-11-03 PROCEDURE — C1725 CATH, TRANSLUMIN NON-LASER: HCPCS

## 2021-11-03 PROCEDURE — 82962 GLUCOSE BLOOD TEST: CPT

## 2021-11-03 PROCEDURE — 71045 X-RAY EXAM CHEST 1 VIEW: CPT

## 2021-11-03 RX ORDER — NICOTINE POLACRILEX 4 MG
15 LOZENGE BUCCAL PRN
Status: DISCONTINUED | OUTPATIENT
Start: 2021-11-03 | End: 2021-11-05 | Stop reason: HOSPADM

## 2021-11-03 RX ORDER — SODIUM CHLORIDE 0.9 % (FLUSH) 0.9 %
5-40 SYRINGE (ML) INJECTION EVERY 12 HOURS SCHEDULED
Status: DISCONTINUED | OUTPATIENT
Start: 2021-11-03 | End: 2021-11-05 | Stop reason: HOSPADM

## 2021-11-03 RX ORDER — SODIUM CHLORIDE 9 MG/ML
25 INJECTION, SOLUTION INTRAVENOUS PRN
Status: DISCONTINUED | OUTPATIENT
Start: 2021-11-03 | End: 2021-11-05 | Stop reason: HOSPADM

## 2021-11-03 RX ORDER — OMEPRAZOLE 20 MG/1
20 CAPSULE, DELAYED RELEASE ORAL DAILY
Status: DISCONTINUED | OUTPATIENT
Start: 2021-11-03 | End: 2021-11-03 | Stop reason: CLARIF

## 2021-11-03 RX ORDER — NICOTINE 21 MG/24HR
1 PATCH, TRANSDERMAL 24 HOURS TRANSDERMAL DAILY
Status: DISCONTINUED | OUTPATIENT
Start: 2021-11-03 | End: 2021-11-05 | Stop reason: HOSPADM

## 2021-11-03 RX ORDER — POLYETHYLENE GLYCOL 3350 17 G/17G
17 POWDER, FOR SOLUTION ORAL DAILY PRN
Status: DISCONTINUED | OUTPATIENT
Start: 2021-11-03 | End: 2021-11-05 | Stop reason: HOSPADM

## 2021-11-03 RX ORDER — LISINOPRIL 10 MG/1
10 TABLET ORAL DAILY
Status: DISCONTINUED | OUTPATIENT
Start: 2021-11-03 | End: 2021-11-05 | Stop reason: HOSPADM

## 2021-11-03 RX ORDER — METOPROLOL SUCCINATE 25 MG/1
25 TABLET, EXTENDED RELEASE ORAL DAILY
Status: DISCONTINUED | OUTPATIENT
Start: 2021-11-03 | End: 2021-11-05 | Stop reason: HOSPADM

## 2021-11-03 RX ORDER — TRAZODONE HYDROCHLORIDE 50 MG/1
50 TABLET ORAL NIGHTLY PRN
Status: DISCONTINUED | OUTPATIENT
Start: 2021-11-03 | End: 2021-11-05 | Stop reason: HOSPADM

## 2021-11-03 RX ORDER — METOPROLOL TARTRATE 50 MG/1
25 TABLET, FILM COATED ORAL 2 TIMES DAILY
Status: DISCONTINUED | OUTPATIENT
Start: 2021-11-03 | End: 2021-11-03

## 2021-11-03 RX ORDER — SODIUM CHLORIDE 0.9 % (FLUSH) 0.9 %
5-40 SYRINGE (ML) INJECTION PRN
Status: DISCONTINUED | OUTPATIENT
Start: 2021-11-03 | End: 2021-11-05 | Stop reason: HOSPADM

## 2021-11-03 RX ORDER — DEXTROSE MONOHYDRATE 50 MG/ML
100 INJECTION, SOLUTION INTRAVENOUS PRN
Status: DISCONTINUED | OUTPATIENT
Start: 2021-11-03 | End: 2021-11-05 | Stop reason: HOSPADM

## 2021-11-03 RX ORDER — ONDANSETRON 4 MG/1
4 TABLET, ORALLY DISINTEGRATING ORAL EVERY 8 HOURS PRN
Status: DISCONTINUED | OUTPATIENT
Start: 2021-11-03 | End: 2021-11-05 | Stop reason: HOSPADM

## 2021-11-03 RX ORDER — DEXTROSE MONOHYDRATE 25 G/50ML
12.5 INJECTION, SOLUTION INTRAVENOUS PRN
Status: DISCONTINUED | OUTPATIENT
Start: 2021-11-03 | End: 2021-11-05 | Stop reason: HOSPADM

## 2021-11-03 RX ORDER — ASPIRIN 325 MG
325 TABLET ORAL DAILY
Status: DISCONTINUED | OUTPATIENT
Start: 2021-11-04 | End: 2021-11-03 | Stop reason: ALTCHOICE

## 2021-11-03 RX ORDER — HEPARIN SODIUM 10000 [USP'U]/100ML
5-30 INJECTION, SOLUTION INTRAVENOUS CONTINUOUS
Status: DISCONTINUED | OUTPATIENT
Start: 2021-11-03 | End: 2021-11-03

## 2021-11-03 RX ORDER — BLOOD-GLUCOSE METER
1 EACH MISCELLANEOUS ONCE
Status: DISCONTINUED | OUTPATIENT
Start: 2021-11-03 | End: 2021-11-03 | Stop reason: RX

## 2021-11-03 RX ORDER — ASPIRIN 81 MG/1
324 TABLET, CHEWABLE ORAL ONCE
Status: COMPLETED | OUTPATIENT
Start: 2021-11-03 | End: 2021-11-03

## 2021-11-03 RX ORDER — INSULIN GLARGINE 100 [IU]/ML
40 INJECTION, SOLUTION SUBCUTANEOUS NIGHTLY
Status: DISCONTINUED | OUTPATIENT
Start: 2021-11-03 | End: 2021-11-05 | Stop reason: HOSPADM

## 2021-11-03 RX ORDER — HEPARIN SODIUM 1000 [USP'U]/ML
30 INJECTION, SOLUTION INTRAVENOUS; SUBCUTANEOUS PRN
Status: DISCONTINUED | OUTPATIENT
Start: 2021-11-03 | End: 2021-11-03

## 2021-11-03 RX ORDER — SODIUM CHLORIDE 0.9 % (FLUSH) 0.9 %
5-40 SYRINGE (ML) INJECTION EVERY 12 HOURS SCHEDULED
Status: DISCONTINUED | OUTPATIENT
Start: 2021-11-03 | End: 2021-11-04

## 2021-11-03 RX ORDER — ATORVASTATIN CALCIUM 40 MG/1
80 TABLET, FILM COATED ORAL NIGHTLY
Status: DISCONTINUED | OUTPATIENT
Start: 2021-11-03 | End: 2021-11-05 | Stop reason: HOSPADM

## 2021-11-03 RX ORDER — ACETAMINOPHEN 325 MG/1
650 TABLET ORAL EVERY 4 HOURS PRN
Status: DISCONTINUED | OUTPATIENT
Start: 2021-11-03 | End: 2021-11-05 | Stop reason: HOSPADM

## 2021-11-03 RX ORDER — HEPARIN SODIUM 1000 [USP'U]/ML
60 INJECTION, SOLUTION INTRAVENOUS; SUBCUTANEOUS PRN
Status: DISCONTINUED | OUTPATIENT
Start: 2021-11-03 | End: 2021-11-03

## 2021-11-03 RX ORDER — BUSPIRONE HYDROCHLORIDE 5 MG/1
5 TABLET ORAL 2 TIMES DAILY
Status: DISCONTINUED | OUTPATIENT
Start: 2021-11-03 | End: 2021-11-05 | Stop reason: HOSPADM

## 2021-11-03 RX ORDER — ISOSORBIDE MONONITRATE 30 MG/1
30 TABLET, EXTENDED RELEASE ORAL DAILY
Status: DISCONTINUED | OUTPATIENT
Start: 2021-11-03 | End: 2021-11-05 | Stop reason: HOSPADM

## 2021-11-03 RX ORDER — GABAPENTIN 300 MG/1
300 CAPSULE ORAL 3 TIMES DAILY
Status: DISCONTINUED | OUTPATIENT
Start: 2021-11-03 | End: 2021-11-05 | Stop reason: HOSPADM

## 2021-11-03 RX ORDER — ONDANSETRON 2 MG/ML
4 INJECTION INTRAMUSCULAR; INTRAVENOUS EVERY 6 HOURS PRN
Status: DISCONTINUED | OUTPATIENT
Start: 2021-11-03 | End: 2021-11-05 | Stop reason: HOSPADM

## 2021-11-03 RX ORDER — LORAZEPAM 2 MG/ML
2 INJECTION INTRAMUSCULAR ONCE
Status: COMPLETED | OUTPATIENT
Start: 2021-11-03 | End: 2021-11-03

## 2021-11-03 RX ORDER — ACETAMINOPHEN 650 MG/1
650 SUPPOSITORY RECTAL EVERY 6 HOURS PRN
Status: DISCONTINUED | OUTPATIENT
Start: 2021-11-03 | End: 2021-11-05 | Stop reason: HOSPADM

## 2021-11-03 RX ORDER — HEPARIN SODIUM 1000 [USP'U]/ML
60 INJECTION, SOLUTION INTRAVENOUS; SUBCUTANEOUS ONCE
Status: COMPLETED | OUTPATIENT
Start: 2021-11-03 | End: 2021-11-03

## 2021-11-03 RX ORDER — ASPIRIN 81 MG/1
81 TABLET ORAL DAILY
Status: DISCONTINUED | OUTPATIENT
Start: 2021-11-04 | End: 2021-11-05 | Stop reason: HOSPADM

## 2021-11-03 RX ORDER — NITROGLYCERIN 0.4 MG/1
0.4 TABLET SUBLINGUAL ONCE
Status: COMPLETED | OUTPATIENT
Start: 2021-11-03 | End: 2021-11-03

## 2021-11-03 RX ORDER — ONDANSETRON 2 MG/ML
4 INJECTION INTRAMUSCULAR; INTRAVENOUS EVERY 6 HOURS PRN
Status: DISCONTINUED | OUTPATIENT
Start: 2021-11-03 | End: 2021-11-03 | Stop reason: SDUPTHER

## 2021-11-03 RX ORDER — ACETAMINOPHEN 325 MG/1
650 TABLET ORAL EVERY 6 HOURS PRN
Status: DISCONTINUED | OUTPATIENT
Start: 2021-11-03 | End: 2021-11-03 | Stop reason: SDUPTHER

## 2021-11-03 RX ORDER — SODIUM CHLORIDE 9 MG/ML
INJECTION, SOLUTION INTRAVENOUS CONTINUOUS
Status: DISCONTINUED | OUTPATIENT
Start: 2021-11-03 | End: 2021-11-04

## 2021-11-03 RX ADMIN — HEPARIN SODIUM 6200 UNITS: 1000 INJECTION, SOLUTION INTRAVENOUS; SUBCUTANEOUS at 09:18

## 2021-11-03 RX ADMIN — LORAZEPAM 2 MG: 2 INJECTION INTRAMUSCULAR; INTRAVENOUS at 17:11

## 2021-11-03 RX ADMIN — SODIUM CHLORIDE: 9 INJECTION, SOLUTION INTRAVENOUS at 17:33

## 2021-11-03 RX ADMIN — TRAZODONE HYDROCHLORIDE 50 MG: 50 TABLET ORAL at 23:55

## 2021-11-03 RX ADMIN — NITROGLYCERIN 0.4 MG: 0.4 TABLET, ORALLY DISINTEGRATING SUBLINGUAL at 06:27

## 2021-11-03 RX ADMIN — SODIUM CHLORIDE, PRESERVATIVE FREE 10 ML: 5 INJECTION INTRAVENOUS at 16:39

## 2021-11-03 RX ADMIN — ASPIRIN 324 MG: 81 TABLET, CHEWABLE ORAL at 06:27

## 2021-11-03 RX ADMIN — HEPARIN SODIUM 12 UNITS/KG/HR: 10000 INJECTION, SOLUTION INTRAVENOUS at 09:17

## 2021-11-03 ASSESSMENT — PAIN DESCRIPTION - LOCATION
LOCATION: CHEST

## 2021-11-03 ASSESSMENT — PAIN DESCRIPTION - PROGRESSION
CLINICAL_PROGRESSION: GRADUALLY IMPROVING

## 2021-11-03 ASSESSMENT — PAIN SCALES - GENERAL
PAINLEVEL_OUTOF10: 5
PAINLEVEL_OUTOF10: 0
PAINLEVEL_OUTOF10: 9
PAINLEVEL_OUTOF10: 10
PAINLEVEL_OUTOF10: 7

## 2021-11-03 ASSESSMENT — PAIN DESCRIPTION - FREQUENCY: FREQUENCY: INTERMITTENT

## 2021-11-03 ASSESSMENT — PAIN DESCRIPTION - DESCRIPTORS: DESCRIPTORS: ACHING

## 2021-11-03 ASSESSMENT — PAIN DESCRIPTION - PAIN TYPE
TYPE: ACUTE PAIN
TYPE: ACUTE PAIN

## 2021-11-03 NOTE — ED NOTES
Upon evaluation of the client, they are observed to be alert and oriented to person, place and situation. The head of the bed is elevated above 30 degrees. The client verbalizes appropriately to all questions and/or comments. The client also makes eye contact when prompted. The client also exhibits unlabored breathing, their skin is pink, warm & dry, and are observed to have relaxed extremities. When communicating, the client speaks with clear speech and normal tone and is in no apparent distress. The call light is within reach, the bed is in the low position and both side rails are up.      Nathanael Mathur RN  11/03/21 2121

## 2021-11-03 NOTE — PROGRESS NOTES
Alternates and risk of the procedure were dicussed in detail  Patient is in agreement to proceed  Mallampati is 3 ASA is  3
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
## 2021-11-03 NOTE — ED PROVIDER NOTES
ED Course as of Nov 03 0747   Wed Nov 03, 2021   0559 Exertional chest pain last month, recently stressed by Trudy, 35% with global hypokinesis recently, abnormal EKG, will need admission for chest pain, got aspirin PTA    [YQ]   0602 EKG showed AVL T wave inversion, V4, V5, V2 biphasic T wave    [YQ]   0732 Patient's chest pain is improved with nitroglycerin however troponin came back 0.113, patient is to have exertional chest pain as a pressure sensations, given patient's significant cardiac history I think is most likely anginal related, patient chest x-ray shows some mild bilateral haziness in the lower lobe, could just be atelectasis, patient not have any pleuritic components or any cough or any fever, and this is not much different compared to previous x-ray, still think the chest pain is related to cardiac etiology, will start him on heparin drip consult cardiology and arrange hospital admission.    [YQ]      ED Course User Index  [YQ] MD Modesto Sandhu MD  11/03/21 4683

## 2021-11-03 NOTE — TELEPHONE ENCOUNTER
Summary   Left ventricular function is severely abnormal , EF is estimated at 20-25%. Mildly dilated left ventricle. Moderate left ventricular hypertrophy. Grade I diastolic dysfunction. Global hypokinesis noted. Moderately dilated left atrium. The mitral valve appears to slightly prolapse. Mild mitral , tricuspid and moderate pulmonic regurgitation is present. Mild pulmonary hypertension noted with RVSP of 43mmHg. No evidence of pericardial effusion. Summary    Supervising physician Dr. Rekha Garcia .    Reduced EF with global hypokinesis EF of 35 %    Large size severe anterior /apical ischemic area of left ventricle    Dilated left ventricle with EDV of 240 ML    Ischemic dilated cardiomyopathy    Abnormal stress test      Recommendation    Needs urgent cardiology follow up       Pt is scheduled for appointment today.  11/03/2021 @ 8:40AM

## 2021-11-03 NOTE — CONSULTS
INPATIENT CARDIOLOGY CONSULT NOTE       Reason for consultation:  CP      Primary care physician: BARBARA Serna CNP         Chief Complaint   Patient presents with    Chest Pain     since 10pm       History of present illness:Dewey is a 39 y. o.year old who  presents with  Chief Complaint   Patient presents with    Chest Pain     since 10pm       Patient is a pleasant 71-year-old gentleman who presents to the hospital with chief complaint of chest pain. He was recently evaluated by Dr. Matt Marte in outpatient setting for chest pains and underwent stress MPI , which was noted to be abnormal as reported below. Patient started experiencing chest discomfort last night, retrosternal 5 out of 10 woke him up from sleep radiating to the left arm associated with shortness of breath. [facility] physician Dr. Jamal Dominguez .    Reduced EF with global hypokinesis EF of 35 %    Large size severe anterior /apical ischemic area of left ventricle    Dilated left ventricle with EDV of 240 ML    Ischemic dilated cardiomyopathy    Abnormal stress test        Recommendation    Needs urgent cardiology follow up     EKG shows sinus rhythm with deep T wave inversions noted in lateral leads, anterior septal leads    Troponin 0 0.113     Past medical history:    has a past medical history of Accident, Acid reflux, Anemia, Broken teeth, Diabetes mellitus ([address] History of nuclear stress test, Hx of Doppler echocardiogram, Kidney stones, Marijuana use, Precordial pain, Shortness of breath on exertion, Teeth missing, and [place] teeth extracted. Past surgical history:   has a past surgical history that includes other surgical history (12/16/2017); [place] tooth extraction; other surgical history (Right, 02/28/2018); Toe amputation (Left, 3/27/2020); Colonoscopy (N/A, 9/21/2021); and Upper gastrointestinal endoscopy (N/A, 9/21/2021). Social History:   reports that he has been smoking cigarettes.  He started smoking about 26 years ago. He has a 12.00 pack-year smoking history. He has never used smokeless tobacco. He reports previous alcohol use. He reports current drug use. Drug: Marijuana aCmeron Engel).   Family history:   no family history of CAD, STROKE of DM    No Known Allergies    heparin (porcine) injection 6,200 Units, PRN  heparin (porcine) injection 3,100 Units, PRN  heparin 25,000 units in dextrose 5% 250 mL (premix) infusion, Continuous  [START ON 11/4/2021] aspirin tablet 325 mg, Daily  busPIRone (BUSPAR) tablet 5 mg, BID  gabapentin (NEURONTIN) capsule 300 mg, TID  insulin glargine (LANTUS) injection vial 40 Units, Nightly  insulin lispro (HUMALOG) injection vial 15 Units, TID WC  isosorbide mononitrate (IMDUR) extended release tablet 30 mg, Daily  lisinopril (PRINIVIL;ZESTRIL) tablet 10 mg, Daily  metoprolol tartrate (LOPRESSOR) tablet 25 mg, BID  traZODone (DESYREL) tablet 50 mg, Nightly PRN  sodium chloride flush 0.9 % injection 5-40 mL, 2 times per day  sodium chloride flush 0.9 % injection 5-40 mL, PRN  0.9 % sodium chloride infusion, PRN  ondansetron (ZOFRAN-ODT) disintegrating tablet 4 mg, Q8H PRN   Or  ondansetron (ZOFRAN) injection 4 mg, Q6H PRN  polyethylene glycol (GLYCOLAX) packet 17 g, Daily PRN  acetaminophen (TYLENOL) tablet 650 mg, Q6H PRN   Or  acetaminophen (TYLENOL) suppository 650 mg, Q6H PRN  insulin lispro (HUMALOG) injection vial 0-6 Units, TID WC  insulin lispro (HUMALOG) injection vial 0-3 Units, Nightly      Current Facility-Administered Medications   Medication Dose Route Frequency Provider Last Rate Last Admin    heparin (porcine) injection 6,200 Units  60 Units/kg IntraVENous PRN Alveda Isael, APRN - CNP        heparin (porcine) injection 3,100 Units  30 Units/kg IntraVENous PRN Alveda Isael, APRN - CNP        heparin 25,000 units in dextrose 5% 250 mL (premix) infusion  5-30 Units/kg/hr IntraVENous Continuous Alveda Isael, APRN - CNP 12.4 mL/hr at 11/03/21 0917 12 Units/kg/hr at Medications   Medication Sig Dispense Refill    insulin glargine (LANTUS) 100 UNIT/ML injection vial Inject 40 Units into the skin nightly 5 pen 2    insulin lispro (HUMALOG) 100 UNIT/ML injection vial Inject 15 Units into the skin 3 times daily (with meals) 1 each 3    INSULIN SYRINGE .5CC/29G 29G X 1/2\" 0.5 ML MISC Inject 1 each into the skin 3 times daily 90 each 2    metFORMIN (GLUCOPHAGE) 1000 MG tablet TAKE ONE TABLET BY MOUTH TWICE A DAY WITH MEALS 60 tablet 2    busPIRone (BUSPAR) 5 MG tablet TAKE ONE TABLET BY MOUTH TWICE A DAY 60 tablet 1    gabapentin (NEURONTIN) 300 MG capsule TAKE ONE CAPSULE BY MOUTH THREE TIMES A DAY 90 capsule 2    traZODone (DESYREL) 50 MG tablet TAKE ONE TABLET BY MOUTH NIGHTLY AS NEEDED FOR SLEEP 30 tablet 2    isosorbide mononitrate (IMDUR) 30 MG extended release tablet Take 1 tablet by mouth daily 30 tablet 3    metoprolol tartrate (LOPRESSOR) 25 MG tablet Take 1 tablet by mouth 2 times daily 60 tablet 0    nitroGLYCERIN (NITROSTAT) 0.4 MG SL tablet Place 1 tablet under the tongue every 5 minutes as needed for Chest pain 25 tablet 3    omeprazole (PRILOSEC) 20 MG delayed release capsule Take 1 capsule by mouth Daily 30 capsule 5    lisinopril (PRINIVIL;ZESTRIL) 10 MG tablet Take 1 tablet by mouth daily 90 tablet 3    vitamin D (ERGOCALCIFEROL) 1.25 MG (35165 UT) CAPS capsule Take 1 capsule by mouth once a week 12 capsule 2    tiZANidine (ZANAFLEX) 2 MG tablet TAKE ONE TABLET BY MOUTH THREE TIMES DAILY AS NEEDED FOR BACK PAIN AND BACK SPASM 30 tablet 0    Insulin Syringes, Disposable, U-100 1 ML MISC 1 each by Does not apply route daily 100 each 3    Lancets MISC 1 each by Does not apply route 3 times daily 600 each 1    Blood Glucose Monitoring Suppl (ONE TOUCH ULTRA 2) w/Device KIT 1 kit by Does not apply route once for 1 dose 1 kit 0    blood glucose monitor strips Test 3 times a day & as needed for symptoms of irregular blood glucose.  270 strip 2    aspirin 325 MG tablet Take 325 mg by mouth daily            Review of Systems:     · Constitutional: No Fever or Weight Loss   · Eyes: No Decreased Vision  · ENT: No Headaches, Hearing Loss or Vertigo  · Cardiovascular: ++ chest pain, dyspnea on exertion, palpitations or loss of consciousness  · Respiratory: No cough or wheezing    · Gastrointestinal: No abdominal pain, appetite loss, blood in stools, constipation, diarrhea or heartburn  · Genitourinary: No dysuria, trouble voiding, or hematuria  · Musculoskeletal:  No gait disturbance, weakness or joint complaints  · Integumentary: No rash or pruritis  · Neurological: No TIA or stroke symptoms  · Psychiatric: No anxiety or depression  · Endocrine: No malaise, fatigue or temperature intolerance  · Hematologic/Lymphatic: No bleeding problems, blood clots or swollen lymph nodes  · Allergic/Immunologic: No nasal congestion or hives    All other systems were reviewed and were negative otherwise. Physical Examination:      Vitals:    11/03/21 0917   BP:    Pulse: 55   Resp: 19   Temp:    SpO2: 96%      Wt Readings from Last 3 Encounters:   11/03/21 228 lb (103.4 kg)   10/25/21 227 lb 12.8 oz (103.3 kg)   10/19/21 228 lb (103.4 kg)     Body mass index is 30.08 kg/m². General Appearance:  No distress, conversant  Constitutional:  Well developed, Well nourished  HEENT:  Normocephalic, Atraumatic, Oropharynx moist, No oral exudates,   Nose normal. Neck Supple Carotid: no carotid bruit  Eyes:  Conjunctiva normal, No discharge. Respiratory:    Normal breath sounds, No respiratory distress, No wheezing, no use of accessory muscles, diaphragm movement is normal  No chest Tenderness  Cardiovascular: S1-S2 No murmurs auscultated. No rubs, thrills or gallops. Normal  rhythm. Pedal pulses are normal. No pedal edema  GI:  Soft Non tender, non distended. :  No CVA tenderness. Musculoskeletal:   No tenderness, No cyanosis, No clubbing.    Integument:  Warm, Dry, No erythema, No rash. Lymphatic:  No lymphadenopathy noted. Neurologic:  Alert & oriented x 3  No focal deficits noted. Psychiatric:  Affect normal, Judgment normal, Mood normal.       Lab Review     Recent Labs     11/03/21  0630   WBC 4.1   HGB 11.8*   HCT 34.7*         Recent Labs     11/03/21  0630   *   K 4.2      CO2 20*   BUN 20   CREATININE 1.3     No results for input(s): AST, ALT, ALB, BILIDIR, BILITOT, ALKPHOS in the last 72 hours. No results for input(s): TROPONINI in the last 72 hours. No results found for: BNP  Lab Results   Component Value Date    INR 0.92 06/12/2019    PROTIME 10.5 06/12/2019         All labs, images, EKGs were personally reviewed      Assessment: 39 y. o.year old with PMH of  has a past medical history of Accident, Acid reflux, Anemia, Broken teeth, Diabetes mellitus ([address] History of nuclear stress test, Hx of Doppler echocardiogram, Kidney stones, Marijuana use, Precordial pain, Shortness of breath on exertion, Teeth missing, and [place] teeth extracted. Recommendations:      1. Non STEMI   2. Abnormal stress test    IV heparin  Full dose aspirin  Imdur  Metoprolol  Left heart cath today, pros cons discussed patient agreeable and consented    3. Nicotine dependence: Counseled against nicotine use  4. Heart failure with reduced ejection fraction    Recent echocardiogram shows LVEF 2025%  LVH Dilated left atrium  Mild mitral valve prolapse and pulmonary hypertension  Switch metoprolol tartrate to succinate  Continue with lisinopril  We will likely initiate Aldactone after left heart cath  LifeVest upon discharge      5. Hyperlipidemia: Obtain lipid panel. Start patient on Lipitor 80 mg daily  6. Essential hypertension: Continue with lisinopril. Start Toprol-XL. Continue Imdur.       Thank you for the consult    Dr. Usha Decker  11/3/2021 9:51 AM

## 2021-11-03 NOTE — ED NOTES
Bed: ED-09  Expected date:   Expected time:   Means of arrival:   Comments:  No monitor no bed     Butch Sierra RN  11/03/21 [id number]

## 2021-11-03 NOTE — PROGRESS NOTES
Pt received from ED dept to cath holding room 17. Assessment completed. Pt reports intermittent chest discomfort. Rated at 7/10, left chest. 12 lead EKG done and O2 applied at 2 liters per NC. Heparin gtt continues at 12 units per kg per hour. 1205 discomfort improved after O2 applied. Rates as a 5 now. CAN Schaeffer at bedside. Repeat 12 lead EKG reviewed per Dr. Miguel Zavala per [facility] RN.

## 2021-11-03 NOTE — ED NOTES
Notified Dr. Armando Wilson of Troponin results. This nurse at patient bedside. Patient reports minimal pain-5/10 on upper left side of chest. Dr. Roberts Mouse aware. Patient going to radiology after this nurse spoke with patient.      Janus Cabot, RN  11/03/21 [id number]

## 2021-11-04 LAB
ANION GAP SERPL CALCULATED.3IONS-SCNC: 10 MMOL/L (ref 4–16)
BUN BLDV-MCNC: 17 MG/DL (ref 6–23)
CALCIUM SERPL-MCNC: 8.3 MG/DL (ref 8.3–10.6)
CHLORIDE BLD-SCNC: 106 MMOL/L (ref 99–110)
CO2: 19 MMOL/L (ref 21–32)
CREAT SERPL-MCNC: 1.3 MG/DL (ref 0.9–1.3)
GFR AFRICAN AMERICAN: >60 ML/MIN/1.73M2
GFR NON-AFRICAN AMERICAN: >60 ML/MIN/1.73M2
GLUCOSE BLD-MCNC: 132 MG/DL (ref 70–99)
GLUCOSE BLD-MCNC: 135 MG/DL (ref 70–99)
GLUCOSE BLD-MCNC: 139 MG/DL (ref 70–99)
GLUCOSE BLD-MCNC: 150 MG/DL (ref 70–99)
GLUCOSE BLD-MCNC: 184 MG/DL (ref 70–99)
GLUCOSE BLD-MCNC: 270 MG/DL (ref 70–99)
HCT VFR BLD CALC: 30.4 % (ref 42–52)
HEMOGLOBIN: 10.3 GM/DL (ref 13.5–18)
MCH RBC QN AUTO: 29.4 PG (ref 27–31)
MCHC RBC AUTO-ENTMCNC: 33.9 % (ref 32–36)
MCV RBC AUTO: 86.9 FL (ref 78–100)
PDW BLD-RTO: 13.7 % (ref 11.7–14.9)
PLATELET # BLD: 155 K/CU MM (ref 140–440)
PMV BLD AUTO: 10.7 FL (ref 7.5–11.1)
POTASSIUM SERPL-SCNC: 4 MMOL/L (ref 3.5–5.1)
RBC # BLD: 3.5 M/CU MM (ref 4.6–6.2)
SODIUM BLD-SCNC: 135 MMOL/L (ref 135–145)
TROPONIN T: 0.34 NG/ML
WBC # BLD: 4 K/CU MM (ref 4–10.5)

## 2021-11-04 PROCEDURE — 1200000000 HC SEMI PRIVATE

## 2021-11-04 PROCEDURE — 6360000002 HC RX W HCPCS: Performed by: NURSE PRACTITIONER

## 2021-11-04 PROCEDURE — 6370000000 HC RX 637 (ALT 250 FOR IP): Performed by: INTERNAL MEDICINE

## 2021-11-04 PROCEDURE — 84484 ASSAY OF TROPONIN QUANT: CPT

## 2021-11-04 PROCEDURE — 36415 COLL VENOUS BLD VENIPUNCTURE: CPT

## 2021-11-04 PROCEDURE — 85027 COMPLETE CBC AUTOMATED: CPT

## 2021-11-04 PROCEDURE — 99233 SBSQ HOSP IP/OBS HIGH 50: CPT | Performed by: INTERNAL MEDICINE

## 2021-11-04 PROCEDURE — 2580000003 HC RX 258: Performed by: INTERNAL MEDICINE

## 2021-11-04 PROCEDURE — APPSS60 APP SPLIT SHARED TIME 46-60 MINUTES: Performed by: NURSE PRACTITIONER

## 2021-11-04 PROCEDURE — 85730 THROMBOPLASTIN TIME PARTIAL: CPT

## 2021-11-04 PROCEDURE — 6370000000 HC RX 637 (ALT 250 FOR IP): Performed by: HOSPITALIST

## 2021-11-04 PROCEDURE — 80048 BASIC METABOLIC PNL TOTAL CA: CPT

## 2021-11-04 PROCEDURE — 82962 GLUCOSE BLOOD TEST: CPT

## 2021-11-04 PROCEDURE — 6370000000 HC RX 637 (ALT 250 FOR IP): Performed by: NURSE PRACTITIONER

## 2021-11-04 PROCEDURE — 94761 N-INVAS EAR/PLS OXIMETRY MLT: CPT

## 2021-11-04 RX ORDER — LORAZEPAM 2 MG/ML
1 INJECTION INTRAMUSCULAR ONCE
Status: COMPLETED | OUTPATIENT
Start: 2021-11-04 | End: 2021-11-04

## 2021-11-04 RX ORDER — ATORVASTATIN CALCIUM 80 MG/1
80 TABLET, FILM COATED ORAL NIGHTLY
Qty: 30 TABLET | Refills: 3 | Status: SHIPPED | OUTPATIENT
Start: 2021-11-04 | End: 2022-03-22 | Stop reason: SDUPTHER

## 2021-11-04 RX ORDER — NICOTINE 21 MG/24HR
1 PATCH, TRANSDERMAL 24 HOURS TRANSDERMAL DAILY
Qty: 30 PATCH | Refills: 3 | Status: SHIPPED | OUTPATIENT
Start: 2021-11-05 | End: 2022-01-20

## 2021-11-04 RX ORDER — SPIRONOLACTONE 25 MG/1
25 TABLET ORAL DAILY
Status: DISCONTINUED | OUTPATIENT
Start: 2021-11-04 | End: 2021-11-05 | Stop reason: HOSPADM

## 2021-11-04 RX ORDER — POLYETHYLENE GLYCOL 3350 17 G/17G
17 POWDER, FOR SOLUTION ORAL DAILY PRN
Qty: 527 G | Refills: 1 | Status: SHIPPED | OUTPATIENT
Start: 2021-11-04 | End: 2021-12-04

## 2021-11-04 RX ORDER — SPIRONOLACTONE 25 MG/1
25 TABLET ORAL DAILY
Qty: 30 TABLET | Refills: 3 | Status: ON HOLD | OUTPATIENT
Start: 2021-11-05 | End: 2022-01-12 | Stop reason: HOSPADM

## 2021-11-04 RX ORDER — LORAZEPAM 2 MG/ML
2 INJECTION INTRAMUSCULAR ONCE
Status: COMPLETED | OUTPATIENT
Start: 2021-11-04 | End: 2021-11-04

## 2021-11-04 RX ADMIN — LORAZEPAM 2 MG: 2 INJECTION INTRAMUSCULAR; INTRAVENOUS at 14:29

## 2021-11-04 RX ADMIN — TRAZODONE HYDROCHLORIDE 50 MG: 50 TABLET ORAL at 20:51

## 2021-11-04 RX ADMIN — LORAZEPAM 1 MG: 2 INJECTION INTRAMUSCULAR; INTRAVENOUS at 23:08

## 2021-11-04 RX ADMIN — SODIUM CHLORIDE: 9 INJECTION, SOLUTION INTRAVENOUS at 08:03

## 2021-11-04 ASSESSMENT — PAIN SCALES - GENERAL
PAINLEVEL_OUTOF10: 0
PAINLEVEL_OUTOF10: 0

## 2021-11-04 NOTE — PROGRESS NOTES
Cardiology Progress Note     Today's Plan: start aldactone, Life vest    Admit Date:  11/3/2021    Consult reason/ Seen today for: NSTEMI    Subjective and  Overnight Events: Patient currently denies any chest pain, mild shortness of breath on ambulation. Right radial site is free of hematoma, no bleeding, fingers pink, no drainage, mobility intact, pulses palpable, patient denies any pain, wound is healing well. Assessment / Plan / Recommendation:     1. Non STEMI :-PCI to ostial LAD, OM1 , proximal PDA 70-80% stenosis. We will plan staged PCI of PDA and attempt PCI of OM. Continue with ASA, statin, BB, ACE, Brilinta, & imdur. 2. Abnormal stress test: presented with new CP, now resolved. Continue with Imdur can titrate to effect if chest pain returns. 3. Nicotine dependence: Counseled against nicotine use  4. Heart failure with reduced ejection fraction: Patient currently appears euvolemic.     Recent echocardiogram shows LVEF 20-25%  LVH Dilated left atrium  Mild mitral valve prolapse and pulmonary hypertension    Continue with lisinopril, Toprol XL, Started on Aldactone    Recommend LifeVest upon discharge due to ICM and recent MI. Patient is high risk for for sudden cardiac death.      5. Hyperlipidemia: lipid panel shows elevated triglycerides. Continue with Lipitor 80 mg daily  6. Essential hypertension:controlled, continue with lisinopril, Toprol-XL, Imdur. Patient can be discharged once Life Vest is placed. History of Presenting Illness:    Chief complain on admission : 39 y. o.year old who is admitted for  Chief Complaint   Patient presents with    Chest Pain     since 10pm        Past medical history:    has a past medical history of Accident, Acid reflux, Anemia, Broken teeth, Diabetes mellitus ([address] History of nuclear stress test, Hx of Doppler echocardiogram, Kidney stones, Marijuana use, Precordial pain, Shortness of breath on exertion, Teeth missing, and [place] teeth extracted. Past surgical history:   has a past surgical history that includes other surgical history (12/16/2017); [place] tooth extraction; other surgical history (Right, 02/28/2018); Toe amputation (Left, 3/27/2020); Colonoscopy (N/A, 9/21/2021); and Upper gastrointestinal endoscopy (N/A, 9/21/2021). Social History:   reports that he has been smoking cigarettes. He started smoking about 26 years ago. He has a 12.00 pack-year smoking history. He has never used smokeless tobacco. He reports previous alcohol use. He reports current drug use. Drug: Marijuana Valdene Schwarz). Family history:  family history includes Allergy (Severe) in his brother; Diabetes in his father; Heart Disease in his father; Obesity in his mother. No Known Allergies    Review of Systems:  Review of Systems   Cardiovascular: Negative for chest pain, palpitations and leg swelling. Musculoskeletal: Negative. Skin: Negative. Neurological: Negative for dizziness and weakness. All other systems reviewed and are negative. BP (!) 151/94   Pulse 67   Temp 98.1 °F (36.7 °C) (Oral)   Resp 21   Ht 6' 1\" (1.854 m)   Wt 225 lb (102.1 kg)   SpO2 99%   BMI 29.69 kg/m²       Intake/Output Summary (Last 24 hours) at 11/4/2021 0934  Last data filed at 11/3/2021 1828  Gross per 24 hour   Intake 360 ml   Output --   Net 360 ml       Physical Exam:  Physical Exam  Constitutional:       Appearance: He is well-developed. Cardiovascular:      Rate and Rhythm: Normal rate and regular rhythm. Pulses: Intact distal pulses. Dorsalis pedis pulses are 2+ on the right side and 2+ on the left side. Posterior tibial pulses are 2+ on the right side and 2+ on the left side. Heart sounds: Normal heart sounds, S1 normal and S2 normal.   Pulmonary:      Effort: Pulmonary effort is normal.      Breath sounds: Normal breath sounds.    Musculoskeletal:         General: Impression:  Active Problems:    Tobacco abuse    NSTEMI (non-ST elevated myocardial infarction) (HCC)    ASCVD (arteriosclerotic cardiovascular disease)  Resolved Problems:    * No resolved hospital problems. *       All labs, medications and tests reviewed by myself, continue all other medications of all above medical condition listed as is except for changes mentioned above. Thank you   Please call with questions. Electronically signed by Noelle Roger. BARBARA Cano - CNP on 11/4/2021 at 9:34 AM         CARDIOLOGY ATTENDING ADDENDUM    I have seen, spoken to and examined this patient personally, independently of the nurse practitioner. I have reviewed the hospital care given to date and reviewed all pertinent labs and imaging. The plan was developed mutually at the time of the visit with the patient,  NP   and myself. I have spoken with patient, nursing staff and provided written and verbal instructions . The above note has been reviewed and I agree with the assessment, diagnosis, and treatment plan with changes made by me as follows       HPI:  I have reviewed the above HPI  And agree with above   Please review addendum/changes made to note above     Interval history:      No CP   Doing well     Physical Exam:  General:  Awake, alert, NAD  Head:normal  Eye:normal  Neck:  No JVD   Chest:  Clear to auscultation, respiration easy  Cardiovascular:    Abdomen:   nontender  Extremities:  0 edema  Pulses; palpable  Neuro: grossly normal      MEDICAL DECISION MAKING;    I agree with the above plan, which was planned by myself and discussed with NP. Left heart cath images reviewed and discussed with patient and family at bedside  S/p PCI to ostial LAD  Residual stenosis noted in left dominant PDA,  OM1.   Continue with aspirin and Brilinta -minimum 1 year  Guideline directed medical therapy for ischemic cardiomyopathy  Start patient on Toprol-XL and lisinopril  Start Aldactone  LifeVest upon discharge  High intensity statins Lipitor 80 mg    Close follow-up with Dr. Priyanka Delgado -to be scheduled for repeat angiogram in few weeks, will need ICD evaluation as outpatient with follow-up echocardiogram on maximally tolerated guideline directed medical therapy    Patient can be discharged home from cardiology standpoint      Dr. Yudy Olivarez MD

## 2021-11-04 NOTE — DISCHARGE SUMMARY
Physician Discharge Summary     Patient ID:  Joann Cordero  [id number]  63 y.o.  1980    Admit date: 11/3/2021    Discharge date and time: 11/04/2021     Admitting Physician: Cheli Brooke MD     Discharge Physician: Cristal Carrillo CNP    Admission Diagnoses: NSTEMI (non-ST elevated myocardial infarction) [facility]) [I21.4]    Discharge Diagnoses:   Non-STEMI  HFrEF  Diabetes mellitus  Hypertension  Tobacco use    Admission Condition: fair    Discharged Condition: good    Indication for Admission: Chest pain    Hospital Course:   Abhijeet Stanton a 39 y. o. male presenting with chest pain.  Patient states he has had intermittent chest pain with exertion over the past month relieved by rest.  Patient follows with Dr. Macedo Dad had a recent stress test on 10/29/2021.  Stress testing revealed reduced ejection fraction with global hypokinesis with EF of 35% as well as a large size severe anterior/apical ischemic area of the left ventricle. Cardiology consulted, PCI to ostial LAD, OM1 , proximal PDA 70-80% stenosis. Cardiology planed staged PCI of PDA and attempt PCI of OM. Commended to continue with ASA, statin, BB, ACE, Brilinta, & imdur. Recent echocardiogram showed LVEF 20 to 25%, patient started on Aldactone in addition to lisinopril and Toprol XL, LifeVest also recommended by cardiology. On the discharge day, patient chest pain-free, stable for discharge.       Consults: cardiology    Significant Diagnostic Studies: [facility]    Outstanding Order Results     Date and Time Order Name Status Description    11/3/2021 11:54 AM EKG 12 Lead Preliminary     11/3/2021  5:56 AM EKG 12 Lead Preliminary     11/3/2021  5:26 AM EKG 12 Lead Preliminary           Treatments: [facility] with stent placement    Discharge Exam:  BP (!) 151/94   Pulse 67   Temp 98.1 °F (36.7 °C) (Oral)   Resp 21   Ht 6' 1\" (1.854 m)   Wt 225 lb (102.1 kg)   SpO2 99%   BMI 29.69 kg/m²     General Appearance:    Alert, cooperative, no distress, appears stated age   Head:    Normocephalic, without obvious abnormality, atraumatic   Eyes:    PERRL, conjunctiva/corneas clear, EOM's intact, fundi     benign, both eyes        Ears:    Normal TM's and external ear canals, both ears   Nose:   Nares normal, septum midline, mucosa normal, no drainage    or sinus tenderness   Throat:   Lips, mucosa, and tongue normal; teeth and gums normal   Neck:   Supple, symmetrical, trachea midline, no adenopathy;        thyroid:  No enlargement/tenderness/nodules; no carotid    bruit or JVD   Back:     Symmetric, no curvature, ROM normal, no CVA tenderness   Lungs:     Clear to auscultation bilaterally, respirations unlabored   Chest wall:    No tenderness or deformity   Heart:    Regular rate and rhythm, S1 and S2 normal, no murmur, rub   or gallop   Abdomen:     Soft, non-tender, bowel sounds active all four quadrants,     no masses, no organomegaly   Genitalia:    Normal male without lesion, discharge or tenderness   Rectal:    Normal tone, normal prostate, no masses or tenderness;    guaiac negative stool   Extremities:   Extremities normal, atraumatic, no cyanosis or edema   Pulses:   2+ and symmetric all extremities   Skin:   Skin color, texture, turgor normal, no rashes or lesions   Lymph nodes:   Cervical, supraclavicular, and axillary nodes normal   Neurologic:   CNII-XII intact.  Normal strength, sensation and reflexes       throughout        Medication List      START taking these medications    atorvastatin 80 MG tablet  Commonly known as: LIPITOR  Take 1 tablet by mouth nightly     nicotine 14 MG/24HR  Commonly known as: NICODERM CQ  Place 1 patch onto the skin daily  Start taking on: November 5, 2021     polyethylene glycol 17 g packet  Commonly known as: GLYCOLAX  Take 17 g by mouth daily as needed for Constipation     spironolactone 25 MG tablet  Commonly known as: ALDACTONE  Take 1 tablet by mouth daily  Start taking on: November 5, 2021     ticagrelor 90 MG Tabs tablet  Commonly known as: BRILINTA  Take 1 tablet by mouth 2 times daily        CONTINUE taking these medications    aspirin 325 MG tablet     blood glucose test strips  Test 3 times a day & as needed for symptoms of irregular blood glucose.      busPIRone 5 MG tablet  Commonly known as: BUSPAR  TAKE ONE TABLET BY MOUTH TWICE A DAY     gabapentin 300 MG capsule  Commonly known as: NEURONTIN  TAKE ONE CAPSULE BY MOUTH THREE TIMES A DAY     insulin glargine 100 UNIT/ML injection vial  Commonly known as: LANTUS  Inject 40 Units into the skin nightly     insulin lispro 100 UNIT/ML injection vial  Commonly known as: HUMALOG  Inject 15 Units into the skin 3 times daily (with meals)     INSULIN SYRINGE .5CC/29G 29G X 1/2\" 0.5 ML Misc  Inject 1 each into the skin 3 times daily     Insulin Syringes (Disposable) U-100 1 ML Misc  1 each by Does not apply route daily     isosorbide mononitrate 30 MG extended release tablet  Commonly known as: IMDUR  Take 1 tablet by mouth daily     Lancets Misc  1 each by Does not apply route 3 times daily     lisinopril 10 MG tablet  Commonly known as: PRINIVIL;ZESTRIL  Take 1 tablet by mouth daily     metFORMIN 1000 MG tablet  Commonly known as: GLUCOPHAGE  TAKE ONE TABLET BY MOUTH TWICE A DAY WITH MEALS     metoprolol tartrate 25 MG tablet  Commonly known as: LOPRESSOR  Take 1 tablet by mouth 2 times daily     nitroGLYCERIN 0.4 MG SL tablet  Commonly known as: Nitrostat  Place 1 tablet under the tongue every 5 minutes as needed for Chest pain     omeprazole 20 MG delayed release capsule  Commonly known as: PriLOSEC  Take 1 capsule by mouth Daily     ONE TOUCH ULTRA 2 w/Device Kit  1 kit by Does not apply route once for 1 dose     tiZANidine 2 MG tablet  Commonly known as: ZANAFLEX  TAKE ONE TABLET BY MOUTH THREE TIMES DAILY AS NEEDED FOR BACK PAIN AND BACK SPASM     traZODone 50 MG tablet  Commonly known as: DESYREL  TAKE ONE TABLET BY MOUTH NIGHTLY AS NEEDED FOR SLEEP     vitamin D 1.25 MG (44193 UT) Caps capsule  Commonly known as: ERGOCALCIFEROL  Take 1 capsule by mouth once a week           Where to Get Your Medications      These medications were sent to [address] [facility]    Phone: [phone]   · atorvastatin 80 MG tablet  · nicotine 14 MG/24HR  · polyethylene glycol 17 g packet  · spironolactone 25 MG tablet     You can get these medications from any pharmacy    Bring a paper prescription for each of these medications  · ticagrelor 90 MG Tabs tablet           Disposition: home    Patient Instructions:   [unfilled]  Activity: activity as tolerated  Diet: cardiac diet  Wound Care: none needed    Follow-up with cardiology in 2 weeks.     Signed:  BARBARA Darby CNP  11/4/2021  12:40 PM

## 2021-11-04 NOTE — CARE COORDINATION
.CM has reviewed pt's chart for needs. CM screening shows that pt has PCP, insurance and is independent PTA. If needs arise please contact [facility].   TE

## 2021-11-04 NOTE — PROGRESS NOTES
Outpatient Pharmacy Progress Note for Meds-to-Beds    Total number of Prescriptions Filled: 4  The following medications were delivered to the patient or nursing unit during the discharge process:   Spironolactone 25mg   Atorvastatin 80mg   Nicotine patch 14mg   PEG      Thank you for letting us serve your patients.   [address]    [address], [address] [facility]    Phone: [phone]    Fax: [phone]

## 2021-11-04 NOTE — PLAN OF CARE
Problem: Pain:  Goal: Pain level will decrease  Description: Pain level will decrease  11/3/2021 2344 by Reina Avila LPN  Outcome: Ongoing  11/3/2021 1837 by Paige Aguirre RN  Outcome: Ongoing  Goal: Control of acute pain  Description: Control of acute pain  11/3/2021 2344 by Reina Avila LPN  Outcome: Ongoing  11/3/2021 1837 by Paige Augirre RN  Outcome: Ongoing  Goal: Control of chronic pain  Description: Control of chronic pain  11/3/2021 2344 by Reina Avila LPN  Outcome: Ongoing  11/3/2021 1837 by Paige Aguirre RN  Outcome: Ongoing     Problem: Falls - Risk of:  Goal: Will remain free from falls  Description: Will remain free from falls  11/3/2021 2344 by Reina Avila LPN  Outcome: Ongoing  11/3/2021 1837 by Paige Aguirre RN  Outcome: Met This Shift  Goal: Absence of physical injury  Description: Absence of physical injury  11/3/2021 2344 by Reina Avila LPN  Outcome: Ongoing  11/3/2021 1837 by Paige Aguirre RN  Outcome: Met This Shift

## 2021-11-05 ENCOUNTER — TELEPHONE (OUTPATIENT)
Dept: INTERNAL MEDICINE CLINIC | Age: 41
End: 2021-11-05

## 2021-11-05 VITALS
SYSTOLIC BLOOD PRESSURE: 125 MMHG | RESPIRATION RATE: 13 BRPM | HEART RATE: 55 BPM | DIASTOLIC BLOOD PRESSURE: 88 MMHG | OXYGEN SATURATION: 98 % | BODY MASS INDEX: 29.82 KG/M2 | WEIGHT: 225 LBS | HEIGHT: 73 IN | TEMPERATURE: 97.4 F

## 2021-11-05 LAB
EKG ATRIAL RATE: 54 BPM
EKG ATRIAL RATE: 72 BPM
EKG ATRIAL RATE: 84 BPM
EKG DIAGNOSIS: NORMAL
EKG P AXIS: 28 DEGREES
EKG P AXIS: 33 DEGREES
EKG P AXIS: 51 DEGREES
EKG P-R INTERVAL: 174 MS
EKG P-R INTERVAL: 178 MS
EKG P-R INTERVAL: 200 MS
EKG Q-T INTERVAL: 374 MS
EKG Q-T INTERVAL: 422 MS
EKG Q-T INTERVAL: 446 MS
EKG QRS DURATION: 104 MS
EKG QRS DURATION: 104 MS
EKG QRS DURATION: 98 MS
EKG QTC CALCULATION (BAZETT): 422 MS
EKG QTC CALCULATION (BAZETT): 441 MS
EKG QTC CALCULATION (BAZETT): 462 MS
EKG R AXIS: -2 DEGREES
EKG R AXIS: -8 DEGREES
EKG R AXIS: 35 DEGREES
EKG T AXIS: 105 DEGREES
EKG T AXIS: 124 DEGREES
EKG T AXIS: 208 DEGREES
EKG VENTRICULAR RATE: 54 BPM
EKG VENTRICULAR RATE: 72 BPM
EKG VENTRICULAR RATE: 84 BPM
GLUCOSE BLD-MCNC: 134 MG/DL (ref 70–99)
GLUCOSE BLD-MCNC: 217 MG/DL (ref 70–99)

## 2021-11-05 PROCEDURE — 6370000000 HC RX 637 (ALT 250 FOR IP): Performed by: HOSPITALIST

## 2021-11-05 PROCEDURE — 2580000003 HC RX 258: Performed by: INTERNAL MEDICINE

## 2021-11-05 PROCEDURE — APPSS60 APP SPLIT SHARED TIME 46-60 MINUTES: Performed by: NURSE PRACTITIONER

## 2021-11-05 PROCEDURE — 6370000000 HC RX 637 (ALT 250 FOR IP): Performed by: NURSE PRACTITIONER

## 2021-11-05 PROCEDURE — 93010 ELECTROCARDIOGRAM REPORT: CPT | Performed by: INTERNAL MEDICINE

## 2021-11-05 PROCEDURE — 99233 SBSQ HOSP IP/OBS HIGH 50: CPT | Performed by: INTERNAL MEDICINE

## 2021-11-05 PROCEDURE — 6370000000 HC RX 637 (ALT 250 FOR IP): Performed by: INTERNAL MEDICINE

## 2021-11-05 PROCEDURE — 82962 GLUCOSE BLOOD TEST: CPT

## 2021-11-05 PROCEDURE — 94761 N-INVAS EAR/PLS OXIMETRY MLT: CPT

## 2021-11-05 RX ORDER — ZOLPIDEM TARTRATE 5 MG/1
5 TABLET ORAL NIGHTLY PRN
Qty: 3 TABLET | Refills: 0 | Status: SHIPPED | OUTPATIENT
Start: 2021-11-05 | End: 2021-11-08

## 2021-11-05 ASSESSMENT — ENCOUNTER SYMPTOMS
VOMITING: 0
NAUSEA: 0
SHORTNESS OF BREATH: 0
COUGH: 0
DIARRHEA: 0
EYE ITCHING: 0
NAUSEA: 1

## 2021-11-05 ASSESSMENT — PAIN SCALES - GENERAL
PAINLEVEL_OUTOF10: 0
PAINLEVEL_OUTOF10: 0

## 2021-11-05 NOTE — PROGRESS NOTES
Broken teeth, Diabetes mellitus ([facility] [address] History of nuclear stress test, Hx of Doppler echocardiogram, Kidney stones, Marijuana use, Precordial pain, Shortness of breath on exertion, Teeth missing, and [place] teeth extracted. Past surgical history:   has a past surgical history that includes other surgical history (12/16/2017); [place] tooth extraction; other surgical history (Right, 02/28/2018); Toe amputation (Left, 3/27/2020); Colonoscopy (N/A, 9/21/2021); and Upper gastrointestinal endoscopy (N/A, 9/21/2021). Social History:   reports that he has been smoking cigarettes. He started smoking about 26 years ago. He has a 12.00 pack-year smoking history. He has never used smokeless tobacco. He reports previous alcohol use. He reports current drug use. Drug: Marijuana Anita Gouty). Family history:  family history includes Allergy (Severe) in his brother; Diabetes in his father; Heart Disease in his father; Obesity in his mother. No Known Allergies    Review of Systems:  Review of Systems   Eyes: Negative for itching. Cardiovascular: Negative for chest pain, palpitations and leg swelling. Musculoskeletal: Negative. Skin: Negative. Neurological: Negative for dizziness and weakness. All other systems reviewed and are negative. BP (!) 145/96   Pulse 81   Temp 98.1 °F (36.7 °C) (Oral)   Resp 17   Ht 6' 1\" (1.854 m)   Wt 225 lb (102.1 kg)   SpO2 97%   BMI 29.69 kg/m²     No intake or output data in the 24 hours ending 11/05/21 1005    Physical Exam:  Physical Exam  Constitutional:       Appearance: He is well-developed. Cardiovascular:      Rate and Rhythm: Normal rate and regular rhythm. Pulses: Intact distal pulses. Dorsalis pedis pulses are 2+ on the right side and 2+ on the left side. Posterior tibial pulses are 2+ on the right side and 2+ on the left side.       Heart sounds: Normal heart sounds, S1 normal and S2 normal.   Pulmonary:      Effort: Pulmonary effort is normal. Breath sounds: Normal breath sounds. Musculoskeletal:         General: Normal range of motion. Skin:     General: Skin is warm and dry. Neurological:      Mental Status: He is alert and oriented to person, place, and time. Medications:    spironolactone  25 mg Oral Daily    busPIRone  5 mg Oral BID    gabapentin  300 mg Oral TID    insulin glargine  40 Units SubCUTAneous Nightly    insulin lispro  15 Units SubCUTAneous TID WC    isosorbide mononitrate  30 mg Oral Daily    lisinopril  10 mg Oral Daily    insulin lispro  0-6 Units SubCUTAneous TID WC    insulin lispro  0-3 Units SubCUTAneous Nightly    metoprolol succinate  25 mg Oral Daily    atorvastatin  80 mg Oral Nightly    sodium chloride flush  5-40 mL IntraVENous 2 times per day    aspirin  81 mg Oral Daily    ticagrelor  90 mg Oral BID    nicotine  1 patch TransDERmal Daily      sodium chloride      sodium chloride      dextrose       traZODone, sodium chloride flush, sodium chloride, ondansetron **OR** [DISCONTINUED] ondansetron, polyethylene glycol, [DISCONTINUED] acetaminophen **OR** acetaminophen, sodium chloride flush, sodium chloride, acetaminophen, ondansetron, glucose, dextrose, glucagon (rDNA), dextrose    Lab Data:  CBC:   Recent Labs     11/03/21  0630 11/04/21  0637   WBC 4.1 4.0   HGB 11.8* 10.3*   HCT 34.7* 30.4*   MCV 86.1 86.9    155     BMP:   Recent Labs     11/03/21  0630 11/04/21  0637   * 135   K 4.2 4.0    106   CO2 20* 19*   BUN 20 17   CREATININE 1.3 1.3     PT/INR: No results for input(s): PROTIME, INR in the last 72 hours. BNP:  No results for input(s): PROBNP in the last 72 hours.   TROPONIN:   Recent Labs     11/03/21  0630 11/03/21  1758 11/04/21  0219   TROPONINT 0.113* 0.514* 0.339*        Impression:  Active Problems:    Tobacco abuse    NSTEMI (non-ST elevated myocardial infarction) (HCC)    ASCVD (arteriosclerotic cardiovascular disease)  Resolved Problems:    * No

## 2021-11-05 NOTE — PROGRESS NOTES
Psych consult was sent for patient early this morning but the doctor on call never opened perfect serve. Patient is really eager to go home. He is agreeable for nurse to schedule his follow up with his PCP and they could possibly refer him outpatient. He has not had any issues today with the anxiety and has his life vest. Insightly sent to hospitalist to see if ok for discharge.

## 2021-11-05 NOTE — PROGRESS NOTES
Progress Note  Date:2021       Room:3102/3102-A  Patient Nikita Burgess     Date of Birth:[birth year]     Age:41 y.o. Patient seen and examined in room, he reported he cannot go home because of insomnia and a severe panic attack. Subjective    Subjective:  Symptoms:  Improved. He reports anxiety. No shortness of breath, malaise, cough, chest pain, weakness, headache, chest pressure, anorexia or diarrhea. Diet:  Adequate intake. He reports  nausea. Activity level: Normal.    Pain:  He reports no pain. Review of Systems   Constitutional: Negative for fever. Respiratory: Negative for cough and shortness of breath. Cardiovascular: Negative for chest pain. Gastrointestinal: Positive for nausea. Negative for anorexia and diarrhea. Neurological: Negative for weakness. Objective         Vitals Last 24 Hours:  TEMPERATURE:  Temp  Av.3 °F (36.8 °C)  Min: 98.1 °F (36.7 °C)  Max: 98.4 °F (36.9 °C)  RESPIRATIONS RANGE: Resp  Av.6  Min: 13  Max: 22  PULSE OXIMETRY RANGE: SpO2  Av.5 %  Min: 97 %  Max: 98 %  PULSE RANGE: Pulse  Av.9  Min: 52  Max: 81  BLOOD PRESSURE RANGE: Systolic (76FZV), IOL:450 , Min:123 , AJP:812   ; Diastolic (93VDF), CCU:68, Min:55, Max:102    I/O (24Hr): No intake or output data in the 24 hours ending 21 0959  Objective:  General Appearance: In no acute distress. Vital signs: (most recent): Blood pressure (!) 145/96, pulse 81, temperature 98.1 °F (36.7 °C), temperature source Oral, resp. rate 17, height 6' 1\" (1.854 m), weight 225 lb (102.1 kg), SpO2 97 %. Vital signs are normal.  No fever. Output: Producing urine and producing stool. Lungs:  Normal effort and normal respiratory rate. Breath sounds clear to auscultation. Heart: Normal rate. Regular rhythm. S1 normal.    Abdomen: Abdomen is soft. Bowel sounds are normal.   There is no abdominal tenderness. Extremities: Normal range of motion.     Neurological: Patient is alert. Skin:  Warm. Labs/Imaging/Diagnostics    Labs:  CBC:Recent Labs     11/03/21  0630 11/04/21  0637   WBC 4.1 4.0   RBC 4.03* 3.50*   HGB 11.8* 10.3*   HCT 34.7* 30.4*   MCV 86.1 86.9   RDW 13.4 13.7    155     CHEMISTRIES:  Recent Labs     11/03/21  0630 11/04/21  0637   * 135   K 4.2 4.0    106   CO2 20* 19*   BUN 20 17   CREATININE 1.3 1.3   GLUCOSE 273* 135*     PT/INR:No results for input(s): PROTIME, INR in the last 72 hours. APTT:  Recent Labs     11/03/21  1238   APTT 56.1*     LIVER PROFILE:No results for input(s): AST, ALT, BILIDIR, BILITOT, ALKPHOS in the last 72 hours. Imaging Last 24 Hours:  No results found. Assessment//Plan           Hospital Problems         Last Modified POA    Tobacco abuse 11/3/2021 Yes    NSTEMI (non-ST elevated myocardial infarction) ([facility] [address] 11/3/2021 Yes    ASCVD (arteriosclerotic cardiovascular disease) 11/3/2021 Yes        Assessment & Plan   Assessment & Plan  #  NSTEMI:  Status post PCI to ostial LAD, OM1 , proximal PDA 70 to 80% stenosis. Cardiology planned staged PCI of the PDA and obtain PCI of OM. Patient on DAPT with aspirin and Brilinta, also on BB, Imdur, and a statin. Chest pain resolved.     #  HFrEF  Recent echocardiogram showed LVEF 20 to 25%. No volume overload on physical exam.  Started patient on Aldactone in addition to lisinopril and Toprol XL per cardiology. LifeVest also recommended by cardiology.     #  Diabetes mellitus  Continue home basal and bolus insulin, continue insulin sliding scale. BG controlled.     #  Hypertension  BP controlled, continue other medication as discussed above.     #  Severe anxiety/panic attack  Patient had multiple panic attack since yesterday requiring several doses of Ativan. His symptoms have been adjustment disorder, psychiatry consult appreciate help.     Electronically signed by BARBARA Barry CNP on 11/5/21 at 9:59 AM EDT

## 2021-11-05 NOTE — PROGRESS NOTES
Pt woke up from sleep having a severe panic attack. Relaxation techniques attempted with patient. Unable to sooth patient. Called NP for order of one time dose of ativan. Also new consult for Psychiatry consult.

## 2021-11-05 NOTE — PROGRESS NOTES
Progress Note  Date:2021       Room:3102/3102-A  Patient Lily Sousa     Date of Birth:[birth year]     Age:41 y.o. Patient seen and examined in the room, reported unable to sleep last night because anxiety, chest pain has resolved. Stated he had a multiple panic attack, worrying he is going to die. Patient reported absence of palpitation, lightheadedness, or syncope. Subjective    Subjective:  Symptoms:  Stable. He reports anxiety. No shortness of breath, malaise, cough, chest pain, weakness, headache, chest pressure or anorexia. Diet:  Adequate intake. No nausea or vomiting. Activity level: Normal.    Pain:  He reports no pain. Review of Systems   Constitutional: Negative for fever. Respiratory: Negative for cough and shortness of breath. Cardiovascular: Negative for chest pain. Gastrointestinal: Negative for anorexia, nausea and vomiting. Neurological: Negative for weakness. Objective         Vitals Last 24 Hours:  TEMPERATURE:  Temp  Av.3 °F (36.8 °C)  Min: 98.1 °F (36.7 °C)  Max: 98.4 °F (36.9 °C)  RESPIRATIONS RANGE: Resp  Av.6  Min: 13  Max: 22  PULSE OXIMETRY RANGE: SpO2  Av.5 %  Min: 97 %  Max: 98 %  PULSE RANGE: Pulse  Av.9  Min: 52  Max: 81  BLOOD PRESSURE RANGE: Systolic (78RID), GMI:857 , Min:123 , LXB:957   ; Diastolic (83SSD), PJZ:89, Min:55, Max:102    I/O (24Hr): No intake or output data in the 24 hours ending 2152  Objective:  General Appearance: In no acute distress. Vital signs: (most recent): Blood pressure (!) 145/96, pulse 81, temperature 98.1 °F (36.7 °C), temperature source Oral, resp. rate 17, height 6' 1\" (1.854 m), weight 225 lb (102.1 kg), SpO2 97 %. Vital signs are normal.  No fever. Output: Producing urine and producing stool. Lungs:  Normal effort and normal respiratory rate. Breath sounds clear to auscultation. Heart: Normal rate. Regular rhythm. S1 normal.    Abdomen: Abdomen is soft.   Bowel sounds are normal.   There is no abdominal tenderness. Neurological: Patient is alert. Skin:  Warm. Labs/Imaging/Diagnostics    Labs:  CBC:Recent Labs     11/03/21  0630 11/04/21  0637   WBC 4.1 4.0   RBC 4.03* 3.50*   HGB 11.8* 10.3*   HCT 34.7* 30.4*   MCV 86.1 86.9   RDW 13.4 13.7    155     CHEMISTRIES:  Recent Labs     11/03/21  0630 11/04/21  0637   * 135   K 4.2 4.0    106   CO2 20* 19*   BUN 20 17   CREATININE 1.3 1.3   GLUCOSE 273* 135*     PT/INR:No results for input(s): PROTIME, INR in the last 72 hours. APTT:  Recent Labs     11/03/21  1238   APTT 56.1*     LIVER PROFILE:No results for input(s): AST, ALT, BILIDIR, BILITOT, ALKPHOS in the last 72 hours. Imaging Last 24 Hours:  No results found. Assessment//Plan           Hospital Problems         Last Modified POA    Tobacco abuse 11/3/2021 Yes    NSTEMI (non-ST elevated myocardial infarction) ([facility] [address] 11/3/2021 Yes    ASCVD (arteriosclerotic cardiovascular disease) 11/3/2021 Yes        Assessment & Plan  #  NSTEMI:  Status post PCI to ostial LAD, OM1 , proximal PDA 70 to 80% stenosis. Cardiology planned staged PCI of the PDA and obtain PCI of OM. Patient on DAPT with aspirin and Brilinta, also on BB, Imdur, and a statin. Chest pain resolved. #  HFrEF  Recent echocardiogram showed LVEF 20 to 25%. No volume overload on physical exam.  Started patient on Aldactone in addition to lisinopril and Toprol XL per cardiology. LifeVest also recommended by cardiology.     #  Diabetes mellitus  Continue home basal and bolus insulin, continue insulin sliding scale. BG controlled.     #  Hypertension  BP controlled, continue other medication as discussed above. #  Severe anxiety/panic attack  Patient had multiple panic attack since yesterday requiring several doses of Ativan. His symptoms have been adjustment disorder, psychiatry consult appreciate help.       Electronically signed by BARBARA Perez CNP on 11/5/21 at 9:52 AM EDT

## 2021-11-05 NOTE — TELEPHONE ENCOUNTER
[facility] Transitions Initial Follow Up Call    Outreach made within 2 business days of discharge: Yes    Patient: Ruben Aguirre Patient : 1980   MRN: [id number]  Reason for Admission: There are no discharge diagnoses documented for the most recent discharge. Discharge Date: 10/20/21       Spoke with: Nano Alaniz      Discharge department/facility: [address] [place] [address] Interactive Patient Contact:  Was patient able to fill all prescriptions: Yes  Was patient instructed to bring all medications to the follow-up visit: Yes  Is patient taking all medications as directed in the discharge summary?  Yes  Does patient understand their discharge instructions: Yes  Does patient have questions or concerns that need addressed prior to 7-14 day follow up office visit: no    Scheduled appointment with PCP within 7-14 days    Follow Up  Future Appointments   Date Time Provider Kamaljit Henry   2021  9:45 AM Henrique Rodrigues MD [id number] [facility]   2021 11:00 AM BARBARA Styles CNP [facility] E S IM [facility]   2021  8:40 AM German Farmer MD [facility] Heart [facility]   2021  8:45 AM BARBARA Styles CNP [facility] E S IM [facility]   2022  1:00 PM BARBARA Azevedo CNP [facility] Gastro [facility]       Rui Mendoza

## 2021-11-05 NOTE — DISCHARGE SUMMARY
Patient ID:  Juve Pizarro  [id number]  15 y.o.  1980     Admit date: 11/3/2021     Discharge date and time: 11/05/2021      Admitting Physician: Kathe Mcdowell MD      Discharge Physician: Ellen García CNP     Admission Diagnoses: NSTEMI (non-ST elevated myocardial infarction) [facility]) [I21.4]     Discharge Diagnoses:   Non-STEMI  HFrEF  Diabetes mellitus  Hypertension  Tobacco use     Admission Condition: fair     Discharged Condition: good     Indication for Admission: Chest pain     Hospital Course:   Juve Pizarro is a 39 y.o. male presenting with chest pain. Patient states he has had intermittent chest pain with exertion over the past month relieved by rest.  Patient follows with Dr. Mandy Sanchez and had a recent stress test on 10/29/2021. Stress testing revealed reduced ejection fraction with global hypokinesis with EF of 35% as well as a large size severe anterior/apical ischemic area of the left ventricle. Cardiology consulted, PCI to ostial LAD, OM1 , proximal PDA 70-80% stenosis. Cardiology planed staged PCI of PDA and attempt PCI of OM. Commended to continue with ASA, statin, BB, ACE, Brilinta, & imdur. Recent echocardiogram showed LVEF 20 to 25%, patient started on Aldactone in addition to lisinopril and Toprol XL, LifeVest also recommended by cardiology. On the discharge day, patient chest pain-free, stable for discharge.         Consults: cardiology     Significant Diagnostic Studies: [facility]             Outstanding Order Results      Date and Time Order Name Status Description     11/3/2021 11:54 AM EKG 12 Lead Preliminary       11/3/2021  5:56 AM EKG 12 Lead Preliminary       11/3/2021  5:26 AM EKG 12 Lead Preliminary               Treatments: [facility] with stent placement     Discharge Exam:  BP (!) 151/94   Pulse 67   Temp 98.1 °F (36.7 °C) (Oral)   Resp 21   Ht 6' 1\" (1.854 m)   Wt 225 lb (102.1 kg)   SpO2 99%   BMI 29.69 kg/m²      General Appearance:    Alert, cooperative, no distress, appears stated age   Head:    Normocephalic, without obvious abnormality, atraumatic   Eyes:    PERRL, conjunctiva/corneas clear, EOM's intact, fundi     benign, both eyes        Ears:    Normal TM's and external ear canals, both ears   Nose:   Nares normal, septum midline, mucosa normal, no drainage    or sinus tenderness   Throat:   Lips, mucosa, and tongue normal; teeth and gums normal   Neck:   Supple, symmetrical, trachea midline, no adenopathy;        thyroid:  No enlargement/tenderness/nodules; no carotid    bruit or JVD   Back:     Symmetric, no curvature, ROM normal, no CVA tenderness   Lungs:     Clear to auscultation bilaterally, respirations unlabored   Chest wall:    No tenderness or deformity   Heart:    Regular rate and rhythm, S1 and S2 normal, no murmur, rub   or gallop   Abdomen:     Soft, non-tender, bowel sounds active all four quadrants,     no masses, no organomegaly   Genitalia:    Normal male without lesion, discharge or tenderness   Rectal:    Normal tone, normal prostate, no masses or tenderness;    guaiac negative stool   Extremities:   Extremities normal, atraumatic, no cyanosis or edema   Pulses:   2+ and symmetric all extremities   Skin:   Skin color, texture, turgor normal, no rashes or lesions   Lymph nodes:   Cervical, supraclavicular, and axillary nodes normal   Neurologic:   CNII-XII intact.  Normal strength, sensation and reflexes       throughout                Medication List             START taking these medications          atorvastatin 80 MG tablet  Commonly known as: LIPITOR  Take 1 tablet by mouth nightly      nicotine 14 MG/24HR  Commonly known as: NICODERM CQ  Place 1 patch onto the skin daily  Start taking on: November 5, 2021      polyethylene glycol 17 g packet  Commonly known as: GLYCOLAX  Take 17 g by mouth daily as needed for Constipation      spironolactone 25 MG tablet  Commonly known as: ALDACTONE  Take 1 tablet by mouth daily  Start taking on: November 5, 2021 ticagrelor 90 MG Tabs tablet  Commonly known as: BRILINTA  Take 1 tablet by mouth 2 times daily                      CONTINUE taking these medications          aspirin 325 MG tablet      blood glucose test strips  Test 3 times a day & as needed for symptoms of irregular blood glucose.       busPIRone 5 MG tablet  Commonly known as: BUSPAR  TAKE ONE TABLET BY MOUTH TWICE A DAY      gabapentin 300 MG capsule  Commonly known as: NEURONTIN  TAKE ONE CAPSULE BY MOUTH THREE TIMES A DAY      insulin glargine 100 UNIT/ML injection vial  Commonly known as: LANTUS  Inject 40 Units into the skin nightly      insulin lispro 100 UNIT/ML injection vial  Commonly known as: HUMALOG  Inject 15 Units into the skin 3 times daily (with meals)      INSULIN SYRINGE .5CC/29G 29G X 1/2\" 0.5 ML Misc  Inject 1 each into the skin 3 times daily      Insulin Syringes (Disposable) U-100 1 ML Misc  1 each by Does not apply route daily      isosorbide mononitrate 30 MG extended release tablet  Commonly known as: IMDUR  Take 1 tablet by mouth daily      Lancets Misc  1 each by Does not apply route 3 times daily      lisinopril 10 MG tablet  Commonly known as: PRINIVIL;ZESTRIL  Take 1 tablet by mouth daily      metFORMIN 1000 MG tablet  Commonly known as: GLUCOPHAGE  TAKE ONE TABLET BY MOUTH TWICE A DAY WITH MEALS      metoprolol tartrate 25 MG tablet  Commonly known as: LOPRESSOR  Take 1 tablet by mouth 2 times daily      nitroGLYCERIN 0.4 MG SL tablet  Commonly known as: Nitrostat  Place 1 tablet under the tongue every 5 minutes as needed for Chest pain      omeprazole 20 MG delayed release capsule  Commonly known as: PriLOSEC  Take 1 capsule by mouth Daily      ONE TOUCH ULTRA 2 w/Device Kit  1 kit by Does not apply route once for 1 dose      tiZANidine 2 MG tablet  Commonly known as: ZANAFLEX  TAKE ONE TABLET BY MOUTH THREE TIMES DAILY AS NEEDED FOR BACK PAIN AND BACK SPASM      traZODone 50 MG tablet  Commonly known as: DESYREL  TAKE ONE TABLET BY MOUTH NIGHTLY AS NEEDED FOR SLEEP      vitamin D 1.25 MG (03204 UT) Caps capsule  Commonly known as: ERGOCALCIFEROL  Take 1 capsule by mouth once a week                          Where to Get Your Medications             These medications were sent to [address] [facility]     Phone: [phone]   atorvastatin 80 MG tablet  nicotine 14 MG/24HR  polyethylene glycol 17 g packet  spironolactone 25 MG tablet      You can get these medications from any pharmacy    Bring a paper prescription for each of these medications  ticagrelor 90 MG Tabs tablet               Disposition: home     Patient Instructions:   [unfilled]  Activity: activity as tolerated  Diet: cardiac diet  Wound Care: none needed     Follow-up with cardiology in 2 weeks. Signed:  BARBARA Valentin CNP  11/5/2021  15:49 PM    Pt was planned to discharged on 11/04, however, he had severe panic attack, had to stay overnight to avoid worsening of cardiac function.  He was discharged on 11/05/2021

## 2021-11-08 ENCOUNTER — CARE COORDINATION (OUTPATIENT)
Dept: CASE MANAGEMENT | Age: 41
End: 2021-11-08

## 2021-11-08 DIAGNOSIS — I21.4 NSTEMI (NON-ST ELEVATED MYOCARDIAL INFARCTION) (HCC): Primary | ICD-10-CM

## 2021-11-08 RX ORDER — PEN NEEDLE, DIABETIC 31 G X1/4"
1 NEEDLE, DISPOSABLE MISCELLANEOUS DAILY
Qty: 100 EACH | Refills: 3 | Status: SHIPPED | OUTPATIENT
Start: 2021-11-08 | End: 2021-11-29 | Stop reason: SDUPTHER

## 2021-11-08 NOTE — CARE COORDINATION
[facility] Initial Follow Up [facility]    Call within 2 business days of discharge: Yes    Patient: Dash Kohli Patient : 1980   MRN: [id number]  Reason for Admission: NSTEMI s/p PCI, Severe Anxiety and Panic Attacks  Discharge Date: 21 RARS: Readmission Risk Score: 10    Last Discharge [facility]       Complaint Diagnosis Description Type Department Provider    11/3/21 Chest Pain NSTEMI (non-ST elevated myocardial infarction) ([facility] [address] . .. ED to Hosp-Admission (Discharged) (ADMITTED) [facility] 3N Nakul Alvarez MD; Garcia Anthony . ..         Non-face-to-face services provided:  Obtained and reviewed discharge summary and/or continuity of care documents  Assessment and support for treatment adherence and medication management-1111    Care Transitions 24 Hour Call    Schedule Follow Up Appointment with PCP: Yoli Vazquez you have any ongoing symptoms?: No  Do you have a copy of your discharge instructions?: Yes  Do you have all of your prescriptions and are they filled?: Yes  Have you been contacted by a [address]?: No  Have you scheduled your follow up appointment?: Yes  How are you going to get to your appointment?: Car - family or friend to transport  Were you discharged with any Home Care or Post Acute Services: No  Patient Home Equipment: Other (Comment:  Glucometer)  Do you have support at home?: Alone  Do you feel like you have everything you need to keep you well at home?: Yes  Are you an active caregiver in your home?: No  Care Transitions Interventions  No Identified Needs       Future Appointments   Date Time Provider Kamaljit Henry   2021  9:45 AM Madisyn Reynoso MD [facility]   2021 11:00 AM Ellan Saint, APRN - CNP [address] [place] E S IM MMA   2021  8:40 AM Rosa Elena Zavala MD [facility] Heart MMA   2021  8:45 AM Ellan Saint, APRN - CNP [address] [place] [facility] IM MMA   2022  1:00 PM BARBARA Temple - CNP [address] [facility]     Challenges to be reviewed by the provider   Additional needs identified to be addressed with provider: denies         Method of communication with provider : none    CARE TRANSITION MONITORING  21 thru 22  Facility: [facility]   RARS: 10 low risk  [address]  PCP: Jorge Paiz [phone]   Cardiologist: Dr Alex Moreland [phone]  Nephrologist: Dr Perez Waterford [phone]    Was this a readmission? No  Patient stated reason for admission: Chest pain  Patients top risk factors for readmission: medical condition-NSTEMI, CHF, DM    Spoke with Patient for Care Transition discharge call, verified Patient name and  as identifiers. Introduced self and explained CT program. Agreeable to ongoing CT follow up. Phone Assessment: Reports doing well since home. Denies chest pain/pressure, palps, sob, dizziness, edema, orthopnea, cough or ac distress. Reports right wrist cardiac cath insertion site w/ minimal bruising. Denies hard lumps, bleeding, erythema, pain, excessive bruising. Reviewed NSTEMI and post cardiac cath instructions; verbalized understanding and adherence. Reports appetite, b&b wnl. Has not smoked since 11/3/21, using Nicoderm patches, denies need for smoking cessation referral. Discussed 1800QUITNOW. Denies anxiety/panic attacks since home however does have continued difficulty sleeping, Patient to discuss w/ PCP as was only given rx for Ambien x 3 pills. Encouraged to speak w/ PCP re: behavioral health d/t anxiety. Has not checked fasting BG. Stressed importance of routine BG monitoring given DM on insulin, advised low carb diet. Education Provided: Does not have scale however plans to purchase.    CHF Zone Mgt:    Daily weights in a.m. before breakfast, after voiding   Keep written log of weights for review  Uri Barreto MD immediately of weight gain/loss 3# or more in 1-7days   No smoking   Take all rx as prescribed, keep scheduled MD appts   Low sodium diet- read labels; avoid/limit high sodium foods such as fast education, confirm weights/bg.   Manolo Dyer RN

## 2021-11-08 NOTE — CARE COORDINATION
[facility] 45 Transitions Initial Follow Up Call    Call within 2 business days of discharge: Yes    Patient: Marin Oliva Patient : 1980   MRN: [id number]  Reason for Admission: NSTEMI s/p PCI, Severe Anxiety/Panic Attacks   Discharge Date: 21 RARS: Readmission Risk Score: [address] [facility] [address]: [address] [place] [address] Discharge [facility]       Complaint Diagnosis Description Type Department Provider    11/3/21 Chest Pain NSTEMI (non-ST elevated myocardial infarction) ([facility] [address] . .. ED to Hosp-Admission (Discharged) (ADMITTED) [facility] 3N Nakul John Benjamin MD; Yobani Noland . .. Non-face-to-face services provided:  Obtained and reviewed discharge summary and/or continuity of care documents    Care Transitions 24 Hour Call    Do you have all of your prescriptions and are they filled?: No  Patient Home Equipment: Other (Comment:  Glucometer)  Do you have support at home?: Alone  Are you an active caregiver in your home?: No  Care Transitions Interventions       Future Appointments   Date Time Provider Kamaljit Henry   2021  9:45 AM Jean Paul Clark MD [facility]   2021 11:00 AM Vida Fernandez APRN - CNP [address] [place] [address] IM MMA   2021  8:40 AM Valerie Perez MD [facility] Heart MMA   2021  8:45 AM Vida Fernandez APRN - CNP [address] [place] [address] [facility] IM MMA   2022  1:00 PM Lindsay Shields APRN - [facility] [address] [facility] Ricky Teagan Pill MMA        Challenges to be reviewed by the provider   Additional needs identified to be addressed with provider {YES / AD:42021}  {[facility] Provider Discharge Needs:24392}    Discussed COVID-19 related testing which was {Blank Single Select Template:36066::\"not done\",\"available\",\"pending\"} at this time. Test results were {Blank Single Select Template:89234::\"not done\",\"negative\",\"positive\",\"pending\"}. Patient informed of results, if available?  {YES / RV:14169}         Method of communication with provider : {Blank Single Select Template:09704::\"face to face\",\"chart routing\",\"staff message\",\"phone\",\"none\"}    CARE TRANSITION MONITORING  *** thru ***  [facility] PCP: {ctcproviders:51261}    Was this a readmission? {YES / KH:61630}  Patient stated reason for admission: ***  Patients top risk factors for readmission: {AMB CC Risk Factors for Readmission:11131}    Spoke with {ctcspokewith:22774} for Care Transition discharge call, verified Patient name and  as identifiers. Introduced self and explained CT program. Agreeable to ongoing CT follow up. Phone Assessment: ***    Education Provided:  CHF Zone Mgt:    Daily weights in a.m. before breakfast, after voiding   Keep written log of weights for review  Shakeel Henao MD immediately of weight gain/loss 3# or more in 1-7days   Take all rx as prescribed, keep scheduled MD appts   Low sodium diet- read labels; avoid/limit high sodium foods such as fast food, canned/boxed/processed foods, frozen meals, soups, cheeses, breads, chips, soda.  Do not add salt to food   Adhere to MD recommended fluid restriction   No smoking   Notify MD immediately if experiencing increased sob, orthopnea, edema, weight gain, feeling of uneasiness, chest pain, increased cough, confusion, wheezing or chest tightness. Call 911 if noting acute distress    AVS/Meds: Confirmed copy of AVS received, reviewed with {ctcspokewith:08383}. Confirmed Patient obtained and is taking {ctcrx:64953} as directed. Med education provided, questions answered, verbalizes understanding. Stressed importance of med compliance. 1111F medication review completed with {ctcspokewith:58447} . Advised obtaining 90 day supply of routine, prn meds. Advised contacting pharmacy/md for refill needs.      Resource Need Assessment:   Living Arrangements: {ctc13:13093}   ADLS:{ctc16:80059}   BPY:{MWFFOK:37689}   Transportation: {XWI2:47207}  HHC:{CTC5:71749}   Community Assistance:{CTC6:91382}   Referrals Made per CTN with Patient/Family Approval: {ctc7:60954}     Hospital Follow Up Appointments: Discussed

## 2021-11-12 ENCOUNTER — OFFICE VISIT (OUTPATIENT)
Dept: INTERNAL MEDICINE CLINIC | Age: 41
End: 2021-11-12
Payer: MEDICAID

## 2021-11-12 VITALS
HEART RATE: 65 BPM | WEIGHT: 233.6 LBS | RESPIRATION RATE: 20 BRPM | HEIGHT: 73 IN | BODY MASS INDEX: 30.96 KG/M2 | DIASTOLIC BLOOD PRESSURE: 82 MMHG | OXYGEN SATURATION: 99 % | SYSTOLIC BLOOD PRESSURE: 131 MMHG

## 2021-11-12 DIAGNOSIS — Z79.4 TYPE 2 DIABETES MELLITUS WITHOUT COMPLICATION, WITH LONG-TERM CURRENT USE OF INSULIN (HCC): ICD-10-CM

## 2021-11-12 DIAGNOSIS — Z76.89 ENCOUNTER FOR SUPPORT AND COORDINATION OF TRANSITION OF CARE: Primary | ICD-10-CM

## 2021-11-12 DIAGNOSIS — I21.4 NSTEMI (NON-ST ELEVATED MYOCARDIAL INFARCTION) (HCC): ICD-10-CM

## 2021-11-12 DIAGNOSIS — I25.10 ASCVD (ARTERIOSCLEROTIC CARDIOVASCULAR DISEASE): ICD-10-CM

## 2021-11-12 DIAGNOSIS — E11.9 TYPE 2 DIABETES MELLITUS WITHOUT COMPLICATION, WITH LONG-TERM CURRENT USE OF INSULIN (HCC): ICD-10-CM

## 2021-11-12 DIAGNOSIS — R80.1 PERSISTENT PROTEINURIA: ICD-10-CM

## 2021-11-12 DIAGNOSIS — I50.22 CHRONIC SYSTOLIC CONGESTIVE HEART FAILURE (HCC): ICD-10-CM

## 2021-11-12 PROBLEM — N18.2 CHRONIC KIDNEY DISEASE, STAGE II (MILD): Status: ACTIVE | Noted: 2021-11-12

## 2021-11-12 PROBLEM — R07.2 PRECORDIAL PAIN: Status: RESOLVED | Noted: 2021-10-25 | Resolved: 2021-11-12

## 2021-11-12 PROBLEM — B35.3 TINEA PEDIS OF BOTH FEET: Status: RESOLVED | Noted: 2020-07-15 | Resolved: 2021-11-12

## 2021-11-12 PROBLEM — M79.671 RIGHT FOOT PAIN: Status: RESOLVED | Noted: 2020-05-03 | Resolved: 2021-11-12

## 2021-11-12 PROBLEM — L84 CORNS AND CALLOSITIES: Status: RESOLVED | Noted: 2020-07-22 | Resolved: 2021-11-12

## 2021-11-12 PROBLEM — Z79.2 RECEIVING INTRAVENOUS ANTIBIOTIC TREATMENT AS OUTPATIENT: Status: RESOLVED | Noted: 2020-04-22 | Resolved: 2021-11-12

## 2021-11-12 PROBLEM — N49.3 FOURNIER'S DISEASE: Status: RESOLVED | Noted: 2017-12-16 | Resolved: 2021-11-12

## 2021-11-12 PROBLEM — M54.50 ACUTE MIDLINE LOW BACK PAIN WITHOUT SCIATICA: Status: RESOLVED | Noted: 2020-08-12 | Resolved: 2021-11-12

## 2021-11-12 PROCEDURE — 1111F DSCHRG MED/CURRENT MED MERGE: CPT | Performed by: NURSE PRACTITIONER

## 2021-11-12 PROCEDURE — 99215 OFFICE O/P EST HI 40 MIN: CPT | Performed by: NURSE PRACTITIONER

## 2021-11-12 RX ORDER — TIZANIDINE 2 MG/1
TABLET ORAL
Qty: 30 TABLET | Refills: 0 | Status: SHIPPED | OUTPATIENT
Start: 2021-11-12 | End: 2021-11-23

## 2021-11-12 NOTE — PROGRESS NOTES
Post-Discharge Transitional Care Management Services or Hospital Follow Up      Roxy Mondragon   YOB: 1980    Date of Office Visit:  11/12/2021  Date of Hospital Admission: 11/3/21  Date of Hospital Discharge: 11/5/21  Risk of hospital readmission (high >=14%. Medium >=10%) :Readmission Risk Score: 10      Care management risk score Rising risk (score 2-5) and Complex Care (Scores >=6): 7     Non face to face  following discharge, date last encounter closed (first attempt may have been earlier): 11/8/2021  7:04 PM    Call initiated 2 business days of discharge: Yes    Patient Active Problem List   Diagnosis    Type 2 diabetes mellitus without complication, with long-term current use of insulin ([address]    Gastroesophageal reflux disease    Tobacco abuse    Acute osteomyelitis of phalanx of left foot ([address]    Osteomyelitis of third toe of left foot ([address]    HBO-Diabetic ulcer of left midfoot associated with type 2 diabetes mellitus, with bone involvement without evidence of necrosis ([address] guerra 3    Diabetic polyneuropathy ([address]    Anxiety    Persistent proteinuria    Benign neoplasm of sigmoid colon    Osullivan's esophagus determined by endoscopy    Vitamin D deficiency    History of amputation of lesser toe of left foot ([address]    NSTEMI (non-ST elevated myocardial infarction) ([address]    ASCVD (arteriosclerotic cardiovascular disease)    Chronic kidney disease, stage II (mild)    Chronic systolic congestive heart failure ([address]     Tracey Adams is a 63-year-old male who office recently for complaints of dyspnea on exertion, left-sided chest pain, and easy fatigability significantly worsening over the last few weeks. Was initially encouraged to go to the ER however did not stay for treatment and then was later set up for outpatient stress test which was completed and had several findings for ischemia.   Shortly after this prior to cardiology follow-up he returned to the ER for significantly worsening chest pain and was admitted for further evaluation. Stress test revealed reduced ejection fraction with global hypokinesis, ejection fraction of 35% as well as a large severe anterior/apical ischemic area of the left ventricle. He received PCI to the ostial LAD, OM1  and proximal PDA with 70 to 80% stenosis. Is scheduled to follow-up with cardiology on the 22nd for staged PCI to PDA and attempted PCI of OM. He is continued on ASA statin, beta-blocker, ACE inhibitor, and also on Brilinta and Imdur. Patient was also started on Aldactone for his LVEF of 20-25%. Patient currently wearing LifeVest and denies any chest pain since hospital discharge. Reports energy levels are much better and also denies any shortness of breath. Patient denies any chest pain, shortness of breath, myalgias, Patient is tolerating cholesterol medications without difficulty. Lab Results   Component Value Date    LDLCALC see below 06/17/2021    LDLDIRECT 88 11/03/2021     Patient compliant with all current medications for diabetes. Blood sugars have been averaging around , and the patient reports checking their blood sugar 2-3 time daily. Patient has had no episodes of significant hypoglycemia, fainting, dizziness, or loss of consciousness. Needs new referral to Dr Darien Randall to set up diabetic nail care.    Lab Results   Component Value Date    LABA1C 6.3 06/17/2021     Lab Results   Component Value Date    .1 06/17/2021     No Known Allergies    Medications listed as ordered at the time of discharge from hospital  @DISCHARGEMEDSLIST(<NOROUTINE> error)@      Medications marked \"taking\" at this time  Outpatient Medications Marked as Taking for the 11/12/21 encounter (Office Visit) with BARBARA Watt CNP   Medication Sig Dispense Refill    tiZANidine (ZANAFLEX) 2 MG tablet TAKE ONE TABLET BY MOUTH THREE TIMES DAILY AS NEEDED FOR BACK PAIN AND BACK SPASM 30 tablet 0    Insulin Pen Needle (Elifabi Fruit PEN NEEDLES) 31G X 6 MM MISC 1 each by Does not apply route daily 100 each 3    atorvastatin (LIPITOR) 80 MG tablet Take 1 tablet by mouth nightly 30 tablet 3    spironolactone (ALDACTONE) 25 MG tablet Take 1 tablet by mouth daily 30 tablet 3    polyethylene glycol (GLYCOLAX) 17 g packet Take 17 g by mouth daily as needed for Constipation 527 g 1    ticagrelor (BRILINTA) 90 MG TABS tablet Take 1 tablet by mouth 2 times daily 60 tablet 3    nicotine (NICODERM CQ) 14 MG/24HR Place 1 patch onto the skin daily 30 patch 3    insulin glargine (LANTUS) 100 UNIT/ML injection vial Inject 40 Units into the skin nightly 5 pen 2    insulin lispro (HUMALOG) 100 UNIT/ML injection vial Inject 15 Units into the skin 3 times daily (with meals) 1 each 3    INSULIN SYRINGE .5CC/29G 29G X 1/2\" 0.5 ML MISC Inject 1 each into the skin 3 times daily 90 each 2    busPIRone (BUSPAR) 5 MG tablet TAKE ONE TABLET BY MOUTH TWICE A DAY 60 tablet 1    gabapentin (NEURONTIN) 300 MG capsule TAKE ONE CAPSULE BY MOUTH THREE TIMES A DAY 90 capsule 2    traZODone (DESYREL) 50 MG tablet TAKE ONE TABLET BY MOUTH NIGHTLY AS NEEDED FOR SLEEP 30 tablet 2    isosorbide mononitrate (IMDUR) 30 MG extended release tablet Take 1 tablet by mouth daily 30 tablet 3    metoprolol tartrate (LOPRESSOR) 25 MG tablet Take 1 tablet by mouth 2 times daily 60 tablet 0    nitroGLYCERIN (NITROSTAT) 0.4 MG SL tablet Place 1 tablet under the tongue every 5 minutes as needed for Chest pain 25 tablet 3    omeprazole (PRILOSEC) 20 MG delayed release capsule Take 1 capsule by mouth Daily 30 capsule 5    lisinopril (PRINIVIL;ZESTRIL) 10 MG tablet Take 1 tablet by mouth daily 90 tablet 3    vitamin D (ERGOCALCIFEROL) 1.25 MG (92532 UT) CAPS capsule Take 1 capsule by mouth once a week 12 capsule 2    Insulin Syringes, Disposable, U-100 1 ML MISC 1 each by Does not apply route daily 100 each 3    Lancets MISC 1 each by Does not apply route 3 times daily 600 each 1    Blood Glucose Monitoring Suppl (ONE TOUCH ULTRA 2) w/Device KIT 1 kit by Does not apply route once for 1 dose 1 kit 0    blood glucose monitor strips Test 3 times a day & as needed for symptoms of irregular blood glucose. 270 strip 2    aspirin 325 MG tablet Take 325 mg by mouth daily           Medications patient taking as of now reconciled against medications ordered at time of hospital discharge: Yes    Chief Complaint   Patient presents with   [address] from Hospital       History of Present illness - Follow up of Hospital diagnosis(es): NSTEMI    Inpatient course: Discharge summary reviewed- see chart. Interval history/Current status: stable/improved    A comprehensive review of systems was negative except for what was noted in the HPI. Vitals:    11/12/21 1057   BP: 131/82   Pulse: 65   Resp: 20   SpO2: 99%   Weight: 233 lb 9.6 oz (106 kg)   Height: 6' 1\" (1.854 m)     Body mass index is 30.82 kg/m².    Wt Readings from Last 3 Encounters:   11/12/21 233 lb 9.6 oz (106 kg)   11/12/21 231 lb 9.6 oz (105.1 kg)   11/03/21 225 lb (102.1 kg)     BP Readings from Last 3 Encounters:   11/12/21 131/82   11/12/21 124/62   11/05/21 125/88        Physical Exam:  General Appearance: alert and oriented to person, place and time, well developed and well- nourished, in no acute distress  Skin: warm and dry, no rash or erythema  Head: normocephalic and atraumatic  Eyes: pupils equal, round, and reactive to light, extraocular eye movements intact, conjunctivae normal  ENT: tympanic membrane, external ear and ear canal normal bilaterally, nose without deformity, nasal mucosa and turbinates normal without polyps  Neck: supple and non-tender without mass, no thyromegaly or thyroid nodules, no cervical lymphadenopathy  Pulmonary/Chest: clear to auscultation bilaterally- no wheezes, rales or rhonchi, normal air movement, no respiratory distress  Cardiovascular: normal rate, regular rhythm, normal S1 and S2, no murmurs, rubs, clicks, or gallops, distal pulses intact, no carotid bruits  Abdomen: soft, non-tender, non-distended, normal bowel sounds, no masses or organomegaly  Extremities: no cyanosis, clubbing or edema  Musculoskeletal: normal range of motion, no joint swelling, deformity or tenderness  Neurologic: reflexes normal and symmetric, no cranial nerve deficit, gait, coordination and speech normal    Assessment/Plan:  1. Encounter for support and coordination of transition of care  - [place] DISCHARGE MEDS RECONCILED W/ CURRENT OUTPATIENT MED LIST  - Comprehensive Metabolic Panel; Future  - Hemoglobin A1C; Future  - Lipid, Fasting; Future  - CBC Auto Differential; Future  - External Referral To Podiatry    2. NSTEMI (non-ST elevated myocardial infarction) [facility])- s/p PCI and has follow up with cardiology 11/22 to discuss staged PCI of PDA & poss PCI of OM. Continue ACE, BB, statin, ASA, Imdur, Brilinta. Currently wearing LifeVest.     3. ASCVD (arteriosclerotic cardiovascular disease)  - Comprehensive Metabolic Panel; Future  - Hemoglobin A1C; Future  - Lipid, Fasting; Future  - CBC Auto Differential; Future    4. Chronic systolic congestive heart failure ([address]- no signs of fluid overload and weight stable; recheck renal function prior to follow up. Continue aldactone. 5. Type 2 diabetes mellitus without complication, with long-term current use of insulin ([address] - well controlled; no changes in management at this time. Recheck A1C prior to 3 month follow up. - Comprehensive Metabolic Panel; Future  - Hemoglobin A1C; Future  - Lipid, Fasting; Future  - CBC Auto Differential; Future    6. Persistent proteinuria- as per Nephrology; repeat urine studies in 2 months. If still abnormal, will likely need renal biopsy. Course of treatment, including any medications, possible imaging, referrals, and follow ups discussed with patient.  All risks and benefits and possible side effects discussed with patient who agrees to plan of care and verbalizes understanding. All labs and imaging reviewed.      Medical Decision Making: high complexity

## 2021-11-17 ENCOUNTER — CARE COORDINATION (OUTPATIENT)
Dept: CASE MANAGEMENT | Age: 41
End: 2021-11-17

## 2021-11-17 DIAGNOSIS — E11.9 TYPE 2 DIABETES MELLITUS WITHOUT COMPLICATION, WITH LONG-TERM CURRENT USE OF INSULIN (HCC): ICD-10-CM

## 2021-11-17 DIAGNOSIS — Z79.4 TYPE 2 DIABETES MELLITUS WITHOUT COMPLICATION, WITH LONG-TERM CURRENT USE OF INSULIN (HCC): ICD-10-CM

## 2021-11-17 DIAGNOSIS — M79.672 BILATERAL FOOT PAIN: ICD-10-CM

## 2021-11-17 DIAGNOSIS — M79.671 BILATERAL FOOT PAIN: ICD-10-CM

## 2021-11-17 RX ORDER — GABAPENTIN 300 MG/1
300 CAPSULE ORAL 3 TIMES DAILY
Qty: 90 CAPSULE | Refills: 2 | Status: SHIPPED | OUTPATIENT
Start: 2021-11-17 | End: 2022-02-15 | Stop reason: SDUPTHER

## 2021-11-17 NOTE — CARE COORDINATION
[facility] Transitions Follow Up Call    2021    Patient: Cuca Laurent  Patient : 1980   MRN: <[id number]>  Reason for Admission:   Discharge Date: 21 RARS: Readmission Risk Score: 10    Attempted to contact patient for care transition follow up. Unable to reach patient. Left message with contact information and request for call back.       Follow Up  Future Appointments   Date Time Provider Kamaljit Henry   2021  8:40 AM Kelly Perez MD [facility] Heart MMA   2021  1:30 PM [facility] CT ROOM 1 [facility] Imaging   2022 10:30 AM Sonya Connolly MD AFLADVNPHHTN AFL [facility]   2/15/2022 10:30 AM Javier Bliss APRN - CNP [address] [place] [address] IM [facility]   2022  1:00 PM Casper Hernández APRN - CNP [address] [place] [facility] ANTHONY Michael RN

## 2021-11-22 ENCOUNTER — OFFICE VISIT (OUTPATIENT)
Dept: CARDIOLOGY CLINIC | Age: 41
End: 2021-11-22
Payer: MEDICAID

## 2021-11-22 VITALS
SYSTOLIC BLOOD PRESSURE: 126 MMHG | WEIGHT: 235.8 LBS | HEART RATE: 53 BPM | DIASTOLIC BLOOD PRESSURE: 74 MMHG | HEIGHT: 73 IN | BODY MASS INDEX: 31.25 KG/M2

## 2021-11-22 DIAGNOSIS — I50.22 CHRONIC SYSTOLIC CONGESTIVE HEART FAILURE (HCC): ICD-10-CM

## 2021-11-22 DIAGNOSIS — E11.621 DIABETIC ULCER OF LEFT MIDFOOT ASSOCIATED WITH TYPE 2 DIABETES MELLITUS, WITH BONE INVOLVEMENT WITHOUT EVIDENCE OF NECROSIS (HCC): ICD-10-CM

## 2021-11-22 DIAGNOSIS — L97.426 DIABETIC ULCER OF LEFT MIDFOOT ASSOCIATED WITH TYPE 2 DIABETES MELLITUS, WITH BONE INVOLVEMENT WITHOUT EVIDENCE OF NECROSIS (HCC): ICD-10-CM

## 2021-11-22 DIAGNOSIS — E11.9 TYPE 2 DIABETES MELLITUS WITHOUT COMPLICATION, WITH LONG-TERM CURRENT USE OF INSULIN (HCC): ICD-10-CM

## 2021-11-22 DIAGNOSIS — I25.5 ISCHEMIC CARDIOMYOPATHY: ICD-10-CM

## 2021-11-22 DIAGNOSIS — Z72.0 TOBACCO ABUSE: ICD-10-CM

## 2021-11-22 DIAGNOSIS — Z89.422 HISTORY OF AMPUTATION OF LESSER TOE OF LEFT FOOT (HCC): ICD-10-CM

## 2021-11-22 DIAGNOSIS — N18.2 CHRONIC KIDNEY DISEASE, STAGE II (MILD): ICD-10-CM

## 2021-11-22 DIAGNOSIS — I25.10 ASCVD (ARTERIOSCLEROTIC CARDIOVASCULAR DISEASE): Primary | ICD-10-CM

## 2021-11-22 DIAGNOSIS — Z79.4 TYPE 2 DIABETES MELLITUS WITHOUT COMPLICATION, WITH LONG-TERM CURRENT USE OF INSULIN (HCC): ICD-10-CM

## 2021-11-22 PROCEDURE — 4004F PT TOBACCO SCREEN RCVD TLK: CPT | Performed by: INTERNAL MEDICINE

## 2021-11-22 PROCEDURE — 2022F DILAT RTA XM EVC RTNOPTHY: CPT | Performed by: INTERNAL MEDICINE

## 2021-11-22 PROCEDURE — G8482 FLU IMMUNIZE ORDER/ADMIN: HCPCS | Performed by: INTERNAL MEDICINE

## 2021-11-22 PROCEDURE — G8417 CALC BMI ABV UP PARAM F/U: HCPCS | Performed by: INTERNAL MEDICINE

## 2021-11-22 PROCEDURE — 99214 OFFICE O/P EST MOD 30 MIN: CPT | Performed by: INTERNAL MEDICINE

## 2021-11-22 PROCEDURE — 3044F HG A1C LEVEL LT 7.0%: CPT | Performed by: INTERNAL MEDICINE

## 2021-11-22 PROCEDURE — 93000 ELECTROCARDIOGRAM COMPLETE: CPT | Performed by: INTERNAL MEDICINE

## 2021-11-22 PROCEDURE — G8427 DOCREV CUR MEDS BY ELIG CLIN: HCPCS | Performed by: INTERNAL MEDICINE

## 2021-11-22 PROCEDURE — 1111F DSCHRG MED/CURRENT MED MERGE: CPT | Performed by: INTERNAL MEDICINE

## 2021-11-22 RX ORDER — ASPIRIN 81 MG/1
81 TABLET ORAL DAILY
Qty: 90 TABLET | Refills: 3 | Status: SHIPPED | OUTPATIENT
Start: 2021-11-22 | End: 2022-05-09 | Stop reason: SDUPTHER

## 2021-11-22 RX ORDER — LISINOPRIL 20 MG/1
20 TABLET ORAL DAILY
Qty: 90 TABLET | Refills: 3 | Status: SHIPPED | OUTPATIENT
Start: 2021-11-22 | End: 2021-12-22

## 2021-11-22 RX ORDER — METOPROLOL TARTRATE 50 MG/1
50 TABLET, FILM COATED ORAL 2 TIMES DAILY
Qty: 180 TABLET | Refills: 1 | OUTPATIENT
Start: 2021-11-22

## 2021-11-22 RX ORDER — METOPROLOL SUCCINATE 50 MG/1
50 TABLET, EXTENDED RELEASE ORAL DAILY
Qty: 30 TABLET | Refills: 3 | Status: SHIPPED | OUTPATIENT
Start: 2021-11-22 | End: 2022-01-24 | Stop reason: SDUPTHER

## 2021-11-22 NOTE — PROGRESS NOTES
CARDIOLOGY    NOTE    Chief Complaint: Chest Pain     HPI:   Raúl Pedraza is a 39y.o. year old who has Past medical history as noted below. Very pleasant gentleman who had NSTEMI and PCI to ostial LAD in Nov 2021 when he presented with chest pain  , EF was 20 % so he is wearing a life vest   HE has 100 % occluded OM1 and PD has disease too but he has no chest pain or sob. 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 Before presenting he was having  chest pain and tightness have been ongoing for about a month  he does smoke a pack a day and has family history of coronary artery disease  He says he is feeling much better now , no ankle swelling , energy levels are better he has been on disability ever since his toe amputation in March 2020       Current Outpatient Medications   Medication Sig Dispense Refill    gabapentin (NEURONTIN) 300 MG capsule Take 1 capsule by mouth 3 times daily for 30 days.  90 capsule 2    tiZANidine (ZANAFLEX) 2 MG tablet TAKE ONE TABLET BY MOUTH THREE TIMES DAILY AS NEEDED FOR BACK PAIN AND BACK SPASM 30 tablet 0    Insulin Pen Needle ([employer] PEN NEEDLES) 31G X 6 MM MISC 1 each by Does not apply route daily 100 each 3    atorvastatin (LIPITOR) 80 MG tablet Take 1 tablet by mouth nightly 30 tablet 3    spironolactone (ALDACTONE) 25 MG tablet Take 1 tablet by mouth daily 30 tablet 3    polyethylene glycol (GLYCOLAX) 17 g packet Take 17 g by mouth daily as needed for Constipation 527 g 1    ticagrelor (BRILINTA) 90 MG TABS tablet Take 1 tablet by mouth 2 times daily 60 tablet 3    nicotine (NICODERM CQ) 14 MG/24HR Place 1 patch onto the skin daily 30 patch 3    insulin glargine (LANTUS) 100 UNIT/ML injection vial Inject 40 Units into the skin nightly 5 pen 2    insulin lispro (HUMALOG) 100 UNIT/ML Reduced EF with global hypokinesis EF of 35 %. Large size severe anterior /apical ischemic area of left ventricle. Ischemic dilated cardiomyopathy. Abnormal stress test.    Hx of Doppler echocardiogram 10/29/2021    Left ventricular function is severely abnormal , EF is estimated at 20-25%. Grade I diastolic dysfunction. Mild mitral , tricuspid and moderate pulmonic regurgitation is present. No evidence of pericardial effusion.     Kidney stones 2005    Passed Kidney Stones In 2005    Marijuana use     \"Maybe Twice A Year\"    Precordial pain 10/25/2021    Shortness of breath on exertion     Teeth missing     Upper And Lower    [place] teeth extracted     4 [place] Teeth Extracted In Past     Past Surgical History:   Procedure Laterality Date    COLONOSCOPY N/A 9/21/2021    COLONOSCOPY POLYPECTOMY SNARE/COLD BIOPSY performed by Jessica Pope MD at [address] [facility]  12/16/2017    I & D Perineal Abscess    OTHER SURGICAL HISTORY Right 02/28/2018    tendoachilles lengthenig of right foot dorsiflexory 3rd metarsal right foot debridement of hyperkerototic lesion     TOE AMPUTATION Left 3/27/2020    TOE AMPUTATION - LEFT THIRD TOE AND METATARSAL HEAD, DEBRIDEMENT PLANTAR FOOT ULCER,  WOUND VAC PLACEMENT performed by Nancie Rai MD at [address] [facility] [place] N/A 9/21/2021    EGD BIOPSY performed by Jessica Pope MD at [address] EXTRACTION      4 [place] Teeth Extracted In Past     Family History   Problem Relation Age of Onset    Obesity Mother     Heart Disease Father         Open Heart Surgery    Diabetes Father     Allergy (Severe) Brother      Social History     Tobacco Use    Smoking status: Light Tobacco Smoker     Packs/day: 0.50     Years: 24.00     Pack years: 12.00     Types: Cigarettes     Start date: 1995    Smokeless tobacco: Never Used   Substance Use Topics    Alcohol use: Not Currently     Comment: once a year Review of Systems:   · Constitutional: No Fever or Weight Loss   · Eyes: No Decreased Vision  · ENT: No Headaches, Hearing Loss or Vertigo  · Cardiovascular: as per note above   · Respiratory: No cough or wheezing and as per note above. · Gastrointestinal: No abdominal pain, appetite loss, blood in stools, constipation, diarrhea or heartburn  · Genitourinary: No dysuria, trouble voiding, or hematuria  · Musculoskeletal:  denies any new  joint aches , swelling  or pain   · Integumentary: No rash or pruritis  · Neurological: No TIA or stroke symptoms  · Psychiatric: No anxiety or depression  · Endocrine: No malaise, fatigue or temperature intolerance  · Hematologic/Lymphatic: No bleeding problems, blood clots or swollen lymph nodes  · Allergic/Immunologic: No nasal congestion or hives    Objective:      Physical Exam:  /74 (Site: Right Upper Arm, Position: Sitting, Cuff Size: Medium Adult)   Pulse 53   Ht 6' 1\" (1.854 m)   Wt 235 lb 12.8 oz (107 kg)   BMI 31.11 kg/m²   Wt Readings from Last 3 Encounters:   11/22/21 235 lb 12.8 oz (107 kg)   11/12/21 233 lb 9.6 oz (106 kg)   11/12/21 231 lb 9.6 oz (105.1 kg)     Body mass index is 31.11 kg/m². Vitals:    11/22/21 0842   BP: 126/74   Pulse: 53        General Appearance:  No distress, conversant  Constitutional:  Well developed, Well nourished, No acute distress, Non-toxic appearance. HENT:  Normocephalic, Atraumatic, Bilateral external ears normal, Oropharynx moist, No oral exudates, Nose normal. Neck- Normal range of motion, No tenderness, Supple, No stridor,no apical-carotid delay  Eyes:  PERRL, EOMI, Conjunctiva normal, No discharge. Respiratory:  Normal breath sounds, No respiratory distress, No wheezing, No chest tenderness. ,no use of accessory muscles, NO crackles  Cardiovascular: (PMI) apex non displaced,no lifts no thrills,S1 and S2 audible, No added heart sounds, No signs of ankle edema, or volume overload, No evidence of JVD, No crackles  GI:  Bowel sounds normal, Soft, No tenderness, No masses, No gross visceromegaly   :  No costovertebral angle tenderness   Musculoskeletal:  No edema, no tenderness, no deformities. Back- no tenderness  Integument:  Well hydrated, no rash   Lymphatic:  No lymphadenopathy noted   Neurologic:  Alert & oriented x 3, CN 2-12 normal, normal motor function, normal sensory function, no focal deficits noted   Psychiatric:  Speech and behavior appropriate       Medical decision making and Data review:  DATA:  Lab Results   Component Value Date    TROPONINT 0.339 (HH) 11/04/2021     BNP:  No results found for: PROBNP  PT/INR:  No results found for: PTINR  Lab Results   Component Value Date    LABA1C 6.3 06/17/2021    LABA1C 6.0 12/29/2020     Lab Results   Component Value Date    CHOL 72 05/19/2019    TRIG 164 (H) 05/19/2019    HDL 23 (L) 11/03/2021    LDLCALC see below 06/17/2021    LDLDIRECT 88 11/03/2021     Lab Results   Component Value Date    ALT 17 07/23/2021    AST 15 07/23/2021     No results for input(s): WBC, HGB, HCT, MCV, PLT in the last 72 hours. TSH: No results found for: TSH  Lab Results   Component Value Date    AST 15 07/23/2021    ALT 17 07/23/2021    BILIDIR <0.2 08/12/2020    BILITOT 0.2 07/23/2021    ALKPHOS 91 07/23/2021     No results found for: PROBNP  Lab Results   Component Value Date    LABA1C 6.3 06/17/2021    LABA1C 6.0 12/29/2020     Lab Results   Component Value Date    WBC 4.0 11/04/2021    HGB 10.3 (L) 11/04/2021    HCT 30.4 (L) 11/04/2021     11/04/2021     Echo 10/29/21   Left ventricular function is severely abnormal , EF is estimated at 20-25%. Mildly dilated left ventricle. Moderate left ventricular hypertrophy. Grade I diastolic dysfunction. Global hypokinesis noted. Moderately dilated left atrium. The mitral valve appears to slightly prolapse. Mild mitral , tricuspid and moderate pulmonic regurgitation is present.    Mild pulmonary hypertension noted with RVSP of 43mmHg. No evidence of pericardial effusion. Stress test  10/29/21  Summary    Supervising physician Dr. Jordyn Ray .    Reduced EF with global hypokinesis EF of 35 %    Large size severe anterior /apical ischemic area of left ventricle    Dilated left ventricle with EDV of 240 ML    Ischemic dilated cardiomyopathy    Abnormal stress test     Cath 11/3/21  Procedure Summary   Access : Radial Indication : NSTEMI   1. PCI to OStial LAD using 4.0 X 15 and post dilated with 4.5 X   12 stent for 90 %lesion reduced to 0 % with MIHIR III flow   2. OM 1 is 100 % occluded  , Circ is dominant and gives PDA   and PLV , PDA has proximal 70-80 % lesion   3. RCA is non dominant small     LMCA: Normal (no stenosis %) and No Angiographicalyl Significant  Disease. widely patent     LAD: Abnormal.ostial LAD has 90 % lesion ,distal LAD is patent, 3 small  diagonal branches patent       Lesion on Prox LAD: 99% stenosis  LCx: Abnormal and Chronic occlusion. Widely patent Dominant Circ . OM1 is 100 %  occluded , PDA has ostial 70-80 % stenosis, OM 2 is small and diffusely  diseased        All labs, medications and tests reviewed by myself including data and history from outside source , patient and available family . Assessment & Plan:      1. ASCVD (arteriosclerotic cardiovascular disease)    2. Type 2 diabetes mellitus without complication, with long-term current use of insulin ([address]    3. Tobacco abuse    4. 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 History of amputation of lesser toe of left foot ([address]    6. Chronic kidney disease, stage II (mild)    7. Chronic systolic congestive heart failure ([address]    8. Ischemic cardiomyopathy       ASCVD    S/p PCI to ostial LAD for 90 %lesion in Nov 2021 , OM has 100 % occluded, PDA comes off Circ and has 70-80 % lesion .  Continue aspirin and bralinta for 1 year due to questions or concerns please feel free to contact the dictating provider for clarification.

## 2021-11-22 NOTE — LETTER
Cecille Lowe  1980  [id number]    Have you had any Chest Pain that is not new? - No  If Yes DO EKG - How does it feel -    How long does the pain last -      How long have you been having the pain -    Did you take a    And did it relieve the pain -      DO EKG IF: Patient has a Heart Rate above 100 or below 40     CAD (Coronary Artery Disease) patient should have one on file every 6 months        Have you had any Shortness of Breath - no  If Yes - When     Have you had any dizziness - No          Sitting wait 5 minutes do supine (laying down) wait 5 minutes then do standing - log each in \"vitals\" area in Epic   Be sure to ask what symptoms they are having if they get dizzy while completing ortho stats such as: room spinning, nausea, etc.    Have you had any palpitations that are not new? - No      Is the patient on any of the following medications -     Do you have any edema - swelling in No    If Yes - CHECK TO SEE IF THE EDEMA IS PITTING      When did you have your last labs drawn   Where did you have them done   What doctor ordered    If we do not have these labs you are retrieve these labs for these providers!     Do you have a surgery or procedure scheduled in the near future - No         Ask patient if they want to sign up for [employer] if they are not already signed up     Check to see if we have an E-MAIL on file for the patient     Check medication list thoroughly!!! AND RECONCILE OUTSIDE MEDICATIONS  If dose has changed change the entire order not just the MG  BE SURE TO ASK PATIENT IF THEY NEED MEDICATION REFILLS     At check out add to every patient's \"wrap up\" the following dot phrase AFTERHOURSEDUCATION and ensure we explain this to our patients

## 2021-11-23 ENCOUNTER — TELEPHONE (OUTPATIENT)
Dept: GASTROENTEROLOGY | Age: 41
End: 2021-11-23

## 2021-11-23 ENCOUNTER — HOSPITAL ENCOUNTER (OUTPATIENT)
Dept: CT IMAGING | Age: 41
Discharge: HOME | End: 2021-11-23
Payer: MEDICAID

## 2021-11-23 DIAGNOSIS — R59.0 MESENTERIC LYMPHADENOPATHY: ICD-10-CM

## 2021-11-23 PROCEDURE — 74177 CT ABD & PELVIS W/CONTRAST: CPT

## 2021-11-23 PROCEDURE — 6360000004 HC RX CONTRAST MEDICATION: Performed by: NURSE PRACTITIONER

## 2021-11-23 PROCEDURE — 2580000003 HC RX 258: Performed by: NURSE PRACTITIONER

## 2021-11-23 RX ORDER — SODIUM CHLORIDE 0.9 % (FLUSH) 0.9 %
5-40 SYRINGE (ML) INJECTION PRN
Status: DISCONTINUED | OUTPATIENT
Start: 2021-11-23 | End: 2021-11-24 | Stop reason: HOSPADM

## 2021-11-23 RX ORDER — TIZANIDINE 2 MG/1
TABLET ORAL
Qty: 30 TABLET | Refills: 0 | Status: SHIPPED | OUTPATIENT
Start: 2021-11-23 | End: 2022-01-17 | Stop reason: SDUPTHER

## 2021-11-23 RX ADMIN — IOPAMIDOL 75 ML: 755 INJECTION, SOLUTION INTRAVENOUS at 13:55

## 2021-11-23 RX ADMIN — SODIUM CHLORIDE, PRESERVATIVE FREE 10 ML: 5 INJECTION INTRAVENOUS at 13:55

## 2021-11-23 NOTE — TELEPHONE ENCOUNTER
Please call and schedule follow-up on EGD/colonoscopy. Place on recall for repeat colonoscopy in 3 years.

## 2021-11-29 DIAGNOSIS — Z79.4 TYPE 2 DIABETES MELLITUS WITHOUT COMPLICATION, WITH LONG-TERM CURRENT USE OF INSULIN (HCC): ICD-10-CM

## 2021-11-29 DIAGNOSIS — E11.9 TYPE 2 DIABETES MELLITUS WITHOUT COMPLICATION, WITH LONG-TERM CURRENT USE OF INSULIN (HCC): ICD-10-CM

## 2021-11-29 RX ORDER — INSULIN GLARGINE 100 [IU]/ML
40 INJECTION, SOLUTION SUBCUTANEOUS NIGHTLY
Qty: 5 PEN | Refills: 2 | Status: SHIPPED | OUTPATIENT
Start: 2021-11-29 | End: 2022-03-09 | Stop reason: SDUPTHER

## 2021-11-29 RX ORDER — PEN NEEDLE, DIABETIC 31 G X1/4"
1 NEEDLE, DISPOSABLE MISCELLANEOUS DAILY
Qty: 100 EACH | Refills: 3 | Status: SHIPPED | OUTPATIENT
Start: 2021-11-29 | End: 2022-04-18 | Stop reason: SDUPTHER

## 2021-12-22 ENCOUNTER — OFFICE VISIT (OUTPATIENT)
Dept: CARDIOLOGY CLINIC | Age: 41
End: 2021-12-22
Payer: MEDICAID

## 2021-12-22 VITALS
HEART RATE: 60 BPM | BODY MASS INDEX: 31.89 KG/M2 | SYSTOLIC BLOOD PRESSURE: 120 MMHG | DIASTOLIC BLOOD PRESSURE: 82 MMHG | WEIGHT: 240.6 LBS | HEIGHT: 73 IN

## 2021-12-22 DIAGNOSIS — E78.5 DYSLIPIDEMIA: ICD-10-CM

## 2021-12-22 DIAGNOSIS — Z01.810 PRE-OPERATIVE CARDIOVASCULAR EXAMINATION: Primary | ICD-10-CM

## 2021-12-22 DIAGNOSIS — E11.9 TYPE 2 DIABETES MELLITUS WITHOUT COMPLICATION, WITH LONG-TERM CURRENT USE OF INSULIN (HCC): ICD-10-CM

## 2021-12-22 DIAGNOSIS — I25.5 ISCHEMIC CARDIOMYOPATHY: Primary | ICD-10-CM

## 2021-12-22 DIAGNOSIS — Z72.0 TOBACCO ABUSE: ICD-10-CM

## 2021-12-22 DIAGNOSIS — I25.10 ASCVD (ARTERIOSCLEROTIC CARDIOVASCULAR DISEASE): ICD-10-CM

## 2021-12-22 DIAGNOSIS — Z79.4 TYPE 2 DIABETES MELLITUS WITHOUT COMPLICATION, WITH LONG-TERM CURRENT USE OF INSULIN (HCC): ICD-10-CM

## 2021-12-22 PROCEDURE — G8482 FLU IMMUNIZE ORDER/ADMIN: HCPCS | Performed by: NURSE PRACTITIONER

## 2021-12-22 PROCEDURE — 4004F PT TOBACCO SCREEN RCVD TLK: CPT | Performed by: NURSE PRACTITIONER

## 2021-12-22 PROCEDURE — G8417 CALC BMI ABV UP PARAM F/U: HCPCS | Performed by: NURSE PRACTITIONER

## 2021-12-22 PROCEDURE — 99214 OFFICE O/P EST MOD 30 MIN: CPT | Performed by: NURSE PRACTITIONER

## 2021-12-22 PROCEDURE — 2022F DILAT RTA XM EVC RTNOPTHY: CPT | Performed by: NURSE PRACTITIONER

## 2021-12-22 PROCEDURE — G8427 DOCREV CUR MEDS BY ELIG CLIN: HCPCS | Performed by: NURSE PRACTITIONER

## 2021-12-22 RX ORDER — SIMETHICONE 80 MG
80 TABLET,CHEWABLE ORAL EVERY 6 HOURS PRN
Qty: 180 TABLET | Refills: 3 | Status: SHIPPED | OUTPATIENT
Start: 2021-12-22 | End: 2022-02-15

## 2021-12-22 RX ORDER — LISINOPRIL 40 MG/1
40 TABLET ORAL DAILY
Qty: 30 TABLET | Refills: 5 | Status: ON HOLD
Start: 2021-12-22 | End: 2022-01-12 | Stop reason: HOSPADM

## 2021-12-22 ASSESSMENT — ENCOUNTER SYMPTOMS
COUGH: 0
SHORTNESS OF BREATH: 0

## 2021-12-22 NOTE — PATIENT INSTRUCTIONS
**It is YOUR responsibilty to bring medication bottles and/or updated medication list to [address] [place] [address]. This will allow us to better serve you and all your healthcare needs**  Please be informed that if you contact our office outside of normal business hours the physician on call cannot help with any scheduling or rescheduling issues, procedure instruction questions or any type of medication issue. We advise you for any urgent/emergency that you go to the nearest emergency room!     PLEASE CALL OUR OFFICE DURING NORMAL BUSINESS HOURS    Monday - Friday   8 am to 5 pm    [place]: [address]: [phone]    [place]:  [phone]

## 2021-12-22 NOTE — PROGRESS NOTES
[facility] ([facility] [place]  Kevin Prince  Phone: [phone]    Fax [phone]    [place] Leigh MD, Easton Parker MD, Anabella Martino MD, MD Donovan John MD Luan Miner, MD Arlester Darner, MD Nydia Shield, APRN      Joseph Case, APRN Fleeta Rinne, [address], BARBARA    CARDIOLOGY  NOTE    2021    Ross Fraire (:  1980) is a 39 y.o. male,Established patient with Dr. Deangelo Titus, here for evaluation of the following chief complaint(s):  1 Month Follow-Up (Pt denies any cardiac symptoms. No swelling )    SUBJECTIVE/OBJECTIVE:    Gera Connell is a 39y.o. year old who has Past medical history as noted below. Very pleasant gentleman who had NSTEMI and PCI to ostial LAD in 2021 when he presented with chest pain, EF was 20 % so he is wearing a life vest. He reporst significant belching and indigestion type pain. HE has 100 % occluded OM1 and PD has disease too but he has no chest pain or sob. 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 Before presenting he was having  chest pain and tightness have been ongoing for about a month  he does smoke a pack a day and has family history of coronary artery disease  He says he is feeling much better now , no ankle swelling , energy levels are better he has been on disability ever since his toe amputation in 2020. HE would like to schedule [facility] in  for staged procedure of PDA and possibly OM. Review of Systems   Constitutional: Negative for fatigue and fever. Respiratory: Negative for cough and shortness of breath. Cardiovascular: Positive for leg swelling. Negative for chest pain and palpitations. Musculoskeletal: Negative for arthralgias and gait problem.    Neurological: Negative for dizziness, syncope, weakness, light-headedness and headaches. Vitals:    12/22/21 1107   BP: 120/82   Pulse: 60   Weight: 240 lb 9.6 oz (109.1 kg)   Height: 6' 1\" (1.854 m)       Wt Readings from Last 3 Encounters:   12/22/21 240 lb 9.6 oz (109.1 kg)   11/22/21 235 lb 12.8 oz (107 kg)   11/12/21 233 lb 9.6 oz (106 kg)       BP Readings from Last 3 Encounters:   11/22/21 126/74   11/12/21 131/82   11/12/21 124/62       Prior to Admission medications    Medication Sig Start Date End Date Taking? Authorizing Provider   Insulin Pen Needle ([employer] PEN NEEDLES) 31G X 6 MM MISC 1 each by Does not apply route daily 11/29/21   BARBARA Hercules CNP   insulin glargine (LANTUS) 100 UNIT/ML injection vial Inject 40 Units into the skin nightly Bottles please 11/29/21   BARBARA Hercules CNP   Insulin Syringes, Disposable, U-100 1 ML MISC 1 each by Does not apply route daily 11/29/21   BARBARA Hercules CNP   tiZANidine (ZANAFLEX) 2 MG tablet TAKE ONE TABLET BY MOUTH THREE TIMES A DAY AS NEEDED FOR BACK PAIN AND BACK SPASM 11/23/21   BARBARA Hercules CNP   ticagrelor (BRILINTA) 90 MG TABS tablet Take 1 tablet by mouth 2 times daily 11/22/21   Leida Hanna MD   aspirin EC 81 MG EC tablet Take 1 tablet by mouth daily 11/22/21   Leida Hanna MD   metoprolol succinate (TOPROL XL) 50 MG extended release tablet Take 1 tablet by mouth daily 11/22/21   Leida Hanna MD   lisinopril (PRINIVIL;ZESTRIL) 20 MG tablet Take 1 tablet by mouth daily 11/22/21   Leida Hanna MD   gabapentin (NEURONTIN) 300 MG capsule Take 1 capsule by mouth 3 times daily for 30 days.  11/17/21 12/17/21  BARBARA Hercules CNP   atorvastatin (LIPITOR) 80 MG tablet Take 1 tablet by mouth nightly 11/4/21   Abagail Mortimer, APRN - CNP   spironolactone (ALDACTONE) 25 MG tablet Take 1 tablet by mouth daily 11/5/21   Abagail Mortimer, APRN - CNP   nicotine (NICODERM CQ) 14 MG/24HR Place 1 patch onto the skin daily 11/5/21 BARBARA Steen CNP   insulin lispro (HUMALOG) 100 UNIT/ML injection vial Inject 15 Units into the skin 3 times daily (with meals) 11/2/21   BARBARA Rodriguez CNP   INSULIN SYRINGE .5CC/29G 29G X 1/2\" 0.5 ML MISC Inject 1 each into the skin 3 times daily 11/2/21   BARBARA Rodriguez CNP   busPIRone (BUSPAR) 5 MG tablet TAKE ONE TABLET BY MOUTH TWICE A DAY 11/2/21   BARBARA Rodriguez CNP   traZODone (DESYREL) 50 MG tablet TAKE ONE TABLET BY MOUTH NIGHTLY AS NEEDED FOR SLEEP 11/2/21   BARBARA Rodriguez CNP   isosorbide mononitrate (IMDUR) 30 MG extended release tablet Take 1 tablet by mouth daily 10/25/21   Rhina Womack MD   nitroGLYCERIN (NITROSTAT) 0.4 MG SL tablet Place 1 tablet under the tongue every 5 minutes as needed for Chest pain 10/25/21   Rhina Womack MD   omeprazole (PRILOSEC) 20 MG delayed release capsule Take 1 capsule by mouth Daily 10/11/21   BARBARA Vo CNP   vitamin D (ERGOCALCIFEROL) 1.25 MG (00084 UT) CAPS capsule Take 1 capsule by mouth once a week 9/15/21   BARBARA Vo CNP   Lancets MISC 1 each by Does not apply route 3 times daily 6/15/20   BARBARA Rodriguez CNP   Blood Glucose Monitoring Suppl (ONE TOUCH ULTRA 2) w/Device KIT 1 kit by Does not apply route once for 1 dose 4/16/20 11/12/21  BARBARA Rodriguez CNP   blood glucose monitor strips Test 3 times a day & as needed for symptoms of irregular blood glucose. 4/16/20   BARBARA Rodriguez CNP       Physical Exam  Vitals reviewed. Constitutional:       General: He is not in acute distress. Appearance: Normal appearance. He is obese. He is not ill-appearing. HENT:      Head: Atraumatic. Neck:      Vascular: No carotid bruit. Cardiovascular:      Rate and Rhythm: Normal rate and regular rhythm. Pulses: Normal pulses. Heart sounds: Normal heart sounds. No murmur heard. Pulmonary:      Effort: Pulmonary effort is normal. No respiratory distress. Breath sounds: Normal breath sounds. Musculoskeletal:         General: No swelling or deformity. Cervical back: Neck supple. No muscular tenderness. Right lower leg: Edema present. Left lower leg: Edema present. Neurological:      Mental Status: He is alert.          Health Maintenance   Topic Date Due    Varicella vaccine (1 of 2 - 2-dose childhood series) Never done    Hepatitis B vaccine (1 of 3 - Risk 3-dose series) 03/09/2022 (Originally 5/14/1999)    HIV screen  03/09/2022 (Originally 5/14/1995)    COVID-19 Vaccine (3 - Booster) 03/02/2022    Diabetic foot exam  03/09/2022    A1C test (Diabetic or Prediabetic)  06/17/2022    Lipid screen  11/03/2022    Potassium monitoring  11/04/2022    Creatinine monitoring  11/04/2022    Diabetic retinal exam  11/23/2022    DTaP/Tdap/Td vaccine (2 - Td or Tdap) 08/12/2029    Pneumococcal 0-64 years Vaccine (2 of 2 - PPSV23) 05/14/2045    Flu vaccine  Completed    Hepatitis C screen  Completed    Hepatitis A vaccine  Aged Out    Hib vaccine  Aged Out    Meningococcal (ACWY) vaccine  Aged Out       Lab Review   Lab Results   Component Value Date    CHOL 72 05/19/2019    TRIG 164 05/19/2019    HDL 23 11/03/2021    LDLDIRECT 88 11/03/2021           Echo 10/29/21   Left ventricular function is severely abnormal , EF is estimated at 20-25%.   Mildly dilated left ventricle.   Moderate left ventricular hypertrophy.   Grade I diastolic dysfunction.   Global hypokinesis noted.   Moderately dilated left atrium.   The mitral valve appears to slightly prolapse.   Mild mitral , tricuspid and moderate pulmonic regurgitation is present.   Mild pulmonary hypertension noted with RVSP of 43mmHg.   No evidence of pericardial effusion.     Stress test  10/29/21  Summary    Supervising physician Dr. Oswaldo Noriega .    Reduced EF with global hypokinesis EF of 35 %    Large size severe anterior /apical ischemic area of left ventricle    Dilated left ventricle with EDV of 240 ML    Ischemic dilated cardiomyopathy    Abnormal stress test      Cath 11/3/21  Procedure Summary   Access : Radial Indication : NSTEMI   1. PCI to OStial LAD using 4.0 X 15 and post dilated with 4.5 X   12 stent for 90 %lesion reduced to 0 % with MIHIR III flow   2. OM 1 is 100 % occluded  , Circ is dominant and gives PDA   and PLV , PDA has proximal 70-80 % lesion   3. RCA is non dominant small     LMCA: Normal (no stenosis %) and No Angiographicalyl Significant  Disease. widely patent     LAD: Abnormal.ostial LAD has 90 % lesion ,distal LAD is patent, 3 small  diagonal branches patent       Lesion on Prox LAD: 99% stenosis  LCx: Abnormal and Chronic occlusion. Widely patent Dominant Circ . OM1 is 100 %  occluded , PDA has ostial 70-80 % stenosis, OM 2 is small and diffusely  diseased           ASSESSMENT/PLAN:    ASCVD  S/p PCI to ostial LAD for 90 %lesion in Nov 2021 , OM has 100 % occluded, PDA comes off Circ and has 70-80 % lesion . Continue aspirin and bralinta for 1 year due to NSTEMI   HE will need PCi to PDA and possible OM bu the wants to wait till after holidays he has no symptoms , I will plan PCI in Jan 2022  unless things change Continue imdur. ? Indigestion anginal equivalent? He mullen not get relief from belching or antacids. He has never tried NTG SL. He is not convinced it would help. Try mylicon.      Ischemic cardiomyopathy  EF of 20-25 % HE is is wearing life vest, change to toprol Xl 50 mg and increase lisinopril 20 mg daily   Will plan echo in Feb 2022 to decide about ICD?  HE  Appears euvolemic   Increase lisinopril to 40 mg      History of amputation of lesser toe of left foot (HCC)  Three vessel run off of both legs on arterial doppler in 2021     HBO-Diabetic ulcer of left midfoot associated with type 2 diabetes mellitus, with bone involvement without evidence of necrosis ([address] guerra 3   diabetes management as per primary physician     Tobacco abuse   Encouraged to quit smoking. He has decreased his tobacco use     Dyslipidemia   All available lab work was reviewed. Patient was advised to repeat lab work before next visit. Necessary orders were placed , instructions given by myself   on lipitor 80 mg daily  Lipids are not at goal. Per lipids 11/3/2021  Counseled extensively and medication compliance urged. We discussed that for the  prevention of ASCVD our  goal is aggressive risk modification. Patient is encouraged to exercise if they can , educated about  brisk walk for 30 minutes  at least 3 to 4 times a week if there are no physical limitations          Various goals were discussed and questions answered. Continue current medications. Appropriate prescriptions are addressed and refills ordered. Questions answered and patient verbalizes understanding. Call for any problems, questions, or concerns. Greater than 60 % of time spent counseling besides reviewing data and images     Return in about 1 month (around 1/22/2022) for or sonner if needed. An electronic signature was used to authenticate this note.     Electronically signed by BARBARA Guzman CNP on 12/22/2021 at 6:47 AM

## 2021-12-22 NOTE — LETTER
Kathyrn Charon Dr. Angie Libman  1980  [id number]    Have you had any Chest Pain that is not new? - No   DO EKG IF: Patient has a Heart Rate above 100 or below 40     CAD (Coronary Artery Disease) patient should have one on file every 6 months      Have you had any Shortness of Breath - No    Have you had any dizziness - No     Have you had any palpitations that are not new? - No    Do you have any edema - swelling in No      When did you have your last labs drawn 11/04/2021  Where did you have them done   What doctor ordered     If we do not have these labs you are retrieve these labs for these providers!     Do you have a surgery or procedure scheduled in the near future - No     Ask patient if they want to sign up for [employer] if they are not already signed up     Check to see if we have an E-MAIL on file for the patient     Check medication list thoroughly!!! AND RECONCILE OUTSIDE MEDICATIONS  If dose has changed change the entire order not just the MG  BE SURE TO ASK PATIENT IF THEY NEED MEDICATION REFILLS     At check out add to every patient's \"wrap up\" the following dot phrase AFTERHOURSEDUCATION and ensure we explain this to our patients

## 2022-01-02 DIAGNOSIS — F41.9 ANXIETY: ICD-10-CM

## 2022-01-03 ENCOUNTER — TELEPHONE (OUTPATIENT)
Dept: CARDIOLOGY CLINIC | Age: 42
End: 2022-01-03

## 2022-01-03 DIAGNOSIS — Z01.810 PRE-OPERATIVE CARDIOVASCULAR EXAMINATION: Primary | ICD-10-CM

## 2022-01-03 RX ORDER — BUSPIRONE HYDROCHLORIDE 5 MG/1
TABLET ORAL
Qty: 60 TABLET | Refills: 1 | Status: SHIPPED | OUTPATIENT
Start: 2022-01-03 | End: 2022-03-02

## 2022-01-03 NOTE — TELEPHONE ENCOUNTER
Received a denial letter for cpt [id number]. Scanned letter into . Placed message in patient chart regarding denial.  Sent message to  for guidance on how to handle Peer to peer and gathering correct cpt codes for procedure.

## 2022-01-04 NOTE — TELEPHONE ENCOUNTER
Spoke with [place] and we changed CPT code to [facility] and authorization is not required. No further action is required for this denial letter. Procedure is now authorized.

## 2022-01-07 ENCOUNTER — NURSE ONLY (OUTPATIENT)
Dept: CARDIOLOGY CLINIC | Age: 42
End: 2022-01-07
Payer: MEDICAID

## 2022-01-07 ENCOUNTER — HOSPITAL ENCOUNTER (OUTPATIENT)
Age: 42
Setting detail: SPECIMEN
Discharge: HOME | End: 2022-01-07
Payer: MEDICAID

## 2022-01-07 ENCOUNTER — HOSPITAL ENCOUNTER (OUTPATIENT)
Age: 42
Discharge: HOME | End: 2022-01-07
Payer: MEDICAID

## 2022-01-07 VITALS — SYSTOLIC BLOOD PRESSURE: 118 MMHG | DIASTOLIC BLOOD PRESSURE: 72 MMHG | HEART RATE: 98 BPM

## 2022-01-07 DIAGNOSIS — Z01.810 PRE-OPERATIVE CARDIOVASCULAR EXAMINATION: ICD-10-CM

## 2022-01-07 LAB
ABO/RH: NORMAL
ANION GAP SERPL CALCULATED.3IONS-SCNC: 10 MMOL/L (ref 4–16)
ANTIBODY SCREEN: NEGATIVE
BASOPHILS ABSOLUTE: 0 K/CU MM
BASOPHILS RELATIVE PERCENT: 1 % (ref 0–1)
BUN BLDV-MCNC: 25 MG/DL (ref 6–23)
CALCIUM SERPL-MCNC: 9.1 MG/DL (ref 8.3–10.6)
CHLORIDE BLD-SCNC: 100 MMOL/L (ref 99–110)
CO2: 23 MMOL/L (ref 21–32)
COMMENT: NORMAL
CREAT SERPL-MCNC: 1.6 MG/DL (ref 0.9–1.3)
DIFFERENTIAL TYPE: ABNORMAL
EOSINOPHILS ABSOLUTE: 0.2 K/CU MM
EOSINOPHILS RELATIVE PERCENT: 4.8 % (ref 0–3)
GFR AFRICAN AMERICAN: 58 ML/MIN/1.73M2
GFR NON-AFRICAN AMERICAN: 48 ML/MIN/1.73M2
GLUCOSE BLD-MCNC: 236 MG/DL (ref 70–99)
HCT VFR BLD CALC: 33.1 % (ref 42–52)
HEMOGLOBIN: 10.9 GM/DL (ref 13.5–18)
IMMATURE NEUTROPHIL %: 0.5 % (ref 0–0.43)
LYMPHOCYTES ABSOLUTE: 1 K/CU MM
LYMPHOCYTES RELATIVE PERCENT: 24.9 % (ref 24–44)
MCH RBC QN AUTO: 28.8 PG (ref 27–31)
MCHC RBC AUTO-ENTMCNC: 32.9 % (ref 32–36)
MCV RBC AUTO: 87.3 FL (ref 78–100)
MONOCYTES ABSOLUTE: 0.3 K/CU MM
MONOCYTES RELATIVE PERCENT: 8.6 % (ref 0–4)
NUCLEATED RBC %: 0 %
PDW BLD-RTO: 12.7 % (ref 11.7–14.9)
PLATELET # BLD: 180 K/CU MM (ref 140–440)
PMV BLD AUTO: 11 FL (ref 7.5–11.1)
POTASSIUM SERPL-SCNC: 5.2 MMOL/L (ref 3.5–5.1)
RBC # BLD: 3.79 M/CU MM (ref 4.6–6.2)
SARS-COV-2: NOT DETECTED
SEGMENTED NEUTROPHILS ABSOLUTE COUNT: 2.4 K/CU MM
SEGMENTED NEUTROPHILS RELATIVE PERCENT: 60.2 % (ref 36–66)
SODIUM BLD-SCNC: 133 MMOL/L (ref 135–145)
SOURCE: NORMAL
TOTAL IMMATURE NEUTOROPHIL: 0.02 K/CU MM
TOTAL NUCLEATED RBC: 0 K/CU MM
WBC # BLD: 4 K/CU MM (ref 4–10.5)

## 2022-01-07 PROCEDURE — 36415 COLL VENOUS BLD VENIPUNCTURE: CPT

## 2022-01-07 PROCEDURE — 99211 OFF/OP EST MAY X REQ PHY/QHP: CPT | Performed by: INTERNAL MEDICINE

## 2022-01-07 PROCEDURE — 80048 BASIC METABOLIC PNL TOTAL CA: CPT

## 2022-01-07 PROCEDURE — U0005 INFEC AGEN DETEC AMPLI PROBE: HCPCS

## 2022-01-07 PROCEDURE — 86900 BLOOD TYPING SEROLOGIC ABO: CPT

## 2022-01-07 PROCEDURE — 86850 RBC ANTIBODY SCREEN: CPT

## 2022-01-07 PROCEDURE — 86901 BLOOD TYPING SEROLOGIC RH(D): CPT

## 2022-01-07 PROCEDURE — 85025 COMPLETE CBC W/AUTO DIFF WBC: CPT

## 2022-01-07 PROCEDURE — U0003 INFECTIOUS AGENT DETECTION BY NUCLEIC ACID (DNA OR RNA); SEVERE ACUTE RESPIRATORY SYNDROME CORONAVIRUS 2 (SARS-COV-2) (CORONAVIRUS DISEASE [COVID-19]), AMPLIFIED PROBE TECHNIQUE, MAKING USE OF HIGH THROUGHPUT TECHNOLOGIES AS DESCRIBED BY CMS-2020-01-R: HCPCS

## 2022-01-07 NOTE — PROGRESS NOTES
Performed COVID testing with oral swab for 3 seconds to throat. This RN present in the room. Dropped swab in  labeled collection tube. Lab order, facesheet and copy of insurance card placed in collection bag. Bag placed in biohazard bag and placed in fridge.  called for . Patient here in office & educated on PCI PDA ? OM for Dx: Oclussion, scheduled for 1/12/22 @ 8:00, w/arrival @ 6:00, @ [facility]. Risks explained; & consents signed. Pre-admission orders given to pt for labs & CXR, which are due 1/7/22 @ [facility]. Instructions given to pt to: tanya NPO after midnight the night before procedure. Patient to call hospital @ [phone] to pre-register. May take morning meds the morning of procedure. Patient was notified that procedure could be delayed due to an emergency. Patient voiced understanding. Copies of consent, pre-testing orders, & info. sheet scanned into media.

## 2022-01-11 ENCOUNTER — TELEPHONE (OUTPATIENT)
Dept: CARDIOLOGY CLINIC | Age: 42
End: 2022-01-11

## 2022-01-12 ENCOUNTER — HOSPITAL ENCOUNTER (OUTPATIENT)
Dept: CARDIAC CATH/INVASIVE PROCEDURES | Age: 42
Discharge: HOME | End: 2022-01-12
Attending: INTERNAL MEDICINE | Admitting: INTERNAL MEDICINE
Payer: MEDICAID

## 2022-01-12 VITALS
TEMPERATURE: 96.5 F | HEIGHT: 73 IN | SYSTOLIC BLOOD PRESSURE: 138 MMHG | DIASTOLIC BLOOD PRESSURE: 77 MMHG | BODY MASS INDEX: 31.81 KG/M2 | HEART RATE: 60 BPM | RESPIRATION RATE: 17 BRPM | WEIGHT: 240 LBS | OXYGEN SATURATION: 97 %

## 2022-01-12 LAB
ACTIVATED CLOTTING TIME, LOW RANGE: 174 SEC
ACTIVATED CLOTTING TIME, LOW RANGE: 232 SEC
ACTIVATED CLOTTING TIME, LOW RANGE: >400 SEC
ANION GAP SERPL CALCULATED.3IONS-SCNC: 13 MMOL/L (ref 4–16)
BUN BLDV-MCNC: 23 MG/DL (ref 6–23)
CALCIUM SERPL-MCNC: 9.1 MG/DL (ref 8.3–10.6)
CHLORIDE BLD-SCNC: 98 MMOL/L (ref 99–110)
CO2: 19 MMOL/L (ref 21–32)
CREAT SERPL-MCNC: 1.5 MG/DL (ref 0.9–1.3)
GFR AFRICAN AMERICAN: >60 ML/MIN/1.73M2
GFR NON-AFRICAN AMERICAN: 52 ML/MIN/1.73M2
GLUCOSE BLD-MCNC: 233 MG/DL (ref 70–99)
POTASSIUM SERPL-SCNC: 4.5 MMOL/L (ref 3.5–5.1)
SODIUM BLD-SCNC: 130 MMOL/L (ref 135–145)

## 2022-01-12 PROCEDURE — 2500000003 HC RX 250 WO HCPCS

## 2022-01-12 PROCEDURE — C1874 STENT, COATED/COV W/DEL SYS: HCPCS

## 2022-01-12 PROCEDURE — C1894 INTRO/SHEATH, NON-LASER: HCPCS

## 2022-01-12 PROCEDURE — 93458 L HRT ARTERY/VENTRICLE ANGIO: CPT | Performed by: INTERNAL MEDICINE

## 2022-01-12 PROCEDURE — 6360000002 HC RX W HCPCS

## 2022-01-12 PROCEDURE — 6360000004 HC RX CONTRAST MEDICATION

## 2022-01-12 PROCEDURE — 92929 HC PRQ CARD STENT W/ANGIO ADDL: CPT

## 2022-01-12 PROCEDURE — 2709999900 HC NON-CHARGEABLE SUPPLY

## 2022-01-12 PROCEDURE — 92943 PRQ TRLUML REVSC CH OCC ANT: CPT

## 2022-01-12 PROCEDURE — 92929 PR PRQ TRLUML CORONARY STENT W/ANGIO ADDL ART/BRNCH: CPT | Performed by: INTERNAL MEDICINE

## 2022-01-12 PROCEDURE — 6370000000 HC RX 637 (ALT 250 FOR IP)

## 2022-01-12 PROCEDURE — 92943 PRQ TRLUML REVSC CH OCC ANT: CPT | Performed by: INTERNAL MEDICINE

## 2022-01-12 PROCEDURE — C1769 GUIDE WIRE: HCPCS

## 2022-01-12 PROCEDURE — C1725 CATH, TRANSLUMIN NON-LASER: HCPCS

## 2022-01-12 PROCEDURE — C1887 CATHETER, GUIDING: HCPCS

## 2022-01-12 PROCEDURE — 85347 COAGULATION TIME ACTIVATED: CPT

## 2022-01-12 PROCEDURE — 80048 BASIC METABOLIC PNL TOTAL CA: CPT

## 2022-01-12 RX ORDER — DIAZEPAM 5 MG/1
5 TABLET ORAL ONCE
Status: DISCONTINUED | OUTPATIENT
Start: 2022-01-12 | End: 2022-01-12

## 2022-01-12 RX ORDER — DIAZEPAM 5 MG/1
5 TABLET ORAL ONCE
Status: DISCONTINUED | OUTPATIENT
Start: 2022-01-12 | End: 2022-01-12 | Stop reason: HOSPADM

## 2022-01-12 RX ORDER — ISOSORBIDE MONONITRATE 60 MG/1
60 TABLET, EXTENDED RELEASE ORAL DAILY
Qty: 90 TABLET | Refills: 3 | Status: SHIPPED | OUTPATIENT
Start: 2022-01-12 | End: 2022-01-24 | Stop reason: SDUPTHER

## 2022-01-12 RX ORDER — DIPHENHYDRAMINE HCL 25 MG
25 TABLET ORAL ONCE
Status: DISCONTINUED | OUTPATIENT
Start: 2022-01-12 | End: 2022-01-12 | Stop reason: HOSPADM

## 2022-01-12 RX ORDER — DIPHENHYDRAMINE HCL 25 MG
25 TABLET ORAL ONCE
Status: DISCONTINUED | OUTPATIENT
Start: 2022-01-12 | End: 2022-01-12

## 2022-01-12 RX ORDER — SODIUM CHLORIDE 9 MG/ML
INJECTION, SOLUTION INTRAVENOUS CONTINUOUS
Status: DISCONTINUED | OUTPATIENT
Start: 2022-01-12 | End: 2022-01-12 | Stop reason: HOSPADM

## 2022-01-12 RX ORDER — SODIUM CHLORIDE 9 MG/ML
INJECTION, SOLUTION INTRAVENOUS CONTINUOUS
Status: DISCONTINUED | OUTPATIENT
Start: 2022-01-12 | End: 2022-01-12

## 2022-01-12 NOTE — PROGRESS NOTES
Patient arrived to cath lab holding room 2.  Assessment completed, call light in reach, life vest left on until procedure starts

## 2022-01-12 NOTE — PROGRESS NOTES
Received from cath lab. Monitor and alarms on. Call light within reach. 6 f right femoral arterial sheath to pressure bag, site wnl.

## 2022-01-12 NOTE — H&P
previous alcohol use. He reports previous drug use. Family history:  family history includes Allergy (Severe) in his brother; Diabetes in his father; Heart Disease in his father; Obesity in his mother. Allergies:    No Known Allergies    Home Medications:  Prior to Admission medications    Medication Sig Start Date End Date Taking? Authorizing Provider   busPIRone (BUSPAR) 5 MG tablet TAKE ONE TABLET BY MOUTH TWICE A DAY 1/3/22  Yes BARBARA Henson CNP   lisinopril (PRINIVIL;ZESTRIL) 40 MG tablet Take 1 tablet by mouth daily 12/22/21  Yes BARBARA Bustos CNP   insulin glargine (LANTUS) 100 UNIT/ML injection vial Inject 40 Units into the skin nightly Bottles please 11/29/21  Yes BARBARA Henson CNP   tiZANidine (ZANAFLEX) 2 MG tablet TAKE ONE TABLET BY MOUTH THREE TIMES A DAY AS NEEDED FOR BACK PAIN AND BACK SPASM 11/23/21  Yes BARBARA Henson CNP   ticagrelor (BRILINTA) 90 MG TABS tablet Take 1 tablet by mouth 2 times daily 11/22/21  Yes Addi Lewis MD   aspirin EC 81 MG EC tablet Take 1 tablet by mouth daily 11/22/21  Yes Addi Lewis MD   metoprolol succinate (TOPROL XL) 50 MG extended release tablet Take 1 tablet by mouth daily 11/22/21  Yes Addi Lewis MD   gabapentin (NEURONTIN) 300 MG capsule Take 1 capsule by mouth 3 times daily for 30 days.  11/17/21 1/12/22 Yes BARBARA Henson CNP   atorvastatin (LIPITOR) 80 MG tablet Take 1 tablet by mouth nightly 11/4/21  Yes BARBARA Guzman CNP   spironolactone (ALDACTONE) 25 MG tablet Take 1 tablet by mouth daily 11/5/21  Yes BARBARA Guzman CNP   insulin lispro (HUMALOG) 100 UNIT/ML injection vial Inject 15 Units into the skin 3 times daily (with meals) 11/2/21  Yes BARBARA Henson CNP   traZODone (DESYREL) 50 MG tablet TAKE ONE TABLET BY MOUTH NIGHTLY AS NEEDED FOR SLEEP 11/2/21  Yes Magdalen Gold, APRN - CNP   isosorbide mononitrate (IMDUR) 30 MG extended release tablet Take 1 tablet by mouth daily 10/25/21  Yes Snehal Pickard MD   omeprazole (PRILOSEC) 20 MG delayed release capsule Take 1 capsule by mouth Daily 10/11/21  Yes Mookie Layer, APRN - CNP   vitamin D (ERGOCALCIFEROL) 1.25 MG (18556 UT) CAPS capsule Take 1 capsule by mouth once a week 9/15/21  Yes Mookie LayerBARBARA CNP   simethicone (GAS RELIEF) 80 MG chewable tablet Take 1 tablet by mouth every 6 hours as needed for Flatulence 12/22/21   Fredricka Mcardle, APRN - CNP   Insulin Pen Needle ([employer] PEN NEEDLES) 31G X 6 MM MISC 1 each by Does not apply route daily 11/29/21   BARBARA Griffin CNP   Insulin Syringes, Disposable, U-100 1 ML MISC 1 each by Does not apply route daily 11/29/21   BARBARA Griffin CNP   nicotine (NICODERM CQ) 14 MG/24HR Place 1 patch onto the skin daily 11/5/21   BARBARA Cuadra CNP   INSULIN SYRINGE .5CC/29G 29G X 1/2\" 0.5 ML MISC Inject 1 each into the skin 3 times daily 11/2/21   BARBARA Griffin CNP   nitroGLYCERIN (NITROSTAT) 0.4 MG SL tablet Place 1 tablet under the tongue every 5 minutes as needed for Chest pain 10/25/21   Snehal Pickard MD   Lancets MISC 1 each by Does not apply route 3 times daily 6/15/20   BARBARA Griffin CNP   Blood Glucose Monitoring Suppl (ONE TOUCH ULTRA 2) w/Device KIT 1 kit by Does not apply route once for 1 dose 4/16/20 12/22/21  BARBARA Griffin CNP   blood glucose monitor strips Test 3 times a day & as needed for symptoms of irregular blood glucose.  4/16/20   BARBARA Griffin CNP       Review of systems: Review of Systems:   · Constitutional: No Fever or Weight Loss   · Eyes: No Decreased Vision  · ENT: No Headaches, Hearing Loss or Vertigo  · Cardiovascular: No chest pain, dyspnea on exertion, palpitations or loss of consciousness  · Respiratory: No cough or wheezing    · Gastrointestinal: No abdominal pain, appetite loss, blood in stools, constipation, diarrhea or heartburn  · Genitourinary: No dysuria, trouble voiding, or hematuria  · Musculoskeletal:  No gait disturbance, weakness or joint complaints  · Integumentary: No rash or pruritis  · Neurological: No TIA or stroke symptoms  · Psychiatric: No anxiety or depression  · Endocrine: No malaise, fatigue or temperature intolerance  · Hematologic/Lymphatic: No bleeding problems, blood clots or swollen lymph nodes  Allergic/Immunologic: No nasal congestion or hives    Physical Examination:    /77   Pulse 58   Temp 96.5 °F (35.8 °C) (Temporal)   Resp 16   Ht 6' 1\" (1.854 m)   Wt 240 lb (108.9 kg)   SpO2 97%   BMI 31.66 kg/m²      General Appearance:  No distress, conversant  Constitutional:  Well developed, Well nourished, No acute distress, Non-toxic appearance. HENT:  Normocephalic, Atraumatic, Bilateral external ears normal, Oropharynx moist, No oral exudates, Nose normal. Neck- Normal range of motion, No tenderness, Supple, No stridor,no apical-carotid delay  Eyes:  PERRL, EOMI, Conjunctiva normal, No discharge. Lymphatics: no palpable lymph nodes  Respiratory:  Normal breath sounds, No respiratory distress, No wheezing, No chest tenderness. ,no uise of accessory muscles, JVP not elevated  Cardiovascular: (PMI) apex non displaced,no lifts no thrills, no s3,no s4, Normal heart rate, Normal rhythm, No murmurs, No rubs, No gallops. GI:  Bowel sounds normal, Soft, No tenderness, No masses, No pulsatile masses, no hepatosplenomegally, no bruits  Musculoskeletal:  Intact distal pulses, No edema, No tenderness, No cyanosis, No clubbing. Good range of motion in all major joints. No tenderness to palpation or major deformities noted. Back- No tenderness. Integument:  Warm, Dry, No erythema, No rash. Skin: no rash, no ulcers  Lymphatic:  No lymphadenopathy noted. Neurologic:  Alert & oriented x 3, Normal motor function, Normal sensory function, No focal deficits noted. Lab Review   No results for input(s): WBC, HGB, HCT, PLT in the last 72 hours.    No results for input(s): NA, K, CL, CO2, PHOS, BUN, CREATININE, CA in the last 72 hours. No results for input(s): AST, ALT, ALB, BILIDIR, BILITOT, ALKPHOS in the last 72 hours. No results for input(s): TROPONINI in the last 72 hours. Lab Results   Component Value Date    INR 0.92 06/12/2019    PROTIME 10.5 06/12/2019     No results found for: BNP      Assessment:         ASCVD  S/p PCI to ostial LAD for 90 %lesion in Nov 2021 , OM has 100 % occluded, PDA comes off Circ and has 70-80 % lesion . Continue aspirin and bralinta for 1 year due to NSTEMI   HE will need PCi to PDA and possible OM bu the wants to wait till after holidays he has no symptoms ,  plan PCI to PDA e Continue imdur. ? Indigestion anginal equivalent? He mullen not get relief from belching or antacids. He has never tried NTG SL. He is not convinced it would help. Try mylicon.       ASA : 2 , Mallampati class II  Plan:   1. : Alternate and risks versus benefits were discussed in detail. We will plan cardiac catheterization. We  discussed the risk of but not limited to  potential kidney failure, emergent surgery,blood transfusion  transfusion, infection, potential even death.   Patient is in agreement and wants to proceed

## 2022-01-12 NOTE — PROGRESS NOTES
.  6f right arterial sheath pulled by Coral WEAVER, pressure held with D-stat dry for 20 minutes. Site wnl, no bleeding or hematoma noted. Patient educated on post sheath restrictions, stated understanding of education. Call light within reach.

## 2022-01-12 NOTE — PROGRESS NOTES
Potassium is high   Hold aldactone and and start entresto in 2 days  Due to high BP and cardiomyopathy  Stop lisinopril   Used 200 cc contrast   Will repeat labs in 1 week  Follow up in office in 2 weeks  May need repeat study to evaluate distal    He prefers to be discharged tonight

## 2022-01-17 DIAGNOSIS — Z79.4 TYPE 2 DIABETES MELLITUS WITHOUT COMPLICATION, WITH LONG-TERM CURRENT USE OF INSULIN (HCC): ICD-10-CM

## 2022-01-17 DIAGNOSIS — E11.9 TYPE 2 DIABETES MELLITUS WITHOUT COMPLICATION, WITH LONG-TERM CURRENT USE OF INSULIN (HCC): ICD-10-CM

## 2022-01-17 RX ORDER — TIZANIDINE 2 MG/1
TABLET ORAL
Qty: 30 TABLET | Refills: 0 | Status: SHIPPED | OUTPATIENT
Start: 2022-01-17 | End: 2022-02-15 | Stop reason: SDUPTHER

## 2022-01-20 PROBLEM — N18.31 STAGE 3A CHRONIC KIDNEY DISEASE (HCC): Status: ACTIVE | Noted: 2022-01-20

## 2022-01-21 ENCOUNTER — TELEPHONE (OUTPATIENT)
Dept: CARDIOLOGY CLINIC | Age: 42
End: 2022-01-21

## 2022-01-24 ENCOUNTER — OFFICE VISIT (OUTPATIENT)
Dept: CARDIOLOGY CLINIC | Age: 42
End: 2022-01-24
Payer: MEDICAID

## 2022-01-24 VITALS
HEIGHT: 75 IN | WEIGHT: 238 LBS | DIASTOLIC BLOOD PRESSURE: 72 MMHG | HEART RATE: 43 BPM | SYSTOLIC BLOOD PRESSURE: 120 MMHG | BODY MASS INDEX: 29.59 KG/M2

## 2022-01-24 DIAGNOSIS — I25.10 ASCVD (ARTERIOSCLEROTIC CARDIOVASCULAR DISEASE): ICD-10-CM

## 2022-01-24 DIAGNOSIS — E11.9 TYPE 2 DIABETES MELLITUS WITHOUT COMPLICATION, WITH LONG-TERM CURRENT USE OF INSULIN (HCC): ICD-10-CM

## 2022-01-24 DIAGNOSIS — Z95.9 STATUS POST ARTERIAL STENT: Primary | ICD-10-CM

## 2022-01-24 DIAGNOSIS — N18.31 STAGE 3A CHRONIC KIDNEY DISEASE (HCC): ICD-10-CM

## 2022-01-24 DIAGNOSIS — Z72.0 TOBACCO ABUSE: ICD-10-CM

## 2022-01-24 DIAGNOSIS — I25.5 ISCHEMIC CARDIOMYOPATHY: ICD-10-CM

## 2022-01-24 DIAGNOSIS — I50.22 CHRONIC SYSTOLIC CONGESTIVE HEART FAILURE (HCC): ICD-10-CM

## 2022-01-24 DIAGNOSIS — Z79.4 TYPE 2 DIABETES MELLITUS WITHOUT COMPLICATION, WITH LONG-TERM CURRENT USE OF INSULIN (HCC): ICD-10-CM

## 2022-01-24 DIAGNOSIS — I21.4 NSTEMI (NON-ST ELEVATED MYOCARDIAL INFARCTION) (HCC): ICD-10-CM

## 2022-01-24 PROCEDURE — 2022F DILAT RTA XM EVC RTNOPTHY: CPT | Performed by: INTERNAL MEDICINE

## 2022-01-24 PROCEDURE — 3046F HEMOGLOBIN A1C LEVEL >9.0%: CPT | Performed by: INTERNAL MEDICINE

## 2022-01-24 PROCEDURE — 1111F DSCHRG MED/CURRENT MED MERGE: CPT | Performed by: INTERNAL MEDICINE

## 2022-01-24 PROCEDURE — G8417 CALC BMI ABV UP PARAM F/U: HCPCS | Performed by: INTERNAL MEDICINE

## 2022-01-24 PROCEDURE — G8427 DOCREV CUR MEDS BY ELIG CLIN: HCPCS | Performed by: INTERNAL MEDICINE

## 2022-01-24 PROCEDURE — 99214 OFFICE O/P EST MOD 30 MIN: CPT | Performed by: INTERNAL MEDICINE

## 2022-01-24 PROCEDURE — 93000 ELECTROCARDIOGRAM COMPLETE: CPT | Performed by: INTERNAL MEDICINE

## 2022-01-24 PROCEDURE — 4004F PT TOBACCO SCREEN RCVD TLK: CPT | Performed by: INTERNAL MEDICINE

## 2022-01-24 PROCEDURE — G8482 FLU IMMUNIZE ORDER/ADMIN: HCPCS | Performed by: INTERNAL MEDICINE

## 2022-01-24 RX ORDER — SPIRONOLACTONE 25 MG/1
25 TABLET ORAL DAILY
Qty: 90 TABLET | Refills: 1 | Status: SHIPPED | OUTPATIENT
Start: 2022-01-24 | End: 2022-02-15

## 2022-01-24 RX ORDER — LISINOPRIL 40 MG/1
40 TABLET ORAL DAILY
Qty: 90 TABLET | Refills: 3 | Status: SHIPPED | OUTPATIENT
Start: 2022-01-24

## 2022-01-24 RX ORDER — METOPROLOL SUCCINATE 50 MG/1
50 TABLET, EXTENDED RELEASE ORAL DAILY
Qty: 90 TABLET | Refills: 3 | Status: SHIPPED | OUTPATIENT
Start: 2022-01-24

## 2022-01-24 RX ORDER — ISOSORBIDE MONONITRATE 60 MG/1
60 TABLET, EXTENDED RELEASE ORAL DAILY
Qty: 90 TABLET | Refills: 3 | Status: SHIPPED | OUTPATIENT
Start: 2022-01-24 | End: 2022-05-24 | Stop reason: ALTCHOICE
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
## 2022-01-31 ENCOUNTER — PROCEDURE VISIT (OUTPATIENT)
Dept: CARDIOLOGY CLINIC | Age: 42
End: 2022-01-31
Payer: MEDICAID

## 2022-01-31 DIAGNOSIS — N18.31 STAGE 3A CHRONIC KIDNEY DISEASE (HCC): ICD-10-CM

## 2022-01-31 DIAGNOSIS — Z72.0 TOBACCO ABUSE: ICD-10-CM

## 2022-01-31 DIAGNOSIS — I25.10 ASCVD (ARTERIOSCLEROTIC CARDIOVASCULAR DISEASE): ICD-10-CM

## 2022-01-31 DIAGNOSIS — E11.9 TYPE 2 DIABETES MELLITUS WITHOUT COMPLICATION, WITH LONG-TERM CURRENT USE OF INSULIN (HCC): ICD-10-CM

## 2022-01-31 DIAGNOSIS — I21.4 NSTEMI (NON-ST ELEVATED MYOCARDIAL INFARCTION) (HCC): ICD-10-CM

## 2022-01-31 DIAGNOSIS — Z95.9 STATUS POST ARTERIAL STENT: ICD-10-CM

## 2022-01-31 DIAGNOSIS — I25.5 ISCHEMIC CARDIOMYOPATHY: ICD-10-CM

## 2022-01-31 DIAGNOSIS — Z79.4 TYPE 2 DIABETES MELLITUS WITHOUT COMPLICATION, WITH LONG-TERM CURRENT USE OF INSULIN (HCC): ICD-10-CM

## 2022-01-31 LAB
LV EF: 43 %
LVEF MODALITY: NORMAL

## 2022-01-31 PROCEDURE — 93306 TTE W/DOPPLER COMPLETE: CPT | Performed by: INTERNAL MEDICINE

## 2022-02-01 ENCOUNTER — TELEPHONE (OUTPATIENT)
Dept: CARDIOLOGY CLINIC | Age: 42
End: 2022-02-01

## 2022-02-10 ENCOUNTER — TELEPHONE (OUTPATIENT)
Dept: CARDIOLOGY CLINIC | Age: 42
End: 2022-02-10

## 2022-02-10 NOTE — TELEPHONE ENCOUNTER
Summary   Dilated left ventricle with mildly suppressed systolic function, ejection   fraction of 40-45%. Grade II diastolic dysfunction. Mildly dilated left atrium. Mild tricuspid, mitral and pulmonic regurgitation. Normal pulmonary artery pressure. No evidence of pericardial effusion. Called patient and notified him of the low normal ECHO test results. Patient verbally understood.

## 2022-02-15 ENCOUNTER — OFFICE VISIT (OUTPATIENT)
Dept: INTERNAL MEDICINE CLINIC | Age: 42
End: 2022-02-15
Payer: MEDICAID

## 2022-02-15 VITALS
RESPIRATION RATE: 20 BRPM | DIASTOLIC BLOOD PRESSURE: 82 MMHG | HEIGHT: 73 IN | BODY MASS INDEX: 28.63 KG/M2 | WEIGHT: 216 LBS | OXYGEN SATURATION: 98 % | SYSTOLIC BLOOD PRESSURE: 131 MMHG | HEART RATE: 63 BPM

## 2022-02-15 DIAGNOSIS — K21.9 GASTROESOPHAGEAL REFLUX DISEASE, UNSPECIFIED WHETHER ESOPHAGITIS PRESENT: ICD-10-CM

## 2022-02-15 DIAGNOSIS — F41.9 ANXIETY: ICD-10-CM

## 2022-02-15 DIAGNOSIS — E55.9 VITAMIN D DEFICIENCY: ICD-10-CM

## 2022-02-15 DIAGNOSIS — I21.4 NSTEMI (NON-ST ELEVATED MYOCARDIAL INFARCTION) (HCC): ICD-10-CM

## 2022-02-15 DIAGNOSIS — E11.9 TYPE 2 DIABETES MELLITUS WITHOUT COMPLICATION, WITH LONG-TERM CURRENT USE OF INSULIN (HCC): ICD-10-CM

## 2022-02-15 DIAGNOSIS — Z00.00 ENCOUNTER FOR PREVENTIVE HEALTH EXAMINATION: Primary | ICD-10-CM

## 2022-02-15 DIAGNOSIS — Z79.4 TYPE 2 DIABETES MELLITUS WITHOUT COMPLICATION, WITH LONG-TERM CURRENT USE OF INSULIN (HCC): ICD-10-CM

## 2022-02-15 DIAGNOSIS — I50.22 CHRONIC SYSTOLIC CONGESTIVE HEART FAILURE (HCC): ICD-10-CM

## 2022-02-15 PROBLEM — E11.621 DIABETIC ULCER OF LEFT MIDFOOT ASSOCIATED WITH TYPE 2 DIABETES MELLITUS, WITH BONE INVOLVEMENT WITHOUT EVIDENCE OF NECROSIS (HCC): Status: RESOLVED | Noted: 2020-05-04 | Resolved: 2022-02-15

## 2022-02-15 PROBLEM — L97.426 DIABETIC ULCER OF LEFT MIDFOOT ASSOCIATED WITH TYPE 2 DIABETES MELLITUS, WITH BONE INVOLVEMENT WITHOUT EVIDENCE OF NECROSIS (HCC): Status: RESOLVED | Noted: 2020-05-04 | Resolved: 2022-02-15

## 2022-02-15 PROBLEM — E11.42 DIABETIC POLYNEUROPATHY (HCC): Status: RESOLVED | Noted: 2020-07-22 | Resolved: 2022-02-15

## 2022-02-15 PROCEDURE — G8482 FLU IMMUNIZE ORDER/ADMIN: HCPCS | Performed by: NURSE PRACTITIONER

## 2022-02-15 PROCEDURE — 99396 PREV VISIT EST AGE 40-64: CPT | Performed by: NURSE PRACTITIONER

## 2022-02-15 RX ORDER — TIZANIDINE 2 MG/1
TABLET ORAL
Qty: 30 TABLET | Refills: 0 | Status: SHIPPED | OUTPATIENT
Start: 2022-02-15 | End: 2022-03-21 | Stop reason: SDUPTHER

## 2022-02-15 RX ORDER — GABAPENTIN 300 MG/1
300 CAPSULE ORAL 3 TIMES DAILY
Qty: 90 CAPSULE | Refills: 2 | Status: SHIPPED | OUTPATIENT
Start: 2022-02-15 | End: 2022-05-18 | Stop reason: SDUPTHER

## 2022-02-15 ASSESSMENT — ENCOUNTER SYMPTOMS
SHORTNESS OF BREATH: 0
COUGH: 0
SINUS PRESSURE: 0
CONSTIPATION: 0
SORE THROAT: 0
COLOR CHANGE: 0
DIARRHEA: 0
CHEST TIGHTNESS: 0
ABDOMINAL PAIN: 0
WHEEZING: 0
EYES NEGATIVE: 1
ABDOMINAL DISTENTION: 0
NAUSEA: 0
SINUS PAIN: 0

## 2022-02-15 ASSESSMENT — PATIENT HEALTH QUESTIONNAIRE - PHQ9
1. LITTLE INTEREST OR PLEASURE IN DOING THINGS: 0
SUM OF ALL RESPONSES TO PHQ QUESTIONS 1-9: 0
DEPRESSION UNABLE TO ASSESS: FUNCTIONAL CAPACITY MOTIVATION LIMITS ACCURACY
SUM OF ALL RESPONSES TO PHQ QUESTIONS 1-9: 0
SUM OF ALL RESPONSES TO PHQ QUESTIONS 1-9: 0
2. FEELING DOWN, DEPRESSED OR HOPELESS: 0
SUM OF ALL RESPONSES TO PHQ QUESTIONS 1-9: 0
SUM OF ALL RESPONSES TO PHQ9 QUESTIONS 1 & 2: 0
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
## 2022-03-02 DIAGNOSIS — F41.9 ANXIETY: ICD-10-CM

## 2022-03-02 RX ORDER — BUSPIRONE HYDROCHLORIDE 5 MG/1
TABLET ORAL
Qty: 60 TABLET | Refills: 1 | Status: SHIPPED | OUTPATIENT
Start: 2022-03-02 | End: 2022-05-03

## 2022-03-03 RX ORDER — ISOSORBIDE MONONITRATE 30 MG/1
TABLET, EXTENDED RELEASE ORAL
Qty: 30 TABLET | Refills: 3 | OUTPATIENT
Start: 2022-03-03

## 2022-03-07 ENCOUNTER — OFFICE VISIT (OUTPATIENT)
Dept: CARDIOLOGY CLINIC | Age: 42
End: 2022-03-07
Payer: MEDICAID

## 2022-03-07 VITALS
HEIGHT: 73 IN | BODY MASS INDEX: 32.2 KG/M2 | WEIGHT: 243 LBS | DIASTOLIC BLOOD PRESSURE: 76 MMHG | OXYGEN SATURATION: 98 % | SYSTOLIC BLOOD PRESSURE: 110 MMHG | HEART RATE: 67 BPM

## 2022-03-07 DIAGNOSIS — N18.31 STAGE 3A CHRONIC KIDNEY DISEASE (HCC): ICD-10-CM

## 2022-03-07 DIAGNOSIS — E55.9 VITAMIN D DEFICIENCY: ICD-10-CM

## 2022-03-07 DIAGNOSIS — I25.10 ASCVD (ARTERIOSCLEROTIC CARDIOVASCULAR DISEASE): Primary | ICD-10-CM

## 2022-03-07 DIAGNOSIS — Z72.0 TOBACCO ABUSE: ICD-10-CM

## 2022-03-07 DIAGNOSIS — I21.4 NSTEMI (NON-ST ELEVATED MYOCARDIAL INFARCTION) (HCC): ICD-10-CM

## 2022-03-07 DIAGNOSIS — I50.22 CHRONIC SYSTOLIC CONGESTIVE HEART FAILURE (HCC): ICD-10-CM

## 2022-03-07 DIAGNOSIS — E11.9 TYPE 2 DIABETES MELLITUS WITHOUT COMPLICATION, WITH LONG-TERM CURRENT USE OF INSULIN (HCC): ICD-10-CM

## 2022-03-07 DIAGNOSIS — Z79.4 TYPE 2 DIABETES MELLITUS WITHOUT COMPLICATION, WITH LONG-TERM CURRENT USE OF INSULIN (HCC): ICD-10-CM

## 2022-03-07 DIAGNOSIS — E78.5 DYSLIPIDEMIA: ICD-10-CM

## 2022-03-07 PROCEDURE — 3046F HEMOGLOBIN A1C LEVEL >9.0%: CPT | Performed by: INTERNAL MEDICINE

## 2022-03-07 PROCEDURE — G8417 CALC BMI ABV UP PARAM F/U: HCPCS | Performed by: INTERNAL MEDICINE

## 2022-03-07 PROCEDURE — 4004F PT TOBACCO SCREEN RCVD TLK: CPT | Performed by: INTERNAL MEDICINE

## 2022-03-07 PROCEDURE — 99214 OFFICE O/P EST MOD 30 MIN: CPT | Performed by: INTERNAL MEDICINE

## 2022-03-07 PROCEDURE — G8427 DOCREV CUR MEDS BY ELIG CLIN: HCPCS | Performed by: INTERNAL MEDICINE

## 2022-03-07 PROCEDURE — 2022F DILAT RTA XM EVC RTNOPTHY: CPT | Performed by: INTERNAL MEDICINE

## 2022-03-07 PROCEDURE — G8482 FLU IMMUNIZE ORDER/ADMIN: HCPCS | Performed by: INTERNAL MEDICINE

## 2022-03-07 NOTE — PROGRESS NOTES
CARDIOLOGY    NOTE    Chief Complaint: Chest Pain     HPI:   Ilan Cooper is a 39y.o. year old who has Past medical history as noted below. Very pleasant gentleman who had NSTEMI and PCI to ostial LAD in Nov 2021  when he presented with NSTEMi and then had elective PCi to Circ  and PDA in Jan 2022 ,Circ  after stent was small so no stent was placed distal to    EF was 20 % in Oct 2021 , meds are being titrated up for last few months. . repeat echo in Jan 2022 showed improved EF of 40-45 %   HE feels he has more energy , breathing has improved ,   Heis on toprol xl 50 but hr is in 40's , HE is on lisinopril, entresto was expensive? 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 Before presenting he was having  chest pain and tightness have been ongoing for about a month  he was smoking  a pack a day but not smoking any more his partner smokes and shares some with him. CKD is stable with creatinine of 1.5   HE has  family history of coronary artery disease. He says he is feeling much better now , no ankle swelling , energy levels are better he has been on disability ever since his toe amputation in March 2020       Current Outpatient Medications   Medication Sig Dispense Refill    busPIRone (BUSPAR) 5 MG tablet TAKE ONE TABLET BY MOUTH TWICE A DAY 60 tablet 1    tiZANidine (ZANAFLEX) 2 MG tablet TAKE ONE TABLET BY MOUTH THREE TIMES A DAY AS NEEDED FOR BACK PAIN AND BACK SPASM 30 tablet 0    gabapentin (NEURONTIN) 300 MG capsule Take 1 capsule by mouth 3 times daily for 30 days.  90 capsule 2    metFORMIN (GLUCOPHAGE) 1000 MG tablet TAKE ONE TABLET BY MOUTH TWICE A DAY WITH MEALS 60 tablet 2    lisinopril (PRINIVIL;ZESTRIL) 40 MG tablet Take 1 tablet by mouth daily 90 tablet 3    isosorbide mononitrate (IMDUR) 60 MG extended release tablet Take 1 tablet by mouth daily 90 tablet 3    metoprolol succinate (TOPROL XL) 50 MG extended release tablet Take 1 tablet by mouth daily 90 tablet 3    insulin lispro (HUMALOG) 100 UNIT/ML injection vial Inject 15 Units into the skin 3 times daily (with meals) 1 each 3    Insulin Pen Needle ([employer] PEN NEEDLES) 31G X 6 MM MISC 1 each by Does not apply route daily 100 each 3    insulin glargine (LANTUS) 100 UNIT/ML injection vial Inject 40 Units into the skin nightly Bottles please 5 pen 2    Insulin Syringes, Disposable, U-100 1 ML MISC 1 each by Does not apply route daily 100 each 3    ticagrelor (BRILINTA) 90 MG TABS tablet Take 1 tablet by mouth 2 times daily 60 tablet 3    aspirin EC 81 MG EC tablet Take 1 tablet by mouth daily 90 tablet 3    atorvastatin (LIPITOR) 80 MG tablet Take 1 tablet by mouth nightly 30 tablet 3    INSULIN SYRINGE .5CC/29G 29G X 1/2\" 0.5 ML MISC Inject 1 each into the skin 3 times daily 90 each 2    traZODone (DESYREL) 50 MG tablet TAKE ONE TABLET BY MOUTH NIGHTLY AS NEEDED FOR SLEEP (Patient taking differently: Take 50 mg by mouth nightly ) 30 tablet 2    nitroGLYCERIN (NITROSTAT) 0.4 MG SL tablet Place 1 tablet under the tongue every 5 minutes as needed for Chest pain 25 tablet 3    omeprazole (PRILOSEC) 20 MG delayed release capsule Take 1 capsule by mouth Daily 30 capsule 5    vitamin D (ERGOCALCIFEROL) 1.25 MG (36332 UT) CAPS capsule Take 1 capsule by mouth once a week 12 capsule 2    Lancets MISC 1 each by Does not apply route 3 times daily 600 each 1    blood glucose monitor strips Test 3 times a day & as needed for symptoms of irregular blood glucose. 270 strip 2    Blood Glucose Monitoring Suppl (ONE TOUCH ULTRA 2) w/Device KIT 1 kit by Does not apply route once for 1 dose 1 kit 0     No current facility-administered medications for this visit. Allergies:   Patient has no known allergies.     Patient History:  Past Medical History:   Diagnosis performed by Kacie Arevalo MD at [facility]. N/A 9/21/2021    EGD BIOPSY performed by Jane Lopez MD at [address] EXTRACTION      4 [place] Teeth Extracted In Past     Family History   Problem Relation Age of Onset    Obesity Mother     Heart Disease Father         Open Heart Surgery    Diabetes Father     Allergy (Severe) Brother      Social History     Tobacco Use    Smoking status: Light Tobacco Smoker     Packs/day: 0.25     Years: 24.00     Pack years: 6.00     Types: Cigarettes     Start date: 1995    Smokeless tobacco: Never Used   Substance Use Topics    Alcohol use: Not Currently     Comment: once a year        Review of Systems:   · Constitutional: No Fever or Weight Loss   · Eyes: No Decreased Vision  · ENT: No Headaches, Hearing Loss or Vertigo  · Cardiovascular: as per note above   · Respiratory: No cough or wheezing and as per note above. · Gastrointestinal: No abdominal pain, appetite loss, blood in stools, constipation, diarrhea or heartburn  · Genitourinary: No dysuria, trouble voiding, or hematuria  · Musculoskeletal:  denies any new  joint aches , swelling  or pain   · Integumentary: No rash or pruritis  · Neurological: No TIA or stroke symptoms  · Psychiatric: No anxiety or depression  · Endocrine: No malaise, fatigue or temperature intolerance  · Hematologic/Lymphatic: No bleeding problems, blood clots or swollen lymph nodes  · Allergic/Immunologic: No nasal congestion or hives    Objective:      Physical Exam:  /76 (Site: Left Upper Arm, Position: Sitting, Cuff Size: Medium Adult)   Pulse 67   Ht 6' 1\" (1.854 m)   Wt 243 lb (110.2 kg)   SpO2 98%   BMI 32.06 kg/m²   Wt Readings from Last 3 Encounters:   03/07/22 243 lb (110.2 kg)   02/15/22 216 lb (98 kg)   01/24/22 238 lb (108 kg)     Body mass index is 32.06 kg/m².   Vitals:    03/07/22 1107   BP: 110/76   Pulse: 67   SpO2: 98%        General Appearance:  No distress, conversant  Constitutional:  Well developed, Well nourished, No acute distress, Non-toxic appearance. HENT:  Normocephalic, Atraumatic, Bilateral external ears normal, Oropharynx moist, No oral exudates, Nose normal. Neck- Normal range of motion, No tenderness, Supple, No stridor,no apical-carotid delay  Eyes:  PERRL, EOMI, Conjunctiva normal, No discharge. Respiratory:  Normal breath sounds, No respiratory distress, No wheezing, No chest tenderness. ,no use of accessory muscles, NO crackles  Cardiovascular: (PMI) apex non displaced,no lifts no thrills,S1 and S2 audible, No added heart sounds, No signs of ankle edema, or volume overload, No evidence of JVD, No crackles  GI:  Bowel sounds normal, Soft, No tenderness, No masses, No gross visceromegaly   :  No costovertebral angle tenderness   Musculoskeletal:  No edema, no tenderness, no deformities. Back- no tenderness  Integument:  Well hydrated, no rash   Lymphatic:  No lymphadenopathy noted   Neurologic:  Alert & oriented x 3, CN 2-12 normal, normal motor function, normal sensory function, no focal deficits noted   Psychiatric:  Speech and behavior appropriate       Medical decision making and Data review:  DATA:  Lab Results   Component Value Date    TROPONINT 0.339 (HH) 11/04/2021     BNP:  No results found for: PROBNP  PT/INR:  No results found for: PTINR  Lab Results   Component Value Date    LABA1C 6.3 06/17/2021    LABA1C 6.0 12/29/2020     Lab Results   Component Value Date    CHOL 72 05/19/2019    TRIG 164 (H) 05/19/2019    HDL 23 (L) 11/03/2021    LDLCALC see below 06/17/2021    LDLDIRECT 88 11/03/2021     Lab Results   Component Value Date    ALT 17 07/23/2021    AST 15 07/23/2021     No results for input(s): WBC, HGB, HCT, MCV, PLT in the last 72 hours.   TSH: No results found for: TSH  Lab Results   Component Value Date    AST 15 07/23/2021    ALT 17 07/23/2021    BILIDIR <0.2 08/12/2020    BILITOT 0.2 07/23/2021    ALKPHOS 91 07/23/2021 No results found for: PROBNP  Lab Results   Component Value Date    LABA1C 6.3 06/17/2021    LABA1C 6.0 12/29/2020     Lab Results   Component Value Date    WBC 4.0 01/07/2022    HGB 10.9 (L) 01/07/2022    HCT 33.1 (L) 01/07/2022     01/07/2022     Echo 10/29/21   Left ventricular function is severely abnormal , EF is estimated at 20-25%. Mildly dilated left ventricle. Moderate left ventricular hypertrophy. Grade I diastolic dysfunction. Global hypokinesis noted. Moderately dilated left atrium. The mitral valve appears to slightly prolapse. Mild mitral , tricuspid and moderate pulmonic regurgitation is present. Mild pulmonary hypertension noted with RVSP of 43mmHg. No evidence of pericardial effusion. Stress test  10/29/21  Summary    Supervising physician Dr. Pratibha Salguero .    Reduced EF with global hypokinesis EF of 35 %    Large size severe anterior /apical ischemic area of left ventricle    Dilated left ventricle with EDV of 240 ML    Ischemic dilated cardiomyopathy    Abnormal stress test     Cath 11/3/21  Procedure Summary   Access : Radial Indication : NSTEMI   1. PCI to OStial LAD using 4.0 X 15 and post dilated with 4.5 X   12 stent for 90 %lesion reduced to 0 % with MIHIR III flow   2. OM 1 is 100 % occluded  , Circ is dominant and gives PDA   and PLV , PDA has proximal 70-80 % lesion   3. RCA is non dominant small     LMCA: Normal (no stenosis %) and No Angiographicalyl Significant  Disease. widely patent     LAD: Abnormal.ostial LAD has 90 % lesion ,distal LAD is patent, 3 small  diagonal branches patent       Lesion on Prox LAD: 99% stenosis  LCx: Abnormal and Chronic occlusion. Widely patent Dominant Circ . OM1 is 100 %  occluded , PDA has ostial 70-80 % stenosis, OM 2 is small and diffusely  diseased    Cath 1/12/21  .  PCI to  100 % occluded OM1 using turnPike support and   Fielder XT wire followed by serial balloon and placement of 2.75   X 34 JAI with Mihir III flow 2. PCI to PDA using 2.75 X 15 JAI reduced 90 % lesion to 0 %   with Edy III flow   3. LAD ostial stent is patent   4. LVEDP was 16 mmHG           All labs, medications and tests reviewed by myself including data and history from outside source , patient and available family . Assessment & Plan:      1. ASCVD (arteriosclerotic cardiovascular disease)    2. Chronic systolic congestive heart failure ([facility]    3. Dyslipidemia    4. NSTEMI (non-ST elevated myocardial infarction) ([address]    5. Stage 3a chronic kidney disease ([facility]    6. Tobacco abuse    7. Type 2 diabetes mellitus without complication, with long-term current use of insulin ([address]    8. Vitamin D deficiency       ASCVD    S/p PCI to ostial LAD for 90 %lesion in Nov 2021 , OM has 100 % occluded, PDA comes off Circ and has 70-80 % lesion . Continue aspirin and bralinta for 1 year due to NSTEMI   HE will need PCi to PDA and possible OM bu the wants to wait till after holidays he has no symptoms , He had  PCI to PDa and OM  in Jan 2022 but OM /circ was  and post PCi had very small vessel , would like to see if it needs repeat ballooning or stent in 6 weeks after pci , so plan repeat cath by March 2022 Continue imdur, recheck cath to look at  ?   Refer to cardiac rehab       Ischemic cardiomyopathy  EF of 20-25 % in Oct 2021 , Tolerating  toprol Xl 50 mg and lisinopril 40 mg daily EF in Jan 2022 was improved to 40-45 % , He has stopped wearing life vest   HE  Appears euvolemic He is not on any lasix , has no ankle swelling   Aldactone was stopped due to hyperkalemia ,He has not needed lasix, He is euvolemic       History of amputation of lesser toe of left foot (HCC)  Three vessel run off of both legs on arterial doppler in 2021    HBO-Diabetic ulcer of left midfoot associated with type 2 diabetes mellitus, with bone involvement without evidence of necrosis ([address]   diabetes management as per primary physician    Tobacco abuse  He says he is not smoking ,HE was congratulated      Dyslipidemia :  All available lab work was reviewed. Patient was advised to repeat lab work before next visit. Necessary orders were placed , instructions given by myself   on lipitor 80 mg daily     Counseled extensively and medication compliance urged. We discussed that for the  prevention of ASCVD our  goal is aggressive risk modification. Patient is encouraged to exercise if they can , educated about  brisk walk for 30 minutes  at least 3 to 4 times a week if there are no physical limitations  Various goals were discussed and questions answered. Continue current medications. Appropriate prescriptions are addressed and refills ordered. Questions answered and patient verbalizes understanding. Call for any problems, questions, or concerns. Greater than 60 % of time spent counseling besides reviewing data and images     Continue all other medications of all above medical condition listed as is. No follow-ups on file. Please note this report has been partially produced using speech recognition software and may contain errors related to that system including errors in grammar, punctuation, and spelling, as well as words and phrases that may be inappropriate. If there are any questions or concerns please feel free to contact the dictating provider for clarification.

## 2022-03-07 NOTE — PATIENT INSTRUCTIONS
Please be informed that if you contact our office outside of normal business hours the physician on call cannot help with any scheduling or rescheduling issues, procedure instruction questions or any type of medication issue. We advise you for any urgent/emergency that you go to the nearest emergency room! PLEASE CALL OUR OFFICE DURING NORMAL BUSINESS HOURS    Monday - Friday   8 am to 5 pm    [place]: [phone]    [place]:  [phone]    **It is YOUR responsibilty to bring medication bottles and/or updated medication list to [address] [place] [address].  This will allow us to better serve you and all your healthcare needs**

## 2022-03-08 DIAGNOSIS — E11.9 TYPE 2 DIABETES MELLITUS WITHOUT COMPLICATION, WITH LONG-TERM CURRENT USE OF INSULIN (HCC): ICD-10-CM

## 2022-03-08 DIAGNOSIS — Z79.4 TYPE 2 DIABETES MELLITUS WITHOUT COMPLICATION, WITH LONG-TERM CURRENT USE OF INSULIN (HCC): ICD-10-CM

## 2022-03-09 DIAGNOSIS — Z79.4 TYPE 2 DIABETES MELLITUS WITHOUT COMPLICATION, WITH LONG-TERM CURRENT USE OF INSULIN (HCC): ICD-10-CM

## 2022-03-09 DIAGNOSIS — E11.9 TYPE 2 DIABETES MELLITUS WITHOUT COMPLICATION, WITH LONG-TERM CURRENT USE OF INSULIN (HCC): ICD-10-CM

## 2022-03-09 RX ORDER — INSULIN GLARGINE 100 [IU]/ML
40 INJECTION, SOLUTION SUBCUTANEOUS NIGHTLY
Qty: 5 PEN | Refills: 2 | Status: SHIPPED | OUTPATIENT
Start: 2022-03-09 | End: 2022-03-13 | Stop reason: SDUPTHER

## 2022-03-13 DIAGNOSIS — Z79.4 TYPE 2 DIABETES MELLITUS WITHOUT COMPLICATION, WITH LONG-TERM CURRENT USE OF INSULIN (HCC): ICD-10-CM

## 2022-03-13 DIAGNOSIS — E11.9 TYPE 2 DIABETES MELLITUS WITHOUT COMPLICATION, WITH LONG-TERM CURRENT USE OF INSULIN (HCC): ICD-10-CM

## 2022-03-14 RX ORDER — INSULIN GLARGINE 100 [IU]/ML
40 INJECTION, SOLUTION SUBCUTANEOUS NIGHTLY
Qty: 5 PEN | Refills: 2 | Status: SHIPPED | OUTPATIENT
Start: 2022-03-14 | End: 2022-07-25 | Stop reason: SDUPTHER

## 2022-03-21 RX ORDER — TIZANIDINE 2 MG/1
TABLET ORAL
Qty: 30 TABLET | Refills: 0 | Status: SHIPPED | OUTPATIENT
Start: 2022-03-21 | End: 2022-04-18 | Stop reason: SDUPTHER

## 2022-03-22 RX ORDER — ATORVASTATIN CALCIUM 80 MG/1
80 TABLET, FILM COATED ORAL NIGHTLY
Qty: 30 TABLET | Refills: 5 | Status: SHIPPED | OUTPATIENT
Start: 2022-03-22 | End: 2022-09-20

## 2022-03-23 ENCOUNTER — HOSPITAL ENCOUNTER (OUTPATIENT)
Dept: CARDIAC REHAB | Age: 42
Setting detail: THERAPIES SERIES
Discharge: HOME | End: 2022-03-23
Payer: MEDICAID

## 2022-03-23 PROCEDURE — G0423 INTENS CARDIAC REHAB NO EXER: HCPCS

## 2022-03-23 PROCEDURE — G0422 INTENS CARDIAC REHAB W/EXERC: HCPCS

## 2022-03-25 ENCOUNTER — TELEPHONE (OUTPATIENT)
Dept: INTERNAL MEDICINE CLINIC | Age: 42
End: 2022-03-25

## 2022-03-25 NOTE — TELEPHONE ENCOUNTER
I need to renew my handicap placard and was wondering if Dr. Kalyan Albright would write the letter I had to send last year to renew it. Thank you  Robert Maier  Please advise.

## 2022-03-28 ENCOUNTER — HOSPITAL ENCOUNTER (OUTPATIENT)
Dept: CARDIAC REHAB | Age: 42
Setting detail: THERAPIES SERIES
Discharge: HOME | End: 2022-03-28
Payer: MEDICAID

## 2022-03-28 ENCOUNTER — APPOINTMENT (OUTPATIENT)
Dept: CARDIAC REHAB | Age: 42
End: 2022-03-28
Payer: MEDICAID

## 2022-03-28 LAB — GLUCOSE BLD-MCNC: 203 MG/DL (ref 70–99)

## 2022-03-28 PROCEDURE — G0422 INTENS CARDIAC REHAB W/EXERC: HCPCS

## 2022-03-28 PROCEDURE — G0423 INTENS CARDIAC REHAB NO EXER: HCPCS

## 2022-03-28 PROCEDURE — 82962 GLUCOSE BLOOD TEST: CPT

## 2022-03-29 ENCOUNTER — HOSPITAL ENCOUNTER (OUTPATIENT)
Dept: CARDIAC REHAB | Age: 42
Setting detail: THERAPIES SERIES
Discharge: HOME | End: 2022-03-29
Payer: MEDICAID

## 2022-03-29 ENCOUNTER — APPOINTMENT (OUTPATIENT)
Dept: CARDIAC REHAB | Age: 42
End: 2022-03-29
Payer: MEDICAID

## 2022-03-29 LAB
GLUCOSE BLD-MCNC: 122 MG/DL (ref 70–99)
GLUCOSE BLD-MCNC: 200 MG/DL (ref 70–99)

## 2022-03-29 PROCEDURE — G0422 INTENS CARDIAC REHAB W/EXERC: HCPCS

## 2022-03-29 PROCEDURE — 82962 GLUCOSE BLOOD TEST: CPT

## 2022-03-29 PROCEDURE — G0423 INTENS CARDIAC REHAB NO EXER: HCPCS

## 2022-03-31 ENCOUNTER — APPOINTMENT (OUTPATIENT)
Dept: CARDIAC REHAB | Age: 42
End: 2022-03-31
Payer: MEDICAID

## 2022-03-31 ENCOUNTER — HOSPITAL ENCOUNTER (OUTPATIENT)
Dept: CARDIAC REHAB | Age: 42
Setting detail: THERAPIES SERIES
Discharge: HOME | End: 2022-03-31
Payer: MEDICAID

## 2022-03-31 LAB
GLUCOSE BLD-MCNC: 140 MG/DL (ref 70–99)
GLUCOSE BLD-MCNC: 256 MG/DL (ref 70–99)

## 2022-03-31 PROCEDURE — G0422 INTENS CARDIAC REHAB W/EXERC: HCPCS

## 2022-03-31 PROCEDURE — G0423 INTENS CARDIAC REHAB NO EXER: HCPCS

## 2022-03-31 PROCEDURE — 82962 GLUCOSE BLOOD TEST: CPT

## 2022-04-04 ENCOUNTER — HOSPITAL ENCOUNTER (OUTPATIENT)
Dept: CARDIAC REHAB | Age: 42
Setting detail: THERAPIES SERIES
Discharge: HOME | End: 2022-04-04
Payer: MEDICAID

## 2022-04-04 ENCOUNTER — APPOINTMENT (OUTPATIENT)
Dept: CARDIAC REHAB | Age: 42
End: 2022-04-04
Payer: MEDICAID

## 2022-04-04 LAB
GLUCOSE BLD-MCNC: 188 MG/DL (ref 70–99)
GLUCOSE BLD-MCNC: 95 MG/DL (ref 70–99)

## 2022-04-04 PROCEDURE — 82962 GLUCOSE BLOOD TEST: CPT

## 2022-04-04 PROCEDURE — G0423 INTENS CARDIAC REHAB NO EXER: HCPCS

## 2022-04-04 PROCEDURE — G0422 INTENS CARDIAC REHAB W/EXERC: HCPCS

## 2022-04-05 ENCOUNTER — HOSPITAL ENCOUNTER (OUTPATIENT)
Dept: CARDIAC REHAB | Age: 42
Setting detail: THERAPIES SERIES
Discharge: HOME | End: 2022-04-05
Payer: MEDICAID

## 2022-04-05 ENCOUNTER — APPOINTMENT (OUTPATIENT)
Dept: CARDIAC REHAB | Age: 42
End: 2022-04-05
Payer: MEDICAID

## 2022-04-05 LAB
GLUCOSE BLD-MCNC: 132 MG/DL (ref 70–99)
GLUCOSE BLD-MCNC: 210 MG/DL (ref 70–99)

## 2022-04-05 PROCEDURE — 82962 GLUCOSE BLOOD TEST: CPT

## 2022-04-05 PROCEDURE — G0423 INTENS CARDIAC REHAB NO EXER: HCPCS

## 2022-04-05 PROCEDURE — G0422 INTENS CARDIAC REHAB W/EXERC: HCPCS

## 2022-04-06 DIAGNOSIS — Z79.4 TYPE 2 DIABETES MELLITUS WITHOUT COMPLICATION, WITH LONG-TERM CURRENT USE OF INSULIN (HCC): ICD-10-CM

## 2022-04-06 DIAGNOSIS — E11.9 TYPE 2 DIABETES MELLITUS WITHOUT COMPLICATION, WITH LONG-TERM CURRENT USE OF INSULIN (HCC): ICD-10-CM

## 2022-04-07 ENCOUNTER — APPOINTMENT (OUTPATIENT)
Dept: CARDIAC REHAB | Age: 42
End: 2022-04-07
Payer: MEDICAID

## 2022-04-11 ENCOUNTER — HOSPITAL ENCOUNTER (OUTPATIENT)
Dept: CARDIAC REHAB | Age: 42
Setting detail: THERAPIES SERIES
Discharge: HOME | End: 2022-04-11
Payer: MEDICAID

## 2022-04-11 ENCOUNTER — OFFICE VISIT (OUTPATIENT)
Dept: GASTROENTEROLOGY | Age: 42
End: 2022-04-11
Payer: MEDICAID

## 2022-04-11 ENCOUNTER — APPOINTMENT (OUTPATIENT)
Dept: CARDIAC REHAB | Age: 42
End: 2022-04-11
Payer: MEDICAID

## 2022-04-11 VITALS
DIASTOLIC BLOOD PRESSURE: 66 MMHG | BODY MASS INDEX: 32.37 KG/M2 | TEMPERATURE: 97.3 F | SYSTOLIC BLOOD PRESSURE: 114 MMHG | WEIGHT: 244.2 LBS | HEART RATE: 67 BPM | OXYGEN SATURATION: 98 % | HEIGHT: 73 IN

## 2022-04-11 DIAGNOSIS — K21.9 GASTROESOPHAGEAL REFLUX DISEASE WITHOUT ESOPHAGITIS: ICD-10-CM

## 2022-04-11 DIAGNOSIS — Z86.010 HX OF ADENOMATOUS COLONIC POLYPS: ICD-10-CM

## 2022-04-11 DIAGNOSIS — D64.9 ANEMIA, UNSPECIFIED TYPE: Primary | ICD-10-CM

## 2022-04-11 LAB
GLUCOSE BLD-MCNC: 139 MG/DL (ref 70–99)
GLUCOSE BLD-MCNC: 217 MG/DL (ref 70–99)

## 2022-04-11 PROCEDURE — G8417 CALC BMI ABV UP PARAM F/U: HCPCS | Performed by: NURSE PRACTITIONER

## 2022-04-11 PROCEDURE — G8427 DOCREV CUR MEDS BY ELIG CLIN: HCPCS | Performed by: NURSE PRACTITIONER

## 2022-04-11 PROCEDURE — 82962 GLUCOSE BLOOD TEST: CPT

## 2022-04-11 PROCEDURE — G0422 INTENS CARDIAC REHAB W/EXERC: HCPCS

## 2022-04-11 PROCEDURE — 99213 OFFICE O/P EST LOW 20 MIN: CPT | Performed by: NURSE PRACTITIONER

## 2022-04-11 PROCEDURE — G0423 INTENS CARDIAC REHAB NO EXER: HCPCS

## 2022-04-11 PROCEDURE — 4004F PT TOBACCO SCREEN RCVD TLK: CPT | Performed by: NURSE PRACTITIONER

## 2022-04-11 RX ORDER — NAPROXEN 500 MG/1
TABLET ORAL
COMMUNITY
Start: 2022-03-21 | End: 2022-06-08

## 2022-04-11 NOTE — PROGRESS NOTES
Hortencia Jasso 39 y.o. male was seen by ANDERS Goel on 4/11/2022     Wt Readings from Last 3 Encounters:   04/11/22 244 lb 3.2 oz (110.8 kg)   03/07/22 243 lb (110.2 kg)   02/15/22 216 lb (98 kg)       PERLA Jasso is a pleasant 39 y.o.  male who presents today for follow-up on acid reflux, anemia and history of adenomatous colon polyps. He had cardiac problems in November 2021 and had a cardiac stent placed. He then had per patient record a heart attack with two more cardiac stents placed in January 2022. He quit smoking cigarettes in December 2021. He denies NSAID use. His last EGD/colonoscopy were done by Dr. Shahid Mata on 9-. His EGD showed small hiatal hernia, normal appearing stomach and duodenal bulb. His esophageal biopsies showed mixed inflammation with reactive changes with no evidence of Osullivan's esophagus. His duodenal biopsies showed normal tissue with no evidence of Celiac disease. His colonoscopy showed two adenomatous colon polyps that were removed; one noted to be serrated adenoma with low grade dysplasia. His random colon biopsies showed normal tissue with no evidence of microscopic colitis. He reports feeling good. His appetite is good and weight is stable. No nausea or vomiting. His heartburn and acid reflux is controlled with Prilosec. No nocturnal awakenings with acid reflux. No dysphagia or pain with swallowing. No abdominal pain, bloating or distention. No excess belching. He has excess flatulence. His typical bowel pattern is daily with soft brown formed stools. No diarrhea or constipation. No blood in her stools or melena. No family history of stomach or colon cancer. ROS  Review of Systems   Constitutional: Negative for appetite change, chills, diaphoresis, fever and unexpected weight change. HENT: Negative for ear pain, hearing loss and tinnitus.    Eyes: Negative for pain, discharge and visual disturbance.    Respiratory: Negative for cough, shortness of breath and wheezing.    Cardiovascular: Negative for chest pain, palpitations and leg swelling. Gastrointestinal: Negative for abdominal pain, blood in stool, constipation, diarrhea, nausea and vomiting. Endocrine: Negative for cold intolerance and heat intolerance. Genitourinary: Negative for dysuria, frequency, hematuria and urgency. Musculoskeletal: Positive for back pain. Negative for myalgias and neck pain. Skin: Negative for color change, pallor and rash. Allergic/Immunologic: Negative for environmental allergies and food allergies. Neurological: Negative for dizziness, seizures, weakness and headaches. Hematological: Does not bruise/bleed easily. Psychiatric/Behavioral: Positive for sleep disturbance. Negative for dysphoric mood. The patient is nervous/anxious.         Allergies  No Known Allergies    Medications  Current Outpatient Medications   Medication Sig Dispense Refill    naproxen (NAPROSYN) 500 MG tablet       insulin lispro (HUMALOG) 100 UNIT/ML injection vial Inject 15 Units into the skin 3 times daily (with meals) 1 each 3    atorvastatin (LIPITOR) 80 MG tablet Take 1 tablet by mouth nightly 30 tablet 5    tiZANidine (ZANAFLEX) 2 MG tablet TAKE ONE TABLET BY MOUTH THREE TIMES A DAY AS NEEDED FOR BACK PAIN AND BACK SPASM 30 tablet 0    insulin glargine (LANTUS) 100 UNIT/ML injection vial Inject 40 Units into the skin nightly Bottles please 5 pen 2    Insulin Syringes, Disposable, U-100 1 ML MISC 1 each by Does not apply route daily 100 each 3    busPIRone (BUSPAR) 5 MG tablet TAKE ONE TABLET BY MOUTH TWICE A DAY 60 tablet 1    gabapentin (NEURONTIN) 300 MG capsule Take 1 capsule by mouth 3 times daily for 30 days.  90 capsule 2    lisinopril (PRINIVIL;ZESTRIL) 40 MG tablet Take 1 tablet by mouth daily 90 tablet 3    isosorbide mononitrate (IMDUR) 60 MG extended release tablet Take 1 tablet by mouth daily 90 tablet 3    metoprolol succinate (TOPROL XL) 50 MG extended release tablet Take 1 tablet by mouth daily 90 tablet 3    Insulin Pen Needle ([employer] PEN NEEDLES) 31G X 6 MM MISC 1 each by Does not apply route daily 100 each 3    ticagrelor (BRILINTA) 90 MG TABS tablet Take 1 tablet by mouth 2 times daily 60 tablet 3    aspirin EC 81 MG EC tablet Take 1 tablet by mouth daily 90 tablet 3    INSULIN SYRINGE .5CC/29G 29G X 1/2\" 0.5 ML MISC Inject 1 each into the skin 3 times daily 90 each 2    traZODone (DESYREL) 50 MG tablet TAKE ONE TABLET BY MOUTH NIGHTLY AS NEEDED FOR SLEEP (Patient taking differently: Take 50 mg by mouth nightly ) 30 tablet 2    omeprazole (PRILOSEC) 20 MG delayed release capsule Take 1 capsule by mouth Daily 30 capsule 5    vitamin D (ERGOCALCIFEROL) 1.25 MG (21324 UT) CAPS capsule Take 1 capsule by mouth once a week 12 capsule 2    Lancets MISC 1 each by Does not apply route 3 times daily 600 each 1    blood glucose monitor strips Test 3 times a day & as needed for symptoms of irregular blood glucose. 270 strip 2    metFORMIN (GLUCOPHAGE) 1000 MG tablet TAKE ONE TABLET BY MOUTH TWICE A DAY WITH MEALS (Patient not taking: Reported on 4/11/2022) 60 tablet 2    nitroGLYCERIN (NITROSTAT) 0.4 MG SL tablet Place 1 tablet under the tongue every 5 minutes as needed for Chest pain (Patient not taking: Reported on 4/11/2022) 25 tablet 3    Blood Glucose Monitoring Suppl (ONE TOUCH ULTRA 2) w/Device KIT 1 kit by Does not apply route once for 1 dose 1 kit 0     No current facility-administered medications for this visit. Past medical history:   He has a past medical history of Accident, Acid reflux, Anemia, Broken teeth, Diabetes mellitus ([address] H/O percutaneous left heart catheterization, History of heart artery stent, History of nuclear stress test, Hx of Doppler echocardiogram, Kidney stones, Marijuana use, Precordial pain, Shortness of breath on exertion, Teeth missing, and [place] teeth extracted.     Past surgical history:  He has a past surgical history that includes other surgical history (12/16/2017); [place] tooth extraction; other surgical history (Right, 02/28/2018); Toe amputation (Left, 3/27/2020); Colonoscopy (N/A, 9/21/2021); and Upper gastrointestinal endoscopy (N/A, 9/21/2021). Social History:  He reports that he has been smoking cigarettes. He started smoking about 27 years ago. He has a 6.00 pack-year smoking history. He has never used smokeless tobacco. He reports previous alcohol use. He reports previous drug use. Family history:  His family history includes Allergy (Severe) in his brother; Diabetes in his father; Heart Disease in his father; Obesity in his mother. Objective    Vitals:    04/11/22 1300   BP: 114/66   Pulse: 67   Temp: 97.3 °F (36.3 °C)   SpO2: 98%        Physical exam    Physical Exam  Constitutional:       General: He is not in acute distress. Appearance: He is well-developed. He is obese. He is not ill-appearing, toxic-appearing or diaphoretic. HENT:      Head: Normocephalic and atraumatic. Nose: Nose normal.      Mouth/Throat:      Mouth: Mucous membranes are moist.   Cardiovascular:      Rate and Rhythm: Normal rate and regular rhythm. Pulses: Normal pulses. Heart sounds: Normal heart sounds. No murmur heard. No gallop. Pulmonary:      Effort: Pulmonary effort is normal. No respiratory distress. Breath sounds: Normal breath sounds. No stridor. No wheezing or rhonchi. Abdominal:      General: Bowel sounds are normal. There is no distension. Palpations: Abdomen is soft. There is no mass. Tenderness: There is no abdominal tenderness. Hernia: No hernia is present. Musculoskeletal:         General: Normal range of motion. Cervical back: Neck supple. Skin:     General: Skin is warm and dry. Neurological:      Mental Status: He is alert and oriented to person, place, and time.    Psychiatric:         Mood and Affect: Mood normal.         Hospital Outpatient Visit on 04/11/2022   Component Date Value Ref Range Status    POC Glucose 04/11/2022 217* 70 - 99 MG/DL Final    POC Glucose 04/11/2022 139* 70 - 99 MG/DL Final   Hospital Outpatient Visit on 04/05/2022   Component Date Value Ref Range Status    POC Glucose 04/05/2022 210* 70 - 99 MG/DL Final    POC Glucose 04/05/2022 132* 70 - 99 MG/DL Final   Hospital Outpatient Visit on 04/04/2022   Component Date Value Ref Range Status    POC Glucose 04/04/2022 188* 70 - 99 MG/DL Final    POC Glucose 04/04/2022 95  70 - 99 MG/DL Final   Hospital Outpatient Visit on 03/31/2022   Component Date Value Ref Range Status    POC Glucose 03/31/2022 256* 70 - 99 MG/DL Final    POC Glucose 03/31/2022 140* 70 - 99 MG/DL Final   Hospital Outpatient Visit on 03/29/2022   Component Date Value Ref Range Status    POC Glucose 03/29/2022 200* 70 - 99 MG/DL Final    POC Glucose 03/29/2022 122* 70 - 99 MG/DL Final   Hospital Outpatient Visit on 03/28/2022   Component Date Value Ref Range Status    POC Glucose 03/28/2022 203* 70 - 99 MG/DL Final       Assessment and Plan:  1. Normocytic anemia of uncertain etiology most likely due to chronic disease. Will order CBC and Iron levels to monitor. Currently the patient did not have signs of GI bleeding.  His CT of abdomen/pelvis done on 8- showed interval development of multiple small mesenteric and retroperitoneal lymph nodes including a 1.5 x 1.9 cm retrocaval lymph node.  If anemia does not improve may benefit from Oncology referral.  Recommend avoidance of NSAID's and improved glycemic control.  Recommend periodic monitoring of H&H.     2. History of adenomatous colon polyps recommend repeat colonoscopy in three years due to serrated adenoma. 3.  GERD that is stable without dysphagia or odynophagia. His EGD showed small hiatal hernia otherwise probable Osullivan's esophagus appearance,normal appearing stomach and duodenal bulb.   His esophageal biopsies showed mixed inflammation with reactive changes with no evidence of Osullivan's esophagus. His duodenal biopsies showed normal tissue with no evidence of Celiac disease.  The patient was encouraged to continue taking Prilosec daily for treatment of acid reflux.  The patient was encouraged to continue with anti-reflux measures and avoid foods that worsen. 4.  The patient was encouraged to follow-up in 6 months or sooner if needed.     Total time:  25 minutes.

## 2022-04-12 ENCOUNTER — APPOINTMENT (OUTPATIENT)
Dept: CARDIAC REHAB | Age: 42
End: 2022-04-12
Payer: MEDICAID

## 2022-04-12 ENCOUNTER — HOSPITAL ENCOUNTER (OUTPATIENT)
Dept: CARDIAC REHAB | Age: 42
Setting detail: THERAPIES SERIES
Discharge: HOME | End: 2022-04-12
Payer: MEDICAID

## 2022-04-12 PROCEDURE — G0422 INTENS CARDIAC REHAB W/EXERC: HCPCS

## 2022-04-12 PROCEDURE — G0423 INTENS CARDIAC REHAB NO EXER: HCPCS

## 2022-04-14 ENCOUNTER — APPOINTMENT (OUTPATIENT)
Dept: CARDIAC REHAB | Age: 42
End: 2022-04-14
Payer: MEDICAID

## 2022-04-14 ENCOUNTER — HOSPITAL ENCOUNTER (OUTPATIENT)
Dept: CARDIAC REHAB | Age: 42
Setting detail: THERAPIES SERIES
Discharge: HOME | End: 2022-04-14
Payer: MEDICAID

## 2022-04-14 PROCEDURE — G0422 INTENS CARDIAC REHAB W/EXERC: HCPCS

## 2022-04-14 PROCEDURE — G0423 INTENS CARDIAC REHAB NO EXER: HCPCS

## 2022-04-18 ENCOUNTER — APPOINTMENT (OUTPATIENT)
Dept: CARDIAC REHAB | Age: 42
End: 2022-04-18
Payer: MEDICAID

## 2022-04-18 ENCOUNTER — HOSPITAL ENCOUNTER (OUTPATIENT)
Dept: CARDIAC REHAB | Age: 42
Setting detail: THERAPIES SERIES
Discharge: HOME | End: 2022-04-18
Payer: MEDICAID

## 2022-04-18 PROCEDURE — G0423 INTENS CARDIAC REHAB NO EXER: HCPCS

## 2022-04-18 PROCEDURE — G0422 INTENS CARDIAC REHAB W/EXERC: HCPCS

## 2022-04-18 RX ORDER — PEN NEEDLE, DIABETIC 31 G X1/4"
1 NEEDLE, DISPOSABLE MISCELLANEOUS 3 TIMES DAILY
Qty: 100 EACH | Refills: 3 | Status: SHIPPED | OUTPATIENT
Start: 2022-04-18

## 2022-04-18 RX ORDER — TIZANIDINE 2 MG/1
TABLET ORAL
Qty: 30 TABLET | Refills: 0 | Status: SHIPPED | OUTPATIENT
Start: 2022-04-18 | End: 2022-05-09 | Stop reason: SDUPTHER

## 2022-04-18 RX ORDER — IBUPROFEN 200 MG
1 TABLET ORAL 3 TIMES DAILY
Qty: 90 EACH | Refills: 2 | Status: SHIPPED | OUTPATIENT
Start: 2022-04-18

## 2022-04-19 ENCOUNTER — APPOINTMENT (OUTPATIENT)
Dept: CARDIAC REHAB | Age: 42
End: 2022-04-19
Payer: MEDICAID

## 2022-04-19 ENCOUNTER — HOSPITAL ENCOUNTER (OUTPATIENT)
Dept: CARDIAC REHAB | Age: 42
Setting detail: THERAPIES SERIES
Discharge: HOME | End: 2022-04-19
Payer: MEDICAID

## 2022-04-19 PROCEDURE — G0423 INTENS CARDIAC REHAB NO EXER: HCPCS

## 2022-04-19 PROCEDURE — G0422 INTENS CARDIAC REHAB W/EXERC: HCPCS

## 2022-04-21 ENCOUNTER — APPOINTMENT (OUTPATIENT)
Dept: CARDIAC REHAB | Age: 42
End: 2022-04-21
Payer: MEDICAID

## 2022-04-21 ENCOUNTER — HOSPITAL ENCOUNTER (OUTPATIENT)
Dept: CARDIAC REHAB | Age: 42
Setting detail: THERAPIES SERIES
Discharge: HOME | End: 2022-04-21
Payer: MEDICAID

## 2022-04-21 PROCEDURE — G0422 INTENS CARDIAC REHAB W/EXERC: HCPCS

## 2022-04-21 PROCEDURE — G0423 INTENS CARDIAC REHAB NO EXER: HCPCS

## 2022-04-25 ENCOUNTER — HOSPITAL ENCOUNTER (OUTPATIENT)
Dept: CARDIAC REHAB | Age: 42
Setting detail: THERAPIES SERIES
Discharge: HOME | End: 2022-04-25
Payer: MEDICAID

## 2022-04-25 ENCOUNTER — APPOINTMENT (OUTPATIENT)
Dept: CARDIAC REHAB | Age: 42
End: 2022-04-25
Payer: MEDICAID

## 2022-04-25 PROCEDURE — G0423 INTENS CARDIAC REHAB NO EXER: HCPCS

## 2022-04-25 PROCEDURE — G0422 INTENS CARDIAC REHAB W/EXERC: HCPCS

## 2022-04-26 ENCOUNTER — APPOINTMENT (OUTPATIENT)
Dept: CARDIAC REHAB | Age: 42
End: 2022-04-26
Payer: MEDICAID

## 2022-04-26 ENCOUNTER — HOSPITAL ENCOUNTER (OUTPATIENT)
Dept: CARDIAC REHAB | Age: 42
Setting detail: THERAPIES SERIES
Discharge: HOME | End: 2022-04-26
Payer: MEDICAID

## 2022-04-26 PROCEDURE — G0423 INTENS CARDIAC REHAB NO EXER: HCPCS

## 2022-04-26 PROCEDURE — G0422 INTENS CARDIAC REHAB W/EXERC: HCPCS

## 2022-04-28 ENCOUNTER — HOSPITAL ENCOUNTER (OUTPATIENT)
Dept: CARDIAC REHAB | Age: 42
Setting detail: THERAPIES SERIES
Discharge: HOME | End: 2022-04-28
Payer: MEDICAID

## 2022-04-28 ENCOUNTER — APPOINTMENT (OUTPATIENT)
Dept: CARDIAC REHAB | Age: 42
End: 2022-04-28
Payer: MEDICAID

## 2022-04-28 PROCEDURE — G0423 INTENS CARDIAC REHAB NO EXER: HCPCS

## 2022-04-28 PROCEDURE — G0422 INTENS CARDIAC REHAB W/EXERC: HCPCS

## 2022-05-02 ENCOUNTER — HOSPITAL ENCOUNTER (OUTPATIENT)
Dept: CARDIAC REHAB | Age: 42
Setting detail: THERAPIES SERIES
Discharge: HOME | End: 2022-05-02
Payer: MEDICAID

## 2022-05-02 ENCOUNTER — APPOINTMENT (OUTPATIENT)
Dept: CARDIAC REHAB | Age: 42
End: 2022-05-02
Payer: MEDICAID

## 2022-05-02 PROCEDURE — G0423 INTENS CARDIAC REHAB NO EXER: HCPCS

## 2022-05-02 PROCEDURE — G0422 INTENS CARDIAC REHAB W/EXERC: HCPCS

## 2022-05-03 ENCOUNTER — APPOINTMENT (OUTPATIENT)
Dept: CARDIAC REHAB | Age: 42
End: 2022-05-03
Payer: MEDICAID

## 2022-05-03 ENCOUNTER — HOSPITAL ENCOUNTER (OUTPATIENT)
Dept: CARDIAC REHAB | Age: 42
Setting detail: THERAPIES SERIES
Discharge: HOME | End: 2022-05-03
Payer: MEDICAID

## 2022-05-03 DIAGNOSIS — F41.9 ANXIETY: ICD-10-CM

## 2022-05-03 PROCEDURE — G0422 INTENS CARDIAC REHAB W/EXERC: HCPCS

## 2022-05-03 PROCEDURE — G0423 INTENS CARDIAC REHAB NO EXER: HCPCS

## 2022-05-03 RX ORDER — BUSPIRONE HYDROCHLORIDE 5 MG/1
TABLET ORAL
Qty: 60 TABLET | Refills: 1 | Status: SHIPPED | OUTPATIENT
Start: 2022-05-03 | End: 2022-07-01

## 2022-05-03 RX ORDER — TRAZODONE HYDROCHLORIDE 50 MG/1
TABLET ORAL
Qty: 30 TABLET | Refills: 2 | Status: SHIPPED | OUTPATIENT
Start: 2022-05-03 | End: 2022-05-04 | Stop reason: SDUPTHER

## 2022-05-03 RX ORDER — BUSPIRONE HYDROCHLORIDE 5 MG/1
TABLET ORAL
Qty: 60 TABLET | Refills: 1 | OUTPATIENT
Start: 2022-05-03

## 2022-05-03 RX ORDER — TRAZODONE HYDROCHLORIDE 50 MG/1
TABLET ORAL
Qty: 30 TABLET | Refills: 2 | OUTPATIENT
Start: 2022-05-03

## 2022-05-04 NOTE — LETTER
[facility]  [address] [address]  Phone: [phone]  Fax: [phone]    BARBARA De Los Santos CNP        May 13, 2019     Patient: Ketty Barriga   YOB: 1980   Date of Visit: 5/13/2019       To Whom it May Concern:    Brenna Roque was seen in my clinic on 5/13/2019. He may return to work on 5/15/2019. If you have any questions or concerns, please don't hesitate to call.     Sincerely,         BARBARA De Los Santos CNP Completo :)

## 2022-05-05 ENCOUNTER — HOSPITAL ENCOUNTER (OUTPATIENT)
Dept: CARDIAC REHAB | Age: 42
Setting detail: THERAPIES SERIES
Discharge: HOME | End: 2022-05-05
Payer: MEDICAID

## 2022-05-05 ENCOUNTER — APPOINTMENT (OUTPATIENT)
Dept: CARDIAC REHAB | Age: 42
End: 2022-05-05
Payer: MEDICAID

## 2022-05-05 PROCEDURE — G0422 INTENS CARDIAC REHAB W/EXERC: HCPCS

## 2022-05-05 PROCEDURE — G0423 INTENS CARDIAC REHAB NO EXER: HCPCS

## 2022-05-05 RX ORDER — TRAZODONE HYDROCHLORIDE 50 MG/1
50 TABLET ORAL NIGHTLY
Qty: 30 TABLET | Refills: 2 | Status: SHIPPED | OUTPATIENT
Start: 2022-05-05 | End: 2022-10-26

## 2022-05-05 RX ORDER — TRAZODONE HYDROCHLORIDE 50 MG/1
TABLET ORAL
Qty: 30 TABLET | Refills: 2 | OUTPATIENT
Start: 2022-05-05

## 2022-05-09 ENCOUNTER — APPOINTMENT (OUTPATIENT)
Dept: CARDIAC REHAB | Age: 42
End: 2022-05-09
Payer: MEDICAID

## 2022-05-09 ENCOUNTER — HOSPITAL ENCOUNTER (OUTPATIENT)
Dept: CARDIAC REHAB | Age: 42
Setting detail: THERAPIES SERIES
Discharge: HOME | End: 2022-05-09
Payer: MEDICAID

## 2022-05-09 PROCEDURE — G0422 INTENS CARDIAC REHAB W/EXERC: HCPCS

## 2022-05-09 PROCEDURE — G0423 INTENS CARDIAC REHAB NO EXER: HCPCS

## 2022-05-09 RX ORDER — ASPIRIN 81 MG/1
81 TABLET ORAL DAILY
Qty: 90 TABLET | Refills: 3 | Status: SHIPPED | OUTPATIENT
Start: 2022-05-09 | End: 2022-06-09 | Stop reason: SDUPTHER

## 2022-05-09 RX ORDER — OMEPRAZOLE 20 MG/1
20 CAPSULE, DELAYED RELEASE ORAL DAILY
Qty: 30 CAPSULE | Refills: 5 | Status: SHIPPED | OUTPATIENT
Start: 2022-05-09 | End: 2022-08-30 | Stop reason: SDUPTHER

## 2022-05-09 RX ORDER — TIZANIDINE 2 MG/1
TABLET ORAL
Qty: 30 TABLET | Refills: 0 | Status: SHIPPED | OUTPATIENT
Start: 2022-05-09 | End: 2022-06-09 | Stop reason: SDUPTHER

## 2022-05-10 ENCOUNTER — HOSPITAL ENCOUNTER (OUTPATIENT)
Dept: CARDIAC REHAB | Age: 42
Setting detail: THERAPIES SERIES
Discharge: HOME | End: 2022-05-10
Payer: MEDICAID

## 2022-05-10 ENCOUNTER — APPOINTMENT (OUTPATIENT)
Dept: CARDIAC REHAB | Age: 42
End: 2022-05-10
Payer: MEDICAID

## 2022-05-10 PROCEDURE — G0422 INTENS CARDIAC REHAB W/EXERC: HCPCS

## 2022-05-10 PROCEDURE — G0423 INTENS CARDIAC REHAB NO EXER: HCPCS

## 2022-05-12 ENCOUNTER — APPOINTMENT (OUTPATIENT)
Dept: CARDIAC REHAB | Age: 42
End: 2022-05-12
Payer: MEDICAID

## 2022-05-12 ENCOUNTER — HOSPITAL ENCOUNTER (OUTPATIENT)
Dept: CARDIAC REHAB | Age: 42
Setting detail: THERAPIES SERIES
Discharge: HOME | End: 2022-05-12
Payer: MEDICAID

## 2022-05-12 ENCOUNTER — TELEPHONE (OUTPATIENT)
Dept: CARDIOLOGY CLINIC | Age: 42
End: 2022-05-12

## 2022-05-12 LAB
CATARACTS: NEGATIVE
DIABETIC RETINOPATHY: NORMAL
GLAUCOMA: NEGATIVE
INTRAOCULAR PRESSURE EYE: NORMAL
VISUAL ACUITY DISTANCE LEFT EYE: NORMAL
VISUAL ACUITY DISTANCE RIGHT EYE: NORMAL

## 2022-05-12 PROCEDURE — G0422 INTENS CARDIAC REHAB W/EXERC: HCPCS

## 2022-05-12 PROCEDURE — G0423 INTENS CARDIAC REHAB NO EXER: HCPCS

## 2022-05-12 NOTE — TELEPHONE ENCOUNTER
Cardiologist: Dr. Chakraborty [facility]  Surgeon: Dr. Hannah Tse  Surgery: vitrectomy and endolaser in the left eye d/t vitreous hemorrhage secondary to diabetic retinopathy  Anesthesia: Retrobulbar block w/IV sedation  Date: 5/25/2022  FAX# [phone] and [phone]  Ph# [phone]    Last OV 3/7/2022 w/Alejandro      ASCVD     S/p PCI to ostial LAD for 90 %lesion in Nov 2021 , OM has 100 % occluded, PDA comes off Circ and has 70-80 % lesion . Continue aspirin and bralinta for 1 year due to NSTEMI   HE will need PCi to PDA and possible OM bu the wants to wait till after holidays he has no symptoms , He had  PCI to PDa and OM  in Jan 2022 but OM /circ was  and post PCi had very small vessel , would like to see if it needs repeat ballooning or stent in 6 weeks after pci , so plan repeat cath by March 2022 Continue imdur, recheck cath to look at  ? Refer to cardiac rehab         Ischemic cardiomyopathy  EF of 20-25 % in Oct 2021 , Tolerating  toprol Xl 50 mg and lisinopril 40 mg daily EF in Jan 2022 was improved to 40-45 % , He has stopped wearing life vest   HE  Appears euvolemic He is not on any lasix , has no ankle swelling   Aldactone was stopped due to hyperkalemia ,He has not needed lasix, He is euvolemic         History of amputation of lesser toe of left foot (HCC)  Three vessel run off of both legs on arterial doppler in 2021     HBO-Diabetic ulcer of left midfoot associated with type 2 diabetes mellitus, with bone involvement without evidence of necrosis ([facility] [address] 3   diabetes management as per primary physician     Tobacco abuse  He says he is not smoking ,HE was congratulated       Dyslipidemia :  All available lab work was reviewed. Patient was advised to repeat lab work before next visit.  Necessary orders were placed , instructions given by myself   on lipitor 80 mg daily       NM- 10/29/2021   Reduced EF with global hypokinesis EF of 35 %    Large size severe anterior /apical ischemic area of left ventricle    Dilated left ventricle with EDV of 240 ML    Ischemic dilated cardiomyopathy    Abnormal stress test       Echo- 1/31/2022   Dilated left ventricle with mildly suppressed systolic function, ejection   fraction of 40-45%. Grade II diastolic dysfunction. Mildly dilated left atrium. Mild tricuspid, mitral and pulmonic regurgitation. Normal pulmonary artery pressure. No evidence of pericardial effusion. Cath- 1/12/2022   1. PCI to  100 % occluded OM1 using turnPike support and   Fielder XT wire followed by serial balloon and placement of 2.75   X 34 JAI with Edy III flow   2. PCI to PDA using 2.75 X 15 JAI reduced 90 % lesion to 0 %   with Edy III flow   3. LAD ostial stent is patent   4.  LVEDP was 16 mmHG         Aspirin

## 2022-05-16 ENCOUNTER — APPOINTMENT (OUTPATIENT)
Dept: CARDIAC REHAB | Age: 42
End: 2022-05-16
Payer: MEDICAID

## 2022-05-16 ENCOUNTER — HOSPITAL ENCOUNTER (OUTPATIENT)
Dept: CARDIAC REHAB | Age: 42
Setting detail: THERAPIES SERIES
Discharge: HOME | End: 2022-05-16
Payer: MEDICAID

## 2022-05-16 ENCOUNTER — OFFICE VISIT (OUTPATIENT)
Dept: INTERNAL MEDICINE CLINIC | Age: 42
End: 2022-05-16
Payer: MEDICAID

## 2022-05-16 VITALS
RESPIRATION RATE: 16 BRPM | WEIGHT: 241.6 LBS | HEART RATE: 61 BPM | OXYGEN SATURATION: 98 % | HEIGHT: 73 IN | SYSTOLIC BLOOD PRESSURE: 111 MMHG | BODY MASS INDEX: 32.02 KG/M2 | DIASTOLIC BLOOD PRESSURE: 61 MMHG

## 2022-05-16 DIAGNOSIS — Z01.818 PRE-OP EVALUATION: Primary | ICD-10-CM

## 2022-05-16 DIAGNOSIS — H43.12 VITREOUS HEMORRHAGE OF LEFT EYE DUE TO DIABETES MELLITUS (HCC): ICD-10-CM

## 2022-05-16 DIAGNOSIS — E11.39 VITREOUS HEMORRHAGE OF LEFT EYE DUE TO DIABETES MELLITUS (HCC): ICD-10-CM

## 2022-05-16 PROCEDURE — 4004F PT TOBACCO SCREEN RCVD TLK: CPT | Performed by: NURSE PRACTITIONER

## 2022-05-16 PROCEDURE — G8417 CALC BMI ABV UP PARAM F/U: HCPCS | Performed by: NURSE PRACTITIONER

## 2022-05-16 PROCEDURE — 3046F HEMOGLOBIN A1C LEVEL >9.0%: CPT | Performed by: NURSE PRACTITIONER

## 2022-05-16 PROCEDURE — 2022F DILAT RTA XM EVC RTNOPTHY: CPT | Performed by: NURSE PRACTITIONER

## 2022-05-16 PROCEDURE — G0422 INTENS CARDIAC REHAB W/EXERC: HCPCS

## 2022-05-16 PROCEDURE — G0423 INTENS CARDIAC REHAB NO EXER: HCPCS

## 2022-05-16 PROCEDURE — G8427 DOCREV CUR MEDS BY ELIG CLIN: HCPCS | Performed by: NURSE PRACTITIONER

## 2022-05-16 PROCEDURE — 99213 OFFICE O/P EST LOW 20 MIN: CPT | Performed by: NURSE PRACTITIONER

## 2022-05-16 SDOH — ECONOMIC STABILITY: FOOD INSECURITY: WITHIN THE PAST 12 MONTHS, THE FOOD YOU BOUGHT JUST DIDN'T LAST AND YOU DIDN'T HAVE MONEY TO GET MORE.: NEVER TRUE

## 2022-05-16 SDOH — ECONOMIC STABILITY: FOOD INSECURITY: WITHIN THE PAST 12 MONTHS, YOU WORRIED THAT YOUR FOOD WOULD RUN OUT BEFORE YOU GOT MONEY TO BUY MORE.: NEVER TRUE

## 2022-05-16 ASSESSMENT — SOCIAL DETERMINANTS OF HEALTH (SDOH): HOW HARD IS IT FOR YOU TO PAY FOR THE VERY BASICS LIKE FOOD, HOUSING, MEDICAL CARE, AND HEATING?: NOT HARD AT ALL
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
## 2022-05-17 ENCOUNTER — APPOINTMENT (OUTPATIENT)
Dept: CARDIAC REHAB | Age: 42
End: 2022-05-17
Payer: MEDICAID

## 2022-05-17 ENCOUNTER — HOSPITAL ENCOUNTER (OUTPATIENT)
Dept: CARDIAC REHAB | Age: 42
Setting detail: THERAPIES SERIES
Discharge: HOME | End: 2022-05-17
Payer: MEDICAID

## 2022-05-17 PROCEDURE — G0423 INTENS CARDIAC REHAB NO EXER: HCPCS

## 2022-05-17 PROCEDURE — G0422 INTENS CARDIAC REHAB W/EXERC: HCPCS

## 2022-05-17 NOTE — TELEPHONE ENCOUNTER
Do they need to hold his anti platelets for procedure   If yes ?  Then can stop plavix / Knute Dollar for 5 days but continue aspirin  HE is low risk for eye procedure

## 2022-05-18 DIAGNOSIS — Z79.4 TYPE 2 DIABETES MELLITUS WITHOUT COMPLICATION, WITH LONG-TERM CURRENT USE OF INSULIN (HCC): ICD-10-CM

## 2022-05-18 DIAGNOSIS — E11.9 TYPE 2 DIABETES MELLITUS WITHOUT COMPLICATION, WITH LONG-TERM CURRENT USE OF INSULIN (HCC): ICD-10-CM

## 2022-05-18 RX ORDER — GABAPENTIN 300 MG/1
300 CAPSULE ORAL 3 TIMES DAILY
Qty: 90 CAPSULE | Refills: 2 | Status: SHIPPED | OUTPATIENT
Start: 2022-05-18 | End: 2022-06-21 | Stop reason: SDUPTHER

## 2022-05-19 ENCOUNTER — APPOINTMENT (OUTPATIENT)
Dept: CARDIAC REHAB | Age: 42
End: 2022-05-19
Payer: MEDICAID

## 2022-05-19 ENCOUNTER — HOSPITAL ENCOUNTER (OUTPATIENT)
Dept: CARDIAC REHAB | Age: 42
Setting detail: THERAPIES SERIES
Discharge: HOME | End: 2022-05-19
Payer: MEDICAID

## 2022-05-19 PROCEDURE — G0422 INTENS CARDIAC REHAB W/EXERC: HCPCS

## 2022-05-19 PROCEDURE — G0423 INTENS CARDIAC REHAB NO EXER: HCPCS

## 2022-05-23 ENCOUNTER — HOSPITAL ENCOUNTER (OUTPATIENT)
Dept: CARDIAC REHAB | Age: 42
Setting detail: THERAPIES SERIES
Discharge: HOME | End: 2022-05-23
Payer: MEDICAID

## 2022-05-23 ENCOUNTER — APPOINTMENT (OUTPATIENT)
Dept: CARDIAC REHAB | Age: 42
End: 2022-05-23
Payer: MEDICAID

## 2022-05-23 PROCEDURE — G0423 INTENS CARDIAC REHAB NO EXER: HCPCS

## 2022-05-23 PROCEDURE — G0422 INTENS CARDIAC REHAB W/EXERC: HCPCS

## 2022-05-24 ENCOUNTER — APPOINTMENT (OUTPATIENT)
Dept: CARDIAC REHAB | Age: 42
End: 2022-05-24
Payer: MEDICAID

## 2022-05-24 ENCOUNTER — OFFICE VISIT (OUTPATIENT)
Dept: CARDIOLOGY CLINIC | Age: 42
End: 2022-05-24
Payer: MEDICAID

## 2022-05-24 ENCOUNTER — HOSPITAL ENCOUNTER (OUTPATIENT)
Dept: CARDIAC REHAB | Age: 42
Setting detail: THERAPIES SERIES
Discharge: HOME | End: 2022-05-24
Payer: MEDICAID

## 2022-05-24 VITALS
HEART RATE: 60 BPM | HEIGHT: 73 IN | SYSTOLIC BLOOD PRESSURE: 128 MMHG | DIASTOLIC BLOOD PRESSURE: 66 MMHG | BODY MASS INDEX: 31.41 KG/M2 | WEIGHT: 237 LBS

## 2022-05-24 DIAGNOSIS — E11.9 TYPE 2 DIABETES MELLITUS WITHOUT COMPLICATION, WITH LONG-TERM CURRENT USE OF INSULIN (HCC): ICD-10-CM

## 2022-05-24 DIAGNOSIS — Z72.0 TOBACCO ABUSE: ICD-10-CM

## 2022-05-24 DIAGNOSIS — I25.10 ASCVD (ARTERIOSCLEROTIC CARDIOVASCULAR DISEASE): Primary | ICD-10-CM

## 2022-05-24 DIAGNOSIS — N18.31 STAGE 3A CHRONIC KIDNEY DISEASE (HCC): ICD-10-CM

## 2022-05-24 DIAGNOSIS — Z89.422 HISTORY OF AMPUTATION OF LESSER TOE OF LEFT FOOT (HCC): ICD-10-CM

## 2022-05-24 DIAGNOSIS — K21.9 GASTROESOPHAGEAL REFLUX DISEASE, UNSPECIFIED WHETHER ESOPHAGITIS PRESENT: ICD-10-CM

## 2022-05-24 DIAGNOSIS — Z79.4 TYPE 2 DIABETES MELLITUS WITHOUT COMPLICATION, WITH LONG-TERM CURRENT USE OF INSULIN (HCC): ICD-10-CM

## 2022-05-24 DIAGNOSIS — N52.8 OTHER MALE ERECTILE DYSFUNCTION: ICD-10-CM

## 2022-05-24 DIAGNOSIS — E78.5 DYSLIPIDEMIA: ICD-10-CM

## 2022-05-24 DIAGNOSIS — I50.22 CHRONIC SYSTOLIC CONGESTIVE HEART FAILURE (HCC): ICD-10-CM

## 2022-05-24 DIAGNOSIS — N18.2 CHRONIC KIDNEY DISEASE, STAGE II (MILD): ICD-10-CM

## 2022-05-24 DIAGNOSIS — I25.5 ISCHEMIC CARDIOMYOPATHY: ICD-10-CM

## 2022-05-24 PROCEDURE — 2022F DILAT RTA XM EVC RTNOPTHY: CPT | Performed by: INTERNAL MEDICINE

## 2022-05-24 PROCEDURE — 3046F HEMOGLOBIN A1C LEVEL >9.0%: CPT | Performed by: INTERNAL MEDICINE

## 2022-05-24 PROCEDURE — G8417 CALC BMI ABV UP PARAM F/U: HCPCS | Performed by: INTERNAL MEDICINE

## 2022-05-24 PROCEDURE — 1036F TOBACCO NON-USER: CPT | Performed by: INTERNAL MEDICINE

## 2022-05-24 PROCEDURE — 99214 OFFICE O/P EST MOD 30 MIN: CPT | Performed by: INTERNAL MEDICINE

## 2022-05-24 PROCEDURE — G0423 INTENS CARDIAC REHAB NO EXER: HCPCS

## 2022-05-24 PROCEDURE — G0422 INTENS CARDIAC REHAB W/EXERC: HCPCS

## 2022-05-24 PROCEDURE — G8427 DOCREV CUR MEDS BY ELIG CLIN: HCPCS | Performed by: INTERNAL MEDICINE

## 2022-05-24 RX ORDER — RANOLAZINE 500 MG/1
500 TABLET, EXTENDED RELEASE ORAL 2 TIMES DAILY
Qty: 60 TABLET | Refills: 3 | Status: SHIPPED | OUTPATIENT
Start: 2022-05-24 | End: 2022-09-20

## 2022-05-24 RX ORDER — SILDENAFIL 100 MG/1
100 TABLET, FILM COATED ORAL DAILY PRN
Qty: 10 TABLET | Refills: 1 | Status: SHIPPED | OUTPATIENT
Start: 2022-05-24 | End: 2022-06-13

## 2022-05-24 RX ORDER — INSULIN LISPRO 100 [IU]/ML
15 INJECTION, SOLUTION INTRAVENOUS; SUBCUTANEOUS
Qty: 3 EACH | Refills: 2 | Status: SHIPPED | OUTPATIENT
Start: 2022-05-24 | End: 2022-08-10 | Stop reason: SDUPTHER

## 2022-05-24 NOTE — PATIENT INSTRUCTIONS
fter  **It is YOUR responsibilty to bring medication bottles and/or updated medication list to [address] [place] [address]. This will allow us to better serve you and all your healthcare needs**    [facility] Laboratory Locations - No appointment necessary. Sites open Monday to Friday. Call your preferred location for test preparation, business hours and other information you need. [employer]. [facility] [address] Noreen Mays, [address] [facility]  Phone: [phone] [facility] [address] [facility]  [address]. [facility], [address] [facility]  Phone: [phone]     Please hold on to these instructions the  will call you within 1-9 business days when we receive authorization from your insurance. Echocardiogram    WHAT TO EXPECT:   ? This test will take approximately 45 minutes. ? It is an ultrasound of the heart. ? It can look at the valves and chambers inside the heart   ? There is no special instructions for this test.     If you are unable to keep this appointment, please notify us 24 hours prior to test at [phone].
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
## 2022-05-25 ENCOUNTER — TELEPHONE (OUTPATIENT)
Dept: CARDIOLOGY CLINIC | Age: 42
End: 2022-05-25

## 2022-05-25 NOTE — TELEPHONE ENCOUNTER
Faxed referral, demographics, ins card, and office note to Dr. Solano Hash office at Fax# [phone]. # [phone].

## 2022-05-26 ENCOUNTER — APPOINTMENT (OUTPATIENT)
Dept: CARDIAC REHAB | Age: 42
End: 2022-05-26
Payer: MEDICAID

## 2022-05-30 ENCOUNTER — APPOINTMENT (OUTPATIENT)
Dept: CARDIAC REHAB | Age: 42
End: 2022-05-30
Payer: MEDICAID

## 2022-05-31 ENCOUNTER — APPOINTMENT (OUTPATIENT)
Dept: CARDIAC REHAB | Age: 42
End: 2022-05-31
Payer: MEDICAID

## 2022-06-02 ENCOUNTER — APPOINTMENT (OUTPATIENT)
Dept: CARDIAC REHAB | Age: 42
End: 2022-06-02
Payer: MEDICAID

## 2022-06-03 ENCOUNTER — PROCEDURE VISIT (OUTPATIENT)
Dept: CARDIOLOGY CLINIC | Age: 42
End: 2022-06-03
Payer: MEDICAID

## 2022-06-03 DIAGNOSIS — E11.9 TYPE 2 DIABETES MELLITUS WITHOUT COMPLICATION, WITH LONG-TERM CURRENT USE OF INSULIN (HCC): ICD-10-CM

## 2022-06-03 DIAGNOSIS — E78.5 DYSLIPIDEMIA: ICD-10-CM

## 2022-06-03 DIAGNOSIS — I50.22 CHRONIC SYSTOLIC CONGESTIVE HEART FAILURE (HCC): ICD-10-CM

## 2022-06-03 DIAGNOSIS — I25.10 ASCVD (ARTERIOSCLEROTIC CARDIOVASCULAR DISEASE): ICD-10-CM

## 2022-06-03 DIAGNOSIS — I25.5 ISCHEMIC CARDIOMYOPATHY: ICD-10-CM

## 2022-06-03 DIAGNOSIS — Z89.422 HISTORY OF AMPUTATION OF LESSER TOE OF LEFT FOOT (HCC): ICD-10-CM

## 2022-06-03 DIAGNOSIS — Z72.0 TOBACCO ABUSE: ICD-10-CM

## 2022-06-03 DIAGNOSIS — I42.9 CARDIOMYOPATHY, UNSPECIFIED TYPE (HCC): ICD-10-CM

## 2022-06-03 DIAGNOSIS — Z79.4 TYPE 2 DIABETES MELLITUS WITHOUT COMPLICATION, WITH LONG-TERM CURRENT USE OF INSULIN (HCC): ICD-10-CM

## 2022-06-03 LAB
LV EF: 48 %
LVEF MODALITY: NORMAL

## 2022-06-03 PROCEDURE — 93306 TTE W/DOPPLER COMPLETE: CPT | Performed by: INTERNAL MEDICINE

## 2022-06-06 ENCOUNTER — HOSPITAL ENCOUNTER (OUTPATIENT)
Dept: CARDIAC REHAB | Age: 42
Setting detail: THERAPIES SERIES
Discharge: HOME | End: 2022-06-06
Payer: MEDICAID

## 2022-06-06 ENCOUNTER — APPOINTMENT (OUTPATIENT)
Dept: CARDIAC REHAB | Age: 42
End: 2022-06-06
Payer: MEDICAID

## 2022-06-06 ENCOUNTER — HOSPITAL ENCOUNTER (OUTPATIENT)
Age: 42
Discharge: HOME | End: 2022-06-06

## 2022-06-06 DIAGNOSIS — E55.9 VITAMIN D DEFICIENCY: ICD-10-CM

## 2022-06-06 DIAGNOSIS — E11.9 TYPE 2 DIABETES MELLITUS WITHOUT COMPLICATION, WITH LONG-TERM CURRENT USE OF INSULIN (HCC): ICD-10-CM

## 2022-06-06 DIAGNOSIS — Z00.00 ENCOUNTER FOR PREVENTIVE HEALTH EXAMINATION: ICD-10-CM

## 2022-06-06 DIAGNOSIS — N17.9 AKI (ACUTE KIDNEY INJURY) (HCC): ICD-10-CM

## 2022-06-06 DIAGNOSIS — E87.5 HYPERKALEMIA: Primary | ICD-10-CM

## 2022-06-06 DIAGNOSIS — Z79.4 TYPE 2 DIABETES MELLITUS WITHOUT COMPLICATION, WITH LONG-TERM CURRENT USE OF INSULIN (HCC): ICD-10-CM

## 2022-06-06 LAB
ALBUMIN SERPL-MCNC: 4.2 GM/DL (ref 3.4–5)
ALP BLD-CCNC: 111 IU/L (ref 40–129)
ALT SERPL-CCNC: 66 U/L (ref 10–40)
ANION GAP SERPL CALCULATED.3IONS-SCNC: 11 MMOL/L (ref 4–16)
AST SERPL-CCNC: 55 IU/L (ref 15–37)
BASOPHILS ABSOLUTE: 0.1 K/CU MM
BASOPHILS RELATIVE PERCENT: 1.2 % (ref 0–1)
BILIRUB SERPL-MCNC: 0.3 MG/DL (ref 0–1)
BUN BLDV-MCNC: 43 MG/DL (ref 6–23)
CALCIUM SERPL-MCNC: 9.4 MG/DL (ref 8.3–10.6)
CHLORIDE BLD-SCNC: 105 MMOL/L (ref 99–110)
CHOLESTEROL, FASTING: 122 MG/DL
CO2: 18 MMOL/L (ref 21–32)
CREAT SERPL-MCNC: 2.1 MG/DL (ref 0.9–1.3)
DIFFERENTIAL TYPE: ABNORMAL
EOSINOPHILS ABSOLUTE: 0.2 K/CU MM
EOSINOPHILS RELATIVE PERCENT: 4.7 % (ref 0–3)
ESTIMATED AVERAGE GLUCOSE: 134 MG/DL
GFR AFRICAN AMERICAN: 42 ML/MIN/1.73M2
GFR NON-AFRICAN AMERICAN: 35 ML/MIN/1.73M2
GLUCOSE BLD-MCNC: 116 MG/DL (ref 70–99)
HBA1C MFR BLD: 6.3 % (ref 4.2–6.3)
HCT VFR BLD CALC: 32.1 % (ref 42–52)
HDLC SERPL-MCNC: 23 MG/DL
HEMOGLOBIN: 10.1 GM/DL (ref 13.5–18)
IMMATURE NEUTROPHIL %: 0 % (ref 0–0.43)
LDL CHOLESTEROL CALCULATED: 44 MG/DL
LYMPHOCYTES ABSOLUTE: 1.3 K/CU MM
LYMPHOCYTES RELATIVE PERCENT: 30.8 % (ref 24–44)
MCH RBC QN AUTO: 30 PG (ref 27–31)
MCHC RBC AUTO-ENTMCNC: 31.5 % (ref 32–36)
MCV RBC AUTO: 95.3 FL (ref 78–100)
MONOCYTES ABSOLUTE: 0.3 K/CU MM
MONOCYTES RELATIVE PERCENT: 7.6 % (ref 0–4)
NUCLEATED RBC %: 0 %
PDW BLD-RTO: 12.4 % (ref 11.7–14.9)
PLATELET # BLD: 166 K/CU MM (ref 140–440)
PMV BLD AUTO: 11.1 FL (ref 7.5–11.1)
POTASSIUM SERPL-SCNC: 5.9 MMOL/L (ref 3.5–5.1)
RBC # BLD: 3.37 M/CU MM (ref 4.6–6.2)
SEGMENTED NEUTROPHILS ABSOLUTE COUNT: 2.3 K/CU MM
SEGMENTED NEUTROPHILS RELATIVE PERCENT: 55.7 % (ref 36–66)
SODIUM BLD-SCNC: 134 MMOL/L (ref 135–145)
TOTAL IMMATURE NEUTOROPHIL: 0 K/CU MM
TOTAL NUCLEATED RBC: 0 K/CU MM
TOTAL PROTEIN: 8.4 GM/DL (ref 6.4–8.2)
TRIGLYCERIDE, FASTING: 274 MG/DL
VITAMIN D 25-HYDROXY: 23.03 NG/ML
WBC # BLD: 4.1 K/CU MM (ref 4–10.5)

## 2022-06-06 PROCEDURE — 85025 COMPLETE CBC W/AUTO DIFF WBC: CPT

## 2022-06-06 PROCEDURE — 83036 HEMOGLOBIN GLYCOSYLATED A1C: CPT

## 2022-06-06 PROCEDURE — 80061 LIPID PANEL: CPT

## 2022-06-06 PROCEDURE — 82306 VITAMIN D 25 HYDROXY: CPT

## 2022-06-06 PROCEDURE — 80053 COMPREHEN METABOLIC PANEL: CPT

## 2022-06-06 PROCEDURE — 36415 COLL VENOUS BLD VENIPUNCTURE: CPT

## 2022-06-06 PROCEDURE — G0422 INTENS CARDIAC REHAB W/EXERC: HCPCS

## 2022-06-06 PROCEDURE — G0423 INTENS CARDIAC REHAB NO EXER: HCPCS

## 2022-06-06 RX ORDER — SODIUM POLYSTYRENE SULFONATE 15 G/60ML
15 SUSPENSION ORAL; RECTAL ONCE
Qty: 60 ML | Refills: 0 | Status: SHIPPED
Start: 2022-06-06 | End: 2022-10-03 | Stop reason: CLARIF

## 2022-06-07 ENCOUNTER — HOSPITAL ENCOUNTER (OUTPATIENT)
Dept: CARDIAC REHAB | Age: 42
Setting detail: THERAPIES SERIES
Discharge: HOME | End: 2022-06-07
Payer: MEDICAID

## 2022-06-07 ENCOUNTER — APPOINTMENT (OUTPATIENT)
Dept: CARDIAC REHAB | Age: 42
End: 2022-06-07
Payer: MEDICAID

## 2022-06-07 PROCEDURE — G0422 INTENS CARDIAC REHAB W/EXERC: HCPCS

## 2022-06-07 PROCEDURE — G0423 INTENS CARDIAC REHAB NO EXER: HCPCS

## 2022-06-08 ENCOUNTER — OFFICE VISIT (OUTPATIENT)
Dept: INTERNAL MEDICINE CLINIC | Age: 42
End: 2022-06-08
Payer: MEDICAID

## 2022-06-08 VITALS
DIASTOLIC BLOOD PRESSURE: 62 MMHG | BODY MASS INDEX: 31.14 KG/M2 | SYSTOLIC BLOOD PRESSURE: 110 MMHG | WEIGHT: 235 LBS | HEART RATE: 56 BPM | OXYGEN SATURATION: 97 % | RESPIRATION RATE: 18 BRPM | HEIGHT: 73 IN

## 2022-06-08 DIAGNOSIS — E87.5 HYPERKALEMIA: Primary | ICD-10-CM

## 2022-06-08 DIAGNOSIS — K21.9 GASTROESOPHAGEAL REFLUX DISEASE WITHOUT ESOPHAGITIS: ICD-10-CM

## 2022-06-08 DIAGNOSIS — R80.1 PERSISTENT PROTEINURIA: ICD-10-CM

## 2022-06-08 DIAGNOSIS — E78.00 ELEVATED LDL CHOLESTEROL LEVEL: ICD-10-CM

## 2022-06-08 DIAGNOSIS — E11.9 TYPE 2 DIABETES MELLITUS WITHOUT COMPLICATION, WITH LONG-TERM CURRENT USE OF INSULIN (HCC): ICD-10-CM

## 2022-06-08 DIAGNOSIS — F41.9 ANXIETY: ICD-10-CM

## 2022-06-08 DIAGNOSIS — E55.9 VITAMIN D DEFICIENCY: ICD-10-CM

## 2022-06-08 DIAGNOSIS — I50.22 CHRONIC SYSTOLIC CONGESTIVE HEART FAILURE (HCC): ICD-10-CM

## 2022-06-08 DIAGNOSIS — N17.9 AKI (ACUTE KIDNEY INJURY) (HCC): ICD-10-CM

## 2022-06-08 DIAGNOSIS — Z79.4 TYPE 2 DIABETES MELLITUS WITHOUT COMPLICATION, WITH LONG-TERM CURRENT USE OF INSULIN (HCC): ICD-10-CM

## 2022-06-08 PROCEDURE — 2022F DILAT RTA XM EVC RTNOPTHY: CPT | Performed by: NURSE PRACTITIONER

## 2022-06-08 PROCEDURE — 1036F TOBACCO NON-USER: CPT | Performed by: NURSE PRACTITIONER

## 2022-06-08 PROCEDURE — G8427 DOCREV CUR MEDS BY ELIG CLIN: HCPCS | Performed by: NURSE PRACTITIONER

## 2022-06-08 PROCEDURE — G8417 CALC BMI ABV UP PARAM F/U: HCPCS | Performed by: NURSE PRACTITIONER

## 2022-06-08 PROCEDURE — 3044F HG A1C LEVEL LT 7.0%: CPT | Performed by: NURSE PRACTITIONER

## 2022-06-08 PROCEDURE — 99214 OFFICE O/P EST MOD 30 MIN: CPT | Performed by: NURSE PRACTITIONER

## 2022-06-08 ASSESSMENT — ENCOUNTER SYMPTOMS
ABDOMINAL DISTENTION: 0
SINUS PAIN: 0
SORE THROAT: 0
SHORTNESS OF BREATH: 0
COUGH: 0
NAUSEA: 0
DIARRHEA: 0
CONSTIPATION: 0
COLOR CHANGE: 0
WHEEZING: 0
EYES NEGATIVE: 1
ABDOMINAL PAIN: 0
SINUS PRESSURE: 0
CHEST TIGHTNESS: 0

## 2022-06-08 NOTE — PROGRESS NOTES
Arianna Spray  1980 06/08/22    Chief Complaint   Patient presents with    Follow-up     Concerned about potassium       SUBJECTIVE:      3 month follow up: All recent labs were stable except there were 2 main concerns; Did have a hyperkalemia of 5.9 and decreased renal function (creatinine of 2.1 and GFR of 35). Patient with hx of NSTEMI and PCI to ostial LAD in Nov 202, then elective stent to Circ  and PDA in Jan 2022. Continues on ASA/Brilinta x 1 year. On statin, Imdur 60 mg, Toprol XL 50 mg daily, lisinopril  40 mg daily. Continues on cardiac rehab and doing well overall. He did have repeat echo completed on 6/3/22 which showed improved EF now at 45-50%. Patient compliant with all current medications for diabetes. Blood sugars have been averaging around 100-120, and the patient reports checking their blood sugar 1-2 time daily. Patient has had no episodes of significant hypoglycemia, fainting, dizziness, or loss of consciousness. Continues on metformin 1000 mg BID, Lantus 40 units nightly, and Humalog 15 units TID. Lab Results   Component Value Date    LABA1C 6.3 06/06/2022     Lab Results   Component Value Date     06/06/2022       Lab Results   Component Value Date    LDLCALC 44 06/06/2022    LDLDIRECT 88 11/03/2021     Anxiety has been stable on Buspar 5 mg BID. Denies any concern at this time.      VIT D- compliant with 50,000 units weekly. Recent levels this week within goal.    Review of Systems   Constitutional: Negative for activity change, appetite change, fatigue and fever. HENT: Negative for congestion, sinus pressure, sinus pain and sore throat. Eyes: Negative. Respiratory: Negative for cough, chest tightness, shortness of breath and wheezing. Cardiovascular: Negative for chest pain and palpitations. Gastrointestinal: Negative for abdominal distention, abdominal pain, constipation, diarrhea and nausea.    Genitourinary: Negative for difficulty urinating, dysuria, flank pain, frequency and hematuria. Musculoskeletal: Negative for arthralgias, gait problem, joint swelling and myalgias. Skin: Negative for color change and rash. Neurological: Negative for dizziness, light-headedness and numbness. Hematological: Negative. Psychiatric/Behavioral: Negative. OBJECTIVE:    /62 (Site: Left Upper Arm, Position: Sitting, Cuff Size: Medium Adult)   Pulse 56   Resp 18   Ht 6' 1\" (1.854 m)   Wt 235 lb (106.6 kg)   SpO2 97%   BMI 31.00 kg/m²     Physical Exam  Constitutional:       General: He is not in acute distress. Appearance: He is well-developed. He is not diaphoretic. HENT:      Head: Normocephalic and atraumatic. Nose: Nose normal.   Eyes:      Conjunctiva/sclera: Conjunctivae normal.      Pupils: Pupils are equal, round, and reactive to light. Neck:      Thyroid: No thyromegaly. Cardiovascular:      Rate and Rhythm: Normal rate and regular rhythm. Heart sounds: Normal heart sounds. No murmur heard. Pulmonary:      Effort: Pulmonary effort is normal.      Breath sounds: Normal breath sounds. Abdominal:      General: Bowel sounds are normal. There is no distension. Palpations: Abdomen is soft. Tenderness: There is no abdominal tenderness. Musculoskeletal:         General: Normal range of motion. Lymphadenopathy:      Cervical: No cervical adenopathy. Skin:     General: Skin is warm and dry. Capillary Refill: Capillary refill takes less than 2 seconds. Findings: No rash. Neurological:      Mental Status: He is alert and oriented to person, place, and time. GCS: GCS eye subscore is 4. GCS verbal subscore is 5. GCS motor subscore is 6. Cranial Nerves: No cranial nerve deficit. Sensory: No sensory deficit. Coordination: Coordination normal.      Deep Tendon Reflexes: Reflexes normal.   Psychiatric:         Behavior: Behavior normal.         Thought Content:  Thought content normal.         ASSESSMENT:    1. Hyperkalemia    2. ROSE MARY (acute kidney injury) ([address]    3. Type 2 diabetes mellitus without complication, with long-term current use of insulin ([address]    4. Chronic systolic congestive heart failure (HCC)    5. Elevated LDL cholesterol level    6. Vitamin D deficiency    7. Gastroesophageal reflux disease without esophagitis    8. Anxiety    9. Persistent proteinuria        PLAN:    Eunice Ray was seen today for follow-up. Diagnoses and all orders for this visit:    Hyperkalemia- likely in setting of decreased renal function. Work on limiting any NSAIDS, increase water intake. Check CMP again today. -     Comprehensive Metabolic Panel; Future    ROSE MARY (acute kidney injury) ([address]- as above; also rescreen urine given proteinuria as he defers renal biopsy for now. -     Comprehensive Metabolic Panel; Future    Type 2 diabetes mellitus without complication, with long-term current use of insulin ([address]- well controlled; no changes at this time     Chronic systolic congestive heart failure ([address]- as per cardiology management; no signs of fluid overload. Elevated LDL cholesterol level- at goal; recheck lipid panel/hepatic function today. Vitamin D deficiency- at goal; continue current regiment; recheck levels again in 3-6 months. Gastroesophageal reflux disease without esophagitis - well controlled; no changes in management at this time. Anxiety - well controlled; no changes in management at this time. Persistent proteinuria- as above. Deferring renal biopsy for now. -     Urinalysis with Microscopic  -     PROTEIN ELECTROPHORESIS, URINE; Future    Course of treatment, including any medications, possible imaging, referrals, and follow ups discussed with patient. All risks and benefits and possible side effects discussed with patient who agrees to plan of care and verbalizes understanding. All labs and imaging reviewed. No flowsheet data found.     Return in about 4 months (around 10/8/2022). Pleas note this reports has been produced speech recognition software and may contain errors related to that system including errors in grammar, punctuation, and spelling, as well as words and phrases that may be appropriate. If there are any questions or concerns please feel free to contact the dictating provider for clarification.

## 2022-06-09 ENCOUNTER — APPOINTMENT (OUTPATIENT)
Dept: CARDIAC REHAB | Age: 42
End: 2022-06-09
Payer: MEDICAID

## 2022-06-09 ENCOUNTER — HOSPITAL ENCOUNTER (OUTPATIENT)
Dept: CARDIAC REHAB | Age: 42
Setting detail: THERAPIES SERIES
Discharge: HOME | End: 2022-06-09
Payer: MEDICAID

## 2022-06-09 PROCEDURE — G0423 INTENS CARDIAC REHAB NO EXER: HCPCS

## 2022-06-09 PROCEDURE — G0422 INTENS CARDIAC REHAB W/EXERC: HCPCS

## 2022-06-09 RX ORDER — ASPIRIN 81 MG/1
81 TABLET ORAL DAILY
Qty: 90 TABLET | Refills: 3 | Status: SHIPPED | OUTPATIENT
Start: 2022-06-09

## 2022-06-09 RX ORDER — ERGOCALCIFEROL 1.25 MG/1
50000 CAPSULE ORAL WEEKLY
Qty: 12 CAPSULE | Refills: 2 | OUTPATIENT
Start: 2022-06-09

## 2022-06-10 RX ORDER — TIZANIDINE 2 MG/1
TABLET ORAL
Qty: 30 TABLET | Refills: 0 | Status: SHIPPED | OUTPATIENT
Start: 2022-06-10 | End: 2022-07-11 | Stop reason: SDUPTHER

## 2022-06-13 ENCOUNTER — HOSPITAL ENCOUNTER (OUTPATIENT)
Dept: CARDIAC REHAB | Age: 42
Setting detail: THERAPIES SERIES
Discharge: HOME | End: 2022-06-13
Payer: MEDICAID

## 2022-06-13 ENCOUNTER — APPOINTMENT (OUTPATIENT)
Dept: CARDIAC REHAB | Age: 42
End: 2022-06-13
Payer: MEDICAID

## 2022-06-13 PROCEDURE — G0422 INTENS CARDIAC REHAB W/EXERC: HCPCS

## 2022-06-13 PROCEDURE — G0423 INTENS CARDIAC REHAB NO EXER: HCPCS

## 2022-06-13 RX ORDER — SILDENAFIL 100 MG/1
TABLET, FILM COATED ORAL
Qty: 10 TABLET | Refills: 1 | Status: SHIPPED | OUTPATIENT
Start: 2022-06-13

## 2022-06-14 ENCOUNTER — APPOINTMENT (OUTPATIENT)
Dept: CARDIAC REHAB | Age: 42
End: 2022-06-14
Payer: MEDICAID

## 2022-06-16 ENCOUNTER — HOSPITAL ENCOUNTER (OUTPATIENT)
Dept: CARDIAC REHAB | Age: 42
Setting detail: THERAPIES SERIES
Discharge: HOME | End: 2022-06-16
Payer: MEDICAID

## 2022-06-16 PROCEDURE — G0422 INTENS CARDIAC REHAB W/EXERC: HCPCS

## 2022-06-16 PROCEDURE — G0423 INTENS CARDIAC REHAB NO EXER: HCPCS

## 2022-06-17 DIAGNOSIS — Z79.4 TYPE 2 DIABETES MELLITUS WITHOUT COMPLICATION, WITH LONG-TERM CURRENT USE OF INSULIN (HCC): Primary | ICD-10-CM

## 2022-06-17 DIAGNOSIS — E11.9 TYPE 2 DIABETES MELLITUS WITHOUT COMPLICATION, WITH LONG-TERM CURRENT USE OF INSULIN (HCC): Primary | ICD-10-CM

## 2022-06-20 ENCOUNTER — HOSPITAL ENCOUNTER (OUTPATIENT)
Dept: CARDIAC REHAB | Age: 42
Setting detail: THERAPIES SERIES
Discharge: HOME | End: 2022-06-20
Payer: MEDICAID

## 2022-06-20 PROCEDURE — G0422 INTENS CARDIAC REHAB W/EXERC: HCPCS

## 2022-06-20 PROCEDURE — G0423 INTENS CARDIAC REHAB NO EXER: HCPCS

## 2022-06-21 ENCOUNTER — HOSPITAL ENCOUNTER (OUTPATIENT)
Dept: CARDIAC REHAB | Age: 42
Setting detail: THERAPIES SERIES
Discharge: HOME | End: 2022-06-21
Payer: MEDICAID

## 2022-06-21 DIAGNOSIS — E11.9 TYPE 2 DIABETES MELLITUS WITHOUT COMPLICATION, WITH LONG-TERM CURRENT USE OF INSULIN (HCC): ICD-10-CM

## 2022-06-21 DIAGNOSIS — Z79.4 TYPE 2 DIABETES MELLITUS WITHOUT COMPLICATION, WITH LONG-TERM CURRENT USE OF INSULIN (HCC): ICD-10-CM

## 2022-06-21 PROCEDURE — G0423 INTENS CARDIAC REHAB NO EXER: HCPCS

## 2022-06-21 PROCEDURE — G0422 INTENS CARDIAC REHAB W/EXERC: HCPCS

## 2022-06-21 RX ORDER — GABAPENTIN 300 MG/1
300 CAPSULE ORAL 3 TIMES DAILY
Qty: 90 CAPSULE | Refills: 2 | Status: SHIPPED | OUTPATIENT
Start: 2022-06-21 | End: 2022-09-29

## 2022-06-23 ENCOUNTER — HOSPITAL ENCOUNTER (OUTPATIENT)
Dept: CARDIAC REHAB | Age: 42
Setting detail: THERAPIES SERIES
Discharge: HOME | End: 2022-06-23
Payer: MEDICAID

## 2022-06-23 PROCEDURE — G0422 INTENS CARDIAC REHAB W/EXERC: HCPCS

## 2022-06-23 PROCEDURE — G0423 INTENS CARDIAC REHAB NO EXER: HCPCS

## 2022-06-27 ENCOUNTER — HOSPITAL ENCOUNTER (OUTPATIENT)
Dept: CARDIAC REHAB | Age: 42
Setting detail: THERAPIES SERIES
Discharge: HOME | End: 2022-06-27
Payer: MEDICAID

## 2022-06-27 PROCEDURE — G0423 INTENS CARDIAC REHAB NO EXER: HCPCS

## 2022-06-27 PROCEDURE — G0422 INTENS CARDIAC REHAB W/EXERC: HCPCS

## 2022-06-28 ENCOUNTER — HOSPITAL ENCOUNTER (OUTPATIENT)
Dept: CARDIAC REHAB | Age: 42
Setting detail: THERAPIES SERIES
Discharge: HOME | End: 2022-06-28
Payer: MEDICAID

## 2022-06-28 PROCEDURE — G0423 INTENS CARDIAC REHAB NO EXER: HCPCS

## 2022-06-28 PROCEDURE — G0422 INTENS CARDIAC REHAB W/EXERC: HCPCS

## 2022-06-30 ENCOUNTER — TELEPHONE (OUTPATIENT)
Dept: INTERNAL MEDICINE CLINIC | Age: 42
End: 2022-06-30

## 2022-06-30 DIAGNOSIS — Z89.422 HISTORY OF AMPUTATION OF LESSER TOE OF LEFT FOOT (HCC): ICD-10-CM

## 2022-06-30 DIAGNOSIS — E11.9 TYPE 2 DIABETES MELLITUS WITHOUT COMPLICATION, WITH LONG-TERM CURRENT USE OF INSULIN (HCC): Primary | ICD-10-CM

## 2022-06-30 DIAGNOSIS — Z79.4 TYPE 2 DIABETES MELLITUS WITHOUT COMPLICATION, WITH LONG-TERM CURRENT USE OF INSULIN (HCC): Primary | ICD-10-CM

## 2022-06-30 DIAGNOSIS — F41.9 ANXIETY: ICD-10-CM

## 2022-06-30 NOTE — TELEPHONE ENCOUNTER
----- Message from Suzanna Lee sent at 2019 10:22 AM CST -----  [id number]  Ginette Goodson  1962  HOME: [phone]   WORK: [phone]   CELL: [phone]       Patient is scheduled for Labs 19  Lab orders needed: NEED LAB ORDERS    Please insure lab orders will be available by the date of service. If they accept his insurance, then yes this is fine. Please refer to Dr Leonard Burciaga, complete foot and ankle.

## 2022-06-30 NOTE — TELEPHONE ENCOUNTER
Hello,  I was wondering if Dr. Glenis Geronimo could refer me to a different podiatrist. I have looked online and seen that Nikita Joshi on [employer] takes both of my insurance. Could you see if I could be referred to him instead of [facility]?   Thank you   Ronn Jacobs   Please advise none

## 2022-07-01 RX ORDER — BUSPIRONE HYDROCHLORIDE 5 MG/1
TABLET ORAL
Qty: 60 TABLET | Refills: 1 | Status: SHIPPED | OUTPATIENT
Start: 2022-07-01 | End: 2022-07-05

## 2022-07-02 DIAGNOSIS — F41.9 ANXIETY: ICD-10-CM

## 2022-07-05 RX ORDER — BUSPIRONE HYDROCHLORIDE 5 MG/1
TABLET ORAL
Qty: 60 TABLET | Refills: 1 | Status: SHIPPED | OUTPATIENT
Start: 2022-07-05 | End: 2022-10-26

## 2022-07-11 RX ORDER — TIZANIDINE 2 MG/1
TABLET ORAL
Qty: 30 TABLET | Refills: 0 | Status: SHIPPED | OUTPATIENT
Start: 2022-07-11 | End: 2022-08-08 | Stop reason: SDUPTHER

## 2022-07-20 RX ORDER — SPIRONOLACTONE 25 MG/1
TABLET ORAL
Qty: 90 TABLET | Refills: 1 | OUTPATIENT
Start: 2022-07-20

## 2022-07-25 DIAGNOSIS — E11.9 TYPE 2 DIABETES MELLITUS WITHOUT COMPLICATION, WITH LONG-TERM CURRENT USE OF INSULIN (HCC): ICD-10-CM

## 2022-07-25 DIAGNOSIS — Z79.4 TYPE 2 DIABETES MELLITUS WITHOUT COMPLICATION, WITH LONG-TERM CURRENT USE OF INSULIN (HCC): ICD-10-CM

## 2022-07-25 RX ORDER — INSULIN GLARGINE 100 [IU]/ML
40 INJECTION, SOLUTION SUBCUTANEOUS NIGHTLY
Qty: 5 PEN | Refills: 2 | Status: SHIPPED | OUTPATIENT
Start: 2022-07-25

## 2022-08-01 ENCOUNTER — OFFICE VISIT (OUTPATIENT)
Dept: CARDIOLOGY CLINIC | Age: 42
End: 2022-08-01
Payer: MEDICARE

## 2022-08-01 VITALS
BODY MASS INDEX: 30.67 KG/M2 | HEIGHT: 73 IN | SYSTOLIC BLOOD PRESSURE: 128 MMHG | HEART RATE: 54 BPM | RESPIRATION RATE: 18 BRPM | WEIGHT: 231.4 LBS | DIASTOLIC BLOOD PRESSURE: 64 MMHG

## 2022-08-01 DIAGNOSIS — I21.4 NSTEMI (NON-ST ELEVATED MYOCARDIAL INFARCTION) (HCC): ICD-10-CM

## 2022-08-01 DIAGNOSIS — I25.10 ASCVD (ARTERIOSCLEROTIC CARDIOVASCULAR DISEASE): Primary | ICD-10-CM

## 2022-08-01 DIAGNOSIS — Z72.0 TOBACCO ABUSE: ICD-10-CM

## 2022-08-01 DIAGNOSIS — E11.9 TYPE 2 DIABETES MELLITUS WITHOUT COMPLICATION, WITH LONG-TERM CURRENT USE OF INSULIN (HCC): ICD-10-CM

## 2022-08-01 DIAGNOSIS — Z89.422 HISTORY OF AMPUTATION OF LESSER TOE OF LEFT FOOT (HCC): ICD-10-CM

## 2022-08-01 DIAGNOSIS — I25.5 ISCHEMIC CARDIOMYOPATHY: ICD-10-CM

## 2022-08-01 DIAGNOSIS — Z79.4 TYPE 2 DIABETES MELLITUS WITHOUT COMPLICATION, WITH LONG-TERM CURRENT USE OF INSULIN (HCC): ICD-10-CM

## 2022-08-01 DIAGNOSIS — N18.31 STAGE 3A CHRONIC KIDNEY DISEASE (HCC): ICD-10-CM

## 2022-08-01 DIAGNOSIS — E78.5 DYSLIPIDEMIA: ICD-10-CM

## 2022-08-01 DIAGNOSIS — I50.22 CHRONIC SYSTOLIC CONGESTIVE HEART FAILURE (HCC): ICD-10-CM

## 2022-08-01 DIAGNOSIS — N18.2 CHRONIC KIDNEY DISEASE, STAGE II (MILD): ICD-10-CM

## 2022-08-01 PROCEDURE — 3044F HG A1C LEVEL LT 7.0%: CPT | Performed by: INTERNAL MEDICINE

## 2022-08-01 PROCEDURE — 99214 OFFICE O/P EST MOD 30 MIN: CPT | Performed by: INTERNAL MEDICINE

## 2022-08-01 RX ORDER — ISOSORBIDE MONONITRATE 60 MG/1
TABLET, EXTENDED RELEASE ORAL
COMMUNITY
Start: 2022-07-26 | End: 2022-08-01 | Stop reason: ALTCHOICE
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
## 2022-08-08 RX ORDER — TIZANIDINE 2 MG/1
TABLET ORAL
Qty: 30 TABLET | Refills: 0 | Status: SHIPPED | OUTPATIENT
Start: 2022-08-08 | End: 2022-09-02 | Stop reason: SDUPTHER

## 2022-08-10 DIAGNOSIS — E11.9 TYPE 2 DIABETES MELLITUS WITHOUT COMPLICATION, WITH LONG-TERM CURRENT USE OF INSULIN (HCC): ICD-10-CM

## 2022-08-10 DIAGNOSIS — Z79.4 TYPE 2 DIABETES MELLITUS WITHOUT COMPLICATION, WITH LONG-TERM CURRENT USE OF INSULIN (HCC): ICD-10-CM

## 2022-08-10 RX ORDER — INSULIN LISPRO 100 [IU]/ML
15 INJECTION, SOLUTION INTRAVENOUS; SUBCUTANEOUS
Qty: 3 EACH | Refills: 2 | Status: SHIPPED | OUTPATIENT
Start: 2022-08-10

## 2022-08-30 RX ORDER — OMEPRAZOLE 20 MG/1
20 CAPSULE, DELAYED RELEASE ORAL DAILY
Qty: 30 CAPSULE | Refills: 5 | Status: SHIPPED | OUTPATIENT
Start: 2022-08-30

## 2022-09-02 RX ORDER — TIZANIDINE 2 MG/1
TABLET ORAL
Qty: 30 TABLET | Refills: 0 | Status: SHIPPED | OUTPATIENT
Start: 2022-09-02 | End: 2022-10-17 | Stop reason: SDUPTHER

## 2022-09-20 RX ORDER — ATORVASTATIN CALCIUM 80 MG/1
80 TABLET, FILM COATED ORAL DAILY
Qty: 30 TABLET | Refills: 5 | Status: SHIPPED | OUTPATIENT
Start: 2022-09-20

## 2022-09-20 RX ORDER — RANOLAZINE 500 MG/1
500 TABLET, EXTENDED RELEASE ORAL 2 TIMES DAILY
Qty: 60 TABLET | Refills: 5 | Status: SHIPPED | OUTPATIENT
Start: 2022-09-20

## 2022-09-29 ENCOUNTER — HOSPITAL ENCOUNTER (OUTPATIENT)
Age: 42
Discharge: HOME | End: 2022-09-29
Payer: MEDICARE

## 2022-09-29 ENCOUNTER — OFFICE VISIT (OUTPATIENT)
Dept: GASTROENTEROLOGY | Age: 42
End: 2022-09-29
Payer: MEDICARE

## 2022-09-29 VITALS
HEART RATE: 61 BPM | WEIGHT: 223 LBS | BODY MASS INDEX: 29.55 KG/M2 | DIASTOLIC BLOOD PRESSURE: 60 MMHG | SYSTOLIC BLOOD PRESSURE: 126 MMHG | TEMPERATURE: 97.3 F | OXYGEN SATURATION: 97 % | HEIGHT: 73 IN

## 2022-09-29 DIAGNOSIS — D64.9 ANEMIA, UNSPECIFIED TYPE: ICD-10-CM

## 2022-09-29 DIAGNOSIS — Z86.010 HX OF ADENOMATOUS COLONIC POLYPS: ICD-10-CM

## 2022-09-29 DIAGNOSIS — R19.5 LOOSE STOOLS: ICD-10-CM

## 2022-09-29 DIAGNOSIS — I25.10 ASCVD (ARTERIOSCLEROTIC CARDIOVASCULAR DISEASE): ICD-10-CM

## 2022-09-29 DIAGNOSIS — N18.31 STAGE 3A CHRONIC KIDNEY DISEASE (HCC): ICD-10-CM

## 2022-09-29 DIAGNOSIS — I21.4 NSTEMI (NON-ST ELEVATED MYOCARDIAL INFARCTION) (HCC): ICD-10-CM

## 2022-09-29 DIAGNOSIS — K21.9 GASTROESOPHAGEAL REFLUX DISEASE WITHOUT ESOPHAGITIS: ICD-10-CM

## 2022-09-29 DIAGNOSIS — N18.2 CHRONIC KIDNEY DISEASE, STAGE II (MILD): ICD-10-CM

## 2022-09-29 DIAGNOSIS — D64.9 ANEMIA, UNSPECIFIED TYPE: Primary | ICD-10-CM

## 2022-09-29 DIAGNOSIS — R80.1 PERSISTENT PROTEINURIA: ICD-10-CM

## 2022-09-29 DIAGNOSIS — E78.5 DYSLIPIDEMIA: ICD-10-CM

## 2022-09-29 LAB
ALBUMIN SERPL-MCNC: 4 GM/DL (ref 3.4–5)
ALP BLD-CCNC: 126 IU/L (ref 40–129)
ALT SERPL-CCNC: 59 U/L (ref 10–40)
ANION GAP SERPL CALCULATED.3IONS-SCNC: 13 MMOL/L (ref 4–16)
AST SERPL-CCNC: 62 IU/L (ref 15–37)
BASOPHILS ABSOLUTE: 0 K/CU MM
BASOPHILS RELATIVE PERCENT: 0.8 % (ref 0–1)
BILIRUB SERPL-MCNC: 0.3 MG/DL (ref 0–1)
BUN BLDV-MCNC: 22 MG/DL (ref 6–23)
CALCIUM SERPL-MCNC: 8.9 MG/DL (ref 8.3–10.6)
CHLORIDE BLD-SCNC: 105 MMOL/L (ref 99–110)
CO2: 15 MMOL/L (ref 21–32)
CREAT SERPL-MCNC: 1.8 MG/DL (ref 0.9–1.3)
DIFFERENTIAL TYPE: ABNORMAL
EOSINOPHILS ABSOLUTE: 0.2 K/CU MM
EOSINOPHILS RELATIVE PERCENT: 4.8 % (ref 0–3)
FERRITIN: 90 NG/ML (ref 30–400)
FOLATE: 12.8 NG/ML (ref 3.1–17.5)
GFR AFRICAN AMERICAN: 50 ML/MIN/1.73M2
GFR NON-AFRICAN AMERICAN: 42 ML/MIN/1.73M2
GLUCOSE BLD-MCNC: 166 MG/DL (ref 70–99)
HCT VFR BLD CALC: 37.4 % (ref 42–52)
HEMOGLOBIN: 11.2 GM/DL (ref 13.5–18)
IMMATURE NEUTROPHIL %: 0.2 % (ref 0–0.43)
IRON: 60 UG/DL (ref 59–158)
LYMPHOCYTES ABSOLUTE: 1.4 K/CU MM
LYMPHOCYTES RELATIVE PERCENT: 28.2 % (ref 24–44)
MCH RBC QN AUTO: 30.4 PG (ref 27–31)
MCHC RBC AUTO-ENTMCNC: 29.9 % (ref 32–36)
MCV RBC AUTO: 101.4 FL (ref 78–100)
MONOCYTES ABSOLUTE: 0.4 K/CU MM
MONOCYTES RELATIVE PERCENT: 6.9 % (ref 0–4)
NUCLEATED RBC %: 0 %
PCT TRANSFERRIN: 20 % (ref 10–44)
PDW BLD-RTO: 12.2 % (ref 11.7–14.9)
PLATELET # BLD: 147 K/CU MM (ref 140–440)
PMV BLD AUTO: 11.2 FL (ref 7.5–11.1)
POTASSIUM SERPL-SCNC: 5.5 MMOL/L (ref 3.5–5.1)
RBC # BLD: 3.69 M/CU MM (ref 4.6–6.2)
SEGMENTED NEUTROPHILS ABSOLUTE COUNT: 3 K/CU MM
SEGMENTED NEUTROPHILS RELATIVE PERCENT: 59.1 % (ref 36–66)
SODIUM BLD-SCNC: 133 MMOL/L (ref 135–145)
TOTAL IMMATURE NEUTOROPHIL: 0.01 K/CU MM
TOTAL IRON BINDING CAPACITY: 301 UG/DL (ref 250–450)
TOTAL NUCLEATED RBC: 0 K/CU MM
TOTAL PROTEIN: 7.8 GM/DL (ref 6.4–8.2)
UNSATURATED IRON BINDING CAPACITY: 241 UG/DL (ref 110–370)
VITAMIN B-12: 562.2 PG/ML (ref 211–911)
VITAMIN D 25-HYDROXY: 15.53 NG/ML
WBC # BLD: 5 K/CU MM (ref 4–10.5)

## 2022-09-29 PROCEDURE — 36415 COLL VENOUS BLD VENIPUNCTURE: CPT

## 2022-09-29 PROCEDURE — 99213 OFFICE O/P EST LOW 20 MIN: CPT | Performed by: NURSE PRACTITIONER

## 2022-09-29 PROCEDURE — 84156 ASSAY OF PROTEIN URINE: CPT

## 2022-09-29 PROCEDURE — 80053 COMPREHEN METABOLIC PANEL: CPT

## 2022-09-29 PROCEDURE — 83540 ASSAY OF IRON: CPT

## 2022-09-29 PROCEDURE — 82746 ASSAY OF FOLIC ACID SERUM: CPT

## 2022-09-29 PROCEDURE — 82728 ASSAY OF FERRITIN: CPT

## 2022-09-29 PROCEDURE — 84166 PROTEIN E-PHORESIS/URINE/CSF: CPT

## 2022-09-29 PROCEDURE — 83520 IMMUNOASSAY QUANT NOS NONAB: CPT

## 2022-09-29 PROCEDURE — G8417 CALC BMI ABV UP PARAM F/U: HCPCS | Performed by: NURSE PRACTITIONER

## 2022-09-29 PROCEDURE — 83550 IRON BINDING TEST: CPT

## 2022-09-29 PROCEDURE — 82306 VITAMIN D 25 HYDROXY: CPT

## 2022-09-29 PROCEDURE — G8427 DOCREV CUR MEDS BY ELIG CLIN: HCPCS | Performed by: NURSE PRACTITIONER

## 2022-09-29 PROCEDURE — 82607 VITAMIN B-12: CPT

## 2022-09-29 PROCEDURE — 86325 OTHER IMMUNOELECTROPHORESIS: CPT

## 2022-09-29 PROCEDURE — 85025 COMPLETE CBC W/AUTO DIFF WBC: CPT

## 2022-09-29 PROCEDURE — 1036F TOBACCO NON-USER: CPT | Performed by: NURSE PRACTITIONER

## 2022-09-29 RX ORDER — ERGOCALCIFEROL 1.25 MG/1
50000 CAPSULE ORAL WEEKLY
Qty: 12 CAPSULE | Refills: 2 | Status: SHIPPED
Start: 2022-09-29 | End: 2022-10-17 | Stop reason: SDUPTHER

## 2022-09-29 RX ORDER — DICYCLOMINE HYDROCHLORIDE 10 MG/1
10 CAPSULE ORAL 4 TIMES DAILY PRN
Qty: 120 CAPSULE | Refills: 0 | Status: SHIPPED | OUTPATIENT
Start: 2022-09-29

## 2022-09-29 RX ORDER — WHEAT DEXTRIN 3 G/3.8 G
4 POWDER (GRAM) ORAL
Qty: 1 EACH | Refills: 3 | Status: SHIPPED | OUTPATIENT
Start: 2022-09-29

## 2022-09-29 NOTE — PROGRESS NOTES
Danii Ardon 43 y.o. male was seen by ANDERS Jasso on 9/29/2022     Wt Readings from Last 3 Encounters:   09/29/22 223 lb (101.2 kg)   08/01/22 231 lb 6.4 oz (105 kg)   06/08/22 235 lb (106.6 kg)       PERLA Ardon is a pleasant 43 y.o.  male who presents today for follow-up on acid reflux, anemia and history of adenomatous colon polyps. He reports feeling good. He completed his cardiac rehab in mid June 2022 and is following dietician recommendations. He denies NSAID use. His last EGD/colonoscopy were done by Dr. Flavia Zarco on 9-. His EGD showed small hiatal hernia, normal appearing stomach and duodenal bulb. His esophageal biopsies showed mixed inflammation with reactive changes with no evidence of Osullivan's esophagus. His duodenal biopsies showed normal tissue with no evidence of Celiac disease. His colonoscopy showed two adenomatous colon polyps that were removed; one noted to be serrated adenoma with low grade dysplasia. His random colon biopsies showed normal tissue with no evidence of microscopic colitis. His appetite is good and weight is stable. No nausea or vomiting. His heartburn and acid reflux is controlled with Prilosec. No nocturnal awakenings with acid reflux. No dysphagia or pain with swallowing. No abdominal pain, bloating or distention. No excess belching. He has excess flatulence. His bowels are moving three times daily with soft mushy brown to liquids stools. In the last three months having loose stools. He cut out caffiene and dairy with no improvement. No constipation. No blood in his stools or melena. No family history of stomach or colon cancer. ROS  Review of Systems   Constitutional: Negative for appetite change, chills, diaphoresis, fever and unexpected weight change. HENT: Negative for ear pain, hearing loss and tinnitus. Eyes: Negative for pain, discharge and visual disturbance.    Respiratory: Negative for cough, shortness of breath and wheezing. Cardiovascular: Negative for chest pain, palpitations and leg swelling. Gastrointestinal: Negative for abdominal pain, blood in stool, constipation, diarrhea, nausea and vomiting. Endocrine: Negative for cold intolerance and heat intolerance. Genitourinary: Negative for dysuria, frequency, hematuria and urgency. Musculoskeletal: Positive for back pain. Negative for myalgias and neck pain. Skin: Negative for color change, pallor and rash. Allergic/Immunologic: Negative for environmental allergies and food allergies. Neurological: Negative for dizziness, seizures, weakness and headaches. Hematological: Does not bruise/bleed easily. Psychiatric/Behavioral: Positive for sleep disturbance. Negative for dysphoric mood. The patient is nervous/anxious. Allergies  No Known Allergies    Medications  Current Outpatient Medications   Medication Sig Dispense Refill    atorvastatin (LIPITOR) 80 MG tablet Take 1 tablet by mouth daily 30 tablet 5    ranolazine (RANEXA) 500 MG extended release tablet Take 1 tablet by mouth 2 times daily 60 tablet 5    tiZANidine (ZANAFLEX) 2 MG tablet TAKE ONE TABLET BY MOUTH THREE TIMES A DAY AS NEEDED FOR BACK PAIN AND BACK SPASM 30 tablet 0    omeprazole (PRILOSEC) 20 MG delayed release capsule Take 1 capsule by mouth Daily 30 capsule 5    insulin lispro (HUMALOG) 100 UNIT/ML SOLN injection vial Inject 15 Units into the skin in the morning and 15 Units at noon and 15 Units in the evening. Inject before meals. 3 each 2    insulin glargine (LANTUS) 100 UNIT/ML injection vial Inject 40 Units into the skin nightly Bottles please 5 pen 2    busPIRone (BUSPAR) 5 MG tablet TAKE ONE TABLET BY MOUTH TWICE A DAY 60 tablet 1    ticagrelor (BRILINTA) 90 MG TABS tablet Take 1 tablet by mouth 2 times daily 60 tablet 5    gabapentin (NEURONTIN) 300 MG capsule Take 1 capsule by mouth 3 times daily for 30 days.  90 capsule 2    sildenafil (VIAGRA) 100 MG tablet TAKE ONE TABLET BY MOUTH DAILY AS NEEDED FOR ERECTILE DYSFUNCTION 10 tablet 1    aspirin EC 81 MG EC tablet Take 1 tablet by mouth daily 90 tablet 3    Insulin Syringes, Disposable, U-100 1 ML MISC 1 each by Does not apply route daily 100 each 3    traZODone (DESYREL) 50 MG tablet Take 1 tablet by mouth nightly 30 tablet 2    Insulin Pen Needle ([employer] PEN NEEDLES) 31G X 6 MM MISC 1 each by Does not apply route 3 times daily 100 each 3    INSULIN SYRINGE .5CC/29G 29G X 1/2\" 0.5 ML MISC Inject 1 each into the skin 3 times daily 90 each 2    lisinopril (PRINIVIL;ZESTRIL) 40 MG tablet Take 1 tablet by mouth daily 90 tablet 3    Lancets MISC 1 each by Does not apply route 3 times daily 600 each 1    blood glucose monitor strips Test 3 times a day & as needed for symptoms of irregular blood glucose. 270 strip 2    sodium polystyrene (SPS) 15 GM/60ML suspension Take 60 mLs by mouth once for 1 dose (Patient not taking: Reported on 8/1/2022) 60 mL 0    metoprolol succinate (TOPROL XL) 50 MG extended release tablet Take 1 tablet by mouth daily 90 tablet 3    nitroGLYCERIN (NITROSTAT) 0.4 MG SL tablet Place 1 tablet under the tongue every 5 minutes as needed for Chest pain (Patient not taking: Reported on 9/29/2022) 25 tablet 3    Blood Glucose Monitoring Suppl (ONE TOUCH ULTRA 2) w/Device KIT 1 kit by Does not apply route once for 1 dose 1 kit 0     No current facility-administered medications for this visit. Past medical history:   He has a past medical history of Accident, Acid reflux, Anemia, Broken teeth, Diabetes mellitus ([facility] [address] H/O percutaneous left heart catheterization, History of heart artery stent, History of nuclear stress test, Hx of Doppler echocardiogram, Kidney stones, Marijuana use, Precordial pain, Shortness of breath on exertion, Teeth missing, and Vian teeth extracted. Past surgical history:  He has a past surgical history that includes other surgical history (12/16/2017);  Vian tooth extraction; other surgical history (Right, 02/28/2018); Toe amputation (Left, 3/27/2020); Colonoscopy (N/A, 9/21/2021); and Upper gastrointestinal endoscopy (N/A, 9/21/2021). Social History:  He reports that he quit smoking about 8 months ago. His smoking use included cigarettes. He started smoking about 27 years ago. He has a 6.00 pack-year smoking history. He has never used smokeless tobacco. He reports that he does not currently use alcohol. He reports that he does not currently use drugs. Family history:  His family history includes Allergy (Severe) in his brother; Diabetes in his father; Heart Disease in his father; Obesity in his mother. Objective    Vitals:    09/29/22 1429   BP: 126/60   Pulse: 61   Temp: 97.3 °F (36.3 °C)   SpO2: 97%        Physical exam    Physical Exam  Constitutional:       General: He is not in acute distress. Appearance: Normal appearance. He is well-developed. He is not ill-appearing, toxic-appearing or diaphoretic. HENT:      Head: Normocephalic and atraumatic. Nose: Nose normal.      Mouth/Throat:      Mouth: Mucous membranes are moist.   Cardiovascular:      Rate and Rhythm: Normal rate and regular rhythm. Pulses: Normal pulses. Heart sounds: Normal heart sounds. No murmur heard. No gallop. Pulmonary:      Effort: Pulmonary effort is normal. No respiratory distress. Breath sounds: Normal breath sounds. No stridor. No wheezing or rhonchi. Abdominal:      General: Bowel sounds are normal. There is no distension. Palpations: Abdomen is soft. There is no mass. Tenderness: There is no abdominal tenderness. Hernia: No hernia is present. Musculoskeletal:         General: Normal range of motion. Cervical back: Neck supple. Skin:     General: Skin is warm and dry. Neurological:      Mental Status: He is alert and oriented to person, place, and time.    Psychiatric:         Mood and Affect: Mood normal.       Hospital Outpatient Visit on 09/29/2022   Component Date Value Ref Range Status    Urine Total Protein 09/29/2022 >600  MG/DL Final   Hospital Outpatient Visit on 09/29/2022   Component Date Value Ref Range Status    Vitamin B-12 09/29/2022 562.2  211 - 911 pg/ml Final    Folate 09/29/2022 12.8  3.1 - 17.5 NG/ML Final    Vit D, 25-Hydroxy 09/29/2022 15.53 (A)  >20 NG/ML Final    Ferritin 09/29/2022 90  30 - 400 NG/ML Final    Iron 09/29/2022 60  59 - 158 ug/dL Final    UIBC 09/29/2022 241  110 - 370 ug/dL Final    TIBC 09/29/2022 301  250 - 450 ug/dL Final    Transferrin % 09/29/2022 20  10 - 44 % Final    WBC 09/29/2022 5.0  4.0 - 10.5 K/CU MM Final    RBC 09/29/2022 3.69 (A)  4.6 - 6.2 M/CU MM Final    Hemoglobin 09/29/2022 11.2 (A)  13.5 - 18.0 GM/DL Final    Hematocrit 09/29/2022 37.4 (A)  42 - 52 % Final    MCV 09/29/2022 101.4 (A)  78 - 100 FL Final    MCH 09/29/2022 30.4  27 - 31 PG Final    MCHC 09/29/2022 29.9 (A)  32.0 - 36.0 % Final    RDW 09/29/2022 12.2  11.7 - 14.9 % Final    Platelets 51/48/7085 147  140 - 440 K/CU MM Final    MPV 09/29/2022 11.2 (A)  7.5 - 11.1 FL Final    Differential Type 09/29/2022 AUTOMATED DIFFERENTIAL   Final    Segs Relative 09/29/2022 59.1  36 - 66 % Final    Lymphocytes % 09/29/2022 28.2  24 - 44 % Final    Monocytes % 09/29/2022 6.9 (A)  0 - 4 % Final    Eosinophils % 09/29/2022 4.8 (A)  0 - 3 % Final    Basophils % 09/29/2022 0.8  0 - 1 % Final    Segs Absolute 09/29/2022 3.0  K/CU MM Final    Lymphocytes Absolute 09/29/2022 1.4  K/CU MM Final    Monocytes Absolute 09/29/2022 0.4  K/CU MM Final    Eosinophils Absolute 09/29/2022 0.2  K/CU MM Final    Basophils Absolute 09/29/2022 0.0  K/CU MM Final    Nucleated RBC % 09/29/2022 0.0  % Final    Total Nucleated RBC 09/29/2022 0.0  K/CU MM Final    Total Immature Neutrophil 09/29/2022 0.01  K/CU MM Final    Immature Neutrophil % 09/29/2022 0.2  0 - 0.43 % Final       Assessment and Plan:  1.   Normocytic anemia of uncertain etiology most likely due to chronic disease. His H&H is stable. Currently the patient did not have signs of GI bleeding. Recommend avoidance of NSAID's and improved glycemic control. Recommend periodic monitoring of H&H.     2.  History of adenomatous colon polyps recommend repeat colonoscopy in three years due to serrated adenoma. 3.  GERD that is stable without dysphagia or odynophagia. His EGD showed small hiatal hernia otherwise probable Osullivan's esophagus appearance,normal appearing stomach and duodenal bulb. His esophageal biopsies showed mixed inflammation with reactive changes with no evidence of Osullivan's esophagus. His duodenal biopsies showed normal tissue with no evidence of Celiac disease. The patient was encouraged to continue taking Prilosec daily for treatment of acid reflux. The patient was encouraged to continue with anti-reflux measures and avoid foods that worsen. 5.  Loose stools most likely medication or diet related. Will order Bentyl 10 mg take as needed for treatment- discussed side effect of drowsiness, dry mouth and constipation. Recommend taking Benefiber twice daily. 6.  The patient was encouraged to follow-up in 6 months or sooner if needed. Total time:  20 minutes.

## 2022-09-30 ENCOUNTER — TELEPHONE (OUTPATIENT)
Dept: CARDIOLOGY CLINIC | Age: 42
End: 2022-09-30

## 2022-10-03 ENCOUNTER — OFFICE VISIT (OUTPATIENT)
Dept: INTERNAL MEDICINE CLINIC | Age: 42
End: 2022-10-03
Payer: MEDICARE

## 2022-10-03 VITALS
HEART RATE: 64 BPM | RESPIRATION RATE: 18 BRPM | OXYGEN SATURATION: 98 % | HEIGHT: 73 IN | SYSTOLIC BLOOD PRESSURE: 118 MMHG | DIASTOLIC BLOOD PRESSURE: 72 MMHG | BODY MASS INDEX: 30.22 KG/M2 | WEIGHT: 228 LBS

## 2022-10-03 DIAGNOSIS — F41.9 ANXIETY: ICD-10-CM

## 2022-10-03 DIAGNOSIS — K21.9 GASTROESOPHAGEAL REFLUX DISEASE WITHOUT ESOPHAGITIS: ICD-10-CM

## 2022-10-03 DIAGNOSIS — Z79.4 TYPE 2 DIABETES MELLITUS WITHOUT COMPLICATION, WITH LONG-TERM CURRENT USE OF INSULIN (HCC): Primary | ICD-10-CM

## 2022-10-03 DIAGNOSIS — G47.00 INSOMNIA, UNSPECIFIED TYPE: ICD-10-CM

## 2022-10-03 DIAGNOSIS — E55.9 VITAMIN D DEFICIENCY: ICD-10-CM

## 2022-10-03 DIAGNOSIS — E78.00 ELEVATED LDL CHOLESTEROL LEVEL: ICD-10-CM

## 2022-10-03 DIAGNOSIS — E87.5 HYPERKALEMIA: ICD-10-CM

## 2022-10-03 DIAGNOSIS — E11.9 TYPE 2 DIABETES MELLITUS WITHOUT COMPLICATION, WITH LONG-TERM CURRENT USE OF INSULIN (HCC): Primary | ICD-10-CM

## 2022-10-03 LAB
ALBUMIN, U: 72 %
ALPHA-1-GLOBULIN, U: 2 %
ALPHA-2-GLOBULIN, U: 5 %
BETA GLOBULIN, U: 13 %
GAMMA GLOBULIN, U: 9 %
SPEP INTERPRETATION: ABNORMAL
SPEP INTERPRETATION: NORMAL
URINE TOTAL PROTEIN: >600 MG/DL
URINE TOTAL PROTEIN: >600 MG/DL

## 2022-10-03 PROCEDURE — G8427 DOCREV CUR MEDS BY ELIG CLIN: HCPCS | Performed by: NURSE PRACTITIONER

## 2022-10-03 PROCEDURE — 3044F HG A1C LEVEL LT 7.0%: CPT | Performed by: NURSE PRACTITIONER

## 2022-10-03 PROCEDURE — 1036F TOBACCO NON-USER: CPT | Performed by: NURSE PRACTITIONER

## 2022-10-03 PROCEDURE — G8417 CALC BMI ABV UP PARAM F/U: HCPCS | Performed by: NURSE PRACTITIONER

## 2022-10-03 PROCEDURE — G8484 FLU IMMUNIZE NO ADMIN: HCPCS | Performed by: NURSE PRACTITIONER

## 2022-10-03 PROCEDURE — 99214 OFFICE O/P EST MOD 30 MIN: CPT | Performed by: NURSE PRACTITIONER

## 2022-10-03 PROCEDURE — 2022F DILAT RTA XM EVC RTNOPTHY: CPT | Performed by: NURSE PRACTITIONER

## 2022-10-03 ASSESSMENT — ENCOUNTER SYMPTOMS
ABDOMINAL PAIN: 0
ABDOMINAL DISTENTION: 0
NAUSEA: 0
SORE THROAT: 0
SINUS PRESSURE: 0
CHEST TIGHTNESS: 0
COLOR CHANGE: 0
DIARRHEA: 0
WHEEZING: 0
CONSTIPATION: 0
EYES NEGATIVE: 1
SINUS PAIN: 0
COUGH: 0
SHORTNESS OF BREATH: 0

## 2022-10-03 NOTE — PROGRESS NOTES
Jaclyn [birth year]  1980  10/03/22    Chief Complaint   Patient presents with    Follow-up     4 month follow up        SUBJECTIVE:      4 month follow up:    Patient with hx of NSTEMI and PCI to ostial LAD in Nov 202, then elective stent to Circ  and PDA in Jan 2022. Continues on ASA/Brilinta x 1 year. On statin, Imdur 60 mg, Toprol XL 50 mg daily, lisinopril  40 mg daily. He did have repeat echo completed on 6/3/22 which showed improved EF at 45-50%. Has graduated from cardiac rehab since his last visit with us. No recent cardiac changes or concerns. Potassium levels are still slightly high, but improving. Patient compliant with all current medications for diabetes. Blood sugars have been averaging around 100-120, and the patient reports checking their blood sugar 1-2 time daily. Patient has had no episodes of significant hypoglycemia, fainting, dizziness, or loss of consciousness. Continues on metformin 1000 mg BID, Lantus 40 units nightly, and Humalog 15 units TID. Lab Results   Component Value Date    LABA1C 6.3 06/06/2022     Lab Results   Component Value Date     06/06/2022     Patient denies any chest pain, shortness of breath, myalgias, Patient is tolerating cholesterol medications without difficulty. Lab Results   Component Value Date    LDLCALC 44 06/06/2022    LDLDIRECT 88 11/03/2021     Anxiety has been stable on Buspar 5 mg BID. Denies any concern at this time. Sleeping well on Trazodone 50 mg QHS. VIT D- compliant with 50,000 units weekly which was recently restarted this last week. Recent levels were slightly low around 15. Will need rechecked in 3 months. Review of Systems   Constitutional:  Negative for activity change, appetite change, fatigue and fever. HENT:  Negative for congestion, sinus pressure, sinus pain and sore throat. Eyes: Negative. Respiratory:  Negative for cough, chest tightness, shortness of breath and wheezing.     Cardiovascular:  Negative for chest pain and palpitations. Gastrointestinal:  Negative for abdominal distention, abdominal pain, constipation, diarrhea and nausea. Genitourinary:  Negative for difficulty urinating, dysuria, flank pain, frequency and hematuria. Musculoskeletal:  Negative for arthralgias, gait problem, joint swelling and myalgias. Skin:  Negative for color change and rash. Neurological:  Negative for dizziness, light-headedness and numbness. Hematological: Negative. Psychiatric/Behavioral: Negative. OBJECTIVE:    /72   Pulse 64   Resp 18   Ht 6' 1\" (1.854 m)   Wt 228 lb (103.4 kg)   SpO2 98%   BMI 30.08 kg/m²     Physical Exam  Constitutional:       General: He is not in acute distress. Appearance: He is well-developed. He is not diaphoretic. HENT:      Head: Normocephalic and atraumatic. Neck:      Thyroid: No thyromegaly. Cardiovascular:      Rate and Rhythm: Normal rate and regular rhythm. Heart sounds: Normal heart sounds. No murmur heard. Pulmonary:      Effort: Pulmonary effort is normal.      Breath sounds: Normal breath sounds. Abdominal:      General: Bowel sounds are normal. There is no distension. Palpations: Abdomen is soft. Tenderness: There is no abdominal tenderness. Musculoskeletal:         General: Normal range of motion. Lymphadenopathy:      Cervical: No cervical adenopathy. Skin:     General: Skin is warm and dry. Capillary Refill: Capillary refill takes less than 2 seconds. Neurological:      Mental Status: He is alert and oriented to person, place, and time. GCS: GCS eye subscore is 4. GCS verbal subscore is 5. GCS motor subscore is 6. Coordination: Coordination normal.   Psychiatric:         Behavior: Behavior normal.         Thought Content: Thought content normal.       ASSESSMENT:    1. Type 2 diabetes mellitus without complication, with long-term current use of insulin ([address]    2. Anxiety    3.  Elevated LDL cholesterol level    4. Vitamin D deficiency    5. Gastroesophageal reflux disease without esophagitis    6. Hyperkalemia    7. Gastroesophageal reflux disease, unspecified whether esophagitis present        PLAN:    Juve Shah was seen today for follow-up. Diagnoses and all orders for this visit:    Type 2 diabetes mellitus without complication, with long-term current use of insulin (HCC)-sugars remain at goal/very well controlled. Recheck A1c today and will adjust regimen if necessary. Patient is on statin and ASA. -     Comprehensive Metabolic Panel; Future  -     Hemoglobin A1C; Future  -     Lipid, Fasting; Future    Anxiety-well controlled. Continue BuSpar without change. Elevated LDL cholesterol level-continue statin. Recheck lipid panel hepatic function today. -     Comprehensive Metabolic Panel; Future  -     Hemoglobin A1C; Future  -     Lipid, Fasting; Future    Vitamin D deficiency-recently restarted on 50,000 units weekly. We will plan to recheck these levels in 2 to 3 months. Gastroesophageal reflux disease without esophagitis-controlled. Continue current omeprazole 20 mg daily regiment unchanged. -     Comprehensive Metabolic Panel; Future  -     Hemoglobin A1C; Future  -     Lipid, Fasting; Future    Hyperkalemia-trending back towards normal.  Recheck today. Increase water intake and limit potassium containing foods. Insomnia, unspecified type-controlled. Continue trazodone 50 mg nightly. No flowsheet data found. No follow-ups on file. Pleas note this reports has been produced speech recognition software and may contain errors related to that system including errors in grammar, punctuation, and spelling, as well as words and phrases that may be appropriate. If there are any questions or concerns please feel free to contact the dictating provider for clarification.

## 2022-10-17 ENCOUNTER — PATIENT MESSAGE (OUTPATIENT)
Dept: GASTROENTEROLOGY | Age: 42
End: 2022-10-17

## 2022-10-17 RX ORDER — TIZANIDINE 2 MG/1
TABLET ORAL
Qty: 30 TABLET | Refills: 0 | Status: SHIPPED | OUTPATIENT
Start: 2022-10-17

## 2022-10-17 RX ORDER — ERGOCALCIFEROL 1.25 MG/1
50000 CAPSULE ORAL WEEKLY
Qty: 12 CAPSULE | Refills: 1 | Status: SHIPPED | OUTPATIENT
Start: 2022-10-17

## 2022-10-26 DIAGNOSIS — F41.9 ANXIETY: ICD-10-CM

## 2022-10-26 RX ORDER — BUSPIRONE HYDROCHLORIDE 5 MG/1
TABLET ORAL
Qty: 60 TABLET | Refills: 1 | Status: SHIPPED | OUTPATIENT
Start: 2022-10-26

## 2022-10-26 RX ORDER — TRAZODONE HYDROCHLORIDE 50 MG/1
TABLET ORAL
Qty: 30 TABLET | Refills: 2 | Status: SHIPPED | OUTPATIENT
Start: 2022-10-26

## 2022-11-15 RX ORDER — TIZANIDINE 2 MG/1
TABLET ORAL
Qty: 30 TABLET | Refills: 0 | Status: SHIPPED | OUTPATIENT
Start: 2022-11-15

## 2022-11-25 DIAGNOSIS — E11.9 TYPE 2 DIABETES MELLITUS WITHOUT COMPLICATION, WITH LONG-TERM CURRENT USE OF INSULIN (HCC): ICD-10-CM

## 2022-11-25 DIAGNOSIS — Z79.4 TYPE 2 DIABETES MELLITUS WITHOUT COMPLICATION, WITH LONG-TERM CURRENT USE OF INSULIN (HCC): ICD-10-CM

## 2022-11-28 RX ORDER — GABAPENTIN 300 MG/1
CAPSULE ORAL
Qty: 90 CAPSULE | Refills: 2 | Status: SHIPPED | OUTPATIENT
Start: 2022-11-28 | End: 2023-02-26

## 2022-12-13 RX ORDER — TIZANIDINE 2 MG/1
TABLET ORAL
Qty: 30 TABLET | Refills: 0 | Status: SHIPPED | OUTPATIENT
Start: 2022-12-13

## 2022-12-26 DIAGNOSIS — F41.9 ANXIETY: ICD-10-CM

## 2022-12-27 RX ORDER — LISINOPRIL 40 MG/1
40 TABLET ORAL DAILY
Qty: 90 TABLET | Refills: 3 | Status: SHIPPED | OUTPATIENT
Start: 2022-12-27

## 2022-12-27 RX ORDER — BUSPIRONE HYDROCHLORIDE 5 MG/1
TABLET ORAL
Qty: 60 TABLET | Refills: 1 | Status: SHIPPED | OUTPATIENT
Start: 2022-12-27

## 2023-01-03 RX ORDER — TIZANIDINE 2 MG/1
TABLET ORAL
Qty: 30 TABLET | Refills: 0 | Status: SHIPPED | OUTPATIENT
Start: 2023-01-03

## 2023-01-03 RX ORDER — ERGOCALCIFEROL 1.25 MG/1
50000 CAPSULE ORAL WEEKLY
Qty: 12 CAPSULE | Refills: 1 | Status: SHIPPED | OUTPATIENT
Start: 2023-01-03

## 2023-01-09 RX ORDER — SYRINGE AND NEEDLE,INSULIN,1ML 30 G X1/2"
SYRINGE, EMPTY DISPOSABLE MISCELLANEOUS
Qty: 100 EACH | Refills: 2 | Status: SHIPPED | OUTPATIENT
Start: 2023-01-09

## 2023-01-09 RX ORDER — IBUPROFEN 200 MG
1 TABLET ORAL 3 TIMES DAILY
Qty: 90 EACH | Refills: 2 | Status: SHIPPED | OUTPATIENT
Start: 2023-01-09

## 2023-01-09 RX ORDER — PEN NEEDLE, DIABETIC 31 G X1/4"
1 NEEDLE, DISPOSABLE MISCELLANEOUS 3 TIMES DAILY
Qty: 100 EACH | Refills: 3 | Status: SHIPPED | OUTPATIENT
Start: 2023-01-09

## 2023-01-12 ENCOUNTER — OFFICE VISIT (OUTPATIENT)
Dept: CARDIOLOGY CLINIC | Age: 43
End: 2023-01-12
Payer: MEDICARE

## 2023-01-12 VITALS
SYSTOLIC BLOOD PRESSURE: 120 MMHG | HEART RATE: 60 BPM | BODY MASS INDEX: 30.62 KG/M2 | DIASTOLIC BLOOD PRESSURE: 80 MMHG | HEIGHT: 73 IN | WEIGHT: 231 LBS

## 2023-01-12 DIAGNOSIS — N18.31 STAGE 3A CHRONIC KIDNEY DISEASE (HCC): ICD-10-CM

## 2023-01-12 DIAGNOSIS — Z79.4 TYPE 2 DIABETES MELLITUS WITHOUT COMPLICATION, WITH LONG-TERM CURRENT USE OF INSULIN (HCC): ICD-10-CM

## 2023-01-12 DIAGNOSIS — E78.5 DYSLIPIDEMIA: ICD-10-CM

## 2023-01-12 DIAGNOSIS — I50.22 CHRONIC SYSTOLIC CONGESTIVE HEART FAILURE (HCC): ICD-10-CM

## 2023-01-12 DIAGNOSIS — I25.5 ISCHEMIC CARDIOMYOPATHY: ICD-10-CM

## 2023-01-12 DIAGNOSIS — E11.9 TYPE 2 DIABETES MELLITUS WITHOUT COMPLICATION, WITH LONG-TERM CURRENT USE OF INSULIN (HCC): ICD-10-CM

## 2023-01-12 DIAGNOSIS — N52.8 OTHER MALE ERECTILE DYSFUNCTION: ICD-10-CM

## 2023-01-12 DIAGNOSIS — I25.10 ASCVD (ARTERIOSCLEROTIC CARDIOVASCULAR DISEASE): Primary | ICD-10-CM

## 2023-01-12 DIAGNOSIS — Z87.891 FORMER TOBACCO USE: ICD-10-CM

## 2023-01-12 PROBLEM — N18.2 CHRONIC KIDNEY DISEASE, STAGE II (MILD): Status: RESOLVED | Noted: 2021-11-12 | Resolved: 2023-01-12

## 2023-01-12 PROBLEM — I21.4 NSTEMI (NON-ST ELEVATED MYOCARDIAL INFARCTION) (HCC): Status: RESOLVED | Noted: 2021-11-03 | Resolved: 2023-01-12

## 2023-01-12 PROCEDURE — G8484 FLU IMMUNIZE NO ADMIN: HCPCS | Performed by: NURSE PRACTITIONER

## 2023-01-12 PROCEDURE — G8427 DOCREV CUR MEDS BY ELIG CLIN: HCPCS | Performed by: NURSE PRACTITIONER

## 2023-01-12 PROCEDURE — G8417 CALC BMI ABV UP PARAM F/U: HCPCS | Performed by: NURSE PRACTITIONER

## 2023-01-12 PROCEDURE — 3046F HEMOGLOBIN A1C LEVEL >9.0%: CPT | Performed by: NURSE PRACTITIONER

## 2023-01-12 PROCEDURE — 99214 OFFICE O/P EST MOD 30 MIN: CPT | Performed by: NURSE PRACTITIONER

## 2023-01-12 PROCEDURE — 1036F TOBACCO NON-USER: CPT | Performed by: NURSE PRACTITIONER

## 2023-01-12 PROCEDURE — 2022F DILAT RTA XM EVC RTNOPTHY: CPT | Performed by: NURSE PRACTITIONER

## 2023-01-12 PROCEDURE — 93000 ELECTROCARDIOGRAM COMPLETE: CPT | Performed by: NURSE PRACTITIONER

## 2023-01-12 RX ORDER — ISOSORBIDE MONONITRATE 60 MG/1
60 TABLET, EXTENDED RELEASE ORAL DAILY
COMMUNITY

## 2023-01-12 NOTE — PATIENT INSTRUCTIONS
Please be informed that if you contact our office outside of normal business hours the physician on call cannot help with any scheduling or rescheduling issues, procedure instruction questions or any type of medication issue. We advise you for any urgent/emergency that you go to the nearest emergency room! PLEASE CALL OUR OFFICE DURING NORMAL BUSINESS HOURS    Monday - Friday   8 am to 5 pm    [place] [facility]: [phone]    [place]:  [phone]    **It is YOUR responsibilty to bring medication bottles and/or updated medication list to [address] [place] [address]. This will allow us to better serve you and all your healthcare needs**    [facility] Locations - No appointment necessary. Sites open Monday to Friday. Call your preferred location for test preparation, business hours and other information you need. [employer] accepts [employer]. [facility] [address] [facility], [address] [facility]  Phone: [phone] [facility] [address] [facility]  [address]. [place], [address] [facility]  Phone: [phone]     Thank you for allowing us to care for you today! We want to ensure we can follow your treatment plan and we strive to give you the best outcomes and experience possible. If you ever have a life threatening emergency and call 911 - for an ambulance (EMS)   Our providers can only care for you at:   [facility] or [facility]. Even if you have someone take you or you drive yourself we can only care for you in a [address] [facility] facility. Our providers are not setup at the other healthcare locations!

## 2023-01-12 NOTE — PROGRESS NOTES
CARDIOLOGY  NOTE    2023    Lorena Tavarez (:  1980) is a 43 y.o. [facility] patient with Dr. Sonam Valdes, here for evaluation of the following chief complaint(s):  6 Month Follow-Up (Pt denies any cardiac sx. No upcoming surgeries or procedures. )        SUBJECTIVE/OBJECTIVE:    PERLA Meyers has Past medical history as noted below. Very pleasant gentleman who had NSTEMI and PCI to ostial LAD in 2021  when he presented with NSTEMi and then had elective PCi to Circ  and PDA in 2022 ,Circ  after stent was small so no stent was placed distal to . H had  retinal surgery due to diabetic retinopathy , s/p left 2nd toe amputation due to diabetic osteomyelitis   HE says he is struggling with ED  but Viagra helps but he is also using imdur , His partner smokes but he Is not smoking. HE is not sob , HE is not dizzy and has no chest  pain    EF was 20 % in Oct 2021 , underwent PCI and medical RX . repeat echo in 2022 showed improved EF of 40-45 %  HE is not on lasix any more   HE feels he has more energy , breathing has improved ,   Heis on toprol xl 50 but hr is in 40's , HE is on lisinopril, entresto was expensive? 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 Before presenting he was having  chest pain and tightness  he was smoking  a pack a day but not smoking any more his partner smokes and shares some with him. CKD is stable with creatinine of 1.5   HE has  family history of coronary artery disease. He says he is feeling much better now , no ankle swelling ,  he has been on disability ever since his toe amputation in 2020   HE just moved into a new house got a new puppy Livia . Partner  at [place] [id number]   Constitutional:  Negative for fatigue and fever.    Respiratory:  Negative for cough and shortness of breath. Cardiovascular:  Negative for chest pain, palpitations and leg swelling. Musculoskeletal:  Negative for arthralgias and gait problem. Neurological:  Negative for dizziness, syncope, weakness, light-headedness and headaches. Vitals:    01/12/23 1251   BP: 120/80   Site: Left Upper Arm   Position: Sitting   Cuff Size: Medium Adult   Pulse: 60   Weight: 231 lb (104.8 kg)   Height: 6' 1\" (1.854 m)       Wt Readings from Last 3 Encounters:   01/12/23 231 lb (104.8 kg)   10/03/22 228 lb (103.4 kg)   09/29/22 223 lb (101.2 kg)       BP Readings from Last 3 Encounters:   01/12/23 120/80   10/03/22 118/72   09/29/22 126/60       Prior to Admission medications    Medication Sig Start Date End Date Taking?  Authorizing Provider   isosorbide mononitrate (IMDUR) 60 MG extended release tablet Take 60 mg by mouth daily   Yes Historical Provider, MD   Insulin Pen Needle ([employer] PEN NEEDLES) 31G X 6 MM MISC 1 each by Does not apply route 3 times daily 1/9/23  Yes BARBARA Maynard CNP   B-D INS SYR MICROFINE 1CC/28G 28G X 1/2\" 1 ML MISC USE ONE DAILY 1/9/23  Yes BARBARA Maynard CNP   INSULIN SYRINGE .5CC/29G 29G X 1/2\" 0.5 ML MISC Inject 1 each into the skin 3 times daily 1/9/23  Yes BARBARA Maynard CNP   tiZANidine (ZANAFLEX) 2 MG tablet TAKE ONE TABLET BY MOUTH THREE TIMES A DAY AS NEEDED FOR BACK PAIN AND BACK SPASM 1/3/23  Yes BARBARA Maynard CNP   vitamin D (ERGOCALCIFEROL) 1.25 MG (91159 UT) CAPS capsule Take 1 capsule by mouth once a week 1/3/23  Yes Phoebe LevBARBARA CNP   lisinopril (PRINIVIL;ZESTRIL) 40 MG tablet Take 1 tablet by mouth daily 12/27/22  Yes Reydon DrainBARBARA CNP   busPIRone (BUSPAR) 5 MG tablet TAKE ONE TABLET BY MOUTH TWICE A DAY 12/27/22  Yes BARBARA Maynard CNP   gabapentin (NEURONTIN) 300 MG capsule TAKE ONE CAPSULE BY MOUTH THREE TIMES A DAY 11/28/22 2/26/23 Yes BARBARA Maynard CNP   traZODone (DESYREL) 50 MG tablet TAKE ONE TABLET BY MOUTH ONCE NIGHTLY AS NEEDED FOR SLEEP 10/26/22  Yes BARBARA Santos CNP   dicyclomine (BENTYL) 10 MG capsule Take 1 capsule by mouth 4 times daily as needed (for abdominal cramping) 9/29/22  Yes BARBARA Dangelo CNP   atorvastatin (LIPITOR) 80 MG tablet Take 1 tablet by mouth daily 9/20/22  Yes BARBARA Acosta CNP   ranolazine (RANEXA) 500 MG extended release tablet Take 1 tablet by mouth 2 times daily 9/20/22  Yes BARBARA Acosta CNP   omeprazole (PRILOSEC) 20 MG delayed release capsule Take 1 capsule by mouth Daily 8/30/22  Yes BARBARA Dangelo CNP   insulin lispro (HUMALOG) 100 UNIT/ML SOLN injection vial Inject 15 Units into the skin in the morning and 15 Units at noon and 15 Units in the evening. Inject before meals. 8/10/22  Yes BARBARA Santos CNP   insulin glargine (LANTUS) 100 UNIT/ML injection vial Inject 40 Units into the skin nightly Bottles please 7/25/22  Yes BARBARA Santos CNP   ticagrelor (BRILINTA) 90 MG TABS tablet Take 1 tablet by mouth 2 times daily 7/5/22  Yes Johnson Cabral MD   sildenafil (VIAGRA) 100 MG tablet TAKE ONE TABLET BY MOUTH DAILY AS NEEDED FOR ERECTILE DYSFUNCTION 6/13/22  Yes Johnson Cabral MD   aspirin EC 81 MG EC tablet Take 1 tablet by mouth daily 6/9/22  Yes Johnson Cabral MD   Insulin Syringes, Disposable, U-100 1 ML MISC 1 each by Does not apply route daily 5/24/22  Yes BARBARA Santos CNP   metoprolol succinate (TOPROL XL) 50 MG extended release tablet Take 1 tablet by mouth daily 1/24/22  Yes Johnson Cabral MD   Lancets MISC 1 each by Does not apply route 3 times daily 6/15/20  Yes BARBARA Santos CNP   blood glucose monitor strips Test 3 times a day & as needed for symptoms of irregular blood glucose.  4/16/20  Yes BARBARA Santos CNP   Wheat Dextrin (BENEFIBER) POWD Take 4 g by mouth 3 times daily (with meals)  Patient not taking: Reported on 1/12/2023 9/29/22   Isaiah Soulier BARBARA Garcia CNP   nitroGLYCERIN (NITROSTAT) 0.4 MG SL tablet Place 1 tablet under the tongue every 5 minutes as needed for Chest pain  Patient not taking: Reported on 1/12/2023 10/25/21   Rosa Elena Zavala MD   Blood Glucose Monitoring Suppl (ONE TOUCH ULTRA 2) w/Device KIT 1 kit by Does not apply route once for 1 dose 4/16/20 8/1/22  Ellan Saint, APRN - CNP       Physical Exam  Vitals reviewed. Constitutional:       General: He is not in acute distress. Appearance: Normal appearance. He is obese. He is not ill-appearing. HENT:      Head: Atraumatic. Neck:      Vascular: No carotid bruit. Cardiovascular:      Rate and Rhythm: Normal rate and regular rhythm. Pulses: Normal pulses. Heart sounds: Normal heart sounds. No murmur heard. Pulmonary:      Effort: Pulmonary effort is normal. No respiratory distress. Breath sounds: Normal breath sounds. Musculoskeletal:         General: No swelling or deformity. Cervical back: Neck supple. No muscular tenderness. Neurological:      Mental Status: He is alert.        Health Maintenance   Topic Date Due    Hepatitis A vaccine (1 of 2 - Risk 2-dose series) Never done    HIV screen  Never done    Hepatitis B vaccine (1 of 3 - Risk 3-dose series) Never done    COVID-19 Vaccine (4 - Booster) 02/24/2022    Diabetic foot exam  03/09/2022    Flu vaccine (1) 08/01/2022    Diabetic Alb to Cr ratio (uACR) test  09/15/2022    Depression Screen  02/15/2023    Diabetic retinal exam  05/12/2023    A1C test (Diabetic or Prediabetic)  06/06/2023    Lipids  06/06/2023    GFR test (Diabetes, CKD 3-4, OR last GFR 15-59)  09/29/2023    DTaP/Tdap/Td vaccine (2 - Td or Tdap) 08/12/2029    Pneumococcal 0-64 years Vaccine  Completed    Hepatitis C screen  Completed    Hib vaccine  Aged Out    Meningococcal (ACWY) vaccine  Aged Out    Varicella vaccine  Discontinued       Lab Review   Lab Results   Component Value Date/Time    CHOL 72 05/19/2019 06:19 AM TRIG 164 05/19/2019 06:19 AM    HDL 23 06/06/2022 07:31 AM    LDLDIRECT 88 11/03/2021 06:30 AM        Note reviewed from : BARBARA Hercules CNP on 10/3/22    Echo 10/29/21   Left ventricular function is severely abnormal , EF is estimated at 20-25%. Mildly dilated left ventricle. Moderate left ventricular hypertrophy. Grade I diastolic dysfunction. Global hypokinesis noted. Moderately dilated left atrium. The mitral valve appears to slightly prolapse. Mild mitral , tricuspid and moderate pulmonic regurgitation is present. Mild pulmonary hypertension noted with RVSP of 43mmHg. No evidence of pericardial effusion. Stress test  10/29/21  Summary    Supervising physician Dr. Deysi Campos . Reduced EF with global hypokinesis EF of 35 %    Large size severe anterior /apical ischemic area of left ventricle    Dilated left ventricle with EDV of 240 ML    Ischemic dilated cardiomyopathy    Abnormal stress test      Cath 11/3/21  Procedure Summary   Access : Radial Indication : NSTEMI   1. PCI to OStial LAD using 4.0 X 15 and post dilated with 4.5 X   12 stent for 90 %lesion reduced to 0 % with MIHIR III flow   2. OM 1 is 100 % occluded  , Circ is dominant and gives PDA   and PLV , PDA has proximal 70-80 % lesion   3. RCA is non dominant small     LMCA: Normal (no stenosis %) and No Angiographicalyl Significant  Disease. widely patent     LAD: Abnormal.ostial LAD has 90 % lesion ,distal LAD is patent, 3 small  diagonal branches patent       Lesion on Prox LAD: 99% stenosis  LCx: Abnormal and Chronic occlusion. Widely patent Dominant Circ . OM1 is 100 %  occluded , PDA has ostial 70-80 % stenosis, OM 2 is small and diffusely  diseased     Cath 1/12/21  . PCI to  100 % occluded OM1 using turnPike support and   Fielder XT wire followed by serial balloon and placement of 2.75   X 34 JAI with Mihir III flow   2. PCI to PDA using 2.75 X 15 JAI reduced 90 % lesion to 0 %   with Mihir III flow   3.  LAD ostial stent is patent   4. LVEDP was 16 mmHG     Echo 5/24/2022   Summary   Left ventricular function is mildly suppressed, EF 45-50 %. Grade II diastolic dysfunction. The left ventricle is dilated. Mildly dilated left atrium. Mitral annular calcification is present. Mild mitral, tricuspid, and pulmonic regurgitation is present. No evidence of pericardial effusion. ASSESSMENT/PLAN:  ASCVD  S/p PCI to ostial LAD for 90 %lesion in Nov 2021 , OM has 100 % occluded, PDA comes off Circ and has 70-80 % lesion . Continue aspirin and bralinta due to multivessel pci and h/o  NSTEMI    He had  PCI to PDa and OM  in Jan 2022 but OM /circ was  and post PCi had very small vessel ,intitially was planning  needs repeat ballooning or stent in 6 weeks after pci ,by March 2022 but since he is doing well will not plan it now but repeat echo , continue DAPT till Dec 2022   Advised to not to use  Imdur so that he can use  Viagra, if Viagra does not work will go back to [place]  Use ranexa 500 mg bid          Ischemic cardiomyopathy  EF of 20-25 % in Oct 2021 , Tolerating  toprol Xl 50 mg and lisinopril 40 mg daily EF in Jan 2022 was improved to 40-45 % , He  stopped wearing life vest in JAn 2022  Repeat echo in May 2022 showed  EF 50%   HE  Appears euvolemic He is not on any lasix , has no ankle swelling       Aldactone was stopped due to hyperkalemia   He has not needed lasix, He is euvolemic         ED  Stopped imdur so he could use viagra, he is using it as needed and is doing well. Did not see urology.         Hyperkalemia  K is high , repeat K and also get lipid panel , if still high will refer to PCP        History of amputation of lesser toe of left foot (HCC)  Three vessel run off of both legs on arterial doppler in 2021     HBO-Diabetic ulcer of left midfoot associated with type 2 diabetes mellitus, with bone involvement without evidence of necrosis ([address]   diabetes management as per primary physician, Hba1c is 6.1     Tobacco abuse  He says he is not smoking ,HE was congratulated     Dyslipidemia  All available lab work was reviewed. Patient was advised to repeat lab work before next visit. Labs ordered. Necessary orders were placed , instructions given by myself   on lipitor 80 mg daily   GI asked for our opinion for holding lipitor due to GI upset. Will do trial of no lipitor for 1 week. If GI upset improves will switch to Crestor, if unchanged resume lipitor. Counseled extensively and medication compliance urged. We discussed that for the  prevention of ASCVD our  goal is aggressive risk modification. Patient is encouraged to exercise if they can , educated about  brisk walk for 30 minutes  at least 3 to 4 times a week if there are no physical limitations  Various goals were discussed and questions answered. Continue current medications. Appropriate prescriptions are addressed and refills ordered. Questions answered and patient verbalizes understanding. Call for any problems, questions, or concerns. Greater than 60 % of time spent counseling besides reviewing data and images       Return in about 6 months (around 7/12/2023). An electronic signature was used to authenticate this note.     Electronically signed by BARBARA Au CNP on 1/12/2023 at 1:02 PM

## 2023-01-24 RX ORDER — ISOSORBIDE MONONITRATE 60 MG/1
TABLET, EXTENDED RELEASE ORAL
Qty: 90 TABLET | OUTPATIENT
Start: 2023-01-24

## 2023-01-25 RX ORDER — TRAZODONE HYDROCHLORIDE 50 MG/1
TABLET ORAL
Qty: 30 TABLET | Refills: 2 | Status: SHIPPED | OUTPATIENT
Start: 2023-01-25

## 2023-01-26 ASSESSMENT — ENCOUNTER SYMPTOMS
COUGH: 0
SHORTNESS OF BREATH: 0

## 2023-02-07 RX ORDER — TIZANIDINE 2 MG/1
TABLET ORAL
Qty: 30 TABLET | Refills: 0 | Status: SHIPPED | OUTPATIENT
Start: 2023-02-07

## 2023-02-08 SDOH — ECONOMIC STABILITY: INCOME INSECURITY: HOW HARD IS IT FOR YOU TO PAY FOR THE VERY BASICS LIKE FOOD, HOUSING, MEDICAL CARE, AND HEATING?: NOT VERY HARD

## 2023-02-08 SDOH — ECONOMIC STABILITY: FOOD INSECURITY: WITHIN THE PAST 12 MONTHS, THE FOOD YOU BOUGHT JUST DIDN'T LAST AND YOU DIDN'T HAVE MONEY TO GET MORE.: NEVER TRUE

## 2023-02-08 SDOH — ECONOMIC STABILITY: FOOD INSECURITY: WITHIN THE PAST 12 MONTHS, YOU WORRIED THAT YOUR FOOD WOULD RUN OUT BEFORE YOU GOT MONEY TO BUY MORE.: NEVER TRUE

## 2023-02-08 SDOH — ECONOMIC STABILITY: HOUSING INSECURITY
IN THE LAST 12 MONTHS, WAS THERE A TIME WHEN YOU DID NOT HAVE A STEADY PLACE TO SLEEP OR SLEPT IN A SHELTER (INCLUDING NOW)?: NO

## 2023-02-08 SDOH — ECONOMIC STABILITY: TRANSPORTATION INSECURITY
IN THE PAST 12 MONTHS, HAS LACK OF TRANSPORTATION KEPT YOU FROM MEETINGS, WORK, OR FROM GETTING THINGS NEEDED FOR DAILY LIVING?: NO

## 2023-02-09 ENCOUNTER — OFFICE VISIT (OUTPATIENT)
Dept: INTERNAL MEDICINE CLINIC | Age: 43
End: 2023-02-09
Payer: MEDICARE

## 2023-02-09 VITALS
SYSTOLIC BLOOD PRESSURE: 124 MMHG | HEIGHT: 73 IN | HEART RATE: 57 BPM | WEIGHT: 229.8 LBS | DIASTOLIC BLOOD PRESSURE: 82 MMHG | BODY MASS INDEX: 30.46 KG/M2 | RESPIRATION RATE: 20 BRPM | OXYGEN SATURATION: 96 %

## 2023-02-09 DIAGNOSIS — I25.5 ISCHEMIC CARDIOMYOPATHY: ICD-10-CM

## 2023-02-09 DIAGNOSIS — E55.9 VITAMIN D DEFICIENCY: ICD-10-CM

## 2023-02-09 DIAGNOSIS — F41.9 ANXIETY: ICD-10-CM

## 2023-02-09 DIAGNOSIS — I25.10 ASCVD (ARTERIOSCLEROTIC CARDIOVASCULAR DISEASE): ICD-10-CM

## 2023-02-09 DIAGNOSIS — E78.00 ELEVATED LDL CHOLESTEROL LEVEL: ICD-10-CM

## 2023-02-09 DIAGNOSIS — K21.9 GASTROESOPHAGEAL REFLUX DISEASE WITHOUT ESOPHAGITIS: ICD-10-CM

## 2023-02-09 DIAGNOSIS — Z79.4 TYPE 2 DIABETES MELLITUS WITHOUT COMPLICATION, WITH LONG-TERM CURRENT USE OF INSULIN (HCC): Primary | ICD-10-CM

## 2023-02-09 DIAGNOSIS — E11.9 TYPE 2 DIABETES MELLITUS WITHOUT COMPLICATION, WITH LONG-TERM CURRENT USE OF INSULIN (HCC): Primary | ICD-10-CM

## 2023-02-09 PROCEDURE — G8484 FLU IMMUNIZE NO ADMIN: HCPCS | Performed by: NURSE PRACTITIONER

## 2023-02-09 PROCEDURE — G8427 DOCREV CUR MEDS BY ELIG CLIN: HCPCS | Performed by: NURSE PRACTITIONER

## 2023-02-09 PROCEDURE — 99214 OFFICE O/P EST MOD 30 MIN: CPT | Performed by: NURSE PRACTITIONER

## 2023-02-09 PROCEDURE — 1036F TOBACCO NON-USER: CPT | Performed by: NURSE PRACTITIONER

## 2023-02-09 PROCEDURE — 3046F HEMOGLOBIN A1C LEVEL >9.0%: CPT | Performed by: NURSE PRACTITIONER

## 2023-02-09 PROCEDURE — G8417 CALC BMI ABV UP PARAM F/U: HCPCS | Performed by: NURSE PRACTITIONER

## 2023-02-09 PROCEDURE — 2022F DILAT RTA XM EVC RTNOPTHY: CPT | Performed by: NURSE PRACTITIONER

## 2023-02-09 SDOH — ECONOMIC STABILITY: INCOME INSECURITY: HOW HARD IS IT FOR YOU TO PAY FOR THE VERY BASICS LIKE FOOD, HOUSING, MEDICAL CARE, AND HEATING?: NOT HARD AT ALL

## 2023-02-09 SDOH — ECONOMIC STABILITY: FOOD INSECURITY: WITHIN THE PAST 12 MONTHS, THE FOOD YOU BOUGHT JUST DIDN'T LAST AND YOU DIDN'T HAVE MONEY TO GET MORE.: NEVER TRUE

## 2023-02-09 SDOH — ECONOMIC STABILITY: FOOD INSECURITY: WITHIN THE PAST 12 MONTHS, YOU WORRIED THAT YOUR FOOD WOULD RUN OUT BEFORE YOU GOT MONEY TO BUY MORE.: NEVER TRUE

## 2023-02-09 ASSESSMENT — PATIENT HEALTH QUESTIONNAIRE - PHQ9
DEPRESSION UNABLE TO ASSESS: FUNCTIONAL CAPACITY MOTIVATION LIMITS ACCURACY
SUM OF ALL RESPONSES TO PHQ QUESTIONS 1-9: 0
SUM OF ALL RESPONSES TO PHQ9 QUESTIONS 1 & 2: 0
1. LITTLE INTEREST OR PLEASURE IN DOING THINGS: 0
2. FEELING DOWN, DEPRESSED OR HOPELESS: 0

## 2023-02-09 NOTE — PROGRESS NOTES
Juan Manuel Polanco  1980  02/10/23    Chief Complaint   Patient presents with    Follow-up     4 month follow up        SUBJECTIVE:      4 month follow up:    Patient with hx of NSTEMI and PCI to ostial LAD in Nov 202, then elective stent to Circ  and PDA in Jan 2022. Continues on ASA/Brilinta x 1 year. On statin, Imdur 60 mg, Toprol XL 50 mg daily, lisinopril  40 mg daily. EF at 45-50%. Has graduated from cardiac rehab since his last visit with us. No recent cardiac changes or concerns. Continues on switch from Imdur due to Ranexa for viagra use. Patient denies any chest pain, shortness of breath, myalgias, Patient is tolerating cholesterol medications without difficulty. Lab Results   Component Value Date    LDLCALC 44 06/06/2022    LDLDIRECT 88 11/03/2021     Patient compliant with all current medications for diabetes. Blood sugars have been averaging around 100-120, and the patient reports checking their blood sugar 1-2 time daily. Patient has had no episodes of significant hypoglycemia, fainting, dizziness, or loss of consciousness. Continues on metformin 1000 mg BID, Lantus 40 units nightly, and Humalog 15 units TID. Hemoglobin A1C   Date Value Ref Range Status   06/06/2022 6.3 4.2 - 6.3 % Final     Patient's reflux well controlled on current medications. Patient denies any blood in stool black stool, heartburn, reflux. Denies issues with frequent coughing or reflux while sleeping. Able to eat and drink appropriately without complications. Continues on Omeprazole 20 mg daily. Anxiety has been stable on Buspar 5 mg BID. Denies any concern at this time. Sleeping well on Trazodone 50 mg QHS. Continues with 50,000 units weekly of his Vit D. Review of Systems   Constitutional:  Negative for activity change, appetite change, fatigue and fever. HENT:  Negative for congestion, sinus pressure, sinus pain and sore throat. Eyes: Negative.     Respiratory:  Negative for cough, chest tightness, shortness of breath and wheezing. Cardiovascular:  Negative for chest pain and palpitations. Gastrointestinal:  Negative for abdominal distention, abdominal pain, constipation, diarrhea and nausea. Genitourinary:  Negative for difficulty urinating, dysuria, flank pain, frequency and hematuria. Musculoskeletal:  Negative for arthralgias, gait problem, joint swelling and myalgias. Skin:  Negative for color change and rash. Neurological:  Negative for dizziness, light-headedness and numbness. Hematological: Negative. Psychiatric/Behavioral: Negative. OBJECTIVE:    /82   Pulse 57   Resp 20   Ht 6' 1\" (1.854 m)   Wt 229 lb 12.8 oz (104.2 kg)   SpO2 96%   BMI 30.32 kg/m²     Physical Exam  Constitutional:       General: He is not in acute distress. Appearance: He is well-developed. He is not diaphoretic. HENT:      Head: Normocephalic and atraumatic. Nose: Nose normal.   Eyes:      Conjunctiva/sclera: Conjunctivae normal.      Pupils: Pupils are equal, round, and reactive to light. Neck:      Thyroid: No thyromegaly. Cardiovascular:      Rate and Rhythm: Normal rate and regular rhythm. Heart sounds: Normal heart sounds. No murmur heard. Pulmonary:      Effort: Pulmonary effort is normal.      Breath sounds: Normal breath sounds. Abdominal:      General: Bowel sounds are normal. There is no distension. Palpations: Abdomen is soft. Tenderness: There is no abdominal tenderness. Musculoskeletal:         General: Normal range of motion. Lymphadenopathy:      Cervical: No cervical adenopathy. Skin:     General: Skin is warm and dry. Capillary Refill: Capillary refill takes less than 2 seconds. Findings: No rash. Neurological:      Mental Status: He is alert and oriented to person, place, and time. GCS: GCS eye subscore is 4. GCS verbal subscore is 5. GCS motor subscore is 6. Cranial Nerves: No cranial nerve deficit. Sensory: No sensory deficit. Coordination: Coordination normal.      Deep Tendon Reflexes: Reflexes normal.   Psychiatric:         Behavior: Behavior normal.         Thought Content: Thought content normal.       ASSESSMENT:    1. Type 2 diabetes mellitus without complication, with long-term current use of insulin ([facility] [address]    2. ASCVD (arteriosclerotic cardiovascular disease)    3. Ischemic cardiomyopathy    4. Elevated LDL cholesterol level    5. Gastroesophageal reflux disease without esophagitis    6. Vitamin D deficiency    7. Anxiety        PLAN:    Vaughn Carrasco was seen today for follow-up. Diagnoses and all orders for this visit:    Type 2 diabetes mellitus without complication, with long-term current use of insulin ([facility])-sugars appear well controlled. Continue current regimen and we will check labs as below to include renal function, A1c and microalbumin.  -     CBC with Auto Differential; Future  -     Comprehensive Metabolic Panel; Future  -     Hemoglobin A1C; Future  -     Lipid, Fasting; Future  -     MICROALBUMIN / CREATININE URINE RATIO; Future    ASCVD (arteriosclerotic cardiovascular disease)-well managed and asymptomatic. Continue as per cardiology advisement. -     CBC with Auto Differential; Future  -     Comprehensive Metabolic Panel; Future  -     Hemoglobin A1C; Future  -     Lipid, Fasting; Future    Ischemic cardiomyopathy-same as above. Elevated LDL cholesterol level-continue statin. Recheck lipid panel/hepatic function today. -     CBC with Auto Differential; Future  -     Comprehensive Metabolic Panel; Future  -     Hemoglobin A1C; Future  -     Lipid, Fasting; Future    Gastroesophageal reflux disease without esophagitis-well-controlled. No changes current PPI therapy. Vitamin D deficiency-continue oral replacement. Recheck levels today. -     Vitamin D 25 Hydroxy; Future    Anxiety-appears well controlled. Continue BuSpar without change.     Course of treatment, including any medications, possible imaging, referrals, and follow ups discussed with patient. All risks and benefits and possible side effects discussed with patient who agrees to plan of care and verbalizes understanding. All labs and imaging reviewed. No flowsheet data found. Return in about 4 months (around 6/9/2023). Leah note this reports has been produced speech recognition software and may contain errors related to that system including errors in grammar, punctuation, and spelling, as well as words and phrases that may be appropriate. If there are any questions or concerns please feel free to contact the dictating provider for clarification.

## 2023-02-10 ASSESSMENT — ENCOUNTER SYMPTOMS
SHORTNESS OF BREATH: 0
DIARRHEA: 0
ABDOMINAL DISTENTION: 0
CONSTIPATION: 0
WHEEZING: 0
COLOR CHANGE: 0
COUGH: 0
SINUS PAIN: 0
SINUS PRESSURE: 0
SORE THROAT: 0
EYES NEGATIVE: 1
CHEST TIGHTNESS: 0
NAUSEA: 0
ABDOMINAL PAIN: 0

## 2023-02-16 RX ORDER — METOPROLOL SUCCINATE 50 MG/1
50 TABLET, EXTENDED RELEASE ORAL DAILY
Qty: 90 TABLET | Refills: 3 | Status: SHIPPED | OUTPATIENT
Start: 2023-02-16

## 2023-02-26 DIAGNOSIS — F41.9 ANXIETY: ICD-10-CM

## 2023-02-27 RX ORDER — BUSPIRONE HYDROCHLORIDE 5 MG/1
TABLET ORAL
Qty: 60 TABLET | Refills: 1 | Status: SHIPPED | OUTPATIENT
Start: 2023-02-27

## 2023-03-01 DIAGNOSIS — E11.9 TYPE 2 DIABETES MELLITUS WITHOUT COMPLICATION, WITH LONG-TERM CURRENT USE OF INSULIN (HCC): ICD-10-CM

## 2023-03-01 DIAGNOSIS — Z79.4 TYPE 2 DIABETES MELLITUS WITHOUT COMPLICATION, WITH LONG-TERM CURRENT USE OF INSULIN (HCC): ICD-10-CM

## 2023-03-01 RX ORDER — GABAPENTIN 300 MG/1
CAPSULE ORAL
Qty: 90 CAPSULE | Refills: 2 | Status: SHIPPED | OUTPATIENT
Start: 2023-03-01 | End: 2023-05-30

## 2023-03-03 RX ORDER — TIZANIDINE 2 MG/1
TABLET ORAL
Qty: 30 TABLET | Refills: 0 | Status: SHIPPED | OUTPATIENT
Start: 2023-03-03

## 2023-03-21 RX ORDER — RANOLAZINE 500 MG/1
500 TABLET, EXTENDED RELEASE ORAL 2 TIMES DAILY
Qty: 60 TABLET | Refills: 5 | Status: SHIPPED | OUTPATIENT
Start: 2023-03-21

## 2023-03-21 RX ORDER — ATORVASTATIN CALCIUM 80 MG/1
80 TABLET, FILM COATED ORAL DAILY
Qty: 30 TABLET | Refills: 5 | Status: SHIPPED | OUTPATIENT
Start: 2023-03-21

## 2023-03-27 ENCOUNTER — OFFICE VISIT (OUTPATIENT)
Dept: GASTROENTEROLOGY | Age: 43
End: 2023-03-27
Payer: MEDICARE

## 2023-03-27 VITALS
BODY MASS INDEX: 30.46 KG/M2 | OXYGEN SATURATION: 97 % | HEART RATE: 60 BPM | HEIGHT: 73 IN | WEIGHT: 229.8 LBS | SYSTOLIC BLOOD PRESSURE: 122 MMHG | DIASTOLIC BLOOD PRESSURE: 80 MMHG | TEMPERATURE: 97.1 F

## 2023-03-27 DIAGNOSIS — K21.9 GASTROESOPHAGEAL REFLUX DISEASE WITHOUT ESOPHAGITIS: ICD-10-CM

## 2023-03-27 DIAGNOSIS — R19.5 LOOSE STOOLS: ICD-10-CM

## 2023-03-27 DIAGNOSIS — Z86.010 HX OF ADENOMATOUS COLONIC POLYPS: Primary | ICD-10-CM

## 2023-03-27 DIAGNOSIS — D64.9 ANEMIA, UNSPECIFIED TYPE: ICD-10-CM

## 2023-03-27 PROCEDURE — 99213 OFFICE O/P EST LOW 20 MIN: CPT | Performed by: NURSE PRACTITIONER

## 2023-03-27 PROCEDURE — G8484 FLU IMMUNIZE NO ADMIN: HCPCS | Performed by: NURSE PRACTITIONER

## 2023-03-27 PROCEDURE — 1036F TOBACCO NON-USER: CPT | Performed by: NURSE PRACTITIONER

## 2023-03-27 PROCEDURE — G8417 CALC BMI ABV UP PARAM F/U: HCPCS | Performed by: NURSE PRACTITIONER

## 2023-03-27 PROCEDURE — G8427 DOCREV CUR MEDS BY ELIG CLIN: HCPCS | Performed by: NURSE PRACTITIONER

## 2023-03-27 RX ORDER — DICYCLOMINE HYDROCHLORIDE 10 MG/1
10 CAPSULE ORAL 4 TIMES DAILY PRN
Qty: 120 CAPSULE | Refills: 0 | Status: SHIPPED | OUTPATIENT
Start: 2023-03-27

## 2023-03-27 RX ORDER — OMEPRAZOLE 20 MG/1
20 CAPSULE, DELAYED RELEASE ORAL DAILY
Qty: 30 CAPSULE | Refills: 5 | Status: SHIPPED | OUTPATIENT
Start: 2023-03-27

## 2023-04-23 DIAGNOSIS — Z79.4 TYPE 2 DIABETES MELLITUS WITHOUT COMPLICATION, WITH LONG-TERM CURRENT USE OF INSULIN (HCC): ICD-10-CM

## 2023-04-23 DIAGNOSIS — E11.9 TYPE 2 DIABETES MELLITUS WITHOUT COMPLICATION, WITH LONG-TERM CURRENT USE OF INSULIN (HCC): ICD-10-CM

## 2023-04-24 RX ORDER — TRAZODONE HYDROCHLORIDE 50 MG/1
TABLET ORAL
Qty: 30 TABLET | Refills: 2 | Status: SHIPPED | OUTPATIENT
Start: 2023-04-24

## 2023-04-24 RX ORDER — INSULIN LISPRO 100 [IU]/ML
15 INJECTION, SOLUTION INTRAVENOUS; SUBCUTANEOUS
Qty: 3 EACH | Refills: 2 | Status: SHIPPED | OUTPATIENT
Start: 2023-04-24

## 2023-04-27 DIAGNOSIS — F41.9 ANXIETY: ICD-10-CM

## 2023-04-27 RX ORDER — BUSPIRONE HYDROCHLORIDE 5 MG/1
TABLET ORAL
Qty: 60 TABLET | Refills: 1 | Status: SHIPPED | OUTPATIENT
Start: 2023-04-27

## 2023-05-05 RX ORDER — TIZANIDINE 2 MG/1
TABLET ORAL
Qty: 30 TABLET | Refills: 0 | Status: SHIPPED | OUTPATIENT
Start: 2023-05-05

## 2023-05-05 RX ORDER — TRAZODONE HYDROCHLORIDE 50 MG/1
50 TABLET ORAL NIGHTLY PRN
Qty: 30 TABLET | Refills: 2 | Status: SHIPPED | OUTPATIENT
Start: 2023-05-05

## 2023-05-08 RX ORDER — PEN NEEDLE, DIABETIC 31 G X1/4"
NEEDLE, DISPOSABLE MISCELLANEOUS
Qty: 100 EACH | Refills: 2 | Status: SHIPPED | OUTPATIENT
Start: 2023-05-08

## 2023-05-27 DIAGNOSIS — Z79.4 TYPE 2 DIABETES MELLITUS WITHOUT COMPLICATION, WITH LONG-TERM CURRENT USE OF INSULIN (HCC): ICD-10-CM

## 2023-05-27 DIAGNOSIS — E11.9 TYPE 2 DIABETES MELLITUS WITHOUT COMPLICATION, WITH LONG-TERM CURRENT USE OF INSULIN (HCC): ICD-10-CM

## 2023-05-30 RX ORDER — GABAPENTIN 300 MG/1
CAPSULE ORAL
Qty: 90 CAPSULE | Refills: 2 | Status: SHIPPED | OUTPATIENT
Start: 2023-05-30 | End: 2023-08-28

## 2023-06-02 RX ORDER — TIZANIDINE 2 MG/1
TABLET ORAL
Qty: 30 TABLET | Refills: 0 | Status: SHIPPED | OUTPATIENT
Start: 2023-06-02

## 2023-06-06 DIAGNOSIS — Z79.4 TYPE 2 DIABETES MELLITUS WITHOUT COMPLICATION, WITH LONG-TERM CURRENT USE OF INSULIN (HCC): ICD-10-CM

## 2023-06-06 DIAGNOSIS — E11.9 TYPE 2 DIABETES MELLITUS WITHOUT COMPLICATION, WITH LONG-TERM CURRENT USE OF INSULIN (HCC): ICD-10-CM

## 2023-06-06 RX ORDER — INSULIN GLARGINE 100 [IU]/ML
INJECTION, SOLUTION SUBCUTANEOUS
Qty: 15 ML | Refills: 2 | Status: SHIPPED | OUTPATIENT
Start: 2023-06-06

## 2023-06-08 ENCOUNTER — HOSPITAL ENCOUNTER (OUTPATIENT)
Age: 43
Discharge: HOME | End: 2023-06-08
Payer: MEDICARE

## 2023-06-08 DIAGNOSIS — I25.10 ASCVD (ARTERIOSCLEROTIC CARDIOVASCULAR DISEASE): ICD-10-CM

## 2023-06-08 DIAGNOSIS — E11.9 TYPE 2 DIABETES MELLITUS WITHOUT COMPLICATION, WITH LONG-TERM CURRENT USE OF INSULIN (HCC): ICD-10-CM

## 2023-06-08 DIAGNOSIS — E78.00 ELEVATED LDL CHOLESTEROL LEVEL: ICD-10-CM

## 2023-06-08 DIAGNOSIS — Z79.4 TYPE 2 DIABETES MELLITUS WITHOUT COMPLICATION, WITH LONG-TERM CURRENT USE OF INSULIN (HCC): ICD-10-CM

## 2023-06-08 DIAGNOSIS — E55.9 VITAMIN D DEFICIENCY: ICD-10-CM

## 2023-06-08 LAB
25(OH)D3 SERPL-MCNC: 24.96 NG/ML
ALBUMIN SERPL-MCNC: 3.5 GM/DL (ref 3.4–5)
ALP BLD-CCNC: 103 IU/L (ref 40–128)
ALT SERPL-CCNC: 12 U/L (ref 10–40)
ANION GAP SERPL CALCULATED.3IONS-SCNC: 11 MMOL/L (ref 4–16)
AST SERPL-CCNC: 17 IU/L (ref 15–37)
BASOPHILS ABSOLUTE: 0.1 K/CU MM
BASOPHILS RELATIVE PERCENT: 1 % (ref 0–1)
BILIRUB SERPL-MCNC: 0.2 MG/DL (ref 0–1)
BUN SERPL-MCNC: 34 MG/DL (ref 6–23)
CALCIUM SERPL-MCNC: 8.3 MG/DL (ref 8.3–10.6)
CHLORIDE BLD-SCNC: 99 MMOL/L (ref 99–110)
CHOLESTEROL, FASTING: 212 MG/DL
CO2: 19 MMOL/L (ref 21–32)
CREAT SERPL-MCNC: 2.5 MG/DL (ref 0.9–1.3)
CREAT UR-MCNC: 124.1 MG/DL (ref 39–259)
DIFFERENTIAL TYPE: ABNORMAL
EOSINOPHILS ABSOLUTE: 0.3 K/CU MM
EOSINOPHILS RELATIVE PERCENT: 5.4 % (ref 0–3)
ESTIMATED AVERAGE GLUCOSE: 131 MG/DL
GFR SERPL CREATININE-BSD FRML MDRD: 32 ML/MIN/1.73M2
GLUCOSE SERPL-MCNC: 117 MG/DL (ref 70–99)
HBA1C MFR BLD: 6.2 % (ref 4.2–6.3)
HCT VFR BLD CALC: 33.6 % (ref 42–52)
HDLC SERPL-MCNC: 23 MG/DL
HEMOGLOBIN: 10.7 GM/DL (ref 13.5–18)
IMMATURE NEUTROPHIL %: 0.4 % (ref 0–0.43)
LDLC SERPL CALC-MCNC: ABNORMAL MG/DL
LDLC SERPL-MCNC: 97 MG/DL
LYMPHOCYTES ABSOLUTE: 1.6 K/CU MM
LYMPHOCYTES RELATIVE PERCENT: 32.6 % (ref 24–44)
MCH RBC QN AUTO: 29.7 PG (ref 27–31)
MCHC RBC AUTO-ENTMCNC: 31.8 % (ref 32–36)
MCV RBC AUTO: 93.3 FL (ref 78–100)
MICROALBUMIN 24H UR-MCNC: >440 MG/DL
MICROALBUMIN/CREAT UR-RTO: 0 MG/G CREAT (ref 0–30)
MONOCYTES ABSOLUTE: 0.5 K/CU MM
MONOCYTES RELATIVE PERCENT: 9.4 % (ref 0–4)
NUCLEATED RBC %: 0 %
PDW BLD-RTO: 12.6 % (ref 11.7–14.9)
PLATELET # BLD: 172 K/CU MM (ref 140–440)
PMV BLD AUTO: 10.8 FL (ref 7.5–11.1)
POTASSIUM SERPL-SCNC: 5.1 MMOL/L (ref 3.5–5.1)
RBC # BLD: 3.6 M/CU MM (ref 4.6–6.2)
SEGMENTED NEUTROPHILS ABSOLUTE COUNT: 2.5 K/CU MM
SEGMENTED NEUTROPHILS RELATIVE PERCENT: 51.2 % (ref 36–66)
SODIUM BLD-SCNC: 129 MMOL/L (ref 135–145)
TOTAL IMMATURE NEUTOROPHIL: 0.02 K/CU MM
TOTAL NUCLEATED RBC: 0 K/CU MM
TOTAL PROTEIN: 7.2 GM/DL (ref 6.4–8.2)
TRIGLYCERIDE, FASTING: 664 MG/DL
WBC # BLD: 4.8 K/CU MM (ref 4–10.5)

## 2023-06-08 PROCEDURE — 82570 ASSAY OF URINE CREATININE: CPT

## 2023-06-08 PROCEDURE — 83036 HEMOGLOBIN GLYCOSYLATED A1C: CPT

## 2023-06-08 PROCEDURE — 83721 ASSAY OF BLOOD LIPOPROTEIN: CPT

## 2023-06-08 PROCEDURE — 82306 VITAMIN D 25 HYDROXY: CPT

## 2023-06-08 PROCEDURE — 80053 COMPREHEN METABOLIC PANEL: CPT

## 2023-06-08 PROCEDURE — 36415 COLL VENOUS BLD VENIPUNCTURE: CPT

## 2023-06-08 PROCEDURE — 82043 UR ALBUMIN QUANTITATIVE: CPT

## 2023-06-08 PROCEDURE — 85025 COMPLETE CBC W/AUTO DIFF WBC: CPT

## 2023-06-08 PROCEDURE — 80061 LIPID PANEL: CPT

## 2023-06-09 ENCOUNTER — OFFICE VISIT (OUTPATIENT)
Dept: INTERNAL MEDICINE CLINIC | Age: 43
End: 2023-06-09
Payer: MEDICARE

## 2023-06-09 VITALS
SYSTOLIC BLOOD PRESSURE: 138 MMHG | HEIGHT: 73 IN | HEART RATE: 51 BPM | OXYGEN SATURATION: 99 % | RESPIRATION RATE: 18 BRPM | DIASTOLIC BLOOD PRESSURE: 88 MMHG | BODY MASS INDEX: 37.24 KG/M2 | WEIGHT: 281 LBS

## 2023-06-09 DIAGNOSIS — Z79.4 TYPE 2 DIABETES MELLITUS WITH CHRONIC KIDNEY DISEASE, WITH LONG-TERM CURRENT USE OF INSULIN, UNSPECIFIED CKD STAGE (HCC): ICD-10-CM

## 2023-06-09 DIAGNOSIS — F41.9 ANXIETY: ICD-10-CM

## 2023-06-09 DIAGNOSIS — G47.00 INSOMNIA, UNSPECIFIED TYPE: ICD-10-CM

## 2023-06-09 DIAGNOSIS — I25.118 ATHEROSCLEROTIC HEART DISEASE OF NATIVE CORONARY ARTERY WITH OTHER FORMS OF ANGINA PECTORIS (HCC): Primary | ICD-10-CM

## 2023-06-09 DIAGNOSIS — K21.9 GASTROESOPHAGEAL REFLUX DISEASE WITHOUT ESOPHAGITIS: ICD-10-CM

## 2023-06-09 DIAGNOSIS — E55.9 VITAMIN D DEFICIENCY: ICD-10-CM

## 2023-06-09 DIAGNOSIS — E11.22 TYPE 2 DIABETES MELLITUS WITH CHRONIC KIDNEY DISEASE, WITH LONG-TERM CURRENT USE OF INSULIN, UNSPECIFIED CKD STAGE (HCC): ICD-10-CM

## 2023-06-09 PROBLEM — Z89.422 HISTORY OF AMPUTATION OF LESSER TOE OF LEFT FOOT (HCC): Status: RESOLVED | Noted: 2021-10-25 | Resolved: 2023-06-09

## 2023-06-09 PROCEDURE — 99214 OFFICE O/P EST MOD 30 MIN: CPT | Performed by: NURSE PRACTITIONER

## 2023-06-09 PROCEDURE — 1036F TOBACCO NON-USER: CPT | Performed by: NURSE PRACTITIONER

## 2023-06-09 PROCEDURE — 2022F DILAT RTA XM EVC RTNOPTHY: CPT | Performed by: NURSE PRACTITIONER

## 2023-06-09 PROCEDURE — 3044F HG A1C LEVEL LT 7.0%: CPT | Performed by: NURSE PRACTITIONER

## 2023-06-09 PROCEDURE — G8417 CALC BMI ABV UP PARAM F/U: HCPCS | Performed by: NURSE PRACTITIONER

## 2023-06-09 PROCEDURE — G8427 DOCREV CUR MEDS BY ELIG CLIN: HCPCS | Performed by: NURSE PRACTITIONER

## 2023-06-09 RX ORDER — TIZANIDINE 2 MG/1
TABLET ORAL
Qty: 30 TABLET | Refills: 0 | Status: SHIPPED | OUTPATIENT
Start: 2023-06-09

## 2023-06-09 RX ORDER — INSULIN LISPRO 100 [IU]/ML
0-15 INJECTION, SOLUTION INTRAVENOUS; SUBCUTANEOUS
Qty: 3 EACH | Refills: 3 | Status: SHIPPED | OUTPATIENT
Start: 2023-06-09

## 2023-06-09 ASSESSMENT — ENCOUNTER SYMPTOMS
COUGH: 0
CONSTIPATION: 0
WHEEZING: 0
SINUS PRESSURE: 0
EYES NEGATIVE: 1
DIARRHEA: 0
SINUS PAIN: 0
ABDOMINAL PAIN: 0
NAUSEA: 0
SORE THROAT: 0
CHEST TIGHTNESS: 0
ABDOMINAL DISTENTION: 0
COLOR CHANGE: 0
SHORTNESS OF BREATH: 0

## 2023-06-09 NOTE — PROGRESS NOTES
Jaclyn Sep  1980 06/09/23    Chief Complaint   Patient presents with    Follow-up     4 month follow up        SUBJECTIVE:      4 month follow up:    Patient with hx of NSTEMI and PCI to ostial LAD in Nov 202, then elective stent to Circ  and PDA in Jan 2022. Continues on ASA/Brilinta x 1 year. On statin,  Toprol XL 50 mg daily, lisinopril  40 mg daily, Ranexa 500 mg BID. No recent cardiac changes or concerns. Patient denies any chest pain, shortness of breath, myalgias, Patient is tolerating cholesterol medications without difficulty. LDL remains in goal range, however, triglycerides did recently jump to 664. HDL below goal range. Continues on Lipitor 80 mg daily. Patient compliant with all current medications for diabetes. Blood sugars have been averaging around 100-120, and the patient reports checking their blood sugar 1-2 time daily. Patient has had no episodes of significant hypoglycemia, fainting, dizziness, or loss of consciousness. Continues on metformin 1000 mg BID, Lantus 40 units nightly, and Humalog 15 units TID. Hemoglobin A1C   Date Value Ref Range Status   06/08/2023 6.2 4.2 - 6.3 % Final     Patient's reflux well controlled on current medications. Patient denies any blood in stool black stool, heartburn, reflux. Denies issues with frequent coughing or reflux while sleeping. Able to eat and drink appropriately without complications. Continues on Omeprazole 20 mg daily. Anxiety has been stable on Buspar 5 mg BID. Denies any concern at this time. Sleeping well on Trazodone 50 mg QHS. Continues with 50,000 units weekly of his Vit D. Most recent levels at goal at 24.96.     Review of Systems   Constitutional:  Negative for activity change, appetite change, fatigue and fever. HENT:  Negative for congestion, sinus pressure, sinus pain and sore throat. Eyes: Negative. Respiratory:  Negative for cough, chest tightness, shortness of breath and wheezing.

## 2023-06-22 RX ORDER — ERGOCALCIFEROL 1.25 MG/1
50000 CAPSULE ORAL WEEKLY
Qty: 12 CAPSULE | Refills: 1 | OUTPATIENT
Start: 2023-06-22

## 2023-06-26 DIAGNOSIS — F41.9 ANXIETY: ICD-10-CM

## 2023-06-26 RX ORDER — BUSPIRONE HYDROCHLORIDE 5 MG/1
TABLET ORAL
Qty: 60 TABLET | Refills: 1 | Status: SHIPPED | OUTPATIENT
Start: 2023-06-26

## 2023-07-24 RX ORDER — DICYCLOMINE HYDROCHLORIDE 10 MG/1
10 CAPSULE ORAL 4 TIMES DAILY PRN
Qty: 120 CAPSULE | Refills: 2 | Status: SHIPPED | OUTPATIENT
Start: 2023-07-24

## 2023-07-25 RX ORDER — TIZANIDINE 2 MG/1
TABLET ORAL
Qty: 30 TABLET | Refills: 0 | Status: SHIPPED | OUTPATIENT
Start: 2023-07-25

## 2023-07-25 RX ORDER — TIZANIDINE 2 MG/1
TABLET ORAL
Qty: 30 TABLET | Refills: 0 | Status: SHIPPED | OUTPATIENT
Start: 2023-07-25 | End: 2023-07-25 | Stop reason: SDUPTHER

## 2023-07-25 RX ORDER — TRAZODONE HYDROCHLORIDE 50 MG/1
50 TABLET ORAL NIGHTLY PRN
Qty: 30 TABLET | Refills: 2 | Status: SHIPPED | OUTPATIENT
Start: 2023-07-25

## 2023-07-27 RX ORDER — IBUPROFEN 200 MG
1 TABLET ORAL 3 TIMES DAILY
Qty: 90 EACH | Refills: 2 | Status: SHIPPED | OUTPATIENT
Start: 2023-07-27

## 2023-07-28 RX ORDER — SYRINGE AND NEEDLE,INSULIN,1ML 30 G X1/2"
SYRINGE, EMPTY DISPOSABLE MISCELLANEOUS
Qty: 100 EACH | Refills: 2 | Status: SHIPPED | OUTPATIENT
Start: 2023-07-28

## 2023-08-01 ENCOUNTER — OFFICE VISIT (OUTPATIENT)
Dept: CARDIOLOGY CLINIC | Age: 43
End: 2023-08-01
Payer: MEDICARE

## 2023-08-01 VITALS
BODY MASS INDEX: 30.48 KG/M2 | SYSTOLIC BLOOD PRESSURE: 132 MMHG | OXYGEN SATURATION: 98 % | HEART RATE: 52 BPM | WEIGHT: 230 LBS | HEIGHT: 73 IN | DIASTOLIC BLOOD PRESSURE: 80 MMHG

## 2023-08-01 DIAGNOSIS — K52.9 CHRONIC DIARRHEA OF UNKNOWN ORIGIN: ICD-10-CM

## 2023-08-01 DIAGNOSIS — Z78.9 STATIN INTOLERANCE: ICD-10-CM

## 2023-08-01 DIAGNOSIS — E78.5 DYSLIPIDEMIA: ICD-10-CM

## 2023-08-01 DIAGNOSIS — I25.5 ISCHEMIC CARDIOMYOPATHY: ICD-10-CM

## 2023-08-01 DIAGNOSIS — I25.10 ASCVD (ARTERIOSCLEROTIC CARDIOVASCULAR DISEASE): Primary | ICD-10-CM

## 2023-08-01 PROCEDURE — 1036F TOBACCO NON-USER: CPT | Performed by: NURSE PRACTITIONER

## 2023-08-01 PROCEDURE — 99214 OFFICE O/P EST MOD 30 MIN: CPT | Performed by: NURSE PRACTITIONER

## 2023-08-01 PROCEDURE — G8417 CALC BMI ABV UP PARAM F/U: HCPCS | Performed by: NURSE PRACTITIONER

## 2023-08-01 PROCEDURE — 93000 ELECTROCARDIOGRAM COMPLETE: CPT | Performed by: NURSE PRACTITIONER

## 2023-08-01 PROCEDURE — G8427 DOCREV CUR MEDS BY ELIG CLIN: HCPCS | Performed by: NURSE PRACTITIONER

## 2023-08-01 RX ORDER — INCLISIRAN 284 MG/1.5ML
284 INJECTION, SOLUTION SUBCUTANEOUS ONCE
Qty: 1.5 ML | Refills: 0 | Status: SHIPPED | OUTPATIENT
Start: 2023-08-01 | End: 2023-08-01

## 2023-08-01 RX ORDER — ISOSORBIDE MONONITRATE 60 MG/1
TABLET, EXTENDED RELEASE ORAL
COMMUNITY

## 2023-08-01 ASSESSMENT — ENCOUNTER SYMPTOMS
COUGH: 0
SHORTNESS OF BREATH: 0

## 2023-08-01 NOTE — PROGRESS NOTES
gives PDA   and PLV , PDA has proximal 70-80 % lesion   3. RCA is non dominant small     LMCA: Normal (no stenosis %) and No Angiographicalyl Significant  Disease. widely patent     LAD: Abnormal.ostial LAD has 90 % lesion ,distal LAD is patent, 3 small  diagonal branches patent       Lesion on Prox LAD: 99% stenosis  LCx: Abnormal and Chronic occlusion. Widely patent Dominant Circ . OM1 is 100 %  occluded , PDA has ostial 70-80 % stenosis, OM 2 is small and diffusely  diseased     Cath 1/12/21  . PCI to  100 % occluded OM1 using turnPike support and   Fielder XT wire followed by serial balloon and placement of 2.75   X 34 JAI with Edy III flow   2. PCI to PDA using 2.75 X 15 JAI reduced 90 % lesion to 0 %   with Edy III flow   3. LAD ostial stent is patent   4. LVEDP was 16 mmHG     Echo 5/24/2022   Summary   Left ventricular function is mildly suppressed, EF 45-50 %. Grade II diastolic dysfunction. The left ventricle is dilated. Mildly dilated left atrium. Mitral annular calcification is present. Mild mitral, tricuspid, and pulmonic regurgitation is present. No evidence of pericardial effusion. ASSESSMENT/PLAN:  ASCVD  S/p PCI to ostial LAD for 90 %lesion in Nov 2021 , OM has 100 % occluded, PDA comes off Circ and has 70-80 % lesion .  Continue aspirin and bralinta due to multivessel pci and h/o  NSTEMI    He had  PCI to PDa and OM  in Jan 2022 but OM /circ was  and post PCi had very small vessel ,intitially was planning  needs repeat ballooning or stent in 6 weeks after pci ,by March 2022 but since he is doing well will not plan it now but repeat echo , continue DAPT till Dec 2022   Advised to not to use  Imdur so that he can use  Viagra, if Viagra does not work will go back to [facility]  Use ranexa 500 mg bid          Ischemic cardiomyopathy  EF of 20-25 % in Oct 2021 , Tolerating  toprol Xl 50 mg and lisinopril 40 mg daily EF in Jan 2022 was improved to 40-45 % , He  stopped wearing life vest

## 2023-08-01 NOTE — PATIENT INSTRUCTIONS
are not setup at the other healthcare locations! We are committed to providing you the best care possible. If you receive a survey after visiting one of our offices, please take time to share your experience concerning your physician office visit. These surveys are confidential and no health information about you is shared. We are eager to improve for you and we are counting on your feedback to help make that happen.

## 2023-08-02 ENCOUNTER — TELEPHONE (OUTPATIENT)
Dept: CARDIOLOGY CLINIC | Age: 43
End: 2023-08-02

## 2023-08-09 ENCOUNTER — PROCEDURE VISIT (OUTPATIENT)
Dept: CARDIOLOGY CLINIC | Age: 43
End: 2023-08-09
Payer: MEDICARE

## 2023-08-09 DIAGNOSIS — E78.5 DYSLIPIDEMIA: ICD-10-CM

## 2023-08-09 DIAGNOSIS — Z78.9 STATIN INTOLERANCE: ICD-10-CM

## 2023-08-09 DIAGNOSIS — I25.10 ASCVD (ARTERIOSCLEROTIC CARDIOVASCULAR DISEASE): ICD-10-CM

## 2023-08-09 DIAGNOSIS — K52.9 CHRONIC DIARRHEA OF UNKNOWN ORIGIN: ICD-10-CM

## 2023-08-09 DIAGNOSIS — I25.5 ISCHEMIC CARDIOMYOPATHY: ICD-10-CM

## 2023-08-09 LAB
LV EF: 53 %
LVEF MODALITY: NORMAL

## 2023-08-09 PROCEDURE — 93306 TTE W/DOPPLER COMPLETE: CPT | Performed by: INTERNAL MEDICINE

## 2023-08-14 ENCOUNTER — TELEPHONE (OUTPATIENT)
Dept: CARDIOLOGY CLINIC | Age: 43
End: 2023-08-14

## 2023-08-14 NOTE — TELEPHONE ENCOUNTER
----- Message from BARBARA Mcclain CNP sent at 8/13/2023  9:59 PM EDT -----  unchanged  ----- Message -----  From: Santosh Lucio [facility] Cardiovascular Results From [facility] [place]  Sent: 8/10/2023   5:25 PM EDT  To: BARBARA Mcclain CNP    Summary   Left ventricular function is mildly normal, EF is estimated at 50-55%. Left ventricle is dilated. Mild left ventricular hypertrophy. Grade II diastolic dysfunction. Mildly dilated left atrium. Mitral annular calcification is present. Mild mitral, tricuspid and pulmonic regurgitation is present. RVSP is 25 mmHg. No evidence of pericardial effusion. When this echo is compared with the echo from 6/2022, no significant   interval changes have occurred. Left message on voice mail regarding results of echo.

## 2023-08-15 DIAGNOSIS — E78.5 DYSLIPIDEMIA: Primary | ICD-10-CM

## 2023-08-15 RX ORDER — EZETIMIBE 10 MG/1
10 TABLET ORAL DAILY
Qty: 30 TABLET | Refills: 5 | Status: SHIPPED | OUTPATIENT
Start: 2023-08-15

## 2023-08-21 RX ORDER — PEN NEEDLE, DIABETIC 31 G X1/4"
NEEDLE, DISPOSABLE MISCELLANEOUS
Qty: 100 EACH | Refills: 5 | Status: SHIPPED | OUTPATIENT
Start: 2023-08-21

## 2023-08-25 DIAGNOSIS — E11.9 TYPE 2 DIABETES MELLITUS WITHOUT COMPLICATION, WITH LONG-TERM CURRENT USE OF INSULIN (HCC): ICD-10-CM

## 2023-08-25 DIAGNOSIS — Z79.4 TYPE 2 DIABETES MELLITUS WITHOUT COMPLICATION, WITH LONG-TERM CURRENT USE OF INSULIN (HCC): ICD-10-CM

## 2023-08-28 DIAGNOSIS — Z79.4 TYPE 2 DIABETES MELLITUS WITHOUT COMPLICATION, WITH LONG-TERM CURRENT USE OF INSULIN (HCC): ICD-10-CM

## 2023-08-28 DIAGNOSIS — E11.9 TYPE 2 DIABETES MELLITUS WITHOUT COMPLICATION, WITH LONG-TERM CURRENT USE OF INSULIN (HCC): ICD-10-CM

## 2023-08-28 RX ORDER — GABAPENTIN 300 MG/1
300 CAPSULE ORAL 3 TIMES DAILY
Qty: 90 CAPSULE | Refills: 2 | Status: SHIPPED | OUTPATIENT
Start: 2023-08-28 | End: 2023-11-26

## 2023-08-28 RX ORDER — TIZANIDINE 2 MG/1
TABLET ORAL
Qty: 30 TABLET | Refills: 0 | Status: SHIPPED | OUTPATIENT
Start: 2023-08-28

## 2023-08-28 RX ORDER — GABAPENTIN 300 MG/1
CAPSULE ORAL
Qty: 90 CAPSULE | Refills: 2 | Status: SHIPPED | OUTPATIENT
Start: 2023-08-28 | End: 2023-08-28 | Stop reason: SDUPTHER

## 2023-08-28 RX ORDER — TRAZODONE HYDROCHLORIDE 50 MG/1
50 TABLET ORAL NIGHTLY PRN
Qty: 30 TABLET | Refills: 2 | Status: SHIPPED | OUTPATIENT
Start: 2023-08-28

## 2023-09-05 DIAGNOSIS — F41.9 ANXIETY: ICD-10-CM

## 2023-09-05 RX ORDER — BUSPIRONE HYDROCHLORIDE 5 MG/1
5 TABLET ORAL 2 TIMES DAILY
Qty: 60 TABLET | Refills: 1 | Status: SHIPPED | OUTPATIENT
Start: 2023-09-05

## 2023-09-18 RX ORDER — RANOLAZINE 500 MG/1
500 TABLET, EXTENDED RELEASE ORAL 2 TIMES DAILY
Qty: 60 TABLET | Refills: 5 | Status: SHIPPED | OUTPATIENT
Start: 2023-09-18

## 2023-09-18 RX ORDER — ATORVASTATIN CALCIUM 80 MG/1
80 TABLET, FILM COATED ORAL DAILY
Qty: 30 TABLET | Refills: 5 | Status: SHIPPED | OUTPATIENT
Start: 2023-09-18

## 2023-09-29 RX ORDER — TIZANIDINE 2 MG/1
TABLET ORAL
Qty: 30 TABLET | Refills: 0 | Status: SHIPPED | OUTPATIENT
Start: 2023-09-29

## 2023-10-04 ENCOUNTER — OFFICE VISIT (OUTPATIENT)
Dept: GASTROENTEROLOGY | Age: 43
End: 2023-10-04
Payer: MEDICARE

## 2023-10-04 VITALS
HEIGHT: 73 IN | SYSTOLIC BLOOD PRESSURE: 128 MMHG | BODY MASS INDEX: 30.4 KG/M2 | TEMPERATURE: 97.9 F | OXYGEN SATURATION: 98 % | WEIGHT: 229.4 LBS | HEART RATE: 51 BPM | DIASTOLIC BLOOD PRESSURE: 78 MMHG

## 2023-10-04 DIAGNOSIS — R19.5 LOOSE STOOLS: ICD-10-CM

## 2023-10-04 DIAGNOSIS — Z86.010 HX OF ADENOMATOUS COLONIC POLYPS: ICD-10-CM

## 2023-10-04 DIAGNOSIS — K21.9 GASTROESOPHAGEAL REFLUX DISEASE WITHOUT ESOPHAGITIS: ICD-10-CM

## 2023-10-04 DIAGNOSIS — D64.9 ANEMIA, UNSPECIFIED TYPE: Primary | ICD-10-CM

## 2023-10-04 PROCEDURE — 1036F TOBACCO NON-USER: CPT | Performed by: NURSE PRACTITIONER

## 2023-10-04 PROCEDURE — G8427 DOCREV CUR MEDS BY ELIG CLIN: HCPCS | Performed by: NURSE PRACTITIONER

## 2023-10-04 PROCEDURE — 99213 OFFICE O/P EST LOW 20 MIN: CPT | Performed by: NURSE PRACTITIONER

## 2023-10-04 PROCEDURE — G8484 FLU IMMUNIZE NO ADMIN: HCPCS | Performed by: NURSE PRACTITIONER

## 2023-10-04 PROCEDURE — G8417 CALC BMI ABV UP PARAM F/U: HCPCS | Performed by: NURSE PRACTITIONER

## 2023-10-04 RX ORDER — DICYCLOMINE HYDROCHLORIDE 10 MG/1
10 CAPSULE ORAL 4 TIMES DAILY PRN
Qty: 120 CAPSULE | Refills: 2 | Status: SHIPPED | OUTPATIENT
Start: 2023-10-04

## 2023-10-04 RX ORDER — OMEPRAZOLE 20 MG/1
20 CAPSULE, DELAYED RELEASE ORAL DAILY
Qty: 30 CAPSULE | Refills: 5 | Status: SHIPPED | OUTPATIENT
Start: 2023-10-04

## 2023-10-04 NOTE — PROGRESS NOTES
tracheal deviation. Cardiovascular:      Rate and Rhythm: Normal rate and regular rhythm. Pulses: Normal pulses. Heart sounds: Normal heart sounds. No murmur heard. No friction rub. No gallop. Pulmonary:      Effort: Pulmonary effort is normal. No respiratory distress. Breath sounds: Normal breath sounds. No stridor. No wheezing, rhonchi or rales. Chest:      Chest wall: No tenderness. Abdominal:      General: Bowel sounds are normal. There is no distension. Palpations: Abdomen is soft. There is no mass. Tenderness: There is no abdominal tenderness. There is no guarding or rebound. Hernia: No hernia is present. Musculoskeletal:         General: Normal range of motion. Cervical back: Normal range of motion and neck supple. Lymphadenopathy:      Cervical: No cervical adenopathy. Skin:     General: Skin is warm and dry. Neurological:      Mental Status: He is alert and oriented to person, place, and time. Psychiatric:         Mood and Affect: Mood normal.         Procedure visit on 08/09/2023   Component Date Value Ref Range Status    Left Ventricular Ejection Fraction 08/09/2023 53   Final-Edited    LVEF MODALITY 08/09/2023 ECHO   Final-Edited       Assessment and Plan:  1. Normocytic anemia of uncertain etiology most likely due to chronic disease. His H&H is stable. Currently the patient did not have signs of GI bleeding. Recommend avoidance of NSAID's and improved glycemic control. Recommend periodic monitoring of H&H.     2.  History of adenomatous colon polyps recommend repeat colonoscopy in three years due to serrated adenoma. 3.  GERD that is stable without dysphagia or odynophagia. His EGD showed small hiatal hernia otherwise probable Osullivan's esophagus appearance,normal appearing stomach and duodenal bulb. His esophageal biopsies showed mixed inflammation with reactive changes with no evidence of Osullivan's esophagus.   His duodenal biopsies

## 2023-10-17 ENCOUNTER — OFFICE VISIT (OUTPATIENT)
Dept: INTERNAL MEDICINE CLINIC | Age: 43
End: 2023-10-17
Payer: MEDICARE

## 2023-10-17 ENCOUNTER — HOSPITAL ENCOUNTER (OUTPATIENT)
Dept: CT IMAGING | Age: 43
Discharge: HOME | End: 2023-10-17
Payer: MEDICARE

## 2023-10-17 VITALS
SYSTOLIC BLOOD PRESSURE: 138 MMHG | DIASTOLIC BLOOD PRESSURE: 86 MMHG | WEIGHT: 233.8 LBS | BODY MASS INDEX: 30.99 KG/M2 | OXYGEN SATURATION: 99 % | HEIGHT: 73 IN | HEART RATE: 53 BPM | RESPIRATION RATE: 16 BRPM

## 2023-10-17 DIAGNOSIS — F41.9 ANXIETY: ICD-10-CM

## 2023-10-17 DIAGNOSIS — L02.214 ABSCESS OF GROIN, RIGHT: Primary | ICD-10-CM

## 2023-10-17 DIAGNOSIS — L02.214 ABSCESS OF GROIN, RIGHT: ICD-10-CM

## 2023-10-17 DIAGNOSIS — E11.9 TYPE 2 DIABETES MELLITUS WITHOUT COMPLICATION, WITH LONG-TERM CURRENT USE OF INSULIN (HCC): ICD-10-CM

## 2023-10-17 DIAGNOSIS — Z79.4 TYPE 2 DIABETES MELLITUS WITHOUT COMPLICATION, WITH LONG-TERM CURRENT USE OF INSULIN (HCC): ICD-10-CM

## 2023-10-17 DIAGNOSIS — I25.118 ATHEROSCLEROTIC HEART DISEASE OF NATIVE CORONARY ARTERY WITH OTHER FORMS OF ANGINA PECTORIS (HCC): ICD-10-CM

## 2023-10-17 DIAGNOSIS — E55.9 VITAMIN D DEFICIENCY: ICD-10-CM

## 2023-10-17 LAB
EGFR, POC: 27 ML/MIN/1.73M2
POC CREATININE: 2.9 MG/DL (ref 0.9–1.3)

## 2023-10-17 PROCEDURE — 82565 ASSAY OF CREATININE: CPT

## 2023-10-17 PROCEDURE — G8427 DOCREV CUR MEDS BY ELIG CLIN: HCPCS | Performed by: NURSE PRACTITIONER

## 2023-10-17 PROCEDURE — 1036F TOBACCO NON-USER: CPT | Performed by: NURSE PRACTITIONER

## 2023-10-17 PROCEDURE — 2022F DILAT RTA XM EVC RTNOPTHY: CPT | Performed by: NURSE PRACTITIONER

## 2023-10-17 PROCEDURE — 99214 OFFICE O/P EST MOD 30 MIN: CPT | Performed by: NURSE PRACTITIONER

## 2023-10-17 PROCEDURE — G8417 CALC BMI ABV UP PARAM F/U: HCPCS | Performed by: NURSE PRACTITIONER

## 2023-10-17 PROCEDURE — 74176 CT ABD & PELVIS W/O CONTRAST: CPT

## 2023-10-17 PROCEDURE — 3044F HG A1C LEVEL LT 7.0%: CPT | Performed by: NURSE PRACTITIONER

## 2023-10-17 PROCEDURE — G8484 FLU IMMUNIZE NO ADMIN: HCPCS | Performed by: NURSE PRACTITIONER

## 2023-10-17 RX ORDER — CLINDAMYCIN HYDROCHLORIDE 300 MG/1
300 CAPSULE ORAL 3 TIMES DAILY
Qty: 30 CAPSULE | Refills: 0 | Status: SHIPPED | OUTPATIENT
Start: 2023-10-17 | End: 2023-10-27

## 2023-10-17 ASSESSMENT — ENCOUNTER SYMPTOMS
ABDOMINAL PAIN: 0
WHEEZING: 0
SORE THROAT: 0
DIARRHEA: 0
ABDOMINAL DISTENTION: 0
CHEST TIGHTNESS: 0
COUGH: 0
NAUSEA: 0
SINUS PAIN: 0
SHORTNESS OF BREATH: 0
CONSTIPATION: 0
SINUS PRESSURE: 0
EYES NEGATIVE: 1
COLOR CHANGE: 1

## 2023-10-17 NOTE — RT PROTOCOL NOTE
SPOKE TO DR. Sally Freeman, PT HAD CREAT 2.94 GFR 26-UNABLE TO GIVE CONTRAST. DR. Sally Freeman GAVE VERBAL ORDER TO CHANGE TO A WO CONTRAST DUE TO PT LAB VALUES

## 2023-10-18 ENCOUNTER — OFFICE VISIT (OUTPATIENT)
Dept: SURGERY | Age: 43
End: 2023-10-18
Payer: MEDICARE

## 2023-10-18 VITALS
HEART RATE: 66 BPM | SYSTOLIC BLOOD PRESSURE: 128 MMHG | WEIGHT: 232.2 LBS | DIASTOLIC BLOOD PRESSURE: 90 MMHG | BODY MASS INDEX: 30.77 KG/M2 | OXYGEN SATURATION: 99 % | HEIGHT: 73 IN

## 2023-10-18 DIAGNOSIS — L02.91 ABSCESS: Primary | ICD-10-CM

## 2023-10-18 PROCEDURE — 99203 OFFICE O/P NEW LOW 30 MIN: CPT | Performed by: SURGERY

## 2023-10-18 PROCEDURE — G8484 FLU IMMUNIZE NO ADMIN: HCPCS | Performed by: SURGERY

## 2023-10-18 PROCEDURE — 1036F TOBACCO NON-USER: CPT | Performed by: SURGERY

## 2023-10-18 PROCEDURE — G8417 CALC BMI ABV UP PARAM F/U: HCPCS | Performed by: SURGERY

## 2023-10-18 PROCEDURE — G8427 DOCREV CUR MEDS BY ELIG CLIN: HCPCS | Performed by: SURGERY

## 2023-10-24 ENCOUNTER — HOSPITAL ENCOUNTER (OUTPATIENT)
Age: 43
Discharge: HOME | End: 2023-10-24
Payer: MEDICARE

## 2023-10-24 DIAGNOSIS — E11.9 TYPE 2 DIABETES MELLITUS WITHOUT COMPLICATION, WITH LONG-TERM CURRENT USE OF INSULIN (HCC): ICD-10-CM

## 2023-10-24 DIAGNOSIS — Z79.4 TYPE 2 DIABETES MELLITUS WITHOUT COMPLICATION, WITH LONG-TERM CURRENT USE OF INSULIN (HCC): ICD-10-CM

## 2023-10-24 DIAGNOSIS — I25.118 ATHEROSCLEROTIC HEART DISEASE OF NATIVE CORONARY ARTERY WITH OTHER FORMS OF ANGINA PECTORIS (HCC): ICD-10-CM

## 2023-10-24 LAB
ALBUMIN SERPL-MCNC: 3.6 GM/DL (ref 3.4–5)
ALP BLD-CCNC: 110 IU/L (ref 40–128)
ALT SERPL-CCNC: 13 U/L (ref 10–40)
ANION GAP SERPL CALCULATED.3IONS-SCNC: 9 MMOL/L (ref 4–16)
AST SERPL-CCNC: 21 IU/L (ref 15–37)
BASOPHILS ABSOLUTE: 0.1 K/CU MM
BASOPHILS RELATIVE PERCENT: 0.9 % (ref 0–1)
BILIRUB SERPL-MCNC: 0.2 MG/DL (ref 0–1)
BUN SERPL-MCNC: 30 MG/DL (ref 6–23)
CALCIUM SERPL-MCNC: 9.1 MG/DL (ref 8.3–10.6)
CHLORIDE BLD-SCNC: 102 MMOL/L (ref 99–110)
CHOLESTEROL, FASTING: 246 MG/DL
CO2: 23 MMOL/L (ref 21–32)
CREAT SERPL-MCNC: 2.6 MG/DL (ref 0.9–1.3)
CREAT UR-MCNC: 86.7 MG/DL (ref 39–259)
DIFFERENTIAL TYPE: ABNORMAL
EOSINOPHILS ABSOLUTE: 0.2 K/CU MM
EOSINOPHILS RELATIVE PERCENT: 4.5 % (ref 0–3)
ESTIMATED AVERAGE GLUCOSE: 143 MG/DL
GFR SERPL CREATININE-BSD FRML MDRD: 30 ML/MIN/1.73M2
GLUCOSE SERPL-MCNC: 130 MG/DL (ref 70–99)
HBA1C MFR BLD: 6.6 % (ref 4.2–6.3)
HCT VFR BLD CALC: 35.2 % (ref 42–52)
HDLC SERPL-MCNC: 22 MG/DL
HEMOGLOBIN: 11.3 GM/DL (ref 13.5–18)
IMMATURE NEUTROPHIL %: 0.6 % (ref 0–0.43)
LDLC SERPL CALC-MCNC: ABNORMAL MG/DL
LDLC SERPL-MCNC: 107 MG/DL
LYMPHOCYTES ABSOLUTE: 1.6 K/CU MM
LYMPHOCYTES RELATIVE PERCENT: 29.5 % (ref 24–44)
MCH RBC QN AUTO: 29.6 PG (ref 27–31)
MCHC RBC AUTO-ENTMCNC: 32.1 % (ref 32–36)
MCV RBC AUTO: 92.1 FL (ref 78–100)
MICROALBUMIN 24H UR-MCNC: 5075 MG/DL
MICROALBUMIN/CREAT UR-RTO: >440 MG/G CREAT (ref 0–30)
MONOCYTES ABSOLUTE: 0.4 K/CU MM
MONOCYTES RELATIVE PERCENT: 6.6 % (ref 0–4)
NUCLEATED RBC %: 0 %
PDW BLD-RTO: 12.5 % (ref 11.7–14.9)
PLATELET # BLD: 184 K/CU MM (ref 140–440)
PMV BLD AUTO: 10.9 FL (ref 7.5–11.1)
POTASSIUM SERPL-SCNC: 5.2 MMOL/L (ref 3.5–5.1)
RBC # BLD: 3.82 M/CU MM (ref 4.6–6.2)
SEGMENTED NEUTROPHILS ABSOLUTE COUNT: 3.1 K/CU MM
SEGMENTED NEUTROPHILS RELATIVE PERCENT: 57.9 % (ref 36–66)
SODIUM BLD-SCNC: 134 MMOL/L (ref 135–145)
TOTAL IMMATURE NEUTOROPHIL: 0.03 K/CU MM
TOTAL NUCLEATED RBC: 0 K/CU MM
TOTAL PROTEIN: 7.3 GM/DL (ref 6.4–8.2)
TRIGLYCERIDE, FASTING: 767 MG/DL
WBC # BLD: 5.3 K/CU MM (ref 4–10.5)

## 2023-10-24 PROCEDURE — 36415 COLL VENOUS BLD VENIPUNCTURE: CPT

## 2023-10-24 PROCEDURE — 82043 UR ALBUMIN QUANTITATIVE: CPT

## 2023-10-24 PROCEDURE — 85025 COMPLETE CBC W/AUTO DIFF WBC: CPT

## 2023-10-24 PROCEDURE — 83036 HEMOGLOBIN GLYCOSYLATED A1C: CPT

## 2023-10-24 PROCEDURE — 82570 ASSAY OF URINE CREATININE: CPT

## 2023-10-24 PROCEDURE — 83721 ASSAY OF BLOOD LIPOPROTEIN: CPT

## 2023-10-24 PROCEDURE — 80061 LIPID PANEL: CPT

## 2023-10-24 PROCEDURE — 80053 COMPREHEN METABOLIC PANEL: CPT

## 2023-10-25 DIAGNOSIS — N28.9: Primary | ICD-10-CM

## 2023-10-25 RX ORDER — FENOFIBRATE 160 MG/1
160 TABLET ORAL DAILY
Qty: 90 TABLET | Refills: 1 | Status: SHIPPED | OUTPATIENT
Start: 2023-10-25

## 2023-10-30 RX ORDER — TIZANIDINE 2 MG/1
TABLET ORAL
Qty: 30 TABLET | Refills: 0 | Status: SHIPPED | OUTPATIENT
Start: 2023-10-30

## 2023-10-31 DIAGNOSIS — F41.9 ANXIETY: ICD-10-CM

## 2023-10-31 PROBLEM — N18.32 STAGE 3B CHRONIC KIDNEY DISEASE (HCC): Status: ACTIVE | Noted: 2023-10-31

## 2023-10-31 PROBLEM — E66.3 OVERWEIGHT: Status: ACTIVE | Noted: 2023-10-31

## 2023-10-31 RX ORDER — BUSPIRONE HYDROCHLORIDE 5 MG/1
5 TABLET ORAL 2 TIMES DAILY
Qty: 60 TABLET | Refills: 1 | Status: SHIPPED | OUTPATIENT
Start: 2023-10-31

## 2023-11-01 ENCOUNTER — OFFICE VISIT (OUTPATIENT)
Dept: SURGERY | Age: 43
End: 2023-11-01
Payer: MEDICARE

## 2023-11-01 VITALS
BODY MASS INDEX: 30.23 KG/M2 | DIASTOLIC BLOOD PRESSURE: 82 MMHG | HEART RATE: 51 BPM | SYSTOLIC BLOOD PRESSURE: 132 MMHG | OXYGEN SATURATION: 99 % | WEIGHT: 229.1 LBS

## 2023-11-01 DIAGNOSIS — L02.91 ABSCESS: Primary | ICD-10-CM

## 2023-11-01 PROCEDURE — G8417 CALC BMI ABV UP PARAM F/U: HCPCS | Performed by: SURGERY

## 2023-11-01 PROCEDURE — G8484 FLU IMMUNIZE NO ADMIN: HCPCS | Performed by: SURGERY

## 2023-11-01 PROCEDURE — 1036F TOBACCO NON-USER: CPT | Performed by: SURGERY

## 2023-11-01 PROCEDURE — 99213 OFFICE O/P EST LOW 20 MIN: CPT | Performed by: SURGERY

## 2023-11-01 PROCEDURE — G8427 DOCREV CUR MEDS BY ELIG CLIN: HCPCS | Performed by: SURGERY

## 2023-11-01 NOTE — PROGRESS NOTES
(2/9/2023)    Hunger Vital Sign     Worried About Running Out of Food in the Last Year: Never true     [address] in the Last Year: Never true   Transportation Needs: Unknown (2/9/2023)    PRAPARE - Transportation     Lack of Transportation (Medical): Not on file     Lack of Transportation (Non-Medical): No   Physical Activity: Not on file   Stress: Not on file   Social Connections: Not on file   Intimate Partner Violence: Not on file   Housing Stability: Unknown (2/9/2023)    Housing Stability Vital Sign     Unable to Pay for Housing in the Last Year: Not on file     Number of Places Lived in the Last Year: Not on file     Unstable Housing in the Last Year: No       OBJECTIVE:    General: A&O x3  Respiratory: Chest rise equal bilaterally  CV: Regular rate and rhythm  Abdomen: Soft, nontender, nondistended, no rebound or guarding. Right scrotal lesion almost completely resolved. No fluctuance or induration or erythema. ASSESSMENT:    1. Abscess          PLAN:    Improving. He has completed antibiotics and it continues to improve. Almost completely resolved. Patient noted to follow-up if any concern is worsening. Otherwise follow-up as needed. No orders of the defined types were placed in this encounter. No orders of the defined types were placed in this encounter. Follow Up: No follow-ups on file.     Hyacinth Ellis DO
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
## 2023-11-17 DIAGNOSIS — F41.9 ANXIETY: ICD-10-CM

## 2023-11-17 RX ORDER — BUSPIRONE HYDROCHLORIDE 5 MG/1
5 TABLET ORAL 2 TIMES DAILY
Qty: 180 TABLET | Refills: 1 | Status: SHIPPED | OUTPATIENT
Start: 2023-11-17

## 2023-11-23 DIAGNOSIS — Z79.4 TYPE 2 DIABETES MELLITUS WITHOUT COMPLICATION, WITH LONG-TERM CURRENT USE OF INSULIN (HCC): ICD-10-CM

## 2023-11-23 DIAGNOSIS — E11.9 TYPE 2 DIABETES MELLITUS WITHOUT COMPLICATION, WITH LONG-TERM CURRENT USE OF INSULIN (HCC): ICD-10-CM

## 2023-11-27 RX ORDER — GABAPENTIN 300 MG/1
300 CAPSULE ORAL 3 TIMES DAILY
Qty: 90 CAPSULE | Refills: 2 | Status: SHIPPED | OUTPATIENT
Start: 2023-11-27 | End: 2024-02-25

## 2023-11-28 RX ORDER — TIZANIDINE 2 MG/1
TABLET ORAL
Qty: 30 TABLET | Refills: 0 | Status: SHIPPED | OUTPATIENT
Start: 2023-11-28

## 2023-12-11 ENCOUNTER — HOSPITAL ENCOUNTER (OUTPATIENT)
Age: 43
Discharge: HOME | End: 2023-12-11
Payer: MEDICARE

## 2023-12-11 ENCOUNTER — TELEPHONE (OUTPATIENT)
Dept: INTERNAL MEDICINE CLINIC | Age: 43
End: 2023-12-11

## 2023-12-11 DIAGNOSIS — I50.22 CHRONIC SYSTOLIC CONGESTIVE HEART FAILURE (HCC): ICD-10-CM

## 2023-12-11 DIAGNOSIS — Z79.4 TYPE 2 DIABETES MELLITUS WITH CHRONIC KIDNEY DISEASE, WITH LONG-TERM CURRENT USE OF INSULIN, UNSPECIFIED CKD STAGE (HCC): ICD-10-CM

## 2023-12-11 DIAGNOSIS — N18.32 STAGE 3B CHRONIC KIDNEY DISEASE (HCC): ICD-10-CM

## 2023-12-11 DIAGNOSIS — E11.22 TYPE 2 DIABETES MELLITUS WITH CHRONIC KIDNEY DISEASE, WITH LONG-TERM CURRENT USE OF INSULIN, UNSPECIFIED CKD STAGE (HCC): ICD-10-CM

## 2023-12-11 DIAGNOSIS — E66.3 OVERWEIGHT: ICD-10-CM

## 2023-12-11 DIAGNOSIS — R63.4 ABNORMAL WEIGHT LOSS: ICD-10-CM

## 2023-12-11 DIAGNOSIS — E11.9 TYPE 2 DIABETES MELLITUS WITHOUT COMPLICATION, WITH LONG-TERM CURRENT USE OF INSULIN (HCC): ICD-10-CM

## 2023-12-11 DIAGNOSIS — E78.5 DYSLIPIDEMIA: ICD-10-CM

## 2023-12-11 DIAGNOSIS — R80.1 PERSISTENT PROTEINURIA: ICD-10-CM

## 2023-12-11 DIAGNOSIS — Z78.9 STATIN INTOLERANCE: ICD-10-CM

## 2023-12-11 DIAGNOSIS — F41.9 ANXIETY: ICD-10-CM

## 2023-12-11 DIAGNOSIS — Z79.4 TYPE 2 DIABETES MELLITUS WITHOUT COMPLICATION, WITH LONG-TERM CURRENT USE OF INSULIN (HCC): ICD-10-CM

## 2023-12-11 DIAGNOSIS — I25.118 ATHEROSCLEROTIC HEART DISEASE OF NATIVE CORONARY ARTERY WITH OTHER FORMS OF ANGINA PECTORIS (HCC): ICD-10-CM

## 2023-12-11 LAB
ALBUMIN SERPL-MCNC: 4.4 GM/DL (ref 3.4–5)
ALBUMIN SERPL-MCNC: 4.5 GM/DL (ref 3.4–5)
ALP BLD-CCNC: 57 IU/L (ref 40–129)
ALP BLD-CCNC: 58 IU/L (ref 40–129)
ALT SERPL-CCNC: 73 U/L (ref 10–40)
ALT SERPL-CCNC: 74 U/L (ref 10–40)
ANION GAP SERPL CALCULATED.3IONS-SCNC: 14 MMOL/L (ref 4–16)
ANION GAP SERPL CALCULATED.3IONS-SCNC: 14 MMOL/L (ref 4–16)
AST SERPL-CCNC: 64 IU/L (ref 15–37)
AST SERPL-CCNC: 64 IU/L (ref 15–37)
BACTERIA: ABNORMAL /HPF
BASOPHILS ABSOLUTE: 0 K/CU MM
BASOPHILS RELATIVE PERCENT: 0.9 % (ref 0–1)
BILIRUB SERPL-MCNC: 0.2 MG/DL (ref 0–1)
BILIRUB SERPL-MCNC: 0.2 MG/DL (ref 0–1)
BILIRUBIN URINE: NEGATIVE MG/DL
BLOOD, URINE: ABNORMAL
BUN SERPL-MCNC: 52 MG/DL (ref 6–23)
BUN SERPL-MCNC: 52 MG/DL (ref 6–23)
CALCIUM SERPL-MCNC: 9.2 MG/DL (ref 8.3–10.6)
CALCIUM SERPL-MCNC: 9.4 MG/DL (ref 8.3–10.6)
CHLORIDE BLD-SCNC: 103 MMOL/L (ref 99–110)
CHLORIDE BLD-SCNC: 103 MMOL/L (ref 99–110)
CLARITY: CLEAR
CO2: 18 MMOL/L (ref 21–32)
CO2: 18 MMOL/L (ref 21–32)
COLOR: YELLOW
CREAT SERPL-MCNC: 4.2 MG/DL (ref 0.9–1.3)
CREAT SERPL-MCNC: 4.3 MG/DL (ref 0.9–1.3)
CREATININE URINE: 65.5 MG/DL (ref 39–259)
DIFFERENTIAL TYPE: ABNORMAL
EOSINOPHILS ABSOLUTE: 0.2 K/CU MM
EOSINOPHILS RELATIVE PERCENT: 4.4 % (ref 0–3)
GFR SERPL CREATININE-BSD FRML MDRD: 17 ML/MIN/1.73M2
GFR SERPL CREATININE-BSD FRML MDRD: 17 ML/MIN/1.73M2
GLUCOSE SERPL-MCNC: 120 MG/DL (ref 70–99)
GLUCOSE SERPL-MCNC: 121 MG/DL (ref 70–99)
GLUCOSE, URINE: >1000 MG/DL
HCT VFR BLD CALC: 36.4 % (ref 42–52)
HEMOGLOBIN: 12 GM/DL (ref 13.5–18)
IMMATURE NEUTROPHIL %: 0.3 % (ref 0–0.43)
INR BLD: 1 INDEX
KETONES, URINE: NEGATIVE MG/DL
LEUKOCYTE ESTERASE, URINE: NEGATIVE
LYMPHOCYTES ABSOLUTE: 1.3 K/CU MM
LYMPHOCYTES RELATIVE PERCENT: 38.4 % (ref 24–44)
MAGNESIUM: 2 MG/DL (ref 1.8–2.4)
MCH RBC QN AUTO: 30.5 PG (ref 27–31)
MCHC RBC AUTO-ENTMCNC: 33 % (ref 32–36)
MCV RBC AUTO: 92.4 FL (ref 78–100)
MONOCYTES ABSOLUTE: 0.3 K/CU MM
MONOCYTES RELATIVE PERCENT: 8.1 % (ref 0–4)
MUCUS: ABNORMAL HPF
NITRITE URINE, QUANTITATIVE: NEGATIVE
NUCLEATED RBC %: 0 %
PDW BLD-RTO: 13 % (ref 11.7–14.9)
PH, URINE: 5.5 (ref 5–8)
PHOSPHORUS: 4.5 MG/DL (ref 2.5–4.9)
PLATELET # BLD: 184 K/CU MM (ref 140–440)
PMV BLD AUTO: 11.5 FL (ref 7.5–11.1)
POTASSIUM SERPL-SCNC: 5.9 MMOL/L (ref 3.5–5.1)
POTASSIUM SERPL-SCNC: 6 MMOL/L (ref 3.5–5.1)
PROT/CREAT RATIO, UR: 5.2
PROTEIN UA: >300 MG/DL
PROTHROMBIN TIME: 13.2 SECONDS (ref 11.7–14.5)
RBC # BLD: 3.94 M/CU MM (ref 4.6–6.2)
RBC URINE: 2 /HPF (ref 0–3)
SEGMENTED NEUTROPHILS ABSOLUTE COUNT: 1.7 K/CU MM
SEGMENTED NEUTROPHILS RELATIVE PERCENT: 47.9 % (ref 36–66)
SODIUM BLD-SCNC: 135 MMOL/L (ref 135–145)
SODIUM BLD-SCNC: 135 MMOL/L (ref 135–145)
SPECIFIC GRAVITY UA: 1.02 (ref 1–1.03)
TOTAL IMMATURE NEUTOROPHIL: 0.01 K/CU MM
TOTAL NUCLEATED RBC: 0 K/CU MM
TOTAL PROTEIN: 8.7 GM/DL (ref 6.4–8.2)
TOTAL PROTEIN: 8.8 GM/DL (ref 6.4–8.2)
TRICHOMONAS: ABNORMAL /HPF
TSH SERPL DL<=0.005 MIU/L-ACNC: 1.42 UIU/ML (ref 0.27–4.2)
URINE TOTAL PROTEIN: 338.8 MG/DL
UROBILINOGEN, URINE: 0.2 MG/DL (ref 0.2–1)
WBC # BLD: 3.4 K/CU MM (ref 4–10.5)
WBC UA: <1 /HPF (ref 0–2)

## 2023-12-11 PROCEDURE — 36415 COLL VENOUS BLD VENIPUNCTURE: CPT

## 2023-12-11 PROCEDURE — 85610 PROTHROMBIN TIME: CPT

## 2023-12-11 PROCEDURE — 80061 LIPID PANEL: CPT

## 2023-12-11 PROCEDURE — 84156 ASSAY OF PROTEIN URINE: CPT

## 2023-12-11 PROCEDURE — 84155 ASSAY OF PROTEIN SERUM: CPT

## 2023-12-11 PROCEDURE — 85025 COMPLETE CBC W/AUTO DIFF WBC: CPT

## 2023-12-11 PROCEDURE — 80053 COMPREHEN METABOLIC PANEL: CPT

## 2023-12-11 PROCEDURE — 81001 URINALYSIS AUTO W/SCOPE: CPT

## 2023-12-11 PROCEDURE — 84100 ASSAY OF PHOSPHORUS: CPT

## 2023-12-11 PROCEDURE — 83036 HEMOGLOBIN GLYCOSYLATED A1C: CPT

## 2023-12-11 PROCEDURE — 83970 ASSAY OF PARATHORMONE: CPT

## 2023-12-11 PROCEDURE — 84166 PROTEIN E-PHORESIS/URINE/CSF: CPT

## 2023-12-11 PROCEDURE — 83735 ASSAY OF MAGNESIUM: CPT

## 2023-12-11 PROCEDURE — 86160 COMPLEMENT ANTIGEN: CPT

## 2023-12-11 PROCEDURE — 82040 ASSAY OF SERUM ALBUMIN: CPT

## 2023-12-11 PROCEDURE — 84165 PROTEIN E-PHORESIS SERUM: CPT

## 2023-12-11 PROCEDURE — 82570 ASSAY OF URINE CREATININE: CPT

## 2023-12-11 PROCEDURE — 84443 ASSAY THYROID STIM HORMONE: CPT

## 2023-12-11 PROCEDURE — 80074 ACUTE HEPATITIS PANEL: CPT

## 2023-12-11 PROCEDURE — 80048 BASIC METABOLIC PNL TOTAL CA: CPT

## 2023-12-11 NOTE — TELEPHONE ENCOUNTER
Patient's potassium is very elevated and his kidney function continues to drop quickly. Given his cardiac hx, this is especially dangerous and with his renal impairment may not clear the potassium as well. I recommend going to the ER.

## 2023-12-12 PROBLEM — N18.4 CHRONIC KIDNEY DISEASE, STAGE IV (SEVERE) (HCC): Status: ACTIVE | Noted: 2023-12-12

## 2023-12-12 PROBLEM — E87.5 HYPERKALEMIA: Status: ACTIVE | Noted: 2023-12-12

## 2023-12-13 LAB
C3 SERPL-MCNC: 172 MG/DL (ref 90–180)
C4 SERPL-MCNC: 14 MG/DL (ref 10–40)
CHOLEST SERPL-MCNC: 277 MG/DL
CHOLEST SERPL-MCNC: 278 MG/DL
ESTIMATED AVERAGE GLUCOSE: 134 MG/DL
ESTIMATED AVERAGE GLUCOSE: 134 MG/DL
HAV IGM SERPL QL IA: NON REACTIVE
HBA1C MFR BLD: 6.3 % (ref 4.2–6.3)
HBA1C MFR BLD: 6.3 % (ref 4.2–6.3)
HBV CORE IGM SERPL QL IA: NON REACTIVE
HBV SURFACE AG SERPL QL IA: NON REACTIVE
HCV AB SERPL QL IA: NON REACTIVE
HDLC SERPL-MCNC: 22 MG/DL
HDLC SERPL-MCNC: 24 MG/DL
LDLC SERPL CALC-MCNC: 176 MG/DL
LDLC SERPL CALC-MCNC: 179 MG/DL
PTH-INTACT SERPL-MCNC: 107 PG/ML (ref 15–65)
TRIGL SERPL-MCNC: 386 MG/DL
TRIGL SERPL-MCNC: 387 MG/DL

## 2023-12-14 ENCOUNTER — TELEPHONE (OUTPATIENT)
Dept: CARDIOLOGY CLINIC | Age: 43
End: 2023-12-14

## 2023-12-14 NOTE — TELEPHONE ENCOUNTER
Spoke with pt advised him to start atorvastatin back and was okay to take with fenofibrate. Has been off atorvastatin for a month.

## 2023-12-14 NOTE — TELEPHONE ENCOUNTER
Spoke with PT he has not been taking his atorvastatin for at least 1 month, he has been taking fenofibrate

## 2023-12-14 NOTE — TELEPHONE ENCOUNTER
----- Message from BARBARA Navarro CNP sent at 12/14/2023  7:54 AM EST -----  Very high cholesterol.  Please verify taking medication if so how long at current dose  ----- Message -----  [facility] From [employer]  Sent: 12/13/2023   3:44 PM EST  To: BARBARA Navarro CNP

## 2023-12-14 NOTE — TELEPHONE ENCOUNTER
----- Message from BARBARA Chavarria CNP sent at 12/14/2023  7:54 AM EST -----  Very high cholesterol.  Please verify taking medication if so how long at current dose  ----- Message -----  From: [facility] Incoming Lab Results From [employer] ([employer])  Sent: 12/13/2023   3:44 PM EST  To: BARBARA Chavarria CNP

## 2023-12-15 ENCOUNTER — TELEPHONE (OUTPATIENT)
Dept: CARDIOLOGY CLINIC | Age: 43
End: 2023-12-15

## 2023-12-15 LAB
ALBUMIN SERPL ELPH-MCNC: 3.7 GM/DL (ref 3.2–5.6)
ALBUMIN, U: 56 %
ALPHA-1-GLOBULIN, U: 4 %
ALPHA-1-GLOBULIN: 0.3 GM/DL (ref 0.1–0.4)
ALPHA-2-GLOBULIN, U: 9 %
ALPHA-2-GLOBULIN: 1 GM/DL (ref 0.4–1.2)
BETA GLOBULIN, U: 13 %
BETA GLOBULIN: 1.3 GM/DL (ref 0.5–1.3)
GAMMA GLOBULIN, U: 19 %
GAMMA GLOBULIN: 2.3 GM/DL (ref 0.5–1.6)
SPEP INTERPRETATION: ABNORMAL
TOTAL PROTEIN: 8.7 GM/DL (ref 6.4–8.2)
URINE TOTAL PROTEIN: 338.8 MG/DL

## 2023-12-15 NOTE — TELEPHONE ENCOUNTER
----- Message from Alton Mcnally MA sent at 12/14/2023  4:59 PM EST -----    ----- Message -----  From: Richard Pham MD  Sent: 12/14/2023   4:07 PM EST  To: Alton Mcnally MA    Lipid numbers are still high    Spoke to pt regarding labs. Pt stated he wasn't taking the lipitor, but will restart with fenofibrate.  Patient advised and voices understanding.'

## 2023-12-26 LAB
Lab: NORMAL
TEST NAME: NORMAL

## 2023-12-30 DIAGNOSIS — E11.9 TYPE 2 DIABETES MELLITUS WITHOUT COMPLICATION, WITH LONG-TERM CURRENT USE OF INSULIN (HCC): ICD-10-CM

## 2023-12-30 DIAGNOSIS — Z79.4 TYPE 2 DIABETES MELLITUS WITHOUT COMPLICATION, WITH LONG-TERM CURRENT USE OF INSULIN (HCC): ICD-10-CM

## 2024-01-02 RX ORDER — LISINOPRIL 40 MG/1
40 TABLET ORAL DAILY
Qty: 90 TABLET | Refills: 3 | OUTPATIENT
Start: 2024-01-02

## 2024-01-02 RX ORDER — INSULIN GLARGINE 100 [IU]/ML
INJECTION, SOLUTION SUBCUTANEOUS
Qty: 15 ML | Refills: 2 | Status: SHIPPED | OUTPATIENT
Start: 2024-01-02

## 2024-01-02 RX ORDER — TIZANIDINE 2 MG/1
TABLET ORAL
Qty: 30 TABLET | Refills: 0 | Status: SHIPPED | OUTPATIENT
Start: 2024-01-02

## 2024-01-04 ENCOUNTER — HOSPITAL ENCOUNTER (OUTPATIENT)
Age: 44
Discharge: HOME | End: 2024-01-04
Payer: MEDICARE

## 2024-01-04 DIAGNOSIS — R80.1 PERSISTENT PROTEINURIA: ICD-10-CM

## 2024-01-04 DIAGNOSIS — N18.4 CHRONIC KIDNEY DISEASE, STAGE IV (SEVERE) (HCC): ICD-10-CM

## 2024-01-04 DIAGNOSIS — Z79.4 TYPE 2 DIABETES MELLITUS WITHOUT COMPLICATION, WITH LONG-TERM CURRENT USE OF INSULIN (HCC): ICD-10-CM

## 2024-01-04 DIAGNOSIS — E87.5 HYPERKALEMIA: ICD-10-CM

## 2024-01-04 DIAGNOSIS — E66.3 OVERWEIGHT: ICD-10-CM

## 2024-01-04 DIAGNOSIS — I50.22 CHRONIC SYSTOLIC CONGESTIVE HEART FAILURE (HCC): ICD-10-CM

## 2024-01-04 DIAGNOSIS — F41.9 ANXIETY: ICD-10-CM

## 2024-01-04 DIAGNOSIS — E11.9 TYPE 2 DIABETES MELLITUS WITHOUT COMPLICATION, WITH LONG-TERM CURRENT USE OF INSULIN (HCC): ICD-10-CM

## 2024-01-04 LAB
ALBUMIN SERPL-MCNC: 3.8 GM/DL (ref 3.4–5)
ANION GAP SERPL CALCULATED.3IONS-SCNC: 12 MMOL/L (ref 7–16)
BACTERIA: NEGATIVE /HPF
BASOPHILS ABSOLUTE: 0 K/CU MM
BASOPHILS RELATIVE PERCENT: 1.1 % (ref 0–1)
BILIRUBIN URINE: NEGATIVE MG/DL
BLOOD, URINE: ABNORMAL
BUN SERPL-MCNC: 48 MG/DL (ref 6–23)
CALCIUM SERPL-MCNC: 8.5 MG/DL (ref 8.3–10.6)
CHLORIDE BLD-SCNC: 101 MMOL/L (ref 99–110)
CLARITY: CLEAR
CO2: 19 MMOL/L (ref 21–32)
COLOR: YELLOW
CREAT SERPL-MCNC: 4.1 MG/DL (ref 0.9–1.3)
CREATININE URINE: 72 MG/DL (ref 39–259)
DIFFERENTIAL TYPE: ABNORMAL
EOSINOPHILS ABSOLUTE: 0.1 K/CU MM
EOSINOPHILS RELATIVE PERCENT: 4 % (ref 0–3)
GFR SERPL CREATININE-BSD FRML MDRD: 18 ML/MIN/1.73M2
GLUCOSE SERPL-MCNC: 148 MG/DL (ref 70–99)
GLUCOSE, URINE: >1000 MG/DL
HBV SURFACE AB SERPL IA-ACNC: >1000 M[IU]/ML
HBV SURFACE AG SERPL QL IA: NON REACTIVE
HCT VFR BLD CALC: 32.2 % (ref 42–52)
HEMOGLOBIN: 10.6 GM/DL (ref 13.5–18)
IMMATURE NEUTROPHIL %: 0.3 % (ref 0–0.43)
KETONES, URINE: NEGATIVE MG/DL
LEUKOCYTE ESTERASE, URINE: NEGATIVE
LYMPHOCYTES ABSOLUTE: 1.2 K/CU MM
LYMPHOCYTES RELATIVE PERCENT: 33.2 % (ref 24–44)
MAGNESIUM: 1.8 MG/DL (ref 1.8–2.4)
MCH RBC QN AUTO: 30.5 PG (ref 27–31)
MCHC RBC AUTO-ENTMCNC: 32.9 % (ref 32–36)
MCV RBC AUTO: 92.5 FL (ref 78–100)
MONOCYTES ABSOLUTE: 0.3 K/CU MM
MONOCYTES RELATIVE PERCENT: 7.1 % (ref 0–4)
NITRITE URINE, QUANTITATIVE: NEGATIVE
NUCLEATED RBC %: 0 %
PDW BLD-RTO: 13 % (ref 11.7–14.9)
PH, URINE: 6 (ref 5–8)
PHOSPHORUS: 4.3 MG/DL (ref 2.5–4.9)
PLATELET # BLD: 166 K/CU MM (ref 140–440)
PMV BLD AUTO: 11.5 FL (ref 7.5–11.1)
POTASSIUM SERPL-SCNC: 5.1 MMOL/L (ref 3.5–5.1)
PROT/CREAT RATIO, UR: 5
PROTEIN UA: >300 MG/DL
RBC # BLD: 3.48 M/CU MM (ref 4.6–6.2)
RBC URINE: 3 /HPF (ref 0–3)
SEGMENTED NEUTROPHILS ABSOLUTE COUNT: 1.9 K/CU MM
SEGMENTED NEUTROPHILS RELATIVE PERCENT: 54.3 % (ref 36–66)
SODIUM BLD-SCNC: 132 MMOL/L (ref 135–145)
SPECIFIC GRAVITY UA: 1.02 (ref 1–1.03)
TOTAL IMMATURE NEUTOROPHIL: 0.01 K/CU MM
TOTAL NUCLEATED RBC: 0 K/CU MM
TRICHOMONAS: NORMAL /HPF
URINE TOTAL PROTEIN: 361.8 MG/DL
UROBILINOGEN, URINE: 0.2 MG/DL (ref 0.2–1)
WBC # BLD: 3.5 K/CU MM (ref 4–10.5)
WBC UA: <1 /HPF (ref 0–2)

## 2024-01-04 PROCEDURE — 82040 ASSAY OF SERUM ALBUMIN: CPT

## 2024-01-04 PROCEDURE — 87340 HEPATITIS B SURFACE AG IA: CPT

## 2024-01-04 PROCEDURE — 86706 HEP B SURFACE ANTIBODY: CPT

## 2024-01-04 PROCEDURE — 84100 ASSAY OF PHOSPHORUS: CPT

## 2024-01-04 PROCEDURE — 84156 ASSAY OF PROTEIN URINE: CPT

## 2024-01-04 PROCEDURE — 83970 ASSAY OF PARATHORMONE: CPT

## 2024-01-04 PROCEDURE — 82570 ASSAY OF URINE CREATININE: CPT

## 2024-01-04 PROCEDURE — 81001 URINALYSIS AUTO W/SCOPE: CPT

## 2024-01-04 PROCEDURE — 80048 BASIC METABOLIC PNL TOTAL CA: CPT

## 2024-01-04 PROCEDURE — 85025 COMPLETE CBC W/AUTO DIFF WBC: CPT

## 2024-01-04 PROCEDURE — 36415 COLL VENOUS BLD VENIPUNCTURE: CPT

## 2024-01-04 PROCEDURE — 83735 ASSAY OF MAGNESIUM: CPT

## 2024-01-05 LAB — PTH-INTACT SERPL-MCNC: 138 PG/ML (ref 15–65)

## 2024-01-08 RX ORDER — LISINOPRIL 40 MG/1
40 TABLET ORAL DAILY
Qty: 90 TABLET | Refills: 3 | OUTPATIENT
Start: 2024-01-08

## 2024-01-17 ENCOUNTER — OFFICE VISIT (OUTPATIENT)
Dept: INTERNAL MEDICINE CLINIC | Age: 44
End: 2024-01-17
Payer: MEDICARE

## 2024-01-17 VITALS
WEIGHT: 226 LBS | HEART RATE: 92 BPM | RESPIRATION RATE: 22 BRPM | BODY MASS INDEX: 29.95 KG/M2 | SYSTOLIC BLOOD PRESSURE: 130 MMHG | DIASTOLIC BLOOD PRESSURE: 78 MMHG | OXYGEN SATURATION: 99 % | HEIGHT: 73 IN

## 2024-01-17 DIAGNOSIS — E11.9 TYPE 2 DIABETES MELLITUS WITHOUT COMPLICATION, WITH LONG-TERM CURRENT USE OF INSULIN (HCC): ICD-10-CM

## 2024-01-17 DIAGNOSIS — R11.2 NAUSEA AND VOMITING, UNSPECIFIED VOMITING TYPE: ICD-10-CM

## 2024-01-17 DIAGNOSIS — N18.6 ESRD (END STAGE RENAL DISEASE) (HCC): ICD-10-CM

## 2024-01-17 DIAGNOSIS — K21.9 GASTROESOPHAGEAL REFLUX DISEASE WITHOUT ESOPHAGITIS: ICD-10-CM

## 2024-01-17 DIAGNOSIS — F41.9 ANXIETY: ICD-10-CM

## 2024-01-17 DIAGNOSIS — Z79.4 TYPE 2 DIABETES MELLITUS WITHOUT COMPLICATION, WITH LONG-TERM CURRENT USE OF INSULIN (HCC): ICD-10-CM

## 2024-01-17 DIAGNOSIS — I25.118 ATHEROSCLEROTIC HEART DISEASE OF NATIVE CORONARY ARTERY WITH OTHER FORMS OF ANGINA PECTORIS (HCC): ICD-10-CM

## 2024-01-17 DIAGNOSIS — Z00.00 ENCOUNTER FOR PREVENTIVE HEALTH EXAMINATION: Primary | ICD-10-CM

## 2024-01-17 PROCEDURE — G8482 FLU IMMUNIZE ORDER/ADMIN: HCPCS | Performed by: NURSE PRACTITIONER

## 2024-01-17 PROCEDURE — 96372 THER/PROPH/DIAG INJ SC/IM: CPT | Performed by: NURSE PRACTITIONER

## 2024-01-17 PROCEDURE — 99396 PREV VISIT EST AGE 40-64: CPT | Performed by: NURSE PRACTITIONER

## 2024-01-17 RX ORDER — PROMETHAZINE HYDROCHLORIDE 12.5 MG/1
12.5 TABLET ORAL 4 TIMES DAILY PRN
Qty: 20 TABLET | Refills: 0 | Status: ON HOLD | OUTPATIENT
Start: 2024-01-17 | End: 2024-01-24

## 2024-01-17 RX ORDER — PROMETHAZINE HYDROCHLORIDE 25 MG/ML
25 INJECTION, SOLUTION INTRAMUSCULAR; INTRAVENOUS ONCE
Status: COMPLETED | OUTPATIENT
Start: 2024-01-17 | End: 2024-01-17

## 2024-01-17 RX ADMIN — PROMETHAZINE HYDROCHLORIDE 25 MG: 25 INJECTION, SOLUTION INTRAMUSCULAR; INTRAVENOUS at 11:30

## 2024-01-17 ASSESSMENT — PATIENT HEALTH QUESTIONNAIRE - PHQ9
SUM OF ALL RESPONSES TO PHQ QUESTIONS 1-9: 0
DEPRESSION UNABLE TO ASSESS: FUNCTIONAL CAPACITY MOTIVATION LIMITS ACCURACY
2. FEELING DOWN, DEPRESSED OR HOPELESS: 0
SUM OF ALL RESPONSES TO PHQ QUESTIONS 1-9: 0
1. LITTLE INTEREST OR PLEASURE IN DOING THINGS: 0
SUM OF ALL RESPONSES TO PHQ9 QUESTIONS 1 & 2: 0
SUM OF ALL RESPONSES TO PHQ QUESTIONS 1-9: 0
SUM OF ALL RESPONSES TO PHQ QUESTIONS 1-9: 0

## 2024-01-17 ASSESSMENT — ENCOUNTER SYMPTOMS
CONSTIPATION: 0
COLOR CHANGE: 0
NAUSEA: 1
SHORTNESS OF BREATH: 0
WHEEZING: 0
ABDOMINAL DISTENTION: 0
DIARRHEA: 0
CHEST TIGHTNESS: 0
COUGH: 0
SINUS PAIN: 0
ABDOMINAL PAIN: 0
SINUS PRESSURE: 0
EYES NEGATIVE: 1
SORE THROAT: 0
VOMITING: 1
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
## 2024-01-19 ENCOUNTER — HOSPITAL ENCOUNTER (INPATIENT)
Age: 44
LOS: 19 days | Discharge: HOME | DRG: 969 | End: 2024-02-08
Attending: STUDENT IN AN ORGANIZED HEALTH CARE EDUCATION/TRAINING PROGRAM | Admitting: INTERNAL MEDICINE
Payer: MEDICARE

## 2024-01-19 ENCOUNTER — APPOINTMENT (OUTPATIENT)
Dept: GENERAL RADIOLOGY | Age: 44
DRG: 969 | End: 2024-01-19
Payer: MEDICARE

## 2024-01-19 ENCOUNTER — APPOINTMENT (OUTPATIENT)
Dept: CT IMAGING | Age: 44
DRG: 969 | End: 2024-01-19
Payer: MEDICARE

## 2024-01-19 DIAGNOSIS — J18.9 PNEUMONIA DUE TO INFECTIOUS ORGANISM, UNSPECIFIED LATERALITY, UNSPECIFIED PART OF LUNG: ICD-10-CM

## 2024-01-19 DIAGNOSIS — R71.0 DROP IN HEMOGLOBIN: ICD-10-CM

## 2024-01-19 DIAGNOSIS — R16.1 SPLENOMEGALY: ICD-10-CM

## 2024-01-19 DIAGNOSIS — N17.9 ACUTE RENAL FAILURE SUPERIMPOSED ON STAGE 4 CHRONIC KIDNEY DISEASE, UNSPECIFIED ACUTE RENAL FAILURE TYPE (HCC): ICD-10-CM

## 2024-01-19 DIAGNOSIS — I42.9 CARDIOMYOPATHY, UNSPECIFIED TYPE (HCC): ICD-10-CM

## 2024-01-19 DIAGNOSIS — N18.4 ACUTE RENAL FAILURE SUPERIMPOSED ON STAGE 4 CHRONIC KIDNEY DISEASE, UNSPECIFIED ACUTE RENAL FAILURE TYPE (HCC): ICD-10-CM

## 2024-01-19 DIAGNOSIS — E87.1 HYPONATREMIA: Primary | ICD-10-CM

## 2024-01-19 DIAGNOSIS — R11.2 NAUSEA AND VOMITING, UNSPECIFIED VOMITING TYPE: ICD-10-CM

## 2024-01-19 LAB
ALBUMIN SERPL-MCNC: 3.1 GM/DL (ref 3.4–5)
ALP BLD-CCNC: 64 IU/L (ref 40–129)
ALT SERPL-CCNC: 98 U/L (ref 10–40)
AMORPHOUS: ABNORMAL /HPF
ANION GAP SERPL CALCULATED.3IONS-SCNC: 24 MMOL/L (ref 7–16)
AST SERPL-CCNC: 403 IU/L (ref 15–37)
B PARAP IS1001 DNA NPH QL NAA+NON-PROBE: NOT DETECTED
B PERT.PT PRMT NPH QL NAA+NON-PROBE: NOT DETECTED
B-OH-BUTYR SERPL-MCNC: 8.3 MG/DL (ref 0–3)
BACTERIA: NEGATIVE /HPF
BASE EXCESS: 11 (ref 0–3)
BASOPHILS ABSOLUTE: 0 K/CU MM
BASOPHILS RELATIVE PERCENT: 0.1 % (ref 0–1)
BILIRUB SERPL-MCNC: 1.5 MG/DL (ref 0–1)
BILIRUBIN URINE: ABNORMAL MG/DL
BLOOD, URINE: ABNORMAL
BUN SERPL-MCNC: 75 MG/DL (ref 6–23)
C PNEUM DNA NPH QL NAA+NON-PROBE: NOT DETECTED
CALCIUM SERPL-MCNC: 8.4 MG/DL (ref 8.3–10.6)
CHLORIDE BLD-SCNC: 79 MMOL/L (ref 99–110)
CLARITY: CLEAR
CO2: 14 MMOL/L (ref 21–32)
COLOR: YELLOW
COMMENT: ABNORMAL
CREAT SERPL-MCNC: 5.7 MG/DL (ref 0.9–1.3)
DIFFERENTIAL TYPE: ABNORMAL
EOSINOPHILS ABSOLUTE: 0 K/CU MM
EOSINOPHILS RELATIVE PERCENT: 0 % (ref 0–3)
FLUAV H1 2009 PAN RNA NPH NAA+NON-PROBE: NOT DETECTED
FLUAV H1 RNA NPH QL NAA+NON-PROBE: NOT DETECTED
FLUAV H3 RNA NPH QL NAA+NON-PROBE: NOT DETECTED
FLUAV RNA NPH QL NAA+NON-PROBE: NOT DETECTED
FLUBV RNA NPH QL NAA+NON-PROBE: NOT DETECTED
GFR SERPL CREATININE-BSD FRML MDRD: 12 ML/MIN/1.73M2
GLUCOSE BLD-MCNC: 193 MG/DL (ref 70–99)
GLUCOSE SERPL-MCNC: 176 MG/DL (ref 70–99)
GLUCOSE, URINE: >1000 MG/DL
HADV DNA NPH QL NAA+NON-PROBE: NOT DETECTED
HCO3 VENOUS: 14.7 MMOL/L (ref 22–29)
HCOV 229E RNA NPH QL NAA+NON-PROBE: NOT DETECTED
HCOV HKU1 RNA NPH QL NAA+NON-PROBE: NOT DETECTED
HCOV NL63 RNA NPH QL NAA+NON-PROBE: NOT DETECTED
HCOV OC43 RNA NPH QL NAA+NON-PROBE: NOT DETECTED
HCT VFR BLD CALC: 32.8 % (ref 42–52)
HEMOGLOBIN: 11.5 GM/DL (ref 13.5–18)
HMPV RNA NPH QL NAA+NON-PROBE: NOT DETECTED
HPIV1 RNA NPH QL NAA+NON-PROBE: NOT DETECTED
HPIV2 RNA NPH QL NAA+NON-PROBE: NOT DETECTED
HPIV3 RNA NPH QL NAA+NON-PROBE: NOT DETECTED
HPIV4 RNA NPH QL NAA+NON-PROBE: NOT DETECTED
IMMATURE NEUTROPHIL %: 1.6 % (ref 0–0.43)
KETONES, URINE: ABNORMAL MG/DL
LACTATE: 1.5 MMOL/L (ref 0.5–1.9)
LEUKOCYTE ESTERASE, URINE: NEGATIVE
LIPASE: 448 IU/L (ref 13–60)
LYMPHOCYTES ABSOLUTE: 0.3 K/CU MM
LYMPHOCYTES RELATIVE PERCENT: 4 % (ref 24–44)
M PNEUMO DNA NPH QL NAA+NON-PROBE: NOT DETECTED
MAGNESIUM: 2.2 MG/DL (ref 1.8–2.4)
MCH RBC QN AUTO: 29.6 PG (ref 27–31)
MCHC RBC AUTO-ENTMCNC: 35.1 % (ref 32–36)
MCV RBC AUTO: 84.5 FL (ref 78–100)
MONOCYTES ABSOLUTE: 0.4 K/CU MM
MONOCYTES RELATIVE PERCENT: 5 % (ref 0–4)
MUCUS: ABNORMAL HPF
NITRITE URINE, QUANTITATIVE: NEGATIVE
NUCLEATED RBC %: 0 %
O2 SAT, VEN: 50.1 % (ref 50–70)
OSMOLALITY UR: 400 MOS/L (ref 292–1090)
PCO2, VEN: 32 MMHG (ref 41–51)
PDW BLD-RTO: 12.5 % (ref 11.7–14.9)
PH VENOUS: 7.27 (ref 7.32–7.43)
PH, URINE: 5.5 (ref 5–8)
PLATELET # BLD: 169 K/CU MM (ref 140–440)
PMV BLD AUTO: 12.5 FL (ref 7.5–11.1)
PO2, VEN: 34 MMHG (ref 28–48)
POTASSIUM SERPL-SCNC: 3.8 MMOL/L (ref 3.5–5.1)
PRO-BNP: 5285 PG/ML
PROTEIN UA: >300 MG/DL
RBC # BLD: 3.88 M/CU MM (ref 4.6–6.2)
RBC URINE: 39 /HPF (ref 0–3)
RSV RNA NPH QL NAA+NON-PROBE: NOT DETECTED
RV+EV RNA NPH QL NAA+NON-PROBE: NOT DETECTED
SARS-COV-2 RNA NPH QL NAA+NON-PROBE: NOT DETECTED
SEGMENTED NEUTROPHILS ABSOLUTE COUNT: 7.2 K/CU MM
SEGMENTED NEUTROPHILS RELATIVE PERCENT: 89.3 % (ref 36–66)
SODIUM BLD-SCNC: 117 MMOL/L (ref 135–145)
SODIUM URINE: 16 MMOL/L (ref 35–167)
SPECIFIC GRAVITY UA: 1.02 (ref 1–1.03)
TOTAL IMMATURE NEUTOROPHIL: 0.13 K/CU MM
TOTAL NUCLEATED RBC: 0 K/CU MM
TOTAL PROTEIN: 8.2 GM/DL (ref 6.4–8.2)
TRICHOMONAS: ABNORMAL /HPF
TROPONIN, HIGH SENSITIVITY: 243 NG/L (ref 0–22)
TROPONIN, HIGH SENSITIVITY: 253 NG/L (ref 0–22)
UROBILINOGEN, URINE: 0.2 MG/DL (ref 0.2–1)
WBC # BLD: 8 K/CU MM (ref 4–10.5)
WBC UA: 1 /HPF (ref 0–2)

## 2024-01-19 PROCEDURE — 82962 GLUCOSE BLOOD TEST: CPT

## 2024-01-19 PROCEDURE — 0202U NFCT DS 22 TRGT SARS-COV-2: CPT

## 2024-01-19 PROCEDURE — 82010 KETONE BODYS QUAN: CPT

## 2024-01-19 PROCEDURE — 83935 ASSAY OF URINE OSMOLALITY: CPT

## 2024-01-19 PROCEDURE — 83735 ASSAY OF MAGNESIUM: CPT

## 2024-01-19 PROCEDURE — 71045 X-RAY EXAM CHEST 1 VIEW: CPT

## 2024-01-19 PROCEDURE — 81001 URINALYSIS AUTO W/SCOPE: CPT

## 2024-01-19 PROCEDURE — 2580000003 HC RX 258: Performed by: PHYSICIAN ASSISTANT

## 2024-01-19 PROCEDURE — 83690 ASSAY OF LIPASE: CPT

## 2024-01-19 PROCEDURE — 87040 BLOOD CULTURE FOR BACTERIA: CPT

## 2024-01-19 PROCEDURE — 84484 ASSAY OF TROPONIN QUANT: CPT

## 2024-01-19 PROCEDURE — 85025 COMPLETE CBC W/AUTO DIFF WBC: CPT

## 2024-01-19 PROCEDURE — 83605 ASSAY OF LACTIC ACID: CPT

## 2024-01-19 PROCEDURE — 83880 ASSAY OF NATRIURETIC PEPTIDE: CPT

## 2024-01-19 PROCEDURE — 82805 BLOOD GASES W/O2 SATURATION: CPT

## 2024-01-19 PROCEDURE — 93005 ELECTROCARDIOGRAM TRACING: CPT | Performed by: PHYSICIAN ASSISTANT

## 2024-01-19 PROCEDURE — 71250 CT THORAX DX C-: CPT

## 2024-01-19 PROCEDURE — 82550 ASSAY OF CK (CPK): CPT

## 2024-01-19 PROCEDURE — 84300 ASSAY OF URINE SODIUM: CPT

## 2024-01-19 PROCEDURE — 70450 CT HEAD/BRAIN W/O DYE: CPT

## 2024-01-19 PROCEDURE — 99285 EMERGENCY DEPT VISIT HI MDM: CPT

## 2024-01-19 PROCEDURE — 80053 COMPREHEN METABOLIC PANEL: CPT

## 2024-01-19 RX ORDER — SODIUM CHLORIDE 9 MG/ML
INJECTION, SOLUTION INTRAVENOUS CONTINUOUS
Status: DISCONTINUED | OUTPATIENT
Start: 2024-01-19 | End: 2024-01-20

## 2024-01-19 RX ORDER — ONDANSETRON 2 MG/ML
4 INJECTION INTRAMUSCULAR; INTRAVENOUS EVERY 6 HOURS PRN
Status: DISCONTINUED | OUTPATIENT
Start: 2024-01-19 | End: 2024-01-20 | Stop reason: SDUPTHER

## 2024-01-19 RX ADMIN — SODIUM CHLORIDE: 9 INJECTION, SOLUTION INTRAVENOUS at 22:09

## 2024-01-20 ENCOUNTER — APPOINTMENT (OUTPATIENT)
Dept: GENERAL RADIOLOGY | Age: 44
DRG: 969 | End: 2024-01-20
Payer: MEDICARE

## 2024-01-20 PROBLEM — E87.1 HYPONATREMIA: Status: ACTIVE | Noted: 2024-01-20

## 2024-01-20 LAB
ALBUMIN SERPL-MCNC: 2.4 GM/DL (ref 3.4–5)
ALP BLD-CCNC: 58 IU/L (ref 40–129)
ALT SERPL-CCNC: 90 U/L (ref 10–40)
ANION GAP SERPL CALCULATED.3IONS-SCNC: 24 MMOL/L (ref 7–16)
ANION GAP SERPL CALCULATED.3IONS-SCNC: 24 MMOL/L (ref 7–16)
APTT: 34.2 SECONDS (ref 25.1–37.1)
AST SERPL-CCNC: 414 IU/L (ref 15–37)
BASOPHILS ABSOLUTE: 0 K/CU MM
BASOPHILS RELATIVE PERCENT: 0.1 % (ref 0–1)
BILIRUB SERPL-MCNC: 3.2 MG/DL (ref 0–1)
BILIRUBIN DIRECT: 1.7 MG/DL (ref 0–0.3)
BILIRUBIN, INDIRECT: 1.5 MG/DL (ref 0–0.7)
BUN SERPL-MCNC: 87 MG/DL (ref 6–23)
BUN SERPL-MCNC: 95 MG/DL (ref 6–23)
CALCIUM IONIZED: 3.6 MG/DL (ref 4.48–5.28)
CALCIUM SERPL-MCNC: 7 MG/DL (ref 8.3–10.6)
CALCIUM SERPL-MCNC: 7.7 MG/DL (ref 8.3–10.6)
CHLORIDE BLD-SCNC: 81 MMOL/L (ref 99–110)
CHLORIDE BLD-SCNC: 83 MMOL/L (ref 99–110)
CHLORIDE URINE RANDOM: 16 MMOL/L (ref 43–210)
CO2: 10 MMOL/L (ref 21–32)
CO2: 14 MMOL/L (ref 21–32)
CREAT SERPL-MCNC: 6.5 MG/DL (ref 0.9–1.3)
CREAT SERPL-MCNC: 6.6 MG/DL (ref 0.9–1.3)
DIFFERENTIAL TYPE: ABNORMAL
EOSINOPHILS ABSOLUTE: 0 K/CU MM
EOSINOPHILS RELATIVE PERCENT: 0 % (ref 0–3)
ESTIMATED AVERAGE GLUCOSE: 148 MG/DL
GFR SERPL CREATININE-BSD FRML MDRD: 10 ML/MIN/1.73M2
GFR SERPL CREATININE-BSD FRML MDRD: 10 ML/MIN/1.73M2
GLUCOSE BLD-MCNC: 180 MG/DL (ref 70–99)
GLUCOSE BLD-MCNC: 218 MG/DL (ref 70–99)
GLUCOSE BLD-MCNC: 222 MG/DL (ref 70–99)
GLUCOSE BLD-MCNC: 234 MG/DL (ref 70–99)
GLUCOSE BLD-MCNC: 298 MG/DL (ref 70–99)
GLUCOSE SERPL-MCNC: 203 MG/DL (ref 70–99)
GLUCOSE SERPL-MCNC: 213 MG/DL (ref 70–99)
HBA1C MFR BLD: 6.8 % (ref 4.2–6.3)
HBV SURFACE AB SERPL IA-ACNC: >1000 M[IU]/ML
HBV SURFACE AG SERPL QL IA: NON REACTIVE
HCT VFR BLD CALC: 28.8 % (ref 42–52)
HCV AB SERPL QL IA: NON REACTIVE
HEMOGLOBIN: 10.3 GM/DL (ref 13.5–18)
IMMATURE NEUTROPHIL %: 1.3 % (ref 0–0.43)
INR BLD: 1.3 INDEX
IONIZED CA: 0.9 MMOL/L (ref 1.12–1.32)
L PNEUMO AG UR QL IA: POSITIVE
LYMPHOCYTES ABSOLUTE: 0.3 K/CU MM
LYMPHOCYTES RELATIVE PERCENT: 4.5 % (ref 24–44)
MAGNESIUM: 2.4 MG/DL (ref 1.8–2.4)
MAGNESIUM: 2.5 MG/DL (ref 1.8–2.4)
MCH RBC QN AUTO: 29.9 PG (ref 27–31)
MCHC RBC AUTO-ENTMCNC: 35.8 % (ref 32–36)
MCV RBC AUTO: 83.7 FL (ref 78–100)
MONOCYTES ABSOLUTE: 0.2 K/CU MM
MONOCYTES RELATIVE PERCENT: 3.3 % (ref 0–4)
MRSA, DNA, NASAL: NEGATIVE
NUCLEATED RBC %: 0 %
OSMOLALITY UR: 283 MOS/L (ref 280–300)
PDW BLD-RTO: 12.7 % (ref 11.7–14.9)
PHOSPHORUS: 9 MG/DL (ref 2.5–4.9)
PHOSPHORUS: 9.4 MG/DL (ref 2.5–4.9)
PLATELET # BLD: 157 K/CU MM (ref 140–440)
PMV BLD AUTO: 12.8 FL (ref 7.5–11.1)
POTASSIUM SERPL-SCNC: 4 MMOL/L (ref 3.5–5.1)
POTASSIUM SERPL-SCNC: 4.1 MMOL/L (ref 3.5–5.1)
POTASSIUM, UR: 42 MMOL/L (ref 22–119)
PROTHROMBIN TIME: 16.5 SECONDS (ref 11.7–14.5)
RBC # BLD: 3.44 M/CU MM (ref 4.6–6.2)
S PNEUM AG CSF QL: NORMAL
SEGMENTED NEUTROPHILS ABSOLUTE COUNT: 6.1 K/CU MM
SEGMENTED NEUTROPHILS RELATIVE PERCENT: 90.8 % (ref 36–66)
SODIUM BLD-SCNC: 117 MMOL/L (ref 135–145)
SODIUM BLD-SCNC: 119 MMOL/L (ref 135–145)
SODIUM URINE: 21 MMOL/L (ref 35–167)
SPECIMEN DESCRIPTION: NORMAL
TOTAL IMMATURE NEUTOROPHIL: 0.09 K/CU MM
TOTAL NUCLEATED RBC: 0 K/CU MM
TOTAL PROTEIN: 5.8 GM/DL (ref 6.4–8.2)
WBC # BLD: 6.7 K/CU MM (ref 4–10.5)

## 2024-01-20 PROCEDURE — 83516 IMMUNOASSAY NONANTIBODY: CPT

## 2024-01-20 PROCEDURE — 82436 ASSAY OF URINE CHLORIDE: CPT

## 2024-01-20 PROCEDURE — 6370000000 HC RX 637 (ALT 250 FOR IP): Performed by: SPECIALIST

## 2024-01-20 PROCEDURE — 87449 NOS EACH ORGANISM AG IA: CPT

## 2024-01-20 PROCEDURE — 85610 PROTHROMBIN TIME: CPT

## 2024-01-20 PROCEDURE — 30233N1 TRANSFUSION OF NONAUTOLOGOUS RED BLOOD CELLS INTO PERIPHERAL VEIN, PERCUTANEOUS APPROACH: ICD-10-PCS | Performed by: PHYSICIAN ASSISTANT

## 2024-01-20 PROCEDURE — 83735 ASSAY OF MAGNESIUM: CPT

## 2024-01-20 PROCEDURE — 87389 HIV-1 AG W/HIV-1&-2 AB AG IA: CPT

## 2024-01-20 PROCEDURE — 86702 HIV-2 ANTIBODY: CPT

## 2024-01-20 PROCEDURE — 86738 MYCOPLASMA ANTIBODY: CPT

## 2024-01-20 PROCEDURE — 90935 HEMODIALYSIS ONE EVALUATION: CPT

## 2024-01-20 PROCEDURE — 86325 OTHER IMMUNOELECTROPHORESIS: CPT

## 2024-01-20 PROCEDURE — 84133 ASSAY OF URINE POTASSIUM: CPT

## 2024-01-20 PROCEDURE — 85025 COMPLETE CBC W/AUTO DIFF WBC: CPT

## 2024-01-20 PROCEDURE — 86704 HEP B CORE ANTIBODY TOTAL: CPT

## 2024-01-20 PROCEDURE — 84156 ASSAY OF PROTEIN URINE: CPT

## 2024-01-20 PROCEDURE — 83883 ASSAY NEPHELOMETRY NOT SPEC: CPT

## 2024-01-20 PROCEDURE — 02HV33Z INSERTION OF INFUSION DEVICE INTO SUPERIOR VENA CAVA, PERCUTANEOUS APPROACH: ICD-10-PCS | Performed by: PHYSICIAN ASSISTANT

## 2024-01-20 PROCEDURE — 82962 GLUCOSE BLOOD TEST: CPT

## 2024-01-20 PROCEDURE — 80048 BASIC METABOLIC PNL TOTAL CA: CPT

## 2024-01-20 PROCEDURE — 87899 AGENT NOS ASSAY W/OPTIC: CPT

## 2024-01-20 PROCEDURE — 6360000002 HC RX W HCPCS: Performed by: STUDENT IN AN ORGANIZED HEALTH CARE EDUCATION/TRAINING PROGRAM

## 2024-01-20 PROCEDURE — B543ZZA ULTRASONOGRAPHY OF RIGHT JUGULAR VEINS, GUIDANCE: ICD-10-PCS | Performed by: PHYSICIAN ASSISTANT

## 2024-01-20 PROCEDURE — 2500000003 HC RX 250 WO HCPCS: Performed by: STUDENT IN AN ORGANIZED HEALTH CARE EDUCATION/TRAINING PROGRAM

## 2024-01-20 PROCEDURE — 84100 ASSAY OF PHOSPHORUS: CPT

## 2024-01-20 PROCEDURE — 87340 HEPATITIS B SURFACE AG IA: CPT

## 2024-01-20 PROCEDURE — 84166 PROTEIN E-PHORESIS/URINE/CSF: CPT

## 2024-01-20 PROCEDURE — 82330 ASSAY OF CALCIUM: CPT

## 2024-01-20 PROCEDURE — 87641 MR-STAPH DNA AMP PROBE: CPT

## 2024-01-20 PROCEDURE — 6370000000 HC RX 637 (ALT 250 FOR IP): Performed by: STUDENT IN AN ORGANIZED HEALTH CARE EDUCATION/TRAINING PROGRAM

## 2024-01-20 PROCEDURE — 2000000000 HC ICU R&B

## 2024-01-20 PROCEDURE — 2580000003 HC RX 258: Performed by: STUDENT IN AN ORGANIZED HEALTH CARE EDUCATION/TRAINING PROGRAM

## 2024-01-20 PROCEDURE — 6360000002 HC RX W HCPCS: Performed by: PHYSICIAN ASSISTANT

## 2024-01-20 PROCEDURE — 86706 HEP B SURFACE ANTIBODY: CPT

## 2024-01-20 PROCEDURE — 83520 IMMUNOASSAY QUANT NOS NONAB: CPT

## 2024-01-20 PROCEDURE — 6370000000 HC RX 637 (ALT 250 FOR IP): Performed by: INTERNAL MEDICINE

## 2024-01-20 PROCEDURE — 83930 ASSAY OF BLOOD OSMOLALITY: CPT

## 2024-01-20 PROCEDURE — 82248 BILIRUBIN DIRECT: CPT

## 2024-01-20 PROCEDURE — 80053 COMPREHEN METABOLIC PANEL: CPT

## 2024-01-20 PROCEDURE — 94664 DEMO&/EVAL PT USE INHALER: CPT

## 2024-01-20 PROCEDURE — 5A1D70Z PERFORMANCE OF URINARY FILTRATION, INTERMITTENT, LESS THAN 6 HOURS PER DAY: ICD-10-PCS | Performed by: INTERNAL MEDICINE

## 2024-01-20 PROCEDURE — 94640 AIRWAY INHALATION TREATMENT: CPT

## 2024-01-20 PROCEDURE — 2580000003 HC RX 258: Performed by: PHYSICIAN ASSISTANT

## 2024-01-20 PROCEDURE — 94761 N-INVAS EAR/PLS OXIMETRY MLT: CPT

## 2024-01-20 PROCEDURE — 71045 X-RAY EXAM CHEST 1 VIEW: CPT

## 2024-01-20 PROCEDURE — 84300 ASSAY OF URINE SODIUM: CPT

## 2024-01-20 PROCEDURE — 2580000003 HC RX 258: Performed by: INTERNAL MEDICINE

## 2024-01-20 PROCEDURE — 86701 HIV-1ANTIBODY: CPT

## 2024-01-20 PROCEDURE — 85730 THROMBOPLASTIN TIME PARTIAL: CPT

## 2024-01-20 PROCEDURE — 83036 HEMOGLOBIN GLYCOSYLATED A1C: CPT

## 2024-01-20 PROCEDURE — 86255 FLUORESCENT ANTIBODY SCREEN: CPT

## 2024-01-20 PROCEDURE — 76937 US GUIDE VASCULAR ACCESS: CPT

## 2024-01-20 PROCEDURE — 2500000003 HC RX 250 WO HCPCS: Performed by: INTERNAL MEDICINE

## 2024-01-20 RX ORDER — ONDANSETRON 4 MG/1
4 TABLET, ORALLY DISINTEGRATING ORAL EVERY 8 HOURS PRN
Status: DISCONTINUED | OUTPATIENT
Start: 2024-01-20 | End: 2024-02-08 | Stop reason: HOSPADM

## 2024-01-20 RX ORDER — ONDANSETRON 2 MG/ML
4 INJECTION INTRAMUSCULAR; INTRAVENOUS EVERY 6 HOURS PRN
Status: DISCONTINUED | OUTPATIENT
Start: 2024-01-20 | End: 2024-02-08 | Stop reason: HOSPADM

## 2024-01-20 RX ORDER — METOPROLOL SUCCINATE 50 MG/1
50 TABLET, EXTENDED RELEASE ORAL DAILY
Status: DISCONTINUED | OUTPATIENT
Start: 2024-01-20 | End: 2024-01-24

## 2024-01-20 RX ORDER — BUSPIRONE HYDROCHLORIDE 5 MG/1
10 TABLET ORAL 3 TIMES DAILY
Status: DISCONTINUED | OUTPATIENT
Start: 2024-01-20 | End: 2024-02-08 | Stop reason: HOSPADM

## 2024-01-20 RX ORDER — PANTOPRAZOLE SODIUM 40 MG/1
40 TABLET, DELAYED RELEASE ORAL
Status: DISCONTINUED | OUTPATIENT
Start: 2024-01-20 | End: 2024-01-26

## 2024-01-20 RX ORDER — INSULIN GLARGINE 100 [IU]/ML
30 INJECTION, SOLUTION SUBCUTANEOUS NIGHTLY
Status: DISCONTINUED | OUTPATIENT
Start: 2024-01-20 | End: 2024-01-22

## 2024-01-20 RX ORDER — ISOSORBIDE MONONITRATE 30 MG/1
60 TABLET, EXTENDED RELEASE ORAL DAILY
Status: DISCONTINUED | OUTPATIENT
Start: 2024-01-20 | End: 2024-02-08 | Stop reason: HOSPADM

## 2024-01-20 RX ORDER — IPRATROPIUM BROMIDE AND ALBUTEROL SULFATE 2.5; .5 MG/3ML; MG/3ML
1 SOLUTION RESPIRATORY (INHALATION)
Status: DISCONTINUED | OUTPATIENT
Start: 2024-01-20 | End: 2024-01-20

## 2024-01-20 RX ORDER — GABAPENTIN 300 MG/1
300 CAPSULE ORAL 3 TIMES DAILY
Status: DISCONTINUED | OUTPATIENT
Start: 2024-01-20 | End: 2024-02-01

## 2024-01-20 RX ORDER — IPRATROPIUM BROMIDE AND ALBUTEROL SULFATE 2.5; .5 MG/3ML; MG/3ML
1 SOLUTION RESPIRATORY (INHALATION) EVERY 4 HOURS PRN
Status: DISCONTINUED | OUTPATIENT
Start: 2024-01-20 | End: 2024-02-08 | Stop reason: HOSPADM

## 2024-01-20 RX ORDER — ASPIRIN 81 MG/1
81 TABLET, CHEWABLE ORAL DAILY
Status: DISCONTINUED | OUTPATIENT
Start: 2024-01-20 | End: 2024-02-08 | Stop reason: HOSPADM

## 2024-01-20 RX ORDER — SODIUM CHLORIDE 0.9 % (FLUSH) 0.9 %
5-40 SYRINGE (ML) INJECTION PRN
Status: DISCONTINUED | OUTPATIENT
Start: 2024-01-20 | End: 2024-02-08 | Stop reason: HOSPADM

## 2024-01-20 RX ORDER — POLYETHYLENE GLYCOL 3350 17 G/17G
17 POWDER, FOR SOLUTION ORAL DAILY PRN
Status: DISCONTINUED | OUTPATIENT
Start: 2024-01-20 | End: 2024-02-08 | Stop reason: HOSPADM

## 2024-01-20 RX ORDER — INSULIN LISPRO 100 [IU]/ML
0-4 INJECTION, SOLUTION INTRAVENOUS; SUBCUTANEOUS
Status: DISCONTINUED | OUTPATIENT
Start: 2024-01-20 | End: 2024-01-26

## 2024-01-20 RX ORDER — ACETAMINOPHEN 325 MG/1
650 TABLET ORAL EVERY 6 HOURS PRN
Status: DISCONTINUED | OUTPATIENT
Start: 2024-01-20 | End: 2024-01-20

## 2024-01-20 RX ORDER — ATORVASTATIN CALCIUM 40 MG/1
80 TABLET, FILM COATED ORAL NIGHTLY
Status: DISCONTINUED | OUTPATIENT
Start: 2024-01-20 | End: 2024-02-08 | Stop reason: HOSPADM

## 2024-01-20 RX ORDER — INSULIN LISPRO 100 [IU]/ML
0-8 INJECTION, SOLUTION INTRAVENOUS; SUBCUTANEOUS
Status: DISCONTINUED | OUTPATIENT
Start: 2024-01-20 | End: 2024-01-20

## 2024-01-20 RX ORDER — INSULIN LISPRO 100 [IU]/ML
0-4 INJECTION, SOLUTION INTRAVENOUS; SUBCUTANEOUS NIGHTLY
Status: DISCONTINUED | OUTPATIENT
Start: 2024-01-20 | End: 2024-01-20

## 2024-01-20 RX ORDER — INSULIN LISPRO 100 [IU]/ML
0-16 INJECTION, SOLUTION INTRAVENOUS; SUBCUTANEOUS
Status: DISCONTINUED | OUTPATIENT
Start: 2024-01-20 | End: 2024-01-28

## 2024-01-20 RX ORDER — INSULIN GLARGINE 100 [IU]/ML
10 INJECTION, SOLUTION SUBCUTANEOUS ONCE
Status: COMPLETED | OUTPATIENT
Start: 2024-01-20 | End: 2024-01-20

## 2024-01-20 RX ORDER — ACETAMINOPHEN 650 MG/1
650 SUPPOSITORY RECTAL EVERY 6 HOURS PRN
Status: DISCONTINUED | OUTPATIENT
Start: 2024-01-20 | End: 2024-01-20

## 2024-01-20 RX ORDER — EZETIMIBE 10 MG/1
10 TABLET ORAL NIGHTLY
Status: DISCONTINUED | OUTPATIENT
Start: 2024-01-20 | End: 2024-02-08 | Stop reason: HOSPADM

## 2024-01-20 RX ORDER — TIZANIDINE 4 MG/1
2 TABLET ORAL EVERY 8 HOURS PRN
Status: DISCONTINUED | OUTPATIENT
Start: 2024-01-20 | End: 2024-01-20

## 2024-01-20 RX ORDER — TRAZODONE HYDROCHLORIDE 50 MG/1
50 TABLET ORAL NIGHTLY
Status: DISCONTINUED | OUTPATIENT
Start: 2024-01-20 | End: 2024-02-02

## 2024-01-20 RX ORDER — SODIUM CHLORIDE 9 MG/ML
INJECTION, SOLUTION INTRAVENOUS PRN
Status: DISCONTINUED | OUTPATIENT
Start: 2024-01-20 | End: 2024-02-08 | Stop reason: HOSPADM

## 2024-01-20 RX ORDER — HEPARIN SODIUM 5000 [USP'U]/ML
5000 INJECTION, SOLUTION INTRAVENOUS; SUBCUTANEOUS EVERY 8 HOURS SCHEDULED
Status: DISCONTINUED | OUTPATIENT
Start: 2024-01-20 | End: 2024-02-08 | Stop reason: HOSPADM

## 2024-01-20 RX ORDER — DEXTROSE MONOHYDRATE 100 MG/ML
INJECTION, SOLUTION INTRAVENOUS CONTINUOUS PRN
Status: DISCONTINUED | OUTPATIENT
Start: 2024-01-20 | End: 2024-02-08 | Stop reason: HOSPADM

## 2024-01-20 RX ORDER — INSULIN GLARGINE 100 [IU]/ML
20 INJECTION, SOLUTION SUBCUTANEOUS 2 TIMES DAILY
Status: DISCONTINUED | OUTPATIENT
Start: 2024-01-20 | End: 2024-01-20

## 2024-01-20 RX ORDER — SODIUM CHLORIDE 0.9 % (FLUSH) 0.9 %
5-40 SYRINGE (ML) INJECTION EVERY 12 HOURS SCHEDULED
Status: DISCONTINUED | OUTPATIENT
Start: 2024-01-20 | End: 2024-02-08 | Stop reason: HOSPADM

## 2024-01-20 RX ORDER — GLUCAGON 1 MG/ML
1 KIT INJECTION PRN
Status: DISCONTINUED | OUTPATIENT
Start: 2024-01-20 | End: 2024-02-08 | Stop reason: HOSPADM

## 2024-01-20 RX ORDER — ACETAMINOPHEN 325 MG/1
650 TABLET ORAL EVERY 6 HOURS SCHEDULED
Status: DISCONTINUED | OUTPATIENT
Start: 2024-01-20 | End: 2024-01-26

## 2024-01-20 RX ADMIN — HEPARIN SODIUM 5000 UNITS: 5000 INJECTION INTRAVENOUS; SUBCUTANEOUS at 14:13

## 2024-01-20 RX ADMIN — HEPARIN SODIUM 5000 UNITS: 5000 INJECTION INTRAVENOUS; SUBCUTANEOUS at 21:31

## 2024-01-20 RX ADMIN — METOPROLOL SUCCINATE 50 MG: 50 TABLET, EXTENDED RELEASE ORAL at 08:00

## 2024-01-20 RX ADMIN — ACETAMINOPHEN 650 MG: 325 TABLET ORAL at 18:56

## 2024-01-20 RX ADMIN — ATORVASTATIN CALCIUM 80 MG: 40 TABLET, FILM COATED ORAL at 21:31

## 2024-01-20 RX ADMIN — INSULIN GLARGINE 10 UNITS: 100 INJECTION, SOLUTION SUBCUTANEOUS at 22:14

## 2024-01-20 RX ADMIN — INSULIN LISPRO 4 UNITS: 100 INJECTION, SOLUTION INTRAVENOUS; SUBCUTANEOUS at 07:59

## 2024-01-20 RX ADMIN — ACETAMINOPHEN 650 MG: 650 SUPPOSITORY RECTAL at 03:22

## 2024-01-20 RX ADMIN — TRAZODONE HYDROCHLORIDE 50 MG: 50 TABLET ORAL at 21:31

## 2024-01-20 RX ADMIN — SODIUM BICARBONATE: 84 INJECTION, SOLUTION INTRAVENOUS at 07:02

## 2024-01-20 RX ADMIN — BUSPIRONE HYDROCHLORIDE 10 MG: 5 TABLET ORAL at 21:30

## 2024-01-20 RX ADMIN — CEFTRIAXONE 1000 MG: 1 INJECTION, POWDER, FOR SOLUTION INTRAMUSCULAR; INTRAVENOUS at 02:35

## 2024-01-20 RX ADMIN — ONDANSETRON 4 MG: 2 INJECTION INTRAMUSCULAR; INTRAVENOUS at 03:37

## 2024-01-20 RX ADMIN — INSULIN GLARGINE 10 UNITS: 100 INJECTION, SOLUTION SUBCUTANEOUS at 09:16

## 2024-01-20 RX ADMIN — BUSPIRONE HYDROCHLORIDE 10 MG: 5 TABLET ORAL at 14:09

## 2024-01-20 RX ADMIN — ACETAMINOPHEN 650 MG: 325 TABLET ORAL at 12:39

## 2024-01-20 RX ADMIN — ISOSORBIDE MONONITRATE 60 MG: 60 TABLET, EXTENDED RELEASE ORAL at 08:00

## 2024-01-20 RX ADMIN — BUSPIRONE HYDROCHLORIDE 10 MG: 5 TABLET ORAL at 08:00

## 2024-01-20 RX ADMIN — HEPARIN SODIUM 5000 UNITS: 5000 INJECTION INTRAVENOUS; SUBCUTANEOUS at 05:47

## 2024-01-20 RX ADMIN — SODIUM BICARBONATE: 84 INJECTION, SOLUTION INTRAVENOUS at 17:44

## 2024-01-20 RX ADMIN — AZITHROMYCIN MONOHYDRATE 500 MG: 500 INJECTION, POWDER, LYOPHILIZED, FOR SOLUTION INTRAVENOUS at 02:46

## 2024-01-20 RX ADMIN — SODIUM CHLORIDE, PRESERVATIVE FREE 10 ML: 5 INJECTION INTRAVENOUS at 21:33

## 2024-01-20 RX ADMIN — IPRATROPIUM BROMIDE AND ALBUTEROL SULFATE 1 DOSE: .5; 2.5 SOLUTION RESPIRATORY (INHALATION) at 07:34

## 2024-01-20 RX ADMIN — ASPIRIN 81 MG 81 MG: 81 TABLET ORAL at 08:00

## 2024-01-20 RX ADMIN — PANTOPRAZOLE SODIUM 40 MG: 40 TABLET, DELAYED RELEASE ORAL at 08:00

## 2024-01-20 RX ADMIN — EZETIMIBE 10 MG: 10 TABLET ORAL at 21:30

## 2024-01-20 RX ADMIN — TICAGRELOR 90 MG: 90 TABLET ORAL at 21:30

## 2024-01-20 RX ADMIN — INSULIN LISPRO 4 UNITS: 100 INJECTION, SOLUTION INTRAVENOUS; SUBCUTANEOUS at 12:39

## 2024-01-20 RX ADMIN — INSULIN LISPRO 4 UNITS: 100 INJECTION, SOLUTION INTRAVENOUS; SUBCUTANEOUS at 21:50

## 2024-01-20 RX ADMIN — TICAGRELOR 90 MG: 90 TABLET ORAL at 08:00

## 2024-01-20 RX ADMIN — CALCIUM CHLORIDE 1000 MG: 100 INJECTION INTRAVENOUS; INTRAVENTRICULAR at 09:37

## 2024-01-20 ASSESSMENT — PAIN SCALES - GENERAL
PAINLEVEL_OUTOF10: 8
PAINLEVEL_OUTOF10: 7
PAINLEVEL_OUTOF10: 8

## 2024-01-20 ASSESSMENT — PAIN DESCRIPTION - LOCATION
LOCATION: ABDOMEN
LOCATION: ABDOMEN
LOCATION: BACK

## 2024-01-20 ASSESSMENT — PAIN DESCRIPTION - DESCRIPTORS
DESCRIPTORS: ACHING
DESCRIPTORS: ACHING

## 2024-01-20 NOTE — H&P
History and Physical 24        NAME: Dewey Sosa  : 1980  MRN: [id number]      Assessment/Plan:  Dewey Sosa is a 43 y.o. male significant past medical history  who presented to [facility] 2024 with nausea, vomiting, frequent falls, found to be hyponatremic to 117, admitted to the ICU for further management and care.      Problem list  Hyponatremia,   CKD stage V  Lung mass -pneumonia versus neoplasm?  Troponinemia -type II MI    Neuro: Alert, oriented, following commands, frequent falls CT head negative.  Evaluated with PT OT.  Pain controlled with current multimodal regimen.  Cardio: Hemodynamically stable, no acute issues at this time.  Monitor vitals and adjust as needed.  Known to have cardiomyopathy.  Ischemic cardiomyopathy, CHF, echo show EF of 50-55, with dilated left ventricle, mild left ventricular hypertrophy, grade 2 diastolic dysfunction.  Stable troponin, likely from poor renal clearance, no concern for ACS.  Resp: On room air, no acute issues, continue pulmonary toileting.  CT of the chest showed masslike opacity in the right lower lobe, pneumonia versus neoplasm, splenomegaly, advanced atherosclerotic calcification of the coronary arteries.  GI: Advance diet as tolerated.  GI prophylaxis.  : CKD stage V, worsening creatinine with urine output.  Monitor electrolytes and replenish as needed.  Noted hyponatremic to 117, replenished with normal saline as able.  Continue frequent sodium checks and avoid overcorrection by more than 6 to 8 mEq in the first 24 hours.  Nephrology consulted, recs appreciated.  Heme: Hemoglobin stable, platelets appropriate.  DVT prophylaxis heparin.  ID: WBC of 8, started on CAP coverage with ceftriaxone and azithromycin.  Follow-up cultures, PCR, MRSA, urinary antigens and de-escalate as able.  Endo: POC BG ISS PRN. Lantus 20 U BID, ISS for BG goal 140-180 mg/dl  MSK: DTI prevention    Tubes/Lines/Drains:  PIV    Code Status: Full         Chief  acute abnormality of the visualized skull or soft tissues.     1.  No acute intracranial abnormality. 2.  Focal area of encephalomalacia in the right frontal lobe may represent an old stroke.     XR CHEST PORTABLE    Result Date: 1/19/2024  EXAMINATION: ONE XRAY VIEW OF THE CHEST 1/19/2024 8:05 pm COMPARISON: 11/03/2021 HISTORY: ORDERING SYSTEM PROVIDED HISTORY: sob TECHNOLOGIST PROVIDED HISTORY: Reason for exam:->sob Reason for Exam: sob FINDINGS: Lung volumes are reduced resulting in crowding of central and basilar vascular markings.  There is dense consolidation within the right lung base. Patchy consolidation is noted throughout the left lung.  Heart size and vascularity are stable.  There is no pneumothorax or effusion     Expiratory film with bilateral atelectasis versus pneumonia.       CBC:   Recent Labs     01/19/24 1945   WBC 8.0   HGB 11.5*        BMP:    Recent Labs     01/19/24 1945   *   K 3.8   CL 79*   CO2 14*   BUN 75*   CREATININE 5.7*   GLUCOSE 176*     Hepatic:   Recent Labs     01/19/24 1945   *   ALT 98*   BILITOT 1.5*   ALKPHOS 64     Lipids:   Lab Results   Component Value Date/Time    CHOL 278 12/11/2023 12:28 PM    HDL 22 12/11/2023 12:28 PM    TRIG 386 12/11/2023 12:28 PM     Hemoglobin A1C:   Lab Results   Component Value Date/Time    LABA1C 6.3 12/11/2023 12:25 PM     TSH: No results found for: \"TSH\"  Troponin:   Lab Results   Component Value Date/Time    TROPONINT 0.339 11/04/2021 02:19 AM    TROPONINT 0.514 11/03/2021 05:58 PM    TROPONINT 0.113 11/03/2021 06:30 AM     Lactic Acid: No results for input(s): \"LACTA\" in the last 72 hours.  BNP:   Recent Labs     01/19/24 1945   PROBNP 5,285*     UA:  Lab Results   Component Value Date/Time    NITRU NEGATIVE 01/19/2024 09:20 PM    COLORU YELLOW 01/19/2024 09:20 PM    WBCUA 1 01/19/2024 09:20 PM    RBCUA 39 01/19/2024 09:20 PM    MUCUS RARE 01/19/2024 09:20 PM    TRICHOMONAS NONE SEEN 01/19/2024 09:20 PM    BACTERIA

## 2024-01-20 NOTE — PROGRESS NOTES
Admitted overnight ICU setting given hyponatremia, worsening renal function, mild acidemia and hyper glycemia.  Per discussion with nephrology service, patient will require institution of dialytic support (advanced CKD stage V at baseline).  Endocrinology has also been consulted regarding diabetes management (known to Dr. Charlene beck as an outpatient).    Given abnormal finding on chest CT scan (right lower lobe lung mass), the patient may require CT-guided biopsy upcoming week.  Although this may represent pneumonic infiltrate, given the absence of fever, leukocytosis, respiratory symptoms this seems less likely.  However, continue antimicrobial therapy for treatment of possible community-acquired pneumonia pending further evaluation, culture data, urinary antigens.      Patient and family have been appraised of the need for placement of temporary dialysis catheter.    Complex decisions required for evaluation management reviewed during critical care rounds.    E Cordasco  [phone]

## 2024-01-20 NOTE — CONSULTS
V2.0  Internal Medicine Consult      Name:  Dewey Sosa /Age/Sex: 1980  (43 y.o. male)   MRN & CSN:  [id number] & [id number] Encounter Date/Time: 2024 12:41 PM EST   Location:  -A PCP: Frandy Parsons APRN - CNP       Hospital Day: 2      History of Present Illness:     Chief Complaint: n/v, falls    Dewey Sosa is a 43 y.o. male with PMH of DM II, CKD, CAD, CHF who presents with nausea and vomiting. Has miriam having frequent falls as well.  Pt was found to have hyponatremia Na 116 worsening kidney function. Admitted to ICU. Developed fever during admission. Being treated for PNA.     Assessment and Plan:     Sepsis noted after admission. Fever T max 101.6, tachycardia 110's, tachypnea 30's. 2/2 legionella pneumonia.   Legionalla antigen +  - On rocephin, azithromycin; defer to ICU regarding abx  - f/u blood cx    Hyponatremia. Likely 2/2 legionella pneumonia and poor oral intake/dehydration.  P/w Na 117, still 117 today  Now on bicarb drip    ROSE MARY on CKD IV.  Cr 5.7 (baseline ~2.5).   Has been on bicarb drip.   Temp vas cath placed  Likely nephro will start HD.    Type II diabetes. On home insulin  - on lantus and sliding scale  - endo managing     Systolic and diastolic CHF. No sign of volume overload.  Last ECHO 2023 EF 50-55% (was 20-25% 10/2021), LV dilated , grade II diastolic  On home Toprol, Imdur        Disposition:   Per Primary    Diet Diet NPO Exceptions are: Ice Chips   DVT Prophylaxis [] Lovenox, [x]  Heparin, [] SCDs, [] Ambulation,  [] Eliquis, [] Xarelto   Code Status Full Code     History from:     patient     Review of Systems: Need 10 Elements   See above           Objective:     Intake/Output Summary (Last 24 hours) at 2024 1248  Last data filed at 2024 1036  Gross per 24 hour   Intake --   Output 320 ml   Net -320 ml      Vitals:   Vitals:    24 0600 24 0734 24 0802 24 1231   BP: (!) 140/97  (!) 153/103 (!) 149/97   Pulse: (!)  Kim Kirkland APRN - CNP   omeprazole (PRILOSEC) 20 MG delayed release capsule Take 1 capsule by mouth Daily 10/4/23   Kim Kirkland APRN - CNP   atorvastatin (LIPITOR) 80 MG tablet Take 1 tablet by mouth daily 9/18/23   Brie Mcneil APRN - CNP   ranolazine (RANEXA) 500 MG extended release tablet Take 1 tablet by mouth 2 times daily 9/18/23   Brie Mcneil APRN - CNP   traZODone (DESYREL) 50 MG tablet Take 1 tablet by mouth nightly as needed for Sleep 8/28/23   Frandy Parsons APRN - CNP   isosorbide mononitrate (IMDUR) 60 MG extended release tablet Take by mouth    Provider, MD Kera   INS SYRINGE/NEEDLE 1CC/28G (B-D INS SYR MICROFINE 1CC/28G) 28G X 1/2\" 1 ML MISC USE ONE DAILY 7/28/23   Shaun Bear MD   INSULIN SYRINGE .5CC/29G 29G X 1/2\" 0.5 ML MISC Inject 1 each into the skin 3 times daily 7/27/23   Shaun Bear MD   insulin lispro (HUMALOG) 100 UNIT/ML SOLN injection vial Inject 0-15 Units into the skin 3 times daily (before meals) 6/9/23   Frandy Parsons APRN - CNP   metoprolol succinate (TOPROL XL) 50 MG extended release tablet Take 1 tablet by mouth daily 2/16/23   Brie Mcneil APRN - CNP   aspirin EC 81 MG EC tablet Take 1 tablet by mouth daily 6/9/22   Emili Jimenes MD   Insulin Syringes, Disposable, U-100 1 ML MISC 1 each by Does not apply route daily 5/24/22   Frandy Parsons APRN - CNP   nitroGLYCERIN (NITROSTAT) 0.4 MG SL tablet Place 1 tablet under the tongue every 5 minutes as needed for Chest pain 10/25/21   Emili Jimenes MD   Lancets MISC 1 each by Does not apply route 3 times daily 6/15/20   Frandy Parsons APRN - CNP   Blood Glucose Monitoring Suppl (ONE TOUCH ULTRA 2) w/Device KIT 1 kit by Does not apply route once for 1 dose 4/16/20 1/11/24  Frandy Parsons, BARBARA - CNP   blood glucose monitor strips Test 3 times a day & as needed for symptoms of irregular blood glucose. 4/16/20   Frandy Parsons, BARBARA - JAYME       Physical

## 2024-01-20 NOTE — CONSULTS
Consult completed. Procedure/rationale explained to pt & consent obtained. #20ga Nexiva extra-long, 1.75\" PIV initiated using UltraSound-guided technique without difficulty/complications. Pt tolerated well and no other c/o or needs noted or reported. RN notified.

## 2024-01-20 NOTE — PROGRESS NOTES
V2.0  [facility] Hospitalist Progress Note      Name:  Dewey Sosa /Age/Sex: 1980  (43 y.o. male)   MRN & CSN:  [id number] & [id number] Encounter Date/Time: 2024 12:45 PM EST    Location:  -A PCP: Frandy Parsons APRN - CNP       Hospital Day: 2    Assessment and Plan:   Dewey Sosa is a 43 y.o. male with pmh of DM-2, CKD, CAD, CHF who presents with nausea and vomiting .lab workup significant for  Hyponatremia and ROSE MARY, and radiologic evidence of pneumonia      Plan:  Hyponatremia  ROSE MARY on CKD stage IV  High anion gap metabolic acidosis  Hyperphosphatemia  Possible starvation ketosis  Legionella pneumonia  Sepsis  Troponinemia, most likely type II MI, s/t ROSE MARY  Frequent falls POA, no head trauma        Neuro: Patient is alert and oriented, patient reported frequent falls prior to admission, CT head obtained-negative for acute abnormality    Cardiac: Hemodynamically stable, mild tachycardia noted, continue telemetry monitoring history of ischemic cardiomyopathy, EF 50 to 55% grade 2 diastolic dysfunction,   Elevated troponin-most likely secondary to ROSE MARY, no concerns for ACS    Respiratory: On room air, Kussmaul's breathing pattern, respiratory rate 20-28, started on bicarb drip, may need dialysis.  CXR reviewed-right lower lobe consolidation-this is most likely pneumonia vs mass.  Urine antigens positive for Legionella, started on CAP coverage azithromycin and Rocephin.    GI: Protonix for GI PPx, patient can be started on diet    : ROSE MARY-creatinine in -2 0.5-4.3.  5.7 this admission-- > 6.6 today, anion gap is 24, started on bicarb drip, follows Dr. Nevarez as outpatient, refusing renal biopsy, refer to [facility] for transplant workup  Temporary dialysis catheter was placed, informed nephrologist, may need dialysis    Heme: Hb-10.3, platelet count 157k, transfuse for Hb<7, follow-up daily CBC    ID: WBC count-6.7, chest x-ray evidence of right lower lobe consolidation, improving on
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
## 2024-01-20 NOTE — CONSULTS
cooperative, appears stated age   EYES:  vision intact Fundoscopic Exam not performed   ENT:Normal  NECK:  Supple, No JVD.   Thyroid Exam:Normal   LUNGS:  Has Vesicular Breath Sounds,   CARDIOVASCULAR:  Normal apical impulse, regular rate and rhythm, normal S1 and S2, no S3 or S4, and has no  murmur   ABDOMEN:  No scars, normal bowel sounds, soft, non-distended, non-tender, no masses palpated, no hepatolienomegaly  Musculoskeletal: Normal  Extremities: Normal, peripheral pulses normal, , has no edema left third toe amputation  NEUROLOGIC:  Awake, alert, oriented to name, place and time.  Cranial nerves II-XII are grossly intact.  Motor is  intact.  Sensory neuropathy. ,  and gait is normal.    DATA:    CBC:   Recent Labs     01/19/24 1945 01/20/24  0615   WBC 8.0 6.7   HGB 11.5* 10.3*    157      CMP:  Recent Labs     01/19/24 1945 01/20/24  0615   * 117*   K 3.8 4.1   CL 79* 83*   CO2 14* 10*   BUN 75* 87*   CREATININE 5.7* 6.5*   CALCIUM 8.4 7.0*   PROT 8.2 5.8*   LABALBU 3.1* 2.4*   BILITOT 1.5* 3.2*   ALKPHOS 64 58   * 414*   ALT 98* 90*       Lipids:   Lab Results   Component Value Date/Time    CHOL 278 12/11/2023 12:28 PM    HDL 22 12/11/2023 12:28 PM    TRIG 386 12/11/2023 12:28 PM     Glucose:   Recent Labs     01/19/24  1811 01/20/24  0744 01/20/24  0912   POCGLU 193* 222* 298*       Hemoglobin A1C:   Lab Results   Component Value Date/Time    LABA1C 6.3 12/11/2023 12:25 PM     Free T4: No results found for: \"T4FREE\"  Free T3: No results found for: \"FT3\"  TSH High Sensitivity:   Lab Results   Component Value Date/Time    TSHHS 1.420 12/11/2023 12:19 PM       CT CHEST ABDOMEN PELVIS WO CONTRAST Additional Contrast? None   Final Result   1. Masslike opacity in the right lower lobe.  If there is evidence of   infection, pneumonia may be considered.  A pulmonary mass cannot be excluded.   2. Splenomegaly of uncertain significance.   3. Advanced atherosclerotic calcification of coronary

## 2024-01-20 NOTE — PROCEDURES
Pt completed 2 hours dialysis today removing no fluid.  Tolerated well.  Right temporary HD catheter used for access ran good, both lumens normal saline locked and capped.  Pt denies needs.  Report to OWEN Arriola.      Patient Name: Dewey Sosa  Patient : 1980  MRN: [id number]     Acct: [id number]  Date of Admission: 2024  Room/Bed: -A  Code Status:  Full Code  Allergies: No Known Allergies  Diagnosis:    Patient Active Problem List   Diagnosis    Type 2 diabetes mellitus without complication, with long-term current use of insulin (HCC)    Gastroesophageal reflux disease    Former tobacco use    Acute osteomyelitis of phalanx of left foot (HCC)    Osteomyelitis of third toe of left foot (HCC)    Anxiety    Persistent proteinuria    Benign neoplasm of sigmoid colon    Vitamin D deficiency    ASCVD (arteriosclerotic cardiovascular disease)    Chronic systolic congestive heart failure (HCC)    Ischemic cardiomyopathy    Dyslipidemia    Stage 3a chronic kidney disease (HCC)    Other male erectile dysfunction    Atherosclerotic heart disease of native coronary artery with other forms of angina pectoris (HCC)    Type 2 diabetes mellitus with chronic kidney disease    Insomnia    Stage 3b chronic kidney disease (HCC)    Overweight    Chronic kidney disease, stage IV (severe) (HCC)    Hyperkalemia    Hyponatremia         Treatment:  Hemodialysis 1:1  Priority: 1st Time Acute  Location: Acute Room    Diabetic: Yes  NPO: No  Isolation Precautions: Dialysis     Consent for Treatment Verified: Yes  Blood Consent Verified: Not Applicable     Safety Verified: Identify (I), Consent (C), Equipment (E), HepB Status (B), Orders Complete (O), Access Verified (A), and Timeliness (T)  Time out performed prior to access at 1511 hours.    Report Received from Primary RN at 1455 hours.  Primary RN (First Initial, Last Name, Title): JOSIE Santos RN  Incapacitated Nurse Education Completed: Yes     HBsAg ONLY:   None  Barriers to Learning?: None  Preferred method of Learning: Oral  Topic(s): Access Care, Signs and Symptoms of Infection, and Procedural   Teaching Tools: Explanation   Response to Education: Verbalized Understanding and Requires Follow-up     Electronically signed by Janice Burch RN on 1/20/2024 at 5:28 PM

## 2024-01-20 NOTE — PROGRESS NOTES
4 Eyes Skin Assessment     NAME:  Dewey Sosa  YOB: 1980  MEDICAL RECORD NUMBER:  [id number]    The patient is being assessed for  Admission    I agree that at least one RN has performed a thorough Head to Toe Skin Assessment on the patient. ALL assessment sites listed below have been assessed.      Areas assessed by both nurses:    Head, Face, Ears, Shoulders, Back, Chest, Arms, Elbows, Hands, Sacrum. Buttock, Coccyx, Ischium, Legs. Feet and Heels, and Under Medical Devices         Does the Patient have a Wound? No noted wound(s)       Bryon Prevention initiated by RN: Yes  Wound Care Orders initiated by RN: No    Pressure Injury (Stage 3,4, Unstageable, DTI, NWPT, and Complex wounds) if present, place Wound referral order by RN under : No    New Ostomies, if present place, Ostomy referral order under : No     Nurse 1 eSignature: Electronically signed by Viviana Benitez RN on 1/20/24 at 3:33 AM EST    **SHARE this note so that the co-signing nurse can place an eSignature**    Nurse 2 eSignature: Electronically signed by Rayna Sanchez RN on 1/20/24 at 6:27 AM EST

## 2024-01-20 NOTE — CONSULTS
Patient:   Dewey Sosa    Date:  24  :  1980, 43 y.o.   Nephrologist: Trudy Cordoba MD  Provider: Frandy Parsons APRN - CNP    Reason for Consult: Acute kidney injury with underlying chronic kidney disease stage G4 A3    Consult requested by : Alpa Puga PA-C    Chief Complaint:   Nausea, vomiting and back pain for the last 1 week(since last Saturday).    HISTORY OF PRESENT ILLNESS/Brief hospital course  AND  brief background history    Mr. Sosa, is an unfortunate 43-year young male, brought to the emergency department with chronic history of nausea and vomiting.  His symptoms started last Saturday, he was not able to eat anything he also noticed low-grade fever and chills and low back pain, as the symptoms did not viridiana he decided to come to the emergency department.    On arrival in the emergency department, he was febrile with maximum temperature 101.6, otherwise he was hemodynamically stable, with oxygen saturation 95% while breathing ambient air.  He underwent several diagnostic tests which include imaging, biochemical electrographic.    Imaging study which include computed tomography of the chest abdomen and pelvis without administration of intravenous contrast material showed masslike opacity in the right lower lobe, mle, and evidence of atherosclerosis.  Computed tomography of the head showed focal area of encephalomalacia of the right frontal lobe.     Biochemical testing showed sodium of 117, serum bicarbonate of 14, blood urea nitrogen and creatinine of 75 and 5.7 mg/dL respectively.  Other pertinent abnormal lab include low albumin of 3.1 and high bilirubin of 1.5 also had elevated alanine and aspirated aminotransferase level.  Additionally hemoglobin is 11.5, with normal white count and platelet count.  Lipase level was 448, proBNP level was more than 5000 and also had elevated high-sensitivity troponin to 53 ng/L.  His beta-hydroxybutyrate level was  regurgitation is present.   RVSP is 25 mmHg.   No evidence of pericardial effusion.   When this echo is compared with the echo from 6/2022, no significant   interval changes have occurred    Kidney imaging :     Computed tomography of abdomen pelvis without administration of intravenous contrast material done on October 17, 2023      Normal adrenal glands. Normal kidneys. No renal calculi or  hydronephrosis.      PMH :   Progressive chronic kidney disease stage G4 A3 with supra nephrotic range proteinuria  Type 2 diabetes mellitus diagnosed since age 23, has been on insulin therapy at least since 2013  History of high blood pressure  Coronary artery disease he underwent stent placement in the past  Diabetic foot ulcer causing to amputation in the left side  He had episode of hepatitis A but he was able to recover no other infection  Also depression and anxiety        PSH :  Left toe amputation  Also had subscap surgery as a child          Habits :   He quit smoking 2 years ago but had been smoking on and off since age 15, no history of alcohol illicit drug use    Soc Hx:  He was born in [facility], moved to [facility] at age 27.  He lives with his significant other for the past 5 years.  He has no children used to work as a  but currently unemployed because of disability    FMH   Paternal side had coronary artery disease, nobody had advanced kidney disease        REVIEW OF SYSTEMS:     All pertinent ROS neg except   Nausea, vomiting, fever and chills, low back pain, fatigue    Medication :     Reviewed, see in epic    BP (!) 128/93   Pulse (!) 110   Temp (!) 101.6 °F (38.7 °C) (Oral)   Resp (!) 31   Ht 1.854 m (6' 1\")   Wt 99.7 kg (219 lb 12.8 oz)   SpO2 95%   BMI 29.00 kg/m²     PHYSICAL EXAM:  General appearance: He was alert and awake but in distress of course he was not feeling well  HEENT: Very minimal conjunctival pallor no scleral icterus  Neck: Seems supple  Heart: Tachycardia, I
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
## 2024-01-20 NOTE — PROCEDURES
Patient Name: Dewey Sosa   Medical Record Number: [id number]  Date: 1/20/2024   Time: 12:25 PM   Room/Bed: 2112/2112-A      Ultrasound-Guided temporary dialysis catheter placement Procedure Note  Indication:  hemodialysis                                                                                            Consent: The patient provided verbal consent for this procedure.    Procedure: The patient was positioned appropriately and the skin over the right internal jugular vein was prepped with betadine and draped in a sterile fashion.     Local anesthesia was obtained by infiltration using 1% Lidocaine without epinephrine.      A large bore needle was used to identify the vein under sterile ultrasound guidance.      A guide wire was then inserted into the vein through the needle and advanced without resistance. A skin incision was made at the wire and the tract was dilated.     The central line was then inserted into the vessel over the guide wire using Seldinger technique.      All ports showed good, free flowing blood return and were flushed with saline solution.      The catheter was then securely fastened to the skin with sutures and covered with a sterile dressing.      A post procedure X-ray was ordered and is still pending at this time.    The patient tolerated the procedure well.    Blood loss: less than 10 mL    Complications: None    Electronically signed by Viviana Shields PA-C on 1/20/2024 at 12:25 PM

## 2024-01-20 NOTE — RT PROTOCOL NOTE
RT Inhaler-Nebulizer Bronchodilator Protocol Note    There is a bronchodilator order in the chart from a provider indicating to follow the RT Bronchodilator Protocol and there is an “Initiate RT Inhaler-Nebulizer Bronchodilator Protocol” order as well (see protocol at bottom of note).    CXR Findings:  XR CHEST PORTABLE    Result Date: 1/19/2024  Expiratory film with bilateral atelectasis versus pneumonia.       The findings from the last RT Protocol Assessment were as follows:   History Pulmonary Disease: Smoker 15 pack years or more  Respiratory Pattern: Regular pattern and RR 12-20 bpm  Breath Sounds: Slightly diminished and/or crackles  Cough: Strong, spontaneous, non-productive  Indication for Bronchodilator Therapy: None  Bronchodilator Assessment Score: 3    Aerosolized bronchodilator medication orders have been revised according to the RT Inhaler-Nebulizer Bronchodilator Protocol below.    Respiratory Therapist to perform RT Therapy Protocol Assessment initially then follow the protocol.  Repeat RT Therapy Protocol Assessment PRN for score 0-3 or on second treatment, BID, and PRN for scores above 3.    No Indications - adjust the frequency to every 6 hours PRN wheezing or bronchospasm, if no treatments needed after 48 hours then discontinue using Per Protocol order mode.     If indication present, adjust the RT bronchodilator orders based on the Bronchodilator Assessment Score as indicated below.  Use Inhaler orders unless patient has one or more of the following: on home nebulizer, not able to hold breath for 10 seconds, is not alert and oriented, cannot activate and use MDI correctly, or respiratory rate 25 breaths per minute or more, then use the equivalent nebulizer order(s) with same Frequency and PRN reasons based on the score.  If a patient is on this medication at home then do not decrease Frequency below that used at home.    0-3 - enter or revise RT bronchodilator order(s) to equivalent RT

## 2024-01-21 ENCOUNTER — APPOINTMENT (OUTPATIENT)
Dept: ULTRASOUND IMAGING | Age: 44
DRG: 969 | End: 2024-01-21
Payer: MEDICARE

## 2024-01-21 ENCOUNTER — APPOINTMENT (OUTPATIENT)
Dept: GENERAL RADIOLOGY | Age: 44
DRG: 969 | End: 2024-01-21
Payer: MEDICARE

## 2024-01-21 LAB
ALBUMIN SERPL-MCNC: 2.2 GM/DL (ref 3.4–5)
ALBUMIN SERPL-MCNC: 2.2 GM/DL (ref 3.4–5)
ALBUMIN SERPL-MCNC: 2.3 GM/DL (ref 3.4–5)
ALP BLD-CCNC: 80 IU/L (ref 40–129)
ALP BLD-CCNC: 86 IU/L (ref 40–129)
ALT SERPL-CCNC: 92 U/L (ref 10–40)
ALT SERPL-CCNC: 99 U/L (ref 10–40)
ANION GAP SERPL CALCULATED.3IONS-SCNC: 17 MMOL/L (ref 7–16)
ANION GAP SERPL CALCULATED.3IONS-SCNC: 22 MMOL/L (ref 7–16)
AST SERPL-CCNC: 379 IU/L (ref 15–37)
AST SERPL-CCNC: 437 IU/L (ref 15–37)
BASOPHILS ABSOLUTE: 0 K/CU MM
BASOPHILS ABSOLUTE: 0 K/CU MM
BASOPHILS RELATIVE PERCENT: 0 % (ref 0–1)
BASOPHILS RELATIVE PERCENT: 0.2 % (ref 0–1)
BILIRUB SERPL-MCNC: 2.6 MG/DL (ref 0–1)
BILIRUB SERPL-MCNC: 2.7 MG/DL (ref 0–1)
BILIRUBIN DIRECT: 2.5 MG/DL (ref 0–0.3)
BILIRUBIN DIRECT: 2.5 MG/DL (ref 0–0.3)
BILIRUBIN, INDIRECT: 0.1 MG/DL (ref 0–0.7)
BILIRUBIN, INDIRECT: 0.2 MG/DL (ref 0–0.7)
BUN SERPL-MCNC: 82 MG/DL (ref 6–23)
BUN SERPL-MCNC: 87 MG/DL (ref 6–23)
CALCIUM IONIZED: 3.48 MG/DL (ref 4.48–5.28)
CALCIUM IONIZED: 3.48 MG/DL (ref 4.48–5.28)
CALCIUM SERPL-MCNC: 6.6 MG/DL (ref 8.3–10.6)
CALCIUM SERPL-MCNC: 6.9 MG/DL (ref 8.3–10.6)
CHLORIDE BLD-SCNC: 80 MMOL/L (ref 99–110)
CHLORIDE BLD-SCNC: 82 MMOL/L (ref 99–110)
CO2: 19 MMOL/L (ref 21–32)
CO2: 21 MMOL/L (ref 21–32)
CREAT SERPL-MCNC: 6 MG/DL (ref 0.9–1.3)
CREAT SERPL-MCNC: 6.6 MG/DL (ref 0.9–1.3)
DIFFERENTIAL TYPE: ABNORMAL
DIFFERENTIAL TYPE: ABNORMAL
EKG ATRIAL RATE: 106 BPM
EKG DIAGNOSIS: NORMAL
EKG P AXIS: 22 DEGREES
EKG P-R INTERVAL: 158 MS
EKG Q-T INTERVAL: 342 MS
EKG QRS DURATION: 104 MS
EKG QTC CALCULATION (BAZETT): 454 MS
EKG R AXIS: -17 DEGREES
EKG T AXIS: 88 DEGREES
EKG VENTRICULAR RATE: 106 BPM
EOSINOPHILS ABSOLUTE: 0 K/CU MM
EOSINOPHILS ABSOLUTE: 0 K/CU MM
EOSINOPHILS RELATIVE PERCENT: 0 % (ref 0–3)
EOSINOPHILS RELATIVE PERCENT: 0 % (ref 0–3)
GFR SERPL CREATININE-BSD FRML MDRD: 10 ML/MIN/1.73M2
GFR SERPL CREATININE-BSD FRML MDRD: 11 ML/MIN/1.73M2
GLUCOSE BLD-MCNC: 190 MG/DL (ref 70–99)
GLUCOSE BLD-MCNC: 196 MG/DL (ref 70–99)
GLUCOSE BLD-MCNC: 228 MG/DL (ref 70–99)
GLUCOSE BLD-MCNC: 246 MG/DL (ref 70–99)
GLUCOSE SERPL-MCNC: 187 MG/DL (ref 70–99)
GLUCOSE SERPL-MCNC: 220 MG/DL (ref 70–99)
HCT VFR BLD CALC: 24.3 % (ref 42–52)
HCT VFR BLD CALC: 27.4 % (ref 42–52)
HEMOGLOBIN: 8.6 GM/DL (ref 13.5–18)
HEMOGLOBIN: 9.9 GM/DL (ref 13.5–18)
IMMATURE NEUTROPHIL %: 1.1 % (ref 0–0.43)
IMMATURE NEUTROPHIL %: 2 % (ref 0–0.43)
INR BLD: 1.5 INDEX
IONIZED CA: 0.87 MMOL/L (ref 1.12–1.32)
IONIZED CA: 0.87 MMOL/L (ref 1.12–1.32)
LIPASE: 1748 IU/L (ref 13–60)
LYMPHOCYTES ABSOLUTE: 0.3 K/CU MM
LYMPHOCYTES ABSOLUTE: 0.4 K/CU MM
LYMPHOCYTES RELATIVE PERCENT: 6 % (ref 24–44)
LYMPHOCYTES RELATIVE PERCENT: 7.2 % (ref 24–44)
MAGNESIUM: 2.3 MG/DL (ref 1.8–2.4)
MAGNESIUM: 2.4 MG/DL (ref 1.8–2.4)
MCH RBC QN AUTO: 29.7 PG (ref 27–31)
MCH RBC QN AUTO: 29.8 PG (ref 27–31)
MCHC RBC AUTO-ENTMCNC: 35.4 % (ref 32–36)
MCHC RBC AUTO-ENTMCNC: 36.1 % (ref 32–36)
MCV RBC AUTO: 82.5 FL (ref 78–100)
MCV RBC AUTO: 83.8 FL (ref 78–100)
MONOCYTES ABSOLUTE: 0.2 K/CU MM
MONOCYTES ABSOLUTE: 0.2 K/CU MM
MONOCYTES RELATIVE PERCENT: 4.2 % (ref 0–4)
MONOCYTES RELATIVE PERCENT: 4.5 % (ref 0–4)
NUCLEATED RBC %: 0 %
NUCLEATED RBC %: 0 %
PDW BLD-RTO: 12.4 % (ref 11.7–14.9)
PDW BLD-RTO: 12.6 % (ref 11.7–14.9)
PHOSPHORUS: 7.9 MG/DL (ref 2.5–4.9)
PHOSPHORUS: 8 MG/DL (ref 2.5–4.9)
PLATELET # BLD: 137 K/CU MM (ref 140–440)
PLATELET # BLD: 151 K/CU MM (ref 140–440)
PMV BLD AUTO: 12.5 FL (ref 7.5–11.1)
PMV BLD AUTO: 12.8 FL (ref 7.5–11.1)
POTASSIUM SERPL-SCNC: 3.3 MMOL/L (ref 3.5–5.1)
POTASSIUM SERPL-SCNC: 3.4 MMOL/L (ref 3.5–5.1)
PROTHROMBIN TIME: 18.6 SECONDS (ref 11.7–14.5)
RBC # BLD: 2.9 M/CU MM (ref 4.6–6.2)
RBC # BLD: 3.32 M/CU MM (ref 4.6–6.2)
SEGMENTED NEUTROPHILS ABSOLUTE COUNT: 4.4 K/CU MM
SEGMENTED NEUTROPHILS ABSOLUTE COUNT: 4.6 K/CU MM
SEGMENTED NEUTROPHILS RELATIVE PERCENT: 87 % (ref 36–66)
SEGMENTED NEUTROPHILS RELATIVE PERCENT: 87.8 % (ref 36–66)
SODIUM BLD-SCNC: 118 MMOL/L (ref 135–145)
SODIUM BLD-SCNC: 123 MMOL/L (ref 135–145)
TOTAL IMMATURE NEUTOROPHIL: 0.06 K/CU MM
TOTAL IMMATURE NEUTOROPHIL: 0.1 K/CU MM
TOTAL NUCLEATED RBC: 0 K/CU MM
TOTAL NUCLEATED RBC: 0 K/CU MM
TOTAL PROTEIN: 5.3 GM/DL (ref 6.4–8.2)
TOTAL PROTEIN: 5.6 GM/DL (ref 6.4–8.2)
WBC # BLD: 5 K/CU MM (ref 4–10.5)
WBC # BLD: 5.3 K/CU MM (ref 4–10.5)

## 2024-01-21 PROCEDURE — 85610 PROTHROMBIN TIME: CPT

## 2024-01-21 PROCEDURE — 84165 PROTEIN E-PHORESIS SERUM: CPT

## 2024-01-21 PROCEDURE — 83735 ASSAY OF MAGNESIUM: CPT

## 2024-01-21 PROCEDURE — 2580000003 HC RX 258: Performed by: STUDENT IN AN ORGANIZED HEALTH CARE EDUCATION/TRAINING PROGRAM

## 2024-01-21 PROCEDURE — 88185 FLOWCYTOMETRY/TC ADD-ON: CPT

## 2024-01-21 PROCEDURE — 6370000000 HC RX 637 (ALT 250 FOR IP): Performed by: INTERNAL MEDICINE

## 2024-01-21 PROCEDURE — 2500000003 HC RX 250 WO HCPCS: Performed by: INTERNAL MEDICINE

## 2024-01-21 PROCEDURE — 2000000000 HC ICU R&B

## 2024-01-21 PROCEDURE — 80076 HEPATIC FUNCTION PANEL: CPT

## 2024-01-21 PROCEDURE — 82330 ASSAY OF CALCIUM: CPT

## 2024-01-21 PROCEDURE — 86320 SERUM IMMUNOELECTROPHORESIS: CPT

## 2024-01-21 PROCEDURE — 6370000000 HC RX 637 (ALT 250 FOR IP): Performed by: STUDENT IN AN ORGANIZED HEALTH CARE EDUCATION/TRAINING PROGRAM

## 2024-01-21 PROCEDURE — 80053 COMPREHEN METABOLIC PANEL: CPT

## 2024-01-21 PROCEDURE — 74018 RADEX ABDOMEN 1 VIEW: CPT

## 2024-01-21 PROCEDURE — 82248 BILIRUBIN DIRECT: CPT

## 2024-01-21 PROCEDURE — 82962 GLUCOSE BLOOD TEST: CPT

## 2024-01-21 PROCEDURE — 71045 X-RAY EXAM CHEST 1 VIEW: CPT

## 2024-01-21 PROCEDURE — 51798 US URINE CAPACITY MEASURE: CPT

## 2024-01-21 PROCEDURE — 2580000003 HC RX 258: Performed by: INTERNAL MEDICINE

## 2024-01-21 PROCEDURE — 85025 COMPLETE CBC W/AUTO DIFF WBC: CPT

## 2024-01-21 PROCEDURE — 6360000002 HC RX W HCPCS: Performed by: STUDENT IN AN ORGANIZED HEALTH CARE EDUCATION/TRAINING PROGRAM

## 2024-01-21 PROCEDURE — 84155 ASSAY OF PROTEIN SERUM: CPT

## 2024-01-21 PROCEDURE — 94761 N-INVAS EAR/PLS OXIMETRY MLT: CPT

## 2024-01-21 PROCEDURE — 87536 HIV-1 QUANT&REVRSE TRNSCRPJ: CPT

## 2024-01-21 PROCEDURE — 93010 ELECTROCARDIOGRAM REPORT: CPT | Performed by: INTERNAL MEDICINE

## 2024-01-21 PROCEDURE — 88184 FLOWCYTOMETRY/ TC 1 MARKER: CPT

## 2024-01-21 PROCEDURE — 76705 ECHO EXAM OF ABDOMEN: CPT

## 2024-01-21 PROCEDURE — 83690 ASSAY OF LIPASE: CPT

## 2024-01-21 PROCEDURE — 84100 ASSAY OF PHOSPHORUS: CPT

## 2024-01-21 RX ORDER — INSULIN GLARGINE 100 [IU]/ML
10 INJECTION, SOLUTION SUBCUTANEOUS ONCE
Status: COMPLETED | OUTPATIENT
Start: 2024-01-21 | End: 2024-01-21

## 2024-01-21 RX ORDER — 3% SODIUM CHLORIDE 3 G/100ML
50 INJECTION, SOLUTION INTRAVENOUS CONTINUOUS
Status: DISCONTINUED | OUTPATIENT
Start: 2024-01-21 | End: 2024-01-22

## 2024-01-21 RX ORDER — ACETAMINOPHEN 325 MG/1
650 TABLET ORAL EVERY 4 HOURS PRN
Status: DISCONTINUED | OUTPATIENT
Start: 2024-01-21 | End: 2024-02-08 | Stop reason: HOSPADM

## 2024-01-21 RX ORDER — CALCIUM CARBONATE 500 MG/1
500 TABLET, CHEWABLE ORAL 3 TIMES DAILY
Status: DISCONTINUED | OUTPATIENT
Start: 2024-01-21 | End: 2024-02-08 | Stop reason: HOSPADM

## 2024-01-21 RX ORDER — CALCITRIOL 0.25 UG/1
0.5 CAPSULE, LIQUID FILLED ORAL DAILY
Status: DISCONTINUED | OUTPATIENT
Start: 2024-01-21 | End: 2024-02-08 | Stop reason: HOSPADM

## 2024-01-21 RX ORDER — CALCIUM GLUCONATE 20 MG/ML
1000 INJECTION, SOLUTION INTRAVENOUS ONCE
Status: COMPLETED | OUTPATIENT
Start: 2024-01-21 | End: 2024-01-21

## 2024-01-21 RX ORDER — ERGOCALCIFEROL 1.25 MG/1
50000 CAPSULE ORAL WEEKLY
Status: DISCONTINUED | OUTPATIENT
Start: 2024-01-21 | End: 2024-02-08 | Stop reason: HOSPADM

## 2024-01-21 RX ADMIN — INSULIN GLARGINE 10 UNITS: 100 INJECTION, SOLUTION SUBCUTANEOUS at 22:37

## 2024-01-21 RX ADMIN — PANTOPRAZOLE SODIUM 40 MG: 40 TABLET, DELAYED RELEASE ORAL at 05:39

## 2024-01-21 RX ADMIN — EZETIMIBE 10 MG: 10 TABLET ORAL at 22:11

## 2024-01-21 RX ADMIN — ACETAMINOPHEN 650 MG: 325 TABLET ORAL at 11:48

## 2024-01-21 RX ADMIN — SODIUM BICARBONATE: 84 INJECTION, SOLUTION INTRAVENOUS at 05:42

## 2024-01-21 RX ADMIN — ATORVASTATIN CALCIUM 80 MG: 40 TABLET, FILM COATED ORAL at 22:11

## 2024-01-21 RX ADMIN — POTASSIUM BICARBONATE 20 MEQ: 782 TABLET, EFFERVESCENT ORAL at 18:40

## 2024-01-21 RX ADMIN — BUSPIRONE HYDROCHLORIDE 10 MG: 5 TABLET ORAL at 09:07

## 2024-01-21 RX ADMIN — SODIUM CHLORIDE, PRESERVATIVE FREE 10 ML: 5 INJECTION INTRAVENOUS at 09:13

## 2024-01-21 RX ADMIN — ASPIRIN 81 MG 81 MG: 81 TABLET ORAL at 09:07

## 2024-01-21 RX ADMIN — HEPARIN SODIUM 5000 UNITS: 5000 INJECTION INTRAVENOUS; SUBCUTANEOUS at 13:45

## 2024-01-21 RX ADMIN — CALCITRIOL CAPSULES 0.25 MCG 0.5 MCG: 0.25 CAPSULE ORAL at 11:49

## 2024-01-21 RX ADMIN — TICAGRELOR 90 MG: 90 TABLET ORAL at 22:12

## 2024-01-21 RX ADMIN — HEPARIN SODIUM 5000 UNITS: 5000 INJECTION INTRAVENOUS; SUBCUTANEOUS at 05:39

## 2024-01-21 RX ADMIN — ERGOCALCIFEROL 50000 UNITS: 1.25 CAPSULE ORAL at 12:13

## 2024-01-21 RX ADMIN — METOPROLOL SUCCINATE 50 MG: 50 TABLET, EXTENDED RELEASE ORAL at 09:07

## 2024-01-21 RX ADMIN — CALCIUM CARBONATE 500 MG: 500 TABLET, CHEWABLE ORAL at 22:18

## 2024-01-21 RX ADMIN — SODIUM CHLORIDE, PRESERVATIVE FREE 10 ML: 5 INJECTION INTRAVENOUS at 22:11

## 2024-01-21 RX ADMIN — ACETAMINOPHEN 650 MG: 325 TABLET ORAL at 05:39

## 2024-01-21 RX ADMIN — TRAZODONE HYDROCHLORIDE 50 MG: 50 TABLET ORAL at 22:11

## 2024-01-21 RX ADMIN — SODIUM CHLORIDE 50 ML/HR: 3 INJECTION, SOLUTION INTRAVENOUS at 19:48

## 2024-01-21 RX ADMIN — CALCIUM GLUCONATE 1000 MG: 20 INJECTION, SOLUTION INTRAVENOUS at 18:42

## 2024-01-21 RX ADMIN — CEFTRIAXONE 1000 MG: 1 INJECTION, POWDER, FOR SOLUTION INTRAMUSCULAR; INTRAVENOUS at 03:44

## 2024-01-21 RX ADMIN — INSULIN LISPRO 4 UNITS: 100 INJECTION, SOLUTION INTRAVENOUS; SUBCUTANEOUS at 13:45

## 2024-01-21 RX ADMIN — TICAGRELOR 90 MG: 90 TABLET ORAL at 09:07

## 2024-01-21 RX ADMIN — BUSPIRONE HYDROCHLORIDE 10 MG: 5 TABLET ORAL at 13:44

## 2024-01-21 RX ADMIN — HEPARIN SODIUM 5000 UNITS: 5000 INJECTION INTRAVENOUS; SUBCUTANEOUS at 22:19

## 2024-01-21 RX ADMIN — BUSPIRONE HYDROCHLORIDE 10 MG: 5 TABLET ORAL at 22:11

## 2024-01-21 RX ADMIN — ISOSORBIDE MONONITRATE 60 MG: 60 TABLET, EXTENDED RELEASE ORAL at 09:07

## 2024-01-21 RX ADMIN — AZITHROMYCIN MONOHYDRATE 250 MG: 500 INJECTION, POWDER, LYOPHILIZED, FOR SOLUTION INTRAVENOUS at 03:56

## 2024-01-21 RX ADMIN — ACETAMINOPHEN 650 MG: 325 TABLET ORAL at 00:27

## 2024-01-21 RX ADMIN — INSULIN LISPRO 4 UNITS: 100 INJECTION, SOLUTION INTRAVENOUS; SUBCUTANEOUS at 18:40

## 2024-01-21 RX ADMIN — CALCIUM CARBONATE 500 MG: 500 TABLET, CHEWABLE ORAL at 15:46

## 2024-01-21 RX ADMIN — CALCIUM CARBONATE 500 MG: 500 TABLET, CHEWABLE ORAL at 11:48

## 2024-01-21 ASSESSMENT — PAIN DESCRIPTION - LOCATION
LOCATION: ABDOMEN

## 2024-01-21 ASSESSMENT — PAIN SCALES - GENERAL
PAINLEVEL_OUTOF10: 0
PAINLEVEL_OUTOF10: 0
PAINLEVEL_OUTOF10: 5
PAINLEVEL_OUTOF10: 0
PAINLEVEL_OUTOF10: 0
PAINLEVEL_OUTOF10: 7
PAINLEVEL_OUTOF10: 0
PAINLEVEL_OUTOF10: 0
PAINLEVEL_OUTOF10: 4
PAINLEVEL_OUTOF10: 0
PAINLEVEL_OUTOF10: 4

## 2024-01-21 ASSESSMENT — PAIN DESCRIPTION - DESCRIPTORS
DESCRIPTORS: ACHING

## 2024-01-21 NOTE — PROGRESS NOTES
Nephrology Progress Note  1/21/2024 10:42 AM        Subjective:   Admit Date: 1/19/2024  PCP: Frandy Parsons APRN - CNP    Interval History: Patient seen in early morning, this is a late entry  He tolerated 2 hours of dialysis without any ultrafiltration  Also intensive care unit team was gracious enough to place a temporary dialysis catheter for me-    Diet: Some    ROS: No nausea vomiting, no PND orthopnea  He made about 500 cc of urine  Still has low-grade fever Tmax was 100.3, acceptable blood pressure    Data:     Current meds:    sodium chloride flush  5-40 mL IntraVENous 2 times per day    heparin (porcine)  5,000 Units SubCUTAneous 3 times per day    acetaminophen  650 mg Oral 4 times per day    aspirin  81 mg Oral Daily    ticagrelor  90 mg Oral BID    ezetimibe  10 mg Oral Nightly    [Held by provider] gabapentin  300 mg Oral TID    busPIRone  10 mg Oral TID    pantoprazole  40 mg Oral QAM AC    atorvastatin  80 mg Oral Nightly    traZODone  50 mg Oral Nightly    isosorbide mononitrate  60 mg Oral Daily    metoprolol succinate  50 mg Oral Daily    azithromycin  250 mg IntraVENous Q24H    cefTRIAXone (ROCEPHIN) IV  1,000 mg IntraVENous Q24H    insulin lispro  0-16 Units SubCUTAneous TID WC    insulin lispro  0-4 Units SubCUTAneous 2 times per day    insulin glargine  30 Units SubCUTAneous Nightly      sodium chloride      dextrose           I/O last 3 completed shifts:  In: 560 [P.O.:60]  Out: 820 [Urine:320]    CBC:   Recent Labs     01/19/24  1945 01/20/24  0615 01/21/24  0630   WBC 8.0 6.7 5.3   HGB 11.5* 10.3* 9.9*    157 151          Recent Labs     01/20/24  0615 01/20/24  1225 01/21/24  0645   * 119* 123*   K 4.1 4.0 3.4*   CL 83* 81* 82*   CO2 10* 14* 19*   BUN 87* 95* 82*   CREATININE 6.5* 6.6* 6.0*   GLUCOSE 203* 213* 187*       Lab Results   Component Value Date    CALCIUM 6.9 (LL) 01/21/2024    PHOS 8.0 (H) 01/21/2024       Objective:     Vitals: /88   Pulse (!) 105   Temp

## 2024-01-21 NOTE — PROGRESS NOTES
Progress Note( Dr. Cruz)  1/21/2024  Subjective:   Admit Date: 1/19/2024  PCP: Frandy Parsnos APRN - CNP    Admitted For : Nausea vomiting and frequent fall was found to be hyponatremic with serum sodium 117     Consulted For: Better control of blood glucose     Interval History: Patient was at dialysis catheter placed yesterday at dialysis for 2 hours  Feels somewhat better able to eat last night and again this morning  Serum sodium getting better of 123 it is 117 yesterday  Still acidotic CO2 up to 19 still has a big anion gap her blood glucose are better  Patient was not started on an IV insulin drip    Denies any chest pains,   Yes  SOB .   Denies nausea or vomiting.   No new bowel or bladder symptoms.       Intake/Output Summary (Last 24 hours) at 1/21/2024 1251  Last data filed at 1/20/2024 1719  Gross per 24 hour   Intake 560 ml   Output 500 ml   Net 60 ml       DATA    CBC:   Recent Labs     01/19/24  1945 01/20/24  0615 01/21/24  0630   WBC 8.0 6.7 5.3   HGB 11.5* 10.3* 9.9*    157 151    CMP:  Recent Labs     01/19/24  1945 01/20/24  0615 01/20/24  1225 01/21/24  0630 01/21/24  0645   * 117* 119*  --  123*   K 3.8 4.1 4.0  --  3.4*   CL 79* 83* 81*  --  82*   CO2 14* 10* 14*  --  19*   BUN 75* 87* 95*  --  82*   CREATININE 5.7* 6.5* 6.6*  --  6.0*   CALCIUM 8.4 7.0* 7.7*  --  6.9*   PROT 8.2 5.8*  --  5.7*  --    LABALBU 3.1* 2.4*  --   --   --    BILITOT 1.5* 3.2*  --   --   --    ALKPHOS 64 58  --   --   --    * 414*  --   --   --    ALT 98* 90*  --   --   --      Lipids:   Lab Results   Component Value Date/Time    CHOL 278 12/11/2023 12:28 PM    HDL 22 12/11/2023 12:28 PM    TRIG 386 12/11/2023 12:28 PM     Glucose:  Recent Labs     01/21/24  0247 01/21/24  0857 01/21/24  1153   POCGLU 228* 196* 246*     [id number]:  Lab Results   Component Value Date/Time    LABA1C 6.8 01/20/2024 05:54 AM     High Sensitivity TSH:   Lab Results   Component Value Date/Time    TSHHS 1.420  busPIRone  10 mg Oral TID    pantoprazole  40 mg Oral QAM AC    atorvastatin  80 mg Oral Nightly    traZODone  50 mg Oral Nightly    isosorbide mononitrate  60 mg Oral Daily    metoprolol succinate  50 mg Oral Daily    azithromycin  250 mg IntraVENous Q24H    cefTRIAXone (ROCEPHIN) IV  1,000 mg IntraVENous Q24H    insulin lispro  0-16 Units SubCUTAneous TID WC    insulin lispro  0-4 Units SubCUTAneous 2 times per day    insulin glargine  30 Units SubCUTAneous Nightly      Infusions:    sodium chloride      dextrose           Objective:   Vitals: BP (!) 150/91   Pulse 99   Temp 98.1 °F (36.7 °C) (Oral)   Resp 19   Ht 1.854 m (6' 1\")   Wt 99.7 kg (219 lb 12.8 oz)   SpO2 95%   BMI 29.00 kg/m²   General appearance: alert and cooperative with exam  Neck: no JVD or bruit  Thyroid : Normal lobes   Lungs: Has Vesicular Breath sounds   Heart:  regular rate and rhythm  Abdomen: soft, non-tender; bowel sounds normal; no masses,  no organomegaly  Musculoskeletal: Normal  Extremities: extremities normal, , no edema//left third toe amputation   Neurologic:  Awake, alert, oriented to name, place and time.  Cranial nerves II-XII are grossly intact.  Motor is  intact.  Sensory neuropathy.,  and gait is normal.    Assessment:     Patient Active Problem List:     Type 2 diabetes mellitus without complication, with long-term current use of insulin (HCC)     Gastroesophageal reflux disease     Former tobacco use     Acute osteomyelitis of phalanx of left foot (HCC)     Osteomyelitis of third toe of left foot (HCC)     Anxiety     Persistent proteinuria     Benign neoplasm of sigmoid colon     Vitamin D deficiency     ASCVD (arteriosclerotic cardiovascular disease)     Chronic systolic congestive heart failure (HCC)     Ischemic cardiomyopathy     Dyslipidemia     Stage 3a chronic kidney disease (HCC)     Other male erectile dysfunction     Atherosclerotic heart disease of native coronary artery with other forms of angina pectoris

## 2024-01-21 NOTE — PROGRESS NOTES
V2.0  [facility] Hospitalist Progress Note      Name:  Dewey Sosa /Age/Sex: 1980  (43 y.o. male)   MRN & CSN:  [id number] & [id number] Encounter Date/Time: 2024 12:45 PM EST    Location:  -A PCP: Frandy Parsons APRN - CNP       Hospital Day: 3    Assessment and Plan:   Dewey Sosa is a 43 y.o. male with pmh of DM-2, CKD, CAD, CHF who presents with nausea and vomiting .lab workup significant for  Hyponatremia and ROSE MARY, and radiologic evidence of pneumonia      Plan:  Hyponatremia  ROSE MARY on CKD stage IV  High anion gap metabolic acidosis  Hyperphosphatemia  Possible starvation ketosis  Right lower lobe Legionella pneumonia  Sepsis  Transaminitis  Troponinemia, most likely type II MI, s/t ROSE MARY  Frequent falls POA, no head trauma  PVD s/p left phalanx amputation        Neuro: Patient is alert and oriented, appeared anxious, patient reported frequent falls prior to admission, CT head obtained-negative for acute abnormality    Cardiac: Hemodynamically stable, mild tachycardia noted, continue telemetry monitoring history of ischemic cardiomyopathy, EF 50 to 55% grade 2 diastolic dysfunction,   Elevated troponin-most likely secondary to ROSE MARY, no concerns for ACS    Respiratory: On room air,normal  breathing pattern, respiratory rate 20-28, started on bicarb drip, may need dialysis.  CXR reviewed-right lower lobe consolidation-this is most likely pneumonia vs mass.  Urine antigens positive for Legionella, respiratory PCR panel-negative, MRSA nares-negative, on CAP coverage azithromycin and Rocephin.    GI: Protonix for GI PPx, patient can be started on diet, transaminitis noted with AST-414, ALT-90 lipase-448, CT A/P-no abnormality noted in the liver bladder bladder and biliary ducts and pancreas evidence of splenomegaly noted, no focal lesion or obstruction, no Hx of alcoholism, will repeat LFTs and trend daily, ordered RUQ U/S     : ROSE MARY-creatinine in -2.5-4.3.  5.7 this admission-- > 6.6  nausea/vomiting/abdominal pain, no motor or sensory changes, no bowel or bladder dysfunction    Objective:     Intake/Output Summary (Last 24 hours) at 1/21/2024 0925  Last data filed at 1/20/2024 1719  Gross per 24 hour   Intake 560 ml   Output 590 ml   Net -30 ml          Vitals:   Vitals:    01/21/24 0803   BP: (!) 161/91   Pulse: (!) 105   Resp: 19   Temp: 98.7 °F (37.1 °C)   SpO2: 96%       Physical Exam:     General: Patient is a 43-year-old male, appears stated age, NAD  Eyes: EOMI  ENT: neck supple  Cardiovascular: Sinus tachycardia, no murmurs, rubs, or gallop  Respiratory: Clear to auscultation, no wheeze, rales, rhonchi  Gastrointestinal: Soft, diffuse abdominal pain to palpation, normoactive bowel sounds  Genitourinary: no suprapubic tenderness  Musculoskeletal: No edema  Skin: warm, dry  Neuro: Alert, oriented x 3, delayed response, dilcia LE weakness motor strength 3/5, bedbound at home  Psych: Mood appropriate.     Medications:   Medications:    sodium chloride flush  5-40 mL IntraVENous 2 times per day    heparin (porcine)  5,000 Units SubCUTAneous 3 times per day    acetaminophen  650 mg Oral 4 times per day    aspirin  81 mg Oral Daily    ticagrelor  90 mg Oral BID    ezetimibe  10 mg Oral Nightly    [Held by provider] gabapentin  300 mg Oral TID    busPIRone  10 mg Oral TID    pantoprazole  40 mg Oral QAM AC    atorvastatin  80 mg Oral Nightly    traZODone  50 mg Oral Nightly    isosorbide mononitrate  60 mg Oral Daily    metoprolol succinate  50 mg Oral Daily    azithromycin  250 mg IntraVENous Q24H    cefTRIAXone (ROCEPHIN) IV  1,000 mg IntraVENous Q24H    insulin lispro  0-16 Units SubCUTAneous TID     insulin lispro  0-4 Units SubCUTAneous 2 times per day    insulin glargine  30 Units SubCUTAneous Nightly      Infusions:    sodium chloride      dextrose       PRN Meds: sodium chloride flush, 5-40 mL, PRN  sodium chloride, , PRN  ondansetron, 4 mg, Q8H PRN   Or  ondansetron, 4 mg, Q6H

## 2024-01-21 NOTE — PROGRESS NOTES
V2.0  [facility] [place]      Name:  Dewey Sosa /Age/Sex: 1980  (43 y.o. male)   MRN & CSN:  [id number] & [id number] Encounter Date/Time: 2024 1:41 PM EST    Location:  -A PCP: Frandy Parsons APRN - CNP       Hospital Day: 3      Subjective:     Chief Complaint:  Fall (Complains he has fallen 6 times in the last few days, and has hit his head on ASA) and Fatigue (Complaining of increasing weakness. )     Started having worsening abd pain today, lipase 1700's. U/S ordered.  Some nausea no vomiting.  No worsening of SOB.         Assessment and Plan:   Sepsis noted after admission. Fever T max 101.6, tachycardia 110's, tachypnea 30's. 2/2 legionella pneumonia.   CT chest showed right lung mass vs consolidation  Legionalla antigen +  - On azithromycin and rocephin  - f/u blood cx     Hyponatremia. Likely 2/2 legionella pneumonia and poor oral intake/dehydration.  P/w Na 117, still 123 today  Was on bicarb drip     ROSE MARY on CKD IV.  Cr 5.7 (baseline ~2.5).   Has been on bicarb drip.   Temp vas cath placed  Started on HD.     Type II diabetes. On home insulin  - on lantus and sliding scale  - endo managing      Systolic and diastolic CHF. No sign of volume overload.  Last ECHO 2023 EF 50-55% (was 20-25% 10/2021), LV dilated , grade II diastolic  On home Toprol, Imdur    Elevated lipase, transaminitis. Worsening abd pain.  Could be 2/2 infection, CT abd on admission no pancreatitis  - follow up U/S      Personally reviewed Lab Studies and Imaging     Discussed management of the case with nephrology who recommended HIV workup    Imaging that was interpreted personally includes CT chest/abd and results as above    Drugs that require monitoring for toxicity include heparin and the method of monitoring was CBC      Diet ADULT DIET; Regular; 3 carb choices (45 gm/meal)   DVT Prophylaxis [] Lovenox, [x]  Heparin, [] SCDs, [] Ambulation,  [] Eliquis, [] Xarelto  [] Coumadin   Code  Medical Condition (MA) Reason for Exam: multiple falls FINDINGS: BRAIN/VENTRICLES: There is no acute intracranial hemorrhage, mass effect or midline shift.  No abnormal extra-axial fluid collection.  The gray-white differentiation is maintained without evidence of an acute infarct.  There is no evidence of hydrocephalus. Focal area of encephalomalacia in the right frontal lobe likely represents an old stroke. ORBITS: The visualized portion of the orbits demonstrate no acute abnormality. SINUSES: Bilateral maxillary sinus mucous retention cysts.  No paranasal sinus air-fluid levels.  The bilateral mastoid air cells are clear. SOFT TISSUES/SKULL:  No acute abnormality of the visualized skull or soft tissues.     1.  No acute intracranial abnormality. 2.  Focal area of encephalomalacia in the right frontal lobe may represent an old stroke.     XR CHEST PORTABLE    Result Date: 1/19/2024  EXAMINATION: ONE XRAY VIEW OF THE CHEST 1/19/2024 8:05 pm COMPARISON: 11/03/2021 HISTORY: ORDERING SYSTEM PROVIDED HISTORY: sob TECHNOLOGIST PROVIDED HISTORY: Reason for exam:->sob Reason for Exam: sob FINDINGS: Lung volumes are reduced resulting in crowding of central and basilar vascular markings.  There is dense consolidation within the right lung base. Patchy consolidation is noted throughout the left lung.  Heart size and vascularity are stable.  There is no pneumothorax or effusion     Expiratory film with bilateral atelectasis versus pneumonia.       Electronically signed by Yuval Nath MD on 1/21/2024 at 1:44 PM

## 2024-01-22 ENCOUNTER — APPOINTMENT (OUTPATIENT)
Dept: NON INVASIVE DIAGNOSTICS | Age: 44
DRG: 969 | End: 2024-01-22
Payer: MEDICARE

## 2024-01-22 ENCOUNTER — APPOINTMENT (OUTPATIENT)
Dept: MRI IMAGING | Age: 44
DRG: 969 | End: 2024-01-22
Payer: MEDICARE

## 2024-01-22 PROBLEM — R11.2 NAUSEA AND VOMITING: Status: ACTIVE | Noted: 2024-01-22

## 2024-01-22 PROBLEM — R16.1 SPLENOMEGALY: Status: ACTIVE | Noted: 2024-01-22

## 2024-01-22 PROBLEM — R74.8 ELEVATED LIPASE: Status: ACTIVE | Noted: 2024-01-22

## 2024-01-22 PROBLEM — R79.89 ELEVATED LFTS: Status: ACTIVE | Noted: 2024-01-22

## 2024-01-22 LAB
ALBUMIN SERPL-MCNC: 2.1 GM/DL (ref 3.4–5)
ALP BLD-CCNC: 95 IU/L (ref 40–129)
ALT SERPL-CCNC: 82 U/L (ref 10–40)
ANION GAP SERPL CALCULATED.3IONS-SCNC: 17 MMOL/L (ref 7–16)
ANION GAP SERPL CALCULATED.3IONS-SCNC: 17 MMOL/L (ref 7–16)
ANION GAP SERPL CALCULATED.3IONS-SCNC: 21 MMOL/L (ref 7–16)
AST SERPL-CCNC: 332 IU/L (ref 15–37)
BASOPHILS ABSOLUTE: 0 K/CU MM
BASOPHILS RELATIVE PERCENT: 0 % (ref 0–1)
BILIRUB SERPL-MCNC: 2.2 MG/DL (ref 0–1)
BILIRUBIN DIRECT: 2.2 MG/DL (ref 0–0.3)
BILIRUBIN, INDIRECT: 0 MG/DL (ref 0–0.7)
BUN SERPL-MCNC: 81 MG/DL (ref 6–23)
BUN SERPL-MCNC: 90 MG/DL (ref 6–23)
BUN SERPL-MCNC: 93 MG/DL (ref 6–23)
CALCIUM SERPL-MCNC: 5.8 MG/DL (ref 8.3–10.6)
CALCIUM SERPL-MCNC: 7.7 MG/DL (ref 8.3–10.6)
CALCIUM SERPL-MCNC: 7.9 MG/DL (ref 8.3–10.6)
CHLORIDE BLD-SCNC: 83 MMOL/L (ref 99–110)
CHLORIDE BLD-SCNC: 83 MMOL/L (ref 99–110)
CHLORIDE BLD-SCNC: 86 MMOL/L (ref 99–110)
CO2: 18 MMOL/L (ref 21–32)
CO2: 19 MMOL/L (ref 21–32)
CO2: 20 MMOL/L (ref 21–32)
CREAT SERPL-MCNC: 5.9 MG/DL (ref 0.9–1.3)
CREAT SERPL-MCNC: 6.8 MG/DL (ref 0.9–1.3)
CREAT SERPL-MCNC: 6.8 MG/DL (ref 0.9–1.3)
DIFFERENTIAL TYPE: ABNORMAL
ECHO AO ROOT DIAM: 3 CM
ECHO AO ROOT INDEX: 1.34 CM/M2
ECHO AV AREA PEAK VELOCITY: 3.1 CM2
ECHO AV AREA VTI: 2.9 CM2
ECHO AV AREA/BSA PEAK VELOCITY: 1.4 CM2/M2
ECHO AV AREA/BSA VTI: 1.3 CM2/M2
ECHO AV MEAN GRADIENT: 7 MMHG
ECHO AV MEAN VELOCITY: 1.2 M/S
ECHO AV PEAK GRADIENT: 13 MMHG
ECHO AV PEAK VELOCITY: 1.8 M/S
ECHO AV VELOCITY RATIO: 0.78
ECHO AV VTI: 25.7 CM
ECHO BSA: 2.26 M2
ECHO EST RA PRESSURE: 3 MMHG
ECHO IVC PROX: 1.6 CM
ECHO LA AREA 4C: 18.7 CM2
ECHO LA DIAMETER INDEX: 1.83 CM/M2
ECHO LA DIAMETER: 4.1 CM
ECHO LA MAJOR AXIS: 5.7 CM
ECHO LA TO AORTIC ROOT RATIO: 1.37
ECHO LA VOL MOD A4C: 53 ML (ref 18–58)
ECHO LA VOLUME INDEX MOD A4C: 24 ML/M2 (ref 16–34)
ECHO LV E' LATERAL VELOCITY: 8 CM/S
ECHO LV E' SEPTAL VELOCITY: 6 CM/S
ECHO LV EDV A4C: 133 ML
ECHO LV EDV INDEX A4C: 59 ML/M2
ECHO LV EJECTION FRACTION A4C: 65 %
ECHO LV ESV A4C: 47 ML
ECHO LV ESV INDEX A4C: 21 ML/M2
ECHO LV FRACTIONAL SHORTENING: 22 % (ref 28–44)
ECHO LV INTERNAL DIMENSION DIASTOLE INDEX: 1.83 CM/M2
ECHO LV INTERNAL DIMENSION DIASTOLIC: 4.1 CM (ref 4.2–5.9)
ECHO LV INTERNAL DIMENSION SYSTOLIC INDEX: 1.43 CM/M2
ECHO LV INTERNAL DIMENSION SYSTOLIC: 3.2 CM
ECHO LV IVSD: 1 CM (ref 0.6–1)
ECHO LV MASS 2D: 141.5 G (ref 88–224)
ECHO LV MASS INDEX 2D: 63.2 G/M2 (ref 49–115)
ECHO LV POSTERIOR WALL DIASTOLIC: 1.1 CM (ref 0.6–1)
ECHO LV RELATIVE WALL THICKNESS RATIO: 0.54
ECHO LVOT AREA: 4.2 CM2
ECHO LVOT AV VTI INDEX: 0.7
ECHO LVOT DIAM: 2.3 CM
ECHO LVOT MEAN GRADIENT: 4 MMHG
ECHO LVOT PEAK GRADIENT: 8 MMHG
ECHO LVOT PEAK VELOCITY: 1.4 M/S
ECHO LVOT STROKE VOLUME INDEX: 33.6 ML/M2
ECHO LVOT SV: 75.2 ML
ECHO LVOT VTI: 18.1 CM
ECHO MV A VELOCITY: 0.61 M/S
ECHO MV E DECELERATION TIME (DT): 187 MS
ECHO MV E VELOCITY: 0.75 M/S
ECHO MV E/A RATIO: 1.23
ECHO MV E/E' LATERAL: 9.38
ECHO MV E/E' RATIO (AVERAGED): 10.94
ECHO PV MAX VELOCITY: 2.8 M/S
ECHO PV MEAN GRADIENT: 15 MMHG
ECHO PV MEAN VELOCITY: 1.7 M/S
ECHO PV PEAK GRADIENT: 32 MMHG
ECHO PV VTI: 38.3 CM
ECHO RIGHT VENTRICULAR SYSTOLIC PRESSURE (RVSP): 33 MMHG
ECHO RV MID DIMENSION: 2.7 CM
ECHO RVOT MEAN GRADIENT: 13 MMHG
ECHO RVOT PEAK GRADIENT: 16 MMHG
ECHO RVOT PEAK VELOCITY: 2 M/S
ECHO RVOT VTI: 24.6 CM
ECHO TV REGURGITANT MAX VELOCITY: 2.73 M/S
ECHO TV REGURGITANT PEAK GRADIENT: 30 MMHG
EOSINOPHILS ABSOLUTE: 0 K/CU MM
EOSINOPHILS RELATIVE PERCENT: 0 % (ref 0–3)
GFR SERPL CREATININE-BSD FRML MDRD: 10 ML/MIN/1.73M2
GFR SERPL CREATININE-BSD FRML MDRD: 10 ML/MIN/1.73M2
GFR SERPL CREATININE-BSD FRML MDRD: 11 ML/MIN/1.73M2
GLUCOSE BLD-MCNC: 131 MG/DL (ref 70–99)
GLUCOSE BLD-MCNC: 151 MG/DL (ref 70–99)
GLUCOSE BLD-MCNC: 163 MG/DL (ref 70–99)
GLUCOSE BLD-MCNC: 173 MG/DL (ref 70–99)
GLUCOSE BLD-MCNC: 239 MG/DL (ref 70–99)
GLUCOSE SERPL-MCNC: 132 MG/DL (ref 70–99)
GLUCOSE SERPL-MCNC: 138 MG/DL (ref 70–99)
GLUCOSE SERPL-MCNC: 141 MG/DL (ref 70–99)
HCT VFR BLD CALC: 23.3 % (ref 42–52)
HEMOGLOBIN: 8.2 GM/DL (ref 13.5–18)
IMMATURE NEUTROPHIL %: 0.9 % (ref 0–0.43)
LIPASE: 1086 IU/L (ref 13–60)
LYMPHOCYTES ABSOLUTE: 0.3 K/CU MM
LYMPHOCYTES RELATIVE PERCENT: 6.3 % (ref 24–44)
MAGNESIUM: 2.3 MG/DL (ref 1.8–2.4)
MAGNESIUM: 2.5 MG/DL (ref 1.8–2.4)
MCH RBC QN AUTO: 29.9 PG (ref 27–31)
MCHC RBC AUTO-ENTMCNC: 35.2 % (ref 32–36)
MCV RBC AUTO: 85 FL (ref 78–100)
MONOCYTES ABSOLUTE: 0.2 K/CU MM
MONOCYTES RELATIVE PERCENT: 4.5 % (ref 0–4)
NUCLEATED RBC %: 0 %
PDW BLD-RTO: 12.8 % (ref 11.7–14.9)
PHOSPHORUS: 7.5 MG/DL (ref 2.5–4.9)
PLATELET # BLD: 133 K/CU MM (ref 140–440)
PMV BLD AUTO: 12.5 FL (ref 7.5–11.1)
POTASSIUM SERPL-SCNC: 3.3 MMOL/L (ref 3.5–5.1)
POTASSIUM SERPL-SCNC: 3.5 MMOL/L (ref 3.5–5.1)
POTASSIUM SERPL-SCNC: 3.6 MMOL/L (ref 3.5–5.1)
RBC # BLD: 2.74 M/CU MM (ref 4.6–6.2)
SEGMENTED NEUTROPHILS ABSOLUTE COUNT: 4 K/CU MM
SEGMENTED NEUTROPHILS RELATIVE PERCENT: 88.3 % (ref 36–66)
SODIUM BLD-SCNC: 120 MMOL/L (ref 135–145)
SODIUM BLD-SCNC: 122 MMOL/L (ref 135–145)
SODIUM BLD-SCNC: 122 MMOL/L (ref 135–145)
TOTAL CK: 3590 IU/L (ref 38–174)
TOTAL IMMATURE NEUTOROPHIL: 0.04 K/CU MM
TOTAL NUCLEATED RBC: 0 K/CU MM
TOTAL PROTEIN: 5.5 GM/DL (ref 6.4–8.2)
TRIGL SERPL-MCNC: 608 MG/DL
WBC # BLD: 4.5 K/CU MM (ref 4–10.5)

## 2024-01-22 PROCEDURE — APPSS180 APP SPLIT SHARED TIME > 60 MINUTES: Performed by: LICENSED PRACTICAL NURSE

## 2024-01-22 PROCEDURE — 82962 GLUCOSE BLOOD TEST: CPT

## 2024-01-22 PROCEDURE — 93308 TTE F-UP OR LMTD: CPT

## 2024-01-22 PROCEDURE — 6370000000 HC RX 637 (ALT 250 FOR IP): Performed by: STUDENT IN AN ORGANIZED HEALTH CARE EDUCATION/TRAINING PROGRAM

## 2024-01-22 PROCEDURE — 84478 ASSAY OF TRIGLYCERIDES: CPT

## 2024-01-22 PROCEDURE — 2580000003 HC RX 258: Performed by: STUDENT IN AN ORGANIZED HEALTH CARE EDUCATION/TRAINING PROGRAM

## 2024-01-22 PROCEDURE — 6370000000 HC RX 637 (ALT 250 FOR IP): Performed by: INTERNAL MEDICINE

## 2024-01-22 PROCEDURE — 6360000002 HC RX W HCPCS: Performed by: STUDENT IN AN ORGANIZED HEALTH CARE EDUCATION/TRAINING PROGRAM

## 2024-01-22 PROCEDURE — 2500000003 HC RX 250 WO HCPCS: Performed by: STUDENT IN AN ORGANIZED HEALTH CARE EDUCATION/TRAINING PROGRAM

## 2024-01-22 PROCEDURE — 94761 N-INVAS EAR/PLS OXIMETRY MLT: CPT

## 2024-01-22 PROCEDURE — 82550 ASSAY OF CK (CPK): CPT

## 2024-01-22 PROCEDURE — 93306 TTE W/DOPPLER COMPLETE: CPT | Performed by: INTERNAL MEDICINE

## 2024-01-22 PROCEDURE — 2580000003 HC RX 258: Performed by: INTERNAL MEDICINE

## 2024-01-22 PROCEDURE — 80048 BASIC METABOLIC PNL TOTAL CA: CPT

## 2024-01-22 PROCEDURE — 84100 ASSAY OF PHOSPHORUS: CPT

## 2024-01-22 PROCEDURE — 83735 ASSAY OF MAGNESIUM: CPT

## 2024-01-22 PROCEDURE — 2060000000 HC ICU INTERMEDIATE R&B

## 2024-01-22 PROCEDURE — 82248 BILIRUBIN DIRECT: CPT

## 2024-01-22 PROCEDURE — 90935 HEMODIALYSIS ONE EVALUATION: CPT

## 2024-01-22 PROCEDURE — 99223 1ST HOSP IP/OBS HIGH 75: CPT | Performed by: INTERNAL MEDICINE

## 2024-01-22 PROCEDURE — 36415 COLL VENOUS BLD VENIPUNCTURE: CPT

## 2024-01-22 PROCEDURE — 85025 COMPLETE CBC W/AUTO DIFF WBC: CPT

## 2024-01-22 PROCEDURE — 83690 ASSAY OF LIPASE: CPT

## 2024-01-22 PROCEDURE — 80053 COMPREHEN METABOLIC PANEL: CPT

## 2024-01-22 RX ORDER — INSULIN GLARGINE 100 [IU]/ML
10 INJECTION, SOLUTION SUBCUTANEOUS NIGHTLY
Status: DISCONTINUED | OUTPATIENT
Start: 2024-01-22 | End: 2024-01-26

## 2024-01-22 RX ADMIN — CALCIUM CARBONATE 500 MG: 500 TABLET, CHEWABLE ORAL at 21:55

## 2024-01-22 RX ADMIN — SODIUM CHLORIDE 50 ML/HR: 3 INJECTION, SOLUTION INTRAVENOUS at 05:25

## 2024-01-22 RX ADMIN — AZITHROMYCIN MONOHYDRATE 250 MG: 500 INJECTION, POWDER, LYOPHILIZED, FOR SOLUTION INTRAVENOUS at 03:11

## 2024-01-22 RX ADMIN — ACETAMINOPHEN 650 MG: 325 TABLET ORAL at 11:53

## 2024-01-22 RX ADMIN — BUSPIRONE HYDROCHLORIDE 10 MG: 5 TABLET ORAL at 21:54

## 2024-01-22 RX ADMIN — BUSPIRONE HYDROCHLORIDE 10 MG: 5 TABLET ORAL at 08:19

## 2024-01-22 RX ADMIN — ISOSORBIDE MONONITRATE 60 MG: 60 TABLET, EXTENDED RELEASE ORAL at 08:20

## 2024-01-22 RX ADMIN — HEPARIN SODIUM 5000 UNITS: 5000 INJECTION INTRAVENOUS; SUBCUTANEOUS at 13:22

## 2024-01-22 RX ADMIN — TICAGRELOR 90 MG: 90 TABLET ORAL at 08:20

## 2024-01-22 RX ADMIN — EZETIMIBE 10 MG: 10 TABLET ORAL at 21:55

## 2024-01-22 RX ADMIN — SODIUM CHLORIDE, PRESERVATIVE FREE 10 ML: 5 INJECTION INTRAVENOUS at 21:56

## 2024-01-22 RX ADMIN — ATORVASTATIN CALCIUM 80 MG: 40 TABLET, FILM COATED ORAL at 21:55

## 2024-01-22 RX ADMIN — TRAZODONE HYDROCHLORIDE 50 MG: 50 TABLET ORAL at 21:55

## 2024-01-22 RX ADMIN — ACETAMINOPHEN 650 MG: 325 TABLET ORAL at 18:16

## 2024-01-22 RX ADMIN — METOPROLOL SUCCINATE 50 MG: 50 TABLET, EXTENDED RELEASE ORAL at 08:19

## 2024-01-22 RX ADMIN — ASPIRIN 81 MG 81 MG: 81 TABLET ORAL at 08:20

## 2024-01-22 RX ADMIN — CALCIUM CARBONATE 500 MG: 500 TABLET, CHEWABLE ORAL at 14:42

## 2024-01-22 RX ADMIN — PANTOPRAZOLE SODIUM 40 MG: 40 TABLET, DELAYED RELEASE ORAL at 08:19

## 2024-01-22 RX ADMIN — HEPARIN SODIUM 5000 UNITS: 5000 INJECTION INTRAVENOUS; SUBCUTANEOUS at 21:55

## 2024-01-22 RX ADMIN — INSULIN GLARGINE 10 UNITS: 100 INJECTION, SOLUTION SUBCUTANEOUS at 22:05

## 2024-01-22 RX ADMIN — TICAGRELOR 90 MG: 90 TABLET ORAL at 21:55

## 2024-01-22 RX ADMIN — CALCIUM CARBONATE 500 MG: 500 TABLET, CHEWABLE ORAL at 08:19

## 2024-01-22 RX ADMIN — ACETAMINOPHEN 650 MG: 325 TABLET ORAL at 00:36

## 2024-01-22 RX ADMIN — CALCITRIOL CAPSULES 0.25 MCG 0.5 MCG: 0.25 CAPSULE ORAL at 08:19

## 2024-01-22 RX ADMIN — ACETAMINOPHEN 650 MG: 325 TABLET ORAL at 07:39

## 2024-01-22 RX ADMIN — CALCIUM CHLORIDE 2000 MG: 100 INJECTION INTRAVENOUS; INTRAVENTRICULAR at 03:03

## 2024-01-22 RX ADMIN — SODIUM CHLORIDE, PRESERVATIVE FREE 10 ML: 5 INJECTION INTRAVENOUS at 08:20

## 2024-01-22 RX ADMIN — CEFTRIAXONE 1000 MG: 1 INJECTION, POWDER, FOR SOLUTION INTRAMUSCULAR; INTRAVENOUS at 04:50

## 2024-01-22 RX ADMIN — HEPARIN SODIUM 5000 UNITS: 5000 INJECTION INTRAVENOUS; SUBCUTANEOUS at 07:39

## 2024-01-22 RX ADMIN — BUSPIRONE HYDROCHLORIDE 10 MG: 5 TABLET ORAL at 13:22

## 2024-01-22 ASSESSMENT — PAIN DESCRIPTION - DESCRIPTORS
DESCRIPTORS: ACHING;DISCOMFORT;SORE
DESCRIPTORS: ACHING

## 2024-01-22 ASSESSMENT — PAIN SCALES - GENERAL
PAINLEVEL_OUTOF10: 8
PAINLEVEL_OUTOF10: 4

## 2024-01-22 ASSESSMENT — PAIN DESCRIPTION - ORIENTATION: ORIENTATION: RIGHT;LEFT

## 2024-01-22 ASSESSMENT — PAIN DESCRIPTION - LOCATION
LOCATION: ABDOMEN
LOCATION: LEG

## 2024-01-22 NOTE — PROGRESS NOTES
Patient Name: Dewey Sosa  Patient : 1980  MRN: [id number]     Acct: [id number]  Date of Admission: 2024  Room/Bed: -A  Code Status:  Full Code  Allergies: No Known Allergies  Diagnosis:    Patient Active Problem List   Diagnosis    Type 2 diabetes mellitus without complication, with long-term current use of insulin (HCC)    Gastroesophageal reflux disease    Former tobacco use    Acute osteomyelitis of phalanx of left foot (HCC)    Osteomyelitis of third toe of left foot (HCC)    Anxiety    Persistent proteinuria    Benign neoplasm of sigmoid colon    Vitamin D deficiency    ASCVD (arteriosclerotic cardiovascular disease)    Chronic systolic congestive heart failure (HCC)    Ischemic cardiomyopathy    Dyslipidemia    Stage 3a chronic kidney disease (HCC)    Other male erectile dysfunction    Atherosclerotic heart disease of native coronary artery with other forms of angina pectoris (HCC)    Type 2 diabetes mellitus with chronic kidney disease    Insomnia    Stage 3b chronic kidney disease (HCC)    Overweight    Chronic kidney disease, stage IV (severe) (HCC)    Hyperkalemia    Hyponatremia         Treatment:  Hemodialysis 1:1  Priority: Routine  Location: ICU    Diabetic: No  NPO: No  Isolation Precautions: Dialysis     Consent for Treatment Verified: Yes       Safety Verified: Identify (I), Consent (C), Equipment (E), HepB Status (B), Orders Complete (O), Access Verified (A), and Timeliness (T)  Time out performed prior to access at 1315 hours.    Report Received from Primary RN at 1300 hours.  Primary RN (First Initial, Last Name, Title): JEAN PIERRE Lewis RN    Incapacitated Nurse Education Completed: Yes     HBsAg ONLY:  Date Drawn: 2024       Results: Negative  HBsAb:  Date Drawn:  2024       Results: Immune >10    Order  Dialysis Bath  K+ (Potassium): 2  Ca+ (Calcium): 2.5  Na+ (Sodium): 130  HCO3 (Bicarb): 35  Bicarbonate Concentrate Lot No.: [id number]  Acid  Applicable  Venous Catheter Locking Solution: Not Applicable  Post-Treatment Procedures: Blood returned, Catheter Capped, clamped with Saline x2 ports  Machine Disinfection Process: Acid/Vinegar Clean  Rinseback Volume (ml): 300 ml  Blood Volume Processed (Liters): 36.6 L  Dialyzer Clearance: Lightly streaked  Duration of Treatment (minutes): 150 minutes     Hemodialysis Intake (ml): 500 ml  Hemodialysis Output (ml): 500 ml     Tolerated Treatment: Good             Provider Notification        Handoff complete and report given to Primary RN at 1550 hours.  Primary RN (First Initial, Last Name, Title):  JEAN PIERRE Lewis RN     Education  Person Educated: Patient   Knowledge Base: Substantial  Barriers to Learning?: None  Preferred method of Learning: Hands-on  Topic(s): Access Care, Signs and Symptoms of Infection, Fluid Management, Albumin, Procedural, Medications, Treatment Options, Potassium, Diet, and Transplant   Teaching Tools: Video, Handout, Demonstration, and Explanation   Response to Education: Verbalized Understanding, Return Demonstration, Teach Back, and Requires Follow-up     Electronically signed by Torri Bailon RN on 1/22/2024 at 4:18 PM

## 2024-01-22 NOTE — CARE COORDINATION
CM in to see pt. Pt sleeping and does not awaken to CM entering and while pt receiving bedside HD. Chart reviewed. Per nursing flowsheet pt has been alert and oriented to person, place and time but disoriented to situation. PT/OT ordered 1/20 and Cm will follow for evals and recs once they are on the chart. White board message left re; need for eval due to multiple falls at home. Pt has PCP and insurance to assist with medical costs. Cm will return to see pt at a later time when pt awake and able to participate with CM.

## 2024-01-22 NOTE — CONSULTS
[facility] Gastroenterology and Hepatology             MD Suzanne Blevins MD Carol Christensen, APRN-CNP       Brie Meng, APRN-CNP             [address] [place] [address]             [phone] fax [phone]      Physician attestation:  I have personally seen and examined the patient independently. I have reviewed the patient's available data,including medical history and recent test results. Reviewed and discussed note as documented by AMALIA.  I agree with the physical exam findings, assessment and plan.     Briefly   43-year-old male with past medical history of type 2 diabetes, GERD, osteomyelitis, oncology congestive heart failure, history of ischemic cardiomyopathy, CKD who presented to the hospital with complaint of nausea, vomiting and altered mental status.  He was noted to be significantly hyponatremic and was thus admitted to the ICU.  Patient was also noted to be severely ROSE MARY.  He was subsequently diagnosed with Legionella as disease as well as noted to have significantly elevated lipase.  His LFTs were also elevated predominantly AST and total bilirubin alkaline phosphatase has been noted to be normal, there is also mild elevation in ALT.  Currently is in direct bilirubin has now trended down to normal with predominant indirect elevation.  Of note triglyceride elevated to 608.  CT of the abdomen and pelvis did not reveal pancreatitis.    His last EGD/colonoscopy were done by Dr. Chavez on 9-.  His EGD showed small hiatal hernia, normal appearing stomach and duodenal bulb.  His esophageal biopsies showed mixed inflammation with reactive changes with no evidence of Osullivan's esophagus.  His duodenal biopsies showed normal tissue with no evidence of Celiac disease.  His colonoscopy showed two adenomatous colon polyps that were removed; one noted to be serrated adenoma with low grade dysplasia.  His random colon biopsies showed  Phosphatase 95 40 - 129 IU/L     (H) 15 - 37 IU/L    ALT 82 (H) 10 - 40 U/L    Total Protein 5.5 (L) 6.4 - 8.2 GM/DL   Lipase    Collection Time: 01/22/24  4:40 AM   Result Value Ref Range    Lipase 1,086 (H) 13 - 60 IU/L   POCT Glucose    Collection Time: 01/22/24  8:25 AM   Result Value Ref Range    POC Glucose 131 (H) 70 - 99 MG/DL   Basic Metabolic Panel w/ Reflex to MG    Collection Time: 01/22/24  8:27 AM   Result Value Ref Range    CO2 18 (L) 21 - 32 MMOL/L    Glucose 141 (H) 70 - 99 MG/DL    BUN 93 (H) 6 - 23 MG/DL    Creatinine 6.8 (H) 0.9 - 1.3 MG/DL    Est, Glom Filt Rate 10 (L) >60 mL/min/1.73m2    Calcium 7.7 (L) 8.3 - 10.6 MG/DL   Echo (TTE) limited (PRN contrast/bubble/strain/3D)    Collection Time: 01/22/24  8:47 AM   Result Value Ref Range    LV EDV A4C 133 mL    LV ESV A4C 47 mL    IVSd 1.0 0.6 - 1.0 cm    LVIDd 4.1 (A) 4.2 - 5.9 cm    LVIDs 3.2 cm    LVOT Diameter 2.3 cm    LVOT Mean Gradient 4 mmHg    LVOT VTI 18.1 cm    LVOT Peak Velocity 1.4 m/s    LVOT Peak Gradient 8 mmHg    LVPWd 1.1 (A) 0.6 - 1.0 cm    LV E' Lateral Velocity 8 cm/s    LV E' Septal Velocity 6 cm/s    LV Ejection Fraction A4C 65 %    LVOT Area 4.2 cm2    LVOT SV 75.2 ml    LA Major [place] 5.7 cm    LA Area 4C 18.7 cm2    LA Volume MOD A4C 53 18 - 58 mL    LA Diameter 4.1 cm    AV Mean Gradient 7 mmHg    AV VTI 25.7 cm    AV Mean Velocity 1.2 m/s    AV Peak Velocity 1.8 m/s    AV Peak Gradient 13 mmHg    AV Area by VTI 2.9 cm2    AV Area by Peak Velocity 3.1 cm2    Aortic Root 3.0 cm    IVC Proxmal 1.6 cm    MV E Wave Deceleration Time 187.0 ms    MV A Velocity 0.61 m/s    MV E Velocity 0.75 m/s    PV Mean Gradient 15 mmHg    PV VTI 38.3 cm    PV Mean Velocity 1.7 m/s    PV Max Velocity 2.8 m/s    PV Peak Gradient 32 mmHg    Est. RA Pressure 3 mmHg    RVOT Mean Gradient 13 mmHg    RVOT VTI 24.6 cm    RVOT Peak Velocity 2.0 m/s    RVOT Peak Gradient 16 mmHg    RV Mid Dimension 2.7 cm    TR Max Velocity 2.73 m/s    TR Peak

## 2024-01-22 NOTE — CONSULTS
24 hour rounding completed. Sterile dressing change completed per protocol. Patient continues to meet the following criteria for central vascular access: Hemodialysis    Consult the Vascular Access Team for questions, concerns, or change in patient's condition.

## 2024-01-22 NOTE — PROGRESS NOTES
Comprehensive Nutrition Assessment    Type and Reason for Visit:  Initial, Positive Nutrition Screen (Unsure of wt loss)    Nutrition Recommendations/Plan:   Modify diet to 5 CHO choices   Offer diabetic oral nutrition supplement daily  Please continue to encourage and document po intakes at all meals  Monitor weights, intakes, labs, POC     Malnutrition Assessment:  Malnutrition Status:  At risk for malnutrition (Comment) (01/22/24 6111)    Context:  Acute Illness       Nutrition Assessment:    Admitted w/ hyponatremia, past medical history of diabetes, GERD, acute osteomyelitis of the phalanx of the left foot and third toe, anxiety, CKD stage IV, d CAD, CHF, hyperlipidemia, obesity. Pt lethargic today, unable to adequately wake to eat, some agitation/confusion noted as well. Has not eaten in a few days per RN. Lunch tray untouched at bedside, getting ready to start dialysis. Noted non-sig wt loss of 5.3% x3mo per chart review. Will modify diet to better meet calorie needs, trial diabetic oral supp daily. If unable to be alert enough for po intake, may consider temporary nutrition support. Follow at high nutrition risk.    Nutrition Related Findings:    +insulin, vit D; POC glucose 132-171, Na 122, GFR 10, Cr 6.8, BUN 93, elevated LFTs Wound Type: None       Current Nutrition Intake & Therapies:    Average Meal Intake: 0%, 1-25%  Average Supplements Intake: None Ordered  ADULT DIET; Regular; 3 carb choices (45 gm/meal); Low Fat/Low Chol/High Fiber/CANDIE    Anthropometric Measures:  Height: 185.4 cm (6' 1\")  Ideal Body Weight (IBW): 184 lbs (84 kg)    Admission Body Weight: 99.3 kg (218 lb 14.7 oz)  Current Body Weight: 99.3 kg (218 lb 14.7 oz),   IBW. Weight Source: Bed Scale  Current BMI (kg/m2): 28.9  Usual Body Weight: 105.3 kg (232 lb 2.3 oz) (10/18/23)  % Weight Change (Calculated): -5.7  Weight Adjustment For: No Adjustment                      Estimated Daily Nutrient Needs:  Energy Requirements Based On:

## 2024-01-22 NOTE — PROGRESS NOTES
V2.0  [facility] Hospitalist Progress Note      Name:  Dewey Sosa /Age/Sex: 1980  (43 y.o. male)   MRN & CSN:  [id number] & [id number] Encounter Date/Time: 2024 12:45 PM EST    Location:  -A PCP: Frandy Parsons APRN - CNP       Hospital Day: 4    Assessment and Plan:   Dewey Sosa is a 43 y.o. male with pmh of DM-2, CKD, CAD, CHF who presents with nausea and vomiting .lab workup significant for  Hyponatremia and ROSE MARY, and radiologic evidence of pneumonia      Plan:  Hyponatremia- POA improving  ?  Acute pancreatitis vs choledocholithiasis  Hypertriglyceridemia  ROSE MARY on CKD stage IV  High anion gap metabolic acidosis  Hyperphosphatemia  Possible starvation ketosis  Right lower lobe Legionella pneumonia  Sepsis  Transaminitis, elevated lipase  Troponinemia, most likely type II MI, s/t ROSE MARY  Frequent falls POA, no head trauma  PVD s/p left phalanx amputation        Neuro: Patient is very anxious today, and confused, continue BuSpar, patient reported frequent falls prior to admission, CT head obtained-negative for acute abnormality    Cardiac: Hemodynamically stable, mild tachycardia noted, continue telemetry monitoring history of ischemic cardiomyopathy, EF 50 to 55% grade 2 diastolic dysfunction,   Elevated troponin-most likely secondary to ROSE MARY, no concerns for ACS    Respiratory: On room air,normal  breathing pattern,   CXR reviewed-right lower lobe consolidation-this is most likely pneumonia vs mass.  Urine antigens positive for Legionella, respiratory PCR panel-negative, MRSA nares-negative, on CAP coverage azithromycin and Rocephin.    GI: Protonix for GI PPx, patient can be started on diet, transaminitis noted with AST- 332, ALT-82, lipase-448-->1748-->1086, CT A/P-no abnormality noted in the liver, gall bladder and biliary ducts and pancreas,  evidence of splenomegaly noted, no focal lesion or obstruction, no Hx of alcoholism, will repeat LFTs and trend daily, ordered RUQ

## 2024-01-22 NOTE — PROGRESS NOTES
V2.0  [facility] Hospitalist Progress Note      Name:  Dewey Sosa /Age/Sex: 1980  (43 y.o. male)   MRN & CSN:  [id number] & [id number] Encounter Date/Time: 2024 1:41 PM EST    Location:  -A PCP: Frandy Parsons APRN - CNP       Hospital Day: 4      Subjective:     Chief Complaint:  Fall (Complains he has fallen 6 times in the last few days, and has hit his head on ASA) and Fatigue (Complaining of increasing weakness. )     Initially transferred out of ICU, but repeat BMP showed Na 118. Per RN, pt mental status was slowly getting worse. nephro contacted who ordered hypertonic saline. Pt transferred back under ICU care.  This morning, pt was agitated, and confused. Still having worsening abd pain today   Some nausea no vomiting.  No worsening of SOB.    Assessment and Plan:   Sepsis noted after admission. Fever T max 101.6, tachycardia 110's, tachypnea 30's. 2/2 legionella pneumonia.   CT chest showed right lung mass vs consolidation  Legionalla antigen +  - On azithromycin and rocephin  - f/u blood cx     Hyponatremia. Likely 2/2 legionella pneumonia and poor oral intake/dehydration.  P/w Na 117, improved with bicarb drip to 123 but down to 118.   - nephro managing  - on hypertonic saline     Transaminitis, hyperbilirubinemia.  Was going up but today coming down.   Lipase noted 1748 with worsening abd pain. No sign of pancreatitis   Imaging including U/S and CT abd unremarkable. Suspect 2/2 legionella.   - GI consulted  - trend liver panel, lipase    ROSE MARY on CKD IV.  Cr 5.7 (baseline ~2.5).   Has been on bicarb drip.   Temp vas cath placed  Started on HD.     Type II diabetes. On home insulin  - on lantus and sliding scale  - endo managing      Systolic and diastolic CHF. No sign of volume overload.  Last ECHO 2023 EF 50-55% (was 20-25% 10/2021), LV dilated , grade II diastolic  On home Toprol, Imdur        Personally reviewed Lab Studies and Imaging     Imaging that was interpreted

## 2024-01-22 NOTE — PROGRESS NOTES
D/t patient being claustro, needing meds, and needing to use the bathroom we are unable to complete MRI exam this evening.  However, we will be able to get to the patient tomorrow in the early AM.

## 2024-01-22 NOTE — PROGRESS NOTES
Nephrology Progress Note  1/22/2024 3:51 PM  Subjective:     Interval History: Dewey Sosa is a 43 y.o. male with  some generalized weakness still and confusion.  I did discuss with patient's boyfriend about overall current situation and care.        Data:   Scheduled Meds:   insulin glargine  10 Units SubCUTAneous Nightly    calcitRIOL  0.5 mcg Oral Daily    vitamin D  50,000 Units Oral Weekly    calcium carbonate  500 mg Oral TID    sodium chloride flush  5-40 mL IntraVENous 2 times per day    heparin (porcine)  5,000 Units SubCUTAneous 3 times per day    acetaminophen  650 mg Oral 4 times per day    aspirin  81 mg Oral Daily    ticagrelor  90 mg Oral BID    ezetimibe  10 mg Oral Nightly    [Held by provider] gabapentin  300 mg Oral TID    busPIRone  10 mg Oral TID    pantoprazole  40 mg Oral QAM AC    atorvastatin  80 mg Oral Nightly    traZODone  50 mg Oral Nightly    isosorbide mononitrate  60 mg Oral Daily    metoprolol succinate  50 mg Oral Daily    azithromycin  250 mg IntraVENous Q24H    cefTRIAXone (ROCEPHIN) IV  1,000 mg IntraVENous Q24H    insulin lispro  0-16 Units SubCUTAneous TID     insulin lispro  0-4 Units SubCUTAneous 2 times per day     Continuous Infusions:   sodium chloride      dextrose           CBC   Recent Labs     01/21/24  0630 01/21/24  1609 01/22/24  0440   WBC 5.3 5.0 4.5   HGB 9.9* 8.6* 8.2*   HCT 27.4* 24.3* 23.3*    137* 133*      BMP   Recent Labs     01/21/24  0645 01/21/24  1609 01/22/24  0030 01/22/24  0440 01/22/24  0827   * 118* 122* 120* 122*   K 3.4* 3.3* 3.3* 3.5 3.6   CL 82* 80* 86* 83* 83*   CO2 19* 21 19* 20* 18*   PHOS 8.0* 7.9*  --  7.5*  --    BUN 82* 87* 81* 90* 93*   CREATININE 6.0* 6.6* 5.9* 6.8* 6.8*     Hepatic:   Recent Labs     01/21/24  0645 01/21/24  1609 01/22/24  0440   * 379* 332*   ALT 99* 92* 82*   BILITOT 2.6* 2.7* 2.2*   ALKPHOS 80 86 95     Troponin: No results for input(s): \"TROPONINI\" in the last 72 hours.  BNP: No  now await HIV testing trend LFTs   I did discuss with patient's boyfriend and made him aware of current situation             FARTUN SHAHID MD, MD

## 2024-01-23 ENCOUNTER — APPOINTMENT (OUTPATIENT)
Dept: GENERAL RADIOLOGY | Age: 44
DRG: 969 | End: 2024-01-23
Attending: STUDENT IN AN ORGANIZED HEALTH CARE EDUCATION/TRAINING PROGRAM
Payer: MEDICARE

## 2024-01-23 ENCOUNTER — APPOINTMENT (OUTPATIENT)
Dept: CT IMAGING | Age: 44
DRG: 969 | End: 2024-01-23
Payer: MEDICARE

## 2024-01-23 ENCOUNTER — APPOINTMENT (OUTPATIENT)
Dept: GENERAL RADIOLOGY | Age: 44
DRG: 969 | End: 2024-01-23
Payer: MEDICARE

## 2024-01-23 ENCOUNTER — APPOINTMENT (OUTPATIENT)
Dept: MRI IMAGING | Age: 44
DRG: 969 | End: 2024-01-23
Payer: MEDICARE

## 2024-01-23 PROBLEM — T79.6XXA TRAUMATIC RHABDOMYOLYSIS (HCC): Status: ACTIVE | Noted: 2024-01-23

## 2024-01-23 LAB
ALBUMIN SERPL-MCNC: 2.3 GM/DL (ref 3.4–5)
ALP BLD-CCNC: 138 IU/L (ref 40–129)
ALT SERPL-CCNC: 79 U/L (ref 10–40)
ANION GAP SERPL CALCULATED.3IONS-SCNC: 15 MMOL/L (ref 7–16)
AST SERPL-CCNC: 307 IU/L (ref 15–37)
BASOPHILS ABSOLUTE: 0 K/CU MM
BASOPHILS RELATIVE PERCENT: 0 % (ref 0–1)
BILIRUB SERPL-MCNC: 1.9 MG/DL (ref 0–1)
BILIRUBIN DIRECT: 1.7 MG/DL (ref 0–0.3)
BILIRUBIN, INDIRECT: 0.2 MG/DL (ref 0–0.7)
BUN SERPL-MCNC: 72 MG/DL (ref 6–23)
CALCIUM IONIZED: 4 MG/DL (ref 4.48–5.28)
CALCIUM SERPL-MCNC: 7.5 MG/DL (ref 8.3–10.6)
CHLORIDE BLD-SCNC: 88 MMOL/L (ref 99–110)
CO2: 22 MMOL/L (ref 21–32)
CREAT SERPL-MCNC: 5.4 MG/DL (ref 0.9–1.3)
DIFFERENTIAL TYPE: ABNORMAL
EOSINOPHILS ABSOLUTE: 0 K/CU MM
EOSINOPHILS RELATIVE PERCENT: 0.2 % (ref 0–3)
GFR SERPL CREATININE-BSD FRML MDRD: 13 ML/MIN/1.73M2
GLUCOSE BLD-MCNC: 120 MG/DL (ref 70–99)
GLUCOSE BLD-MCNC: 129 MG/DL (ref 70–99)
GLUCOSE BLD-MCNC: 156 MG/DL (ref 70–99)
GLUCOSE BLD-MCNC: 174 MG/DL (ref 70–99)
GLUCOSE BLD-MCNC: 184 MG/DL (ref 70–99)
GLUCOSE SERPL-MCNC: 156 MG/DL (ref 70–99)
HBV CORE AB SERPL QL IA: POSITIVE
HCT VFR BLD CALC: 22.9 % (ref 42–52)
HEMOGLOBIN: 8 GM/DL (ref 13.5–18)
IMMATURE NEUTROPHIL %: 1.3 % (ref 0–0.43)
IONIZED CA: 1 MMOL/L (ref 1.12–1.32)
LACTATE: 0.8 MMOL/L (ref 0.5–1.9)
LIPASE: 1341 IU/L (ref 13–60)
LYMPHOCYTES ABSOLUTE: 0.3 K/CU MM
LYMPHOCYTES RELATIVE PERCENT: 6.7 % (ref 24–44)
M PNEUMO IGG SER IA-ACNC: 0 U/L
M PNEUMO IGM SER IA-ACNC: 0 U/L
MAGNESIUM: 2.4 MG/DL (ref 1.8–2.4)
MCH RBC QN AUTO: 30 PG (ref 27–31)
MCHC RBC AUTO-ENTMCNC: 34.9 % (ref 32–36)
MCV RBC AUTO: 85.8 FL (ref 78–100)
MONOCYTES ABSOLUTE: 0.2 K/CU MM
MONOCYTES RELATIVE PERCENT: 4.4 % (ref 0–4)
NUCLEATED RBC %: 0 %
PDW BLD-RTO: 13.2 % (ref 11.7–14.9)
PHOSPHORUS: 5.1 MG/DL (ref 2.5–4.9)
PLATELET # BLD: 140 K/CU MM (ref 140–440)
PMV BLD AUTO: 12.1 FL (ref 7.5–11.1)
POTASSIUM SERPL-SCNC: 3.6 MMOL/L (ref 3.5–5.1)
RBC # BLD: 2.67 M/CU MM (ref 4.6–6.2)
SEGMENTED NEUTROPHILS ABSOLUTE COUNT: 4.2 K/CU MM
SEGMENTED NEUTROPHILS RELATIVE PERCENT: 87.4 % (ref 36–66)
SODIUM BLD-SCNC: 125 MMOL/L (ref 135–145)
TOTAL CK: 3178 IU/L (ref 38–174)
TOTAL CK: ABNORMAL IU/L (ref 38–174)
TOTAL IMMATURE NEUTOROPHIL: 0.06 K/CU MM
TOTAL NUCLEATED RBC: 0 K/CU MM
TOTAL PROTEIN: 5.9 GM/DL (ref 6.4–8.2)
TRIGL SERPL-MCNC: 545 MG/DL
WBC # BLD: 4.8 K/CU MM (ref 4–10.5)

## 2024-01-23 PROCEDURE — 6370000000 HC RX 637 (ALT 250 FOR IP): Performed by: STUDENT IN AN ORGANIZED HEALTH CARE EDUCATION/TRAINING PROGRAM

## 2024-01-23 PROCEDURE — 6360000004 HC RX CONTRAST MEDICATION: Performed by: STUDENT IN AN ORGANIZED HEALTH CARE EDUCATION/TRAINING PROGRAM

## 2024-01-23 PROCEDURE — 6370000000 HC RX 637 (ALT 250 FOR IP): Performed by: INTERNAL MEDICINE

## 2024-01-23 PROCEDURE — 85025 COMPLETE CBC W/AUTO DIFF WBC: CPT

## 2024-01-23 PROCEDURE — 84478 ASSAY OF TRIGLYCERIDES: CPT

## 2024-01-23 PROCEDURE — APPNB45 APP NON BILLABLE 31-45 MINUTES: Performed by: LICENSED PRACTICAL NURSE

## 2024-01-23 PROCEDURE — 80053 COMPREHEN METABOLIC PANEL: CPT

## 2024-01-23 PROCEDURE — 99233 SBSQ HOSP IP/OBS HIGH 50: CPT | Performed by: INTERNAL MEDICINE

## 2024-01-23 PROCEDURE — 82962 GLUCOSE BLOOD TEST: CPT

## 2024-01-23 PROCEDURE — 83735 ASSAY OF MAGNESIUM: CPT

## 2024-01-23 PROCEDURE — 6360000002 HC RX W HCPCS: Performed by: STUDENT IN AN ORGANIZED HEALTH CARE EDUCATION/TRAINING PROGRAM

## 2024-01-23 PROCEDURE — 83605 ASSAY OF LACTIC ACID: CPT

## 2024-01-23 PROCEDURE — 94761 N-INVAS EAR/PLS OXIMETRY MLT: CPT

## 2024-01-23 PROCEDURE — 90935 HEMODIALYSIS ONE EVALUATION: CPT

## 2024-01-23 PROCEDURE — 84100 ASSAY OF PHOSPHORUS: CPT

## 2024-01-23 PROCEDURE — 2060000000 HC ICU INTERMEDIATE R&B

## 2024-01-23 PROCEDURE — 82248 BILIRUBIN DIRECT: CPT

## 2024-01-23 PROCEDURE — 74160 CT ABDOMEN W/CONTRAST: CPT

## 2024-01-23 PROCEDURE — 2580000003 HC RX 258: Performed by: STUDENT IN AN ORGANIZED HEALTH CARE EDUCATION/TRAINING PROGRAM

## 2024-01-23 PROCEDURE — 83690 ASSAY OF LIPASE: CPT

## 2024-01-23 PROCEDURE — 82550 ASSAY OF CK (CPK): CPT

## 2024-01-23 PROCEDURE — 71045 X-RAY EXAM CHEST 1 VIEW: CPT

## 2024-01-23 PROCEDURE — 82330 ASSAY OF CALCIUM: CPT

## 2024-01-23 RX ORDER — HYDROXYZINE HYDROCHLORIDE 10 MG/1
10 TABLET, FILM COATED ORAL 3 TIMES DAILY PRN
Status: DISCONTINUED | OUTPATIENT
Start: 2024-01-23 | End: 2024-02-08 | Stop reason: HOSPADM

## 2024-01-23 RX ORDER — LORAZEPAM 0.5 MG/1
0.5 TABLET ORAL
Status: COMPLETED | OUTPATIENT
Start: 2024-01-23 | End: 2024-01-23

## 2024-01-23 RX ADMIN — TICAGRELOR 90 MG: 90 TABLET ORAL at 20:42

## 2024-01-23 RX ADMIN — PANTOPRAZOLE SODIUM 40 MG: 40 TABLET, DELAYED RELEASE ORAL at 06:38

## 2024-01-23 RX ADMIN — ACETAMINOPHEN 650 MG: 325 TABLET ORAL at 11:30

## 2024-01-23 RX ADMIN — ACETAMINOPHEN 650 MG: 325 TABLET ORAL at 18:56

## 2024-01-23 RX ADMIN — LORAZEPAM 0.5 MG: 0.5 TABLET ORAL at 10:48

## 2024-01-23 RX ADMIN — METOPROLOL SUCCINATE 50 MG: 50 TABLET, EXTENDED RELEASE ORAL at 15:39

## 2024-01-23 RX ADMIN — TICAGRELOR 90 MG: 90 TABLET ORAL at 15:39

## 2024-01-23 RX ADMIN — TRAZODONE HYDROCHLORIDE 50 MG: 50 TABLET ORAL at 20:42

## 2024-01-23 RX ADMIN — EZETIMIBE 10 MG: 10 TABLET ORAL at 20:42

## 2024-01-23 RX ADMIN — ACETAMINOPHEN 650 MG: 325 TABLET ORAL at 01:28

## 2024-01-23 RX ADMIN — SODIUM CHLORIDE, PRESERVATIVE FREE 10 ML: 5 INJECTION INTRAVENOUS at 21:01

## 2024-01-23 RX ADMIN — HEPARIN SODIUM 5000 UNITS: 5000 INJECTION INTRAVENOUS; SUBCUTANEOUS at 22:31

## 2024-01-23 RX ADMIN — ISOSORBIDE MONONITRATE 60 MG: 60 TABLET, EXTENDED RELEASE ORAL at 15:39

## 2024-01-23 RX ADMIN — BUSPIRONE HYDROCHLORIDE 10 MG: 5 TABLET ORAL at 20:42

## 2024-01-23 RX ADMIN — ACETAMINOPHEN 650 MG: 325 TABLET ORAL at 06:38

## 2024-01-23 RX ADMIN — CALCIUM CARBONATE 500 MG: 500 TABLET, CHEWABLE ORAL at 16:25

## 2024-01-23 RX ADMIN — BUSPIRONE HYDROCHLORIDE 10 MG: 5 TABLET ORAL at 11:30

## 2024-01-23 RX ADMIN — AZITHROMYCIN MONOHYDRATE 250 MG: 500 INJECTION, POWDER, LYOPHILIZED, FOR SOLUTION INTRAVENOUS at 04:39

## 2024-01-23 RX ADMIN — HEPARIN SODIUM 5000 UNITS: 5000 INJECTION INTRAVENOUS; SUBCUTANEOUS at 06:38

## 2024-01-23 RX ADMIN — INSULIN GLARGINE 10 UNITS: 100 INJECTION, SOLUTION SUBCUTANEOUS at 20:51

## 2024-01-23 RX ADMIN — ASPIRIN 81 MG 81 MG: 81 TABLET ORAL at 16:24

## 2024-01-23 RX ADMIN — CALCIUM CARBONATE 500 MG: 500 TABLET, CHEWABLE ORAL at 20:42

## 2024-01-23 RX ADMIN — IOPAMIDOL 80 ML: 755 INJECTION, SOLUTION INTRAVENOUS at 18:14

## 2024-01-23 RX ADMIN — BUSPIRONE HYDROCHLORIDE 10 MG: 5 TABLET ORAL at 16:24

## 2024-01-23 RX ADMIN — ATORVASTATIN CALCIUM 80 MG: 40 TABLET, FILM COATED ORAL at 20:42

## 2024-01-23 RX ADMIN — CALCITRIOL CAPSULES 0.25 MCG 0.5 MCG: 0.25 CAPSULE ORAL at 16:25

## 2024-01-23 RX ADMIN — CEFTRIAXONE 1000 MG: 1 INJECTION, POWDER, FOR SOLUTION INTRAMUSCULAR; INTRAVENOUS at 01:33

## 2024-01-23 RX ADMIN — HYDROXYZINE HYDROCHLORIDE 10 MG: 10 TABLET, FILM COATED ORAL at 16:24

## 2024-01-23 ASSESSMENT — PAIN DESCRIPTION - ONSET: ONSET: PROGRESSIVE

## 2024-01-23 ASSESSMENT — PAIN DESCRIPTION - DIRECTION: RADIATING_TOWARDS: BACK

## 2024-01-23 ASSESSMENT — PAIN DESCRIPTION - PAIN TYPE: TYPE: ACUTE PAIN

## 2024-01-23 ASSESSMENT — PAIN DESCRIPTION - FREQUENCY: FREQUENCY: CONTINUOUS

## 2024-01-23 ASSESSMENT — PAIN - FUNCTIONAL ASSESSMENT
PAIN_FUNCTIONAL_ASSESSMENT: PREVENTS OR INTERFERES WITH MANY ACTIVE NOT PASSIVE ACTIVITIES
PAIN_FUNCTIONAL_ASSESSMENT: PREVENTS OR INTERFERES SOME ACTIVE ACTIVITIES AND ADLS

## 2024-01-23 ASSESSMENT — PAIN DESCRIPTION - DESCRIPTORS
DESCRIPTORS: ACHING
DESCRIPTORS: ACHING

## 2024-01-23 ASSESSMENT — PAIN SCALES - GENERAL
PAINLEVEL_OUTOF10: 0
PAINLEVEL_OUTOF10: 8
PAINLEVEL_OUTOF10: 7

## 2024-01-23 ASSESSMENT — PAIN DESCRIPTION - LOCATION
LOCATION: ABDOMEN
LOCATION: GENERALIZED

## 2024-01-23 ASSESSMENT — PAIN DESCRIPTION - ORIENTATION
ORIENTATION: RIGHT;LEFT
ORIENTATION: MID

## 2024-01-23 NOTE — PROGRESS NOTES
Progress Note( Dr. Cruz)  1/23/2024  Subjective:   Admit Date: 1/19/2024  PCP: Frandy Parsons APRN - CNP    Admitted For : Nausea vomiting and frequent fall was found to be hyponatremic with serum sodium 117     Consulted For: Better control of blood glucose     Interval History: Patient l    Feels somewhat better able to eat last night and again this morning  Serum sodium getting better of 123 it is 117 yesterday  Still acidotic CO2 up to 22  anion gap is closed now .his  blood glucose are better    Patient was not started on an IV insulin drip  Patient had elevated liver enzymes also lipase suggesting pancreatitis or hepatitis  Positive for Legionella    Denies any chest pains,   Yes  SOB .   Has some  nausea but no  vomiting.  Does not feel it is much.   No new bowel or bladder symptoms.       Intake/Output Summary (Last 24 hours) at 1/23/2024 0831  Last data filed at 1/23/2024 0533  Gross per 24 hour   Intake 500 ml   Output 1520 ml   Net -1020 ml         DATA    CBC:   Recent Labs     01/21/24  1609 01/22/24  0440 01/23/24  0641   WBC 5.0 4.5 4.8   HGB 8.6* 8.2* 8.0*   * 133* 140      CMP:  Recent Labs     01/21/24  1609 01/22/24  0030 01/22/24  0440 01/22/24  0827 01/23/24  0641   *   < > 120* 122* 125*   K 3.3*   < > 3.5 3.6 3.6   CL 80*   < > 83* 83* 88*   CO2 21   < > 20* 18* 22   BUN 87*   < > 90* 93* 72*   CREATININE 6.6*   < > 6.8* 6.8* 5.4*   CALCIUM 6.6*   < > 7.9* 7.7* 7.5*   PROT 5.3*  --  5.5*  --  5.9*   LABALBU 2.2*  2.2*  --  2.1*  --  2.3*   BILITOT 2.7*  --  2.2*  --  1.9*   ALKPHOS 86  --  95  --  138*   *  --  332*  --  307*   ALT 92*  --  82*  --  79*    < > = values in this interval not displayed.       Lipids:   Lab Results   Component Value Date/Time    CHOL 278 12/11/2023 12:28 PM    HDL 22 12/11/2023 12:28 PM    TRIG 608 01/22/2024 05:02 AM     Glucose:  Recent Labs     01/22/24  2124 01/23/24  0221 01/23/24  0522   POCGLU 239* 184* 156*

## 2024-01-23 NOTE — PROGRESS NOTES
Physician Progress Note      PATIENT:               SERA NDIAYE  [facility] #:                  [id number]  :                       1980  ADMIT DATE:       2024 5:59 PM  DISCH DATE:  RESPONDING  PROVIDER #:        Yuval Nath MD          QUERY TEXT:    Pt admitted with Hyponatremia and then sepsis. Pt noted to have increasing   agitation and confusion in the 24 PN. If possible, please document in the   progress notes and discharge summary if you are evaluating and / or treating   any of the following:    The medical record reflects the following:  Risk Factors: Hyponatremia, Sepsis, Legionella Pneumonia, ROSE MARY  Clinical Indicators: Initially transferred out of ICU, but repeat BMP showed   Na 118. Per RN, pt mental status was slowly getting worse. nephro contacted   who ordered hypertonic saline. Pt transferred back under ICU care. This   morning, pt was agitated, and confused.  Treatment: hypertonic saline, Pt transferred back under ICU care, Nephrology   consult,  azithromycin and rocephin, bicarb drip, Labs, Dialysis cath placed ,   2 hours of dialysis completed    Thank you, Gia Gómez RN, CDS [phone]  Options provided:  -- Metabolic encephalopathy  -- Septic encephalopathy  -- Other - I will add my own diagnosis  -- Disagree - Not applicable / Not valid  -- Disagree - Clinically unable to determine / Unknown  -- Refer to Clinical Documentation Reviewer    PROVIDER RESPONSE TEXT:    This patient has metabolic encephalopathy.    Query created by: Gia Gómez on 2024 12:16 PM      Electronically signed by:  Yuval Nath MD 2024 8:22 AM

## 2024-01-23 NOTE — PROGRESS NOTES
[facility] Gastroenterology and Hepatology             MD Suzanne Blevins MD Carol Christensen, APRN-CNP       Brie Meng, APRN-CNP             [address] [place] [address]             [phone] fax [phone]      Gastroenterology Progress note . 1/23/2024  Reason for consult:   Acute transaminitis with elevated lipase    Physician attestation:  I have personally seen and examined the patient independently. I have reviewed the patient's available data,including medical history and recent test results. Reviewed and discussed note as documented by AMALIA.  I agree with the physical exam findings, assessment and plan.     Briefly   Patient again noted to be agitated today.  Initially in the morning was threatening to leave AMA.  He could not tolerate MRCP due to anxiety despite Ativan p.o.  Total CK now downtrending, triglyceride 545, lipase continues to be elevated.    Gen: normal mood, alert  ENT: normal TJ  Cardiovascular: RRR, No M/R/G  Respiratory: CTA BL  Gastrointestinal: Soft,NT ND  Genitourinary: no suprapubic tenderness  Musculoskeletal: no scars  Skin:moist  Neuro: alert and oriented x3    My assessment and plan reveals   #1 elevated LFTs  Possible differentials include Legionella induced hepatitis versus  rhabdomyolysis - .  CK from admission noted to be significantly elevated to 20,000.  His pattern of LFT elevation certainly raises concern for that with AST significantly more than ALT as well as indirect and direct bilirubin.  -CK now down to 3000  -Initially planned to evaluate for any biliary dilation or associated choledocholithiasis with MRCP however patient not cooperating for MRI sequencing.   -No biliary dilation on prior imaging  -Recommend continuing monitoring bilirubin     #2 elevated lipase  -Possible differentials include Pancreatitis however now mentions abdominal pain vs possible rhabdo.   - TG elevated 608   -Initially  his agitation, distractibility.  He denies any diarrhea, constipation melena hematochezia however I was unable to assess for dysphagia, heartburn/reflux.    ROS: Per history of present illness. All other systems were reviewed and were negative.      Physical Exam  Blood pressure (!) 144/91, pulse 92, temperature 97 °F (36.1 °C), resp. rate (!) 33, height 1.854 m (6' 1\"), weight 104.9 kg (231 lb 4.2 oz), SpO2 91 %.  Constitutional: Patient is in no distress.  He appears agitated and anxious  Eyes: Pupils equal, round.  No icterus.  HENT: Oral mucosa is moist.   Pulmonary: No accessory muscle use.  Tachypneic   cardiovascular: Tachycardic no JVD.   Gastrointestinal: Bowel sounds are present in all four quadrants. Abdomen is soft, diffusely tender, nondistended. Rectal examination deferred.   Skin: No jaundice, spider angiomas, or palmar erythema. No purpura.  Neuro: Awake,alert, and oriented x3, patient is confused to details and lacks insight to his current health status. No gross focal neurologic deficits.       Chart and labs reviewed.    IMPRESSION/RECOMMENDATIONS:  1) transaminitis, hyperbilirubinemia-Elevated LFTs.  Possible differentials include Legionella induced hepatitis versus possible rhabdomyolysis   -CK was noted to be significantly elevated at over 20,000 initially; today it is 3590.  -Recommend to trend LFTs  -attempted to undergo MRCP today but was unable to tolerate due to a nosebleed, confusion and anxiety-given the patient's current condition and for previous exam findings showing no ductal dilation we will defer this test and reconsider this as an outpatient procedure     2) elevated lipase, lipase noted to be initially at 1748;  currently 1,068 -concern for pancreatitis versus choledocholithiasis  Inpatient Admission Diagnosis/ Severity of Illness- Abdominal Pain/ Pancreatitis   -Triglycerides noted to be significantly elevated at 608  -Aggressive pain control   -Recommend nutrition consult given

## 2024-01-23 NOTE — PROGRESS NOTES
MG/DL        Imaging/Diagnostics Last 24 Hours   XR CHEST PORTABLE    Result Date: 1/20/2024  EXAMINATION: ONE X-RAY VIEW OF THE CHEST 1/20/2024 12:35 pm COMPARISON: 01/19/2024, 11/03/2021 HISTORY: ORDERING SYSTEM PROVIDED HISTORY: Check Trialysis catheter RT IJ placement. TECHNOLOGIST PROVIDED HISTORY: Reason for Exam:  Check Trialysis catheter RT IJ placement. FINDINGS: A single frontal view of the chest demonstrates interval placement of a right internal jugular Trialysis catheter, with tip overlying the lower SVC, in satisfactory position.  There is no evidence of a pneumothorax status post central line placement.  There is a persistent masslike opacity within the right lung base, which appears slightly improved in comparison with the study of 01/19/2024, suggestive of pneumonia.  Lungs are otherwise clear, without additional focus of airspace consolidation.  The heart size and mediastinal contours are within limits.  The skeletal structures are unremarkable.     1. Interval placement of right internal jugular Trialysis catheter, with tip overlying the lower SVC, in satisfactory position, and no evidence of a pneumothorax. 2. Slight interval improvement in appearance of focal mass-like consolidative opacity within the right lung base, favoring slight improvement of pneumonia. However, continued serial chest x-ray follow-up is recommended to ensure resolution of this mass-like right basilar opacity, as a true pulmonary mass is also a diagnostic consideration.     CT CHEST ABDOMEN PELVIS WO CONTRAST Additional Contrast? None    Result Date: 1/19/2024  EXAMINATION: CT OF THE CHEST, ABDOMEN, AND PELVIS WITHOUT CONTRAST 1/19/2024 10:12 pm TECHNIQUE: CT of the chest, abdomen and pelvis was performed without the administration of intravenous contrast. Multiplanar reformatted images are provided for review. Automated exposure control, iterative reconstruction, and/or weight based adjustment of the mA/kV was utilized to  follow-up may be considered on a nonemergent outpatient basis.     CT HEAD WO CONTRAST    Result Date: 1/19/2024  EXAMINATION: CT OF THE HEAD WITHOUT CONTRAST  1/19/2024 8:18 pm TECHNIQUE: CT of the head was performed without the administration of intravenous contrast. Automated exposure control, iterative reconstruction, and/or weight based adjustment of the mA/kV was utilized to reduce the radiation dose to as low as reasonably achievable. COMPARISON: None. HISTORY: ORDERING SYSTEM PROVIDED HISTORY: multiple falls TECHNOLOGIST PROVIDED HISTORY: Has a \"code stroke\" or \"stroke alert\" been called?->No Reason for exam:->multiple falls Decision Support Exception - unselect if not a suspected or confirmed emergency medical condition->Emergency Medical Condition (MA) Reason for Exam: multiple falls FINDINGS: BRAIN/VENTRICLES: There is no acute intracranial hemorrhage, mass effect or midline shift.  No abnormal extra-axial fluid collection.  The gray-white differentiation is maintained without evidence of an acute infarct.  There is no evidence of hydrocephalus. Focal area of encephalomalacia in the right frontal lobe likely represents an old stroke. ORBITS: The visualized portion of the orbits demonstrate no acute abnormality. SINUSES: Bilateral maxillary sinus mucous retention cysts.  No paranasal sinus air-fluid levels.  The bilateral mastoid air cells are clear. SOFT TISSUES/SKULL:  No acute abnormality of the visualized skull or soft tissues.     1.  No acute intracranial abnormality. 2.  Focal area of encephalomalacia in the right frontal lobe may represent an old stroke.     XR CHEST PORTABLE    Result Date: 1/19/2024  EXAMINATION: ONE XRAY VIEW OF THE CHEST 1/19/2024 8:05 pm COMPARISON: 11/03/2021 HISTORY: ORDERING SYSTEM PROVIDED HISTORY: sob TECHNOLOGIST PROVIDED HISTORY: Reason for exam:->sob Reason for Exam: sob FINDINGS: Lung volumes are reduced resulting in crowding of central and basilar vascular markings.

## 2024-01-23 NOTE — PROGRESS NOTES
Progress Note( Dr. Cruz)  1/22/2024  Subjective:   Admit Date: 1/19/2024  PCP: Frandy Parsons APRN - CNP    Admitted For : Nausea vomiting and frequent fall was found to be hyponatremic with serum sodium 117     Consulted For: Better control of blood glucose     Interval History: Patient l    Feels somewhat better able to eat last night and again this morning  Serum sodium getting better of 123 it is 117 yesterday  Still acidotic CO2 up to 19 still has a big anion gap her blood glucose are better  Patient was not started on an IV insulin drip  Patient had elevated liver enzymes also lipase suggesting pancreatitis or hepatitis  Positive for Legionella    Denies any chest pains,   Yes  SOB .   Has some  nausea but no  vomiting.  Does not feel it is much.   No new bowel or bladder symptoms.       Intake/Output Summary (Last 24 hours) at 1/22/2024 2021  Last data filed at 1/22/2024 1556  Gross per 24 hour   Intake 1528.52 ml   Output 1770 ml   Net -241.48 ml         DATA    CBC:   Recent Labs     01/21/24  0630 01/21/24  1609 01/22/24  0440   WBC 5.3 5.0 4.5   HGB 9.9* 8.6* 8.2*    137* 133*      CMP:  Recent Labs     01/21/24  0645 01/21/24  1609 01/22/24  0030 01/22/24  0440 01/22/24  0827   * 118* 122* 120* 122*   K 3.4* 3.3* 3.3* 3.5 3.6   CL 82* 80* 86* 83* 83*   CO2 19* 21 19* 20* 18*   BUN 82* 87* 81* 90* 93*   CREATININE 6.0* 6.6* 5.9* 6.8* 6.8*   CALCIUM 6.9* 6.6* 5.8* 7.9* 7.7*   PROT 5.6* 5.3*  --  5.5*  --    LABALBU 2.3* 2.2*  2.2*  --  2.1*  --    BILITOT 2.6* 2.7*  --  2.2*  --    ALKPHOS 80 86  --  95  --    * 379*  --  332*  --    ALT 99* 92*  --  82*  --        Lipids:   Lab Results   Component Value Date/Time    CHOL 278 12/11/2023 12:28 PM    HDL 22 12/11/2023 12:28 PM    TRIG 608 01/22/2024 05:02 AM     Glucose:  Recent Labs     01/22/24  0825 01/22/24  1149 01/22/24  1752   POCGLU 131* 173* 151*       [id number]:  Lab Results   Component Value Date/Time    LABA1C 6.8

## 2024-01-23 NOTE — CARE COORDINATION
Cm attempted to see pt again today. Pt feeling so poorly that he cannot participate with CM at this time. Pt states that he just wants to go home. Cm offered emotional support and will follow up with pt at a later time.

## 2024-01-23 NOTE — PROGRESS NOTES
Occupational Therapy  OT/PT attempted to see pt this AM per CM request. Pt is currently DHRUV for a MRI. Will reattempt to see pt as able.    Added: Discussed w/ RN upon pt return to floor, RN requesting pt be held from therapy for today. Will reattempt tomorrow AM.    Priscilla Whitten OTR/L [id number]  1/23/2024     11:23 AM

## 2024-01-23 NOTE — PROGRESS NOTES
Nephrology Progress Note  1/23/2024 10:27 AM  Subjective:     Interval History: Dewey Sosa is a 43 y.o. male appears doing better overall as far as affect but still tired seen on dialysis tolerating at this time      Data:   Scheduled Meds:   insulin glargine  10 Units SubCUTAneous Nightly    calcitRIOL  0.5 mcg Oral Daily    vitamin D  50,000 Units Oral Weekly    calcium carbonate  500 mg Oral TID    sodium chloride flush  5-40 mL IntraVENous 2 times per day    heparin (porcine)  5,000 Units SubCUTAneous 3 times per day    acetaminophen  650 mg Oral 4 times per day    aspirin  81 mg Oral Daily    ticagrelor  90 mg Oral BID    ezetimibe  10 mg Oral Nightly    [Held by provider] gabapentin  300 mg Oral TID    busPIRone  10 mg Oral TID    pantoprazole  40 mg Oral QAM AC    atorvastatin  80 mg Oral Nightly    traZODone  50 mg Oral Nightly    isosorbide mononitrate  60 mg Oral Daily    metoprolol succinate  50 mg Oral Daily    azithromycin  250 mg IntraVENous Q24H    cefTRIAXone (ROCEPHIN) IV  1,000 mg IntraVENous Q24H    insulin lispro  0-16 Units SubCUTAneous TID     insulin lispro  0-4 Units SubCUTAneous 2 times per day     Continuous Infusions:   sodium chloride      dextrose           CBC   Recent Labs     01/21/24  1609 01/22/24  0440 01/23/24  0641   WBC 5.0 4.5 4.8   HGB 8.6* 8.2* 8.0*   HCT 24.3* 23.3* 22.9*   * 133* 140      BMP   Recent Labs     01/21/24  1609 01/22/24  0030 01/22/24  0440 01/22/24  0827 01/23/24  0641   *   < > 120* 122* 125*   K 3.3*   < > 3.5 3.6 3.6   CL 80*   < > 83* 83* 88*   CO2 21   < > 20* 18* 22   PHOS 7.9*  --  7.5*  --  5.1*   BUN 87*   < > 90* 93* 72*   CREATININE 6.6*   < > 6.8* 6.8* 5.4*    < > = values in this interval not displayed.     Hepatic:   Recent Labs     01/21/24  1609 01/22/24  0440 01/23/24  0641   * 332* 307*   ALT 92* 82* 79*   BILITOT 2.7* 2.2* 1.9*   ALKPHOS 86 95 138*     Troponin: No results for input(s): \"TROPONINI\" in the last

## 2024-01-23 NOTE — PROCEDURES
Pt completed 2.5 hours dialysis today removing no fluid per order.  Tolerated well, no distress noted.  Right temporary HD catheter used for access ran good, both lumens normal saline locked and capped.  Pt denies needs.      Patient Name: Dewey Sosa  Patient : 1980  MRN: [id number]     Acct: [id number]  Date of Admission: 2024  Room/Bed: [facility]/A  Code Status:  Full Code  Allergies: No Known Allergies  Diagnosis:    Patient Active Problem List   Diagnosis    Type 2 diabetes mellitus without complication, with long-term current use of insulin (HCC)    Gastroesophageal reflux disease    Former tobacco use    Acute osteomyelitis of phalanx of left foot (HCC)    Osteomyelitis of third toe of left foot (HCC)    Anxiety    Persistent proteinuria    Benign neoplasm of sigmoid colon    Vitamin D deficiency    ASCVD (arteriosclerotic cardiovascular disease)    Chronic systolic congestive heart failure (HCC)    Ischemic cardiomyopathy    Dyslipidemia    Stage 3a chronic kidney disease (HCC)    Other male erectile dysfunction    Atherosclerotic heart disease of native coronary artery with other forms of angina pectoris (HCC)    Type 2 diabetes mellitus with chronic kidney disease    Insomnia    Stage 3b chronic kidney disease (HCC)    Overweight    Chronic kidney disease, stage IV (severe) (HCC)    Hyperkalemia    Hyponatremia    Elevated LFTs    Elevated lipase    Splenomegaly    Nausea and vomiting         Treatment:  Hemodilaysis 2:1  Priority: Routine  Location: Acute Room    Diabetic: Yes  NPO: No  Isolation Precautions: None     Consent for Treatment Verified: Yes  Blood Consent Verified: Not Applicable     Safety Verified: Identify (I), Consent (C), Equipment (E), HepB Status (B), Orders Complete (O), Access Verified (A), and Timeliness (T)  Time out performed prior to access at 0730 hours.    Report Received from Primary RN at 0642 hours.  Primary RN (First Initial, Last Name, Title): SIGIFREDO

## 2024-01-24 LAB
ALBUMIN SERPL-MCNC: 2.1 GM/DL (ref 3.4–5)
ALP BLD-CCNC: 194 IU/L (ref 40–129)
ALT SERPL-CCNC: 72 U/L (ref 10–40)
ANION GAP SERPL CALCULATED.3IONS-SCNC: 15 MMOL/L (ref 7–16)
AST SERPL-CCNC: 269 IU/L (ref 15–37)
BASOPHILS ABSOLUTE: 0 K/CU MM
BASOPHILS RELATIVE PERCENT: 0.2 % (ref 0–1)
BILIRUB SERPL-MCNC: 1.6 MG/DL (ref 0–1)
BILIRUBIN DIRECT: 1.4 MG/DL (ref 0–0.3)
BILIRUBIN, INDIRECT: 0.2 MG/DL (ref 0–0.7)
BUN SERPL-MCNC: 55 MG/DL (ref 6–23)
CALCIUM IONIZED: 4 MG/DL (ref 4.48–5.28)
CALCIUM SERPL-MCNC: 7.2 MG/DL (ref 8.3–10.6)
CD3 CELLS # BLD: 231 CELLS/UL (ref 570–2400)
CD3 CELLS NFR SPEC: 87 % (ref 62–87)
CD3+CD4+ CELLS # BLD: 25 CELLS/UL (ref 430–1800)
CD3+CD4+ CELLS NFR BLD: 10 % (ref 32–64)
CD3+CD4+ CELLS/CD3+CD8+ CLL BLD: 0.13 RATIO (ref 0.8–3.9)
CD3+CD8+ CELLS # BLD: 201 CELLS/UL (ref 210–1200)
CD3+CD8+ CELLS NFR SPEC: 76 % (ref 15–46)
CHLORIDE BLD-SCNC: 88 MMOL/L (ref 99–110)
CO2: 23 MMOL/L (ref 21–32)
CREAT SERPL-MCNC: 5 MG/DL (ref 0.9–1.3)
CULTURE: NORMAL
CULTURE: NORMAL
DIFFERENTIAL TYPE: ABNORMAL
EOSINOPHILS ABSOLUTE: 0 K/CU MM
EOSINOPHILS RELATIVE PERCENT: 0.2 % (ref 0–3)
GFR SERPL CREATININE-BSD FRML MDRD: 14 ML/MIN/1.73M2
GLUCOSE BLD-MCNC: 150 MG/DL (ref 70–99)
GLUCOSE BLD-MCNC: 175 MG/DL (ref 70–99)
GLUCOSE BLD-MCNC: 202 MG/DL (ref 70–99)
GLUCOSE BLD-MCNC: 222 MG/DL (ref 70–99)
GLUCOSE BLD-MCNC: 226 MG/DL (ref 70–99)
GLUCOSE SERPL-MCNC: 149 MG/DL (ref 70–99)
HCT VFR BLD CALC: 22 % (ref 42–52)
HEMOGLOBIN: 7.6 GM/DL (ref 13.5–18)
HIV 1 & 2 AB SERPLBLD IA.RAPID: ABNORMAL
HIV 1+2 AB+HIV1 P24 AG SERPL QL IA: REACTIVE
HIV 2 AB SERPLBLD QL IA.RAPID: NEGATIVE
HIV1 AB SERPLBLD QL IA.RAPID: POSITIVE
HIV1 RNA # SPEC NAA+PROBE: ABNORMAL CPY/ML
HIV1 RNA SER-IMP: DETECTED
HIV1 RNA SPEC NAA+PROBE-LOG#: 4.62 LOG CPY/ML
IMMATURE NEUTROPHIL %: 1.1 % (ref 0–0.43)
IONIZED CA: 1 MMOL/L (ref 1.12–1.32)
KAPPA LC FREE SER-MCNC: 212.6 MG/L (ref 3.3–19.4)
KAPPA LC FREE/LAMBDA FREE SER NEPH: 1.39 {RATIO} (ref 0.26–1.65)
LAMBDA LC FREE SERPL-MCNC: 153.18 MG/L (ref 5.71–26.3)
LIPASE: 1153 IU/L (ref 13–60)
LYMPHOCYTES ABSOLUTE: 0.3 K/CU MM
LYMPHOCYTES RELATIVE PERCENT: 6.8 % (ref 24–44)
Lab: NORMAL
MAGNESIUM: 2.2 MG/DL (ref 1.8–2.4)
MCH RBC QN AUTO: 30 PG (ref 27–31)
MCHC RBC AUTO-ENTMCNC: 34.5 % (ref 32–36)
MCV RBC AUTO: 87 FL (ref 78–100)
MONOCYTES ABSOLUTE: 0.3 K/CU MM
MONOCYTES RELATIVE PERCENT: 5.9 % (ref 0–4)
MYELOPEROXIDASE AB SER-ACNC: 1 AU/ML (ref 0–19)
NUCLEATED RBC %: 0 %
PDW BLD-RTO: 13.1 % (ref 11.7–14.9)
PHOSPHORUS: 3.4 MG/DL (ref 2.5–4.9)
PLATELET # BLD: 145 K/CU MM (ref 140–440)
PMV BLD AUTO: 11.4 FL (ref 7.5–11.1)
POTASSIUM SERPL-SCNC: 3.6 MMOL/L (ref 3.5–5.1)
PROTEINASE3 AB SER-ACNC: 0 AU/ML (ref 0–19)
RBC # BLD: 2.53 M/CU MM (ref 4.6–6.2)
SEGMENTED NEUTROPHILS ABSOLUTE COUNT: 3.9 K/CU MM
SEGMENTED NEUTROPHILS RELATIVE PERCENT: 85.8 % (ref 36–66)
SERVICE CMNT-IMP: ABNORMAL
SODIUM BLD-SCNC: 126 MMOL/L (ref 135–145)
SPECIMEN: NORMAL
SPECIMEN: NORMAL
TEST NAME: NORMAL
TOTAL IMMATURE NEUTOROPHIL: 0.05 K/CU MM
TOTAL NUCLEATED RBC: 0 K/CU MM
TOTAL PROTEIN: 5.6 GM/DL (ref 6.4–8.2)
WBC # BLD: 4.6 K/CU MM (ref 4–10.5)

## 2024-01-24 PROCEDURE — 6370000000 HC RX 637 (ALT 250 FOR IP): Performed by: INTERNAL MEDICINE

## 2024-01-24 PROCEDURE — 80053 COMPREHEN METABOLIC PANEL: CPT

## 2024-01-24 PROCEDURE — APPSS60 APP SPLIT SHARED TIME 46-60 MINUTES: Performed by: NURSE PRACTITIONER

## 2024-01-24 PROCEDURE — 6360000002 HC RX W HCPCS: Performed by: STUDENT IN AN ORGANIZED HEALTH CARE EDUCATION/TRAINING PROGRAM

## 2024-01-24 PROCEDURE — 97530 THERAPEUTIC ACTIVITIES: CPT

## 2024-01-24 PROCEDURE — 2580000003 HC RX 258: Performed by: STUDENT IN AN ORGANIZED HEALTH CARE EDUCATION/TRAINING PROGRAM

## 2024-01-24 PROCEDURE — 88185 FLOWCYTOMETRY/TC ADD-ON: CPT

## 2024-01-24 PROCEDURE — 6370000000 HC RX 637 (ALT 250 FOR IP): Performed by: STUDENT IN AN ORGANIZED HEALTH CARE EDUCATION/TRAINING PROGRAM

## 2024-01-24 PROCEDURE — 84100 ASSAY OF PHOSPHORUS: CPT

## 2024-01-24 PROCEDURE — 87491 CHLMYD TRACH DNA AMP PROBE: CPT

## 2024-01-24 PROCEDURE — 90935 HEMODIALYSIS ONE EVALUATION: CPT

## 2024-01-24 PROCEDURE — 87591 N.GONORRHOEAE DNA AMP PROB: CPT

## 2024-01-24 PROCEDURE — 83690 ASSAY OF LIPASE: CPT

## 2024-01-24 PROCEDURE — 97112 NEUROMUSCULAR REEDUCATION: CPT

## 2024-01-24 PROCEDURE — 99223 1ST HOSP IP/OBS HIGH 75: CPT | Performed by: INTERNAL MEDICINE

## 2024-01-24 PROCEDURE — 97167 OT EVAL HIGH COMPLEX 60 MIN: CPT

## 2024-01-24 PROCEDURE — 85025 COMPLETE CBC W/AUTO DIFF WBC: CPT

## 2024-01-24 PROCEDURE — 36592 COLLECT BLOOD FROM PICC: CPT

## 2024-01-24 PROCEDURE — 99232 SBSQ HOSP IP/OBS MODERATE 35: CPT | Performed by: INTERNAL MEDICINE

## 2024-01-24 PROCEDURE — 82962 GLUCOSE BLOOD TEST: CPT

## 2024-01-24 PROCEDURE — 82248 BILIRUBIN DIRECT: CPT

## 2024-01-24 PROCEDURE — APPNB30 APP NON BILLABLE TIME 0-30 MINS: Performed by: LICENSED PRACTICAL NURSE

## 2024-01-24 PROCEDURE — 2060000000 HC ICU INTERMEDIATE R&B

## 2024-01-24 PROCEDURE — 83735 ASSAY OF MAGNESIUM: CPT

## 2024-01-24 PROCEDURE — 6370000000 HC RX 637 (ALT 250 FOR IP): Performed by: FAMILY MEDICINE

## 2024-01-24 PROCEDURE — 6370000000 HC RX 637 (ALT 250 FOR IP): Performed by: NURSE PRACTITIONER

## 2024-01-24 PROCEDURE — 88184 FLOWCYTOMETRY/ TC 1 MARKER: CPT

## 2024-01-24 PROCEDURE — 82330 ASSAY OF CALCIUM: CPT

## 2024-01-24 PROCEDURE — 94761 N-INVAS EAR/PLS OXIMETRY MLT: CPT

## 2024-01-24 PROCEDURE — 97535 SELF CARE MNGMENT TRAINING: CPT

## 2024-01-24 PROCEDURE — 97163 PT EVAL HIGH COMPLEX 45 MIN: CPT

## 2024-01-24 RX ORDER — NICOTINE 21 MG/24HR
1 PATCH, TRANSDERMAL 24 HOURS TRANSDERMAL DAILY
Status: DISCONTINUED | OUTPATIENT
Start: 2024-01-24 | End: 2024-02-08 | Stop reason: HOSPADM

## 2024-01-24 RX ORDER — METOPROLOL SUCCINATE 50 MG/1
100 TABLET, EXTENDED RELEASE ORAL DAILY
Status: DISCONTINUED | OUTPATIENT
Start: 2024-01-24 | End: 2024-02-08 | Stop reason: HOSPADM

## 2024-01-24 RX ORDER — SULFAMETHOXAZOLE AND TRIMETHOPRIM 400; 80 MG/1; MG/1
1 TABLET ORAL DAILY
Status: DISCONTINUED | OUTPATIENT
Start: 2024-01-24 | End: 2024-01-26

## 2024-01-24 RX ORDER — SIMETHICONE 80 MG
80 TABLET,CHEWABLE ORAL EVERY 6 HOURS PRN
Status: DISCONTINUED | OUTPATIENT
Start: 2024-01-24 | End: 2024-02-08 | Stop reason: HOSPADM

## 2024-01-24 RX ADMIN — ACETAMINOPHEN 650 MG: 325 TABLET ORAL at 06:32

## 2024-01-24 RX ADMIN — ISOSORBIDE MONONITRATE 60 MG: 60 TABLET, EXTENDED RELEASE ORAL at 08:37

## 2024-01-24 RX ADMIN — ASPIRIN 81 MG 81 MG: 81 TABLET ORAL at 08:37

## 2024-01-24 RX ADMIN — CALCITRIOL CAPSULES 0.25 MCG 0.5 MCG: 0.25 CAPSULE ORAL at 08:36

## 2024-01-24 RX ADMIN — PANTOPRAZOLE SODIUM 40 MG: 40 TABLET, DELAYED RELEASE ORAL at 06:32

## 2024-01-24 RX ADMIN — SODIUM CHLORIDE, PRESERVATIVE FREE 10 ML: 5 INJECTION INTRAVENOUS at 08:37

## 2024-01-24 RX ADMIN — TRAZODONE HYDROCHLORIDE 50 MG: 50 TABLET ORAL at 20:45

## 2024-01-24 RX ADMIN — CALCIUM CARBONATE 500 MG: 500 TABLET, CHEWABLE ORAL at 20:45

## 2024-01-24 RX ADMIN — CALCIUM CARBONATE 500 MG: 500 TABLET, CHEWABLE ORAL at 08:37

## 2024-01-24 RX ADMIN — BUSPIRONE HYDROCHLORIDE 10 MG: 5 TABLET ORAL at 20:44

## 2024-01-24 RX ADMIN — BUSPIRONE HYDROCHLORIDE 10 MG: 5 TABLET ORAL at 08:36

## 2024-01-24 RX ADMIN — INSULIN GLARGINE 10 UNITS: 100 INJECTION, SOLUTION SUBCUTANEOUS at 20:45

## 2024-01-24 RX ADMIN — HEPARIN SODIUM 5000 UNITS: 5000 INJECTION INTRAVENOUS; SUBCUTANEOUS at 20:45

## 2024-01-24 RX ADMIN — AZITHROMYCIN MONOHYDRATE 250 MG: 500 INJECTION, POWDER, LYOPHILIZED, FOR SOLUTION INTRAVENOUS at 04:46

## 2024-01-24 RX ADMIN — SIMETHICONE 80 MG: 80 TABLET, CHEWABLE ORAL at 00:38

## 2024-01-24 RX ADMIN — METOPROLOL SUCCINATE 100 MG: 50 TABLET, EXTENDED RELEASE ORAL at 08:36

## 2024-01-24 RX ADMIN — SIMETHICONE 80 MG: 80 TABLET, CHEWABLE ORAL at 20:45

## 2024-01-24 RX ADMIN — ACETAMINOPHEN 650 MG: 325 TABLET ORAL at 17:36

## 2024-01-24 RX ADMIN — INSULIN LISPRO 4 UNITS: 100 INJECTION, SOLUTION INTRAVENOUS; SUBCUTANEOUS at 17:43

## 2024-01-24 RX ADMIN — CALCIUM CARBONATE 500 MG: 500 TABLET, CHEWABLE ORAL at 17:35

## 2024-01-24 RX ADMIN — EZETIMIBE 10 MG: 10 TABLET ORAL at 20:44

## 2024-01-24 RX ADMIN — SULFAMETHOXAZOLE AND TRIMETHOPRIM 1 TABLET: 400; 80 TABLET ORAL at 17:36

## 2024-01-24 RX ADMIN — HEPARIN SODIUM 5000 UNITS: 5000 INJECTION INTRAVENOUS; SUBCUTANEOUS at 06:33

## 2024-01-24 RX ADMIN — TICAGRELOR 90 MG: 90 TABLET ORAL at 08:37

## 2024-01-24 RX ADMIN — CEFTRIAXONE 1000 MG: 1 INJECTION, POWDER, FOR SOLUTION INTRAMUSCULAR; INTRAVENOUS at 03:59

## 2024-01-24 RX ADMIN — ATORVASTATIN CALCIUM 80 MG: 40 TABLET, FILM COATED ORAL at 20:44

## 2024-01-24 RX ADMIN — ACETAMINOPHEN 650 MG: 325 TABLET ORAL at 22:31

## 2024-01-24 RX ADMIN — HYDROXYZINE HYDROCHLORIDE 10 MG: 10 TABLET, FILM COATED ORAL at 22:30

## 2024-01-24 RX ADMIN — SODIUM CHLORIDE, PRESERVATIVE FREE 10 ML: 5 INJECTION INTRAVENOUS at 20:46

## 2024-01-24 ASSESSMENT — PAIN DESCRIPTION - ORIENTATION
ORIENTATION: MID
ORIENTATION: MID

## 2024-01-24 ASSESSMENT — PAIN SCALES - GENERAL
PAINLEVEL_OUTOF10: 3
PAINLEVEL_OUTOF10: 7
PAINLEVEL_OUTOF10: 0

## 2024-01-24 ASSESSMENT — PAIN DESCRIPTION - PAIN TYPE: TYPE: ACUTE PAIN

## 2024-01-24 ASSESSMENT — PAIN DESCRIPTION - FREQUENCY: FREQUENCY: CONTINUOUS

## 2024-01-24 ASSESSMENT — PAIN DESCRIPTION - LOCATION
LOCATION: ABDOMEN
LOCATION: ABDOMEN

## 2024-01-24 ASSESSMENT — PAIN DESCRIPTION - DESCRIPTORS
DESCRIPTORS: ACHING
DESCRIPTORS: ACHING

## 2024-01-24 ASSESSMENT — PAIN DESCRIPTION - ONSET: ONSET: PROGRESSIVE

## 2024-01-24 NOTE — PLAN OF CARE
Problem: Discharge Planning  Goal: Discharge to home or other facility with appropriate resources  Outcome: Progressing  Flowsheets (Taken 1/23/2024 1530)  Discharge to home or other facility with appropriate resources:   Identify barriers to discharge with patient and caregiver   Arrange for needed discharge resources and transportation as appropriate   Identify discharge learning needs (meds, wound care, etc)   Arrange for interpreters to assist at discharge as needed   Refer to discharge planning if patient needs post-hospital services based on physician order or complex needs related to functional status, cognitive ability or social support system     Problem: Pain  Goal: Verbalizes/displays adequate comfort level or baseline comfort level  Outcome: Progressing     Problem: Skin/Tissue Integrity  Goal: Absence of new skin breakdown  Description: 1.  Monitor for areas of redness and/or skin breakdown  2.  Assess vascular access sites hourly  3.  Every 4-6 hours minimum:  Change oxygen saturation probe site  4.  Every 4-6 hours:  If on nasal continuous positive airway pressure, respiratory therapy assess nares and determine need for appliance change or resting period.  Outcome: Progressing     Problem: Safety - Adult  Goal: Free from fall injury  Outcome: Progressing     Problem: Chronic Conditions and Co-morbidities  Goal: Patient's chronic conditions and co-morbidity symptoms are monitored and maintained or improved  Outcome: Progressing  Flowsheets (Taken 1/23/2024 1530)  Care Plan - Patient's Chronic Conditions and Co-Morbidity Symptoms are Monitored and Maintained or Improved:   Monitor and assess patient's chronic conditions and comorbid symptoms for stability, deterioration, or improvement   Collaborate with multidisciplinary team to address chronic and comorbid conditions and prevent exacerbation or deterioration   Update acute care plan with appropriate goals if chronic or comorbid symptoms are  exacerbated and prevent overall improvement and discharge     Problem: Nutrition Deficit:  Goal: Optimize nutritional status  Outcome: Progressing

## 2024-01-24 NOTE — PROGRESS NOTES
[place]2.0  [facility] [place]      Name:  Dewey Sosa /Age/Sex: 1980  (43 y.o. male)   MRN & CSN:  [id number] & [id number] Encounter Date/Time: 2024 1:41 PM EST    Location:  -A PCP: Frandy Parsons APRN - CNP       Hospital Day: 6      Subjective:     Chief Complaint:  Fall (Complains he has fallen 6 times in the last few days, and has hit his head on ASA) and Fatigue (Complaining of increasing weakness. )     Pt seen and examined in the AM. Patient states he feels weak, fatigued and SOB when moving. Patient's mother in the room.     Pt. Is aware that he has work up on going for his HIV and may need dialysis set up outpatient.       Assessment and Plan:   Sepsis noted after admission  Legionella Pneumonia   HIV Infection, ?AIDS  . Fever T max 101.6, tachycardia 110's, tachypnea 30's. 2/2 legionella pneumonia.   CT chest showed right lung mass vs consolidation  Legion[place] antigen +  - received azithromycin 7 days in total (last dose tomorrow) and rocephin 5 days total  - f/u blood cx  - ID consulted  - CD4 count only 25     Hyponatremia. Likely 2/2 legionella pneumonia and poor oral intake/dehydration.  P/w Na 117, improved 123 but then down to 118.   Today Na 126  - nephro managing  - on HD as below     Transaminitis, hyperbilirubinemia.  Was going up but today coming down.   Imaging including U/S and CT abd unremarkable.   CK from few days ago 20K down to 3600 yesterday  Could be legionella hepatitis vs rhabdomyolysis.    - GI consulted  - trend liver panel  - MRCP ordered but cancelled now given no clinical evidence of Choledocholithiasis    Acute pancreatitis 2/2 Elevated TG. Was as high as 1748. Has been fluctuating.    CT abd, U/S no sign of pancreatitis.  Has been having abd pain unclear if chronic or acute  Triglycerides  noted 608 but trended down to 545 yesterday. Check TG again today, if up trending, will need to start insulin drip.       ROSE MARY on CKD IV now ESRD.

## 2024-01-24 NOTE — PROGRESS NOTES
Occupational Therapy  [facility] ACUTE CARE OCCUPATIONAL THERAPY EVALUATION  Dewey Sosa, 1980, 2112/2112-A, 1/24/2024    Discharge Recommendation:  Inpatient Rehabilitation  History  [place]:  The primary encounter diagnosis was Hyponatremia. Diagnoses of Acute renal failure superimposed on stage 4 chronic kidney disease, unspecified acute renal failure type (HCC), Nausea and vomiting, unspecified vomiting type, Pneumonia due to infectious organism, unspecified laterality, unspecified part of lung, Splenomegaly, and Cardiomyopathy, unspecified type (HCC) were also pertinent to this visit.  Patient  has a past medical history of Accident, Acid reflux, Anemia, Broken teeth, Chronic kidney disease, stage II (mild), Diabetes mellitus (HCC), H/O percutaneous left heart catheterization, History of heart artery stent, History of nuclear stress test, Hx of Doppler echocardiogram, Kidney stones, Marijuana use, NSTEMI (non-ST elevated myocardial infarction) (HCC), Precordial pain, Shortness of breath on exertion, Teeth missing, and [place] teeth extracted.  Patient  has a past surgical history that includes other surgical history (12/16/2017); [place] tooth extraction; other surgical history (Right, 02/28/2018); Toe amputation (Left, 3/27/2020); Colonoscopy (N/A, 9/21/2021); and Upper gastrointestinal endoscopy (N/A, 9/21/2021).    Subjective:  Patient states:  \"I'm sorry I'm so weak\".    Pain:  Denied.    Communication with other providers: RN, co-eval with MOE No for safety and support.  Restrictions: General Precautions, Fall Risk, Contact     Home Setup/Prior level of function  Social/Functional History  Lives With: Significant other  Type of Home: House  Home Layout: Two level, Bed/Bath upstairs  Home Access: Stairs to enter without rails  Entrance Stairs - Number of Steps: 2  Bathroom Shower/Tub: Walk-in shower  Bathroom Equipment: Tub transfer bench  Home Equipment: Cane, Walker, rolling,

## 2024-01-24 NOTE — CARE COORDINATION
Received referral for ARU.  Reviewed patients clinicals and PT/OT notes.  Per MD note from 1/23 patient expressing he's tired of being in the hospital and wants to leave AMA.  Although the MD talked the patient into staying this is still a concern for ARU admission.  Due to this patient is not ARU appropriate.  Left  for Karen GUNN regarding this.

## 2024-01-24 NOTE — CARE COORDINATION
CM placed a white board for PT/OT to see for recommendations for discharge planning.   1317 CM into see pt and pt is not in room. CM informed pt is in dialysis.  Pts mom in room. CM informed per mom that pt lives with friend. Pt lives in a two story home. Pt has a PCP and insurance. Pt is able to care for all ADL's. Pt and room mate both drive. Pt will be able to make appointments/ obtain groceries/ meds. Per PT/OT rec is for ARU. CM asked that Gretchen review. LH  1430 pt remains in dialysis but per Gretchen pt does not meet criteria for ARU. CM team follow for rehab snf choice.

## 2024-01-24 NOTE — PROGRESS NOTES
Dw pt and boyfriend and made aware hiv test positive. Consulted ID and hd today and will monitor

## 2024-01-24 NOTE — PROGRESS NOTES
Nephrology Progress Note  1/24/2024 3:30 PM  Subjective:     Interval History: Dewey Sosa is a 43 y.o. male who appears more awake but still tired I discussed in detail with him this morning about his diagnosis of HIV positive and did inform his boyfriend after his consent.      Data:   Scheduled Meds:   metoprolol succinate  100 mg Oral Daily    sulfamethoxazole-trimethoprim  1 tablet Oral Daily    azithromycin  250 mg IntraVENous Q24H    insulin glargine  10 Units SubCUTAneous Nightly    calcitRIOL  0.5 mcg Oral Daily    vitamin D  50,000 Units Oral Weekly    calcium carbonate  500 mg Oral TID    sodium chloride flush  5-40 mL IntraVENous 2 times per day    heparin (porcine)  5,000 Units SubCUTAneous 3 times per day    acetaminophen  650 mg Oral 4 times per day    aspirin  81 mg Oral Daily    ticagrelor  90 mg Oral BID    ezetimibe  10 mg Oral Nightly    [Held by provider] gabapentin  300 mg Oral TID    busPIRone  10 mg Oral TID    pantoprazole  40 mg Oral QAM AC    atorvastatin  80 mg Oral Nightly    traZODone  50 mg Oral Nightly    isosorbide mononitrate  60 mg Oral Daily    insulin lispro  0-16 Units SubCUTAneous TID WC    insulin lispro  0-4 Units SubCUTAneous 2 times per day     Continuous Infusions:   sodium chloride      dextrose           CBC   Recent Labs     01/22/24  0440 01/23/24  0641 01/24/24  0410   WBC 4.5 4.8 4.6   HGB 8.2* 8.0* 7.6*   HCT 23.3* 22.9* 22.0*   * 140 145      BMP   Recent Labs     01/22/24  0440 01/22/24  0827 01/23/24  0641 01/24/24  0410   * 122* 125* 126*   K 3.5 3.6 3.6 3.6   CL 83* 83* 88* 88*   CO2 20* 18* 22 23   PHOS 7.5*  --  5.1* 3.4   BUN 90* 93* 72* 55*   CREATININE 6.8* 6.8* 5.4* 5.0*     Hepatic:   Recent Labs     01/22/24  0440 01/23/24  0641 01/24/24  0410   * 307* 269*   ALT 82* 79* 72*   BILITOT 2.2* 1.9* 1.6*   ALKPHOS 95 138* 194*     Troponin: No results for input(s): \"TROPONINI\" in the last 72 hours.  BNP: No results for input(s):  outpatient dialysis and get tunneled HD catheter               FARTUN SHAHID MD, MD

## 2024-01-24 NOTE — CONSULTS
[facility] ACUTE CARE PHYSICAL THERAPY EVALUATION  Dewey Sosa, 1980, 2112/2112-A, 1/24/2024    History  [place]:  The primary encounter diagnosis was Hyponatremia. Diagnoses of Acute renal failure superimposed on stage 4 chronic kidney disease, unspecified acute renal failure type (HCC), Nausea and vomiting, unspecified vomiting type, Pneumonia due to infectious organism, unspecified laterality, unspecified part of lung, Splenomegaly, and Cardiomyopathy, unspecified type (HCC) were also pertinent to this visit.  Patient  has a past medical history of Accident, Acid reflux, Anemia, Broken teeth, Chronic kidney disease, stage II (mild), Diabetes mellitus (HCC), H/O percutaneous left heart catheterization, History of heart artery stent, History of nuclear stress test, Hx of Doppler echocardiogram, Kidney stones, Marijuana use, NSTEMI (non-ST elevated myocardial infarction) (HCC), Precordial pain, Shortness of breath on exertion, Teeth missing, and [place] teeth extracted.  Patient  has a past surgical history that includes other surgical history (12/16/2017); [place] tooth extraction; other surgical history (Right, 02/28/2018); Toe amputation (Left, 3/27/2020); Colonoscopy (N/A, 9/21/2021); and Upper gastrointestinal endoscopy (N/A, 9/21/2021).    Discharge Recommendation: inpatient rehab     Subjective:    Patient states:  \"I'm tired\".      Pain:  Pt denied having any pain.      Communication with other providers:  Handoff to RN, co-evaluation with Priscilla PACK to maximize safety and for tolerance.    Restrictions: contact precautions, general precautions, fall risk     Home Setup/Prior level of function  Social/Functional History  Lives With: Significant other  Type of Home: House  Home Layout: Two level, Bed/Bath upstairs  Home Access: Stairs to enter without rails  Entrance Stairs - Number of Steps: 2  Bathroom Shower/Tub: Walk-in shower  Bathroom Equipment: Tub transfer bench  Home Equipment:

## 2024-01-24 NOTE — PROGRESS NOTES
[facility] Gastroenterology and Hepatology             MD Suzanne Blevins MD Carol Christensen, APRN-CNP       Brie Meng, APRN-CNP             [address] [place] [address]             [phone] fax [phone]      Gastroenterology Progress note . 1/24/2024  Reason for consult:  Acute transaminitis with elevated lipase     Physician attestation:  I have personally seen and examined the patient independently. I have reviewed the patient's available data,including medical history and recent test results. Reviewed and discussed note as documented by AMALIA.  I agree with the physical exam findings, assessment and plan.     Briefly   Patient underwent CT last night which did confirm mild pancreatitis.  Today he appears to be lying comfortably he was examined in the hemodialysis area.  Discussed findings of CT result with him.  I also met with the patient's mother and discussed results with her.    Gen: normal mood, alert  ENT: normal TJ  Cardiovascular: RRR, No M/R/G  Respiratory: CTA BL  Gastrointestinal: Soft,NT ND  Genitourinary: no suprapubic tenderness  Musculoskeletal: no scars  Skin:moist  Neuro: alert and oriented x3    My assessment and plan reveals   #1 elevated LFTs  Possible differentials include Legionella induced hepatitis versus  rhabdomyolysis - .  CK from admission noted to be significantly elevated to 20,000.  His pattern of LFT elevation certainly raises concern for that with AST significantly more than ALT as well as indirect and direct bilirubin.  -CK now down to 3000  -Initially planned to evaluate for any biliary dilation or associated choledocholithiasis with MRCP however patient not cooperating for MRI sequencing.   -No biliary dilation on prior imaging  -Recommend continuing monitoring bilirubin     #2  Pancreatitis  -Possible differentials include Pancreatitis however now mentions abdominal pain vs possible rhabdo.   - TG

## 2024-01-24 NOTE — PROGRESS NOTES
Pt came down to mri for his abd mri yesterday . After moving him to the mri table he began to choke and gag and could not catch his breath. We tried to sit him up but he was unable so he rolled on his side and blood starting coming out of his nose.  He said he taste blood and is choking. We immediately transferred him to his bed and took him to his room.  The mri of the abd requires him to lay on his back for 20 min in the scanner and hold his breath multiple times. Due to his choking we felt it was unsafe to continue at this point.  We checked on him today but the nurse was unable to come with the pt this afternoon so mri is on hold. If pt is able in the future or willing to try again

## 2024-01-24 NOTE — PROGRESS NOTES
Patient Name: Dewey Sosa  Patient : 1980  MRN: [id number]     Acct: [id number]  Date of Admission: 2024  Room/Bed: -A  Code Status:  Full Code  Allergies: No Known Allergies  Diagnosis:    Patient Active Problem List   Diagnosis    Type 2 diabetes mellitus without complication, with long-term current use of insulin (HCC)    Gastroesophageal reflux disease    Former tobacco use    Acute osteomyelitis of phalanx of left foot (HCC)    Osteomyelitis of third toe of left foot (HCC)    Anxiety    Persistent proteinuria    Benign neoplasm of sigmoid colon    Vitamin D deficiency    ASCVD (arteriosclerotic cardiovascular disease)    Chronic systolic congestive heart failure (HCC)    Ischemic cardiomyopathy    Dyslipidemia    Stage 3a chronic kidney disease (HCC)    Other male erectile dysfunction    Atherosclerotic heart disease of native coronary artery with other forms of angina pectoris (HCC)    Type 2 diabetes mellitus with chronic kidney disease    Insomnia    Stage 3b chronic kidney disease (HCC)    Overweight    Chronic kidney disease, stage IV (severe) (HCC)    Hyperkalemia    Hyponatremia    Elevated LFTs    Elevated lipase    Splenomegaly    Nausea and vomiting    Traumatic rhabdomyolysis (HCC)         Treatment:  Hemodilaysis 2:1  Priority: Routine  Location: Acute Room    Diabetic: Yes  NPO: No  Isolation Precautions: Dialysis     Consent for Treatment Verified: Yes  Blood Consent Verified: Not Applicable     Safety Verified: Identify (I), Consent (C), Equipment (E), HepB Status (B), Orders Complete (O), Access Verified (A), and Timeliness (T)  Time out performed prior to access at 1205 hours.    Report Received from Primary RN at 1150 hours.  Primary RN (First Initial, Last Name, Title): FRANCISCA Vega RN  Incapacitated Nurse Education Completed: Yes     HBsAg ONLY:  Date Drawn: 2024       Results: Negative  HBsAb:  Date Drawn:  2024       Results: Immune  L  Dialyzer Clearance: Moderately streaked  Duration of Treatment (minutes): 150 minutes     Hemodialysis Intake (ml): 500 ml  Hemodialysis Output (ml): 500 ml     Tolerated Treatment: Good  Patient Response to Treatment: TOLERATED WELL          Provider Notification        Handoff complete and report given to Primary RN at 1530 hours.  Primary RN (First Initial, Last Name, Title):  FRANCISCA Lima RN     Education  Person Educated: Patient   Knowledge Base: Substantial  Barriers to Learning?: None  Preferred method of Learning: Oral  Topic(s): Access Care, Signs and Symptoms of Infection, and Procedural   Teaching Tools: Explanation   Response to Education: Verbalized Understanding     Electronically signed by Melody Irving RN on 1/24/2024 at 3:30 PM

## 2024-01-24 NOTE — CONSULTS
Infectious Disease Consult Note  2024   Patient Name: Dewey oSsa : 1980   Impression  HIV Positive: New Diagnosis  Legionella Bilateral Pneumonia: Resolving  No reported allergies to ABX  CrCl 24 on CKD5  Initial T-max 101.7, now afebrile >48 hours, no leukocytosis, hypoalbuminemia  -BC 0/2 NGTD  -Hepatitis B S Ab >1000, Hep B surface Ag non-reactive, Hep C Ab non-reactive. Hep B Core Total Ab positive.   -Resp panel and Strep pna negative, MRSA by PCR negative   -HIV-1 ab positive, CD4/CD8 ratio: 0.13.  CD8 % Suppressor T Cell 76. CD8 T Cell Abs 201. CD3% Total T Cell 87. CD4 T cell 25  -Legionella Ag Positive  CKD5/ Hyponatremia/ Hyperkalemia:  Dr. Nevarez onboard  HD started, will evaluate HD vs PD  Rhabdomyolysis:  Class I Obesity: BMI: 30.51 kg/m2  Elevated LFTs and Lipase:  Dr. Ríos onboard, imp of possible Legionella induced hepatitis vs rhabdomyolysis. Awaiting MRI to r/o any biliary dilatation or choledocholithiasis.   DMII:  Dr. 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 Nevarez the plan of care, he DW patient and patient's boyfriend of the positive HIV test.     Thank you for allowing me to consult in the care of this patient.  ------------------------  REASON FOR CONSULT: Infective syndrome \"consult for hiv + new dx\"  Requested by: Dr. Beka Nevarez  HPI:Patient is a 43 y.o.  male with a history of CKD5, DMII, CAD s/p PCI, GERD, kidney stones, Marijuana use, s/p left 3rd toe amputation ,  1/20-24?  -------------------------------------------------------------------------------------------------------------------    Vital Signs:  Vitals:    01/24/24 0836   BP: 137/77   Pulse: 93   Resp:    Temp:    SpO2:          Exam:    VS: noted; wt 231 lb (104.9 kg) Height 6'1\"  Gen: alert and oriented X3, no distress, up in the chair.  Skin: no stigmata of endocarditis  Wounds: C/D/I-NA  HEMT: AT/NC Oropharynx pink, moist, and without lesions or exudates; dentition in good state of repair  Eyes: PERRLA, EOMI, conjunctiva pink, sclera anicteric.   Neck: Supple. Trachea midline. No LAD.  Chest: no distress and CTA. Good air movement. Room air.   Heart: NSR and no MRG.   Abd: soft, non-distended, no tenderness, no hepatomegaly. Normoactive bowel sounds.  Ext: no clubbing, cyanosis, or edema  Neuro: Mental status intact. CN 2-12 intact and no focal sensory or motor deficits    ?Diagnostic Studies: reviewed  1/19/2024 XR Chest Portable:  IMPRESSION:  Expiratory film with bilateral atelectasis versus pneumonia.    1/19/2024 CT Head WO Contrast:   IMPRESSION:  1.  No acute intracranial abnormality.  2.  Focal area of encephalomalacia in the right frontal lobe may represent an old stroke.    1/19/2024 CT Chest Abdomen Pelvis WO Contrast:  IMPRESSION:  1. Masslike opacity in the right lower lobe.  If there is evidence of infection, pneumonia may be considered.  A pulmonary mass cannot be excluded.  2. Splenomegaly of uncertain significance.  3. Advanced atherosclerotic calcification of coronary arteries. Cardiology follow-up may be considered on a nonemergent outpatient basis.    1/21/2024 XR Chest Portable:  IMPRESSION:  1. Interval placement of right internal jugular Trialysis catheter, with tip  overlying the lower SVC, in satisfactory position, and no evidence of a  pneumothorax.  2. Slight interval improvement in appearance of focal mass-like consolidative opacity within the right lung base, favoring slight

## 2024-01-25 ENCOUNTER — APPOINTMENT (OUTPATIENT)
Dept: INTERVENTIONAL RADIOLOGY/VASCULAR | Age: 44
DRG: 969 | End: 2024-01-25
Payer: MEDICARE

## 2024-01-25 LAB
ALBUMIN SERPL-MCNC: 1.9 GM/DL (ref 3.4–5)
ALP BLD-CCNC: 218 IU/L (ref 40–129)
ALT SERPL-CCNC: 64 U/L (ref 10–40)
ANION GAP SERPL CALCULATED.3IONS-SCNC: 12 MMOL/L (ref 7–16)
AST SERPL-CCNC: 232 IU/L (ref 15–37)
BASOPHILS ABSOLUTE: 0 K/CU MM
BASOPHILS RELATIVE PERCENT: 0 % (ref 0–1)
BILIRUB SERPL-MCNC: 1.1 MG/DL (ref 0–1)
BILIRUBIN DIRECT: 1 MG/DL (ref 0–0.3)
BILIRUBIN, INDIRECT: 0.1 MG/DL (ref 0–0.7)
BUN SERPL-MCNC: 43 MG/DL (ref 6–23)
CALCIUM IONIZED: 4.24 MG/DL (ref 4.48–5.28)
CALCIUM SERPL-MCNC: 7 MG/DL (ref 8.3–10.6)
CHLORIDE BLD-SCNC: 91 MMOL/L (ref 99–110)
CO2: 22 MMOL/L (ref 21–32)
CREAT SERPL-MCNC: 4.2 MG/DL (ref 0.9–1.3)
DIFFERENTIAL TYPE: ABNORMAL
DIFFERENTIAL TYPE: ABNORMAL
EOSINOPHILS ABSOLUTE: 0 K/CU MM
EOSINOPHILS RELATIVE PERCENT: 0.8 % (ref 0–3)
GFR SERPL CREATININE-BSD FRML MDRD: 17 ML/MIN/1.73M2
GLUCOSE BLD-MCNC: 233 MG/DL (ref 70–99)
GLUCOSE BLD-MCNC: 244 MG/DL (ref 70–99)
GLUCOSE BLD-MCNC: 272 MG/DL (ref 70–99)
GLUCOSE BLD-MCNC: 275 MG/DL (ref 70–99)
GLUCOSE SERPL-MCNC: 230 MG/DL (ref 70–99)
HCT VFR BLD CALC: 20.9 % (ref 42–52)
HCT VFR BLD CALC: 24.9 % (ref 42–52)
HEMOGLOBIN: 7 GM/DL (ref 13.5–18)
HEMOGLOBIN: 8.5 GM/DL (ref 13.5–18)
IGA: 249 MG/DL (ref 69–382)
IGG,SERUM: 1250 MG/DL (ref 723–1685)
IGM,SERUM: 130 MG/DL (ref 62–277)
IMMATURE NEUTROPHIL %: 1.1 % (ref 0–0.43)
INR BLD: 1.5 INDEX
IONIZED CA: 1.06 MMOL/L (ref 1.12–1.32)
LACTATE DEHYDROGENASE: 503 IU/L (ref 120–246)
LIPASE: 1667 IU/L (ref 13–60)
LYMPHOCYTES ABSOLUTE: 0.3 K/CU MM
LYMPHOCYTES RELATIVE PERCENT: 9.3 % (ref 24–44)
MAGNESIUM: 2.2 MG/DL (ref 1.8–2.4)
MCH RBC QN AUTO: 29.5 PG (ref 27–31)
MCHC RBC AUTO-ENTMCNC: 33.5 % (ref 32–36)
MCV RBC AUTO: 88.2 FL (ref 78–100)
MONOCYTES ABSOLUTE: 0.3 K/CU MM
MONOCYTES RELATIVE PERCENT: 8.5 % (ref 0–4)
NUCLEATED RBC %: 0 %
PDW BLD-RTO: 13.4 % (ref 11.7–14.9)
PHOSPHORUS: 2.6 MG/DL (ref 2.5–4.9)
PLATELET # BLD: 153 K/CU MM (ref 140–440)
PLT MORPHOLOGY: ABNORMAL
PMV BLD AUTO: 11.1 FL (ref 7.5–11.1)
POTASSIUM SERPL-SCNC: 3.4 MMOL/L (ref 3.5–5.1)
PROTHROMBIN TIME: 18 SECONDS (ref 11.7–14.5)
RBC # BLD: 2.37 M/CU MM (ref 4.6–6.2)
RBC # BLD: ABNORMAL 10*6/UL
SEGMENTED NEUTROPHILS ABSOLUTE COUNT: 2.9 K/CU MM
SEGMENTED NEUTROPHILS RELATIVE PERCENT: 80.3 % (ref 36–66)
SMEAR REVIEW: NORMAL
SODIUM BLD-SCNC: 125 MMOL/L (ref 135–145)
T. PALLIDUM SCREEN: NEGATIVE
TOTAL IMMATURE NEUTOROPHIL: 0.04 K/CU MM
TOTAL NUCLEATED RBC: 0 K/CU MM
TOTAL PROTEIN: 4.7 GM/DL (ref 6.4–8.2)
TRIGL SERPL-MCNC: 328 MG/DL
WBC # BLD: 3.6 K/CU MM (ref 4–10.5)

## 2024-01-25 PROCEDURE — 86780 TREPONEMA PALLIDUM: CPT

## 2024-01-25 PROCEDURE — 80048 BASIC METABOLIC PNL TOTAL CA: CPT

## 2024-01-25 PROCEDURE — 6370000000 HC RX 637 (ALT 250 FOR IP): Performed by: INTERNAL MEDICINE

## 2024-01-25 PROCEDURE — 6360000002 HC RX W HCPCS: Performed by: STUDENT IN AN ORGANIZED HEALTH CARE EDUCATION/TRAINING PROGRAM

## 2024-01-25 PROCEDURE — 6360000002 HC RX W HCPCS: Performed by: INTERNAL MEDICINE

## 2024-01-25 PROCEDURE — 84478 ASSAY OF TRIGLYCERIDES: CPT

## 2024-01-25 PROCEDURE — APPNB45 APP NON BILLABLE 31-45 MINUTES: Performed by: LICENSED PRACTICAL NURSE

## 2024-01-25 PROCEDURE — 80076 HEPATIC FUNCTION PANEL: CPT

## 2024-01-25 PROCEDURE — 86922 COMPATIBILITY TEST ANTIGLOB: CPT

## 2024-01-25 PROCEDURE — 83690 ASSAY OF LIPASE: CPT

## 2024-01-25 PROCEDURE — 82784 ASSAY IGA/IGD/IGG/IGM EACH: CPT

## 2024-01-25 PROCEDURE — 85018 HEMOGLOBIN: CPT

## 2024-01-25 PROCEDURE — 85014 HEMATOCRIT: CPT

## 2024-01-25 PROCEDURE — P9016 RBC LEUKOCYTES REDUCED: HCPCS

## 2024-01-25 PROCEDURE — 36430 TRANSFUSION BLD/BLD COMPNT: CPT

## 2024-01-25 PROCEDURE — 85610 PROTHROMBIN TIME: CPT

## 2024-01-25 PROCEDURE — 82962 GLUCOSE BLOOD TEST: CPT

## 2024-01-25 PROCEDURE — 86695 HERPES SIMPLEX TYPE 1 TEST: CPT

## 2024-01-25 PROCEDURE — 94761 N-INVAS EAR/PLS OXIMETRY MLT: CPT

## 2024-01-25 PROCEDURE — 83010 ASSAY OF HAPTOGLOBIN QUANT: CPT

## 2024-01-25 PROCEDURE — 87327 CRYPTOCOCCUS NEOFORM AG IA: CPT

## 2024-01-25 PROCEDURE — 99233 SBSQ HOSP IP/OBS HIGH 50: CPT | Performed by: NURSE PRACTITIONER

## 2024-01-25 PROCEDURE — 6370000000 HC RX 637 (ALT 250 FOR IP): Performed by: STUDENT IN AN ORGANIZED HEALTH CARE EDUCATION/TRAINING PROGRAM

## 2024-01-25 PROCEDURE — 6370000000 HC RX 637 (ALT 250 FOR IP): Performed by: NURSE PRACTITIONER

## 2024-01-25 PROCEDURE — 85025 COMPLETE CBC W/AUTO DIFF WBC: CPT

## 2024-01-25 PROCEDURE — 86901 BLOOD TYPING SEROLOGIC RH(D): CPT

## 2024-01-25 PROCEDURE — 83735 ASSAY OF MAGNESIUM: CPT

## 2024-01-25 PROCEDURE — 86900 BLOOD TYPING SEROLOGIC ABO: CPT

## 2024-01-25 PROCEDURE — 86592 SYPHILIS TEST NON-TREP QUAL: CPT

## 2024-01-25 PROCEDURE — 99232 SBSQ HOSP IP/OBS MODERATE 35: CPT | Performed by: INTERNAL MEDICINE

## 2024-01-25 PROCEDURE — 86694 HERPES SIMPLEX NES ANTBDY: CPT

## 2024-01-25 PROCEDURE — 86696 HERPES SIMPLEX TYPE 2 TEST: CPT

## 2024-01-25 PROCEDURE — 2060000000 HC ICU INTERMEDIATE R&B

## 2024-01-25 PROCEDURE — 84100 ASSAY OF PHOSPHORUS: CPT

## 2024-01-25 PROCEDURE — 82330 ASSAY OF CALCIUM: CPT

## 2024-01-25 PROCEDURE — 86162 COMPLEMENT TOTAL (CH50): CPT

## 2024-01-25 PROCEDURE — 86850 RBC ANTIBODY SCREEN: CPT

## 2024-01-25 PROCEDURE — 2580000003 HC RX 258: Performed by: STUDENT IN AN ORGANIZED HEALTH CARE EDUCATION/TRAINING PROGRAM

## 2024-01-25 PROCEDURE — 83615 LACTATE (LD) (LDH) ENZYME: CPT

## 2024-01-25 RX ORDER — SODIUM CHLORIDE 9 MG/ML
INJECTION, SOLUTION INTRAVENOUS PRN
Status: DISCONTINUED | OUTPATIENT
Start: 2024-01-25 | End: 2024-02-08 | Stop reason: HOSPADM

## 2024-01-25 RX ORDER — POTASSIUM CHLORIDE 29.8 MG/ML
20 INJECTION INTRAVENOUS ONCE
Status: COMPLETED | OUTPATIENT
Start: 2024-01-25 | End: 2024-01-25

## 2024-01-25 RX ORDER — CALCIUM GLUCONATE 20 MG/ML
2000 INJECTION, SOLUTION INTRAVENOUS ONCE
Status: COMPLETED | OUTPATIENT
Start: 2024-01-25 | End: 2024-01-25

## 2024-01-25 RX ADMIN — AZITHROMYCIN MONOHYDRATE 250 MG: 500 INJECTION, POWDER, LYOPHILIZED, FOR SOLUTION INTRAVENOUS at 02:35

## 2024-01-25 RX ADMIN — PANTOPRAZOLE SODIUM 40 MG: 40 TABLET, DELAYED RELEASE ORAL at 05:18

## 2024-01-25 RX ADMIN — SODIUM CHLORIDE, PRESERVATIVE FREE 10 ML: 5 INJECTION INTRAVENOUS at 11:29

## 2024-01-25 RX ADMIN — HEPARIN SODIUM 5000 UNITS: 5000 INJECTION INTRAVENOUS; SUBCUTANEOUS at 05:17

## 2024-01-25 RX ADMIN — METOPROLOL SUCCINATE 100 MG: 50 TABLET, EXTENDED RELEASE ORAL at 16:03

## 2024-01-25 RX ADMIN — HYDROMORPHONE HYDROCHLORIDE 0.5 MG: 1 INJECTION, SOLUTION INTRAMUSCULAR; INTRAVENOUS; SUBCUTANEOUS at 21:08

## 2024-01-25 RX ADMIN — ACETAMINOPHEN 650 MG: 325 TABLET ORAL at 05:17

## 2024-01-25 RX ADMIN — ISOSORBIDE MONONITRATE 60 MG: 60 TABLET, EXTENDED RELEASE ORAL at 16:03

## 2024-01-25 RX ADMIN — SODIUM CHLORIDE, PRESERVATIVE FREE 10 ML: 5 INJECTION INTRAVENOUS at 21:08

## 2024-01-25 RX ADMIN — SULFAMETHOXAZOLE AND TRIMETHOPRIM 1 TABLET: 400; 80 TABLET ORAL at 16:07

## 2024-01-25 RX ADMIN — ONDANSETRON 4 MG: 2 INJECTION INTRAMUSCULAR; INTRAVENOUS at 03:39

## 2024-01-25 RX ADMIN — TRAZODONE HYDROCHLORIDE 50 MG: 50 TABLET ORAL at 21:08

## 2024-01-25 RX ADMIN — EZETIMIBE 10 MG: 10 TABLET ORAL at 21:08

## 2024-01-25 RX ADMIN — ASPIRIN 81 MG 81 MG: 81 TABLET ORAL at 16:03

## 2024-01-25 RX ADMIN — INSULIN LISPRO 4 UNITS: 100 INJECTION, SOLUTION INTRAVENOUS; SUBCUTANEOUS at 11:21

## 2024-01-25 RX ADMIN — ACETAMINOPHEN 650 MG: 325 TABLET ORAL at 18:09

## 2024-01-25 RX ADMIN — POTASSIUM CHLORIDE 20 MEQ: 29.8 INJECTION, SOLUTION INTRAVENOUS at 11:28

## 2024-01-25 RX ADMIN — CALCIUM CARBONATE 500 MG: 500 TABLET, CHEWABLE ORAL at 21:07

## 2024-01-25 RX ADMIN — HEPARIN SODIUM 5000 UNITS: 5000 INJECTION INTRAVENOUS; SUBCUTANEOUS at 21:07

## 2024-01-25 RX ADMIN — ATORVASTATIN CALCIUM 80 MG: 40 TABLET, FILM COATED ORAL at 21:07

## 2024-01-25 RX ADMIN — INSULIN LISPRO 4 UNITS: 100 INJECTION, SOLUTION INTRAVENOUS; SUBCUTANEOUS at 18:09

## 2024-01-25 RX ADMIN — INSULIN GLARGINE 10 UNITS: 100 INJECTION, SOLUTION SUBCUTANEOUS at 21:09

## 2024-01-25 RX ADMIN — BUSPIRONE HYDROCHLORIDE 10 MG: 5 TABLET ORAL at 21:08

## 2024-01-25 RX ADMIN — HEPARIN SODIUM 5000 UNITS: 5000 INJECTION INTRAVENOUS; SUBCUTANEOUS at 16:08

## 2024-01-25 RX ADMIN — CALCIUM GLUCONATE 2000 MG: 20 INJECTION, SOLUTION INTRAVENOUS at 12:08

## 2024-01-25 ASSESSMENT — PAIN DESCRIPTION - FREQUENCY
FREQUENCY: CONTINUOUS
FREQUENCY: CONTINUOUS

## 2024-01-25 ASSESSMENT — PAIN SCALES - GENERAL
PAINLEVEL_OUTOF10: 7
PAINLEVEL_OUTOF10: 0
PAINLEVEL_OUTOF10: 0
PAINLEVEL_OUTOF10: 8
PAINLEVEL_OUTOF10: 3
PAINLEVEL_OUTOF10: 4

## 2024-01-25 ASSESSMENT — PAIN DESCRIPTION - ONSET
ONSET: AWAKENED FROM SLEEP
ONSET: ON-GOING

## 2024-01-25 ASSESSMENT — PAIN DESCRIPTION - DESCRIPTORS
DESCRIPTORS: ACHING;CRAMPING
DESCRIPTORS: ACHING

## 2024-01-25 ASSESSMENT — PAIN DESCRIPTION - DIRECTION
RADIATING_TOWARDS: BACK
RADIATING_TOWARDS: BACK

## 2024-01-25 ASSESSMENT — PAIN - FUNCTIONAL ASSESSMENT
PAIN_FUNCTIONAL_ASSESSMENT: ACTIVITIES ARE NOT PREVENTED
PAIN_FUNCTIONAL_ASSESSMENT: ACTIVITIES ARE NOT PREVENTED

## 2024-01-25 ASSESSMENT — PAIN DESCRIPTION - ORIENTATION
ORIENTATION: LEFT;MID
ORIENTATION: MID;LEFT;RIGHT

## 2024-01-25 ASSESSMENT — PAIN DESCRIPTION - LOCATION
LOCATION: ABDOMEN
LOCATION: ABDOMEN

## 2024-01-25 ASSESSMENT — PAIN DESCRIPTION - PAIN TYPE
TYPE: ACUTE PAIN
TYPE: ACUTE PAIN

## 2024-01-25 NOTE — PROGRESS NOTES
Results   Component Value Date/Time    [facility] 1.420 12/11/2023 12:19 PM     Free T3: No results found for: \"FT3\"  Free T4:No results found for: \"T4FREE\"    CT ABDOMEN W CONTRAST Additional Contrast? Radiologist Recommendation   Final Result   1.  Trace peripancreatic inflammatory stranding compatible with clinical   history of pancreatitis.  No evidence of peripancreatic collection or   necrosis.      2.  Trace ascites.      3.  Redemonstrated dense consolidative opacity of the right lower lobe likely   to represent pneumonia.  Neoplasm is not excluded and follow-up to resolution   is recommended.         XR CHEST PORTABLE   Final Result   1. Right basilar consolidation mildly improved from the previous study.   2. Pulmonary vascular congestion and possible small effusions.         XR CHEST PORTABLE   Final Result   1. Large right basilar consolidative opacity significantly worsened from   01/20/2024.   2. Nonobstructive bowel gas pattern.         XR ABDOMEN (KUB) (SINGLE AP VIEW)   Final Result   1. Large right basilar consolidative opacity significantly worsened from   01/20/2024.   2. Nonobstructive bowel gas pattern.         US ABDOMEN LIMITED Specify organ? LIVER, SPLEEN, GALLBLADDER, PANCREAS   Final Result   1. Hepatic steatosis.   2. Splenomegaly.   3. Increased echogenicity of the right kidney suggesting medical renal   disease.         XR CHEST PORTABLE   Final Result   1. Interval placement of right internal jugular Trialysis catheter, with tip   overlying the lower SVC, in satisfactory position, and no evidence of a   pneumothorax.   2. Slight interval improvement in appearance of focal mass-like consolidative   opacity within the right lung base, favoring slight improvement of pneumonia.   However, continued serial chest x-ray follow-up is recommended to ensure   resolution of this mass-like right basilar opacity, as a true pulmonary mass   is also a diagnostic consideration.         CT CHEST ABDOMEN

## 2024-01-25 NOTE — PROGRESS NOTES
[place]2.[place]  [facility] [place]      Name:  Dewey Sosa /Age/Sex: 1980  (43 y.o. male)   MRN & CSN:  [id number] & [id number] Encounter Date/Time: 2024 1:41 PM EST    Location:  -A PCP: Frandy Parsons APRN - CNP       Hospital Day: 7      Subjective:     Chief Complaint:  Fall (Complains he has fallen 6 times in the last few days, and has hit his head on ASA) and Fatigue (Complaining of increasing weakness. )     Pt seen and examined in the AM. Patient states he feels weak, fatigued. No SOB today. Patient's mother in the room.       Assessment and Plan:   Sepsis noted after admission  Legionella Pneumonia   HIV Infection, ?AIDS  . Fever T max 101.6, tachycardia 110's, tachypnea 30's. 2/2 legionella pneumonia.   CT chest showed right lung mass vs consolidation  Legionalla antigen +  - S/p azithromycin 7 days in total and rocephin 5 days total  - ID on board   - CD4 count only 25     Hyponatremia. Likely 2/2 legionella pneumonia and poor oral intake/dehydration.  P/w Na 117, improved 123 but then down to 118.   Today Na 125  - nephro managing  - on HD as below     Transaminitis, hyperbilirubinemia.  Was going up but today coming down.   Imaging including U/S and CT abd unremarkable.   CK from few days ago 20K down to 3600 yesterday  Could be legionella hepatitis vs rhabdomyolysis.    - GI consulted  - Trend liver panel  - MRCP ordered but cancelled now given no clinical evidence of Choledocholithiasis    Acute pancreatitis 2/2 Elevated TG. Was as high as 1748. Has been fluctuating.    CT abd, U/S no sign of pancreatitis.  Has been having abd pain unclear if chronic or acute  Triglycerides  noted 608 but trended down to 328  Lipase uptrending to 1667.       ROSE MARY on CKD IV now ESRD.  Cr 5.7 (baseline ~2.5).   Has been on bicarb drip.   Temp vas cath placed  Started on HD.    Hypocalcemia   - Cam 1.06  - Repalce with calcium gluconate     NSVT  - Increased metoprolol to 100mg daily

## 2024-01-25 NOTE — CONSULTS
Comprehensive Nutrition Assessment    Type and Reason for Visit:  Reassess, Consult (Poor Intake/Appetite 5 or More days)    Nutrition Recommendations/Plan:   Change diet to Carb Control 5/No Concentrated Sweets with Low Fat/Maxx Diet   Please encourage pt to refrain form refined carbohydrates (sodas) intake   Switch supplements to Diabetic oral nutrition supplement, chocolate with snacks upon pt's preference   Encourage proper hydration/fluids intake  Please document all PO intakes in I/O      Malnutrition Assessment:  Malnutrition Status:  At risk for malnutrition (Comment) (01/25/24 9338)    Context:  Acute Illness     Findings of the 6 clinical characteristics of malnutrition:  Energy Intake:  50% or less of estimated energy requirements for 5 or more days  Weight Loss:  No significant weight loss     Body Fat Loss:  No significant body fat loss Orbital, Triceps, Fat Overlying Ribs, Buccal region   Muscle Mass Loss:  Unable to assess    Fluid Accumulation:  No significant fluid accumulation Extremities   Strength:  Not Performed    Nutrition Assessment:    Poor oral intakes x 5 days with loss of appetite within stay. Pt did not eat much due to need for HD cath change, was told to not eat much for procedure. However, pt had consumed better today, had at least 25-50% vs. 1-25% of meals. Denies any N/V/P. Pt has been drinking Ensure, likes chocolate. Currently on dialysis, low K/Phos noted but pt with elevated TG and LFTs, will adjust diet order. Will offer snacks as needed. Follow as high nutrition risk.    Nutrition Related Findings:    , Elevated LFTS, GFR 17 Wound Type: None  No edema     Current Nutrition Intake & Therapies:    Average Meal Intake: 26-50%, 1-25%  Average Supplements Intake:  (likes Ensure chocolate per pt)  ADULT ORAL NUTRITION SUPPLEMENT; Breakfast, Lunch, Dinner; Standard High Calorie/High Protein Oral Supplement  ADULT DIET; Regular; 4 carb choices (60 gm/meal); Low Fat/Low      Jennifer Hurtado RD, LD  Contact: [phone]

## 2024-01-25 NOTE — PROGRESS NOTES
01/25/24 0938   Encounter Summary   Encounter Overview/Reason  Initial Encounter   Service Provided For: Patient   Referral/Consult From: [facility]   Support System Parent;Significant other   Last Encounter  01/25/24  (Patient was in pain. Patient did not express any needs at this time. Patient thanked this  for pastoral support provided.)   Complexity of Encounter Low   Begin Time 0930   End Time  0945   Total Time Calculated 15 min   Spiritual/Emotional needs   Type Spiritual Support   Grief, Loss, and Adjustments   Type Adjustment to illness   Assessment/Intervention/Outcome   Assessment Coping   Intervention Active listening;Empowerment;Nurtured Hope;Sustaining Presence/Ministry of presence;Explored/Affirmed feelings, thoughts, concerns;Explored Coping Skills/Resources   Outcome Coping;Encouraged;Expressed Gratitude   Plan and Referrals   Plan/Referrals Continue Support (comment)

## 2024-01-25 NOTE — PROGRESS NOTES
[facility]             MD Suzanne Blevins MD Carol Christensen, APRN-CNP       Brie Meng, APRN-CNP             [address] [place] [address]             [phone] fax [phone]      Gastroenterology Progress note . 1/25/2024  Reason for consult:       Physician attestation:  I have personally seen and examined the patient independently. I have reviewed the patient's available data,including medical history and recent test results. Reviewed and discussed note as documented by AMALIA.  I agree with the physical exam findings, assessment and plan.     Briefly   Patient noted to be resting comfortably, mother present at bedside.  Denies any nausea, vomiting.  Triglycerides noted to downtrend.  Noted to be HIV positive with significantly low CD4 count.  ID on board.    Gen: normal mood, alert  ENT: normal TJ  Cardiovascular: RRR, No M/R/G  Respiratory: CTA BL  Gastrointestinal: Soft,NT ND  Genitourinary: no suprapubic tenderness  Musculoskeletal: no scars  Skin:moist  Neuro: alert and oriented x3    My assessment and plan reveals   #1 elevated LFTs  Possible differentials include Legionella induced hepatitis versus  rhabdomyolysis - .  CK from admission noted to be significantly elevated to 20,000.  His pattern of LFT elevation certainly raises concern for that with AST significantly more than ALT as well as indirect and direct bilirubin.  -CK now down to 3000  -Initially planned to evaluate for any biliary dilation or associated choledocholithiasis with MRCP however patient not cooperating for MRI sequencing.   -No biliary dilation on prior imaging  -Recommend continuing monitoring bilirubin     #2  Pancreatitis  -Possible differentials include Pancreatitis however now mentions abdominal pain vs possible rhabdo.   - TG elevated 608   -Initially plan for MRI to assess for pancreatitis however patient not concentrating.

## 2024-01-25 NOTE — PROGRESS NOTES
Infectious Disease Progress Note  2024   Patient Name: Dewey Sosa : 1980   Impression  HIV Positive: New Diagnosis  Legionella Bilateral Pneumonia: Resolving  No reported allergies to ABX  CrCl 29 on CKD5  Intermittent low grade temps, WBC 3.6, Hb 7.0 with no active bleeding, hypoalbuminemia  -BC 0/2 NGTD  -Hepatitis B S Ab >1000, Hep B surface Ag non-reactive, Hep C Ab non-reactive. Hep B Core Total Ab positive.   -Resp panel and Strep pna negative, MRSA by PCR negative   -HIV-1 ab positive, CD4/CD8 ratio: 0.13.  CD8 % Suppressor T Cell 76. CD8 T Cell Abs 201. CD3% Total T Cell 87. CD4 T cell 25  -Legionella Ag Positive  CKD5/ Hyponatremia/ Hyperkalemia:  Dr. Nevarez onboard  HD started, will evaluate HD vs PD  Rhabdomyolysis:  Class I Obesity: BMI: 30.51 kg/m2  Elevated LFTs and Lipase:  Dr. Ríos onboard, imp of possible Legionella induced hepatitis vs rhabdomyolysis. Awaiting MRI to r/o any biliary dilatation or choledocholithiasis.   DMII:  Dr. Cruz onboard  PAD:  Multi-morbidity: per PMHx:  CKD4, DMII, CAD s/p PCI, GERD, kidney stones, Marijuana use, s/p left 3rd toe amputation , anxiety, CHF and HLD  Plan:  Continue po empiric prophylaxis of Bactrim SS 1 daily (CrCl 24)  Ordered cryptococcal ag/ab, IGG/IGM, HSV 1, 2, urine for gonacoccal, RPR and syphillis ab (Treponema pallidum)  DW Dr. Nevarez the plan of care, he DW patient and patient's boyfriend of the positive HIV test.   When ready for DC:  Follow up in ID clinic in 1 week please, no new lab work requested at this time    Ongoing Antimicrobial Therapy  Bactrim - ?  Completed Antimicrobial Therapy  Azithromycin   Ceftriaxone ?  History:?Interval history noted. Chief complaint: New diagnosis of HIV. Legionella pneumonia.   Denies n/v/d/f or untoward effects of antibiotics  Physical Exam:  Vital Signs: BP (!) 145/89   Pulse 91   Temp 98.4 °F (36.9 °C) (Oral)   Resp 17   Ht 1.854 m (6' 1\")  153 140 - 440 K/CU MM    MPV 11.1 7.5 - 11.1 FL    Differential Type AUTOMATED DIFFERENTIAL     Segs Relative 80.3 (H) 36 - 66 %    Lymphocytes % 9.3 (L) 24 - 44 %    Monocytes % 8.5 (H) 0 - 4 %    Eosinophils % 0.8 0 - 3 %    Basophils % 0.0 0 - 1 %    Segs Absolute 2.9 K/CU MM    Lymphocytes Absolute 0.3 K/CU MM    Monocytes Absolute 0.3 K/CU MM    Eosinophils Absolute 0.0 K/CU MM    Basophils Absolute 0.0 K/CU MM    Nucleated RBC % 0.0 %    Total Nucleated RBC 0.0 K/CU MM    Total Immature Neutrophil 0.04 K/CU MM    Immature Neutrophil % 1.1 (H) 0 - 0.43 %    Differential Type AUTOMATED DIFF RESULTS CONFIRMED BY SMEAR REVIEW     RBC Morphology OCCASIONAL     PLT Morphology PLATELETS NORMAL IN NUMBER AND SIZE    Critical Care Panel    Collection Time: 01/25/24  5:30 AM   Result Value Ref Range    Sodium 125 (L) 135 - 145 MMOL/L    Potassium 3.4 (L) 3.5 - 5.1 MMOL/L    Chloride 91 (L) 99 - 110 mMol/L    CO2 22 21 - 32 MMOL/L    Anion Gap 12 7 - 16    Glucose 230 (H) 70 - 99 MG/DL    BUN 43 (H) 6 - 23 MG/DL    Creatinine 4.2 (H) 0.9 - 1.3 MG/DL    Est, Glom Filt Rate 17 (L) >60 mL/min/1.73m2    Calcium 7.0 (L) 8.3 - 10.6 MG/DL    Phosphorus 2.6 2.5 - 4.9 MG/DL    Magnesium 2.2 1.8 - 2.4 mg/dl   Calcium, Ionized    Collection Time: 01/25/24  5:30 AM   Result Value Ref Range    Ionized Ca 1.06 (L) 1.12 - 1.32 mMOL/L    Calcium, Ionized 4.24 (L) 4.48 - 5.28 MG/DL   Protime-INR    Collection Time: 01/25/24  5:30 AM   Result Value Ref Range    Protime 18.0 (H) 11.7 - 14.5 SECONDS    INR 1.5 INDEX   T Pallidum Screen W/Reflex    Collection Time: 01/25/24  5:30 AM   Result Value Ref Range    T. Pallidum Screen NEGATIVE NEGATIVE   POCT Glucose    Collection Time: 01/25/24 10:05 AM   Result Value Ref Range    POC Glucose 233 (H) 70 - 99 MG/DL   Lipase    Collection Time: 01/25/24 10:55 AM   Result Value Ref Range    Lipase 1,667 (H) 13 - 60 IU/L   Lactate Dehydrogenase    Collection Time: 01/25/24 10:55 AM   Result Value Ref

## 2024-01-25 NOTE — PROGRESS NOTES
Progress Note( Dr. Cruz)  1/24/2024  Subjective:   Admit Date: 1/19/2024  PCP: Frandy Parsons APRN - CNP    Admitted For : Nausea vomiting and frequent fall was found to be hyponatremic with serum sodium 117     Consulted For: Better control of blood glucose     Interval History:     Feels somewhat better able to eat last night and again this morning  Serum sodium getting better of 123 it is 117 yesterday  Still acidotic CO2 up to 22  anion gap is closed now .his  blood glucose are better      Patient had elevated liver enzymes also lipase suggesting pancreatitis or hepatitis  And liver function test trending down.  GI has been seeing him    Positive for Legionella    Denies any chest pains,   Yes  SOB .   Has some  nausea but no  vomiting.  Does not feel it is much.   No new bowel or bladder symptoms.       Intake/Output Summary (Last 24 hours) at 1/24/2024 2151  Last data filed at 1/24/2024 1515  Gross per 24 hour   Intake 1225 ml   Output 2075 ml   Net -850 ml         DATA    CBC:   Recent Labs     01/22/24  0440 01/23/24  0641 01/24/24  0410   WBC 4.5 4.8 4.6   HGB 8.2* 8.0* 7.6*   * 140 145      CMP:  Recent Labs     01/22/24  0440 01/22/24  0827 01/23/24  0641 01/24/24  0410   * 122* 125* 126*   K 3.5 3.6 3.6 3.6   CL 83* 83* 88* 88*   CO2 20* 18* 22 23   BUN 90* 93* 72* 55*   CREATININE 6.8* 6.8* 5.4* 5.0*   CALCIUM 7.9* 7.7* 7.5* 7.2*   PROT 5.5*  --  5.9* 5.6*   LABALBU 2.1*  --  2.3* 2.1*   BILITOT 2.2*  --  1.9* 1.6*   ALKPHOS 95  --  138* 194*   *  --  307* 269*   ALT 82*  --  79* 72*       Lipids:   Lab Results   Component Value Date/Time    CHOL 278 12/11/2023 12:28 PM    HDL 22 12/11/2023 12:28 PM    TRIG 545 01/23/2024 10:05 AM     Glucose:  Recent Labs     01/24/24  1147 01/24/24  1738 01/24/24 2037   POCGLU 226* 202* 222*       VsalmahmilI8B:  Lab Results   Component Value Date/Time    LABA1C 6.8 01/20/2024 05:54 AM     High Sensitivity TSH:   Lab Results   Component Value

## 2024-01-25 NOTE — PROGRESS NOTES
Nephrology Progress Note  1/25/2024 10:05 AM  Subjective:     Interval History: Dewey Sosa is a 43 y.o. male appears tired more calm today and getting tunneled HD catheter hopefully today      Data:   Scheduled Meds:   calcium gluconate  2,000 mg IntraVENous Once    metoprolol succinate  100 mg Oral Daily    sulfamethoxazole-trimethoprim  1 tablet Oral Daily    nicotine  1 patch TransDERmal Daily    insulin glargine  10 Units SubCUTAneous Nightly    calcitRIOL  0.5 mcg Oral Daily    vitamin D  50,000 Units Oral Weekly    calcium carbonate  500 mg Oral TID    sodium chloride flush  5-40 mL IntraVENous 2 times per day    heparin (porcine)  5,000 Units SubCUTAneous 3 times per day    acetaminophen  650 mg Oral 4 times per day    aspirin  81 mg Oral Daily    [Held by provider] ticagrelor  90 mg Oral BID    ezetimibe  10 mg Oral Nightly    [Held by provider] gabapentin  300 mg Oral TID    busPIRone  10 mg Oral TID    pantoprazole  40 mg Oral QAM AC    atorvastatin  80 mg Oral Nightly    traZODone  50 mg Oral Nightly    isosorbide mononitrate  60 mg Oral Daily    insulin lispro  0-16 Units SubCUTAneous TID WC    insulin lispro  0-4 Units SubCUTAneous 2 times per day     Continuous Infusions:   sodium chloride      sodium chloride      dextrose           CBC   Recent Labs     01/23/24  0641 01/24/24  0410 01/25/24  0530   WBC 4.8 4.6 3.6*   HGB 8.0* 7.6* 7.0*   HCT 22.9* 22.0* 20.9*    145 153      BMP   Recent Labs     01/23/24  0641 01/24/24  0410 01/25/24  0530   * 126* 125*   K 3.6 3.6 3.4*   CL 88* 88* 91*   CO2 22 23 22   PHOS 5.1* 3.4 2.6   BUN 72* 55* 43*   CREATININE 5.4* 5.0* 4.2*     Hepatic:   Recent Labs     01/23/24  0641 01/24/24  0410   * 269*   ALT 79* 72*   BILITOT 1.9* 1.6*   ALKPHOS 138* 194*     Troponin: No results for input(s): \"TROPONINI\" in the last 72 hours.  BNP: No results for input(s): \"BNP\" in the last 72 hours.  Lipids: No results for input(s): \"CHOL\", \"HDL\" in the

## 2024-01-26 ENCOUNTER — APPOINTMENT (OUTPATIENT)
Dept: INTERVENTIONAL RADIOLOGY/VASCULAR | Age: 44
DRG: 969 | End: 2024-01-26
Payer: MEDICARE

## 2024-01-26 LAB
ABO/RH: NORMAL
ALBUMIN SERPL ELPH-MCNC: 1.6 GM/DL (ref 3.2–5.6)
ALBUMIN SERPL-MCNC: 1.7 GM/DL (ref 3.4–5)
ALP BLD-CCNC: 211 IU/L (ref 40–129)
ALPHA-1-GLOBULIN: 0.6 GM/DL (ref 0.1–0.4)
ALPHA-2-GLOBULIN: 1.2 GM/DL (ref 0.4–1.2)
ALT SERPL-CCNC: 56 U/L (ref 10–40)
ANION GAP SERPL CALCULATED.3IONS-SCNC: 13 MMOL/L (ref 7–16)
ANTIBODY SCREEN: NEGATIVE
AST SERPL-CCNC: 182 IU/L (ref 15–37)
BASOPHILS ABSOLUTE: 0 K/CU MM
BASOPHILS RELATIVE PERCENT: 0 % (ref 0–1)
BETA GLOBULIN: 1.1 GM/DL (ref 0.5–1.3)
BILIRUB SERPL-MCNC: 0.9 MG/DL (ref 0–1)
BILIRUBIN DIRECT: 0.8 MG/DL (ref 0–0.3)
BILIRUBIN, INDIRECT: 0.1 MG/DL (ref 0–0.7)
BUN SERPL-MCNC: 55 MG/DL (ref 6–23)
CALCIUM IONIZED: 4.4 MG/DL (ref 4.48–5.28)
CALCIUM SERPL-MCNC: 7 MG/DL (ref 8.3–10.6)
CHLORIDE BLD-SCNC: 91 MMOL/L (ref 99–110)
CO2: 21 MMOL/L (ref 21–32)
COMPONENT: NORMAL
CREAT SERPL-MCNC: 4.9 MG/DL (ref 0.9–1.3)
CROSSMATCH RESULT: NORMAL
DIFFERENTIAL TYPE: ABNORMAL
EOSINOPHILS ABSOLUTE: 0.1 K/CU MM
EOSINOPHILS RELATIVE PERCENT: 1.5 % (ref 0–3)
FERRITIN: 2343 NG/ML (ref 30–400)
FOLATE SERPL-MCNC: 3.7 NG/ML (ref 3.1–17.5)
GAMMA GLOBULIN: 1.2 GM/DL (ref 0.5–1.6)
GFR SERPL CREATININE-BSD FRML MDRD: 14 ML/MIN/1.73M2
GLUCOSE BLD-MCNC: 185 MG/DL (ref 70–99)
GLUCOSE BLD-MCNC: 251 MG/DL (ref 70–99)
GLUCOSE BLD-MCNC: 265 MG/DL (ref 70–99)
GLUCOSE BLD-MCNC: 282 MG/DL (ref 70–99)
GLUCOSE BLD-MCNC: 284 MG/DL (ref 70–99)
GLUCOSE SERPL-MCNC: 247 MG/DL (ref 70–99)
HAPTOGLOB SERPL-MCNC: 362 MG/DL (ref 30–200)
HCT VFR BLD CALC: 22.3 % (ref 42–52)
HEMOGLOBIN: 7.4 GM/DL (ref 13.5–18)
IMMATURE NEUTROPHIL %: 2 % (ref 0–0.43)
IONIZED CA: 1.1 MMOL/L (ref 1.12–1.32)
IRON: 37 UG/DL (ref 59–158)
LIPASE: 1190 IU/L (ref 13–60)
LYMPHOCYTES ABSOLUTE: 0.4 K/CU MM
LYMPHOCYTES RELATIVE PERCENT: 10 % (ref 24–44)
MAGNESIUM: 2.3 MG/DL (ref 1.8–2.4)
MCH RBC QN AUTO: 29.4 PG (ref 27–31)
MCHC RBC AUTO-ENTMCNC: 33.2 % (ref 32–36)
MCV RBC AUTO: 88.5 FL (ref 78–100)
MONOCYTES ABSOLUTE: 0.4 K/CU MM
MONOCYTES RELATIVE PERCENT: 9 % (ref 0–4)
NUCLEATED RBC %: 0 %
PCT TRANSFERRIN: 25 % (ref 10–44)
PDW BLD-RTO: 13.2 % (ref 11.7–14.9)
PHOSPHORUS: 3.2 MG/DL (ref 2.5–4.9)
PLATELET # BLD: 163 K/CU MM (ref 140–440)
PMV BLD AUTO: 10.6 FL (ref 7.5–11.1)
POTASSIUM SERPL-SCNC: 4 MMOL/L (ref 3.5–5.1)
RBC # BLD: 2.52 M/CU MM (ref 4.6–6.2)
RETICULOCYTE COUNT PCT: 1 % (ref 0.2–2.2)
SEGMENTED NEUTROPHILS ABSOLUTE COUNT: 3.1 K/CU MM
SEGMENTED NEUTROPHILS RELATIVE PERCENT: 77.5 % (ref 36–66)
SODIUM BLD-SCNC: 125 MMOL/L (ref 135–145)
SPEP INTERPRETATION: ABNORMAL
SPEP INTERPRETATION: NORMAL
STATUS: NORMAL
TOTAL IMMATURE NEUTOROPHIL: 0.08 K/CU MM
TOTAL IRON BINDING CAPACITY: 146 UG/DL (ref 250–450)
TOTAL NUCLEATED RBC: 0 K/CU MM
TOTAL PROTEIN: 4.8 GM/DL (ref 6.4–8.2)
TOTAL PROTEIN: 5.7 GM/DL (ref 6.4–8.2)
TRANSFUSION STATUS: NORMAL
TRIGL SERPL-MCNC: 324 MG/DL
UNIT DIVISION: 0
UNIT NUMBER: NORMAL
UNSATURATED IRON BINDING CAPACITY: 109 UG/DL (ref 110–370)
VITAMIN B-12: 1579 PG/ML (ref 211–911)
WBC # BLD: 4 K/CU MM (ref 4–10.5)

## 2024-01-26 PROCEDURE — 6370000000 HC RX 637 (ALT 250 FOR IP): Performed by: INTERNAL MEDICINE

## 2024-01-26 PROCEDURE — 83540 ASSAY OF IRON: CPT

## 2024-01-26 PROCEDURE — 94761 N-INVAS EAR/PLS OXIMETRY MLT: CPT

## 2024-01-26 PROCEDURE — 6360000002 HC RX W HCPCS: Performed by: STUDENT IN AN ORGANIZED HEALTH CARE EDUCATION/TRAINING PROGRAM

## 2024-01-26 PROCEDURE — 86480 TB TEST CELL IMMUN MEASURE: CPT

## 2024-01-26 PROCEDURE — C9113 INJ PANTOPRAZOLE SODIUM, VIA: HCPCS | Performed by: LICENSED PRACTICAL NURSE

## 2024-01-26 PROCEDURE — 2060000000 HC ICU INTERMEDIATE R&B

## 2024-01-26 PROCEDURE — 6360000002 HC RX W HCPCS: Performed by: LICENSED PRACTICAL NURSE

## 2024-01-26 PROCEDURE — 87901 NFCT AGT GNTYP ALYS HIV1 REV: CPT

## 2024-01-26 PROCEDURE — 83735 ASSAY OF MAGNESIUM: CPT

## 2024-01-26 PROCEDURE — 0DJ08ZZ INSPECTION OF UPPER INTESTINAL TRACT, VIA NATURAL OR ARTIFICIAL OPENING ENDOSCOPIC: ICD-10-PCS | Performed by: INTERNAL MEDICINE

## 2024-01-26 PROCEDURE — 87906 NFCT AGT GNTYP ALYS HIV1: CPT

## 2024-01-26 PROCEDURE — 82728 ASSAY OF FERRITIN: CPT

## 2024-01-26 PROCEDURE — 82607 VITAMIN B-12: CPT

## 2024-01-26 PROCEDURE — 82330 ASSAY OF CALCIUM: CPT

## 2024-01-26 PROCEDURE — 80053 COMPREHEN METABOLIC PANEL: CPT

## 2024-01-26 PROCEDURE — 2580000003 HC RX 258: Performed by: STUDENT IN AN ORGANIZED HEALTH CARE EDUCATION/TRAINING PROGRAM

## 2024-01-26 PROCEDURE — 83690 ASSAY OF LIPASE: CPT

## 2024-01-26 PROCEDURE — 6360000002 HC RX W HCPCS: Performed by: INTERNAL MEDICINE

## 2024-01-26 PROCEDURE — 82248 BILIRUBIN DIRECT: CPT

## 2024-01-26 PROCEDURE — 88184 FLOWCYTOMETRY/ TC 1 MARKER: CPT

## 2024-01-26 PROCEDURE — A4216 STERILE WATER/SALINE, 10 ML: HCPCS | Performed by: LICENSED PRACTICAL NURSE

## 2024-01-26 PROCEDURE — APPNB45 APP NON BILLABLE 31-45 MINUTES: Performed by: LICENSED PRACTICAL NURSE

## 2024-01-26 PROCEDURE — 99232 SBSQ HOSP IP/OBS MODERATE 35: CPT | Performed by: INTERNAL MEDICINE

## 2024-01-26 PROCEDURE — 90935 HEMODIALYSIS ONE EVALUATION: CPT

## 2024-01-26 PROCEDURE — 2580000003 HC RX 258: Performed by: LICENSED PRACTICAL NURSE

## 2024-01-26 PROCEDURE — APPNB60 APP NON BILLABLE TIME 46-60 MINS: Performed by: PHYSICIAN ASSISTANT

## 2024-01-26 PROCEDURE — 85045 AUTOMATED RETICULOCYTE COUNT: CPT

## 2024-01-26 PROCEDURE — 83550 IRON BINDING TEST: CPT

## 2024-01-26 PROCEDURE — 82746 ASSAY OF FOLIC ACID SERUM: CPT

## 2024-01-26 PROCEDURE — 6370000000 HC RX 637 (ALT 250 FOR IP): Performed by: STUDENT IN AN ORGANIZED HEALTH CARE EDUCATION/TRAINING PROGRAM

## 2024-01-26 PROCEDURE — 6370000000 HC RX 637 (ALT 250 FOR IP): Performed by: PHYSICIAN ASSISTANT

## 2024-01-26 PROCEDURE — 99233 SBSQ HOSP IP/OBS HIGH 50: CPT | Performed by: NURSE PRACTITIONER

## 2024-01-26 PROCEDURE — 6370000000 HC RX 637 (ALT 250 FOR IP): Performed by: NURSE PRACTITIONER

## 2024-01-26 PROCEDURE — 84100 ASSAY OF PHOSPHORUS: CPT

## 2024-01-26 PROCEDURE — 82962 GLUCOSE BLOOD TEST: CPT

## 2024-01-26 PROCEDURE — 99222 1ST HOSP IP/OBS MODERATE 55: CPT | Performed by: INTERNAL MEDICINE

## 2024-01-26 PROCEDURE — 84478 ASSAY OF TRIGLYCERIDES: CPT

## 2024-01-26 PROCEDURE — 87900 PHENOTYPE INFECT AGENT DRUG: CPT

## 2024-01-26 PROCEDURE — 85025 COMPLETE CBC W/AUTO DIFF WBC: CPT

## 2024-01-26 RX ORDER — LAMIVUDINE 100 MG/1
100 TABLET, FILM COATED ORAL DAILY
Status: DISCONTINUED | OUTPATIENT
Start: 2024-01-27 | End: 2024-02-08 | Stop reason: HOSPADM

## 2024-01-26 RX ORDER — ENTECAVIR 0.5 MG/1
0.5 TABLET, FILM COATED ORAL
Status: DISCONTINUED | OUTPATIENT
Start: 2024-01-29 | End: 2024-02-08 | Stop reason: HOSPADM

## 2024-01-26 RX ORDER — FOLIC ACID 1 MG/1
1 TABLET ORAL DAILY
Status: DISCONTINUED | OUTPATIENT
Start: 2024-01-26 | End: 2024-02-08 | Stop reason: HOSPADM

## 2024-01-26 RX ORDER — INSULIN GLARGINE 100 [IU]/ML
15 INJECTION, SOLUTION SUBCUTANEOUS NIGHTLY
Status: DISCONTINUED | OUTPATIENT
Start: 2024-01-26 | End: 2024-01-29

## 2024-01-26 RX ORDER — INSULIN LISPRO 100 [IU]/ML
0-4 INJECTION, SOLUTION INTRAVENOUS; SUBCUTANEOUS
Status: DISCONTINUED | OUTPATIENT
Start: 2024-01-26 | End: 2024-01-26

## 2024-01-26 RX ORDER — LAMIVUDINE 150 MG/1
150 TABLET, FILM COATED ORAL ONCE
Status: COMPLETED | OUTPATIENT
Start: 2024-01-26 | End: 2024-01-26

## 2024-01-26 RX ORDER — INSULIN LISPRO 100 [IU]/ML
0-8 INJECTION, SOLUTION INTRAVENOUS; SUBCUTANEOUS
Status: DISCONTINUED | OUTPATIENT
Start: 2024-01-26 | End: 2024-01-26

## 2024-01-26 RX ORDER — LAMIVUDINE 100 MG/1
100 TABLET, FILM COATED ORAL DAILY
Status: DISCONTINUED | OUTPATIENT
Start: 2024-01-27 | End: 2024-01-26 | Stop reason: SDUPTHER

## 2024-01-26 RX ORDER — INSULIN LISPRO 100 [IU]/ML
0-8 INJECTION, SOLUTION INTRAVENOUS; SUBCUTANEOUS
Status: DISCONTINUED | OUTPATIENT
Start: 2024-01-26 | End: 2024-02-08 | Stop reason: HOSPADM

## 2024-01-26 RX ORDER — CALCIUM GLUCONATE 20 MG/ML
2000 INJECTION, SOLUTION INTRAVENOUS ONCE
Status: COMPLETED | OUTPATIENT
Start: 2024-01-26 | End: 2024-01-26

## 2024-01-26 RX ORDER — SULFAMETHOXAZOLE AND TRIMETHOPRIM 400; 80 MG/1; MG/1
1 TABLET ORAL
Status: DISCONTINUED | OUTPATIENT
Start: 2024-01-29 | End: 2024-02-08 | Stop reason: HOSPADM

## 2024-01-26 RX ORDER — LAMIVUDINE 100 MG/1
150 TABLET, FILM COATED ORAL ONCE
Status: DISCONTINUED | OUTPATIENT
Start: 2024-01-26 | End: 2024-01-26 | Stop reason: SDUPTHER

## 2024-01-26 RX ADMIN — CALCITRIOL CAPSULES 0.25 MCG 0.5 MCG: 0.25 CAPSULE ORAL at 10:19

## 2024-01-26 RX ADMIN — SODIUM CHLORIDE, PRESERVATIVE FREE 40 MG: 5 INJECTION INTRAVENOUS at 14:59

## 2024-01-26 RX ADMIN — ASPIRIN 81 MG 81 MG: 81 TABLET ORAL at 10:20

## 2024-01-26 RX ADMIN — HYDROMORPHONE HYDROCHLORIDE 0.5 MG: 1 INJECTION, SOLUTION INTRAMUSCULAR; INTRAVENOUS; SUBCUTANEOUS at 21:20

## 2024-01-26 RX ADMIN — INSULIN LISPRO 8 UNITS: 100 INJECTION, SOLUTION INTRAVENOUS; SUBCUTANEOUS at 10:19

## 2024-01-26 RX ADMIN — HYDROMORPHONE HYDROCHLORIDE 0.5 MG: 1 INJECTION, SOLUTION INTRAMUSCULAR; INTRAVENOUS; SUBCUTANEOUS at 15:28

## 2024-01-26 RX ADMIN — HYDROMORPHONE HYDROCHLORIDE 0.5 MG: 1 INJECTION, SOLUTION INTRAMUSCULAR; INTRAVENOUS; SUBCUTANEOUS at 05:21

## 2024-01-26 RX ADMIN — CALCIUM GLUCONATE 2000 MG: 20 INJECTION, SOLUTION INTRAVENOUS at 10:29

## 2024-01-26 RX ADMIN — HEPARIN SODIUM 5000 UNITS: 5000 INJECTION INTRAVENOUS; SUBCUTANEOUS at 21:13

## 2024-01-26 RX ADMIN — SODIUM CHLORIDE, PRESERVATIVE FREE 10 ML: 5 INJECTION INTRAVENOUS at 10:21

## 2024-01-26 RX ADMIN — METOPROLOL SUCCINATE 100 MG: 50 TABLET, EXTENDED RELEASE ORAL at 10:19

## 2024-01-26 RX ADMIN — BUSPIRONE HYDROCHLORIDE 10 MG: 5 TABLET ORAL at 10:19

## 2024-01-26 RX ADMIN — ATORVASTATIN CALCIUM 80 MG: 40 TABLET, FILM COATED ORAL at 21:13

## 2024-01-26 RX ADMIN — CALCIUM CARBONATE 500 MG: 500 TABLET, CHEWABLE ORAL at 10:19

## 2024-01-26 RX ADMIN — TRAZODONE HYDROCHLORIDE 50 MG: 50 TABLET ORAL at 21:13

## 2024-01-26 RX ADMIN — ACETAMINOPHEN 650 MG: 325 TABLET ORAL at 00:57

## 2024-01-26 RX ADMIN — CALCIUM CARBONATE 500 MG: 500 TABLET, CHEWABLE ORAL at 21:13

## 2024-01-26 RX ADMIN — HYDROMORPHONE HYDROCHLORIDE 0.5 MG: 1 INJECTION, SOLUTION INTRAMUSCULAR; INTRAVENOUS; SUBCUTANEOUS at 01:03

## 2024-01-26 RX ADMIN — EZETIMIBE 10 MG: 10 TABLET ORAL at 21:13

## 2024-01-26 RX ADMIN — BUSPIRONE HYDROCHLORIDE 10 MG: 5 TABLET ORAL at 14:59

## 2024-01-26 RX ADMIN — DOLUTEGRAVIR SODIUM 50 MG: 50 TABLET, FILM COATED ORAL at 10:24

## 2024-01-26 RX ADMIN — ACETAMINOPHEN 650 MG: 325 TABLET ORAL at 05:20

## 2024-01-26 RX ADMIN — INSULIN GLARGINE 15 UNITS: 100 INJECTION, SOLUTION SUBCUTANEOUS at 21:14

## 2024-01-26 RX ADMIN — INSULIN LISPRO 4 UNITS: 100 INJECTION, SOLUTION INTRAVENOUS; SUBCUTANEOUS at 21:14

## 2024-01-26 RX ADMIN — SULFAMETHOXAZOLE AND TRIMETHOPRIM 1 TABLET: 400; 80 TABLET ORAL at 10:20

## 2024-01-26 RX ADMIN — PANTOPRAZOLE SODIUM 40 MG: 40 TABLET, DELAYED RELEASE ORAL at 05:20

## 2024-01-26 RX ADMIN — LAMIVUDINE 150 MG: 150 TABLET, FILM COATED ORAL at 10:21

## 2024-01-26 RX ADMIN — HEPARIN SODIUM 5000 UNITS: 5000 INJECTION INTRAVENOUS; SUBCUTANEOUS at 14:58

## 2024-01-26 RX ADMIN — CALCIUM CARBONATE 500 MG: 500 TABLET, CHEWABLE ORAL at 14:59

## 2024-01-26 RX ADMIN — FOLIC ACID 1 MG: 1 TABLET ORAL at 17:42

## 2024-01-26 RX ADMIN — BUSPIRONE HYDROCHLORIDE 10 MG: 5 TABLET ORAL at 21:13

## 2024-01-26 RX ADMIN — DARUNAVIR ETHANOLATE AND COBICISTAT 1 EACH: 800; 150 TABLET, FILM COATED ORAL at 10:24

## 2024-01-26 RX ADMIN — INSULIN HUMAN 5 UNITS: 100 INJECTION, SUSPENSION SUBCUTANEOUS at 10:21

## 2024-01-26 RX ADMIN — SODIUM CHLORIDE, PRESERVATIVE FREE 10 ML: 5 INJECTION INTRAVENOUS at 21:20

## 2024-01-26 RX ADMIN — ISOSORBIDE MONONITRATE 60 MG: 60 TABLET, EXTENDED RELEASE ORAL at 10:19

## 2024-01-26 ASSESSMENT — PAIN - FUNCTIONAL ASSESSMENT
PAIN_FUNCTIONAL_ASSESSMENT: PREVENTS OR INTERFERES SOME ACTIVE ACTIVITIES AND ADLS
PAIN_FUNCTIONAL_ASSESSMENT: ACTIVITIES ARE NOT PREVENTED
PAIN_FUNCTIONAL_ASSESSMENT: PREVENTS OR INTERFERES WITH ALL ACTIVE AND SOME PASSIVE ACTIVITIES
PAIN_FUNCTIONAL_ASSESSMENT: PREVENTS OR INTERFERES SOME ACTIVE ACTIVITIES AND ADLS

## 2024-01-26 ASSESSMENT — PAIN SCALES - GENERAL
PAINLEVEL_OUTOF10: 0
PAINLEVEL_OUTOF10: 9
PAINLEVEL_OUTOF10: 0
PAINLEVEL_OUTOF10: 9
PAINLEVEL_OUTOF10: 0
PAINLEVEL_OUTOF10: 0
PAINLEVEL_OUTOF10: 9
PAINLEVEL_OUTOF10: 7

## 2024-01-26 ASSESSMENT — PAIN DESCRIPTION - ORIENTATION
ORIENTATION: MID
ORIENTATION: MID
ORIENTATION: RIGHT;LEFT;POSTERIOR;LOWER
ORIENTATION: RIGHT;LEFT;MID

## 2024-01-26 ASSESSMENT — PAIN DESCRIPTION - DESCRIPTORS
DESCRIPTORS: ACHING
DESCRIPTORS: DISCOMFORT;CRAMPING
DESCRIPTORS: ACHING
DESCRIPTORS: ACHING

## 2024-01-26 ASSESSMENT — PAIN DESCRIPTION - LOCATION
LOCATION: BACK
LOCATION: ABDOMEN
LOCATION: BACK
LOCATION: ABDOMEN

## 2024-01-26 NOTE — PROGRESS NOTES
Infectious Disease Progress Note  2024   Patient Name: Dewey Sosa : 1980   Impression  HIV Positive: New Diagnosis  Hepatitis B Co-Infection:  Legionella Bilateral Pneumonia: Resolving  No reported allergies to ABX  CrCl 25 on CKD5  Intermittent low grade temps, WBC 3.6, Hb 7.0 with no active bleeding, hypoalbuminemia  -BC 0/2 NGTD  -Hepatitis B S Ab >1000, Hep B surface Ag non-reactive, Hep C Ab non-reactive. Hep B Core Total Ab positive.   -Resp panel and Strep pna negative, MRSA by PCR negative   -Quantiferon gold: Pending  -HIV-1 ab positive, CD4/CD8 ratio: 0.13.  CD8 % Suppressor T Cell 76. CD8 T Cell Abs 201. CD3% Total T Cell 87. CD4 T cell 25  -Legionella Ag Positive  CKD5/ Hyponatremia/ Hyperkalemia:  Dr. Nevarez onboard  HD started, will evaluate HD vs PD  Rhabdomyolysis:  Class I Obesity: BMI: 30.51 kg/m2  Elevated LFTs and Lipase:  Dr. Ríos onboard, imp of possible Legionella induced hepatitis vs rhabdomyolysis. Awaiting MRI to r/o any biliary dilatation or choledocholithiasis.   DMII:  Dr. 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 Nevarez the plan of care, he DW patient and patient's boyfriend of the positive HIV test.   Ordered Quantiferon TB  with droplet isolation, as patient at high risk and immunocompromised.   When ready for DC:  Follow up in ID clinic in 1 week please, no new lab work requested at this  Splenomegaly    Nausea and vomiting    Traumatic rhabdomyolysis (HCC)  Resolved Problems:    * No resolved hospital problems. *    Electronically signed by: Electronically signed by BARBARA Santillan CNP on 1/26/2024 at 4:05 PM

## 2024-01-26 NOTE — PROGRESS NOTES
V2.0  [facility] Progress Note      Name:  Dewey Sosa /Age/Sex: 1980  (43 y.o. male)   MRN & CSN:  [id number] & [id number] Encounter Date/Time: 2024 1:41 PM EST    Location:  -A PCP: Frandy Parsons APRN - CNP       Hospital Day: 8      Subjective:     Chief Complaint:  Fall (Complains he has fallen 6 times in the last few days, and has hit his head on ASA) and Fatigue (Complaining of increasing weakness. )     Pt seen and examined in the AM. Patient states he feels a little better. Still weak and fatigued. No SOB today. Patient's mother in the room.       Assessment and Plan:   Sepsis noted after admission  Legionella Pneumonia   HIV Infection, ?AIDS  . Fever T max 101.6, tachycardia 110's, tachypnea 30's. 2/2 legionella pneumonia.   CT chest showed right lung mass vs consolidation  Legionalla antigen +  - S/p azithromycin 7 days in total and rocephin 5 days total  - ID on board   - CD4 count only 25  - On bactrim prophylaxis      Hyponatremia. Likely 2/2 legionella pneumonia and poor oral intake/dehydration.  P/w Na 117, improved 123 but then down to 118.   Today Na 125 as well   - Nephro managing  - on HD as below  - Plan for M/W/F HD      Transaminitis, hyperbilirubinemia.  Was going up but today coming down.   Imaging including U/S and CT abd unremarkable.   CK from few days ago 20K down to 3600 yesterday  Could be legionella hepatitis vs rhabdomyolysis.    - GI on board   - Trend liver panel  - MRCP ordered but cancelled now given no clinical evidence of Choledocholithiasis    Acute pancreatitis 2/2 Elevated TG. Was as high as 1748. Has been fluctuating.    CT abd, U/S no sign of pancreatitis.  Has been having abd pain unclear if chronic or acute  Triglycerides  noted 608 but trended down to 324  Lipase down trending        ROSE MARY on CKD IV now ESRD.  Cr 5.7 (baseline ~2.5).   Has been on bicarb drip.   Temp vas cath placed  Started on HD.  Permanent HD cath planned today

## 2024-01-26 NOTE — CARE COORDINATION
Follow up visit with pt and mother at bedside. CM offered encouragement to pt. Pt misses his dog and all that he has been going through is overwhelming. Cm discussed the anticipated need for IP rehab vs SNF at discharge. Cm explained that there are 3 SNFs in the area that do in house dialysis and that may be helpful. Pt's mother has a lot of DME at her home from caring for her mother and when pt is stronger she would like to care for her son at her home. ARU is following for possible appropriateness for IP Rehab at discharge. Cm provdied pt with list of SNFs in network with his insurance and denoted the ones that can provide in house dialysis.

## 2024-01-26 NOTE — PROGRESS NOTES
[facility]             MD Suzanne Blevins MD Carol Christensen, APRN-CNP       Brie Meng, APRN-CNP             [address] [place] [address]             [phone] fax [phone]      Gastroenterology Progress note . 1/26/2024  Reason for consult:     Acute transaminitis with elevated lipase      Physician attestation:  I have personally seen and examined the patient independently. I have reviewed the patient's available data,including medical history and recent test results. Reviewed and discussed note as documented by AMALIA.  I agree with the physical exam findings, assessment and plan.     Briefly   Patient noted to be lying comfortably, examined dialysis session.  Again no overt melena or hematochezia.  Yesterday received 1 unit PRBC noted to have improved hemoglobin of 8.5 today 7.4 no melena or hematochezia.  Denies any abdominal pain or discomfort.  Creatinine still elevated at 4.9.  Plan for dialysis today.    Gen: normal mood, alert  ENT: normal TJ  Cardiovascular: RRR, No M/R/G  Respiratory: CTA BL  Gastrointestinal: Soft,NT ND  Genitourinary: no suprapubic tenderness  Musculoskeletal: no scars  Skin:moist  Neuro: alert and oriented x3    My assessment and plan reveals   #1 elevated LFTs  Possible differentials include Legionella induced hepatitis versus  rhabdomyolysis - .  CK from admission noted to be significantly elevated to 20,000.  His pattern of LFT elevation certainly raises concern for that with AST significantly more than ALT as well as indirect and direct bilirubin.  -CK now down to 3000  -Initially planned to evaluate for any biliary dilation or associated choledocholithiasis with MRCP however patient not cooperating for MRI sequencing.   -No biliary dilation on prior imaging  -Recommend continuing monitoring bilirubin     #2  Pancreatitis  -Possible differentials include Pancreatitis however

## 2024-01-26 NOTE — PROGRESS NOTES
Nephrology Progress Note  1/26/2024 12:25 PM  Subjective:     Interval History: Dewey Sosa is a 43 y.o. male  who has family in the room slightly more awake interactive agrees to go to rehab if approved versus skilled nursing    Data:   Scheduled Meds:   insulin glargine  15 Units SubCUTAneous Nightly    insulin lispro  0-8 Units SubCUTAneous 2 times per day    insulin NPH  5 Units SubCUTAneous QAM    calcium gluconate  2,000 mg IntraVENous Once    dolutegravir sodium  50 mg Oral Daily    darunavir-cobicistat  1 tablet Oral Daily    [START ON 1/27/2024] lamiVUDine  100 mg Oral Daily    metoprolol succinate  100 mg Oral Daily    sulfamethoxazole-trimethoprim  1 tablet Oral Daily    nicotine  1 patch TransDERmal Daily    calcitRIOL  0.5 mcg Oral Daily    vitamin D  50,000 Units Oral Weekly    calcium carbonate  500 mg Oral TID    sodium chloride flush  5-40 mL IntraVENous 2 times per day    heparin (porcine)  5,000 Units SubCUTAneous 3 times per day    acetaminophen  650 mg Oral 4 times per day    aspirin  81 mg Oral Daily    [Held by provider] ticagrelor  90 mg Oral BID    ezetimibe  10 mg Oral Nightly    [Held by provider] gabapentin  300 mg Oral TID    busPIRone  10 mg Oral TID    pantoprazole  40 mg Oral QAM AC    atorvastatin  80 mg Oral Nightly    traZODone  50 mg Oral Nightly    isosorbide mononitrate  60 mg Oral Daily    insulin lispro  0-16 Units SubCUTAneous TID WC     Continuous Infusions:   sodium chloride      sodium chloride      dextrose           CBC   Recent Labs     01/24/24  0410 01/25/24  0530 01/25/24  1813 01/26/24  0432   WBC 4.6 3.6*  --  4.0   HGB 7.6* 7.0* 8.5* 7.4*   HCT 22.0* 20.9* 24.9* 22.3*    153  --  163      BMP   Recent Labs     01/24/24  0410 01/25/24  0530 01/26/24  0432   * 125* 125*   K 3.6 3.4* 4.0   CL 88* 91* 91*   CO2 23 22 21   PHOS 3.4 2.6 3.2   BUN 55* 43* 55*   CREATININE 5.0* 4.2* 4.9*     Hepatic:   Recent Labs     01/24/24  0410 01/25/24  1055    Component Value Date/Time    FERRITIN 90 09/29/2022 09:48 AM     RPR:  No results found for: \"RPR\"  CHIDI:    Lab Results   Component Value Date/Time    CHIDI None Detected 09/15/2021 08:32 AM     24 Hour Urine for Creatinine Clearance:  No components found for: \"CREAT4\", \"UHRS10\", \"UTV10\"      Objective:   I/O: 01/25 0701 - 01/26 0700  In: 470 [P.O.:120]  Out: 850 [Urine:850]  I/O last 3 completed shifts:  In: 960 [P.O.:360; Blood:350; IV Piggyback:250]  Out: 1225 [Urine:1225]  No intake/output data recorded.  Vitals: /79   Pulse 75   Temp 97.5 °F (36.4 °C)   Resp 20   Ht 1.854 m (6' 1\")   Wt 106 kg (233 lb 11 oz)   SpO2 93%   BMI 30.83 kg/m²  {  General appearance: awake weak  HEENT: Head: Normal, normocephalic, atraumatic.  Neck: supple, symmetrical, trachea midline  Lungs: diminished breath sounds bilaterally  Heart: S1, S2 normal  Abdomen: abnormal findings:  soft nt  Extremities: edema trace positive temporary HD catheter  Neurologic: Mental status: alertness: Weak tired      Assessment and Plan:        IMP:   #1 end-stage renal disease   #2  HIV positive with positive urine Legionella    #3 hyponatremia   #4 hypertension   #5 delirium/ metabolic encephalopathy   #6 type 2 diabetes with proteinuria    Plan   #1 maintain on hemodialysis this time place tunneled HD catheter by IR and will plan on keeping on dialysis on Monday Wednesday Friday schedule for now.  Will likely need to go to rehab can either do dialysis at [facility] [place] until stable to come home for now he is agreeable to go to ARU for rehab and I would reevaluate for that as that would be better for him   #2 treated for HIV in the setting of Legionella with infectious disease   #3 correct sodium with dialysis   #4 blood pressure holding stable   #5 each day patient's affect improved little by little and he is now more pleasant and interactive   #6 monitor control glucose   supportive care will follow main focus will be to treat the infection

## 2024-01-26 NOTE — PROGRESS NOTES
Occupational Therapy  OT/PT attempted to see pt for follow up. Upon start of session, RN entered room and stated that dialysis has just called for patient and session needed to be deferred. Will reattempt to see pt as able.     Priscilla Whitten OTR/L [id number]  1/26/2024     11:40 AM

## 2024-01-26 NOTE — PROGRESS NOTES
Patient Name: Dewey Sosa  Patient : 1980  MRN: [id number]     Acct: [id number]  Date of Admission: 2024  Room/Bed: -A  Code Status:  Full Code  Allergies: No Known Allergies  Diagnosis:    Patient Active Problem List   Diagnosis    Type 2 diabetes mellitus without complication, with long-term current use of insulin (HCC)    Gastroesophageal reflux disease    Former tobacco use    Acute osteomyelitis of phalanx of left foot (HCC)    Osteomyelitis of third toe of left foot (HCC)    Anxiety    Persistent proteinuria    Benign neoplasm of sigmoid colon    Vitamin D deficiency    ASCVD (arteriosclerotic cardiovascular disease)    Chronic systolic congestive heart failure (HCC)    Ischemic cardiomyopathy    Dyslipidemia    Stage 3a chronic kidney disease (HCC)    Other male erectile dysfunction    Atherosclerotic heart disease of native coronary artery with other forms of angina pectoris (HCC)    Type 2 diabetes mellitus with chronic kidney disease    Insomnia    Stage 3b chronic kidney disease (HCC)    Overweight    Chronic kidney disease, stage IV (severe) (HCC)    Hyperkalemia    Hyponatremia    Elevated LFTs    Elevated lipase    Splenomegaly    Nausea and vomiting    Traumatic rhabdomyolysis (HCC)         Treatment:  Hemodilaysis 2:1  Priority: Routine  Location: Acute Room    Diabetic: Yes  NPO: No  Isolation Precautions: Dialysis     Consent for Treatment Verified: Yes  Blood Consent Verified: Not Applicable     Safety Verified: Identify (I), Consent (C), Equipment (E), HepB Status (B), Orders Complete (O), Access Verified (A), and Timeliness (T)  Time out performed prior to access at 1108 hours.    Report Received from Primary RN at 1040 hours.  Primary RN (First Initial, Last Name, Title): yue [email or web address]  Incapacitated Nurse Education Completed: Yes     HBsAg ONLY:  Date Drawn: 2024       Results: Negative  HBsAb:  Date Drawn:  2024       Results: Immune  180 minutes     Hemodialysis Intake (ml): 500 ml  Hemodialysis Output (ml): 1500 ml     Tolerated Treatment: Good  Patient Response to Treatment: WELL          Provider Notification        Handoff complete and report given to Primary RN at 1435 hours.  Primary RN (First Initial, Last Name, Title):  SOURAV WEAVER      Education  Person Educated: Patient   Knowledge Base: Substantial  Barriers to Learning?: None  Preferred method of Learning: Oral  Topic(s): Procedural   Teaching Tools: Explanation   Response to Education: Verbalized Understanding     Electronically signed by Melody Irving RN on 1/26/2024 at 2:52 PM

## 2024-01-26 NOTE — CONSULTS
significantly worsened from   01/20/2024.   2. Nonobstructive bowel gas pattern.         US ABDOMEN LIMITED Specify organ? LIVER, SPLEEN, GALLBLADDER, PANCREAS   Final Result   1. Hepatic steatosis.   2. Splenomegaly.   3. Increased echogenicity of the right kidney suggesting medical renal   disease.         XR CHEST PORTABLE   Final Result   1. Interval placement of right internal jugular Trialysis catheter, with tip   overlying the lower SVC, in satisfactory position, and no evidence of a   pneumothorax.   2. Slight interval improvement in appearance of focal mass-like consolidative   opacity within the right lung base, favoring slight improvement of pneumonia.   However, continued serial chest x-ray follow-up is recommended to ensure   resolution of this mass-like right basilar opacity, as a true pulmonary mass   is also a diagnostic consideration.         CT CHEST ABDOMEN PELVIS WO CONTRAST Additional Contrast? None   Final Result   1. Masslike opacity in the right lower lobe.  If there is evidence of   infection, pneumonia may be considered.  A pulmonary mass cannot be excluded.   2. Splenomegaly of uncertain significance.   3. Advanced atherosclerotic calcification of coronary arteries.  Cardiology   follow-up may be considered on a nonemergent outpatient basis.         CT HEAD WO CONTRAST   Final Result   1.  No acute intracranial abnormality.      2.  Focal area of encephalomalacia in the right frontal lobe may represent an   old stroke.         XR CHEST PORTABLE   Final Result   Expiratory film with bilateral atelectasis versus pneumonia.         MRI ABDOMEN WO CONTRAST MRCP    (Results Pending)   IR TUNNELED CVC PLACE WO SQ PORT/PUMP > 5 YEARS    (Results Pending)         Assessment/Plan:    #Acute on Chronic Anemia  -likely multifactorial with chronic disease, CKD/ARF, sepsis with +legionella  -Other bloodlines WNL  -no monoclonal gammopathy  -No s/s of active bleeding  -Will r/o nutritional deficiency,  hemolysis.  Given immunocompromised state ordered flow cytometry    #Mass-like Opacity in RLL  -urine antigen +Legionella so most likely pneumonia.  ID is ruling out other opportunistic infections.  If does not resolve after infection treatment can consider further workup.    Remainder of care per primary and consulting teams.  ID is following for new HIV diagnosis as well as legionella.  Nephrology is following for renal failure/HD. GI is following for liver dysfunction which is improving.    We can continue to follow as an outpatient and monitor blood counts.    Assessment and plan of care was developed in coordination with attending NEHA VegaC    Thank you for allowing me to participate in the care of this very pleasant patient.  Labs, rationale, and plan of care all discussed in depth with patient and questions and concerns addressed.       I have independently evaluated and examined this patient today.  I have reviewed radiologic and biochemical tests on this patient. Management Plan is developed mutually with SULAIMAN Huang. I have reviewed above note and agree with assessment and plan    LANDON

## 2024-01-27 PROBLEM — N18.5 CKD (CHRONIC KIDNEY DISEASE) STAGE 5, GFR LESS THAN 15 ML/MIN (HCC): Status: ACTIVE | Noted: 2024-01-27

## 2024-01-27 PROBLEM — B20 AIDS (ACQUIRED IMMUNODEFICIENCY SYNDROME), CD4 <=200 (HCC): Status: ACTIVE | Noted: 2024-01-27

## 2024-01-27 PROBLEM — A48.1 LEGIONELLA PNEUMONIA (HCC): Status: ACTIVE | Noted: 2024-01-27

## 2024-01-27 LAB
ALBUMIN SERPL-MCNC: 2 GM/DL (ref 3.4–5)
ALP BLD-CCNC: 201 IU/L (ref 40–129)
ALT SERPL-CCNC: 52 U/L (ref 10–40)
ANION GAP SERPL CALCULATED.3IONS-SCNC: 11 MMOL/L (ref 7–16)
AST SERPL-CCNC: 137 IU/L (ref 15–37)
BASOPHILS ABSOLUTE: 0 K/CU MM
BASOPHILS RELATIVE PERCENT: 0.2 % (ref 0–1)
BILIRUB SERPL-MCNC: 0.9 MG/DL (ref 0–1)
BILIRUBIN DIRECT: 0.6 MG/DL (ref 0–0.3)
BILIRUBIN, INDIRECT: 0.3 MG/DL (ref 0–0.7)
BUN SERPL-MCNC: 40 MG/DL (ref 6–23)
C TRACH RRNA SPEC QL NAA+PROBE: NEGATIVE
CALCIUM IONIZED: 4.48 MG/DL (ref 4.48–5.28)
CALCIUM SERPL-MCNC: 7.5 MG/DL (ref 8.3–10.6)
CHLORIDE BLD-SCNC: 94 MMOL/L (ref 99–110)
CO2: 24 MMOL/L (ref 21–32)
COMPLEMENT TOTAL (CH50): 47.4 U/ML (ref 38.7–89.9)
CREAT SERPL-MCNC: 4.3 MG/DL (ref 0.9–1.3)
DIFFERENTIAL TYPE: ABNORMAL
EOSINOPHILS ABSOLUTE: 0.1 K/CU MM
EOSINOPHILS RELATIVE PERCENT: 1.1 % (ref 0–3)
GFR SERPL CREATININE-BSD FRML MDRD: 17 ML/MIN/1.73M2
GLUCOSE BLD-MCNC: 198 MG/DL (ref 70–99)
GLUCOSE BLD-MCNC: 201 MG/DL (ref 70–99)
GLUCOSE BLD-MCNC: 215 MG/DL (ref 70–99)
GLUCOSE BLD-MCNC: 219 MG/DL (ref 70–99)
GLUCOSE BLD-MCNC: 244 MG/DL (ref 70–99)
GLUCOSE SERPL-MCNC: 198 MG/DL (ref 70–99)
HCT VFR BLD CALC: 22.8 % (ref 42–52)
HEMOGLOBIN: 7.6 GM/DL (ref 13.5–18)
HSV 2 GLYCOPROTEIN G AB IGG: 0.07 IV
HSV1 GG IGG SER-ACNC: 22.3 IV
IMMATURE NEUTROPHIL %: 1.7 % (ref 0–0.43)
IONIZED CA: 1.12 MMOL/L (ref 1.12–1.32)
LIPASE: 984 IU/L (ref 13–60)
LYMPHOCYTES ABSOLUTE: 0.4 K/CU MM
LYMPHOCYTES RELATIVE PERCENT: 8.9 % (ref 24–44)
MAGNESIUM: 2.1 MG/DL (ref 1.8–2.4)
MCH RBC QN AUTO: 29.7 PG (ref 27–31)
MCHC RBC AUTO-ENTMCNC: 33.3 % (ref 32–36)
MCV RBC AUTO: 89.1 FL (ref 78–100)
MONOCYTES ABSOLUTE: 0.4 K/CU MM
MONOCYTES RELATIVE PERCENT: 8.2 % (ref 0–4)
N GONORRHOEA RRNA SPEC QL NAA+PROBE: NEGATIVE
NUCLEATED RBC %: 0 %
PDW BLD-RTO: 13.5 % (ref 11.7–14.9)
PHOSPHORUS: 2.4 MG/DL (ref 2.5–4.9)
PLATELET # BLD: 196 K/CU MM (ref 140–440)
PMV BLD AUTO: 10.4 FL (ref 7.5–11.1)
POTASSIUM SERPL-SCNC: 3.8 MMOL/L (ref 3.5–5.1)
RBC # BLD: 2.56 M/CU MM (ref 4.6–6.2)
SEGMENTED NEUTROPHILS ABSOLUTE COUNT: 3.8 K/CU MM
SEGMENTED NEUTROPHILS RELATIVE PERCENT: 79.9 % (ref 36–66)
SODIUM BLD-SCNC: 129 MMOL/L (ref 135–145)
T PALLIDUM IGG SER QL IF: NON REACTIVE
TOTAL IMMATURE NEUTOROPHIL: 0.08 K/CU MM
TOTAL NUCLEATED RBC: 0 K/CU MM
TOTAL PROTEIN: 5.4 GM/DL (ref 6.4–8.2)
WBC # BLD: 4.7 K/CU MM (ref 4–10.5)

## 2024-01-27 PROCEDURE — 86317 IMMUNOASSAY INFECTIOUS AGENT: CPT

## 2024-01-27 PROCEDURE — C9113 INJ PANTOPRAZOLE SODIUM, VIA: HCPCS | Performed by: LICENSED PRACTICAL NURSE

## 2024-01-27 PROCEDURE — 6370000000 HC RX 637 (ALT 250 FOR IP): Performed by: PHYSICIAN ASSISTANT

## 2024-01-27 PROCEDURE — 6370000000 HC RX 637 (ALT 250 FOR IP): Performed by: INTERNAL MEDICINE

## 2024-01-27 PROCEDURE — 82330 ASSAY OF CALCIUM: CPT

## 2024-01-27 PROCEDURE — 6360000002 HC RX W HCPCS: Performed by: STUDENT IN AN ORGANIZED HEALTH CARE EDUCATION/TRAINING PROGRAM

## 2024-01-27 PROCEDURE — 84100 ASSAY OF PHOSPHORUS: CPT

## 2024-01-27 PROCEDURE — 86645 CMV ANTIBODY IGM: CPT

## 2024-01-27 PROCEDURE — 2580000003 HC RX 258: Performed by: LICENSED PRACTICAL NURSE

## 2024-01-27 PROCEDURE — 83735 ASSAY OF MAGNESIUM: CPT

## 2024-01-27 PROCEDURE — 6370000000 HC RX 637 (ALT 250 FOR IP): Performed by: STUDENT IN AN ORGANIZED HEALTH CARE EDUCATION/TRAINING PROGRAM

## 2024-01-27 PROCEDURE — 85025 COMPLETE CBC W/AUTO DIFF WBC: CPT

## 2024-01-27 PROCEDURE — 82962 GLUCOSE BLOOD TEST: CPT

## 2024-01-27 PROCEDURE — 2580000003 HC RX 258: Performed by: STUDENT IN AN ORGANIZED HEALTH CARE EDUCATION/TRAINING PROGRAM

## 2024-01-27 PROCEDURE — 86644 CMV ANTIBODY: CPT

## 2024-01-27 PROCEDURE — 94761 N-INVAS EAR/PLS OXIMETRY MLT: CPT

## 2024-01-27 PROCEDURE — 99232 SBSQ HOSP IP/OBS MODERATE 35: CPT | Performed by: INTERNAL MEDICINE

## 2024-01-27 PROCEDURE — A4216 STERILE WATER/SALINE, 10 ML: HCPCS | Performed by: LICENSED PRACTICAL NURSE

## 2024-01-27 PROCEDURE — 6370000000 HC RX 637 (ALT 250 FOR IP): Performed by: SPECIALIST

## 2024-01-27 PROCEDURE — 82248 BILIRUBIN DIRECT: CPT

## 2024-01-27 PROCEDURE — 6360000002 HC RX W HCPCS: Performed by: INTERNAL MEDICINE

## 2024-01-27 PROCEDURE — 80053 COMPREHEN METABOLIC PANEL: CPT

## 2024-01-27 PROCEDURE — 87116 MYCOBACTERIA CULTURE: CPT

## 2024-01-27 PROCEDURE — 36415 COLL VENOUS BLD VENIPUNCTURE: CPT

## 2024-01-27 PROCEDURE — 83690 ASSAY OF LIPASE: CPT

## 2024-01-27 PROCEDURE — 86778 TOXOPLASMA ANTIBODY IGM: CPT

## 2024-01-27 PROCEDURE — 36592 COLLECT BLOOD FROM PICC: CPT

## 2024-01-27 PROCEDURE — 6360000002 HC RX W HCPCS: Performed by: LICENSED PRACTICAL NURSE

## 2024-01-27 PROCEDURE — 2060000000 HC ICU INTERMEDIATE R&B

## 2024-01-27 RX ADMIN — EZETIMIBE 10 MG: 10 TABLET ORAL at 20:09

## 2024-01-27 RX ADMIN — HYDROMORPHONE HYDROCHLORIDE 0.5 MG: 1 INJECTION, SOLUTION INTRAMUSCULAR; INTRAVENOUS; SUBCUTANEOUS at 02:57

## 2024-01-27 RX ADMIN — ASPIRIN 81 MG 81 MG: 81 TABLET ORAL at 09:04

## 2024-01-27 RX ADMIN — FOLIC ACID 1 MG: 1 TABLET ORAL at 09:03

## 2024-01-27 RX ADMIN — HEPARIN SODIUM 5000 UNITS: 5000 INJECTION INTRAVENOUS; SUBCUTANEOUS at 20:09

## 2024-01-27 RX ADMIN — ACETAMINOPHEN 650 MG: 325 TABLET ORAL at 09:03

## 2024-01-27 RX ADMIN — HEPARIN SODIUM 5000 UNITS: 5000 INJECTION INTRAVENOUS; SUBCUTANEOUS at 13:55

## 2024-01-27 RX ADMIN — INSULIN LISPRO 4 UNITS: 100 INJECTION, SOLUTION INTRAVENOUS; SUBCUTANEOUS at 12:15

## 2024-01-27 RX ADMIN — HYDROMORPHONE HYDROCHLORIDE 0.5 MG: 1 INJECTION, SOLUTION INTRAMUSCULAR; INTRAVENOUS; SUBCUTANEOUS at 13:17

## 2024-01-27 RX ADMIN — INSULIN LISPRO 2 UNITS: 100 INJECTION, SOLUTION INTRAVENOUS; SUBCUTANEOUS at 20:19

## 2024-01-27 RX ADMIN — DARUNAVIR ETHANOLATE AND COBICISTAT 1 EACH: 800; 150 TABLET, FILM COATED ORAL at 10:26

## 2024-01-27 RX ADMIN — CALCIUM CARBONATE 500 MG: 500 TABLET, CHEWABLE ORAL at 20:09

## 2024-01-27 RX ADMIN — SODIUM CHLORIDE, PRESERVATIVE FREE 10 ML: 5 INJECTION INTRAVENOUS at 20:09

## 2024-01-27 RX ADMIN — INSULIN LISPRO 2 UNITS: 100 INJECTION, SOLUTION INTRAVENOUS; SUBCUTANEOUS at 02:55

## 2024-01-27 RX ADMIN — CALCIUM CARBONATE 500 MG: 500 TABLET, CHEWABLE ORAL at 13:55

## 2024-01-27 RX ADMIN — ATORVASTATIN CALCIUM 80 MG: 40 TABLET, FILM COATED ORAL at 20:09

## 2024-01-27 RX ADMIN — BUSPIRONE HYDROCHLORIDE 10 MG: 5 TABLET ORAL at 20:09

## 2024-01-27 RX ADMIN — TRAZODONE HYDROCHLORIDE 50 MG: 50 TABLET ORAL at 20:09

## 2024-01-27 RX ADMIN — HYDROMORPHONE HYDROCHLORIDE 0.5 MG: 1 INJECTION, SOLUTION INTRAMUSCULAR; INTRAVENOUS; SUBCUTANEOUS at 09:07

## 2024-01-27 RX ADMIN — DOLUTEGRAVIR SODIUM 50 MG: 50 TABLET, FILM COATED ORAL at 10:26

## 2024-01-27 RX ADMIN — HYDROXYZINE HYDROCHLORIDE 10 MG: 10 TABLET, FILM COATED ORAL at 09:04

## 2024-01-27 RX ADMIN — INSULIN HUMAN 5 UNITS: 100 INJECTION, SUSPENSION SUBCUTANEOUS at 09:04

## 2024-01-27 RX ADMIN — SODIUM CHLORIDE, PRESERVATIVE FREE 40 MG: 5 INJECTION INTRAVENOUS at 09:04

## 2024-01-27 RX ADMIN — CALCIUM CARBONATE 500 MG: 500 TABLET, CHEWABLE ORAL at 09:03

## 2024-01-27 RX ADMIN — LAMIVUDINE 100 MG: 100 TABLET, FILM COATED ORAL at 10:26

## 2024-01-27 RX ADMIN — METOPROLOL SUCCINATE 100 MG: 50 TABLET, EXTENDED RELEASE ORAL at 09:03

## 2024-01-27 RX ADMIN — BUSPIRONE HYDROCHLORIDE 10 MG: 5 TABLET ORAL at 13:55

## 2024-01-27 RX ADMIN — INSULIN LISPRO 4 UNITS: 100 INJECTION, SOLUTION INTRAVENOUS; SUBCUTANEOUS at 17:14

## 2024-01-27 RX ADMIN — HEPARIN SODIUM 5000 UNITS: 5000 INJECTION INTRAVENOUS; SUBCUTANEOUS at 05:01

## 2024-01-27 RX ADMIN — CALCITRIOL CAPSULES 0.25 MCG 0.5 MCG: 0.25 CAPSULE ORAL at 09:03

## 2024-01-27 RX ADMIN — ISOSORBIDE MONONITRATE 60 MG: 60 TABLET, EXTENDED RELEASE ORAL at 09:03

## 2024-01-27 RX ADMIN — INSULIN GLARGINE 15 UNITS: 100 INJECTION, SOLUTION SUBCUTANEOUS at 20:19

## 2024-01-27 RX ADMIN — BUSPIRONE HYDROCHLORIDE 10 MG: 5 TABLET ORAL at 09:03

## 2024-01-27 RX ADMIN — SODIUM CHLORIDE, PRESERVATIVE FREE 10 ML: 5 INJECTION INTRAVENOUS at 09:06

## 2024-01-27 RX ADMIN — HYDROMORPHONE HYDROCHLORIDE 0.5 MG: 1 INJECTION, SOLUTION INTRAMUSCULAR; INTRAVENOUS; SUBCUTANEOUS at 20:08

## 2024-01-27 ASSESSMENT — PAIN DESCRIPTION - DESCRIPTORS
DESCRIPTORS: SHOOTING;SORE;SPASM
DESCRIPTORS: ACHING;CRAMPING;DISCOMFORT
DESCRIPTORS: ACHING;CRAMPING
DESCRIPTORS: ACHING;SHARP;SHOOTING

## 2024-01-27 ASSESSMENT — PAIN SCALES - GENERAL
PAINLEVEL_OUTOF10: 9
PAINLEVEL_OUTOF10: 3
PAINLEVEL_OUTOF10: 9
PAINLEVEL_OUTOF10: 5
PAINLEVEL_OUTOF10: 0
PAINLEVEL_OUTOF10: 9

## 2024-01-27 ASSESSMENT — PAIN DESCRIPTION - ORIENTATION
ORIENTATION: LOWER
ORIENTATION: LOWER
ORIENTATION: RIGHT;LEFT;POSTERIOR;LOWER
ORIENTATION: POSTERIOR

## 2024-01-27 ASSESSMENT — PAIN - FUNCTIONAL ASSESSMENT
PAIN_FUNCTIONAL_ASSESSMENT: ACTIVITIES ARE NOT PREVENTED
PAIN_FUNCTIONAL_ASSESSMENT: PREVENTS OR INTERFERES WITH MANY ACTIVE NOT PASSIVE ACTIVITIES
PAIN_FUNCTIONAL_ASSESSMENT: PREVENTS OR INTERFERES WITH ALL ACTIVE AND SOME PASSIVE ACTIVITIES

## 2024-01-27 ASSESSMENT — PAIN DESCRIPTION - DIRECTION: RADIATING_TOWARDS: BACK

## 2024-01-27 ASSESSMENT — PAIN DESCRIPTION - FREQUENCY
FREQUENCY: CONTINUOUS
FREQUENCY: CONTINUOUS

## 2024-01-27 ASSESSMENT — PAIN DESCRIPTION - PAIN TYPE: TYPE: ACUTE PAIN

## 2024-01-27 ASSESSMENT — PAIN DESCRIPTION - ONSET: ONSET: ON-GOING

## 2024-01-27 ASSESSMENT — PAIN DESCRIPTION - LOCATION
LOCATION: BACK

## 2024-01-27 NOTE — CARE COORDINATION
VELIA notes consult that pt is agreeable to ARU now and does not want to AMA. VELIA called and spoke to Xiomara @ ARU admissions. Xiomara states that ARU is following but isn't sure if he will be approved due to being ambivalent about therapy and inpatient therapy. ARU states they will continue following for possible admissions.

## 2024-01-27 NOTE — PROGRESS NOTES
Nephrology Progress Note  1/27/2024 11:34 AM  Subjective:     Interval History: Dewey Sosa is a 43 y.o. male appears doing little better more awake interactive agrees to for rehab    Data:   Scheduled Meds:   insulin glargine  15 Units SubCUTAneous Nightly    insulin lispro  0-8 Units SubCUTAneous 2 times per day    insulin NPH  5 Units SubCUTAneous QAM    dolutegravir sodium  50 mg Oral Daily    darunavir-cobicistat  1 tablet Oral Daily    lamiVUDine  100 mg Oral Daily    [START ON 1/29/2024] entecavir  0.5 mg Oral Weekly - Monday    [START ON 1/29/2024] sulfamethoxazole-trimethoprim  1 tablet Oral Once per day on Mon Wed Fri    pantoprazole (PROTONIX) 40 mg in sodium chloride (PF) 0.9 % 10 mL injection  40 mg IntraVENous Daily    folic acid  1 mg Oral Daily    metoprolol succinate  100 mg Oral Daily    nicotine  1 patch TransDERmal Daily    calcitRIOL  0.5 mcg Oral Daily    vitamin D  50,000 Units Oral Weekly    calcium carbonate  500 mg Oral TID    sodium chloride flush  5-40 mL IntraVENous 2 times per day    heparin (porcine)  5,000 Units SubCUTAneous 3 times per day    aspirin  81 mg Oral Daily    [Held by provider] ticagrelor  90 mg Oral BID    ezetimibe  10 mg Oral Nightly    [Held by provider] gabapentin  300 mg Oral TID    busPIRone  10 mg Oral TID    atorvastatin  80 mg Oral Nightly    traZODone  50 mg Oral Nightly    isosorbide mononitrate  60 mg Oral Daily    insulin lispro  0-16 Units SubCUTAneous TID WC     Continuous Infusions:   sodium chloride      sodium chloride      dextrose           CBC   Recent Labs     01/25/24  0530 01/25/24  1813 01/26/24  0432 01/27/24  0505   WBC 3.6*  --  4.0 4.7   HGB 7.0* 8.5* 7.4* 7.6*   HCT 20.9* 24.9* 22.3* 22.8*     --  163 196      BMP   Recent Labs     01/25/24  0530 01/26/24  0432 01/27/24  0505   * 125* 129*   K 3.4* 4.0 3.8   CL 91* 91* 94*   CO2 22 21 24   PHOS 2.6 3.2 2.4*   BUN 43* 55* 40*   CREATININE 4.2* 4.9* 4.3*     Hepatic:    Recent Labs     01/25/24  1055 01/26/24  0432 01/27/24  0505   * 182* 137*   ALT 64* 56* 52*   BILITOT 1.1* 0.9 0.9   ALKPHOS 218* 211* 201*     Troponin: No results for input(s): \"TROPONINI\" in the last 72 hours.  BNP: No results for input(s): \"BNP\" in the last 72 hours.  Lipids: No results for input(s): \"CHOL\", \"HDL\" in the last 72 hours.    Invalid input(s): \"LDLCALCU\"  ABGs: No results found for: \"PHART\", \"PO2ART\", \"GVM7VWV\"  INR:   Recent Labs     01/25/24  0530   INR 1.5     Renal Labs  Albumin:    Lab Results   Component Value Date/Time    LABALBU 2.0 01/27/2024 05:05 AM    LABALBU 34 01/20/2024 10:16 AM     Calcium:    Lab Results   Component Value Date/Time    CALCIUM 7.5 01/27/2024 05:05 AM     Phosphorus:    Lab Results   Component Value Date/Time    PHOS 2.4 01/27/2024 05:05 AM     U/A:    Lab Results   Component Value Date/Time    NITRU NEGATIVE 01/19/2024 09:20 PM    COLORU YELLOW 01/19/2024 09:20 PM    WBCUA 1 01/19/2024 09:20 PM    RBCUA 39 01/19/2024 09:20 PM    MUCUS RARE 01/19/2024 09:20 PM    TRICHOMONAS NONE SEEN 01/19/2024 09:20 PM    BACTERIA NEGATIVE 01/19/2024 09:20 PM    CLARITYU CLEAR 01/19/2024 09:20 PM    SPECGRAV 1.020 01/19/2024 09:20 PM    UROBILINOGEN 0.2 01/19/2024 09:20 PM    BILIRUBINUR SMALL NUMBER OR AMOUNT OBSERVED 01/19/2024 09:20 PM    BLOODU LARGE NUMBER OR AMOUNT OBSERVED 01/19/2024 09:20 PM    KETUA TRACE 01/19/2024 09:20 PM    AMORPHOUS RARE 01/19/2024 09:20 PM     ABG:  No results found for: \"PHART\", \"PAX6XAI\", \"PO2ART\", \"COA9PJH\", \"BEART\", \"THGBART\", \"WKR7LXI\", \"J4RQBJHN\"  HgBA1c:    Lab Results   Component Value Date/Time    LABA1C 6.8 01/20/2024 05:54 AM     Microalbumen/Creatinine ratio:  No components found for: \"RUCREAT\"  TSH:  No results found for: \"TSH\"  IRON:    Lab Results   Component Value Date/Time    IRON 37 01/26/2024 02:30 PM     Iron Saturation:  No components found for: \"PERCENTFE\"  TIBC:    Lab Results   Component Value Date/Time    TIBC 146

## 2024-01-27 NOTE — PROGRESS NOTES
V2.0  [facility] [place] Hospitalist Progress Note      Name:  Dewey Sosa /Age/Sex: 1980  (43 y.o. male)   MRN & CSN:  [id number] & [id number] Encounter Date/Time: 2024 1:41 PM EST    Location:  -A PCP: Frandy Parsons APRN - CNP       Hospital Day: 9      Subjective:     Chief Complaint:  Fall (Complains he has fallen 6 times in the last few days, and has hit his head on ASA) and Fatigue (Complaining of increasing weakness. )     Pt seen and examined in the AM. Patient states he feels better and better every day. Still weak and fatigued. Patient's mother in the room.       Assessment and Plan:   Sepsis noted after admission  Legionella Pneumonia   HIV Infection, ?AIDS  . Fever T max 101.6, tachycardia 110's, tachypnea 30's. 2/2 legionella pneumonia.   CT chest showed right lung mass vs consolidation  Legionalla antigen +  - S/p azithromycin 7 days in total and rocephin 5 days total  - ID on board   - CD4 count only 25  - On bactrim prophylaxis and HAART     Hyponatremia. Likely 2/2 legionella pneumonia and poor oral intake/dehydration.  P/w Na 117, improved 123 but then down to 118.   Today Na 129 as well   - Nephro managing  - on HD as below  - Plan for M/W/F HD   - Will need tunneled cath      Transaminitis, hyperbilirubinemia.  Was going up but today coming down.   Imaging including U/S and CT abd unremarkable.   CK from few days ago 20K down to 3600 yesterday  Could be legionella hepatitis vs rhabdomyolysis.    - GI on board   - Trend liver panel  - MRCP ordered but cancelled now given no clinical evidence of Choledocholithiasis    Acute pancreatitis 2/2 Elevated TG. Was as high as 1748. Has been fluctuating.    CT abd, U/S no sign of pancreatitis.  Has been having abd pain unclear if chronic or acute  Triglycerides  noted 608 but trended down to 324  Lipase down trending        ROSE MARY on CKD IV now ESRD.  Cr 4.3 (baseline ~2.5).   Has been on bicarb drip.   Temp vas cath placed  Started on  Additional Contrast?->Radiologist Recommendation Reason for Exam: eval pancreatitis FINDINGS: Lower Chest: Redemonstrated dense consolidative opacity of the right lower lobe and right lower lobe interlobular septal thickening.  Scattered linear atelectasis.  Visualized portions of the heart are normal in size.  No pericardial effusion. Organs: The liver, gallbladder, spleen are unremarkable.  There is trace peripancreatic inflammatory stranding, particularly in the region of the pancreatic tail.  Minimal hypoattenuation in the pancreatic tail appears linear on the coronal view, most consistent with invagination of fat.  No peripancreatic fluid collection or evidence of necrosis.  There is a 1.3 cm indeterminate right adrenal nodule.  This is stable since at least August 2021 and likely represents a benign adenoma.  No suspicious renal lesion.  No hydronephrosis or renal calculi. GI/Bowel: No evidence of bowel obstruction. Peritoneum/Retroperitoneum: No retroperitoneal or mesenteric lymphadenopathy. Trace ascites.  No pneumoperitoneum.  Scattered atherosclerosis of the abdominal aorta without aneurysm. Bones/Soft Tissues: No acute osseous or soft tissue abnormality.     1.  Trace peripancreatic inflammatory stranding compatible with clinical history of pancreatitis.  No evidence of peripancreatic collection or necrosis. 2.  Trace ascites. 3.  Redemonstrated dense consolidative opacity of the right lower lobe likely to represent pneumonia.  Neoplasm is not excluded and follow-up to resolution is recommended.     XR CHEST PORTABLE    Result Date: 1/23/2024  EXAMINATION: ONE XRAY VIEW OF THE CHEST 1/23/2024 4:51 pm COMPARISON: Chest radiograph 01/21/2024. HISTORY: ORDERING SYSTEM PROVIDED HISTORY: evaluate lung mass/consolidation TECHNOLOGIST PROVIDED HISTORY: Reason for exam:->evaluate lung mass/consolidation Reason for Exam: evaluate lung mass/consolidation Additional signs and symptoms: na Relevant Medical/Surgical

## 2024-01-27 NOTE — PROGRESS NOTES
Progress Note( Dr. Cruz)  1/26/2024  Subjective:   Admit Date: 1/19/2024  PCP: Frandy Parsons APRN - CNP    Admitted For : Nausea vomiting and frequent fall was found to be hyponatremic with serum sodium 117     Consulted For: Better control of blood glucose     Interval History:     Feels somewhat better able to eat last night and     Serum sodium getting better of 125  it is 117 yesterday  Patient blood glucose  trending higher as he has poor appetite and unable to get his scheduled meal dose insulin      Patient had elevated liver enzymes also lipase suggesting pancreatitis or hepatitis  And liver function test trending down.  GI has been seeing him    Positive for Legionella  CT scan shows possibility of pneumonia and also has pancreatitis    Denies any chest pains,   Yes  SOB .   Has some  nausea but no  vomiting.  Patient is n.p.o. this morning is going for MRI and also changing/placing dialysis catheter  No new bowel or bladder symptoms.       Intake/Output Summary (Last 24 hours) at 1/26/2024 2042  Last data filed at 1/26/2024 1416  Gross per 24 hour   Intake 620 ml   Output 2000 ml   Net -1380 ml         DATA    CBC:   Recent Labs     01/24/24  0410 01/25/24  0530 01/25/24  1813 01/26/24  0432   WBC 4.6 3.6*  --  4.0   HGB 7.6* 7.0* 8.5* 7.4*    153  --  163      CMP:  Recent Labs     01/24/24  0410 01/25/24  0530 01/25/24  1055 01/26/24  0432   * 125*  --  125*   K 3.6 3.4*  --  4.0   CL 88* 91*  --  91*   CO2 23 22  --  21   BUN 55* 43*  --  55*   CREATININE 5.0* 4.2*  --  4.9*   CALCIUM 7.2* 7.0*  --  7.0*   PROT 5.6*  --  4.7* 4.8*   LABALBU 2.1*  --  1.9* 1.7*   BILITOT 1.6*  --  1.1* 0.9   ALKPHOS 194*  --  218* 211*   *  --  232* 182*   ALT 72*  --  64* 56*       Lipids:   Lab Results   Component Value Date/Time    CHOL 278 12/11/2023 12:28 PM    HDL 22 12/11/2023 12:28 PM    TRIG 324 01/26/2024 04:32 AM     Glucose:  Recent Labs     01/26/24  0821 01/26/24  1016

## 2024-01-28 LAB
ANION GAP SERPL CALCULATED.3IONS-SCNC: 10 MMOL/L (ref 7–16)
BASOPHILS ABSOLUTE: 0 K/CU MM
BASOPHILS RELATIVE PERCENT: 0.2 % (ref 0–1)
BUN SERPL-MCNC: 51 MG/DL (ref 6–23)
CALCIUM IONIZED: 4.52 MG/DL (ref 4.48–5.28)
CALCIUM SERPL-MCNC: 7.5 MG/DL (ref 8.3–10.6)
CHLORIDE BLD-SCNC: 96 MMOL/L (ref 99–110)
CO2: 23 MMOL/L (ref 21–32)
CREAT SERPL-MCNC: 5.1 MG/DL (ref 0.9–1.3)
DIFFERENTIAL TYPE: ABNORMAL
EOSINOPHILS ABSOLUTE: 0.1 K/CU MM
EOSINOPHILS RELATIVE PERCENT: 1.1 % (ref 0–3)
GFR SERPL CREATININE-BSD FRML MDRD: 14 ML/MIN/1.73M2
GLUCOSE BLD-MCNC: 201 MG/DL (ref 70–99)
GLUCOSE BLD-MCNC: 224 MG/DL (ref 70–99)
GLUCOSE BLD-MCNC: 238 MG/DL (ref 70–99)
GLUCOSE BLD-MCNC: 245 MG/DL (ref 70–99)
GLUCOSE BLD-MCNC: 285 MG/DL (ref 70–99)
GLUCOSE SERPL-MCNC: 195 MG/DL (ref 70–99)
HCT VFR BLD CALC: 21.1 % (ref 42–52)
HEMOGLOBIN: 7 GM/DL (ref 13.5–18)
IMMATURE NEUTROPHIL %: 1.1 % (ref 0–0.43)
IONIZED CA: 1.13 MMOL/L (ref 1.12–1.32)
LYMPHOCYTES ABSOLUTE: 0.6 K/CU MM
LYMPHOCYTES RELATIVE PERCENT: 12.3 % (ref 24–44)
MAGNESIUM: 2 MG/DL (ref 1.8–2.4)
MCH RBC QN AUTO: 30 PG (ref 27–31)
MCHC RBC AUTO-ENTMCNC: 33.2 % (ref 32–36)
MCV RBC AUTO: 90.6 FL (ref 78–100)
MONOCYTES ABSOLUTE: 0.4 K/CU MM
MONOCYTES RELATIVE PERCENT: 7.9 % (ref 0–4)
NUCLEATED RBC %: 0 %
PDW BLD-RTO: 13.7 % (ref 11.7–14.9)
PHOSPHORUS: 3.2 MG/DL (ref 2.5–4.9)
PLATELET # BLD: 192 K/CU MM (ref 140–440)
PMV BLD AUTO: 9.9 FL (ref 7.5–11.1)
POTASSIUM SERPL-SCNC: 4 MMOL/L (ref 3.5–5.1)
RBC # BLD: 2.33 M/CU MM (ref 4.6–6.2)
SEGMENTED NEUTROPHILS ABSOLUTE COUNT: 3.5 K/CU MM
SEGMENTED NEUTROPHILS RELATIVE PERCENT: 77.4 % (ref 36–66)
SODIUM BLD-SCNC: 129 MMOL/L (ref 135–145)
TOTAL IMMATURE NEUTOROPHIL: 0.05 K/CU MM
TOTAL NUCLEATED RBC: 0 K/CU MM
TRIGL SERPL-MCNC: 342 MG/DL
WBC # BLD: 4.6 K/CU MM (ref 4–10.5)

## 2024-01-28 PROCEDURE — 82962 GLUCOSE BLOOD TEST: CPT

## 2024-01-28 PROCEDURE — 84478 ASSAY OF TRIGLYCERIDES: CPT

## 2024-01-28 PROCEDURE — 6370000000 HC RX 637 (ALT 250 FOR IP): Performed by: INTERNAL MEDICINE

## 2024-01-28 PROCEDURE — 2580000003 HC RX 258: Performed by: STUDENT IN AN ORGANIZED HEALTH CARE EDUCATION/TRAINING PROGRAM

## 2024-01-28 PROCEDURE — 2060000000 HC ICU INTERMEDIATE R&B

## 2024-01-28 PROCEDURE — 82330 ASSAY OF CALCIUM: CPT

## 2024-01-28 PROCEDURE — 2580000003 HC RX 258: Performed by: LICENSED PRACTICAL NURSE

## 2024-01-28 PROCEDURE — 80048 BASIC METABOLIC PNL TOTAL CA: CPT

## 2024-01-28 PROCEDURE — 6370000000 HC RX 637 (ALT 250 FOR IP): Performed by: STUDENT IN AN ORGANIZED HEALTH CARE EDUCATION/TRAINING PROGRAM

## 2024-01-28 PROCEDURE — 94761 N-INVAS EAR/PLS OXIMETRY MLT: CPT

## 2024-01-28 PROCEDURE — 84100 ASSAY OF PHOSPHORUS: CPT

## 2024-01-28 PROCEDURE — 6360000002 HC RX W HCPCS: Performed by: INTERNAL MEDICINE

## 2024-01-28 PROCEDURE — 6360000002 HC RX W HCPCS: Performed by: STUDENT IN AN ORGANIZED HEALTH CARE EDUCATION/TRAINING PROGRAM

## 2024-01-28 PROCEDURE — 85025 COMPLETE CBC W/AUTO DIFF WBC: CPT

## 2024-01-28 PROCEDURE — 6370000000 HC RX 637 (ALT 250 FOR IP): Performed by: PHYSICIAN ASSISTANT

## 2024-01-28 PROCEDURE — 6360000002 HC RX W HCPCS: Performed by: LICENSED PRACTICAL NURSE

## 2024-01-28 PROCEDURE — C9113 INJ PANTOPRAZOLE SODIUM, VIA: HCPCS | Performed by: LICENSED PRACTICAL NURSE

## 2024-01-28 PROCEDURE — 83735 ASSAY OF MAGNESIUM: CPT

## 2024-01-28 PROCEDURE — 36592 COLLECT BLOOD FROM PICC: CPT

## 2024-01-28 RX ORDER — INSULIN LISPRO 100 [IU]/ML
10 INJECTION, SOLUTION INTRAVENOUS; SUBCUTANEOUS
Status: DISCONTINUED | OUTPATIENT
Start: 2024-01-28 | End: 2024-02-08 | Stop reason: HOSPADM

## 2024-01-28 RX ORDER — INSULIN LISPRO 100 [IU]/ML
0-8 INJECTION, SOLUTION INTRAVENOUS; SUBCUTANEOUS
Status: DISCONTINUED | OUTPATIENT
Start: 2024-01-28 | End: 2024-02-08 | Stop reason: HOSPADM

## 2024-01-28 RX ORDER — INSULIN LISPRO 100 [IU]/ML
0-4 INJECTION, SOLUTION INTRAVENOUS; SUBCUTANEOUS
Status: DISCONTINUED | OUTPATIENT
Start: 2024-01-28 | End: 2024-01-28

## 2024-01-28 RX ORDER — INSULIN LISPRO 100 [IU]/ML
0-8 INJECTION, SOLUTION INTRAVENOUS; SUBCUTANEOUS
Status: DISCONTINUED | OUTPATIENT
Start: 2024-01-28 | End: 2024-01-28

## 2024-01-28 RX ADMIN — HEPARIN SODIUM 5000 UNITS: 5000 INJECTION INTRAVENOUS; SUBCUTANEOUS at 13:20

## 2024-01-28 RX ADMIN — LAMIVUDINE 100 MG: 100 TABLET, FILM COATED ORAL at 10:15

## 2024-01-28 RX ADMIN — INSULIN LISPRO 2 UNITS: 100 INJECTION, SOLUTION INTRAVENOUS; SUBCUTANEOUS at 20:34

## 2024-01-28 RX ADMIN — BUSPIRONE HYDROCHLORIDE 10 MG: 5 TABLET ORAL at 13:20

## 2024-01-28 RX ADMIN — TRAZODONE HYDROCHLORIDE 50 MG: 50 TABLET ORAL at 20:33

## 2024-01-28 RX ADMIN — HYDROMORPHONE HYDROCHLORIDE 0.5 MG: 1 INJECTION, SOLUTION INTRAMUSCULAR; INTRAVENOUS; SUBCUTANEOUS at 12:05

## 2024-01-28 RX ADMIN — FOLIC ACID 1 MG: 1 TABLET ORAL at 10:13

## 2024-01-28 RX ADMIN — CALCIUM CARBONATE 500 MG: 500 TABLET, CHEWABLE ORAL at 10:13

## 2024-01-28 RX ADMIN — ERGOCALCIFEROL 50000 UNITS: 1.25 CAPSULE ORAL at 10:13

## 2024-01-28 RX ADMIN — BUSPIRONE HYDROCHLORIDE 10 MG: 5 TABLET ORAL at 10:13

## 2024-01-28 RX ADMIN — SODIUM CHLORIDE, PRESERVATIVE FREE 10 ML: 5 INJECTION INTRAVENOUS at 20:33

## 2024-01-28 RX ADMIN — NYSTATIN 500000 UNITS: 100000 SUSPENSION ORAL at 10:13

## 2024-01-28 RX ADMIN — CALCIUM CARBONATE 500 MG: 500 TABLET, CHEWABLE ORAL at 20:33

## 2024-01-28 RX ADMIN — HYDROMORPHONE HYDROCHLORIDE 0.5 MG: 1 INJECTION, SOLUTION INTRAMUSCULAR; INTRAVENOUS; SUBCUTANEOUS at 16:24

## 2024-01-28 RX ADMIN — INSULIN LISPRO 4 UNITS: 100 INJECTION, SOLUTION INTRAVENOUS; SUBCUTANEOUS at 10:12

## 2024-01-28 RX ADMIN — BUSPIRONE HYDROCHLORIDE 10 MG: 5 TABLET ORAL at 20:33

## 2024-01-28 RX ADMIN — ASPIRIN 81 MG 81 MG: 81 TABLET ORAL at 10:13

## 2024-01-28 RX ADMIN — ISOSORBIDE MONONITRATE 60 MG: 60 TABLET, EXTENDED RELEASE ORAL at 10:13

## 2024-01-28 RX ADMIN — HYDROMORPHONE HYDROCHLORIDE 0.5 MG: 1 INJECTION, SOLUTION INTRAMUSCULAR; INTRAVENOUS; SUBCUTANEOUS at 06:50

## 2024-01-28 RX ADMIN — NYSTATIN 500000 UNITS: 100000 SUSPENSION ORAL at 18:14

## 2024-01-28 RX ADMIN — ATORVASTATIN CALCIUM 80 MG: 40 TABLET, FILM COATED ORAL at 20:33

## 2024-01-28 RX ADMIN — NYSTATIN 500000 UNITS: 100000 SUSPENSION ORAL at 20:33

## 2024-01-28 RX ADMIN — INSULIN LISPRO 2 UNITS: 100 INJECTION, SOLUTION INTRAVENOUS; SUBCUTANEOUS at 18:13

## 2024-01-28 RX ADMIN — DOLUTEGRAVIR SODIUM 50 MG: 50 TABLET, FILM COATED ORAL at 10:15

## 2024-01-28 RX ADMIN — EZETIMIBE 10 MG: 10 TABLET ORAL at 20:33

## 2024-01-28 RX ADMIN — HEPARIN SODIUM 5000 UNITS: 5000 INJECTION INTRAVENOUS; SUBCUTANEOUS at 20:33

## 2024-01-28 RX ADMIN — HEPARIN SODIUM 5000 UNITS: 5000 INJECTION INTRAVENOUS; SUBCUTANEOUS at 05:21

## 2024-01-28 RX ADMIN — NYSTATIN 500000 UNITS: 100000 SUSPENSION ORAL at 12:05

## 2024-01-28 RX ADMIN — METOPROLOL SUCCINATE 100 MG: 50 TABLET, EXTENDED RELEASE ORAL at 10:13

## 2024-01-28 RX ADMIN — INSULIN GLARGINE 15 UNITS: 100 INJECTION, SOLUTION SUBCUTANEOUS at 20:33

## 2024-01-28 RX ADMIN — INSULIN LISPRO 2 UNITS: 100 INJECTION, SOLUTION INTRAVENOUS; SUBCUTANEOUS at 02:13

## 2024-01-28 RX ADMIN — INSULIN LISPRO 10 UNITS: 100 INJECTION, SOLUTION INTRAVENOUS; SUBCUTANEOUS at 18:13

## 2024-01-28 RX ADMIN — INSULIN LISPRO 8 UNITS: 100 INJECTION, SOLUTION INTRAVENOUS; SUBCUTANEOUS at 13:20

## 2024-01-28 RX ADMIN — HYDROMORPHONE HYDROCHLORIDE 0.5 MG: 1 INJECTION, SOLUTION INTRAMUSCULAR; INTRAVENOUS; SUBCUTANEOUS at 20:32

## 2024-01-28 RX ADMIN — CALCITRIOL CAPSULES 0.25 MCG 0.5 MCG: 0.25 CAPSULE ORAL at 10:13

## 2024-01-28 RX ADMIN — SODIUM CHLORIDE, PRESERVATIVE FREE 40 MG: 5 INJECTION INTRAVENOUS at 10:13

## 2024-01-28 RX ADMIN — HYDROMORPHONE HYDROCHLORIDE 0.5 MG: 1 INJECTION, SOLUTION INTRAMUSCULAR; INTRAVENOUS; SUBCUTANEOUS at 02:14

## 2024-01-28 RX ADMIN — DARUNAVIR ETHANOLATE AND COBICISTAT 1 EACH: 800; 150 TABLET, FILM COATED ORAL at 10:15

## 2024-01-28 ASSESSMENT — PAIN SCALES - GENERAL
PAINLEVEL_OUTOF10: 9
PAINLEVEL_OUTOF10: 0
PAINLEVEL_OUTOF10: 9
PAINLEVEL_OUTOF10: 9
PAINLEVEL_OUTOF10: 8
PAINLEVEL_OUTOF10: 9

## 2024-01-28 ASSESSMENT — PAIN DESCRIPTION - LOCATION
LOCATION: BACK

## 2024-01-28 ASSESSMENT — PAIN DESCRIPTION - ORIENTATION
ORIENTATION: RIGHT;LEFT;POSTERIOR;LOWER
ORIENTATION: RIGHT;LEFT;POSTERIOR;UPPER;LOWER
ORIENTATION: RIGHT;LEFT;LOWER;POSTERIOR

## 2024-01-28 ASSESSMENT — PAIN DESCRIPTION - DESCRIPTORS
DESCRIPTORS: ACHING;DISCOMFORT;GNAWING
DESCRIPTORS: ACHING;CRAMPING;DISCOMFORT
DESCRIPTORS: ACHING;CRAMPING;DISCOMFORT

## 2024-01-28 ASSESSMENT — PAIN - FUNCTIONAL ASSESSMENT
PAIN_FUNCTIONAL_ASSESSMENT: PREVENTS OR INTERFERES WITH ALL ACTIVE AND SOME PASSIVE ACTIVITIES

## 2024-01-28 NOTE — PROGRESS NOTES
Progress Note( Dr. Cruz)  1/28/2024  Subjective:   Admit Date: 1/19/2024  PCP: Frandy Parsons APRN - CNP    Admitted For : Nausea vomiting and frequent fall was found to be hyponatremic with serum sodium 117     Consulted For: Better control of blood glucose     Interval History:     Feels feels much better today starting also started eating better  Serum sodium getting better of 129  it is 117 earlier    Patient had elevated liver enzymes also lipase suggesting pancreatitis or hepatitis  And liver function test trending down.  GI has been seeing him    Positive for Legionella  CT scan shows possibility of pneumonia and also has pancreatitis  Patient did not go for MRI of the abdomen and also placement dialysis catheter yet   Possible to the be done tomorrow    Denies any chest pains,   Yes  SOB .   Has some  nausea but no  vomiting.  Eating somewhat better seem like he has  got good appetite now   changing/placing dialysis catheter possibly tomorrow  No new bowel or bladder symptoms.       Intake/Output Summary (Last 24 hours) at 1/28/2024 1644  Last data filed at 1/28/2024 1400  Gross per 24 hour   Intake 240 ml   Output 1825 ml   Net -1585 ml         DATA    CBC:   Recent Labs     01/26/24  0432 01/27/24  0505 01/28/24  0525   WBC 4.0 4.7 4.6   HGB 7.4* 7.6* 7.0*    196 192      CMP:  Recent Labs     01/26/24  0432 01/27/24  0505 01/28/24  0525   * 129* 129*   K 4.0 3.8 4.0   CL 91* 94* 96*   CO2 21 24 23   BUN 55* 40* 51*   CREATININE 4.9* 4.3* 5.1*   CALCIUM 7.0* 7.5* 7.5*   PROT 4.8* 5.4*  --    LABALBU 1.7* 2.0*  --    BILITOT 0.9 0.9  --    ALKPHOS 211* 201*  --    * 137*  --    ALT 56* 52*  --        Lipids:   Lab Results   Component Value Date/Time    CHOL 278 12/11/2023 12:28 PM    HDL 22 12/11/2023 12:28 PM    TRIG 324 01/26/2024 04:32 AM     Glucose:  Recent Labs     01/28/24  0210 01/28/24  0956 01/28/24  1208   POCGLU 224* 201* 285*       [id number]:  Lab Results

## 2024-01-28 NOTE — PROGRESS NOTES
Hematology Oncology Inpatient progress note     Patient Name:  Dewey Sosa  Patient :  1980  Patient MRN:  [id number]       Referring Provider: Frandy Parsons APRN - CNP     Date of Service: 2024      Reason for Consult: Anemia in setting of HIV+     Chief Complaint:    Chief Complaint   Patient presents with    Fall     Complains he has fallen 6 times in the last few days, and has hit his head on ASA    Fatigue     Complaining of increasing weakness.      Principal Problem:    Hyponatremia  Active Problems:    Acute on chronic anemia    Elevated LFTs    Elevated lipase    Splenomegaly    Nausea and vomiting    Traumatic rhabdomyolysis (HCC)    Legionella pneumonia (HCC)    AIDS (acquired immunodeficiency syndrome), CD4 <=200 (HCC)    CKD (chronic kidney disease) stage 5, GFR less than 15 ml/min (HCC)  Resolved Problems:    * No resolved hospital problems. *      HPI:   Dewey Sosa is a 43 y.o. male with newly diagnosed HIV+ with low CD4 count (no AIDS defining event as of yet) hospitalized with multiple issues including rhabdomyolysis, legionella pneumonia, hyponatremia, hyperbilirubinemia, acute pancreatitis, ARF (ROSE MARY on CKD IV, now on HD).  He is noted to have worsening anemia over admission from 10.3 =>7.0,  Did receive 1 uPRBC with appropriate response to 8.5 however 7.4 on repeat.  GI is already following.  MCV 88. MCHC 33, RDW 13. Overall Plt and WBC have remained WNL.  Reportedly he has been following with nephrology for CKD however had declined a large portion of the workup prior to this admission.  These diagnoses are all new to him and he admits he was not sure if he wanted to know.    On exam he is with his mother and is ok with her in room to discuss sensitive diagnosis. He feels improved since admission and is in better spirits. He states that he has felt weak over the last few months acutely worsening in the last few weeks.  Admits to decreasing appetite over last 6 months but    However, continued serial chest x-ray follow-up is recommended to ensure   resolution of this mass-like right basilar opacity, as a true pulmonary mass   is also a diagnostic consideration.         CT CHEST ABDOMEN PELVIS WO CONTRAST Additional Contrast? None   Final Result   1. Masslike opacity in the right lower lobe.  If there is evidence of   infection, pneumonia may be considered.  A pulmonary mass cannot be excluded.   2. Splenomegaly of uncertain significance.   3. Advanced atherosclerotic calcification of coronary arteries.  Cardiology   follow-up may be considered on a nonemergent outpatient basis.         CT HEAD WO CONTRAST   Final Result   1.  No acute intracranial abnormality.      2.  Focal area of encephalomalacia in the right frontal lobe may represent an   old stroke.         XR CHEST PORTABLE   Final Result   Expiratory film with bilateral atelectasis versus pneumonia.         IR TUNNELED CVC PLACE WO SQ PORT/PUMP > 5 YEARS    (Results Pending)         Assessment/Plan:    #Acute on Chronic Anemia  -likely multifactorial with chronic disease, CKD/ARF, sepsis with +legionella. May consider GAMALIEL.   -Other bloodlines WNL  -no monoclonal gammopathy  -No s/s of active bleeding  -No nutritional def or hemolysis  -Given immunocompromised state ordered flow cytometry    #Mass-like Opacity in RLL  -urine antigen +Legionella, so most likely pneumonia.  ID is ruling out other opportunistic infections.  If does not resolve after infection treatment can consider further workup.    Remainder of care per primary and consulting teams.  ID is following for new HIV diagnosis as well as legionella.  Nephrology is following for renal failure/HD. GI is following for liver dysfunction which is improving.    We will continue to follow as outpatient and monitor blood counts.  And also consider further evaluation including BMB /aspiration If anemia worsening and no other cause identified.     LANDON

## 2024-01-28 NOTE — PROGRESS NOTES
Progress Note( Dr. Cruz)  1/27/2024  Subjective:   Admit Date: 1/19/2024  PCP: Frandy Parsons APRN - CNP    Admitted For : Nausea vomiting and frequent fall was found to be hyponatremic with serum sodium 117     Consulted For: Better control of blood glucose     Interval History:     Feels somewhat better if he is hungry would like to eat breakfast this morning    Serum sodium getting better of 129  it is 117 yesterday  Patient blood glucose  trending higher as he has poor appetite and unable to get his scheduled meal dose insulin      Patient had elevated liver enzymes also lipase suggesting pancreatitis or hepatitis  And liver function test trending down.  GI has been seeing him    Positive for Legionella  CT scan shows possibility of pneumonia and also has pancreatitis  Patient did not go for MRI of the abdomen and also placement dialysis catheter yesterday    Denies any chest pains,   Yes  SOB .   Has some  nausea but no  vomiting.  Patient is n.p.o. this morning is going for MRI and also changing/placing dialysis catheter  No new bowel or bladder symptoms.       Intake/Output Summary (Last 24 hours) at 1/27/2024 2145  Last data filed at 1/27/2024 2141  Gross per 24 hour   Intake 240 ml   Output 825 ml   Net -585 ml         DATA    CBC:   Recent Labs     01/25/24  0530 01/25/24  1813 01/26/24  0432 01/27/24  0505   WBC 3.6*  --  4.0 4.7   HGB 7.0* 8.5* 7.4* 7.6*     --  163 196      CMP:  Recent Labs     01/25/24  0530 01/25/24  1055 01/26/24  0432 01/27/24  0505   *  --  125* 129*   K 3.4*  --  4.0 3.8   CL 91*  --  91* 94*   CO2 22  --  21 24   BUN 43*  --  55* 40*   CREATININE 4.2*  --  4.9* 4.3*   CALCIUM 7.0*  --  7.0* 7.5*   PROT  --  4.7* 4.8* 5.4*   LABALBU  --  1.9* 1.7* 2.0*   BILITOT  --  1.1* 0.9 0.9   ALKPHOS  --  218* 211* 201*   AST  --  232* 182* 137*   ALT  --  64* 56* 52*       Lipids:   Lab Results   Component Value Date/Time    CHOL 278 12/11/2023 12:28 PM    HDL 22  times per day    aspirin  81 mg Oral Daily    [Held by provider] ticagrelor  90 mg Oral BID    ezetimibe  10 mg Oral Nightly    [Held by provider] gabapentin  300 mg Oral TID    busPIRone  10 mg Oral TID    atorvastatin  80 mg Oral Nightly    traZODone  50 mg Oral Nightly    isosorbide mononitrate  60 mg Oral Daily    insulin lispro  0-16 Units SubCUTAneous TID WC      Infusions:    sodium chloride      sodium chloride      dextrose           Objective:   Vitals: BP (!) 163/84   Pulse 80   Temp 98.1 °F (36.7 °C) (Oral)   Resp 18   Ht 1.854 m (6' 1\")   Wt 108.5 kg (239 lb 3.2 oz)   SpO2 96%   BMI 31.56 kg/m²   General appearance: alert and cooperative with exam  Neck: no JVD or bruit  Thyroid : Normal lobes   Lungs: Has Vesicular Breath sounds   Heart:  regular rate and rhythm  Abdomen: soft, non-tender; bowel sounds normal; no masses,  no organomegaly  Musculoskeletal: Normal  Extremities: extremities normal, , no edema//left third toe amputation   Neurologic:  Awake, alert, oriented to name, place and time.  Cranial nerves II-XII are grossly intact.  Motor is  intact.  Sensory neuropathy.,  and gait is normal.    Assessment:     Patient Active Problem List:     Type 2 diabetes mellitus without complication, with long-term current use of insulin (HCC)     Gastroesophageal reflux disease     Former tobacco use     Acute osteomyelitis of phalanx of left foot (HCC)     Osteomyelitis of third toe of left foot (HCC)     Anxiety     Persistent proteinuria     Benign neoplasm of sigmoid colon     Vitamin D deficiency     ASCVD (arteriosclerotic cardiovascular disease)     Chronic systolic congestive heart failure (HCC)     Ischemic cardiomyopathy     Dyslipidemia     Stage 3a chronic kidney disease (HCC)     Other male erectile dysfunction     Atherosclerotic heart disease of native coronary artery with other forms of angina pectoris (HCC)     Type 2 diabetes mellitus with chronic kidney disease     Insomnia

## 2024-01-28 NOTE — PROGRESS NOTES
Nephrology Progress Note  1/28/2024 11:14 AM  Subjective:     Interval History: Dewey Sosa is a 43 y.o. male every day gets more awake interactive still resting in bed no distress    Data:   Scheduled Meds:   nystatin  5 mL Oral 4x Daily    insulin glargine  15 Units SubCUTAneous Nightly    insulin lispro  0-8 Units SubCUTAneous 2 times per day    insulin NPH  5 Units SubCUTAneous QAM    dolutegravir sodium  50 mg Oral Daily    darunavir-cobicistat  1 tablet Oral Daily    lamiVUDine  100 mg Oral Daily    [START ON 1/29/2024] entecavir  0.5 mg Oral Weekly - Monday    [START ON 1/29/2024] sulfamethoxazole-trimethoprim  1 tablet Oral Once per day on Mon Wed Fri    pantoprazole (PROTONIX) 40 mg in sodium chloride (PF) 0.9 % 10 mL injection  40 mg IntraVENous Daily    folic acid  1 mg Oral Daily    metoprolol succinate  100 mg Oral Daily    nicotine  1 patch TransDERmal Daily    calcitRIOL  0.5 mcg Oral Daily    vitamin D  50,000 Units Oral Weekly    calcium carbonate  500 mg Oral TID    sodium chloride flush  5-40 mL IntraVENous 2 times per day    heparin (porcine)  5,000 Units SubCUTAneous 3 times per day    aspirin  81 mg Oral Daily    [Held by provider] ticagrelor  90 mg Oral BID    ezetimibe  10 mg Oral Nightly    [Held by provider] gabapentin  300 mg Oral TID    busPIRone  10 mg Oral TID    atorvastatin  80 mg Oral Nightly    traZODone  50 mg Oral Nightly    isosorbide mononitrate  60 mg Oral Daily    insulin lispro  0-16 Units SubCUTAneous TID WC     Continuous Infusions:   sodium chloride      sodium chloride      dextrose           CBC   Recent Labs     01/26/24  0432 01/27/24  0505 01/28/24  0525   WBC 4.0 4.7 4.6   HGB 7.4* 7.6* 7.0*   HCT 22.3* 22.8* 21.1*    196 192      BMP   Recent Labs     01/26/24  0432 01/27/24  0505 01/28/24  0525   * 129* 129*   K 4.0 3.8 4.0   CL 91* 94* 96*   CO2 21 24 23   PHOS 3.2 2.4* 3.2   BUN 55* 40* 51*   CREATININE 4.9* 4.3* 5.1*     Hepatic:   Recent

## 2024-01-28 NOTE — PROGRESS NOTES
V2.0  [facility] [place]      Name:  Dewey Sosa /Age/Sex: 1980  (43 y.o. male)   MRN & CSN:  [id number] & [id number] Encounter Date/Time: 2024 1:41 PM EST    Location:  -A PCP: Frandy Parsons APRN - CNP       Hospital Day: 10      Subjective:     Chief Complaint:  Fall (Complains he has fallen 6 times in the last few days, and has hit his head on ASA) and Fatigue (Complaining of increasing weakness. )     Pt seen and examined in the AM. Patient states he feels better and better every day. Still weak and fatigued.      Assessment and Plan:   Sepsis noted after admission  Legionella Pneumonia   HIV Infection, ?AIDS  . Fever T max 101.6, tachycardia 110's, tachypnea 30's. 2/2 legionella pneumonia.   CT chest showed right lung mass vs consolidation  Legionalla antigen +  - S/p azithromycin 7 days in total and rocephin 5 days total  - ID on board   - CD4 count only 25  - On bactrim prophylaxis and HAART     Hyponatremia. Likely 2/2 legionella pneumonia and poor oral intake/dehydration.  P/w Na 117, improved 123 but then down to 118.   Today Na 129 as well   - Nephro managing  - on HD as below  - Plan for M/W/F HD   - plan for tunneled cath on      Transaminitis, hyperbilirubinemia.  Was going up but today coming down.   Imaging including U/S and CT abd unremarkable.   CK from few days ago 20K down to 3600 yesterday  Could be legionella hepatitis vs rhabdomyolysis.    - GI on board   - Trend liver panel  - MRCP ordered but cancelled now given no clinical evidence of Choledocholithiasis    Acute pancreatitis 2/2 Elevated TG. Was as high as 1748. Has been fluctuating.    CT abd, U/S no sign of pancreatitis.  Has been having abd pain unclear if chronic or acute  Triglycerides  noted 608 but trended down to 324  Lipase down trending        ROSE MARY on CKD IV now ESRD.  Cr 4.3 (baseline ~2.5).   Has been on bicarb drip.   Temp vas cath placed  Started on HD.  Permanent HD cath  significantly worsened from 01/20/2024.  Nonobstructive bowel gas pattern.  No evidence of pneumoperitoneum.  No radiopaque foreign body.  No acute osseous abnormality.     1. Large right basilar consolidative opacity significantly worsened from 01/20/2024. 2. Nonobstructive bowel gas pattern.     XR ABDOMEN (KUB) (SINGLE AP VIEW)    Result Date: 1/21/2024  EXAMINATION: ONE SUPINE XRAY VIEW(S) OF THE ABDOMEN; ONE XRAY VIEW OF THE CHEST 1/21/2024 2:12 pm COMPARISON: Chest radiograph 01/20/2024.  CT chest, abdomen, and pelvis 01/19/2024. HISTORY: ORDERING SYSTEM PROVIDED HISTORY: acute abdominal pain TECHNOLOGIST PROVIDED HISTORY: Reason for exam:->acute abdominal pain Reason for Exam: acute abdominal pain Additional signs and symptoms: acute abdominal pain Relevant Medical/Surgical History: acute abdominal pain; ORDERING SYSTEM PROVIDED HISTORY: SOB TECHNOLOGIST PROVIDED HISTORY: Reason for exam:->SOB Reason for Exam: SOB Additional signs and symptoms: SOB Relevant Medical/Surgical History: SOB FINDINGS: Right transjugular central venous catheter in place.  The cardiomediastinal silhouette is within normal limits.  Large right basilar consolidative opacity significantly worsened from 01/20/2024.  Nonobstructive bowel gas pattern.  No evidence of pneumoperitoneum.  No radiopaque foreign body.  No acute osseous abnormality.     1. Large right basilar consolidative opacity significantly worsened from 01/20/2024. 2. Nonobstructive bowel gas pattern.     US ABDOMEN LIMITED Specify organ? LIVER, SPLEEN, GALLBLADDER, PANCREAS    Result Date: 1/21/2024  EXAMINATION: RIGHT UPPER QUADRANT ULTRASOUND 1/21/2024 11:37 am COMPARISON: None. HISTORY: ORDERING SYSTEM PROVIDED HISTORY: transaminitis TECHNOLOGIST PROVIDED HISTORY: Reason for exam:->transaminitis Specify organ?->LIVER Specify organ?->SPLEEN Specify organ?->GALLBLADDER Specify organ?->PANCREAS FINDINGS: LIVER:  There is diffuse abnormal increased echogenicity throughout

## 2024-01-29 ENCOUNTER — APPOINTMENT (OUTPATIENT)
Dept: INTERVENTIONAL RADIOLOGY/VASCULAR | Age: 44
DRG: 969 | End: 2024-01-29
Payer: MEDICARE

## 2024-01-29 LAB
ANION GAP SERPL CALCULATED.3IONS-SCNC: 14 MMOL/L (ref 7–16)
BASOPHILS ABSOLUTE: 0 K/CU MM
BASOPHILS RELATIVE PERCENT: 0.2 % (ref 0–1)
BUN SERPL-MCNC: 57 MG/DL (ref 6–23)
CALCIUM SERPL-MCNC: 7.6 MG/DL (ref 8.3–10.6)
CHLORIDE BLD-SCNC: 100 MMOL/L (ref 99–110)
CO2: 21 MMOL/L (ref 21–32)
CREAT SERPL-MCNC: 5.3 MG/DL (ref 0.9–1.3)
CRYPTOCOCCAL ANTIGEN: NEGATIVE
DIFFERENTIAL TYPE: ABNORMAL
EOSINOPHILS ABSOLUTE: 0 K/CU MM
EOSINOPHILS RELATIVE PERCENT: 0.8 % (ref 0–3)
GFR SERPL CREATININE-BSD FRML MDRD: 13 ML/MIN/1.73M2
GLUCOSE BLD-MCNC: 196 MG/DL (ref 70–99)
GLUCOSE BLD-MCNC: 208 MG/DL (ref 70–99)
GLUCOSE BLD-MCNC: 245 MG/DL (ref 70–99)
GLUCOSE BLD-MCNC: 276 MG/DL (ref 70–99)
GLUCOSE SERPL-MCNC: 219 MG/DL (ref 70–99)
HCT VFR BLD CALC: 20.4 % (ref 42–52)
HEMOGLOBIN: 6.7 GM/DL (ref 13.5–18)
HSV1+2 IGG SER IA-ACNC: 16.2 IV
HSV1+2 IGM SER IA-ACNC: 0.35 IV
IMMATURE NEUTROPHIL %: 1 % (ref 0–0.43)
INR BLD: 1.1 INDEX
LYMPHOCYTES ABSOLUTE: 0.5 K/CU MM
LYMPHOCYTES RELATIVE PERCENT: 10.7 % (ref 24–44)
MAGNESIUM: 1.9 MG/DL (ref 1.8–2.4)
MCH RBC QN AUTO: 30.5 PG (ref 27–31)
MCHC RBC AUTO-ENTMCNC: 32.8 % (ref 32–36)
MCV RBC AUTO: 92.7 FL (ref 78–100)
MONOCYTES ABSOLUTE: 0.3 K/CU MM
MONOCYTES RELATIVE PERCENT: 6.8 % (ref 0–4)
NUCLEATED RBC %: 0 %
PDW BLD-RTO: 13.6 % (ref 11.7–14.9)
PHOSPHORUS: 3.6 MG/DL (ref 2.5–4.9)
PLATELET # BLD: 205 K/CU MM (ref 140–440)
PMV BLD AUTO: 10 FL (ref 7.5–11.1)
POTASSIUM SERPL-SCNC: 3.9 MMOL/L (ref 3.5–5.1)
PROTHROMBIN TIME: 14 SECONDS (ref 11.7–14.5)
RBC # BLD: 2.2 M/CU MM (ref 4.6–6.2)
SEGMENTED NEUTROPHILS ABSOLUTE COUNT: 3.9 K/CU MM
SEGMENTED NEUTROPHILS RELATIVE PERCENT: 80.5 % (ref 36–66)
SODIUM BLD-SCNC: 135 MMOL/L (ref 135–145)
TOTAL IMMATURE NEUTOROPHIL: 0.05 K/CU MM
TOTAL NUCLEATED RBC: 0 K/CU MM
WBC # BLD: 4.9 K/CU MM (ref 4–10.5)

## 2024-01-29 PROCEDURE — C9113 INJ PANTOPRAZOLE SODIUM, VIA: HCPCS | Performed by: LICENSED PRACTICAL NURSE

## 2024-01-29 PROCEDURE — 86922 COMPATIBILITY TEST ANTIGLOB: CPT

## 2024-01-29 PROCEDURE — 6370000000 HC RX 637 (ALT 250 FOR IP): Performed by: NURSE PRACTITIONER

## 2024-01-29 PROCEDURE — 6370000000 HC RX 637 (ALT 250 FOR IP): Performed by: INTERNAL MEDICINE

## 2024-01-29 PROCEDURE — 99233 SBSQ HOSP IP/OBS HIGH 50: CPT | Performed by: NURSE PRACTITIONER

## 2024-01-29 PROCEDURE — 86850 RBC ANTIBODY SCREEN: CPT

## 2024-01-29 PROCEDURE — 80048 BASIC METABOLIC PNL TOTAL CA: CPT

## 2024-01-29 PROCEDURE — 99232 SBSQ HOSP IP/OBS MODERATE 35: CPT | Performed by: INTERNAL MEDICINE

## 2024-01-29 PROCEDURE — 36415 COLL VENOUS BLD VENIPUNCTURE: CPT

## 2024-01-29 PROCEDURE — P9016 RBC LEUKOCYTES REDUCED: HCPCS

## 2024-01-29 PROCEDURE — 83735 ASSAY OF MAGNESIUM: CPT

## 2024-01-29 PROCEDURE — 86901 BLOOD TYPING SEROLOGIC RH(D): CPT

## 2024-01-29 PROCEDURE — 90935 HEMODIALYSIS ONE EVALUATION: CPT

## 2024-01-29 PROCEDURE — 94761 N-INVAS EAR/PLS OXIMETRY MLT: CPT

## 2024-01-29 PROCEDURE — 6360000002 HC RX W HCPCS: Performed by: LICENSED PRACTICAL NURSE

## 2024-01-29 PROCEDURE — 84100 ASSAY OF PHOSPHORUS: CPT

## 2024-01-29 PROCEDURE — 6360000002 HC RX W HCPCS: Performed by: STUDENT IN AN ORGANIZED HEALTH CARE EDUCATION/TRAINING PROGRAM

## 2024-01-29 PROCEDURE — 82962 GLUCOSE BLOOD TEST: CPT

## 2024-01-29 PROCEDURE — 2580000003 HC RX 258: Performed by: STUDENT IN AN ORGANIZED HEALTH CARE EDUCATION/TRAINING PROGRAM

## 2024-01-29 PROCEDURE — A4216 STERILE WATER/SALINE, 10 ML: HCPCS | Performed by: LICENSED PRACTICAL NURSE

## 2024-01-29 PROCEDURE — 2060000000 HC ICU INTERMEDIATE R&B

## 2024-01-29 PROCEDURE — 6370000000 HC RX 637 (ALT 250 FOR IP): Performed by: PHYSICIAN ASSISTANT

## 2024-01-29 PROCEDURE — 6370000000 HC RX 637 (ALT 250 FOR IP): Performed by: STUDENT IN AN ORGANIZED HEALTH CARE EDUCATION/TRAINING PROGRAM

## 2024-01-29 PROCEDURE — 6360000002 HC RX W HCPCS: Performed by: INTERNAL MEDICINE

## 2024-01-29 PROCEDURE — 85610 PROTHROMBIN TIME: CPT

## 2024-01-29 PROCEDURE — 86900 BLOOD TYPING SEROLOGIC ABO: CPT

## 2024-01-29 PROCEDURE — 2580000003 HC RX 258: Performed by: LICENSED PRACTICAL NURSE

## 2024-01-29 PROCEDURE — 85025 COMPLETE CBC W/AUTO DIFF WBC: CPT

## 2024-01-29 RX ORDER — SODIUM CHLORIDE 9 MG/ML
INJECTION, SOLUTION INTRAVENOUS PRN
Status: DISCONTINUED | OUTPATIENT
Start: 2024-01-29 | End: 2024-02-08 | Stop reason: HOSPADM

## 2024-01-29 RX ORDER — INSULIN GLARGINE 100 [IU]/ML
20 INJECTION, SOLUTION SUBCUTANEOUS NIGHTLY
Status: DISCONTINUED | OUTPATIENT
Start: 2024-01-29 | End: 2024-02-08 | Stop reason: HOSPADM

## 2024-01-29 RX ADMIN — BUSPIRONE HYDROCHLORIDE 10 MG: 5 TABLET ORAL at 09:18

## 2024-01-29 RX ADMIN — CALCIUM CARBONATE 500 MG: 500 TABLET, CHEWABLE ORAL at 14:22

## 2024-01-29 RX ADMIN — SODIUM CHLORIDE, PRESERVATIVE FREE 10 ML: 5 INJECTION INTRAVENOUS at 21:11

## 2024-01-29 RX ADMIN — NYSTATIN 500000 UNITS: 100000 SUSPENSION ORAL at 17:17

## 2024-01-29 RX ADMIN — HYDROMORPHONE HYDROCHLORIDE 0.5 MG: 1 INJECTION, SOLUTION INTRAMUSCULAR; INTRAVENOUS; SUBCUTANEOUS at 07:38

## 2024-01-29 RX ADMIN — FOLIC ACID 1 MG: 1 TABLET ORAL at 09:17

## 2024-01-29 RX ADMIN — SODIUM CHLORIDE, PRESERVATIVE FREE 10 ML: 5 INJECTION INTRAVENOUS at 09:17

## 2024-01-29 RX ADMIN — DOLUTEGRAVIR SODIUM 50 MG: 50 TABLET, FILM COATED ORAL at 09:18

## 2024-01-29 RX ADMIN — ISOSORBIDE MONONITRATE 60 MG: 60 TABLET, EXTENDED RELEASE ORAL at 09:17

## 2024-01-29 RX ADMIN — INSULIN LISPRO 2 UNITS: 100 INJECTION, SOLUTION INTRAVENOUS; SUBCUTANEOUS at 02:23

## 2024-01-29 RX ADMIN — METOPROLOL SUCCINATE 100 MG: 50 TABLET, EXTENDED RELEASE ORAL at 09:18

## 2024-01-29 RX ADMIN — NYSTATIN 500000 UNITS: 100000 SUSPENSION ORAL at 21:09

## 2024-01-29 RX ADMIN — DARUNAVIR ETHANOLATE AND COBICISTAT 1 EACH: 800; 150 TABLET, FILM COATED ORAL at 09:18

## 2024-01-29 RX ADMIN — EZETIMIBE 10 MG: 10 TABLET ORAL at 21:09

## 2024-01-29 RX ADMIN — NYSTATIN 500000 UNITS: 100000 SUSPENSION ORAL at 09:19

## 2024-01-29 RX ADMIN — CALCITRIOL CAPSULES 0.25 MCG 0.5 MCG: 0.25 CAPSULE ORAL at 09:18

## 2024-01-29 RX ADMIN — ATORVASTATIN CALCIUM 80 MG: 40 TABLET, FILM COATED ORAL at 21:09

## 2024-01-29 RX ADMIN — INSULIN LISPRO 10 UNITS: 100 INJECTION, SOLUTION INTRAVENOUS; SUBCUTANEOUS at 17:40

## 2024-01-29 RX ADMIN — ENTECAVIR 0.5 MG: 0.5 TABLET, FILM COATED ORAL at 17:39

## 2024-01-29 RX ADMIN — INSULIN LISPRO 2 UNITS: 100 INJECTION, SOLUTION INTRAVENOUS; SUBCUTANEOUS at 17:41

## 2024-01-29 RX ADMIN — TRAZODONE HYDROCHLORIDE 50 MG: 50 TABLET ORAL at 21:09

## 2024-01-29 RX ADMIN — NYSTATIN 500000 UNITS: 100000 SUSPENSION ORAL at 14:22

## 2024-01-29 RX ADMIN — HYDROMORPHONE HYDROCHLORIDE 0.5 MG: 1 INJECTION, SOLUTION INTRAMUSCULAR; INTRAVENOUS; SUBCUTANEOUS at 14:25

## 2024-01-29 RX ADMIN — SODIUM CHLORIDE, PRESERVATIVE FREE 40 MG: 5 INJECTION INTRAVENOUS at 09:20

## 2024-01-29 RX ADMIN — HEPARIN SODIUM 5000 UNITS: 5000 INJECTION INTRAVENOUS; SUBCUTANEOUS at 21:09

## 2024-01-29 RX ADMIN — BUSPIRONE HYDROCHLORIDE 10 MG: 5 TABLET ORAL at 21:09

## 2024-01-29 RX ADMIN — HYDROMORPHONE HYDROCHLORIDE 0.5 MG: 1 INJECTION, SOLUTION INTRAMUSCULAR; INTRAVENOUS; SUBCUTANEOUS at 21:09

## 2024-01-29 RX ADMIN — CALCIUM CARBONATE 500 MG: 500 TABLET, CHEWABLE ORAL at 21:09

## 2024-01-29 RX ADMIN — SULFAMETHOXAZOLE AND TRIMETHOPRIM 1 TABLET: 400; 80 TABLET ORAL at 23:47

## 2024-01-29 RX ADMIN — CALCIUM CARBONATE 500 MG: 500 TABLET, CHEWABLE ORAL at 09:18

## 2024-01-29 RX ADMIN — INSULIN GLARGINE 20 UNITS: 100 INJECTION, SOLUTION SUBCUTANEOUS at 21:09

## 2024-01-29 RX ADMIN — HEPARIN SODIUM 5000 UNITS: 5000 INJECTION INTRAVENOUS; SUBCUTANEOUS at 14:23

## 2024-01-29 RX ADMIN — ASPIRIN 81 MG 81 MG: 81 TABLET ORAL at 09:19

## 2024-01-29 RX ADMIN — HYDROMORPHONE HYDROCHLORIDE 0.5 MG: 1 INJECTION, SOLUTION INTRAMUSCULAR; INTRAVENOUS; SUBCUTANEOUS at 02:23

## 2024-01-29 RX ADMIN — LAMIVUDINE 100 MG: 100 TABLET, FILM COATED ORAL at 09:18

## 2024-01-29 RX ADMIN — BUSPIRONE HYDROCHLORIDE 10 MG: 5 TABLET ORAL at 14:22

## 2024-01-29 ASSESSMENT — PAIN SCALES - GENERAL
PAINLEVEL_OUTOF10: 9
PAINLEVEL_OUTOF10: 8
PAINLEVEL_OUTOF10: 10
PAINLEVEL_OUTOF10: 9
PAINLEVEL_OUTOF10: 0
PAINLEVEL_OUTOF10: 8
PAINLEVEL_OUTOF10: 8
PAINLEVEL_OUTOF10: 0

## 2024-01-29 ASSESSMENT — PAIN SCALES - WONG BAKER
WONGBAKER_NUMERICALRESPONSE: 0

## 2024-01-29 ASSESSMENT — PAIN DESCRIPTION - DESCRIPTORS
DESCRIPTORS: ACHING;CRAMPING
DESCRIPTORS: JABBING
DESCRIPTORS: ACHING;CRAMPING

## 2024-01-29 ASSESSMENT — PAIN DESCRIPTION - ONSET
ONSET: ON-GOING

## 2024-01-29 ASSESSMENT — PAIN DESCRIPTION - FREQUENCY
FREQUENCY: CONTINUOUS

## 2024-01-29 ASSESSMENT — PAIN - FUNCTIONAL ASSESSMENT
PAIN_FUNCTIONAL_ASSESSMENT: PREVENTS OR INTERFERES SOME ACTIVE ACTIVITIES AND ADLS

## 2024-01-29 ASSESSMENT — PAIN DESCRIPTION - LOCATION
LOCATION: BACK

## 2024-01-29 ASSESSMENT — PAIN DESCRIPTION - ORIENTATION
ORIENTATION: RIGHT;LEFT
ORIENTATION: LOWER
ORIENTATION: RIGHT;LOWER
ORIENTATION: RIGHT;LEFT

## 2024-01-29 ASSESSMENT — PAIN DESCRIPTION - PAIN TYPE
TYPE: CHRONIC PAIN
TYPE: ACUTE PAIN
TYPE: ACUTE PAIN;CHRONIC PAIN

## 2024-01-29 ASSESSMENT — PAIN DESCRIPTION - DIRECTION: RADIATING_TOWARDS: 0

## 2024-01-29 NOTE — PROGRESS NOTES
Hematology Oncology Inpatient progress note     Patient Name:  Dewey Sosa  Patient :  1980  Patient MRN:  [id number]       Referring Provider: Frandy Parsons APRN - CNP     Date of Service: 2024      Reason for Consult: Anemia in setting of HIV+     Chief Complaint:    Chief Complaint   Patient presents with    Fall     Complains he has fallen 6 times in the last few days, and has hit his head on ASA    Fatigue     Complaining of increasing weakness.      Principal Problem:    Hyponatremia  Active Problems:    Acute on chronic anemia    Elevated LFTs    Elevated lipase    Splenomegaly    Nausea and vomiting    Traumatic rhabdomyolysis (HCC)    Legionella pneumonia (HCC)    AIDS (acquired immunodeficiency syndrome), CD4 <=200 (HCC)    CKD (chronic kidney disease) stage 5, GFR less than 15 ml/min (HCC)  Resolved Problems:    * No resolved hospital problems. *      HPI:   Dewey Sosa is a 43 y.o. male with newly diagnosed HIV+ with low CD4 count (no AIDS defining event as of yet) hospitalized with multiple issues including rhabdomyolysis, legionella pneumonia, hyponatremia, hyperbilirubinemia, acute pancreatitis, ARF (ROSE MARY on CKD IV, now on HD).  He is noted to have worsening anemia over admission from 10.3 =>7.0,  Did receive 1 uPRBC with appropriate response to 8.5 however 7.4 on repeat.  GI is already following.  MCV 88. MCHC 33, RDW 13. Overall Plt and WBC have remained WNL.  Reportedly he has been following with nephrology for CKD however had declined a large portion of the workup prior to this admission.  These diagnoses are all new to him and he admits he was not sure if he wanted to know.    On exam he is with his mother and is ok with her in room to discuss sensitive diagnosis. He feels improved since admission and is in better spirits. He states that he has felt weak over the last few months acutely worsening in the last few weeks.  Admits to decreasing appetite over last 6 months but  I diastolic dysfunction. Mild mitral , tricuspid and moderate pulmonic regurgitation is present. No evidence of pericardial effusion.    Kidney stones     Passed Kidney Stones In     Marijuana use     \"Maybe Twice A Year\"    NSTEMI (non-ST elevated myocardial infarction) ([facility]) 11/3/2021    Precordial pain 10/25/2021    Shortness of breath on exertion     Teeth missing     Upper And Lower    Hackett teeth extracted     4 Hackett Teeth Extracted In Past       Past Surgical History:   Procedure Laterality Date    COLONOSCOPY N/A 2021    COLONOSCOPY POLYPECTOMY SNARE/COLD BIOPSY performed by Arden Chavez MD at [facility] OR    OTHER SURGICAL HISTORY  2017    I & D Perineal Abscess    OTHER SURGICAL HISTORY Right 2018    tendoachilles lengthenig of right foot dorsiflexory 3rd metarsal right foot debridement of hyperkerototic lesion     TOE AMPUTATION Left 3/27/2020    TOE AMPUTATION - LEFT THIRD TOE AND METATARSAL HEAD, DEBRIDEMENT PLANTAR FOOT ULCER,  WOUND VAC PLACEMENT performed by Destiny Gr MD at [facility] OR    UPPER GASTROINTESTINAL ENDOSCOPY N/A 2021    EGD BIOPSY performed by Arden Chavez MD at [facility] OR    WISDOM TOOTH EXTRACTION      4 Hackett Teeth Extracted In Past                                                                                Social History     Socioeconomic History    Marital status: Single     Spouse name: Not on file    Number of children: Not on file    Years of education: Not on file    Highest education level: Not on file   Occupational History    Not on file   Tobacco Use    Smoking status: Some Days     Current packs/day: 0.00     Average packs/day: 0.3 packs/day for 27.1 years (6.8 ttl pk-yrs)     Types: Cigarettes     Start date:      Last attempt to quit: 2022     Years since quittin.0    Smokeless tobacco: Never   Vaping Use    Vaping Use: Never used   Substance and Sexual Activity    Alcohol use: Not Currently     Comment:

## 2024-01-29 NOTE — PROGRESS NOTES
V2.0  [facility] Hospitalist Progress Note      Name:  Dewey Sosa /Age/Sex: 1980  (43 y.o. male)   MRN & CSN:  [id number] & [id number] Encounter Date/Time: 2024 1:41 PM EST    Location:  -A PCP: Frandy Parsons APRN - CNP       Hospital Day: 11    Assessment and Plan:   # Sepsis 2/2 Legionella Pneumonia   # HIV Infection, ?AIDS  Presented with fever T max 101.6, tachycardia 110's, tachypnea 30's. 2/2 legionella pneumonia. CT chest showed right lung mass vs consolidation  Legionalla antigen +  S/p azithromycin 7 days in total and rocephin 5 days total, ID on board, CD4 count only 25, On bactrim prophylaxis and HAART    # ROSE MARY on CKD IV now ESRD.  Cr 4.3 (baseline ~2.5).   Temp vas cath placed  Started on HD. Contnue HD per nephro.  Permanent HD cath placement pending     # Acute Normocytic Anemia   Hb down trending from 10's to 7.0. Received 1U PRBC   Patient with significant fatigue at rest and SOB with activity   Hb 8.5 post transfusion. Hb stable at 7.6  Transfuse if Hb < 7  PBS without evidence of hemolysis.      # Hyponatremia. Likely 2/2 legionella pneumonia and poor oral intake/dehydration. P/w Na 117, improved 123 but then down to 118.   Today Na 127 as well ,  Nephro managing, on HD as below  - Plan for M/W/F HD   - plan for tunneled cath on      # Transaminitis, hyperbilirubinemia.  Was going up but today coming down.   Imaging including U/S and CT abd unremarkable.   CK from few days ago 20K down to 3600 yesterday  Could be legionella hepatitis vs rhabdomyolysis.    - GI on board   - Trend liver panel  - MRCP ordered but cancelled now given no clinical evidence of Choledocholithiasis    # Acute pancreatitis 2/2 Elevated TG. Was as high as 1748. Has been fluctuating.    CT abd, U/S no sign of pancreatitis.  Has been having abd pain unclear if chronic or acute  Triglycerides  noted 608 but trended down to 324  Lipase down trending      Hypocalcemia   - Cam 1.12    NSVT  -  ascites.  No pneumoperitoneum.  Scattered atherosclerosis of the abdominal aorta without aneurysm. Bones/Soft Tissues: No acute osseous or soft tissue abnormality.     1.  Trace peripancreatic inflammatory stranding compatible with clinical history of pancreatitis.  No evidence of peripancreatic collection or necrosis. 2.  Trace ascites. 3.  Redemonstrated dense consolidative opacity of the right lower lobe likely to represent pneumonia.  Neoplasm is not excluded and follow-up to resolution is recommended.     XR CHEST PORTABLE    Result Date: 1/23/2024  EXAMINATION: ONE XRAY VIEW OF THE CHEST 1/23/2024 4:51 pm COMPARISON: Chest radiograph 01/21/2024. HISTORY: ORDERING SYSTEM PROVIDED HISTORY: evaluate lung mass/consolidation TECHNOLOGIST PROVIDED HISTORY: Reason for exam:->evaluate lung mass/consolidation Reason for Exam: evaluate lung mass/consolidation Additional signs and symptoms: na Relevant Medical/Surgical History: na FINDINGS: Right IJ line again seen.  The cardiomediastinal silhouette is unchanged. Right basilar consolidation mildly improved.  Pulmonary vascular congestion. No pneumothorax.  Possible small effusions.  No acute osseous abnormality.     1. Right basilar consolidation mildly improved from the previous study. 2. Pulmonary vascular congestion and possible small effusions.     Echo (TTE) limited (PRN contrast/bubble/strain/3D)    Result Date: 1/22/2024    Left Ventricle: Normal left ventricular systolic function with a visually estimated EF of 60 - 65%. Left ventricle size is normal. Normal wall thickness. Normal wall motion.   Mitral Valve: Mild regurgitation.   Pulmonic Valve: Elevated gradients. PV mean gradient is 15 mmHg. Elevated gradient when compared to previous echo 8/9/2023.   Pericardium: Small (<1 cm) localized pericardial effusion present around the posterior side of the left ventricle.   Image quality is fair.     XR CHEST PORTABLE    Result Date: 1/21/2024  EXAMINATION: ONE SUPINE

## 2024-01-29 NOTE — PROGRESS NOTES
Occupational Therapy  OT/PT attempted to see pt on this date for follow up. Pt is currently DHRUV for dialysis. Will reattempt to see pt as able.     Priscilla Whitten OTR/L [id number]  1/29/2024     10:17 AM

## 2024-01-29 NOTE — PROGRESS NOTES
Nephrology Progress Note  1/29/2024 11:05 AM  Subjective:     Interval History: Dewey Sosa is a 43 y.o. male seen on dialysis doing well somewhat anxious disease still disease to be evaluated for TB    Data:   Scheduled Meds:   insulin glargine  20 Units SubCUTAneous Nightly    epoetin shailesh-epbx  8,000 Units IntraVENous Once in dialysis    nystatin  5 mL Oral 4x Daily    insulin lispro  10 Units SubCUTAneous TID WC    insulin lispro  0-8 Units SubCUTAneous TID WC    insulin lispro  0-8 Units SubCUTAneous 2 times per day    [Held by provider] insulin NPH  5 Units SubCUTAneous QAM    dolutegravir sodium  50 mg Oral Daily    darunavir-cobicistat  1 tablet Oral Daily    lamiVUDine  100 mg Oral Daily    entecavir  0.5 mg Oral Weekly - Monday    sulfamethoxazole-trimethoprim  1 tablet Oral Once per day on Mon Wed Fri    pantoprazole (PROTONIX) 40 mg in sodium chloride (PF) 0.9 % 10 mL injection  40 mg IntraVENous Daily    folic acid  1 mg Oral Daily    metoprolol succinate  100 mg Oral Daily    nicotine  1 patch TransDERmal Daily    calcitRIOL  0.5 mcg Oral Daily    vitamin D  50,000 Units Oral Weekly    calcium carbonate  500 mg Oral TID    sodium chloride flush  5-40 mL IntraVENous 2 times per day    heparin (porcine)  5,000 Units SubCUTAneous 3 times per day    aspirin  81 mg Oral Daily    [Held by provider] ticagrelor  90 mg Oral BID    ezetimibe  10 mg Oral Nightly    [Held by provider] gabapentin  300 mg Oral TID    busPIRone  10 mg Oral TID    atorvastatin  80 mg Oral Nightly    traZODone  50 mg Oral Nightly    isosorbide mononitrate  60 mg Oral Daily     Continuous Infusions:   sodium chloride      sodium chloride      sodium chloride      dextrose           CBC   Recent Labs     01/27/24  0505 01/28/24  0525 01/29/24  0434   WBC 4.7 4.6 4.9   HGB 7.6* 7.0* 6.7*   HCT 22.8* 21.1* 20.4*    192 205      BMP   Recent Labs     01/27/24  0505 01/28/24  0525 01/29/24  0434   * 129* 135   K 3.8 4.0

## 2024-01-29 NOTE — PROGRESS NOTES
Pt tolerated a 3hr tx well  1L removed     Right cvc used for access     Pt voiced no concerns   Returned to room via transport .    Pt received 1 unit PRBC, no adverse effects noted.      Patient Name: Dewey Sosa  Patient : 1980  MRN: [id number]     Acct: [id number]  Date of Admission: 2024  Room/Bed: [facility]/2-A  Code Status:  Full Code  Allergies: No Known Allergies  Diagnosis:    Patient Active Problem List   Diagnosis    Type 2 diabetes mellitus without complication, with long-term current use of insulin (HCC)    Gastroesophageal reflux disease    Former tobacco use    Acute osteomyelitis of phalanx of left foot (HCC)    Osteomyelitis of third toe of left foot (HCC)    Anxiety    Persistent proteinuria    Acute on chronic anemia    Benign neoplasm of sigmoid colon    Vitamin D deficiency    ASCVD (arteriosclerotic cardiovascular disease)    Chronic systolic congestive heart failure (HCC)    Ischemic cardiomyopathy    Dyslipidemia    Stage 3a chronic kidney disease (HCC)    Other male erectile dysfunction    Atherosclerotic heart disease of native coronary artery with other forms of angina pectoris (HCC)    Type 2 diabetes mellitus with chronic kidney disease    Insomnia    Stage 3b chronic kidney disease (HCC)    Overweight    Chronic kidney disease, stage IV (severe) (HCC)    Hyperkalemia    Hyponatremia    Elevated LFTs    Elevated lipase    Splenomegaly    Nausea and vomiting    Traumatic rhabdomyolysis (HCC)    Legionella pneumonia (HCC)    AIDS (acquired immunodeficiency syndrome), CD4 <=200 (HCC)    CKD (chronic kidney disease) stage 5, GFR less than 15 ml/min (HCC)         Treatment:  Hemodilaysis 2:1  Priority: Routine  Location: Acute Room    Diabetic: Yes  NPO: No  Isolation Precautions: Airborne     Consent for Treatment Verified: Yes  Blood Consent Verified: Yes     Safety Verified: Identify (I), Consent (C), Equipment (E), HepB Status (B), Orders Complete (O), Access Verified  less than 0.1 ppm at: 0635 hours  2nd check: less than 0.1 ppm at: 0950 hours  3rd check: Not Applicable  (if greater than 0.1 ppm, then check every 30 minutes from secondary)    Access Flows and Pressures  Patient Vitals for the past 8 hrs:   Blood Flow Rate (ml/min) Ultrafiltration Rate (ml/hr) Ultrafiltration Removed (ml) Arterial Pressure (mmHg) Venous Pressure (mmHg) TMP DFR Comments Access Visible   01/29/24 0948 300 ml/min 500 ml/hr 0 ml -40 mmHg 60 50 600 TX STARTED Yes   01/29/24 1000 300 ml/min 500 ml/hr 140 ml -40 mmHg 60 50 600 No distress noted Yes   01/29/24 1015 300 ml/min 630 ml/hr 262 ml -50 mmHg 70 50 600 PRBC infusing Yes   01/29/24 1030 300 ml/min 630 ml/hr 417 ml -80 mmHg 80 50 600 -- Yes   01/29/24 1045 300 ml/min 630 ml/hr 577 ml -80 mmHg 90 50 600 NO NEEDS Yes   01/29/24 1100 300 ml/min 630 ml/hr 737 ml -60 mmHg 90 60 600 AWAKE AND ALERT Yes   01/29/24 1115 300 ml/min 630 ml/hr 897 ml -70 mmHg 90 50 600 ASKED FOR URINAL Yes   01/29/24 1130 300 ml/min 630 ml/hr 1057 ml -70 mmHg 90 60 600 ASKED FOR WATER Yes   01/29/24 1145 300 ml/min 640 ml/hr 1203 ml -70 mmHg 90 50 600 PT RESTING Yes   01/29/24 1200 300 ml/min 640 ml/hr 1360 ml -60 mmHg 90 50 600 AWAKE WITH NO NEEDS Yes   01/29/24 1215 300 ml/min 640 ml/hr 1488 ml -60 mmHg 110 60 600 NO DISTRESS Yes   01/29/24 1230 300 ml/min 640 ml/hr 1666 ml -70 mmHg 120 50 600 awake Yes   01/29/24 1245 300 ml/min 640 ml/hr 1788 ml -60 mmHg 130 50 600 no needs Yes   01/29/24 1253 200 ml/min 640 ml/hr 1850 ml -- -- -- -- TREATMENT COMPLETED Yes     Vital Signs  Patient Vitals for the past 8 hrs:   BP Temp Pulse Resp SpO2   01/29/24 0738 -- -- -- 16 --   01/29/24 0917 (!) 160/91 -- 74 -- --   01/29/24 0940 (!) 160/91 97.7 °F (36.5 °C) 73 19 --   01/29/24 0948 (!) 169/81 -- 74 24 --   01/29/24 1000 (!) 162/84 -- 74 18 --   01/29/24 1007 (!) 162/84 -- 78 25 94 %   01/29/24 1015 (!) 164/82 -- 83 23 --   01/29/24 1037 (!) 161/81 97.8 °F (36.6 °C) 75 23 --

## 2024-01-29 NOTE — PROGRESS NOTES
[facility] Gastroenterology and Hepatology             MD Suzanne Blevins MD Carol Christensen, APRN-CNP             [address] [place] [address]             [phone] fax [phone]      Gastroenterology Progress note . 1/29/2024    Brief GI progress note.    Patient not noted to be in room, down for hemodialysis.  Has been placed in airborne precautions again due to AFB stains pending.  Discussed with ID, still awaiting final decision regarding airborne precautions.  Patient was doing well over the weekend however had a drop in his hemoglobin this a.m.  Again no overt melena or hematochezia noted.  Currently hemodynamically stable.  Received dialysis this a.m.  Triglyceride now in the 300s.    Full note to follow tomorrow.

## 2024-01-30 ENCOUNTER — APPOINTMENT (OUTPATIENT)
Dept: GENERAL RADIOLOGY | Age: 44
DRG: 969 | End: 2024-01-30
Payer: MEDICARE

## 2024-01-30 LAB
ABO/RH: NORMAL
ALBUMIN, U: 34 %
ALPHA-1-GLOBULIN, U: 11 %
ALPHA-2-GLOBULIN, U: 18 %
ANION GAP SERPL CALCULATED.3IONS-SCNC: 10 MMOL/L (ref 7–16)
ANTIBODY SCREEN: NEGATIVE
BASOPHILS ABSOLUTE: 0 K/CU MM
BASOPHILS RELATIVE PERCENT: 0.5 % (ref 0–1)
BETA GLOBULIN, U: 14 %
BUN SERPL-MCNC: 39 MG/DL (ref 6–23)
CALCIUM SERPL-MCNC: 7.8 MG/DL (ref 8.3–10.6)
CHLORIDE BLD-SCNC: 100 MMOL/L (ref 99–110)
CMV IGG SERPL IA-ACNC: 8.4 U/ML
CMV IGM SERPL IA-ACNC: <8 AU/ML
CO2: 25 MMOL/L (ref 21–32)
COMPONENT: NORMAL
CREAT SERPL-MCNC: 4.2 MG/DL (ref 0.9–1.3)
CROSSMATCH RESULT: NORMAL
DIFFERENTIAL TYPE: ABNORMAL
EOSINOPHILS ABSOLUTE: 0 K/CU MM
EOSINOPHILS RELATIVE PERCENT: 0.5 % (ref 0–3)
GAMMA GLOBULIN, U: 23 %
GFR SERPL CREATININE-BSD FRML MDRD: 17 ML/MIN/1.73M2
GLUCOSE BLD-MCNC: 120 MG/DL (ref 70–99)
GLUCOSE BLD-MCNC: 137 MG/DL (ref 70–99)
GLUCOSE BLD-MCNC: 222 MG/DL (ref 70–99)
GLUCOSE BLD-MCNC: 227 MG/DL (ref 70–99)
GLUCOSE BLD-MCNC: 254 MG/DL (ref 70–99)
GLUCOSE SERPL-MCNC: 135 MG/DL (ref 70–99)
HCT VFR BLD CALC: 25 % (ref 42–52)
HEMOGLOBIN: 8.3 GM/DL (ref 13.5–18)
IMMATURE NEUTROPHIL %: 0.7 % (ref 0–0.43)
LYMPHOCYTES ABSOLUTE: 0.7 K/CU MM
LYMPHOCYTES RELATIVE PERCENT: 12.4 % (ref 24–44)
MAGNESIUM: 1.9 MG/DL (ref 1.8–2.4)
MCH RBC QN AUTO: 30.4 PG (ref 27–31)
MCHC RBC AUTO-ENTMCNC: 33.2 % (ref 32–36)
MCV RBC AUTO: 91.6 FL (ref 78–100)
MONOCYTES ABSOLUTE: 0.4 K/CU MM
MONOCYTES RELATIVE PERCENT: 6.5 % (ref 0–4)
NUCLEATED RBC %: 0 %
PDW BLD-RTO: 13.5 % (ref 11.7–14.9)
PHOSPHORUS: 3.8 MG/DL (ref 2.5–4.9)
PLATELET # BLD: 239 K/CU MM (ref 140–440)
PMV BLD AUTO: 9.9 FL (ref 7.5–11.1)
POTASSIUM SERPL-SCNC: 3.9 MMOL/L (ref 3.5–5.1)
QUANTI TB1 MINUS NIL: 0 IU/ML (ref 0–0.34)
QUANTI TB2 MINUS NIL: 0 IU/ML (ref 0–0.34)
QUANTIFERON (R) TB GOLD (INCUBATED): ABNORMAL IU/ML
QUANTIFERON MITOGEN MINUS NIL: 0.17 IU/ML
QUANTIFERON NIL: 0.07 IU/ML
RBC # BLD: 2.73 M/CU MM (ref 4.6–6.2)
SEGMENTED NEUTROPHILS ABSOLUTE COUNT: 4.4 K/CU MM
SEGMENTED NEUTROPHILS RELATIVE PERCENT: 79.4 % (ref 36–66)
SODIUM BLD-SCNC: 135 MMOL/L (ref 135–145)
SPEP INTERPRETATION: ABNORMAL
SPEP INTERPRETATION: NORMAL
STATUS: NORMAL
T GONDII IGG SER-ACNC: <3 IU/ML
T GONDII IGM SER IA-ACNC: <3 AU/ML
TOTAL IMMATURE NEUTOROPHIL: 0.04 K/CU MM
TOTAL NUCLEATED RBC: 0 K/CU MM
TRANSFUSION STATUS: NORMAL
TRIGL SERPL-MCNC: 286 MG/DL
UNIT DIVISION: 0
UNIT NUMBER: NORMAL
URINE TOTAL PROTEIN: >600 MG/DL
URINE TOTAL PROTEIN: >600 MG/DL
WBC # BLD: 5.6 K/CU MM (ref 4–10.5)

## 2024-01-30 PROCEDURE — 99232 SBSQ HOSP IP/OBS MODERATE 35: CPT | Performed by: PHYSICIAN ASSISTANT

## 2024-01-30 PROCEDURE — 6370000000 HC RX 637 (ALT 250 FOR IP): Performed by: INTERNAL MEDICINE

## 2024-01-30 PROCEDURE — 97530 THERAPEUTIC ACTIVITIES: CPT

## 2024-01-30 PROCEDURE — 84478 ASSAY OF TRIGLYCERIDES: CPT

## 2024-01-30 PROCEDURE — 2060000000 HC ICU INTERMEDIATE R&B

## 2024-01-30 PROCEDURE — 2580000003 HC RX 258: Performed by: LICENSED PRACTICAL NURSE

## 2024-01-30 PROCEDURE — 99232 SBSQ HOSP IP/OBS MODERATE 35: CPT | Performed by: INTERNAL MEDICINE

## 2024-01-30 PROCEDURE — 6360000002 HC RX W HCPCS: Performed by: LICENSED PRACTICAL NURSE

## 2024-01-30 PROCEDURE — 97535 SELF CARE MNGMENT TRAINING: CPT

## 2024-01-30 PROCEDURE — 2580000003 HC RX 258: Performed by: STUDENT IN AN ORGANIZED HEALTH CARE EDUCATION/TRAINING PROGRAM

## 2024-01-30 PROCEDURE — 6370000000 HC RX 637 (ALT 250 FOR IP): Performed by: STUDENT IN AN ORGANIZED HEALTH CARE EDUCATION/TRAINING PROGRAM

## 2024-01-30 PROCEDURE — 72110 X-RAY EXAM L-2 SPINE 4/>VWS: CPT

## 2024-01-30 PROCEDURE — 6360000002 HC RX W HCPCS: Performed by: STUDENT IN AN ORGANIZED HEALTH CARE EDUCATION/TRAINING PROGRAM

## 2024-01-30 PROCEDURE — 83735 ASSAY OF MAGNESIUM: CPT

## 2024-01-30 PROCEDURE — 6370000000 HC RX 637 (ALT 250 FOR IP): Performed by: PHYSICIAN ASSISTANT

## 2024-01-30 PROCEDURE — 80048 BASIC METABOLIC PNL TOTAL CA: CPT

## 2024-01-30 PROCEDURE — 82962 GLUCOSE BLOOD TEST: CPT

## 2024-01-30 PROCEDURE — C9113 INJ PANTOPRAZOLE SODIUM, VIA: HCPCS | Performed by: LICENSED PRACTICAL NURSE

## 2024-01-30 PROCEDURE — A4216 STERILE WATER/SALINE, 10 ML: HCPCS | Performed by: LICENSED PRACTICAL NURSE

## 2024-01-30 PROCEDURE — 84100 ASSAY OF PHOSPHORUS: CPT

## 2024-01-30 PROCEDURE — 94761 N-INVAS EAR/PLS OXIMETRY MLT: CPT

## 2024-01-30 PROCEDURE — 99233 SBSQ HOSP IP/OBS HIGH 50: CPT | Performed by: INTERNAL MEDICINE

## 2024-01-30 PROCEDURE — 6360000002 HC RX W HCPCS: Performed by: INTERNAL MEDICINE

## 2024-01-30 PROCEDURE — 85025 COMPLETE CBC W/AUTO DIFF WBC: CPT

## 2024-01-30 RX ADMIN — CALCIUM CARBONATE 500 MG: 500 TABLET, CHEWABLE ORAL at 09:15

## 2024-01-30 RX ADMIN — CALCIUM CARBONATE 500 MG: 500 TABLET, CHEWABLE ORAL at 20:16

## 2024-01-30 RX ADMIN — EZETIMIBE 10 MG: 10 TABLET ORAL at 20:16

## 2024-01-30 RX ADMIN — HEPARIN SODIUM 5000 UNITS: 5000 INJECTION INTRAVENOUS; SUBCUTANEOUS at 13:57

## 2024-01-30 RX ADMIN — INSULIN LISPRO 2 UNITS: 100 INJECTION, SOLUTION INTRAVENOUS; SUBCUTANEOUS at 17:15

## 2024-01-30 RX ADMIN — METOPROLOL SUCCINATE 100 MG: 50 TABLET, EXTENDED RELEASE ORAL at 09:16

## 2024-01-30 RX ADMIN — NYSTATIN 500000 UNITS: 100000 SUSPENSION ORAL at 17:15

## 2024-01-30 RX ADMIN — INSULIN LISPRO 10 UNITS: 100 INJECTION, SOLUTION INTRAVENOUS; SUBCUTANEOUS at 17:16

## 2024-01-30 RX ADMIN — BUSPIRONE HYDROCHLORIDE 10 MG: 5 TABLET ORAL at 09:16

## 2024-01-30 RX ADMIN — INSULIN LISPRO 4 UNITS: 100 INJECTION, SOLUTION INTRAVENOUS; SUBCUTANEOUS at 11:43

## 2024-01-30 RX ADMIN — HYDROMORPHONE HYDROCHLORIDE 0.5 MG: 1 INJECTION, SOLUTION INTRAMUSCULAR; INTRAVENOUS; SUBCUTANEOUS at 11:42

## 2024-01-30 RX ADMIN — NYSTATIN 500000 UNITS: 100000 SUSPENSION ORAL at 20:16

## 2024-01-30 RX ADMIN — DARUNAVIR ETHANOLATE AND COBICISTAT 1 EACH: 800; 150 TABLET, FILM COATED ORAL at 09:17

## 2024-01-30 RX ADMIN — HYDROMORPHONE HYDROCHLORIDE 0.5 MG: 1 INJECTION, SOLUTION INTRAMUSCULAR; INTRAVENOUS; SUBCUTANEOUS at 01:10

## 2024-01-30 RX ADMIN — BUSPIRONE HYDROCHLORIDE 10 MG: 5 TABLET ORAL at 13:57

## 2024-01-30 RX ADMIN — SODIUM CHLORIDE, PRESERVATIVE FREE 40 MG: 5 INJECTION INTRAVENOUS at 09:16

## 2024-01-30 RX ADMIN — FOLIC ACID 1 MG: 1 TABLET ORAL at 09:15

## 2024-01-30 RX ADMIN — NYSTATIN 500000 UNITS: 100000 SUSPENSION ORAL at 11:42

## 2024-01-30 RX ADMIN — HYDROMORPHONE HYDROCHLORIDE 0.5 MG: 1 INJECTION, SOLUTION INTRAMUSCULAR; INTRAVENOUS; SUBCUTANEOUS at 20:16

## 2024-01-30 RX ADMIN — INSULIN GLARGINE 20 UNITS: 100 INJECTION, SOLUTION SUBCUTANEOUS at 20:23

## 2024-01-30 RX ADMIN — INSULIN LISPRO 10 UNITS: 100 INJECTION, SOLUTION INTRAVENOUS; SUBCUTANEOUS at 11:42

## 2024-01-30 RX ADMIN — LAMIVUDINE 100 MG: 100 TABLET, FILM COATED ORAL at 09:17

## 2024-01-30 RX ADMIN — NYSTATIN 500000 UNITS: 100000 SUSPENSION ORAL at 09:15

## 2024-01-30 RX ADMIN — HEPARIN SODIUM 5000 UNITS: 5000 INJECTION INTRAVENOUS; SUBCUTANEOUS at 23:12

## 2024-01-30 RX ADMIN — ATORVASTATIN CALCIUM 80 MG: 40 TABLET, FILM COATED ORAL at 20:16

## 2024-01-30 RX ADMIN — SODIUM CHLORIDE, PRESERVATIVE FREE 10 ML: 5 INJECTION INTRAVENOUS at 09:17

## 2024-01-30 RX ADMIN — HYDROMORPHONE HYDROCHLORIDE 0.5 MG: 1 INJECTION, SOLUTION INTRAMUSCULAR; INTRAVENOUS; SUBCUTANEOUS at 15:54

## 2024-01-30 RX ADMIN — CALCITRIOL CAPSULES 0.25 MCG 0.5 MCG: 0.25 CAPSULE ORAL at 09:16

## 2024-01-30 RX ADMIN — DOLUTEGRAVIR SODIUM 50 MG: 50 TABLET, FILM COATED ORAL at 09:17

## 2024-01-30 RX ADMIN — ISOSORBIDE MONONITRATE 60 MG: 60 TABLET, EXTENDED RELEASE ORAL at 09:16

## 2024-01-30 RX ADMIN — HEPARIN SODIUM 5000 UNITS: 5000 INJECTION INTRAVENOUS; SUBCUTANEOUS at 05:49

## 2024-01-30 RX ADMIN — TRAZODONE HYDROCHLORIDE 50 MG: 50 TABLET ORAL at 20:16

## 2024-01-30 RX ADMIN — ASPIRIN 81 MG 81 MG: 81 TABLET ORAL at 09:17

## 2024-01-30 RX ADMIN — HYDROMORPHONE HYDROCHLORIDE 0.5 MG: 1 INJECTION, SOLUTION INTRAMUSCULAR; INTRAVENOUS; SUBCUTANEOUS at 05:49

## 2024-01-30 RX ADMIN — BUSPIRONE HYDROCHLORIDE 10 MG: 5 TABLET ORAL at 20:16

## 2024-01-30 RX ADMIN — SODIUM CHLORIDE, PRESERVATIVE FREE 10 ML: 5 INJECTION INTRAVENOUS at 20:16

## 2024-01-30 ASSESSMENT — PAIN SCALES - WONG BAKER

## 2024-01-30 ASSESSMENT — PAIN DESCRIPTION - ORIENTATION
ORIENTATION: RIGHT;LOWER;MID
ORIENTATION: RIGHT;POSTERIOR
ORIENTATION: LOWER
ORIENTATION: RIGHT;LOWER;MID
ORIENTATION: LOWER
ORIENTATION: LOWER

## 2024-01-30 ASSESSMENT — PAIN DESCRIPTION - PAIN TYPE
TYPE: ACUTE PAIN
TYPE: ACUTE PAIN;CHRONIC PAIN
TYPE: ACUTE PAIN;CHRONIC PAIN
TYPE: ACUTE PAIN

## 2024-01-30 ASSESSMENT — PAIN DESCRIPTION - ONSET
ONSET: GRADUAL
ONSET: ON-GOING

## 2024-01-30 ASSESSMENT — PAIN DESCRIPTION - FREQUENCY
FREQUENCY: CONTINUOUS

## 2024-01-30 ASSESSMENT — PAIN - FUNCTIONAL ASSESSMENT
PAIN_FUNCTIONAL_ASSESSMENT: ACTIVITIES ARE NOT PREVENTED
PAIN_FUNCTIONAL_ASSESSMENT: PREVENTS OR INTERFERES SOME ACTIVE ACTIVITIES AND ADLS
PAIN_FUNCTIONAL_ASSESSMENT: PREVENTS OR INTERFERES SOME ACTIVE ACTIVITIES AND ADLS
PAIN_FUNCTIONAL_ASSESSMENT: ACTIVITIES ARE NOT PREVENTED

## 2024-01-30 ASSESSMENT — PAIN SCALES - GENERAL
PAINLEVEL_OUTOF10: 9
PAINLEVEL_OUTOF10: 3
PAINLEVEL_OUTOF10: 8
PAINLEVEL_OUTOF10: 0
PAINLEVEL_OUTOF10: 5
PAINLEVEL_OUTOF10: 4
PAINLEVEL_OUTOF10: 4
PAINLEVEL_OUTOF10: 9
PAINLEVEL_OUTOF10: 8
PAINLEVEL_OUTOF10: 8

## 2024-01-30 ASSESSMENT — PAIN DESCRIPTION - DESCRIPTORS
DESCRIPTORS: ACHING;THROBBING;SORE;TENDER
DESCRIPTORS: CRAMPING;ACHING
DESCRIPTORS: ACHING
DESCRIPTORS: ACHING;DISCOMFORT
DESCRIPTORS: ACHING;DISCOMFORT;DULL
DESCRIPTORS: ACHING

## 2024-01-30 ASSESSMENT — PAIN DESCRIPTION - LOCATION
LOCATION: BACK
LOCATION: BACK;FLANK
LOCATION: BACK

## 2024-01-30 NOTE — PROGRESS NOTES
Physical Therapy    Physical Therapy Treatment Note  Name: Dewey Sosa MRN: [id number] :   1980   Date:  2024   Admission Date: 2024 Room:  [address] [place], [place] [address]   Restrictions/Precautions:          airborne prec, fall risk  Communication with other providers:  cotx /c OT  Subjective:  Patient states:  agreeable  Pain:   Location, Type, Intensity (0/10 to 10/10):  does not rate    Objective:    Observation:  in bed  Treatment, including education/measures:  Transfers   Rolling: modA  Supine to sit :maxA  Scooting :SBA  Sit to stand :maxAx2  Stand to sit :maxAx1  SPT:modAx1 /c FWW  Multiple trials for all STS for proper momentum.  Vc for safe techs, hand placement and body mechanics  Standing balance x~30\" for lyndsay care post toileting, F-/P+ /c FWW assist  Gait:  Pt amb with FWW for ~10' /c ModA, pt BLE's weak and shaking, continuous pattern /c fast chidi and decreased step length/ht.  Safety  Patient left safely in the chair, with call light/phone in reach with alarm applied. Gait belt was used for transfers and gait.    Assessment / Impression:    Pt improving /c gait and transfer ability  Patient's tolerance of treatment:  Good   Adverse Reaction: na  Significant change in status and impact:  na  Barriers to improvement:  weakness and endurance  Plan for Next Session:    Cont transfer and gait training. Prolonged stands if tolerable in order to build up BLE and core strength  Time in:  0950  Time out:  1022  Timed treatment minutes:  32  Total treatment time:  32    Previously filed items:  Social/Functional History  Lives With: Significant other  Type of Home: House  Home Layout: Two level, Bed/Bath upstairs  Home Access: Stairs to enter without rails  Entrance Stairs - Number of Steps: 2  Bathroom Shower/Tub: Walk-in shower  Bathroom Equipment: Tub transfer bench  Home Equipment: Cane, Walker, rolling, Wheelchair-manual, Rollator  Has the patient had two or more falls in the past year or any fall

## 2024-01-30 NOTE — PROGRESS NOTES
Infectious Disease Progress Note  2024   Patient Name: Dewey Sosa : 1980     Assessment  HIV/AIDS  Legionella pneumonia  Resolved hepatitis B infection  Compensated liver disease on intermittent hemodialysis  History of CKD stage V, type 2 diabetes mellitus, coronary artery disease status post PCI, MSM \"presented with 1 week history of shortness of breath, weakness and falls.  Was diagnosed with Legionella pneumonia.  Newly diagnosed with HIV-1 viral load was 46,000, CD4 count was 25 on this admission.  Sexual contraction.  Tolerating oral ART  Cryptococcal antigen negative, gonococcal and chlamydia NAAT test negative, T. pallidum screen negative  Completed a 1 week course of azithromycin.  ROSE MARY on CKD stage V  ESRD on maintenance hemodialysis on  and Friday  Type 2 diabetes mellitus  Peripheral arterial disease  Multiple comorbidity per PMHx:   Plan  Therapeutic:  PJP prophylaxis: Bactrim single strength 1 tab p.o. daily (2024)  Entecavir 0.5 mg p.o. weekly (on Monday), lamivudine 100 mg p.o. daily, darunavir-cobicistat 800-150 mg p.o. daily, dolutegravir 50 mg p.o. daily (2024)  Continue to monitor renal function, if it worsens then Entecavir and lamivudine dosing will have to be adjusted  Diagnostic:  Trend CRP  Lumbar x-ray  F/u:  QuantiFERON TB test  AFB blood culture  Other: Discussed with infection control nurse.  Blood culture was ordered by myself for AFB to evaluate for disseminated MAC and not tuberculosis.  The patient will be taken off isolation precautions.    Reason for visit: F/u HIV/AIDS, CKD stage V on hemodialysis  History:?Interval history noted  Denies n/v/d/f or untoward effects of antimicrobials  Complains of back pain  Physical Exam:  Vital Signs: BP (!) 156/91   Pulse 74   Temp 98 °F (36.7 °C) (Oral)   Resp 18   Ht 1.854 m (6' 1\")   Wt 103.3 kg (227 lb 11.8 oz)   SpO2 91%   BMI 30.05 kg/m²     Gen: alert and oriented X3, no distress  Skin:

## 2024-01-30 NOTE — PROGRESS NOTES
Infectious Disease Progress Note  2024   Patient Name: Dewey Sosa : 1980   Impression  HIV Positive: New Diagnosis  Hepatitis B Co-Infection:  Legionella Bilateral Pneumonia: Resolving  No reported allergies to ABX  CrCl 25 on CKD5  Afebrile since , WBC normalized from leukopenia on , Hb 8.3 from 6.7after 1 uPRBCs , with no active bleeding, hypoalbuminemia  -BC 0/2 NGTD  -Hepatitis B S Ab >1000, Hep B surface Ag non-reactive, Hep C Ab non-reactive. Hep B Core Total Ab positive.   -Resp panel and Strep pna negative, MRSA by PCR negative    C.trachomatis N.gonorrhea negative  -AFB x1 Pending, x 2 Pending  -Quantiferon gold: Pending  -HIV-1 ab positive, CD4/CD8 ratio: 0.13.  CD8 % Suppressor T Cell 76. CD8 T Cell Abs 201. CD3% Total T Cell 87. CD4 T cell 25  -Legionella Ag Positive  CKD5/ Hyponatremia/ Hyperkalemia:  Dr. Nevarez onboard  HD started, will evaluate HD vs PD  Rhabdomyolysis:  Class I Obesity: BMI: 30.51 kg/m2  Elevated LFTs and Lipase:  Dr. Ríos onboard, imp of possible Legionella induced hepatitis vs rhabdomyolysis. Awaiting MRI to r/o any biliary dilatation or choledocholithiasis.   Anemia in the Setting of HIV:  Dr. FRANCISCA Zavala, hematology, onboard  Dr. Ríos, GI, onboard  DMII:  Dr. Cruz onboard  PAD:  Multi-morbidity: per PMHx:  CKD4, DMII, CAD s/p PCI, GERD, kidney stones, Marijuana use, s/p left 3rd toe amputation , anxiety, CHF and HLD  Plan:  Continue po empiric prophylaxis of Bactrim SS but decreased to thrice weekly as on HD (MWF) after HD treatment,  pharmacy  Continue po dolutegravir sodium 50 mg daily  Continue po darunavir-cobicistat 800-150 mg daily   Continue po lamivudine 100 mg daily  Continue po entecavir 0.5 mg weekly on   Ordered cryptococcal ag/ab, IGG/IGM, HSV 1, 2, urine for gonacoccal, RPR and syphillis ab (Treponema pallidum)  DW Dr. Nevarez the plan of care, he DW patient and patient's boyfriend of the    Result Value Ref Range    POC Glucose 137 (H) 70 - 99 MG/DL     CULTURE results: Invalid input(s): \"BLOOD CULTURE\", \"URINE CULTURE\", \"SURGICAL CULTURE\"    Diagnosis:  Patient Active Problem List   Diagnosis    Type 2 diabetes mellitus without complication, with long-term current use of insulin (HCC)    Gastroesophageal reflux disease    Former tobacco use    Acute osteomyelitis of phalanx of left foot (HCC)    Osteomyelitis of third toe of left foot (HCC)    Anxiety    Persistent proteinuria    Acute on chronic anemia    Benign neoplasm of sigmoid colon    Vitamin D deficiency    ASCVD (arteriosclerotic cardiovascular disease)    Chronic systolic congestive heart failure (HCC)    Ischemic cardiomyopathy    Dyslipidemia    Stage 3a chronic kidney disease (HCC)    Other male erectile dysfunction    Atherosclerotic heart disease of native coronary artery with other forms of angina pectoris (HCC)    Type 2 diabetes mellitus with chronic kidney disease    Insomnia    Stage 3b chronic kidney disease (HCC)    Overweight    Chronic kidney disease, stage IV (severe) (HCC)    Hyperkalemia    Hyponatremia    Elevated LFTs    Elevated lipase    Splenomegaly    Nausea and vomiting    Traumatic rhabdomyolysis (HCC)    Legionella pneumonia (HCC)    AIDS (acquired immunodeficiency syndrome), CD4 <=200 (HCC)    CKD (chronic kidney disease) stage 5, GFR less than 15 ml/min ([facility])       Active Problems  Principal Problem:    Hyponatremia  Active Problems:    Acute on chronic anemia    Elevated LFTs    Elevated lipase    Splenomegaly    Nausea and vomiting    Traumatic rhabdomyolysis (HCC)    Legionella pneumonia (HCC)    AIDS (acquired immunodeficiency syndrome), CD4 <=200 (HCC)    CKD (chronic kidney disease) stage 5, GFR less than 15 ml/min ([facility])  Resolved Problems:    * No resolved hospital problems. *    Electronically signed by: Electronically signed by BARBARA Santillan CNP on 1/30/2024 at 7:28 AM

## 2024-01-30 NOTE — PROGRESS NOTES
Occupational Therapy    Occupational Therapy Treatment Note    Name: Dewey Sosa MRN: [id number] :   1980   Date:  2024   Admission Date: 2024 Room:  -A     Discharge Recommendation: ARU    Primary Problem:  Hyponatremia    Restrictions/Precautions:  General, fall risk, Airborne (TB rule out)    Communication with other providers: RN, co-tx w/ PTA Hayes for safety and CM note    Subjective:  Patient states:  \"I feel like I'm in a better place mentally and I'm ready to work on getting better\"   Pain: Denied    Objective:    Observation: Pt supine in bed, agreeable to therapy.  Objective Measures:  On room air, tele, vitals remained stable    Treatment, including education:  Therapeutic Activity Training:   Therapeutic activity training was instructed today.  Cues were given for safety, sequence, UE/LE placement, awareness, and balance.    Activities performed today included bed mobility training, sup-sit, sit-stand, SPT.  Self Care Training:   Cues were given for safety, sequence, UE/LE placement, visual cues, and balance.    Activities performed today included toileting, hand hygiene      Pt received semi fowlers in bed, agreeable to therapy. Pt provided w/ re-education on the importance of therapy and updated on current plan of care. Pt required maxA to bring torso upright during sup to sit. Pt sat EOB for approx 2 minutes w/ Ronak progressing to SBA for sitting balance. Pt attempted to stand from EOB to RW w/ maxA x2, unable to maintain forward momentum and balance to maintain stand. Pt successfully stood/ w. Slight elevation of bed and maxA x2 to RW. Pt performed approx 10ft functional mobility w/ RW modA, w/ verbal cues for safety, chidi, and speed. Pt sat to toilet w/ modA for safe eccentric control. Pt required maxA for lyndsay care after toileting. Pt performed SPT w/ RW from commode to reclining chair w/ maxA x2 and verbal cues for hand placement. Pt performed hand hygiene w/ wet

## 2024-01-30 NOTE — PROGRESS NOTES
Comprehensive Nutrition Assessment    Type and Reason for Visit:  Reassess    Nutrition Recommendations/Plan:   Liberalize current diet and reorder supplements     Malnutrition Assessment:  Malnutrition Status:  At risk for malnutrition (Comment) (01/25/24 6368)    Context:  Acute Illness       Nutrition Assessment:    Pt is now consuming % of his meals and is at moderate nutritional risk.Will liberalize diet ro help the pt meet their estimated needs    Nutrition Related Findings:    . Wound Type: None       Current Nutrition Intake & Therapies:    Average Meal Intake: 26-50%, 51-75%  Average Supplements Intake: None Ordered  ADULT DIET; Dysphagia - Soft and Bite Sized; Low Fat/Low Chol/High Fiber/CANDIE    Anthropometric Measures:  Height: 185.4 cm (6' 1\")  Ideal Body Weight (IBW): 184 lbs (84 kg)    Admission Body Weight: 99.3 kg (218 lb 14.7 oz)  Current Body Weight: 105 kg (231 lb 7.7 oz), 125.8 % IBW. Weight Source: Bed Scale  Current BMI (kg/m2): 30.5  Usual Body Weight: 105.3 kg (232 lb 2.3 oz) (10/18/23)  % Weight Change (Calculated): -0.3  Weight Adjustment For: No Adjustment                      Estimated Daily Nutrient Needs:  Energy Requirements Based On: Kcal/kg  Weight Used for Energy Requirements: Ideal  Energy (kcal/day): 2,509-2,927 (30-35 kcal/kg IBW)  Weight Used for Protein Requirements: Ideal  Protein (g/day):  (1-1.2 g/kg)  Method Used for Fluid Requirements: Standard Renal  Fluid (ml/day): 2500 or per MD    Nutrition Diagnosis:   Inadequate protein-energy intake related to increase demand for energy/nutrients, cognitive or neurological impairment, acute injury/trauma, renal dysfunction as evidenced by intake 26-50%, dialysis    Nutrition Interventions:   Food and/or Nutrient Delivery: Modify Current Diet, Modify Oral Nutrition Supplement  Nutrition Education/Counseling: No recommendation at this time  Coordination of Nutrition Care: Continue to monitor while inpatient, Coordination of  Care  Plan of Care discussed with: pt's mom at bedside, pt    Goals:  Previous Goal Met: Progress towards Goal(s) Declining  Goals: PO intake 75% or greater, by next RD assessment       Nutrition Monitoring and Evaluation:   Behavioral-Environmental Outcomes: None Identified  Food/Nutrient Intake Outcomes: Food and Nutrient Intake, Supplement Intake  Physical Signs/Symptoms Outcomes: Weight, Nutrition Focused Physical Findings, Biochemical Data, Meal Time Behavior    Discharge Planning:    Continue Oral Nutrition Supplement, Continue current diet     Brooklynn Doss RD, LD  Contact: [phone]

## 2024-01-30 NOTE — PROGRESS NOTES
Progress Note( Dr. Cruz)  1/29/2024  Subjective:   Admit Date: 1/19/2024  PCP: Frandy Parsons APRN - CNP    Admitted For : Nausea vomiting and frequent fall was found to be hyponatremic with serum sodium 117     Consulted For: Better control of blood glucose     Interval History:     Feels feels much better today starting also started eating better  Serum sodium getting better of 129  it is 117 earlier    Patient had elevated liver enzymes also lipase suggesting pancreatitis or hepatitis  And liver function test trending down.  GI has been seeing him    Positive for Legionella  CT scan shows possibility of pneumonia and also has pancreatitis  Patient did not go for MRI of the abdomen and also placement dialysis catheter yet   Possible to the be done tomorrow    Denies any chest pains,   Yes  SOB .   Has some  nausea but no  vomiting.  Eating somewhat better seem like he has  got good appetite now   changing/placing dialysis catheter possibly tomorrow  No new bowel or bladder symptoms.       Intake/Output Summary (Last 24 hours) at 1/29/2024 2225  Last data filed at 1/29/2024 2032  Gross per 24 hour   Intake 1465 ml   Output 4525 ml   Net -3060 ml         DATA    CBC:   Recent Labs     01/27/24  0505 01/28/24  0525 01/29/24  0434   WBC 4.7 4.6 4.9   HGB 7.6* 7.0* 6.7*    192 205      CMP:  Recent Labs     01/27/24  0505 01/28/24  0525 01/29/24  0434   * 129* 135   K 3.8 4.0 3.9   CL 94* 96* 100   CO2 24 23 21   BUN 40* 51* 57*   CREATININE 4.3* 5.1* 5.3*   CALCIUM 7.5* 7.5* 7.6*   PROT 5.4*  --   --    LABALBU 2.0*  --   --    BILITOT 0.9  --   --    ALKPHOS 201*  --   --    *  --   --    ALT 52*  --   --        Lipids:   Lab Results   Component Value Date/Time    CHOL 278 12/11/2023 12:28 PM    HDL 22 12/11/2023 12:28 PM    TRIG 342 01/28/2024 05:25 AM     Glucose:  Recent Labs     01/29/24  0915 01/29/24  1720 01/29/24  2108   POCGLU 276* 208* 196*       [id number]:  Lab Results

## 2024-01-30 NOTE — PROGRESS NOTES
Nephrology Progress Note  1/30/2024 11:47 AM  Subjective:     Interval History: Dewey Sosa is a 43 y.o. male weak doing somewhat better working on TB evaluation    Data:   Scheduled Meds:   insulin glargine  20 Units SubCUTAneous Nightly    nystatin  5 mL Oral 4x Daily    insulin lispro  10 Units SubCUTAneous TID WC    insulin lispro  0-8 Units SubCUTAneous TID WC    insulin lispro  0-8 Units SubCUTAneous 2 times per day    [Held by provider] insulin NPH  5 Units SubCUTAneous QAM    dolutegravir sodium  50 mg Oral Daily    darunavir-cobicistat  1 tablet Oral Daily    lamiVUDine  100 mg Oral Daily    entecavir  0.5 mg Oral Weekly - Monday    sulfamethoxazole-trimethoprim  1 tablet Oral Once per day on Mon Wed Fri    pantoprazole (PROTONIX) 40 mg in sodium chloride (PF) 0.9 % 10 mL injection  40 mg IntraVENous Daily    folic acid  1 mg Oral Daily    metoprolol succinate  100 mg Oral Daily    nicotine  1 patch TransDERmal Daily    calcitRIOL  0.5 mcg Oral Daily    vitamin D  50,000 Units Oral Weekly    calcium carbonate  500 mg Oral TID    sodium chloride flush  5-40 mL IntraVENous 2 times per day    heparin (porcine)  5,000 Units SubCUTAneous 3 times per day    aspirin  81 mg Oral Daily    [Held by provider] ticagrelor  90 mg Oral BID    ezetimibe  10 mg Oral Nightly    [Held by provider] gabapentin  300 mg Oral TID    busPIRone  10 mg Oral TID    atorvastatin  80 mg Oral Nightly    traZODone  50 mg Oral Nightly    isosorbide mononitrate  60 mg Oral Daily     Continuous Infusions:   sodium chloride      sodium chloride      sodium chloride      dextrose           CBC   Recent Labs     01/28/24  0525 01/29/24  0434 01/30/24  0430   WBC 4.6 4.9 5.6   HGB 7.0* 6.7* 8.3*   HCT 21.1* 20.4* 25.0*    205 239      BMP   Recent Labs     01/28/24  0525 01/29/24  0434 01/30/24  0430   * 135 135   K 4.0 3.9 3.9   CL 96* 100 100   CO2 23 21 25   PHOS 3.2 3.6 3.8   BUN 51* 57* 39*   CREATININE 5.1* 5.3* 4.2*  Results   Component Value Date/Time    FERRITIN 2,343 01/26/2024 02:30 PM     RPR:  No results found for: \"RPR\"  CHIDI:    Lab Results   Component Value Date/Time    CHIDI None Detected 09/15/2021 08:32 AM     24 Hour Urine for Creatinine Clearance:  No components found for: \"CREAT4\", \"UHRS10\", \"UTV10\"      Objective:   I/O: 01/29 0701 - 01/30 0700  In: 1390 [P.O.:240]  Out: 4485 [Urine:2635]  I/O last 3 completed shifts:  In: 1465 [P.O.:315; Blood:300]  Out: 5635 [Urine:3785]  No intake/output data recorded.  Vitals: BP (!) 148/83   Pulse 71   Temp 98.3 °F (36.8 °C) (Oral)   Resp 17   Ht 1.854 m (6' 1\")   Wt 103.3 kg (227 lb 11.8 oz)   SpO2 94%   BMI 30.05 kg/m²  {  General appearance: awake weak  HEENT: Head: Normal, normocephalic, atraumatic.  Neck: supple, symmetrical, trachea midline  Lungs: diminished breath sounds bilaterally  Heart: S1, S2 normal  Abdomen: abnormal findings:  soft nt  Extremities: edema trace positive temporary HD catheter  Neurologic: Mental status: alertness: Weak tired      Assessment and Plan:        IMP:   #1 end-stage renal disease   #2  HIV positive with positive urine Legionella    #3 hyponatremia   #4 hypertension   #5 delirium/ metabolic encephalopathy   #6 type 2 diabetes with proteinuria  #7 anemia    Plan  #1 maintain on hemodialysis Monday Wednesday Friday and when TB is ruled out we will get tunneled HD catheter placed  #2 treating for HIV follow-up ID recommendations follow-up workup for TB  #3 sodium electrolytes stable  #4 blood pressure controlled  #5 affect improved every day work on rehab options  #6 control glucose  #7 monitor hemoglobin give EPO  Will hopefully try to go to ARU once stable  Will follow             FARTUN SHAHID MD, MD

## 2024-01-30 NOTE — PROGRESS NOTES
[facility] Gastroenterology and Hepatology             MD Suzanne Blevins MD Carol Christensen, APRN-CNP       Brie Yusra, APRN-CNP             [address] [place] [address]             [phone] fax [phone]      Gastroenterology Progress note . 1/30/2024  Reason for consult:  Acute Transaminitis with elevated Lipase    Physician attestation:  I have personally seen and examined the patient independently. I have reviewed the patient's available data,including medical history and recent test results. Reviewed and discussed note as documented by AMALIA.  I agree with the physical exam findings, assessment and plan.     Briefly Sitting comfortably in bed   Dneies any nausea or vomiting   NO overt melena or hematochezia.   TG improving   Conitnues to be in airborne precautions.     Gen: normal mood, alert  ENT: normal TJ  Cardiovascular: RRR, No M/R/G  Respiratory: CTA BL  Gastrointestinal: Soft,NT ND  Genitourinary: no suprapubic tenderness  Musculoskeletal: no scars  Skin:moist  Neuro: alert and oriented x3    My assessment and plan reveals     #1 elevated LFTs  Possible differentials include Legionella induced hepatitis versus  rhabdomyolysis - .  CK from admission noted to be significantly elevated to 20,000.  His pattern of LFT elevation certainly raises concern for that with AST significantly more than ALT as well as indirect and direct bilirubin.  -CK now down to 3000  -Initially planned to evaluate for any biliary dilation or associated choledocholithiasis with MRCP however patient not cooperating for MRI sequencing.   -No biliary dilation on prior imaging  -Recommend continuing monitoring bilirubin     #2  Pancreatitis  -Possible differentials include Pancreatitis however now mentions abdominal pain vs possible rhabdo.   - TG elevated 608   -Initially plan for MRI to assess for pancreatitis however patient not concentrating.    -CT pancreas

## 2024-01-30 NOTE — PLAN OF CARE
Problem: Discharge Planning  Goal: Discharge to home or other facility with appropriate resources  1/30/2024 0936 by Hafsa Alvarado RN  Outcome: Progressing  1/29/2024 2234 by Frandy Johnson RN  Outcome: Progressing     Problem: Pain  Goal: Verbalizes/displays adequate comfort level or baseline comfort level  1/30/2024 0936 by Hafsa Alvarado RN  Outcome: Progressing  1/29/2024 2234 by Frandy Johnson RN  Outcome: Progressing     Problem: Skin/Tissue Integrity  Goal: Absence of new skin breakdown  Description: 1.  Monitor for areas of redness and/or skin breakdown  2.  Assess vascular access sites hourly  3.  Every 4-6 hours minimum:  Change oxygen saturation probe site  4.  Every 4-6 hours:  If on nasal continuous positive airway pressure, respiratory therapy assess nares and determine need for appliance change or resting period.  1/30/2024 0936 by Hafsa Alvarado RN  Outcome: Progressing  1/29/2024 2234 by Frandy Johnson RN  Outcome: Progressing     Problem: Safety - Adult  Goal: Free from fall injury  1/30/2024 0936 by Hafsa Alvarado RN  Outcome: Progressing  1/29/2024 2234 by Frandy Johnson RN  Outcome: Progressing     Problem: Chronic Conditions and Co-morbidities  Goal: Patient's chronic conditions and co-morbidity symptoms are monitored and maintained or improved  1/30/2024 0936 by Hafsa Alvarado RN  Outcome: Progressing  1/29/2024 2234 by Frandy Johnson RN  Outcome: Progressing     Problem: Nutrition Deficit:  Goal: Optimize nutritional status  1/30/2024 0936 by Hafsa Alvarado RN  Outcome: Progressing  1/29/2024 2234 by Frandy Johnson RN  Outcome: Progressing

## 2024-01-30 NOTE — PROGRESS NOTES
Hematology Oncology Inpatient Progress Note     Patient Name:  Dewey Sosa  Patient :  1980  Patient MRN:  [id number]       Referring Provider: Frandy Parsons APRN - CNP     Date of Service: 2024      Reason for Consult: Anemia in setting of HIV+     Chief Complaint:    Chief Complaint   Patient presents with    Fall     Complains he has fallen 6 times in the last few days, and has hit his head on ASA    Fatigue     Complaining of increasing weakness.      Principal Problem:    Hyponatremia  Active Problems:    Acute on chronic anemia    Elevated LFTs    Elevated lipase    Splenomegaly    Nausea and vomiting    Traumatic rhabdomyolysis (HCC)    Legionella pneumonia (HCC)    AIDS (acquired immunodeficiency syndrome), CD4 <=200 (HCC)    CKD (chronic kidney disease) stage 5, GFR less than 15 ml/min (HCC)  Resolved Problems:    * No resolved hospital problems. *      HPI:   Dewey Sosa is a 43 y.o. male with newly diagnosed HIV+ with low CD4 count (no AIDS defining event as of yet) hospitalized with multiple issues including rhabdomyolysis, legionella pneumonia, hyponatremia, hyperbilirubinemia, acute pancreatitis, ARF (ROSE MARY on CKD IV, now on HD).  He is noted to have worsening anemia over admission from 10.3 =>7.0,  Did receive 1 uPRBC with appropriate response to 8.5 however 7.4 on repeat.  GI is already following.  MCV 88. MCHC 33, RDW 13. Overall Plt and WBC have remained WNL.  Reportedly he has been following with nephrology for CKD however had declined a large portion of the workup prior to this admission.  These diagnoses are all new to him and he admits he was not sure if he wanted to know.    On exam he is with his mother and is ok with her in room to discuss sensitive diagnosis. He feels improved since admission and is in better spirits. He states that he has felt weak over the last few months acutely worsening in the last few weeks.  Admits to decreasing appetite over last 6 months but  including BMB/aspiration.   -Reading Cells noted on smear, likely r/t uremia. No schistocytes. Stable overall.    #Mass-like Opacity in RLL  -urine antigen +Legionella, so most likely pneumonia.  ID is ruling out other opportunistic infections.  If does not resolve after infection treatment can consider further workup.    Remainder of care per primary and consulting teams.  ID is following for new HIV diagnosis as well as legionella.  Nephrology is following for renal failure/HD. GI is following for liver dysfunction which is improving.    We will continue to follow as outpatient and monitor blood counts, as well as  consider further evaluation including BMB /aspiration If anemia worsening and no other cause identified.     Patient was seen independently with attending physician Dr Zavala available for consultation.    Sivan Orozco PA-C    Imaging and labs reviewed and plan of care discussed with patient in depth.  We will follow along.  Please call with any questions.

## 2024-01-30 NOTE — PROGRESS NOTES
01/30/24 1539   Encounter Summary   Encounter Overview/Reason  Attempted Encounter   Service Provided For: Patient not available   Referral/Consult From: Rounding   Last Encounter  01/30/24  (attempted rounding visit.)   Begin Time 1540   End Time  1545   Total Time Calculated 5 min   Assessment/Intervention/Outcome   Assessment Unable to assess  (Patient was not available)   Plan and Referrals   Plan/Referrals Continue Support (comment)  (as needeed)

## 2024-01-30 NOTE — PROGRESS NOTES
[place]  [facility] [place]      Name:  Dewey Sosa /Age/Sex: 1980  (43 y.o. male)   MRN & CSN:  [id number] & [id number] Encounter Date/Time: 2024 1:41 PM EST    Location:  -A PCP: Frandy Parsons APRN - CNP       Hospital Day: 12    Assessment and Plan:   # Sepsis 2/2 Legionella Pneumonia   # HIV Infection, ?AIDS  Presented with fever T max 101.6, tachycardia 110's, tachypnea 30's. 2/2 legionella pneumonia. CT chest showed right lung mass vs consolidation  Legionalla antigen +  S/p azithromycin 7 days in total and rocephin 5 days total, ID on board, CD4 count only 25, On bactrim prophylaxis and HAART, ID ordered cryptococcal ag/ab, IGG/IGM, HSV 1, 2, urine for gonacoccal, RPR and syphillis ab (Treponema pallidum) , follow on results    # ROSE MARY on CKD IV now ESRD.  Cr 4.3 (baseline ~2.5).   Temp vas cath placed  Started on HD. Contnue HD per nephro.  Permanent HD cath placement pending QuantiFERON result    # Acute Normocytic Anemia   Hb down trending from 10's to 7.0. Received 1U PRBC   Patient with significant fatigue at rest and SOB with activity   Hb 8.5 post transfusion. Hb stable at 7.6  Transfuse if Hb < 7  PBS without evidence of hemolysis.      # Hyponatremia. Likely 2/2 legionella pneumonia and poor oral intake/dehydration. P/w Na 117, improved 123 but then down to 118.   Today Na 127 as well ,  Nephro managing, on HD as below  - Plan for M/W/F HD   - plan for tunneled cath but waiting on QuantiFERON result     # Transaminitis, hyperbilirubinemia.  Was going up but today coming down.   Imaging including U/S and CT abd unremarkable.   CK from few days ago 20K down to 3600 yesterday  Could be legionella hepatitis vs rhabdomyolysis.    - GI on board   - Trend liver panel  - MRCP ordered but cancelled now given no clinical evidence of Choledocholithiasis    # Acute pancreatitis 2/2 Elevated TG. Was as high as 1748. Has been fluctuating.    CT abd, U/S no sign of  Immature Neutrophil % 0.7 (H) 0 - 0.43 %   Critical Care Panel    Collection Time: 01/30/24  4:30 AM   Result Value Ref Range    Sodium 135 135 - 145 MMOL/L    Potassium 3.9 3.5 - 5.1 MMOL/L    Chloride 100 99 - 110 mMol/L    CO2 25 21 - 32 MMOL/L    Anion Gap 10 7 - 16    Glucose 135 (H) 70 - 99 MG/DL    BUN 39 (H) 6 - 23 MG/DL    Creatinine 4.2 (H) 0.9 - 1.3 MG/DL    Est, Glom Filt Rate 17 (L) >60 mL/min/1.73m2    Calcium 7.8 (L) 8.3 - 10.6 MG/DL    Phosphorus 3.8 2.5 - 4.9 MG/DL    Magnesium 1.9 1.8 - 2.4 mg/dl   Triglyceride    Collection Time: 01/30/24  4:30 AM   Result Value Ref Range    Triglycerides 286 (H) <150 MG/DL   POCT Glucose    Collection Time: 01/30/24  5:37 AM   Result Value Ref Range    POC Glucose 137 (H) 70 - 99 MG/DL   POCT Glucose    Collection Time: 01/30/24 11:17 AM   Result Value Ref Range    POC Glucose 254 (H) 70 - 99 MG/DL      Results       Procedure Component Value Units Date/Time    Culture, Acid Fast Bacillus, Blood [[id number]] Collected: 01/27/24 1241    Order Status: No result Updated: 01/27/24 1251    Quantiferon, Incubated [[id number]]     Order Status: Sent Specimen: Blood     Quantiferon, Incubated [[id number]] Collected: 01/26/24 1544    Order Status: No result Updated: 01/26/24 1550    Quantiferon, Incubated [[id number]] Collected: 01/26/24 1440    Order Status: Canceled Specimen: Blood     C.trachomatis N.gonorrhoeae DNA [[id number]] Collected: 01/24/24 1745    Order Status: Completed Specimen: Cervix Updated: 01/27/24 1429     C. Trachomatis Amplified Negative     Comment: (NOTE)  INTERPRETIVE INFORMATION: C. trachomatis by TMA  This test is intended for medical purposes only and is not valid   for the evaluation of suspected sexual abuse or for other forensic   purposes. In certain contexts, culture may be required to meet   applicable laws and regulations for diagnosis of C. trachomatis   and N. gonorrhoeae infections. Per 2014 CDC recommendations, this   test does not

## 2024-01-30 NOTE — PLAN OF CARE
Problem: Discharge Planning  Goal: Discharge to home or other facility with appropriate resources  Outcome: Progressing     Problem: Pain  Goal: Verbalizes/displays adequate comfort level or baseline comfort level  Outcome: Progressing     Problem: Skin/Tissue Integrity  Goal: Absence of new skin breakdown  Description: 1.  Monitor for areas of redness and/or skin breakdown  2.  Assess vascular access sites hourly  3.  Every 4-6 hours minimum:  Change oxygen saturation probe site  4.  Every 4-6 hours:  If on nasal continuous positive airway pressure, respiratory therapy assess nares and determine need for appliance change or resting period.  Outcome: Progressing     Problem: Safety - Adult  Goal: Free from fall injury  1/29/2024 2234 by Frandy Johnson, RN  Outcome: Progressing  1/29/2024 1407 by Melania Dallas  Outcome: Progressing     Problem: Chronic Conditions and Co-morbidities  Goal: Patient's chronic conditions and co-morbidity symptoms are monitored and maintained or improved  Outcome: Progressing     Problem: Nutrition Deficit:  Goal: Optimize nutritional status  Outcome: Progressing

## 2024-01-31 ENCOUNTER — ANESTHESIA EVENT (OUTPATIENT)
Dept: ENDOSCOPY | Age: 44
DRG: 969 | End: 2024-01-31
Payer: MEDICARE
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
## 2024-01-31 LAB
ALBUMIN SERPL-MCNC: 2.3 GM/DL (ref 3.4–5)
ALP BLD-CCNC: 152 IU/L (ref 40–129)
ALT SERPL-CCNC: 108 U/L (ref 10–40)
ANION GAP SERPL CALCULATED.3IONS-SCNC: 11 MMOL/L (ref 7–16)
AST SERPL-CCNC: 435 IU/L (ref 15–37)
BASOPHILS ABSOLUTE: 0 K/CU MM
BASOPHILS RELATIVE PERCENT: 0.6 % (ref 0–1)
BILIRUB SERPL-MCNC: 0.6 MG/DL (ref 0–1)
BILIRUBIN DIRECT: 0.4 MG/DL (ref 0–0.3)
BILIRUBIN, INDIRECT: 0.2 MG/DL (ref 0–0.7)
BUN SERPL-MCNC: 51 MG/DL (ref 6–23)
CALCIUM SERPL-MCNC: 7.9 MG/DL (ref 8.3–10.6)
CHLORIDE BLD-SCNC: 98 MMOL/L (ref 99–110)
CO2: 21 MMOL/L (ref 21–32)
CREAT SERPL-MCNC: 4.6 MG/DL (ref 0.9–1.3)
CRP SERPL HS-MCNC: 40 MG/L
DIFFERENTIAL TYPE: ABNORMAL
EOSINOPHILS ABSOLUTE: 0 K/CU MM
EOSINOPHILS RELATIVE PERCENT: 0.6 % (ref 0–3)
GFR SERPL CREATININE-BSD FRML MDRD: 15 ML/MIN/1.73M2
GLUCOSE BLD-MCNC: 127 MG/DL (ref 70–99)
GLUCOSE BLD-MCNC: 188 MG/DL (ref 70–99)
GLUCOSE BLD-MCNC: 196 MG/DL (ref 70–99)
GLUCOSE SERPL-MCNC: 186 MG/DL (ref 70–99)
HCT VFR BLD CALC: 25.3 % (ref 42–52)
HEMOGLOBIN: 8.3 GM/DL (ref 13.5–18)
IMMATURE NEUTROPHIL %: 0.4 % (ref 0–0.43)
LIPASE: 537 IU/L (ref 13–60)
LYMPHOCYTES ABSOLUTE: 0.6 K/CU MM
LYMPHOCYTES RELATIVE PERCENT: 10.8 % (ref 24–44)
MAGNESIUM: 1.7 MG/DL (ref 1.8–2.4)
MCH RBC QN AUTO: 30.2 PG (ref 27–31)
MCHC RBC AUTO-ENTMCNC: 32.8 % (ref 32–36)
MCV RBC AUTO: 92 FL (ref 78–100)
MONOCYTES ABSOLUTE: 0.4 K/CU MM
MONOCYTES RELATIVE PERCENT: 6.7 % (ref 0–4)
NUCLEATED RBC %: 0 %
PDW BLD-RTO: 13.4 % (ref 11.7–14.9)
PHOSPHORUS: 4.5 MG/DL (ref 2.5–4.9)
PLATELET # BLD: 214 K/CU MM (ref 140–440)
PMV BLD AUTO: 9.3 FL (ref 7.5–11.1)
POTASSIUM SERPL-SCNC: 4.2 MMOL/L (ref 3.5–5.1)
RBC # BLD: 2.75 M/CU MM (ref 4.6–6.2)
SEGMENTED NEUTROPHILS ABSOLUTE COUNT: 4.2 K/CU MM
SEGMENTED NEUTROPHILS RELATIVE PERCENT: 80.9 % (ref 36–66)
SODIUM BLD-SCNC: 130 MMOL/L (ref 135–145)
TOTAL IMMATURE NEUTOROPHIL: 0.02 K/CU MM
TOTAL NUCLEATED RBC: 0 K/CU MM
TOTAL PROTEIN: 5.6 GM/DL (ref 6.4–8.2)
WBC # BLD: 5.2 K/CU MM (ref 4–10.5)

## 2024-01-31 PROCEDURE — 2060000000 HC ICU INTERMEDIATE R&B

## 2024-01-31 PROCEDURE — 99232 SBSQ HOSP IP/OBS MODERATE 35: CPT | Performed by: INTERNAL MEDICINE

## 2024-01-31 PROCEDURE — 6360000002 HC RX W HCPCS: Performed by: INTERNAL MEDICINE

## 2024-01-31 PROCEDURE — 84100 ASSAY OF PHOSPHORUS: CPT

## 2024-01-31 PROCEDURE — 36415 COLL VENOUS BLD VENIPUNCTURE: CPT

## 2024-01-31 PROCEDURE — 6370000000 HC RX 637 (ALT 250 FOR IP): Performed by: INTERNAL MEDICINE

## 2024-01-31 PROCEDURE — 6360000002 HC RX W HCPCS: Performed by: LICENSED PRACTICAL NURSE

## 2024-01-31 PROCEDURE — 97530 THERAPEUTIC ACTIVITIES: CPT

## 2024-01-31 PROCEDURE — 86140 C-REACTIVE PROTEIN: CPT

## 2024-01-31 PROCEDURE — 6370000000 HC RX 637 (ALT 250 FOR IP): Performed by: STUDENT IN AN ORGANIZED HEALTH CARE EDUCATION/TRAINING PROGRAM

## 2024-01-31 PROCEDURE — 97535 SELF CARE MNGMENT TRAINING: CPT

## 2024-01-31 PROCEDURE — 97116 GAIT TRAINING THERAPY: CPT

## 2024-01-31 PROCEDURE — 80053 COMPREHEN METABOLIC PANEL: CPT

## 2024-01-31 PROCEDURE — 90935 HEMODIALYSIS ONE EVALUATION: CPT

## 2024-01-31 PROCEDURE — 6360000002 HC RX W HCPCS: Performed by: STUDENT IN AN ORGANIZED HEALTH CARE EDUCATION/TRAINING PROGRAM

## 2024-01-31 PROCEDURE — APPNB45 APP NON BILLABLE 31-45 MINUTES: Performed by: LICENSED PRACTICAL NURSE

## 2024-01-31 PROCEDURE — 6370000000 HC RX 637 (ALT 250 FOR IP): Performed by: PHYSICIAN ASSISTANT

## 2024-01-31 PROCEDURE — 82248 BILIRUBIN DIRECT: CPT

## 2024-01-31 PROCEDURE — 83735 ASSAY OF MAGNESIUM: CPT

## 2024-01-31 PROCEDURE — 83690 ASSAY OF LIPASE: CPT

## 2024-01-31 PROCEDURE — 2580000003 HC RX 258: Performed by: STUDENT IN AN ORGANIZED HEALTH CARE EDUCATION/TRAINING PROGRAM

## 2024-01-31 PROCEDURE — 85025 COMPLETE CBC W/AUTO DIFF WBC: CPT

## 2024-01-31 PROCEDURE — C9113 INJ PANTOPRAZOLE SODIUM, VIA: HCPCS | Performed by: LICENSED PRACTICAL NURSE

## 2024-01-31 PROCEDURE — 6370000000 HC RX 637 (ALT 250 FOR IP): Performed by: NURSE PRACTITIONER

## 2024-01-31 PROCEDURE — 82962 GLUCOSE BLOOD TEST: CPT

## 2024-01-31 PROCEDURE — 2580000003 HC RX 258: Performed by: LICENSED PRACTICAL NURSE

## 2024-01-31 PROCEDURE — A4216 STERILE WATER/SALINE, 10 ML: HCPCS | Performed by: LICENSED PRACTICAL NURSE

## 2024-01-31 RX ORDER — OMEGA-3-ACID ETHYL ESTERS 1 G/1
2 CAPSULE, LIQUID FILLED ORAL 2 TIMES DAILY
Status: DISCONTINUED | OUTPATIENT
Start: 2024-01-31 | End: 2024-02-08 | Stop reason: HOSPADM

## 2024-01-31 RX ADMIN — DOLUTEGRAVIR SODIUM 50 MG: 50 TABLET, FILM COATED ORAL at 09:16

## 2024-01-31 RX ADMIN — HEPARIN SODIUM 5000 UNITS: 5000 INJECTION INTRAVENOUS; SUBCUTANEOUS at 06:23

## 2024-01-31 RX ADMIN — BUSPIRONE HYDROCHLORIDE 10 MG: 5 TABLET ORAL at 14:46

## 2024-01-31 RX ADMIN — METOPROLOL SUCCINATE 100 MG: 50 TABLET, EXTENDED RELEASE ORAL at 09:18

## 2024-01-31 RX ADMIN — HEPARIN SODIUM 5000 UNITS: 5000 INJECTION INTRAVENOUS; SUBCUTANEOUS at 14:46

## 2024-01-31 RX ADMIN — NYSTATIN 500000 UNITS: 100000 SUSPENSION ORAL at 21:05

## 2024-01-31 RX ADMIN — DARUNAVIR ETHANOLATE AND COBICISTAT 1 EACH: 800; 150 TABLET, FILM COATED ORAL at 09:16

## 2024-01-31 RX ADMIN — CALCITRIOL CAPSULES 0.25 MCG 0.5 MCG: 0.25 CAPSULE ORAL at 09:17

## 2024-01-31 RX ADMIN — HYDROMORPHONE HYDROCHLORIDE 0.5 MG: 1 INJECTION, SOLUTION INTRAMUSCULAR; INTRAVENOUS; SUBCUTANEOUS at 22:52

## 2024-01-31 RX ADMIN — TRAZODONE HYDROCHLORIDE 50 MG: 50 TABLET ORAL at 21:05

## 2024-01-31 RX ADMIN — ASPIRIN 81 MG 81 MG: 81 TABLET ORAL at 09:17

## 2024-01-31 RX ADMIN — ATORVASTATIN CALCIUM 80 MG: 40 TABLET, FILM COATED ORAL at 21:05

## 2024-01-31 RX ADMIN — HEPARIN SODIUM 5000 UNITS: 5000 INJECTION INTRAVENOUS; SUBCUTANEOUS at 21:05

## 2024-01-31 RX ADMIN — HYDROMORPHONE HYDROCHLORIDE 0.5 MG: 1 INJECTION, SOLUTION INTRAMUSCULAR; INTRAVENOUS; SUBCUTANEOUS at 06:23

## 2024-01-31 RX ADMIN — NYSTATIN 500000 UNITS: 100000 SUSPENSION ORAL at 17:50

## 2024-01-31 RX ADMIN — IRON SUCROSE 50 MG: 20 INJECTION, SOLUTION INTRAVENOUS at 12:48

## 2024-01-31 RX ADMIN — SODIUM CHLORIDE, PRESERVATIVE FREE 40 MG: 5 INJECTION INTRAVENOUS at 09:16

## 2024-01-31 RX ADMIN — CALCIUM CARBONATE 500 MG: 500 TABLET, CHEWABLE ORAL at 14:46

## 2024-01-31 RX ADMIN — BUSPIRONE HYDROCHLORIDE 10 MG: 5 TABLET ORAL at 21:05

## 2024-01-31 RX ADMIN — HYDROMORPHONE HYDROCHLORIDE 0.5 MG: 1 INJECTION, SOLUTION INTRAMUSCULAR; INTRAVENOUS; SUBCUTANEOUS at 18:54

## 2024-01-31 RX ADMIN — SODIUM CHLORIDE, PRESERVATIVE FREE 10 ML: 5 INJECTION INTRAVENOUS at 09:17

## 2024-01-31 RX ADMIN — EZETIMIBE 10 MG: 10 TABLET ORAL at 21:06

## 2024-01-31 RX ADMIN — INSULIN GLARGINE 20 UNITS: 100 INJECTION, SOLUTION SUBCUTANEOUS at 21:06

## 2024-01-31 RX ADMIN — HYDROMORPHONE HYDROCHLORIDE 0.5 MG: 1 INJECTION, SOLUTION INTRAMUSCULAR; INTRAVENOUS; SUBCUTANEOUS at 01:56

## 2024-01-31 RX ADMIN — HYDROMORPHONE HYDROCHLORIDE 0.5 MG: 1 INJECTION, SOLUTION INTRAMUSCULAR; INTRAVENOUS; SUBCUTANEOUS at 14:46

## 2024-01-31 RX ADMIN — CALCIUM CARBONATE 500 MG: 500 TABLET, CHEWABLE ORAL at 09:16

## 2024-01-31 RX ADMIN — NYSTATIN 500000 UNITS: 100000 SUSPENSION ORAL at 14:46

## 2024-01-31 RX ADMIN — BUSPIRONE HYDROCHLORIDE 10 MG: 5 TABLET ORAL at 09:17

## 2024-01-31 RX ADMIN — INSULIN LISPRO 10 UNITS: 100 INJECTION, SOLUTION INTRAVENOUS; SUBCUTANEOUS at 09:16

## 2024-01-31 RX ADMIN — LAMIVUDINE 100 MG: 100 TABLET, FILM COATED ORAL at 09:16

## 2024-01-31 RX ADMIN — SULFAMETHOXAZOLE AND TRIMETHOPRIM 1 TABLET: 400; 80 TABLET ORAL at 22:52

## 2024-01-31 RX ADMIN — OMEGA-3-ACID ETHYL ESTERS 2 G: 1 CAPSULE, LIQUID FILLED ORAL at 10:20

## 2024-01-31 RX ADMIN — NYSTATIN 500000 UNITS: 100000 SUSPENSION ORAL at 10:20

## 2024-01-31 RX ADMIN — EPOETIN ALFA-EPBX 6000 UNITS: 3000 INJECTION, SOLUTION INTRAVENOUS; SUBCUTANEOUS at 12:48

## 2024-01-31 RX ADMIN — OMEGA-3-ACID ETHYL ESTERS 2 G: 1 CAPSULE, LIQUID FILLED ORAL at 22:52

## 2024-01-31 RX ADMIN — ISOSORBIDE MONONITRATE 60 MG: 60 TABLET, EXTENDED RELEASE ORAL at 09:18

## 2024-01-31 RX ADMIN — FOLIC ACID 1 MG: 1 TABLET ORAL at 09:16

## 2024-01-31 RX ADMIN — CALCIUM CARBONATE 500 MG: 500 TABLET, CHEWABLE ORAL at 21:05

## 2024-01-31 RX ADMIN — HYDROMORPHONE HYDROCHLORIDE 0.5 MG: 1 INJECTION, SOLUTION INTRAMUSCULAR; INTRAVENOUS; SUBCUTANEOUS at 10:20

## 2024-01-31 RX ADMIN — SODIUM CHLORIDE, PRESERVATIVE FREE 10 ML: 5 INJECTION INTRAVENOUS at 21:06

## 2024-01-31 ASSESSMENT — PAIN - FUNCTIONAL ASSESSMENT
PAIN_FUNCTIONAL_ASSESSMENT: ACTIVITIES ARE NOT PREVENTED
PAIN_FUNCTIONAL_ASSESSMENT: PREVENTS OR INTERFERES WITH MANY ACTIVE NOT PASSIVE ACTIVITIES
PAIN_FUNCTIONAL_ASSESSMENT: ACTIVITIES ARE NOT PREVENTED
PAIN_FUNCTIONAL_ASSESSMENT: ACTIVITIES ARE NOT PREVENTED
PAIN_FUNCTIONAL_ASSESSMENT: PREVENTS OR INTERFERES SOME ACTIVE ACTIVITIES AND ADLS

## 2024-01-31 ASSESSMENT — PAIN SCALES - WONG BAKER
WONGBAKER_NUMERICALRESPONSE: 0

## 2024-01-31 ASSESSMENT — PAIN DESCRIPTION - LOCATION
LOCATION: BACK

## 2024-01-31 ASSESSMENT — PAIN DESCRIPTION - DESCRIPTORS
DESCRIPTORS: ACHING
DESCRIPTORS: ACHING
DESCRIPTORS: PRESSURE
DESCRIPTORS: ACHING
DESCRIPTORS: THROBBING
DESCRIPTORS: JABBING

## 2024-01-31 ASSESSMENT — PAIN SCALES - GENERAL
PAINLEVEL_OUTOF10: 9
PAINLEVEL_OUTOF10: 0
PAINLEVEL_OUTOF10: 9
PAINLEVEL_OUTOF10: 8
PAINLEVEL_OUTOF10: 2
PAINLEVEL_OUTOF10: 9
PAINLEVEL_OUTOF10: 7
PAINLEVEL_OUTOF10: 10

## 2024-01-31 ASSESSMENT — PAIN DESCRIPTION - ORIENTATION
ORIENTATION: LOWER

## 2024-01-31 ASSESSMENT — PAIN DESCRIPTION - ONSET: ONSET: ON-GOING

## 2024-01-31 ASSESSMENT — PAIN DESCRIPTION - PAIN TYPE: TYPE: ACUTE PAIN
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
## 2024-01-31 NOTE — PROGRESS NOTES
V2.0  [facility] [place] Progress Note      Name:  Dewey Sosa /Age/Sex: 1980  (43 y.o. male)   MRN & CSN:  [id number] & [id number] Encounter Date/Time: 2024 1:41 PM EST    Location:  -A PCP: Frandy Parsons APRN - CNP       Hospital Day: 13    Assessment and Plan:   # Sepsis 2/2 Legionella Pneumonia   # HIV Infection, ?AIDS  Presented with fever T max 101.6, tachycardia 110's, tachypnea 30's. 2/2 legionella pneumonia. CT chest showed right lung mass vs consolidation  Legionalla antigen +  S/p azithromycin 7 days in total and rocephin 5 days total, ID on board, CD4 count only 25, On bactrim prophylaxis and HAART, ID ordered cryptococcal ag/ab positive, IGG/IGM, HSV 1, 2, urine for gonacoccal, RPR and syphillis ab (Treponema pallidum) , QuantiFERON indeterminate, follow on results    # ROSE MARY on CKD IV now ESRD.  Cr 4.3 (baseline ~2.5).   Temp vas cath placed  Started on HD. Contnue HD per nephro.  Permanent HD cath placement on     # Acute Normocytic Anemia   Hb down trending from 10's to 7.0. Received 1U PRBC   Patient with significant fatigue at rest and SOB with activity   Hb 8.5 post transfusion. Hb stable at 7.6  Transfuse if Hb < 7  PBS without evidence of hemolysis.      # Hyponatremia. Likely 2/2 legionella pneumonia and poor oral intake/dehydration. P/w Na 117, improved 123 but then down to 118.   Today Na 127 as well ,  Nephro managing, on HD as below  - Plan for M/W/F HD   - plan for tunneled cath but waiting on QuantiFERON result     # Transaminitis, hyperbilirubinemia.  Was going up but today coming down.   Imaging including U/S and CT abd unremarkable.   CK from few days ago 20K down to 3600 yesterday  Could be legionella hepatitis vs rhabdomyolysis.    - GI on board   - Trend liver panel  - MRCP ordered but cancelled now given no clinical evidence of Choledocholithiasis-patient did not tolerated    # Acute pancreatitis 2/2 Elevated TG. Was as high as 1748. Has been  8:18 pm TECHNIQUE: CT of the head was performed without the administration of intravenous contrast. Automated exposure control, iterative reconstruction, and/or weight based adjustment of the mA/kV was utilized to reduce the radiation dose to as low as reasonably achievable. COMPARISON: None. HISTORY: ORDERING SYSTEM PROVIDED HISTORY: multiple falls TECHNOLOGIST PROVIDED HISTORY: Has a \"code stroke\" or \"stroke alert\" been called?->No Reason for exam:->multiple falls Decision Support Exception - unselect if not a suspected or confirmed emergency medical condition->Emergency Medical Condition (MA) Reason for Exam: multiple falls FINDINGS: BRAIN/VENTRICLES: There is no acute intracranial hemorrhage, mass effect or midline shift.  No abnormal extra-axial fluid collection.  The gray-white differentiation is maintained without evidence of an acute infarct.  There is no evidence of hydrocephalus. Focal area of encephalomalacia in the right frontal lobe likely represents an old stroke. ORBITS: The visualized portion of the orbits demonstrate no acute abnormality. SINUSES: Bilateral maxillary sinus mucous retention cysts.  No paranasal sinus air-fluid levels.  The bilateral mastoid air cells are clear. SOFT TISSUES/SKULL:  No acute abnormality of the visualized skull or soft tissues.     1.  No acute intracranial abnormality. 2.  Focal area of encephalomalacia in the right frontal lobe may represent an old stroke.     XR CHEST PORTABLE    Result Date: 1/19/2024  EXAMINATION: ONE XRAY VIEW OF THE CHEST 1/19/2024 8:05 pm COMPARISON: 11/03/2021 HISTORY: ORDERING SYSTEM PROVIDED HISTORY: sob TECHNOLOGIST PROVIDED HISTORY: Reason for exam:->sob Reason for Exam: sob FINDINGS: Lung volumes are reduced resulting in crowding of central and basilar vascular markings.  There is dense consolidation within the right lung base. Patchy consolidation is noted throughout the left lung.  Heart size and vascularity are stable.  There is no

## 2024-01-31 NOTE — PROGRESS NOTES
Physical Therapy    Physical Therapy Treatment Note  Name: Dewey Sosa MRN: [id number] :   1980   Date:  2024   Admission Date: 2024 Room:  [address] [place] [address]   Restrictions/Precautions:          airborne prec, fall risk  Communication with other providers:  cotx /c OT  Subjective:  Patient states:  agreeable  Pain:   Location, Type, Intensity (0/10 to 10/10):  does not rate, but has lbp    Objective:    Observation:  in bed  Treatment, including education/measures:  Transfers   Rolling: modA  Supine to sit :modA  Scooting :SBA  Sit to stand : modAx1/2  Stand to sit :Ronak  SPT:minAx1 /c FWW  Multiple sets STS /c pt using BUE to push up  Vc for safe techs, hand placement and body mechanics  Standing balance x~1' F- and bale to stand /c F static grade x~5\". Vc for posture.  Sitting EOB DF/PF x10, LAQx5, trunk flex/atn wt shift and recovery x6reps.  Gait:  Pt amb with FWW for ~15' /c Ronak, small step length/ht and unstable but not LOB.  Safety  Patient left safely in the bed, with call light/phone in reach with alarm applied. Gait belt was used for transfers and gait.    Assessment / Impression:    Pt improving /c gait, transfer and stand balance and tolerance this date  Patient's tolerance of treatment:  Good   Adverse Reaction: na  Significant change in status and impact:  na  Barriers to improvement:  weakness and endurance  Plan for Next Session:    Cont transfer and gait training, stand balance  Time in:  926  Time out:  1004  Timed treatment minutes:  38  Total treatment time:  38    Previously filed items:  Social/Functional History  Lives With: Significant other  Type of Home: House  Home Layout: Two level, Bed/Bath upstairs  Home Access: Stairs to enter without rails  Entrance Stairs - Number of Steps: 2  Bathroom Shower/Tub: Walk-in shower  Bathroom Equipment: Tub transfer bench  Home Equipment: Cane, Walker, rolling, Wheelchair-manual, Rollator  Has the patient had two or more falls in the

## 2024-01-31 NOTE — PLAN OF CARE
Problem: Discharge Planning  Goal: Discharge to home or other facility with appropriate resources  Outcome: Progressing     Problem: Pain  Goal: Verbalizes/displays adequate comfort level or baseline comfort level  Outcome: Progressing     Problem: Skin/Tissue Integrity  Goal: Absence of new skin breakdown  Description: 1.  Monitor for areas of redness and/or skin breakdown  2.  Assess vascular access sites hourly  3.  Every 4-6 hours minimum:  Change oxygen saturation probe site  4.  Every 4-6 hours:  If on nasal continuous positive airway pressure, respiratory therapy assess nares and determine need for appliance change or resting period.  Outcome: Progressing     Problem: Safety - Adult  Goal: Free from fall injury  Outcome: Progressing     Problem: Chronic Conditions and Co-morbidities  Goal: Patient's chronic conditions and co-morbidity symptoms are monitored and maintained or improved  Outcome: Progressing     Problem: Nutrition Deficit:  Goal: Optimize nutritional status  Outcome: Progressing

## 2024-01-31 NOTE — PROGRESS NOTES
Progress Note( Dr. Cruz)  1/30/2024  Subjective:   Admit Date: 1/19/2024  PCP: Frandy Parsons APRN - CNP    Admitted For : Nausea vomiting and frequent fall was found to be hyponatremic with serum sodium 117     Consulted For: Better control of blood glucose     Interval History:     Feels feels much better today starting also started eating better  Serum sodium getting better of 135     Patient had elevated liver enzymes also lipase suggesting pancreatitis or hepatitis  And liver function test trending down.  GI has been seeing him    Positive for Legionella  CT scan shows possibility of pneumonia and also has pancreatitis  Patient being watched for tuberculosis and was placed in an isolated room    Denies any chest pains,   Yes  SOB .   Has some  nausea but no  vomiting.  Eating somewhat better    No new bowel or bladder symptoms.       Intake/Output Summary (Last 24 hours) at 1/30/2024 2202  Last data filed at 1/30/2024 1335  Gross per 24 hour   Intake 480 ml   Output 1530 ml   Net -1050 ml         DATA    CBC:   Recent Labs     01/28/24  0525 01/29/24  0434 01/30/24  0430   WBC 4.6 4.9 5.6   HGB 7.0* 6.7* 8.3*    205 239      CMP:  Recent Labs     01/28/24  0525 01/29/24  0434 01/30/24  0430   * 135 135   K 4.0 3.9 3.9   CL 96* 100 100   CO2 23 21 25   BUN 51* 57* 39*   CREATININE 5.1* 5.3* 4.2*   CALCIUM 7.5* 7.6* 7.8*       Lipids:   Lab Results   Component Value Date/Time    CHOL 278 12/11/2023 12:28 PM    HDL 22 12/11/2023 12:28 PM    TRIG 286 01/30/2024 04:30 AM     Glucose:  Recent Labs     01/30/24  1117 01/30/24  1600 01/30/24 2023   POCGLU 254* 227* 222*       [id number]:  Lab Results   Component Value Date/Time    LABA1C 6.8 01/20/2024 05:54 AM     High Sensitivity TSH:   Lab Results   Component Value Date/Time    TSHHS 1.420 12/11/2023 12:19 PM     Free T3: No results found for: \"FT3\"  Free T4:No results found for: \"T4FREE\"    CT ABDOMEN W CONTRAST Additional Contrast?    Infusions:    sodium chloride      sodium chloride      sodium chloride      dextrose           Objective:   Vitals: BP (!) 162/90   Pulse 82   Temp 98.7 °F (37.1 °C) (Oral)   Resp 16   Ht 1.854 m (6' 1\")   Wt 103.3 kg (227 lb 11.8 oz)   SpO2 96%   BMI 30.05 kg/m²   General appearance: alert and cooperative with exam  Neck: no JVD or bruit  Thyroid : Normal lobes   Lungs: Has Vesicular Breath sounds   Heart:  regular rate and rhythm  Abdomen: soft, non-tender; bowel sounds normal; no masses,  no organomegaly  Musculoskeletal: Normal  Extremities: extremities normal, , no edema//left third toe amputation   Neurologic:  Awake, alert, oriented to name, place and time.  Cranial nerves II-XII are grossly intact.  Motor is  intact.  Sensory neuropathy.,  and gait is normal.    Assessment:     Patient Active Problem List:     Type 2 diabetes mellitus without complication, with long-term current use of insulin (HCC)     Gastroesophageal reflux disease     Former tobacco use     Acute osteomyelitis of phalanx of left foot (HCC)     Osteomyelitis of third toe of left foot (HCC)     Anxiety     Persistent proteinuria     Benign neoplasm of sigmoid colon     Vitamin D deficiency     ASCVD (arteriosclerotic cardiovascular disease)     Chronic systolic congestive heart failure (HCC)     Ischemic cardiomyopathy     Dyslipidemia     Stage 3a chronic kidney disease (HCC)     Other male erectile dysfunction     Atherosclerotic heart disease of native coronary artery with other forms of angina pectoris (HCC)     Type 2 diabetes mellitus with chronic kidney disease     Insomnia     Stage 3b chronic kidney disease (HCC)     Overweight     Chronic kidney disease, stage IV (severe) (HCC)     Hyperkalemia     Hyponatremia     Possible pancreatitis      Possible pneumonia      Plan:     Reviewed POC blood glucose . Labs and X ray results   Reviewed Current Medicines   On meal/ Correction bolus Humalog/ Basal Lantus Insulin

## 2024-01-31 NOTE — PROGRESS NOTES
Infectious Disease Progress Note  2024   Patient Name: Dewey Sosa : 1980     Assessment  HIV/AIDS  Legionella pneumonia  Resolved hepatitis B infection  Compensated liver disease on intermittent hemodialysis  History of CKD stage V, type 2 diabetes mellitus, coronary artery disease status post PCI, MSM \"presented with 1 week history of shortness of breath, weakness and falls.  Was diagnosed with Legionella pneumonia.  Newly diagnosed with HIV-1 viral load was 46,000, CD4 count was 25 on this admission.  Sexual contraction.  Tolerating oral ART  Cryptococcal antigen negative, gonococcal and chlamydia NAAT test negative, T. pallidum screen negative  Quantiferon TB test was indeterminate, likely due to low CD4 counts. Has low epidemiologic risk factors for TB  Completed a 1 week course of azithromycin.  ROSE MARY on CKD stage V  ESRD on maintenance hemodialysis on  and Friday  Elevated lipase  On dwt, GI cons ocnsult  Type 2 diabetes mellitus  Peripheral arterial disease  Multiple comorbidity per PMHx:   Plan  Therapeutic:  PJP prophylaxis: Bactrim single strength 1 tab p.o.  EOD (2024)  Entecavir 0.5 mg p.o. weekly (on Monday), lamivudine 100 mg p.o. daily, darunavir-cobicistat 800-150 mg p.o. daily, dolutegravir 50 mg p.o. daily (2024)  Continue to monitor renal function, if it worsens then Entecavir and lamivudine dosing will have to be adjusted  Diagnostic:  Trend CRP  F/u:  AFB blood culture  Other:s.    Reason for visit: F/u HIV/AIDS, CKD stage V on hemodialysis  History:?Interval history noted  Denies n/v/d/f or untoward effects of antimicrobials  Lower back pain persists  Physical Exam:  Vital Signs: BP (!) 164/87   Pulse 84   Temp 98.1 °F (36.7 °C) (Oral)   Resp 18   Ht 1.854 m (6' 1\")   Wt 103.3 kg (227 lb 11.8 oz)   SpO2 95%   BMI 30.05 kg/m²     Gen: alert and oriented X3, no distress  Skin: no stigmata of endocarditis  Wounds: C/D/I  HEMT: AT/NC Oropharynx

## 2024-01-31 NOTE — PROGRESS NOTES
01/31/24 1031   Encounter Summary   Encounter Overview/Reason  Follow-up   Service Provided For: Patient and family together   Referral/Consult From: [facility], [place]   Support System Parent;Significant other   Last Encounter  01/31/24  (Patient was in good spirit during the visit. Patient said that he was doing much better than yesterday.)   Complexity of Encounter Low   Begin Time 1015   End Time  1030   Total Time Calculated 15 min   Crisis   Type Follow up   Spiritual/Emotional needs   Type Spiritual Support   Grief, Loss, and Adjustments   Type Adjustment to illness   Assessment/Intervention/Outcome   Assessment Calm;Coping;Hopeful;Peaceful   Intervention Active listening;Empowerment;Nurtured Hope   Plan and Referrals   Plan/Referrals Continue to visit, (comment)

## 2024-01-31 NOTE — PROGRESS NOTES
Nephrology Progress Note  1/31/2024 4:22 PM  Subjective:     Interval History: Dewey Sosa is a 43 y.o. male appears to be doing somewhat better today plan on tunneled HD catheter tomorrow    Data:   Scheduled Meds:   epoetin shailesh-epbx  2,000 Units IntraVENous Once in dialysis    omega-3 acid ethyl esters  2 g Oral BID    insulin glargine  20 Units SubCUTAneous Nightly    nystatin  5 mL Oral 4x Daily    insulin lispro  10 Units SubCUTAneous TID WC    insulin lispro  0-8 Units SubCUTAneous TID WC    insulin lispro  0-8 Units SubCUTAneous 2 times per day    [Held by provider] insulin NPH  5 Units SubCUTAneous QAM    dolutegravir sodium  50 mg Oral Daily    darunavir-cobicistat  1 tablet Oral Daily    lamiVUDine  100 mg Oral Daily    entecavir  0.5 mg Oral Weekly - Monday    sulfamethoxazole-trimethoprim  1 tablet Oral Once per day on Mon Wed Fri    pantoprazole (PROTONIX) 40 mg in sodium chloride (PF) 0.9 % 10 mL injection  40 mg IntraVENous Daily    folic acid  1 mg Oral Daily    metoprolol succinate  100 mg Oral Daily    nicotine  1 patch TransDERmal Daily    calcitRIOL  0.5 mcg Oral Daily    vitamin D  50,000 Units Oral Weekly    calcium carbonate  500 mg Oral TID    sodium chloride flush  5-40 mL IntraVENous 2 times per day    heparin (porcine)  5,000 Units SubCUTAneous 3 times per day    aspirin  81 mg Oral Daily    [Held by provider] ticagrelor  90 mg Oral BID    ezetimibe  10 mg Oral Nightly    [Held by provider] gabapentin  300 mg Oral TID    busPIRone  10 mg Oral TID    atorvastatin  80 mg Oral Nightly    traZODone  50 mg Oral Nightly    isosorbide mononitrate  60 mg Oral Daily     Continuous Infusions:   sodium chloride      sodium chloride      sodium chloride      dextrose           CBC   Recent Labs     01/29/24 0434 01/30/24  0430 01/31/24 0314   WBC 4.9 5.6 5.2   HGB 6.7* 8.3* 8.3*   HCT 20.4* 25.0* 25.3*    239 214      BMP   Recent Labs     01/29/24  0434 01/30/24  0430 01/31/24 0314

## 2024-01-31 NOTE — PROGRESS NOTES
[facility] Gastroenterology and Hepatology             MD Suzanne Blevins MD Carol Christensen, APRN-CNP       Brie Meng, APRN-CNP             [address] [place] [address]             [phone] fax [phone]      Gastroenterology Progress note . 1/31/2024  Reason for consult:  Acute Transaminitis with elevated Lipase     Physician attestation:  I have personally seen and examined the patient independently. I have reviewed the patient's available data,including medical history and recent test results. Reviewed and discussed note as documented by AMALIA.  I agree with the physical exam findings, assessment and plan.     Briefly   Patient doing well, he continues to be more alert, today evaluated in dialysis session.  He is finally lifted from TB precautions.  Will plan for EGD tomorrow.    Gen: normal mood, alert  ENT: normal TJ  Cardiovascular: RRR, No M/R/G  Respiratory: CTA BL  Gastrointestinal: Soft,NT ND  Genitourinary: no suprapubic tenderness  Musculoskeletal: no scars  Skin:moist  Neuro: alert and oriented x3    My assessment and plan reveals   #1 elevated LFTs  Possible differentials include Legionella induced hepatitis versus  rhabdomyolysis - .  CK from admission noted to be significantly elevated to 20,000.  His pattern of LFT elevation certainly raises concern for that with AST significantly more than ALT as well as indirect and direct bilirubin.  -CK now down to 3000  -Initially planned to evaluate for any biliary dilation or associated choledocholithiasis with MRCP however patient not cooperating for MRI sequencing.   -No biliary dilation on prior imaging  -Recommend continuing monitoring bilirubin     #2  Pancreatitis  -Possible differentials include Pancreatitis however now mentions abdominal pain vs possible rhabdo.   - TG elevated 608   -Initially plan for MRI to assess for pancreatitis however patient not concentrating.    -CT

## 2024-01-31 NOTE — PROGRESS NOTES
Occupational Therapy    Occupational Therapy Treatment Note    Name: Dewey Sosa MRN: [id number] :   1980   Date:  2024   Admission Date: 2024 Room:  -A     Discharge Recommendation: ARU    Primary Problem:  Hyponatremia    Restrictions/Precautions:  General, fall risk, Airborne (per ID MD, airborne precautions to be removed)    Communication with other providers: RN, co-tx w/ PTA Hayes for safety and support    Subjective:  Patient states: Discussed w/ pt concerns relating HIPAA and sharing of HIV status when visitors are in room. Pt requesting that information not be shared when family or friends are in room. Encouraged pt to call advocate or report concerns to RN supervisor.   Pain: Unrated chronic back pain    Objective:    Observation: Pt supine in bed, agreeable to therapy.   Objective Measures:  On room air, tele, vitals remained stable    Treatment, including education:  Therapeutic Activity Training:   Therapeutic activity training was instructed today.  Cues were given for safety, sequence, UE/LE placement, awareness, and balance.    Activities performed today included bed mobility training, sup-sit, sit-stand, functional mobility    Self Care Training:   Cues were given for safety, sequence, UE/LE placement, visual cues, and balance.    Activities performed today included LB dressing tasks, toileting      Pt received supine in bed, agreeable to therapy. Pt provided w/ re-education on the importance of therapy and updated on current plan of care. Pt performed toileting using urinal in bed. Pt performed sup to sit w/ modA to bring torso upright. Pt required maxA to don socks seated EOB d/t difficulty maintaining balance. Pt stood from EOB to RW w/ modAx2. Pt performed approx 15ft functional mobility in room w/ Ronak, w/ good pace and safety awareness during task. Pt returned to EOB. Pt stood from EOB to RW w/ modA x1, to allow for lyndsay care. Pt able to maintain stand for approx

## 2024-01-31 NOTE — PROGRESS NOTES
Patient doing better will discuss with patient's family currently supportive care doing dialysis today convert temporary to tunneled HD catheter tomorrow as off respiratory isolation and work on discharge planning to ECF.  Full note to follow

## 2024-01-31 NOTE — PROGRESS NOTES
Patient Name: Dewey Sosa  Patient : 1980  MRN: [id number]     Acct: [id number]  Date of Admission: 2024  Room/Bed: /-A  Code Status:  Full Code  Allergies: No Known Allergies  Diagnosis:    Patient Active Problem List   Diagnosis    Type 2 diabetes mellitus without complication, with long-term current use of insulin (HCC)    Gastroesophageal reflux disease    Former tobacco use    Acute osteomyelitis of phalanx of left foot (HCC)    Osteomyelitis of third toe of left foot (HCC)    Anxiety    Persistent proteinuria    Acute on chronic anemia    Benign neoplasm of sigmoid colon    Vitamin D deficiency    ASCVD (arteriosclerotic cardiovascular disease)    Chronic systolic congestive heart failure (HCC)    Ischemic cardiomyopathy    Dyslipidemia    Stage 3a chronic kidney disease (HCC)    Other male erectile dysfunction    Atherosclerotic heart disease of native coronary artery with other forms of angina pectoris (HCC)    Type 2 diabetes mellitus with chronic kidney disease    Insomnia    Stage 3b chronic kidney disease (HCC)    Overweight    Chronic kidney disease, stage IV (severe) (HCC)    Hyperkalemia    Hyponatremia    Elevated LFTs    Elevated lipase    Splenomegaly    Nausea and vomiting    Traumatic rhabdomyolysis (HCC)    Legionella pneumonia (HCC)    AIDS (acquired immunodeficiency syndrome), CD4 <=200 (HCC)    CKD (chronic kidney disease) stage 5, GFR less than 15 ml/min (HCC)         Treatment:  Hemodilaysis 2:1  Priority: Routine  Location: Acute Room    Diabetic: Yes  NPO: No  Isolation Precautions: Dialysis     Consent for Treatment Verified: Yes  Blood Consent Verified: Not Applicable     Safety Verified: Identify (I), Consent (C), Equipment (E), HepB Status (B), Orders Complete (O), Access Verified (A), and Timeliness (T)  Time out performed prior to access at 0815 hours.    Report Received from Primary RN at 0750 hours.  Primary RN (First Initial, Last Name, Title): lise

## 2024-02-01 ENCOUNTER — ANESTHESIA (OUTPATIENT)
Dept: ENDOSCOPY | Age: 44
DRG: 969 | End: 2024-02-01
Payer: MEDICARE

## 2024-02-01 PROBLEM — R71.0 DROP IN HEMOGLOBIN: Status: ACTIVE | Noted: 2024-02-01

## 2024-02-01 LAB
ANION GAP SERPL CALCULATED.3IONS-SCNC: 10 MMOL/L (ref 7–16)
BASOPHILS ABSOLUTE: 0.1 K/CU MM
BASOPHILS RELATIVE PERCENT: 1.1 % (ref 0–1)
BUN SERPL-MCNC: 39 MG/DL (ref 6–23)
CALCIUM SERPL-MCNC: 8.3 MG/DL (ref 8.3–10.6)
CHLORIDE BLD-SCNC: 100 MMOL/L (ref 99–110)
CO2: 25 MMOL/L (ref 21–32)
CREAT SERPL-MCNC: 3.5 MG/DL (ref 0.9–1.3)
CRP SERPL HS-MCNC: 32.5 MG/L
DIFFERENTIAL TYPE: ABNORMAL
EOSINOPHILS ABSOLUTE: 0 K/CU MM
EOSINOPHILS RELATIVE PERCENT: 0.2 % (ref 0–3)
GFR SERPL CREATININE-BSD FRML MDRD: 21 ML/MIN/1.73M2
GLUCOSE BLD-MCNC: 145 MG/DL (ref 70–99)
GLUCOSE BLD-MCNC: 146 MG/DL (ref 70–99)
GLUCOSE BLD-MCNC: 148 MG/DL (ref 70–99)
GLUCOSE BLD-MCNC: 152 MG/DL (ref 70–99)
GLUCOSE SERPL-MCNC: 144 MG/DL (ref 70–99)
HCT VFR BLD CALC: 26.1 % (ref 42–52)
HEMOGLOBIN: 8.4 GM/DL (ref 13.5–18)
IMMATURE NEUTROPHIL %: 0.4 % (ref 0–0.43)
LYMPHOCYTES ABSOLUTE: 0.9 K/CU MM
LYMPHOCYTES RELATIVE PERCENT: 18.9 % (ref 24–44)
Lab: ABNORMAL
MAGNESIUM: 1.7 MG/DL (ref 1.8–2.4)
MCH RBC QN AUTO: 29.7 PG (ref 27–31)
MCHC RBC AUTO-ENTMCNC: 32.2 % (ref 32–36)
MCV RBC AUTO: 92.2 FL (ref 78–100)
MONOCYTES ABSOLUTE: 0.4 K/CU MM
MONOCYTES RELATIVE PERCENT: 8.8 % (ref 0–4)
NUCLEATED RBC %: 0 %
PDW BLD-RTO: 13.3 % (ref 11.7–14.9)
PHOSPHORUS: 4.1 MG/DL (ref 2.5–4.9)
PLATELET # BLD: 237 K/CU MM (ref 140–440)
PMV BLD AUTO: 9.3 FL (ref 7.5–11.1)
POTASSIUM SERPL-SCNC: 4.8 MMOL/L (ref 3.5–5.1)
RBC # BLD: 2.83 M/CU MM (ref 4.6–6.2)
SEGMENTED NEUTROPHILS ABSOLUTE COUNT: 3.2 K/CU MM
SEGMENTED NEUTROPHILS RELATIVE PERCENT: 70.6 % (ref 36–66)
SODIUM BLD-SCNC: 135 MMOL/L (ref 135–145)
TEST NAME: ABNORMAL
TOTAL IMMATURE NEUTOROPHIL: 0.02 K/CU MM
TOTAL NUCLEATED RBC: 0 K/CU MM
TRIGL SERPL-MCNC: 299 MG/DL
WBC # BLD: 4.6 K/CU MM (ref 4–10.5)

## 2024-02-01 PROCEDURE — 80048 BASIC METABOLIC PNL TOTAL CA: CPT

## 2024-02-01 PROCEDURE — 2580000003 HC RX 258: Performed by: LICENSED PRACTICAL NURSE

## 2024-02-01 PROCEDURE — 0DB78ZX EXCISION OF STOMACH, PYLORUS, VIA NATURAL OR ARTIFICIAL OPENING ENDOSCOPIC, DIAGNOSTIC: ICD-10-PCS | Performed by: INTERNAL MEDICINE

## 2024-02-01 PROCEDURE — 3700000001 HC ADD 15 MINUTES (ANESTHESIA): Performed by: INTERNAL MEDICINE

## 2024-02-01 PROCEDURE — 6370000000 HC RX 637 (ALT 250 FOR IP): Performed by: INTERNAL MEDICINE

## 2024-02-01 PROCEDURE — 88342 IMHCHEM/IMCYTCHM 1ST ANTB: CPT

## 2024-02-01 PROCEDURE — 6360000002 HC RX W HCPCS: Performed by: LICENSED PRACTICAL NURSE

## 2024-02-01 PROCEDURE — 6360000002 HC RX W HCPCS: Performed by: INTERNAL MEDICINE

## 2024-02-01 PROCEDURE — 3609012400 HC EGD TRANSORAL BIOPSY SINGLE/MULTIPLE: Performed by: INTERNAL MEDICINE

## 2024-02-01 PROCEDURE — 6360000002 HC RX W HCPCS: Performed by: STUDENT IN AN ORGANIZED HEALTH CARE EDUCATION/TRAINING PROGRAM

## 2024-02-01 PROCEDURE — 84478 ASSAY OF TRIGLYCERIDES: CPT

## 2024-02-01 PROCEDURE — 2060000000 HC ICU INTERMEDIATE R&B

## 2024-02-01 PROCEDURE — 99232 SBSQ HOSP IP/OBS MODERATE 35: CPT | Performed by: INTERNAL MEDICINE

## 2024-02-01 PROCEDURE — 86140 C-REACTIVE PROTEIN: CPT

## 2024-02-01 PROCEDURE — 85025 COMPLETE CBC W/AUTO DIFF WBC: CPT

## 2024-02-01 PROCEDURE — 88305 TISSUE EXAM BY PATHOLOGIST: CPT

## 2024-02-01 PROCEDURE — 2580000003 HC RX 258: Performed by: NURSE ANESTHETIST, CERTIFIED REGISTERED

## 2024-02-01 PROCEDURE — 99232 SBSQ HOSP IP/OBS MODERATE 35: CPT | Performed by: PHYSICIAN ASSISTANT

## 2024-02-01 PROCEDURE — 82962 GLUCOSE BLOOD TEST: CPT

## 2024-02-01 PROCEDURE — 6370000000 HC RX 637 (ALT 250 FOR IP): Performed by: PHYSICIAN ASSISTANT

## 2024-02-01 PROCEDURE — 3700000000 HC ANESTHESIA ATTENDED CARE: Performed by: INTERNAL MEDICINE

## 2024-02-01 PROCEDURE — 6370000000 HC RX 637 (ALT 250 FOR IP): Performed by: STUDENT IN AN ORGANIZED HEALTH CARE EDUCATION/TRAINING PROGRAM

## 2024-02-01 PROCEDURE — 83735 ASSAY OF MAGNESIUM: CPT

## 2024-02-01 PROCEDURE — C9113 INJ PANTOPRAZOLE SODIUM, VIA: HCPCS | Performed by: LICENSED PRACTICAL NURSE

## 2024-02-01 PROCEDURE — 84100 ASSAY OF PHOSPHORUS: CPT

## 2024-02-01 PROCEDURE — 43239 EGD BIOPSY SINGLE/MULTIPLE: CPT | Performed by: INTERNAL MEDICINE

## 2024-02-01 PROCEDURE — 6360000002 HC RX W HCPCS

## 2024-02-01 PROCEDURE — 2580000003 HC RX 258: Performed by: STUDENT IN AN ORGANIZED HEALTH CARE EDUCATION/TRAINING PROGRAM

## 2024-02-01 PROCEDURE — 2500000003 HC RX 250 WO HCPCS: Performed by: NURSE ANESTHETIST, CERTIFIED REGISTERED

## 2024-02-01 PROCEDURE — 2709999900 HC NON-CHARGEABLE SUPPLY: Performed by: INTERNAL MEDICINE

## 2024-02-01 PROCEDURE — A4216 STERILE WATER/SALINE, 10 ML: HCPCS | Performed by: LICENSED PRACTICAL NURSE

## 2024-02-01 PROCEDURE — 6360000002 HC RX W HCPCS: Performed by: NURSE ANESTHETIST, CERTIFIED REGISTERED

## 2024-02-01 PROCEDURE — 94761 N-INVAS EAR/PLS OXIMETRY MLT: CPT

## 2024-02-01 RX ORDER — LIDOCAINE HYDROCHLORIDE 20 MG/ML
INJECTION, SOLUTION INFILTRATION; PERINEURAL PRN
Status: DISCONTINUED | OUTPATIENT
Start: 2024-02-01 | End: 2024-02-01 | Stop reason: SDUPTHER

## 2024-02-01 RX ORDER — SODIUM CHLORIDE 9 MG/ML
INJECTION, SOLUTION INTRAVENOUS CONTINUOUS PRN
Status: DISCONTINUED | OUTPATIENT
Start: 2024-02-01 | End: 2024-02-01 | Stop reason: SDUPTHER

## 2024-02-01 RX ORDER — PANTOPRAZOLE SODIUM 40 MG/1
40 TABLET, DELAYED RELEASE ORAL
Status: DISCONTINUED | OUTPATIENT
Start: 2024-02-02 | End: 2024-02-08 | Stop reason: HOSPADM

## 2024-02-01 RX ORDER — PROPOFOL 10 MG/ML
INJECTION, EMULSION INTRAVENOUS PRN
Status: DISCONTINUED | OUTPATIENT
Start: 2024-02-01 | End: 2024-02-01 | Stop reason: SDUPTHER

## 2024-02-01 RX ORDER — LANOLIN ALCOHOL/MO/W.PET/CERES
400 CREAM (GRAM) TOPICAL 2 TIMES DAILY
Status: DISCONTINUED | OUTPATIENT
Start: 2024-02-01 | End: 2024-02-08 | Stop reason: HOSPADM

## 2024-02-01 RX ADMIN — METOPROLOL SUCCINATE 100 MG: 50 TABLET, EXTENDED RELEASE ORAL at 08:54

## 2024-02-01 RX ADMIN — BUSPIRONE HYDROCHLORIDE 10 MG: 5 TABLET ORAL at 08:54

## 2024-02-01 RX ADMIN — OMEGA-3-ACID ETHYL ESTERS 2 G: 1 CAPSULE, LIQUID FILLED ORAL at 20:29

## 2024-02-01 RX ADMIN — LAMIVUDINE 100 MG: 100 TABLET, FILM COATED ORAL at 08:53

## 2024-02-01 RX ADMIN — PROPOFOL 150 MG: 10 INJECTION, EMULSION INTRAVENOUS at 18:13

## 2024-02-01 RX ADMIN — CALCITRIOL CAPSULES 0.25 MCG 0.5 MCG: 0.25 CAPSULE ORAL at 08:55

## 2024-02-01 RX ADMIN — HYDROMORPHONE HYDROCHLORIDE 0.5 MG: 1 INJECTION, SOLUTION INTRAMUSCULAR; INTRAVENOUS; SUBCUTANEOUS at 20:32

## 2024-02-01 RX ADMIN — DOLUTEGRAVIR SODIUM 50 MG: 50 TABLET, FILM COATED ORAL at 08:53

## 2024-02-01 RX ADMIN — HYDROMORPHONE HYDROCHLORIDE 0.5 MG: 1 INJECTION, SOLUTION INTRAMUSCULAR; INTRAVENOUS; SUBCUTANEOUS at 03:11

## 2024-02-01 RX ADMIN — HYDROMORPHONE HYDROCHLORIDE 0.5 MG: 1 INJECTION, SOLUTION INTRAMUSCULAR; INTRAVENOUS; SUBCUTANEOUS at 14:58

## 2024-02-01 RX ADMIN — SODIUM CHLORIDE, PRESERVATIVE FREE 40 MG: 5 INJECTION INTRAVENOUS at 08:55

## 2024-02-01 RX ADMIN — CALCIUM CARBONATE 500 MG: 500 TABLET, CHEWABLE ORAL at 08:53

## 2024-02-01 RX ADMIN — SODIUM CHLORIDE: 9 INJECTION, SOLUTION INTRAVENOUS at 17:59

## 2024-02-01 RX ADMIN — NYSTATIN 500000 UNITS: 100000 SUSPENSION ORAL at 14:58

## 2024-02-01 RX ADMIN — BUSPIRONE HYDROCHLORIDE 10 MG: 5 TABLET ORAL at 14:58

## 2024-02-01 RX ADMIN — SODIUM CHLORIDE, PRESERVATIVE FREE 10 ML: 5 INJECTION INTRAVENOUS at 08:56

## 2024-02-01 RX ADMIN — NYSTATIN 500000 UNITS: 100000 SUSPENSION ORAL at 18:41

## 2024-02-01 RX ADMIN — HYDROMORPHONE HYDROCHLORIDE 0.5 MG: 1 INJECTION, SOLUTION INTRAMUSCULAR; INTRAVENOUS; SUBCUTANEOUS at 07:48

## 2024-02-01 RX ADMIN — NYSTATIN 500000 UNITS: 100000 SUSPENSION ORAL at 20:28

## 2024-02-01 RX ADMIN — EZETIMIBE 10 MG: 10 TABLET ORAL at 20:29

## 2024-02-01 RX ADMIN — DARUNAVIR ETHANOLATE AND COBICISTAT 1 EACH: 800; 150 TABLET, FILM COATED ORAL at 08:52

## 2024-02-01 RX ADMIN — CALCIUM CARBONATE 500 MG: 500 TABLET, CHEWABLE ORAL at 20:29

## 2024-02-01 RX ADMIN — CALCIUM CARBONATE 500 MG: 500 TABLET, CHEWABLE ORAL at 14:58

## 2024-02-01 RX ADMIN — INSULIN GLARGINE 10 UNITS: 100 INJECTION, SOLUTION SUBCUTANEOUS at 20:29

## 2024-02-01 RX ADMIN — HEPARIN SODIUM 5000 UNITS: 5000 INJECTION INTRAVENOUS; SUBCUTANEOUS at 20:30

## 2024-02-01 RX ADMIN — Medication 400 MG: at 08:55

## 2024-02-01 RX ADMIN — HYDROMORPHONE HYDROCHLORIDE 0.5 MG: 1 INJECTION, SOLUTION INTRAMUSCULAR; INTRAVENOUS; SUBCUTANEOUS at 11:57

## 2024-02-01 RX ADMIN — NYSTATIN 500000 UNITS: 100000 SUSPENSION ORAL at 08:54

## 2024-02-01 RX ADMIN — HEPARIN SODIUM 5000 UNITS: 5000 INJECTION INTRAVENOUS; SUBCUTANEOUS at 05:37

## 2024-02-01 RX ADMIN — ONDANSETRON 4 MG: 2 INJECTION INTRAMUSCULAR; INTRAVENOUS at 06:05

## 2024-02-01 RX ADMIN — SODIUM CHLORIDE, PRESERVATIVE FREE 10 ML: 5 INJECTION INTRAVENOUS at 20:29

## 2024-02-01 RX ADMIN — LIDOCAINE HYDROCHLORIDE 100 MG: 20 INJECTION, SOLUTION INFILTRATION; PERINEURAL at 18:13

## 2024-02-01 RX ADMIN — ISOSORBIDE MONONITRATE 60 MG: 60 TABLET, EXTENDED RELEASE ORAL at 08:55

## 2024-02-01 RX ADMIN — BUSPIRONE HYDROCHLORIDE 10 MG: 5 TABLET ORAL at 20:29

## 2024-02-01 RX ADMIN — TRAZODONE HYDROCHLORIDE 50 MG: 50 TABLET ORAL at 20:29

## 2024-02-01 RX ADMIN — OMEGA-3-ACID ETHYL ESTERS 2 G: 1 CAPSULE, LIQUID FILLED ORAL at 08:55

## 2024-02-01 RX ADMIN — Medication 400 MG: at 20:29

## 2024-02-01 RX ADMIN — ATORVASTATIN CALCIUM 80 MG: 40 TABLET, FILM COATED ORAL at 20:29

## 2024-02-01 RX ADMIN — ASPIRIN 81 MG 81 MG: 81 TABLET ORAL at 08:55

## 2024-02-01 RX ADMIN — FOLIC ACID 1 MG: 1 TABLET ORAL at 08:59

## 2024-02-01 ASSESSMENT — PAIN SCALES - GENERAL
PAINLEVEL_OUTOF10: 9
PAINLEVEL_OUTOF10: 9
PAINLEVEL_OUTOF10: 10
PAINLEVEL_OUTOF10: 7
PAINLEVEL_OUTOF10: 9

## 2024-02-01 ASSESSMENT — PAIN DESCRIPTION - PAIN TYPE
TYPE: ACUTE PAIN
TYPE: ACUTE PAIN;CHRONIC PAIN

## 2024-02-01 ASSESSMENT — PAIN DESCRIPTION - ORIENTATION
ORIENTATION: LOWER
ORIENTATION: RIGHT;LEFT;POSTERIOR;ANTERIOR
ORIENTATION: LOWER

## 2024-02-01 ASSESSMENT — PAIN DESCRIPTION - DESCRIPTORS
DESCRIPTORS: ACHING
DESCRIPTORS: ACHING;DISCOMFORT

## 2024-02-01 ASSESSMENT — PAIN DESCRIPTION - LOCATION
LOCATION: BACK;GENERALIZED
LOCATION: BACK

## 2024-02-01 ASSESSMENT — PAIN DESCRIPTION - ONSET: ONSET: ON-GOING

## 2024-02-01 ASSESSMENT — PAIN DESCRIPTION - FREQUENCY: FREQUENCY: CONTINUOUS

## 2024-02-01 ASSESSMENT — PAIN DESCRIPTION - DIRECTION: RADIATING_TOWARDS: BACK

## 2024-02-01 ASSESSMENT — PAIN - FUNCTIONAL ASSESSMENT: PAIN_FUNCTIONAL_ASSESSMENT: PREVENTS OR INTERFERES WITH MANY ACTIVE NOT PASSIVE ACTIVITIES

## 2024-02-01 NOTE — CARE COORDINATION
Attempted to contact ARU admissions with voice mail message left advising that updated PT/OT notes are in the record as of 1/31 and to please let CM know if they plan  to accept pt and start precert. Awaiting return call.

## 2024-02-01 NOTE — ANESTHESIA PRE PROCEDURE
Department of Anesthesiology  Preprocedure Note       Name:  Dewey Sosa   Age:  43 y.o.  :  1980                                          MRN:  [id number]         Date:  2024      Surgeon: Surgeon(s):  Suzanne Ríos MD    Procedure: Procedure(s):  EGD ESOPHAGOGASTRODUODENOSCOPY    Medications prior to admission:   Prior to Admission medications    Medication Sig Start Date End Date Taking? Authorizing Provider   ticagrelor (BRILINTA) 60 MG TABS tablet Take 1 tablet by mouth 2 times daily 1/15/24   Brie Mcneil APRN - CNP   ezetimibe (ZETIA) 10 MG tablet Take 1 tablet by mouth daily 23   Provider, MD Kera   sodium zirconium cyclosilicate (LOKELMA) 10 g PACK oral suspension Take 1 packet by mouth three times a week 24   Beka Nevarez MD   insulin glargine (LANTUS SOLOSTAR) 100 UNIT/ML injection pen INJECT 40 UNITS UNDER THE SKIN INJECT UNITS UNDER THE SKIN ONCE NIGHTLY 24   Frandy Parsons APRN - CNP   tiZANidine (ZANAFLEX) 2 MG tablet TAKE ONE TABLET BY MOUTH THREE TIMES A DAY AS NEEDED FOR BACK PAIN 24   Frandy Parsons APRN - CNP   Insulin Pen Needle ([employer] PEN NEEDLES) 31G X 6 MM MISC Inject 1 each into the skin 3 times daily 23   Frandy Parsons APRN - CNP   gabapentin (NEURONTIN) 300 MG capsule Take 1 capsule by mouth 3 times daily for 90 days. 23  Frandy Parsons APRN - CNP   busPIRone (BUSPAR) 5 MG tablet Take 1 tablet by mouth 2 times daily 23   Frandy Parsons APRN - CNP   Finerenone (KERENDIA) 10 MG TABS Take 10 mg by mouth daily 10/31/23   Beka Nevarez MD   dapagliflozin (FARXIGA) 10 MG tablet Take 1 tablet by mouth every morning 10/31/23   Beka Nevarez MD   fenofibrate (TRIGLIDE) 160 MG tablet Take 1 tablet by mouth daily 10/25/23   Frandy Parsons APRN - CNP   dicyclomine (BENTYL) 10 MG capsule Take 1 capsule by mouth 4 times daily as needed (for abdominal cramping) 10/4/23   Kim Kirkland, APRN - CNP    omeprazole (PRILOSEC) 20 MG delayed release capsule Take 1 capsule by mouth Daily 10/4/23   Kim Kirkland APRN - CNP   atorvastatin (LIPITOR) 80 MG tablet Take 1 tablet by mouth daily 9/18/23   Brie Mcneil APRN - CNP   ranolazine (RANEXA) 500 MG extended release tablet Take 1 tablet by mouth 2 times daily 9/18/23   Brie Mcneil APRN - CNP   traZODone (DESYREL) 50 MG tablet Take 1 tablet by mouth nightly as needed for Sleep 8/28/23   Frandy Parsons APRN - CNP   isosorbide mononitrate (IMDUR) 60 MG extended release tablet Take by mouth    Provider, MD Kera   INS SYRINGE/NEEDLE 1CC/28G (B-D INS SYR MICROFINE 1CC/28G) 28G X 1/2\" 1 ML MISC USE ONE DAILY 7/28/23   Shaun Bear MD   INSULIN SYRINGE .5CC/29G 29G X 1/2\" 0.5 ML MISC Inject 1 each into the skin 3 times daily 7/27/23   Shaun Bear MD   insulin lispro (HUMALOG) 100 UNIT/ML SOLN injection vial Inject 0-15 Units into the skin 3 times daily (before meals) 6/9/23   Frandy Prasons APRN - CNP   metoprolol succinate (TOPROL XL) 50 MG extended release tablet Take 1 tablet by mouth daily 2/16/23   Brie Mcneil APRN - CNP   aspirin EC 81 MG EC tablet Take 1 tablet by mouth daily 6/9/22   Emili Jimenes MD   Insulin Syringes, Disposable, U-100 1 ML MISC 1 each by Does not apply route daily 5/24/22   Frandy Parsons APRN - CNP   nitroGLYCERIN (NITROSTAT) 0.4 MG SL tablet Place 1 tablet under the tongue every 5 minutes as needed for Chest pain 10/25/21   Emili Jimenes MD   Lancets MISC 1 each by Does not apply route 3 times daily 6/15/20   Frandy Parsons APRN - CNP   Blood Glucose Monitoring Suppl (ONE TOUCH ULTRA 2) w/Device KIT 1 kit by Does not apply route once for 1 dose 4/16/20 1/11/24  Frandy aPrsons APRN - CNP   blood glucose monitor strips Test 3 times a day & as needed for symptoms of irregular blood glucose. 4/16/20   Frandy Parsons APRN - CNP       Current medications:    Current

## 2024-02-01 NOTE — PROGRESS NOTES
Progress Note( Dr. Cruz)  1/31/2024  Subjective:   Admit Date: 1/19/2024  PCP: Frandy Parsons APRN - CNP    Admitted For : Nausea vomiting and frequent fall was found to be hyponatremic with serum sodium 117     Consulted For: Better control of blood glucose     Interval History:     Feels feels much better today starting also started eating better  Serum sodium getting better of 135     Patient had elevated liver enzymes also lipase suggesting pancreatitis or hepatitis  And liver function test trending down.  GI has been seeing him    Positive for Legionella  CT scan shows possibility of pneumonia and also has pancreatitis  Patient being watched for tuberculosis and was placed in an isolated room  Tuberculosis is still a concern.  The lab test are not back yet    Denies any chest pains,   Yes  SOB .   Has some  nausea but no  vomiting.  Eating somewhat better    No new bowel or bladder symptoms.       Intake/Output Summary (Last 24 hours) at 1/31/2024 2112  Last data filed at 1/31/2024 2104  Gross per 24 hour   Intake 1460 ml   Output 3920 ml   Net -2460 ml         DATA    CBC:   Recent Labs     01/29/24  0434 01/30/24  0430 01/31/24  0314   WBC 4.9 5.6 5.2   HGB 6.7* 8.3* 8.3*    239 214      CMP:  Recent Labs     01/29/24  0434 01/30/24  0430 01/31/24  0314    135 130*   K 3.9 3.9 4.2    100 98*   CO2 21 25 21   BUN 57* 39* 51*   CREATININE 5.3* 4.2* 4.6*   CALCIUM 7.6* 7.8* 7.9*   PROT  --   --  5.6*   LABALBU  --   --  2.3*   BILITOT  --   --  0.6   ALKPHOS  --   --  152*   AST  --   --  435*   ALT  --   --  108*       Lipids:   Lab Results   Component Value Date/Time    CHOL 278 12/11/2023 12:28 PM    HDL 22 12/11/2023 12:28 PM    TRIG 286 01/30/2024 04:30 AM     Glucose:  Recent Labs     01/31/24  0900 01/31/24  1748 01/31/24 2101   POCGLU 188* 127* 196*       [id number]:  Lab Results   Component Value Date/Time    LABA1C 6.8 01/20/2024 05:54 AM     High Sensitivity TSH:   Lab  ensure   resolution of this mass-like right basilar opacity, as a true pulmonary mass   is also a diagnostic consideration.         CT CHEST ABDOMEN PELVIS WO CONTRAST Additional Contrast? None   Final Result   1. Masslike opacity in the right lower lobe.  If there is evidence of   infection, pneumonia may be considered.  A pulmonary mass cannot be excluded.   2. Splenomegaly of uncertain significance.   3. Advanced atherosclerotic calcification of coronary arteries.  Cardiology   follow-up may be considered on a nonemergent outpatient basis.         CT HEAD WO CONTRAST   Final Result   1.  No acute intracranial abnormality.      2.  Focal area of encephalomalacia in the right frontal lobe may represent an   old stroke.         XR CHEST PORTABLE   Final Result   Expiratory film with bilateral atelectasis versus pneumonia.         IR TUNNELED CVC PLACE WO SQ PORT/PUMP > 5 YEARS    (Results Pending)        Scheduled Medicines   Medications:    omega-3 acid ethyl esters  2 g Oral BID    insulin glargine  20 Units SubCUTAneous Nightly    nystatin  5 mL Oral 4x Daily    insulin lispro  10 Units SubCUTAneous TID WC    insulin lispro  0-8 Units SubCUTAneous TID WC    insulin lispro  0-8 Units SubCUTAneous 2 times per day    [Held by provider] insulin NPH  5 Units SubCUTAneous QAM    dolutegravir sodium  50 mg Oral Daily    darunavir-cobicistat  1 tablet Oral Daily    lamiVUDine  100 mg Oral Daily    entecavir  0.5 mg Oral Weekly - Monday    sulfamethoxazole-trimethoprim  1 tablet Oral Once per day on Mon Wed Fri    pantoprazole (PROTONIX) 40 mg in sodium chloride (PF) 0.9 % 10 mL injection  40 mg IntraVENous Daily    folic acid  1 mg Oral Daily    metoprolol succinate  100 mg Oral Daily    nicotine  1 patch TransDERmal Daily    calcitRIOL  0.5 mcg Oral Daily    vitamin D  50,000 Units Oral Weekly    calcium carbonate  500 mg Oral TID    sodium chloride flush  5-40 mL IntraVENous 2 times per day    heparin (porcine)  5,000

## 2024-02-01 NOTE — PROGRESS NOTES
Infectious Disease Progress Note  2024   Patient Name: Dewey Sosa : 1980     Assessment  HIV/AIDS  Legionella pneumonia  Resolved hepatitis B infection  Compensated liver disease on intermittent hemodialysis  History of CKD stage V, type 2 diabetes mellitus, coronary artery disease status post PCI, MSM \"presented with 1 week history of shortness of breath, weakness and falls.  Was diagnosed with Legionella pneumonia.  Newly diagnosed with HIV-1 viral load was 46,000, CD4 count was 25 on this admission.  Sexual contraction.  Tolerating oral ART  Cryptococcal antigen negative, gonococcal and chlamydia NAAT test negative, T. pallidum screen negative  Quantiferon TB test was indeterminate, likely due to low CD4 counts. Has low epidemiologic risk factors for TB  Completed a 1 week course of azithromycin.  CRP on dwt  Lumbar pain   X-ray is negative.  ROSE MARY on CKD stage V  ESRD on maintenance hemodialysis on  and Friday  Elevated lipase  On dwt, GI cons ocnsult  Type 2 diabetes mellitus  Peripheral arterial disease  Multiple comorbidity per PMHx:   Plan  Therapeutic:  PJP prophylaxis: Bactrim single strength 1 tab p.o.  EOD (2024)  Entecavir 0.5 mg p.o. weekly (on Monday), lamivudine 100 mg p.o. daily, darunavir-cobicistat 800-150 mg p.o. daily, dolutegravir 50 mg p.o. daily (2024)  Continue to monitor renal function, if it worsens then Entecavir and lamivudine dosing will have to be adjusted  Diagnostic:  Trend CRP  CT of the lumbar spine  F/u:  AFB blood culture  Other:s.    Reason for visit: F/u HIV/AIDS, CKD stage V on hemodialysis  History:?Interval history noted  Denies n/v/d/f or untoward effects of antimicrobials  Complains of back pain  Physical Exam:  Vital Signs: BP (!) 145/88   Pulse 80   Temp 98.3 °F (36.8 °C) (Oral)   Resp 20   Ht 1.854 m (6' 1\")   Wt 103.3 kg (227 lb 11.8 oz)   SpO2 93%   BMI 30.05 kg/m²     Gen: alert and oriented X3, no distress  Skin: no  42 - 52 %    MCV 92.2 78 - 100 FL    MCH 29.7 27 - 31 PG    MCHC 32.2 32.0 - 36.0 %    RDW 13.3 11.7 - 14.9 %    Platelets 237 140 - 440 K/CU MM    MPV 9.3 7.5 - 11.1 FL    Differential Type AUTOMATED DIFFERENTIAL     Segs Relative 70.6 (H) 36 - 66 %    Lymphocytes % 18.9 (L) 24 - 44 %    Monocytes % 8.8 (H) 0 - 4 %    Eosinophils % 0.2 0 - 3 %    Basophils % 1.1 (H) 0 - 1 %    Segs Absolute 3.2 K/CU MM    Lymphocytes Absolute 0.9 K/CU MM    Monocytes Absolute 0.4 K/CU MM    Eosinophils Absolute 0.0 K/CU MM    Basophils Absolute 0.1 K/CU MM    Nucleated RBC % 0.0 %    Total Nucleated RBC 0.0 K/CU MM    Total Immature Neutrophil 0.02 K/CU MM    Immature Neutrophil % 0.4 0 - 0.43 %   Critical Care Panel    Collection Time: 02/01/24  5:40 AM   Result Value Ref Range    Sodium 135 135 - 145 MMOL/L    Potassium 4.8 3.5 - 5.1 MMOL/L    Chloride 100 99 - 110 mMol/L    CO2 25 21 - 32 MMOL/L    Anion Gap 10 7 - 16    Glucose 144 (H) 70 - 99 MG/DL    BUN 39 (H) 6 - 23 MG/DL    Creatinine 3.5 (H) 0.9 - 1.3 MG/DL    Est, Glom Filt Rate 21 (L) >60 mL/min/1.73m2    Calcium 8.3 8.3 - 10.6 MG/DL    Phosphorus 4.1 2.5 - 4.9 MG/DL    Magnesium 1.7 (L) 1.8 - 2.4 mg/dl   C-Reactive Protein    Collection Time: 02/01/24  5:40 AM   Result Value Ref Range    CRP High Sensitivity 32.5 (H) <5.0 mg/L   Triglyceride    Collection Time: 02/01/24  5:40 AM   Result Value Ref Range    Triglycerides 299 (H) <150 MG/DL   POCT Glucose    Collection Time: 02/01/24  7:50 AM   Result Value Ref Range    POC Glucose 152 (H) 70 - 99 MG/DL   POCT Glucose    Collection Time: 02/01/24 11:59 AM   Result Value Ref Range    POC Glucose 145 (H) 70 - 99 MG/DL     CULTURE results: Invalid input(s): \"BLOOD CULTURE\", \"URINE CULTURE\", \"SURGICAL CULTURE\"    Diagnosis:  Patient Active Problem List   Diagnosis    Type 2 diabetes mellitus without complication, with long-term current use of insulin (HCC)    Gastroesophageal reflux disease    Former tobacco use    Acute

## 2024-02-01 NOTE — PROGRESS NOTES
V2.0  [facility]      Name:  Dewey Sosa /Age/Sex: 1980  (43 y.o. male)   MRN & CSN:  [id number] & [id number] Encounter Date/Time: 2024 1:41 PM EST    Location:  -A PCP: Frandy Parsons APRN - CNP       Hospital Day: 14    Assessment and Plan:   # Sepsis 2/2 Legionella Pneumonia   # HIV Infection, ?AIDS  Presented with fever T max 101.6, tachycardia 110's, tachypnea 30's. 2/2 legionella pneumonia. CT chest showed right lung mass vs consolidation  Legionalla antigen +  S/p azithromycin 7 days in total and rocephin 5 days total, ID on board, CD4 count only 25, On bactrim prophylaxis and HAART, ID ordered cryptococcal ag/ab positive, IGG/IGM, HSV 1, 2, urine for gonacoccal, RPR and syphillis ab (Treponema pallidum) , QuantiFERON indeterminate, follow on results.  PT/OT evaluated and recommended ARU    # ROSE MARY on CKD IV now ESRD.  Cr 4.3 (baseline ~2.5).   Temp vas cath placed  Started on HD. Contnue HD per nephro.  Permanent HD cath placement on     # Acute Normocytic Anemia   Hb down trending from 10's to 7.0. Received 1U PRBC   Patient with significant fatigue at rest and SOB with activity   Hb 8.5 post transfusion. Hb stable at 7.6  Transfuse if Hb < 7  PBS without evidence of hemolysis.      # Hyponatremia. Likely 2/2 legionella pneumonia and poor oral intake/dehydration. P/w Na 117, improved 123 but then down to 118.   Today Na 127 as well ,  Nephro managing, on HD as below  - Plan for M/W/F HD   - plan for tunneled cath on     # Transaminitis, hyperbilirubinemia.  Was going up but today coming down.   Imaging including U/S and CT abd unremarkable.   CK from few days ago 20K down to 3600 yesterday  Could be legionella hepatitis vs rhabdomyolysis.    - GI on board   - Trend liver panel  - MRCP ordered but cancelled now given no clinical evidence of Choledocholithiasis-patient did not tolerated    # Acute pancreatitis 2/2 Elevated TG. Was as high as 1748. Has  NO GROWTH AT 5 DAYS    Narrative:      SETUP DATE/TIME:  01/19/2024 2017              Imaging/Diagnostics Last 24 Hours   CT ABDOMEN W CONTRAST Additional Contrast? Radiologist Recommendation    Result Date: 1/23/2024  EXAMINATION: CT ABDOMEN WITH CONTRAST 1/23/2024 6:16 pm TECHNIQUE: CT of the abdomen was performed with the administration of intravenous contrast. Multiplanar reformatted images are provided for review. Automated exposure control, iterative reconstruction, and/or weight based adjustment of the mA/kV was utilized to reduce the radiation dose to as low as reasonably achievable. COMPARISON: 01/19/2024 CT chest/abdomen/pelvis HISTORY: ORDERING SYSTEM PROVIDED HISTORY: eval pancreatitis TECHNOLOGIST PROVIDED HISTORY: Reason for exam:->eval pancreatitis Additional Contrast?->Radiologist Recommendation Reason for Exam: eval pancreatitis FINDINGS: Lower Chest: Redemonstrated dense consolidative opacity of the right lower lobe and right lower lobe interlobular septal thickening.  Scattered linear atelectasis.  Visualized portions of the heart are normal in size.  No pericardial effusion. Organs: The liver, gallbladder, spleen are unremarkable.  There is trace peripancreatic inflammatory stranding, particularly in the region of the pancreatic tail.  Minimal hypoattenuation in the pancreatic tail appears linear on the coronal view, most consistent with invagination of fat.  No peripancreatic fluid collection or evidence of necrosis.  There is a 1.3 cm indeterminate right adrenal nodule.  This is stable since at least August 2021 and likely represents a benign adenoma.  No suspicious renal lesion.  No hydronephrosis or renal calculi. GI/Bowel: No evidence of bowel obstruction. Peritoneum/Retroperitoneum: No retroperitoneal or mesenteric lymphadenopathy. Trace ascites.  No pneumoperitoneum.  Scattered atherosclerosis of the abdominal aorta without aneurysm. Bones/Soft Tissues: No acute osseous or soft tissue

## 2024-02-01 NOTE — ANESTHESIA POSTPROCEDURE EVALUATION
Department of Anesthesiology  Postprocedure Note    Patient: Dewey Sosa  MRN: [id number]  YOB: 1980  Date of evaluation: 2/1/2024    Procedure Summary     Date: 02/01/24 Room / Location: [place] [address] / [facility]    Anesthesia Start: 1759 Anesthesia Stop: 1823    Procedure: EGD BIOPSY FOR GASTRITIS Diagnosis:       Drop in hemoglobin      (Drop in hemoglobin [R71.0])    Surgeons: Suzanne Ríos MD Responsible Provider: Frandy Ni MD    Anesthesia Type: MAC ASA Status: 3          Anesthesia Type: No value filed.    Fernanda Phase I:  10    Fernanda Phase II:  10    Anesthesia Post Evaluation    Patient location during evaluation: bedside  Patient participation: complete - patient participated  Level of consciousness: awake and alert  Pain score: 0  Airway patency: patent  Nausea & Vomiting: no nausea and no vomiting  Cardiovascular status: hemodynamically stable  Respiratory status: acceptable, room air, spontaneous ventilation and nonlabored ventilation  Hydration status: euvolemic  Pain management: adequate        No notable events documented.

## 2024-02-01 NOTE — PROGRESS NOTES
Hematology Oncology Inpatient Progress Note     Patient Name:  Dewey Sosa  Patient :  1980  Patient MRN:  [id number]       Referring Provider: Frandy Parsons APRN - CNP     Date of Service: 2024      Reason for Consult: Anemia in setting of HIV+     Chief Complaint:    Chief Complaint   Patient presents with    Fall     Complains he has fallen 6 times in the last few days, and has hit his head on ASA    Fatigue     Complaining of increasing weakness.      Principal Problem:    Hyponatremia  Active Problems:    Acute on chronic anemia    Elevated LFTs    Elevated lipase    Splenomegaly    Nausea and vomiting    Traumatic rhabdomyolysis (HCC)    Legionella pneumonia (HCC)    AIDS (acquired immunodeficiency syndrome), CD4 <=200 (HCC)    CKD (chronic kidney disease) stage 5, GFR less than 15 ml/min (HCC)    Accident  Resolved Problems:    * No resolved hospital problems. *      HPI:   Dewey Sosa is a 43 y.o. male with newly diagnosed HIV+ with low CD4 count (no AIDS defining event as of yet) hospitalized with multiple issues including rhabdomyolysis, legionella pneumonia, hyponatremia, hyperbilirubinemia, acute pancreatitis, ARF (ROSE MARY on CKD IV, now on HD).  He is noted to have worsening anemia over admission from 10.3 =>7.0,  Did receive 1 uPRBC with appropriate response to 8.5 however 7.4 on repeat.  GI is already following.  MCV 88. MCHC 33, RDW 13. Overall Plt and WBC have remained WNL.  Reportedly he has been following with nephrology for CKD however had declined a large portion of the workup prior to this admission.  These diagnoses are all new to him and he admits he was not sure if he wanted to know.    On exam he is with his mother and is ok with her in room to discuss sensitive diagnosis. He feels improved since admission and is in better spirits. He states that he has felt weak over the last few months acutely worsening in the last few weeks.  Admits to decreasing appetite over last 6  CO2 25 02/01/2024    BUN 39 (H) 02/01/2024    CREATININE 3.5 (H) 02/01/2024    GLUCOSE 144 (H) 02/01/2024    CALCIUM 8.3 02/01/2024    PHOS 4.1 02/01/2024    MG 1.7 (L) 02/01/2024    PROT 5.6 (L) 01/31/2024    LABALBU 2.3 (L) 01/31/2024    BILITOT 0.6 01/31/2024    ALKPHOS 152 (H) 01/31/2024     (H) 01/31/2024     (H) 01/31/2024    LABGLOM 21 (L) 02/01/2024    GFRAA 50 (L) 09/29/2022    AGRATIO 1.0 (L) 06/17/2021    GLOB 4.0 06/17/2021    POCGLU 146 (H) 02/01/2024     Lab Results   Component Value Date    ALKPHOS 152 (H) 01/31/2024     (H) 01/31/2024     (H) 01/31/2024    BILITOT 0.6 01/31/2024    PROT 5.6 (L) 01/31/2024     No results found for: \"URICACID\"  Lab Results   Component Value Date     (H) 01/25/2024     Lab Results   Component Value Date    IRON 37 (L) 01/26/2024    TIBC 146 (L) 01/26/2024    FERRITIN 2,343 (H) 01/26/2024       Imaging:  XR LUMBAR SPINE (MIN 4 VIEWS)   Final Result   1. No acute abnormality or significant disc space narrowing.   2. Aortic atherosclerosis noted.         CT ABDOMEN W CONTRAST Additional Contrast? Radiologist Recommendation   Final Result   1.  Trace peripancreatic inflammatory stranding compatible with clinical   history of pancreatitis.  No evidence of peripancreatic collection or   necrosis.      2.  Trace ascites.      3.  Redemonstrated dense consolidative opacity of the right lower lobe likely   to represent pneumonia.  Neoplasm is not excluded and follow-up to resolution   is recommended.         XR CHEST PORTABLE   Final Result   1. Right basilar consolidation mildly improved from the previous study.   2. Pulmonary vascular congestion and possible small effusions.         XR CHEST PORTABLE   Final Result   1. Large right basilar consolidative opacity significantly worsened from   01/20/2024.   2. Nonobstructive bowel gas pattern.         XR ABDOMEN (KUB) (SINGLE AP VIEW)   Final Result   1. Large right basilar consolidative opacity

## 2024-02-02 ENCOUNTER — APPOINTMENT (OUTPATIENT)
Dept: INTERVENTIONAL RADIOLOGY/VASCULAR | Age: 44
DRG: 969 | End: 2024-02-02
Payer: MEDICARE

## 2024-02-02 ENCOUNTER — APPOINTMENT (OUTPATIENT)
Dept: GENERAL RADIOLOGY | Age: 44
DRG: 969 | End: 2024-02-02
Payer: MEDICARE

## 2024-02-02 LAB
ANION GAP SERPL CALCULATED.3IONS-SCNC: 10 MMOL/L (ref 7–16)
BASOPHILS ABSOLUTE: 0 K/CU MM
BASOPHILS RELATIVE PERCENT: 0.7 % (ref 0–1)
BUN SERPL-MCNC: 52 MG/DL (ref 6–23)
CALCIUM SERPL-MCNC: 7.9 MG/DL (ref 8.3–10.6)
CHLORIDE BLD-SCNC: 98 MMOL/L (ref 99–110)
CO2: 23 MMOL/L (ref 21–32)
CREAT SERPL-MCNC: 4.2 MG/DL (ref 0.9–1.3)
CRP SERPL HS-MCNC: 26.1 MG/L
DIFFERENTIAL TYPE: ABNORMAL
EOSINOPHILS ABSOLUTE: 0 K/CU MM
EOSINOPHILS RELATIVE PERCENT: 0.7 % (ref 0–3)
GFR SERPL CREATININE-BSD FRML MDRD: 17 ML/MIN/1.73M2
GLUCOSE BLD-MCNC: 132 MG/DL (ref 70–99)
GLUCOSE BLD-MCNC: 147 MG/DL (ref 70–99)
GLUCOSE BLD-MCNC: 147 MG/DL (ref 70–99)
GLUCOSE BLD-MCNC: 219 MG/DL (ref 70–99)
GLUCOSE SERPL-MCNC: 130 MG/DL (ref 70–99)
HCT VFR BLD CALC: 24.7 % (ref 42–52)
HEMOGLOBIN: 7.9 GM/DL (ref 13.5–18)
IMMATURE NEUTROPHIL %: 0.5 % (ref 0–0.43)
LYMPHOCYTES ABSOLUTE: 0.7 K/CU MM
LYMPHOCYTES RELATIVE PERCENT: 17.7 % (ref 24–44)
MAGNESIUM: 1.6 MG/DL (ref 1.8–2.4)
MCH RBC QN AUTO: 29.8 PG (ref 27–31)
MCHC RBC AUTO-ENTMCNC: 32 % (ref 32–36)
MCV RBC AUTO: 93.2 FL (ref 78–100)
MONOCYTES ABSOLUTE: 0.4 K/CU MM
MONOCYTES RELATIVE PERCENT: 8.7 % (ref 0–4)
NUCLEATED RBC %: 0 %
PDW BLD-RTO: 13.2 % (ref 11.7–14.9)
PHOSPHORUS: 4.5 MG/DL (ref 2.5–4.9)
PLATELET # BLD: 220 K/CU MM (ref 140–440)
PMV BLD AUTO: 9.5 FL (ref 7.5–11.1)
POTASSIUM SERPL-SCNC: 4.4 MMOL/L (ref 3.5–5.1)
RBC # BLD: 2.65 M/CU MM (ref 4.6–6.2)
SEGMENTED NEUTROPHILS ABSOLUTE COUNT: 2.9 K/CU MM
SEGMENTED NEUTROPHILS RELATIVE PERCENT: 71.7 % (ref 36–66)
SODIUM BLD-SCNC: 131 MMOL/L (ref 135–145)
TOTAL IMMATURE NEUTOROPHIL: 0.02 K/CU MM
TOTAL NUCLEATED RBC: 0 K/CU MM
WBC # BLD: 4 K/CU MM (ref 4–10.5)

## 2024-02-02 PROCEDURE — 36558 INSERT TUNNELED CV CATH: CPT

## 2024-02-02 PROCEDURE — 6360000002 HC RX W HCPCS: Performed by: STUDENT IN AN ORGANIZED HEALTH CARE EDUCATION/TRAINING PROGRAM

## 2024-02-02 PROCEDURE — 71045 X-RAY EXAM CHEST 1 VIEW: CPT

## 2024-02-02 PROCEDURE — 6360000002 HC RX W HCPCS: Performed by: INTERNAL MEDICINE

## 2024-02-02 PROCEDURE — 2700000000 HC OXYGEN THERAPY PER DAY

## 2024-02-02 PROCEDURE — 6370000000 HC RX 637 (ALT 250 FOR IP): Performed by: INTERNAL MEDICINE

## 2024-02-02 PROCEDURE — 02HV33Z INSERTION OF INFUSION DEVICE INTO SUPERIOR VENA CAVA, PERCUTANEOUS APPROACH: ICD-10-PCS | Performed by: RADIOLOGY

## 2024-02-02 PROCEDURE — 6370000000 HC RX 637 (ALT 250 FOR IP): Performed by: STUDENT IN AN ORGANIZED HEALTH CARE EDUCATION/TRAINING PROGRAM

## 2024-02-02 PROCEDURE — 87077 CULTURE AEROBIC IDENTIFY: CPT

## 2024-02-02 PROCEDURE — 84100 ASSAY OF PHOSPHORUS: CPT

## 2024-02-02 PROCEDURE — 2580000003 HC RX 258: Performed by: STUDENT IN AN ORGANIZED HEALTH CARE EDUCATION/TRAINING PROGRAM

## 2024-02-02 PROCEDURE — 87205 SMEAR GRAM STAIN: CPT

## 2024-02-02 PROCEDURE — 05PY03Z REMOVAL OF INFUSION DEVICE FROM UPPER VEIN, OPEN APPROACH: ICD-10-PCS | Performed by: RADIOLOGY

## 2024-02-02 PROCEDURE — 76937 US GUIDE VASCULAR ACCESS: CPT

## 2024-02-02 PROCEDURE — 36592 COLLECT BLOOD FROM PICC: CPT

## 2024-02-02 PROCEDURE — 99232 SBSQ HOSP IP/OBS MODERATE 35: CPT | Performed by: INTERNAL MEDICINE

## 2024-02-02 PROCEDURE — 6370000000 HC RX 637 (ALT 250 FOR IP): Performed by: PHYSICIAN ASSISTANT

## 2024-02-02 PROCEDURE — 85025 COMPLETE CBC W/AUTO DIFF WBC: CPT

## 2024-02-02 PROCEDURE — 87070 CULTURE OTHR SPECIMN AEROBIC: CPT

## 2024-02-02 PROCEDURE — 0JH63XZ INSERTION OF TUNNELED VASCULAR ACCESS DEVICE INTO CHEST SUBCUTANEOUS TISSUE AND FASCIA, PERCUTANEOUS APPROACH: ICD-10-PCS | Performed by: RADIOLOGY

## 2024-02-02 PROCEDURE — 2140000000 HC CCU INTERMEDIATE R&B

## 2024-02-02 PROCEDURE — 05PYX3Z REMOVAL OF INFUSION DEVICE FROM UPPER VEIN, EXTERNAL APPROACH: ICD-10-PCS | Performed by: RADIOLOGY

## 2024-02-02 PROCEDURE — 80048 BASIC METABOLIC PNL TOTAL CA: CPT

## 2024-02-02 PROCEDURE — 82962 GLUCOSE BLOOD TEST: CPT

## 2024-02-02 PROCEDURE — 94761 N-INVAS EAR/PLS OXIMETRY MLT: CPT

## 2024-02-02 PROCEDURE — B31N1ZZ FLUOROSCOPY OF OTHER UPPER ARTERIES USING LOW OSMOLAR CONTRAST: ICD-10-PCS | Performed by: RADIOLOGY

## 2024-02-02 PROCEDURE — 86140 C-REACTIVE PROTEIN: CPT

## 2024-02-02 PROCEDURE — 2500000003 HC RX 250 WO HCPCS: Performed by: RADIOLOGY

## 2024-02-02 PROCEDURE — 77001 FLUOROGUIDE FOR VEIN DEVICE: CPT

## 2024-02-02 PROCEDURE — 90935 HEMODIALYSIS ONE EVALUATION: CPT

## 2024-02-02 PROCEDURE — 99231 SBSQ HOSP IP/OBS SF/LOW 25: CPT | Performed by: PHYSICIAN ASSISTANT

## 2024-02-02 PROCEDURE — 83735 ASSAY OF MAGNESIUM: CPT

## 2024-02-02 RX ORDER — LIDOCAINE HYDROCHLORIDE 10 MG/ML
INJECTION, SOLUTION EPIDURAL; INFILTRATION; INTRACAUDAL; PERINEURAL PRN
Status: COMPLETED | OUTPATIENT
Start: 2024-02-02 | End: 2024-02-02

## 2024-02-02 RX ORDER — AZITHROMYCIN 250 MG/1
500 TABLET, FILM COATED ORAL DAILY
Status: DISCONTINUED | OUTPATIENT
Start: 2024-02-02 | End: 2024-02-08 | Stop reason: HOSPADM

## 2024-02-02 RX ORDER — MIRTAZAPINE 15 MG/1
15 TABLET, ORALLY DISINTEGRATING ORAL NIGHTLY
Status: DISCONTINUED | OUTPATIENT
Start: 2024-02-02 | End: 2024-02-08 | Stop reason: HOSPADM

## 2024-02-02 RX ORDER — CLONIDINE HYDROCHLORIDE 0.1 MG/1
0.1 TABLET ORAL 2 TIMES DAILY
Status: DISCONTINUED | OUTPATIENT
Start: 2024-02-02 | End: 2024-02-08 | Stop reason: HOSPADM

## 2024-02-02 RX ADMIN — ASPIRIN 81 MG 81 MG: 81 TABLET ORAL at 10:53

## 2024-02-02 RX ADMIN — HYDROMORPHONE HYDROCHLORIDE 0.5 MG: 1 INJECTION, SOLUTION INTRAMUSCULAR; INTRAVENOUS; SUBCUTANEOUS at 00:32

## 2024-02-02 RX ADMIN — AZITHROMYCIN DIHYDRATE 500 MG: 250 TABLET ORAL at 18:09

## 2024-02-02 RX ADMIN — IRON SUCROSE 50 MG: 20 INJECTION, SOLUTION INTRAVENOUS at 14:10

## 2024-02-02 RX ADMIN — CALCITRIOL CAPSULES 0.25 MCG 0.5 MCG: 0.25 CAPSULE ORAL at 10:53

## 2024-02-02 RX ADMIN — HYDROMORPHONE HYDROCHLORIDE 0.5 MG: 1 INJECTION, SOLUTION INTRAMUSCULAR; INTRAVENOUS; SUBCUTANEOUS at 04:39

## 2024-02-02 RX ADMIN — CALCIUM CARBONATE 500 MG: 500 TABLET, CHEWABLE ORAL at 10:53

## 2024-02-02 RX ADMIN — LIDOCAINE HYDROCHLORIDE 5 ML: 10 INJECTION, SOLUTION EPIDURAL; INFILTRATION; INTRACAUDAL; PERINEURAL at 09:18

## 2024-02-02 RX ADMIN — LIDOCAINE HYDROCHLORIDE 10 ML: 10 INJECTION, SOLUTION EPIDURAL; INFILTRATION; INTRACAUDAL; PERINEURAL at 09:30

## 2024-02-02 RX ADMIN — CALCIUM CARBONATE 500 MG: 500 TABLET, CHEWABLE ORAL at 20:40

## 2024-02-02 RX ADMIN — EPOETIN ALFA-EPBX 8000 UNITS: 2000 INJECTION, SOLUTION INTRAVENOUS; SUBCUTANEOUS at 14:54

## 2024-02-02 RX ADMIN — OMEGA-3-ACID ETHYL ESTERS 2 G: 1 CAPSULE, LIQUID FILLED ORAL at 10:52

## 2024-02-02 RX ADMIN — HYDROMORPHONE HYDROCHLORIDE 0.5 MG: 1 INJECTION, SOLUTION INTRAMUSCULAR; INTRAVENOUS; SUBCUTANEOUS at 20:39

## 2024-02-02 RX ADMIN — Medication 400 MG: at 20:40

## 2024-02-02 RX ADMIN — ISOSORBIDE MONONITRATE 60 MG: 60 TABLET, EXTENDED RELEASE ORAL at 10:53

## 2024-02-02 RX ADMIN — METOPROLOL SUCCINATE 100 MG: 50 TABLET, EXTENDED RELEASE ORAL at 10:52

## 2024-02-02 RX ADMIN — NYSTATIN 500000 UNITS: 100000 SUSPENSION ORAL at 10:53

## 2024-02-02 RX ADMIN — BUSPIRONE HYDROCHLORIDE 10 MG: 5 TABLET ORAL at 15:55

## 2024-02-02 RX ADMIN — EZETIMIBE 10 MG: 10 TABLET ORAL at 20:41

## 2024-02-02 RX ADMIN — DARUNAVIR ETHANOLATE AND COBICISTAT 1 EACH: 800; 150 TABLET, FILM COATED ORAL at 10:53

## 2024-02-02 RX ADMIN — NYSTATIN 500000 UNITS: 100000 SUSPENSION ORAL at 15:54

## 2024-02-02 RX ADMIN — FOLIC ACID 1 MG: 1 TABLET ORAL at 10:53

## 2024-02-02 RX ADMIN — INSULIN LISPRO 2 UNITS: 100 INJECTION, SOLUTION INTRAVENOUS; SUBCUTANEOUS at 21:01

## 2024-02-02 RX ADMIN — HEPARIN SODIUM 5000 UNITS: 5000 INJECTION INTRAVENOUS; SUBCUTANEOUS at 15:55

## 2024-02-02 RX ADMIN — PANTOPRAZOLE SODIUM 40 MG: 40 TABLET, DELAYED RELEASE ORAL at 06:43

## 2024-02-02 RX ADMIN — OMEGA-3-ACID ETHYL ESTERS 2 G: 1 CAPSULE, LIQUID FILLED ORAL at 20:40

## 2024-02-02 RX ADMIN — NYSTATIN 500000 UNITS: 100000 SUSPENSION ORAL at 20:41

## 2024-02-02 RX ADMIN — DOLUTEGRAVIR SODIUM 50 MG: 50 TABLET, FILM COATED ORAL at 10:53

## 2024-02-02 RX ADMIN — BUSPIRONE HYDROCHLORIDE 10 MG: 5 TABLET ORAL at 10:53

## 2024-02-02 RX ADMIN — ATORVASTATIN CALCIUM 80 MG: 40 TABLET, FILM COATED ORAL at 20:40

## 2024-02-02 RX ADMIN — HEPARIN SODIUM 5000 UNITS: 5000 INJECTION INTRAVENOUS; SUBCUTANEOUS at 06:43

## 2024-02-02 RX ADMIN — INSULIN LISPRO 10 UNITS: 100 INJECTION, SOLUTION INTRAVENOUS; SUBCUTANEOUS at 11:02

## 2024-02-02 RX ADMIN — SODIUM CHLORIDE, PRESERVATIVE FREE 10 ML: 5 INJECTION INTRAVENOUS at 10:58

## 2024-02-02 RX ADMIN — Medication 400 MG: at 10:53

## 2024-02-02 RX ADMIN — SODIUM CHLORIDE, PRESERVATIVE FREE 10 ML: 5 INJECTION INTRAVENOUS at 20:54

## 2024-02-02 RX ADMIN — INSULIN GLARGINE 20 UNITS: 100 INJECTION, SOLUTION SUBCUTANEOUS at 20:41

## 2024-02-02 RX ADMIN — MIRTAZAPINE 15 MG: 15 TABLET, ORALLY DISINTEGRATING ORAL at 21:01

## 2024-02-02 RX ADMIN — BUSPIRONE HYDROCHLORIDE 10 MG: 5 TABLET ORAL at 20:40

## 2024-02-02 RX ADMIN — LAMIVUDINE 100 MG: 100 TABLET, FILM COATED ORAL at 10:53

## 2024-02-02 RX ADMIN — CALCIUM CARBONATE 500 MG: 500 TABLET, CHEWABLE ORAL at 15:55

## 2024-02-02 RX ADMIN — HEPARIN SODIUM 5000 UNITS: 5000 INJECTION INTRAVENOUS; SUBCUTANEOUS at 20:41

## 2024-02-02 RX ADMIN — CLONIDINE HYDROCHLORIDE 0.1 MG: 0.1 TABLET ORAL at 20:40

## 2024-02-02 RX ADMIN — HYDROMORPHONE HYDROCHLORIDE 0.5 MG: 1 INJECTION, SOLUTION INTRAMUSCULAR; INTRAVENOUS; SUBCUTANEOUS at 15:54

## 2024-02-02 RX ADMIN — HYDROMORPHONE HYDROCHLORIDE 0.5 MG: 1 INJECTION, SOLUTION INTRAMUSCULAR; INTRAVENOUS; SUBCUTANEOUS at 10:26

## 2024-02-02 ASSESSMENT — PAIN DESCRIPTION - LOCATION
LOCATION: BACK
LOCATION: BACK;GENERALIZED
LOCATION: BACK;GENERALIZED

## 2024-02-02 ASSESSMENT — PAIN DESCRIPTION - PAIN TYPE: TYPE: ACUTE PAIN

## 2024-02-02 ASSESSMENT — PAIN SCALES - GENERAL
PAINLEVEL_OUTOF10: 9
PAINLEVEL_OUTOF10: 10
PAINLEVEL_OUTOF10: 8
PAINLEVEL_OUTOF10: 10
PAINLEVEL_OUTOF10: 7
PAINLEVEL_OUTOF10: 9
PAINLEVEL_OUTOF10: 5

## 2024-02-02 ASSESSMENT — PAIN SCALES - WONG BAKER: WONGBAKER_NUMERICALRESPONSE: 2

## 2024-02-02 ASSESSMENT — PAIN DESCRIPTION - ORIENTATION
ORIENTATION: RIGHT;LEFT;ANTERIOR;POSTERIOR
ORIENTATION: LOWER
ORIENTATION: LOWER
ORIENTATION: RIGHT;LEFT;ANTERIOR;POSTERIOR

## 2024-02-02 ASSESSMENT — PAIN DESCRIPTION - FREQUENCY: FREQUENCY: CONTINUOUS

## 2024-02-02 ASSESSMENT — PAIN DESCRIPTION - DESCRIPTORS
DESCRIPTORS: ACHING
DESCRIPTORS: ACHING;BURNING;SHOOTING
DESCRIPTORS: ACHING;DISCOMFORT
DESCRIPTORS: THROBBING;POUNDING
DESCRIPTORS: ACHING;DISCOMFORT

## 2024-02-02 NOTE — PROGRESS NOTES
IR PREPROCEDURE NOTE    2/2/24    REMOVAL OF TEMP CATH AND PLACEMENT OF TUNNELED DIALYSIS ACCESS CATHETER PLACEMENT DISCUSSED WITH PT INCLUDING RISK, BENEFITS AND ALTERNATIVES AND PT ELECTS TO PROCEED WITH PROCEDURE.    MADI SANCHEZ DO

## 2024-02-02 NOTE — CARE COORDINATION
Dr. Hardin approved patient to admit to ARU.  Patient will need auth from insurance prior to admitting.  Will follow for updated therapy notes and will start pre-cert at that time.

## 2024-02-02 NOTE — PROGRESS NOTES
Hematology Oncology Inpatient Progress Note     Patient Name:  Dewey Sosa  Patient :  1980  Patient MRN:  [id number]       Referring Provider: Frandy Parsons APRN - CNP     Date of Service: 2024      Reason for Consult: Anemia in setting of HIV+     Chief Complaint:    Chief Complaint   Patient presents with    Fall     Complains he has fallen 6 times in the last few days, and has hit his head on ASA    Fatigue     Complaining of increasing weakness.      Principal Problem:    Hyponatremia  Active Problems:    Acute on chronic anemia    Elevated LFTs    Elevated lipase    Splenomegaly    Nausea and vomiting    Traumatic rhabdomyolysis (HCC)    Legionella pneumonia (HCC)    AIDS (acquired immunodeficiency syndrome), CD4 <=200 (HCC)    CKD (chronic kidney disease) stage 5, GFR less than 15 ml/min (HCC)    Accident    Drop in hemoglobin  Resolved Problems:    * No resolved hospital problems. *      HPI:   Dewey Sosa is a 43 y.o. male with newly diagnosed HIV+ with low CD4 count (no AIDS defining event as of yet) hospitalized with multiple issues including rhabdomyolysis, legionella pneumonia, hyponatremia, hyperbilirubinemia, acute pancreatitis, ARF ([facility]SE [facility] on CKD IV, now on HD).  He is noted to have worsening anemia over admission from 10.3 =>7.0,  Did receive 1 uPRBC with appropriate response to 8.5 however 7.4 on repeat.  GI is already following.  MCV 88. MCHC 33, RDW 13. Overall Plt and WBC have remained WNL.  Reportedly he has been following with nephrology for CKD however had declined a large portion of the workup prior to this admission.  These diagnoses are all new to him and he admits he was not sure if he wanted to know.    On exam he is with his mother and is ok with her in room to discuss sensitive diagnosis. He feels improved since admission and is in better spirits. He states that he has felt weak over the last few months acutely worsening in the last few weeks.  Admits to  basilar consolidative opacity significantly worsened from   01/20/2024.   2. Nonobstructive bowel gas pattern.         XR ABDOMEN (KUB) (SINGLE AP VIEW)   Final Result   1. Large right basilar consolidative opacity significantly worsened from   01/20/2024.   2. Nonobstructive bowel gas pattern.         US ABDOMEN LIMITED Specify organ? LIVER, SPLEEN, GALLBLADDER, PANCREAS   Final Result   1. Hepatic steatosis.   2. Splenomegaly.   3. Increased echogenicity of the right kidney suggesting medical renal   disease.         XR CHEST PORTABLE   Final Result   1. Interval placement of right internal jugular Trialysis catheter, with tip   overlying the lower SVC, in satisfactory position, and no evidence of a   pneumothorax.   2. Slight interval improvement in appearance of focal mass-like consolidative   opacity within the right lung base, favoring slight improvement of pneumonia.   However, continued serial chest x-ray follow-up is recommended to ensure   resolution of this mass-like right basilar opacity, as a true pulmonary mass   is also a diagnostic consideration.         CT CHEST ABDOMEN PELVIS WO CONTRAST Additional Contrast? None   Final Result   1. Masslike opacity in the right lower lobe.  If there is evidence of   infection, pneumonia may be considered.  A pulmonary mass cannot be excluded.   2. Splenomegaly of uncertain significance.   3. Advanced atherosclerotic calcification of coronary arteries.  Cardiology   follow-up may be considered on a nonemergent outpatient basis.         CT HEAD WO CONTRAST   Final Result   1.  No acute intracranial abnormality.      2.  Focal area of encephalomalacia in the right frontal lobe may represent an   old stroke.         XR CHEST PORTABLE   Final Result   Expiratory film with bilateral atelectasis versus pneumonia.         IR TUNNELED CVC PLACE WO SQ PORT/PUMP > 5 YEARS    (Results Pending)   CT LUMBAR SPINE WO CONTRAST    (Results Pending)

## 2024-02-02 NOTE — PROGRESS NOTES
Progress Note( Dr. Cruz)  2/1/2024  Subjective:   Admit Date: 1/19/2024  PCP: Frandy Parsons APRN - CNP    Admitted For : Nausea vomiting and frequent fall was found to be hyponatremic with serum sodium 117     Consulted For: Better control of blood glucose     Interval History:     Feels feels much better today starting also started eating better  Serum sodium getting better of 135     Patient had elevated liver enzymes also lipase suggesting pancreatitis or hepatitis  And liver function test trending down.  GI has been seeing him    Positive for Legionella  CT scan shows possibility of pneumonia and also has pancreatitis    tuberculosis test came back as negative .  He was taken off isolation  Patient is scheduled for later today  Denies any chest pains,   Yes  SOB .   Has some  nausea but no  vomiting. He is n.p.o. for EGD   no new bowel or bladder symptoms.       Intake/Output Summary (Last 24 hours) at 2/1/2024 2130  Last data filed at 2/1/2024 1823  Gross per 24 hour   Intake 700 ml   Output 2100 ml   Net -1400 ml         DATA    CBC:   Recent Labs     01/30/24  0430 01/31/24  0314 02/01/24  0540   WBC 5.6 5.2 4.6   HGB 8.3* 8.3* 8.4*    214 237      CMP:  Recent Labs     01/30/24  0430 01/31/24  0314 02/01/24  0540    130* 135   K 3.9 4.2 4.8    98* 100   CO2 25 21 25   BUN 39* 51* 39*   CREATININE 4.2* 4.6* 3.5*   CALCIUM 7.8* 7.9* 8.3   PROT  --  5.6*  --    LABALBU  --  2.3*  --    BILITOT  --  0.6  --    ALKPHOS  --  152*  --    AST  --  435*  --    ALT  --  108*  --        Lipids:   Lab Results   Component Value Date/Time    CHOL 278 12/11/2023 12:28 PM    HDL 22 12/11/2023 12:28 PM    TRIG 299 02/01/2024 05:40 AM     Glucose:  Recent Labs     02/01/24  0750 02/01/24  1159 02/01/24 2021   POCGLU 152* 145* 148*       [id number]:  Lab Results   Component Value Date/Time    LABA1C 6.8 01/20/2024 05:54 AM     High Sensitivity TSH:   Lab Results   Component Value Date/Time    TSHHS

## 2024-02-02 NOTE — PROGRESS NOTES
V2.0  [facility] Hospitalist Progress Note      Name:  Dewey Sosa /Age/Sex: 1980  (43 y.o. male)   MRN & CSN:  [id number] & [id number] Encounter Date/Time: 2024 1:41 PM EST    Location:  -A PCP: Frandy Parsons APRN - CNP       Hospital Day: 15    Assessment and Plan:   # Sepsis 2/2 Legionella Pneumonia   # HIV Infection, ?AIDS  Presented with fever T max 101.6, tachycardia 110's, tachypnea 30's. 2/2 legionella pneumonia. CT chest showed right lung mass vs consolidation  Legionalla antigen +  S/p azithromycin 7 days in total and rocephin 5 days total, ID on board, CD4 count only 25, On bactrim prophylaxis and HAART, ID ordered cryptococcal ag/ab positive, IGG/IGM, HSV 1, 2, urine for gonacoccal, RPR and syphillis ab (Treponema pallidum) , QuantiFERON indeterminate, ID recommended to continue Bactrim, Entecavir, lamivudine, darunavir cobicistat, dolutegravir, continue to monitor renal function, trend CRP, CT of the L-spine, follow-up on AFB blood culture, PT/OT evaluated and recommended ARU    # ROSE MARY on CKD IV now ESRD.  Cr 4.3 (baseline ~2.5).   Temp vas cath placed  Started on HD. Contnue HD per nephro.  Permanent HD cath placement on     # Acute Normocytic Anemia   Hb down trending from 10's to 7.0. Received 1U PRBC   Patient with significant fatigue at rest and SOB with activity   Hb 8.5 post transfusion. Hb stable at 7.6  Transfuse if Hb < 7  GI planning for EGD on      # Hyponatremia. Likely 2/2 legionella pneumonia and poor oral intake/dehydration. P/w Na 117, improved 123 but then down to 118.   Today Na 127 as well ,  Nephro managing, on HD as below  - Plan for M/W/F HD   - plan for tunneled cath on     # Transaminitis, hyperbilirubinemia.  Was going up but today coming down.   Imaging including U/S and CT abd unremarkable.   CK from few days ago 20K down to 3600 yesterday  Could be legionella hepatitis vs rhabdomyolysis.    - GI on board   - Trend liver panel  - MRCP ordered

## 2024-02-02 NOTE — BRIEF OP NOTE
Brief Postoperative Note      Patient: Dewey Sosa  YOB: 1980  MRN: [id number]    Date of Procedure: 2/2/2024    * No Diagnosis Codes entered *LONG TERM HEMODIALYSIS ACCESS REQUIREMENT    Post-Op Diagnosis: Same       * No procedures listed *REMOVAL OF TEMP CATH, PLACEMENT OF TUNNELED DIALYSIS CATHETER    * No surgeons listed *FRANDY MASSEY DO    Assistant:  * No surgical staff found *    Anesthesia: * No anesthesia type entered *LOCAL    Estimated Blood Loss (mL): Minimal    Complications: None    Specimens:   * No specimens in log *    Implants:  * No implants in log *      Drains: * No LDAs found *    Findings: TEMP CATH REMOVED IN TOTAL AND IN TACT.  NEW TUNNELED DIALYSIS ACCESS CATHETER PLACED VIA US GUIDED RIGHT INTERNAL JUGULAR VEIN WITH TIP IN SUPERIOR CAVOATRIAL JCTN.. DRESSINGS APPLIED.  NO COMPLICATION SUGGESTED.  PORTS ASPIRATED FLUSHED AND FIXED TO SKIN.        Electronically signed by Frandy Massey DO on 2/2/2024 at 9:35 AM

## 2024-02-02 NOTE — PROGRESS NOTES
Infectious Disease Progress Note  2024   Patient Name: Dewey Sosa : 1980     Assessment  HIV/AIDS  Legionella pneumonia  Resolved hepatitis B infection  Compensated liver disease on intermittent hemodialysis  History of CKD stage V, type 2 diabetes mellitus, coronary artery disease status post PCI, MSM \"presented with 1 week history of shortness of breath, weakness and falls.  Was diagnosed with Legionella pneumonia.  Newly diagnosed with HIV-1 viral load was 46,000, CD4 count was 25 on this admission.  Sexual contraction.  Tolerating oral ART  Cryptococcal antigen negative, gonococcal and chlamydia NAAT test negative, T. pallidum screen negative  Quantiferon TB test was indeterminate, likely due to low CD4 counts. Has low epidemiologic risk factors for TB  Completed a 1 week course of azithromycin.  Reports cough and wheezing. ? Persistence of legionella pneumonia. Ch  Lumbar pain   X-ray is negative.  ROSE MARY on CKD stage V  ESRD on maintenance hemodialysis on  and Friday  Elevated lipase  On dwt, GI cons ocnsult  Type 2 diabetes mellitus  Peripheral arterial disease  Multiple comorbidity per PMHx:   Plan  Therapeutic:  Azithromycin 500 mg po daily for 10 days (2024)  PJP prophylaxis: Bactrim single strength 1 tab p.o.  EOD (2024)  Entecavir 0.5 mg p.o. weekly (on Monday), lamivudine 100 mg p.o. daily, darunavir-cobicistat 800-150 mg p.o. daily, dolutegravir 50 mg p.o. daily (2024)  Continue to monitor renal function, if it worsens then Entecavir and lamivudine dosing will have to be adjusted  Diagnostic:  Trend CRP  CT of the lumbar spine  CXR  F/u:  AFB blood culture  Other:s.    Reason for visit: F/u HIV/AIDS, CKD stage V on hemodialysis  History:?Interval history noted  Denies n/v/d/f or untoward effects of antimicrobials  Complains of back pain, cough and wheezing  Physical Exam:  Vital Signs: BP (!) 152/95   Pulse 86   Temp 98.1 °F (36.7 °C)   Resp 17   Ht  Vitamin D deficiency    ASCVD (arteriosclerotic cardiovascular disease)    Chronic systolic congestive heart failure (HCC)    Ischemic cardiomyopathy    Dyslipidemia    Stage 3a chronic kidney disease (HCC)    Other male erectile dysfunction    Atherosclerotic heart disease of native coronary artery with other forms of angina pectoris (HCC)    Type 2 diabetes mellitus with chronic kidney disease    Insomnia    Stage 3b chronic kidney disease (HCC)    Overweight    Chronic kidney disease, stage IV (severe) (HCC)    Hyperkalemia    Hyponatremia    Elevated LFTs    Elevated lipase    Splenomegaly    Nausea and vomiting    Traumatic rhabdomyolysis (HCC)    Legionella pneumonia (HCC)    AIDS (acquired immunodeficiency syndrome), CD4 <=200 (HCC)    CKD (chronic kidney disease) stage 5, GFR less than 15 ml/min (HCC)    Accident    Drop in hemoglobin       Active Problems  Principal Problem:    Hyponatremia  Active Problems:    Acute on chronic anemia    Elevated LFTs    Elevated lipase    Splenomegaly    Nausea and vomiting    Traumatic rhabdomyolysis (HCC)    Legionella pneumonia (HCC)    AIDS (acquired immunodeficiency syndrome), CD4 <=200 (HCC)    CKD (chronic kidney disease) stage 5, GFR less than 15 ml/min (HCC)    Accident    Drop in hemoglobin  Resolved Problems:    * No resolved hospital problems. *              Electronically signed by: Electronically signed by Raad Mcdaniel MD on 2/2/2024 at 12:41 PM

## 2024-02-02 NOTE — PROGRESS NOTES
Report given to nurse on 3N. Patient to be transferred to [facility] after HD treatment. This RN will take patient's belongings to his new room. This RN attempted to call patient's boyfriend but he did not answer.

## 2024-02-02 NOTE — PROGRESS NOTES
is also a diagnostic consideration.         CT CHEST ABDOMEN PELVIS WO CONTRAST Additional Contrast? None   Final Result   1. Masslike opacity in the right lower lobe.  If there is evidence of   infection, pneumonia may be considered.  A pulmonary mass cannot be excluded.   2. Splenomegaly of uncertain significance.   3. Advanced atherosclerotic calcification of coronary arteries.  Cardiology   follow-up may be considered on a nonemergent outpatient basis.         CT HEAD WO CONTRAST   Final Result   1.  No acute intracranial abnormality.      2.  Focal area of encephalomalacia in the right frontal lobe may represent an   old stroke.         XR CHEST PORTABLE   Final Result   Expiratory film with bilateral atelectasis versus pneumonia.         IR TUNNELED CVC PLACE WO SQ PORT/PUMP > 5 YEARS    (Results Pending)   CT LUMBAR SPINE WO CONTRAST    (Results Pending)   IR TUNNELED CVC PLACE WO SQ PORT/PUMP > 5 YEARS    (Results Pending)        Scheduled Medicines   Medications:    epoetin shailesh-epbx  8,000 Units IntraVENous Once in dialysis    iron sucrose  50 mg IntraVENous Once in dialysis    magnesium oxide  400 mg Oral BID    pantoprazole  40 mg Oral QAM AC    omega-3 acid ethyl esters  2 g Oral BID    insulin glargine  20 Units SubCUTAneous Nightly    nystatin  5 mL Oral 4x Daily    insulin lispro  10 Units SubCUTAneous TID WC    insulin lispro  0-8 Units SubCUTAneous TID WC    insulin lispro  0-8 Units SubCUTAneous 2 times per day    dolutegravir sodium  50 mg Oral Daily    darunavir-cobicistat  1 tablet Oral Daily    lamiVUDine  100 mg Oral Daily    entecavir  0.5 mg Oral Weekly - Monday    sulfamethoxazole-trimethoprim  1 tablet Oral Once per day on Mon Wed Fri    folic acid  1 mg Oral Daily    metoprolol succinate  100 mg Oral Daily    nicotine  1 patch TransDERmal Daily    calcitRIOL  0.5 mcg Oral Daily    vitamin D  50,000 Units Oral Weekly    calcium carbonate  500 mg Oral TID    sodium chloride flush  5-40  Stage 3b chronic kidney disease (HCC)     Overweight     Chronic kidney disease, stage IV (severe) (HCC)     Hyperkalemia     Hyponatremia     Possible pancreatitis      Possible pneumonia      Plan:     Reviewed POC blood glucose . Labs and X ray results   Reviewed Current Medicines   On meal/ Correction bolus Humalog/ Basal Lantus Insulin regime  Monitor Blood glucose frequently   Modified  the dose of Insulin/ other medicines as needed   Reviewed notes from nephrology plan is to dialyze on the discretion of nephrology  Will follow     .     CELE Cruz MD, MD

## 2024-02-02 NOTE — PROGRESS NOTES
TRANSFER - OUT REPORT:    Verbal report given to Lianna RN(name) on Dewey Sosa being transferred to 2115(unit) for routine post-op       Report consisted of patient's Situation, Background, Assessment and   Recommendations(SBAR).     Information from the following report(s) Nurse Handoff Report was reviewed with the receiving nurse.    Opportunity for questions and clarification was provided.      Patient transported with:   Registered Nurse

## 2024-02-02 NOTE — PROCEDURES
#: [id number]  All Connections Secure?: Yes  Venous Parameters Set?: Yes  Arterial Parameters Set?: Yes  Saline Line Double Clamped?: Yes  Air Foam Detector Engaged?: Yes  Machine Functioning Alarm Free? Yes  Prime Given: 200ml    Chlorine Testing - Before each treatment and every 4 hours:   Treatment  Treatment Number: 3  Time On: 1224  Time Off: 1525  Treatment Goal: 3 HOUR, 1.5L  Weight - Scale: 98.5 kg (217 lb 2.5 oz) (02/02/24 1525)  1st check: less than 0.1 ppm at: 1015 hours  2nd check: less than 0.1 ppm at: 1315 hours  3rd check: Not Applicable  (if greater than 0.1 ppm, then check every 30 minutes from secondary)    Access Flows and Pressures  Patient Vitals for the past 8 hrs:   Blood Flow Rate (ml/min) Ultrafiltration Rate (ml/hr) Ultrafiltration Removed (ml) Arterial Pressure (mmHg) Venous Pressure (mmHg) TMP DFR Comments Access Visible   02/02/24 1224 300 ml/min 500 ml/hr 0 ml -60 mmHg 70 70 600 TX STARTED, PRIME GIVEN, LINES SECURE AND CALL LIGHT WITHIN REACH Yes   02/02/24 1230 300 ml/min 500 ml/hr 63 ml -60 mmHg 70 70 600 AWAKE AND TALKING Yes   02/02/24 1245 300 ml/min 500 ml/hr 174 ml -70 mmHg 70 70 600 AWAKE AND TALKING Yes   02/02/24 1300 300 ml/min 500 ml/hr 315 ml -70 mmHg 70 70 600 AWAKE AND TALKING Yes   02/02/24 1315 300 ml/min 500 ml/hr 424 ml -70 mmHg 70 70 600 resting Yes   02/02/24 1330 300 ml/min 500 ml/hr 563 ml -70 mmHg 70 70 600 no distress Yes   02/02/24 1345 300 ml/min 500 ml/hr 694 ml -80 mmHg 80 60 600 resting Yes   02/02/24 1400 300 ml/min 500 ml/hr 796 ml -80 mmHg 80 70 600 AWAKE AND ALERT Yes   02/02/24 1415 300 ml/min 500 ml/hr 973 ml -80 mmHg 80 70 600 resting Yes   02/02/24 1430 300 ml/min 500 ml/hr 1050 ml -80 mmHg 80 70 600 denies needs Yes   02/02/24 1445 300 ml/min 500 ml/hr 1234 ml -80 mmHg 200 60 600 AWAKE AND ALERT Yes   02/02/24 1500 300 ml/min 500 ml/hr 1304 ml -80 mmHg 200 70 600 alert, calm Yes   02/02/24 1515 300 ml/min 500 ml/hr 1425 ml -50 mmHg 350 70 600 RESTING  Yes   02/02/24 1525 200 ml/min 0 ml/hr 1500 ml -10 mmHg 210 60 600 TX ENDED, RINSEBACK GIVEN Yes     Vital Signs  Patient Vitals for the past 8 hrs:   BP Temp Pulse Resp SpO2 Weight Weight Method Percent Weight Change   02/02/24 0830 (!) 176/89 -- 86 20 -- -- -- --   02/02/24 0845 (!) 174/90 -- 92 20 95 % -- -- --   02/02/24 0850 (!) 179/89 -- 85 24 98 % -- -- --   02/02/24 0900 (!) 166/97 -- 80 22 95 % -- -- --   02/02/24 0910 (!) 167/89 -- 84 23 93 % -- -- --   02/02/24 0920 (!) 163/95 -- 85 26 94 % -- -- --   02/02/24 1000 (!) 182/87 -- 82 22 95 % -- -- --   02/02/24 1038 -- -- 85 -- -- -- -- --   02/02/24 1052 (!) 182/87 -- 88 -- -- -- -- --   02/02/24 1056 -- -- -- 22 -- -- -- --   02/02/24 1145 -- -- -- -- 95 % -- -- --   02/02/24 1219 (!) 161/83 98.1 °F (36.7 °C) 95 17 98 % 100.8 kg (222 lb 3.6 oz) Bed scale 2.44   02/02/24 1224 (!) 144/96 -- 91 -- -- -- -- --   02/02/24 1230 (!) 152/95 -- 86 -- -- -- -- --   02/02/24 1245 (!) 170/96 -- 86 -- -- -- -- --   02/02/24 1300 (!) 164/90 -- 85 -- -- -- -- --   02/02/24 1315 (!) 150/83 -- 83 -- -- -- -- --   02/02/24 1330 (!) 157/91 -- 81 -- -- -- -- --   02/02/24 1345 138/87 -- 82 -- -- -- -- --   02/02/24 1400 (!) 150/97 -- 78 -- -- -- -- --   02/02/24 1415 (!) 145/85 -- 77 -- -- -- -- --   02/02/24 1430 135/86 -- 73 -- -- -- -- --   02/02/24 1445 128/85 -- 71 -- -- -- -- --   02/02/24 1500 139/85 -- 70 -- -- -- -- --   02/02/24 1515 (!) 146/98 -- 71 -- -- -- -- --   02/02/24 1525 (!) 137/90 98 °F (36.7 °C) 79 20 99 % 98.5 kg (217 lb 2.5 oz) Bed scale -2.28   02/02/24 1530 (!) 153/83 -- 68 -- -- -- -- --   02/02/24 1545 (!) 142/92 98.1 °F (36.7 °C) 72 20 92 % -- -- --     Post-Dialysis  Arterial Catheter Locking Solution:  NORMAL SALINE  Venous Catheter Locking Solution:  NORMAL SALINE  Post-Treatment Procedures: Blood returned, Catheter Capped, clamped with Saline x2 ports  Machine Disinfection Process: Acid/Vinegar Clean, Heat Disinfect, Exterior Machine

## 2024-02-02 NOTE — PROGRESS NOTES
Nephrology Progress Note  2/2/2024 11:59 AM  Subjective:     Interval History: Dewey Sosa is a 43 y.o. male   doing okay in general somewhat anxious today needs something help him sleep at night    Data:   Scheduled Meds:   epoetin shailesh-epbx  8,000 Units IntraVENous Once in dialysis    iron sucrose  50 mg IntraVENous Once in dialysis    mirtazapine  15 mg Oral Nightly    magnesium oxide  400 mg Oral BID    pantoprazole  40 mg Oral QAM AC    omega-3 acid ethyl esters  2 g Oral BID    insulin glargine  20 Units SubCUTAneous Nightly    nystatin  5 mL Oral 4x Daily    insulin lispro  10 Units SubCUTAneous TID WC    insulin lispro  0-8 Units SubCUTAneous TID WC    insulin lispro  0-8 Units SubCUTAneous 2 times per day    dolutegravir sodium  50 mg Oral Daily    darunavir-cobicistat  1 tablet Oral Daily    lamiVUDine  100 mg Oral Daily    entecavir  0.5 mg Oral Weekly - Monday    sulfamethoxazole-trimethoprim  1 tablet Oral Once per day on Mon Wed Fri    folic acid  1 mg Oral Daily    metoprolol succinate  100 mg Oral Daily    nicotine  1 patch TransDERmal Daily    calcitRIOL  0.5 mcg Oral Daily    vitamin D  50,000 Units Oral Weekly    calcium carbonate  500 mg Oral TID    sodium chloride flush  5-40 mL IntraVENous 2 times per day    heparin (porcine)  5,000 Units SubCUTAneous 3 times per day    aspirin  81 mg Oral Daily    [Held by provider] ticagrelor  90 mg Oral BID    ezetimibe  10 mg Oral Nightly    busPIRone  10 mg Oral TID    atorvastatin  80 mg Oral Nightly    traZODone  50 mg Oral Nightly    isosorbide mononitrate  60 mg Oral Daily     Continuous Infusions:   sodium chloride      sodium chloride      sodium chloride      dextrose           CBC   Recent Labs     01/31/24 0314 02/01/24  0540 02/02/24  0435   WBC 5.2 4.6 4.0   HGB 8.3* 8.4* 7.9*   HCT 25.3* 26.1* 24.7*    237 220      BMP   Recent Labs     01/31/24 0314 02/01/24  0540 02/02/24  0435   * 135 131*   K 4.2 4.8 4.4   CL 98* 100  MD

## 2024-02-02 NOTE — PROGRESS NOTES
Occupational Therapy  OT/PT attempted to see pt this am for follow up treatment. Pt is DHRUV. Will reattempt as able     Priscilla Whitten OTR/KATELIN [id number]  2/2/2024     10:49 AM'

## 2024-02-03 LAB
ANION GAP SERPL CALCULATED.3IONS-SCNC: 12 MMOL/L (ref 7–16)
BASOPHILS ABSOLUTE: 0.1 K/CU MM
BASOPHILS RELATIVE PERCENT: 1.6 % (ref 0–1)
BUN SERPL-MCNC: 36 MG/DL (ref 6–23)
CALCIUM SERPL-MCNC: 8.1 MG/DL (ref 8.3–10.6)
CHLORIDE BLD-SCNC: 98 MMOL/L (ref 99–110)
CO2: 23 MMOL/L (ref 21–32)
CREAT SERPL-MCNC: 3.4 MG/DL (ref 0.9–1.3)
DIFFERENTIAL TYPE: ABNORMAL
EOSINOPHILS ABSOLUTE: 0 K/CU MM
EOSINOPHILS RELATIVE PERCENT: 0.3 % (ref 0–3)
GFR SERPL CREATININE-BSD FRML MDRD: 22 ML/MIN/1.73M2
GLUCOSE BLD-MCNC: 130 MG/DL (ref 70–99)
GLUCOSE BLD-MCNC: 132 MG/DL (ref 70–99)
GLUCOSE BLD-MCNC: 140 MG/DL (ref 70–99)
GLUCOSE BLD-MCNC: 160 MG/DL (ref 70–99)
GLUCOSE BLD-MCNC: 212 MG/DL (ref 70–99)
GLUCOSE BLD-MCNC: 223 MG/DL (ref 70–99)
GLUCOSE SERPL-MCNC: 129 MG/DL (ref 70–99)
HCT VFR BLD CALC: 26.3 % (ref 42–52)
HEMOGLOBIN: 8.4 GM/DL (ref 13.5–18)
IMMATURE NEUTROPHIL %: 0.3 % (ref 0–0.43)
LYMPHOCYTES ABSOLUTE: 0.7 K/CU MM
LYMPHOCYTES RELATIVE PERCENT: 23.5 % (ref 24–44)
MAGNESIUM: 1.7 MG/DL (ref 1.8–2.4)
MCH RBC QN AUTO: 30 PG (ref 27–31)
MCHC RBC AUTO-ENTMCNC: 31.9 % (ref 32–36)
MCV RBC AUTO: 93.9 FL (ref 78–100)
MONOCYTES ABSOLUTE: 0.3 K/CU MM
MONOCYTES RELATIVE PERCENT: 10.7 % (ref 0–4)
NUCLEATED RBC %: 0 %
PDW BLD-RTO: 13.1 % (ref 11.7–14.9)
PHOSPHORUS: 3.6 MG/DL (ref 2.5–4.9)
PLATELET # BLD: 213 K/CU MM (ref 140–440)
PMV BLD AUTO: 9.4 FL (ref 7.5–11.1)
POTASSIUM SERPL-SCNC: 4.2 MMOL/L (ref 3.5–5.1)
RBC # BLD: 2.8 M/CU MM (ref 4.6–6.2)
SEGMENTED NEUTROPHILS ABSOLUTE COUNT: 2 K/CU MM
SEGMENTED NEUTROPHILS RELATIVE PERCENT: 63.6 % (ref 36–66)
SODIUM BLD-SCNC: 133 MMOL/L (ref 135–145)
TOTAL IMMATURE NEUTOROPHIL: 0.01 K/CU MM
TOTAL NUCLEATED RBC: 0 K/CU MM
TRIGL SERPL-MCNC: 266 MG/DL
WBC # BLD: 3.1 K/CU MM (ref 4–10.5)

## 2024-02-03 PROCEDURE — 6370000000 HC RX 637 (ALT 250 FOR IP): Performed by: PHYSICIAN ASSISTANT

## 2024-02-03 PROCEDURE — 94761 N-INVAS EAR/PLS OXIMETRY MLT: CPT

## 2024-02-03 PROCEDURE — 99232 SBSQ HOSP IP/OBS MODERATE 35: CPT | Performed by: INTERNAL MEDICINE

## 2024-02-03 PROCEDURE — 6370000000 HC RX 637 (ALT 250 FOR IP): Performed by: INTERNAL MEDICINE

## 2024-02-03 PROCEDURE — 80048 BASIC METABOLIC PNL TOTAL CA: CPT

## 2024-02-03 PROCEDURE — 2140000000 HC CCU INTERMEDIATE R&B

## 2024-02-03 PROCEDURE — 6360000002 HC RX W HCPCS: Performed by: INTERNAL MEDICINE

## 2024-02-03 PROCEDURE — 85025 COMPLETE CBC W/AUTO DIFF WBC: CPT

## 2024-02-03 PROCEDURE — 84478 ASSAY OF TRIGLYCERIDES: CPT

## 2024-02-03 PROCEDURE — 97110 THERAPEUTIC EXERCISES: CPT

## 2024-02-03 PROCEDURE — 6370000000 HC RX 637 (ALT 250 FOR IP): Performed by: STUDENT IN AN ORGANIZED HEALTH CARE EDUCATION/TRAINING PROGRAM

## 2024-02-03 PROCEDURE — 82962 GLUCOSE BLOOD TEST: CPT

## 2024-02-03 PROCEDURE — 6360000002 HC RX W HCPCS: Performed by: STUDENT IN AN ORGANIZED HEALTH CARE EDUCATION/TRAINING PROGRAM

## 2024-02-03 PROCEDURE — 6370000000 HC RX 637 (ALT 250 FOR IP): Performed by: NURSE PRACTITIONER

## 2024-02-03 PROCEDURE — 36415 COLL VENOUS BLD VENIPUNCTURE: CPT

## 2024-02-03 PROCEDURE — 84100 ASSAY OF PHOSPHORUS: CPT

## 2024-02-03 PROCEDURE — 97535 SELF CARE MNGMENT TRAINING: CPT

## 2024-02-03 PROCEDURE — 97530 THERAPEUTIC ACTIVITIES: CPT

## 2024-02-03 PROCEDURE — 2580000003 HC RX 258: Performed by: STUDENT IN AN ORGANIZED HEALTH CARE EDUCATION/TRAINING PROGRAM

## 2024-02-03 PROCEDURE — 83735 ASSAY OF MAGNESIUM: CPT

## 2024-02-03 RX ADMIN — HEPARIN SODIUM 5000 UNITS: 5000 INJECTION INTRAVENOUS; SUBCUTANEOUS at 05:15

## 2024-02-03 RX ADMIN — CALCITRIOL CAPSULES 0.25 MCG 0.5 MCG: 0.25 CAPSULE ORAL at 09:18

## 2024-02-03 RX ADMIN — Medication 400 MG: at 09:22

## 2024-02-03 RX ADMIN — INSULIN LISPRO 10 UNITS: 100 INJECTION, SOLUTION INTRAVENOUS; SUBCUTANEOUS at 12:49

## 2024-02-03 RX ADMIN — SULFAMETHOXAZOLE AND TRIMETHOPRIM 1 TABLET: 400; 80 TABLET ORAL at 05:08

## 2024-02-03 RX ADMIN — ATORVASTATIN CALCIUM 80 MG: 40 TABLET, FILM COATED ORAL at 20:41

## 2024-02-03 RX ADMIN — BUSPIRONE HYDROCHLORIDE 10 MG: 5 TABLET ORAL at 13:44

## 2024-02-03 RX ADMIN — MIRTAZAPINE 15 MG: 15 TABLET, ORALLY DISINTEGRATING ORAL at 20:42

## 2024-02-03 RX ADMIN — NYSTATIN 500000 UNITS: 100000 SUSPENSION ORAL at 20:41

## 2024-02-03 RX ADMIN — HYDROMORPHONE HYDROCHLORIDE 0.5 MG: 1 INJECTION, SOLUTION INTRAMUSCULAR; INTRAVENOUS; SUBCUTANEOUS at 10:37

## 2024-02-03 RX ADMIN — FOLIC ACID 1 MG: 1 TABLET ORAL at 10:15

## 2024-02-03 RX ADMIN — ASPIRIN 81 MG 81 MG: 81 TABLET ORAL at 09:22

## 2024-02-03 RX ADMIN — INSULIN GLARGINE 20 UNITS: 100 INJECTION, SOLUTION SUBCUTANEOUS at 20:43

## 2024-02-03 RX ADMIN — BUSPIRONE HYDROCHLORIDE 10 MG: 5 TABLET ORAL at 09:09

## 2024-02-03 RX ADMIN — CLONIDINE HYDROCHLORIDE 0.1 MG: 0.1 TABLET ORAL at 09:22

## 2024-02-03 RX ADMIN — ISOSORBIDE MONONITRATE 60 MG: 60 TABLET, EXTENDED RELEASE ORAL at 09:22

## 2024-02-03 RX ADMIN — HYDROMORPHONE HYDROCHLORIDE 0.5 MG: 1 INJECTION, SOLUTION INTRAMUSCULAR; INTRAVENOUS; SUBCUTANEOUS at 20:42

## 2024-02-03 RX ADMIN — Medication 400 MG: at 20:41

## 2024-02-03 RX ADMIN — HEPARIN SODIUM 5000 UNITS: 5000 INJECTION INTRAVENOUS; SUBCUTANEOUS at 20:42

## 2024-02-03 RX ADMIN — METOPROLOL SUCCINATE 100 MG: 50 TABLET, EXTENDED RELEASE ORAL at 09:09

## 2024-02-03 RX ADMIN — DARUNAVIR ETHANOLATE AND COBICISTAT 1 EACH: 800; 150 TABLET, FILM COATED ORAL at 10:22

## 2024-02-03 RX ADMIN — AZITHROMYCIN DIHYDRATE 500 MG: 250 TABLET ORAL at 09:22

## 2024-02-03 RX ADMIN — DOLUTEGRAVIR SODIUM 50 MG: 50 TABLET, FILM COATED ORAL at 10:22

## 2024-02-03 RX ADMIN — CALCIUM CARBONATE 500 MG: 500 TABLET, CHEWABLE ORAL at 09:17

## 2024-02-03 RX ADMIN — SODIUM CHLORIDE, PRESERVATIVE FREE 10 ML: 5 INJECTION INTRAVENOUS at 10:38

## 2024-02-03 RX ADMIN — NYSTATIN 500000 UNITS: 100000 SUSPENSION ORAL at 16:43

## 2024-02-03 RX ADMIN — OMEGA-3-ACID ETHYL ESTERS 2 G: 1 CAPSULE, LIQUID FILLED ORAL at 20:41

## 2024-02-03 RX ADMIN — LAMIVUDINE 100 MG: 100 TABLET, FILM COATED ORAL at 10:22

## 2024-02-03 RX ADMIN — NYSTATIN 500000 UNITS: 100000 SUSPENSION ORAL at 09:09

## 2024-02-03 RX ADMIN — SODIUM CHLORIDE, PRESERVATIVE FREE 10 ML: 5 INJECTION INTRAVENOUS at 09:23

## 2024-02-03 RX ADMIN — HYDROMORPHONE HYDROCHLORIDE 0.5 MG: 1 INJECTION, SOLUTION INTRAMUSCULAR; INTRAVENOUS; SUBCUTANEOUS at 05:05

## 2024-02-03 RX ADMIN — SODIUM CHLORIDE, PRESERVATIVE FREE 10 ML: 5 INJECTION INTRAVENOUS at 20:43

## 2024-02-03 RX ADMIN — BUSPIRONE HYDROCHLORIDE 10 MG: 5 TABLET ORAL at 20:41

## 2024-02-03 RX ADMIN — OMEGA-3-ACID ETHYL ESTERS 2 G: 1 CAPSULE, LIQUID FILLED ORAL at 09:11

## 2024-02-03 RX ADMIN — CALCIUM CARBONATE 500 MG: 500 TABLET, CHEWABLE ORAL at 16:43

## 2024-02-03 RX ADMIN — CLONIDINE HYDROCHLORIDE 0.1 MG: 0.1 TABLET ORAL at 20:42

## 2024-02-03 RX ADMIN — CALCIUM CARBONATE 500 MG: 500 TABLET, CHEWABLE ORAL at 20:42

## 2024-02-03 RX ADMIN — PANTOPRAZOLE SODIUM 40 MG: 40 TABLET, DELAYED RELEASE ORAL at 05:15

## 2024-02-03 RX ADMIN — EZETIMIBE 10 MG: 10 TABLET ORAL at 20:42

## 2024-02-03 RX ADMIN — HYDROMORPHONE HYDROCHLORIDE 0.5 MG: 1 INJECTION, SOLUTION INTRAMUSCULAR; INTRAVENOUS; SUBCUTANEOUS at 01:02

## 2024-02-03 RX ADMIN — HEPARIN SODIUM 5000 UNITS: 5000 INJECTION INTRAVENOUS; SUBCUTANEOUS at 13:44

## 2024-02-03 RX ADMIN — INSULIN LISPRO 2 UNITS: 100 INJECTION, SOLUTION INTRAVENOUS; SUBCUTANEOUS at 12:48

## 2024-02-03 ASSESSMENT — PAIN DESCRIPTION - ORIENTATION
ORIENTATION: LOWER
ORIENTATION: LOWER
ORIENTATION: MID;LOWER
ORIENTATION: LOWER
ORIENTATION: LOWER;POSTERIOR

## 2024-02-03 ASSESSMENT — PAIN DESCRIPTION - DESCRIPTORS
DESCRIPTORS: ACHING
DESCRIPTORS: ACHING
DESCRIPTORS: POUNDING
DESCRIPTORS: POUNDING
DESCRIPTORS: THROBBING

## 2024-02-03 ASSESSMENT — PAIN - FUNCTIONAL ASSESSMENT
PAIN_FUNCTIONAL_ASSESSMENT: ACTIVITIES ARE NOT PREVENTED
PAIN_FUNCTIONAL_ASSESSMENT: PREVENTS OR INTERFERES SOME ACTIVE ACTIVITIES AND ADLS
PAIN_FUNCTIONAL_ASSESSMENT: PREVENTS OR INTERFERES SOME ACTIVE ACTIVITIES AND ADLS

## 2024-02-03 ASSESSMENT — PAIN SCALES - GENERAL
PAINLEVEL_OUTOF10: 0
PAINLEVEL_OUTOF10: 9
PAINLEVEL_OUTOF10: 9
PAINLEVEL_OUTOF10: 0
PAINLEVEL_OUTOF10: 9
PAINLEVEL_OUTOF10: 9
PAINLEVEL_OUTOF10: 4
PAINLEVEL_OUTOF10: 9

## 2024-02-03 ASSESSMENT — PAIN DESCRIPTION - LOCATION
LOCATION: BACK

## 2024-02-03 ASSESSMENT — PAIN DESCRIPTION - FREQUENCY: FREQUENCY: CONTINUOUS

## 2024-02-03 ASSESSMENT — PAIN DESCRIPTION - PAIN TYPE: TYPE: ACUTE PAIN

## 2024-02-03 ASSESSMENT — PAIN DESCRIPTION - ONSET: ONSET: ON-GOING

## 2024-02-03 NOTE — PROGRESS NOTES
1.  Trace peripancreatic inflammatory stranding compatible with clinical   history of pancreatitis.  No evidence of peripancreatic collection or   necrosis.      2.  Trace ascites.      3.  Redemonstrated dense consolidative opacity of the right lower lobe likely   to represent pneumonia.  Neoplasm is not excluded and follow-up to resolution   is recommended.         XR CHEST PORTABLE   Final Result   1. Right basilar consolidation mildly improved from the previous study.   2. Pulmonary vascular congestion and possible small effusions.         XR CHEST PORTABLE   Final Result   1. Large right basilar consolidative opacity significantly worsened from   01/20/2024.   2. Nonobstructive bowel gas pattern.         XR ABDOMEN (KUB) (SINGLE AP VIEW)   Final Result   1. Large right basilar consolidative opacity significantly worsened from   01/20/2024.   2. Nonobstructive bowel gas pattern.         US ABDOMEN LIMITED Specify organ? LIVER, SPLEEN, GALLBLADDER, PANCREAS   Final Result   1. Hepatic steatosis.   2. Splenomegaly.   3. Increased echogenicity of the right kidney suggesting medical renal   disease.         XR CHEST PORTABLE   Final Result   1. Interval placement of right internal jugular Trialysis catheter, with tip   overlying the lower SVC, in satisfactory position, and no evidence of a   pneumothorax.   2. Slight interval improvement in appearance of focal mass-like consolidative   opacity within the right lung base, favoring slight improvement of pneumonia.   However, continued serial chest x-ray follow-up is recommended to ensure   resolution of this mass-like right basilar opacity, as a true pulmonary mass   is also a diagnostic consideration.         CT CHEST ABDOMEN PELVIS WO CONTRAST Additional Contrast? None   Final Result   1. Masslike opacity in the right lower lobe.  If there is evidence of   infection, pneumonia may be considered.  A pulmonary mass cannot be excluded.   2. Splenomegaly of uncertain  significance.   3. Advanced atherosclerotic calcification of coronary arteries.  Cardiology   follow-up may be considered on a nonemergent outpatient basis.         CT HEAD WO CONTRAST   Final Result   1.  No acute intracranial abnormality.      2.  Focal area of encephalomalacia in the right frontal lobe may represent an   old stroke.         XR CHEST PORTABLE   Final Result   Expiratory film with bilateral atelectasis versus pneumonia.         IR TUNNELED CVC PLACE WO SQ PORT/PUMP > 5 YEARS    (Results Pending)   CT LUMBAR SPINE WO CONTRAST    (Results Pending)     Assessment/Plan:    #Acute on Chronic Anemia  -likely multifactorial with chronic disease, CKD/ARF, sepsis with +legionella. May consider GAMALIEL.   -Other bloodlines WNL  -no monoclonal gammopathy or hemolysis  -No s/s of active bleeding  -No nutritional def or hemolysis.  Folate is low-normal 3.7, added daily supplement.   -Hb declined to 6.7 1/29, good response to 8.3. RO bleeding.   -Given immunocompromised state ordered flow cytometry which is pending  -may consider further evaluation including BMB/aspiration.   -Jameel Cells noted on smear, likely r/t uremia. No schistocytes. Stable overall, Hgb 8.4 today    #Mass-like Opacity in RLL  -urine antigen +Legionella, so most likely pneumonia.  ID is ruling out other opportunistic infections.  If does not resolve after infection treatment can consider further workup.    Remainder of care per primary and consulting teams.  ID is following for new HIV diagnosis as well as legionella.  Nephrology is following for renal failure/HD. GI is following for liver dysfunction which is improving.    We will continue to follow as outpatient and monitor blood counts, as well as  consider further evaluation including BMB /aspiration if anemia worsening and no other cause identified.     Dayton Lizama MD

## 2024-02-03 NOTE — PROGRESS NOTES
Nephrology Progress Note  2/3/2024 10:16 AM  Subjective:     Interval History: Dewey Sosa is a 43 y.o. male appears to be looking fairly well no acute distress sitting at side of the bed eating p.o. diet    Data:   Scheduled Meds:   mirtazapine  15 mg Oral Nightly    cloNIDine  0.1 mg Oral BID    azithromycin  500 mg Oral Daily    magnesium oxide  400 mg Oral BID    pantoprazole  40 mg Oral QAM AC    omega-3 acid ethyl esters  2 g Oral BID    insulin glargine  20 Units SubCUTAneous Nightly    nystatin  5 mL Oral 4x Daily    insulin lispro  10 Units SubCUTAneous TID WC    insulin lispro  0-8 Units SubCUTAneous TID WC    insulin lispro  0-8 Units SubCUTAneous 2 times per day    dolutegravir sodium  50 mg Oral Daily    darunavir-cobicistat  1 tablet Oral Daily    lamiVUDine  100 mg Oral Daily    entecavir  0.5 mg Oral Weekly - Monday    sulfamethoxazole-trimethoprim  1 tablet Oral Once per day on Mon Wed Fri    folic acid  1 mg Oral Daily    metoprolol succinate  100 mg Oral Daily    nicotine  1 patch TransDERmal Daily    calcitRIOL  0.5 mcg Oral Daily    vitamin D  50,000 Units Oral Weekly    calcium carbonate  500 mg Oral TID    sodium chloride flush  5-40 mL IntraVENous 2 times per day    heparin (porcine)  5,000 Units SubCUTAneous 3 times per day    aspirin  81 mg Oral Daily    [Held by provider] ticagrelor  90 mg Oral BID    ezetimibe  10 mg Oral Nightly    busPIRone  10 mg Oral TID    atorvastatin  80 mg Oral Nightly    isosorbide mononitrate  60 mg Oral Daily     Continuous Infusions:   sodium chloride      sodium chloride      sodium chloride      dextrose           CBC   Recent Labs     02/01/24  0540 02/02/24  0435 02/03/24  0350   WBC 4.6 4.0 3.1*   HGB 8.4* 7.9* 8.4*   HCT 26.1* 24.7* 26.3*    220 213      BMP   Recent Labs     02/01/24  0540 02/02/24  0435 02/03/24  0350    131* 133*   K 4.8 4.4 4.2    98* 98*   CO2 25 23 23   PHOS 4.1 4.5 3.6   BUN 39* 52* 36*   CREATININE 3.5*

## 2024-02-03 NOTE — PROGRESS NOTES
Progress Note( Dr. Cruz)  2/3/2024  Subjective:   Admit Date: 1/19/2024  PCP: Frandy Parsons APRN - CNP    Admitted For : Nausea vomiting and frequent fall was found to be hyponatremic with serum sodium 117     Consulted For: Better control of blood glucose     Interval History:     Feels feels much better today starting also started eating better  Serum sodium getting better of 135     Patient had elevated liver enzymes also lipase suggesting pancreatitis or hepatitis  And liver function test trending down.  GI following him  Positive for Legionella  CT scan shows possibility of pneumonia and also has pancreatitis    Patient had EGD yesterday with no major finding except gastritis  Change the dialysis catheter    Denies any chest pains,   Yes  SOB .   Has some  nausea but no  vomiting. He is eating better  no new bowel or bladder symptoms.       Intake/Output Summary (Last 24 hours) at 2/3/2024 1240  Last data filed at 2/3/2024 0226  Gross per 24 hour   Intake 500 ml   Output 2225 ml   Net -1725 ml         DATA    CBC:   Recent Labs     02/01/24  0540 02/02/24  0435 02/03/24  0350   WBC 4.6 4.0 3.1*   HGB 8.4* 7.9* 8.4*    220 213      CMP:  Recent Labs     02/01/24  0540 02/02/24  0435 02/03/24  0350    131* 133*   K 4.8 4.4 4.2    98* 98*   CO2 25 23 23   BUN 39* 52* 36*   CREATININE 3.5* 4.2* 3.4*   CALCIUM 8.3 7.9* 8.1*       Lipids:   Lab Results   Component Value Date/Time    CHOL 278 12/11/2023 12:28 PM    HDL 22 12/11/2023 12:28 PM    TRIG 266 02/03/2024 03:50 AM     Glucose:  Recent Labs     02/03/24  0220 02/03/24  0824 02/03/24  1211   POCGLU 130* 140* 212*       EpugmeiuitT1C:  Lab Results   Component Value Date/Time    LABA1C 6.8 01/20/2024 05:54 AM     High Sensitivity TSH:   Lab Results   Component Value Date/Time    TSHHS 1.420 12/11/2023 12:19 PM     Free T3: No results found for: \"FT3\"  Free T4:No results found for: \"T4FREE\"    XR CHEST PORTABLE   Final Result   1.

## 2024-02-03 NOTE — PROGRESS NOTES
Occupational Therapy Treatment Note    Name: Dewey Sosa MRN: [id number] :   1980   Date:  2/3/2024   Admission Date: 2024 Room:  [facility]     Primary Problem:   Hyponatremia    Restrictions/Precautions: General, fall risk    Communication with other providers: Per chart review and Nurse patient is appropriate for therapeutic intervention.     Subjective:  Patient states:  Agreeable to OT therapy. \"I got up this morning and walked to bathroom by myself. Doing so much better!\"  Pain:   Location, Type, Intensity (0/10 to 10/10):  5/10 lower back    Objective:    Observation: In supine position upon arrival.    Objective Measures:  Alert and oriented    Treatment, including education:  Therapeutic Activity Training:   Therapeutic activity training was instructed today.  Cues were given for safety, sequence, UE/LE placement, awareness, and balance.    Bed mobility:SUP with bed features  Sitting balance:Good on EOB  Sit/stand transfers:CGA with initiate rep; however, progress Min A from recliner after 5 reps total perform during session.  Functional Mobility: Min A with initiate mobility to bathroom; however, demo wall and furniture gliding and unsteadiness. Educated pt AD training to increase safety and SBA/CGA. Demo notable increase of fatigue.     Activities performed today included bed mobility training, transfers, functional mobility  to increase strength, activity tolerance to facilitate IND c ADL tasks, func transfers / mobility with G safety awareness carryover    Self Care Training:   Cues were given for safety, sequence, UE/LE placement, visual cues, and balance.    Activities performed today included toileting, personal hygiene, and grooming task. Demo CGA while standing all ADL activities with 1 UE support on grab bar or countertop due to fatigue.     Therapeutic Exercise:  BUE strengthening ex targeting utilizing red theraband shoulder press, chest press, shoulder abd/add, shoulder

## 2024-02-03 NOTE — PROGRESS NOTES
Infectious Disease Progress Note  2/3/2024   Patient Name: Dewey Sosa : 1980     Assessment  HIV/AIDS  Legionella pneumonia  Resolved hepatitis B infection  Compensated liver disease on intermittent hemodialysis  History of CKD stage V, type 2 diabetes mellitus, coronary artery disease status post PCI, MSM \"presented with 1 week history of shortness of breath, weakness and falls.  Was diagnosed with Legionella pneumonia.  Newly diagnosed with HIV-1 viral load was 46,000, CD4 count was 25 on this admission.  Sexual contraction.  Tolerating oral ART  Cryptococcal antigen negative, gonococcal and chlamydia NAAT test negative, T. pallidum screen negative  Quantiferon TB test was indeterminate, likely due to low CD4 counts. Has low epidemiologic risk factors for TB  Completed a 1 week course of azithromycin.  Repeat chest x-ray on 2024  Feeling better.   Lumbar pain   X-ray is negative.  ROSE MARY on CKD stage V  ESRD on maintenance hemodialysis on  and Friday  Elevated lipase  On dwt, GI on consult  Type 2 diabetes mellitus  Peripheral arterial disease  Multiple comorbidity per PMHx:   Plan  Therapeutic:  Azithromycin 500 mg po daily for 10 days (2024)  PJP prophylaxis: Bactrim single strength 1 tab p.o.  EOD (2024)  Entecavir 0.5 mg p.o. weekly (on Monday), lamivudine 100 mg p.o. daily, darunavir-cobicistat 800-150 mg p.o. daily, dolutegravir 50 mg p.o. daily (2024)  Continue to monitor renal function, if it worsens then Entecavir and lamivudine dosing will have to be adjusted  Diagnostic:  Trend CRP  CT of the lumbar spine  F/u:  AFB blood culture  Other:s.    Reason for visit: F/u HIV/AIDS, CKD stage V on hemodialysis  History:?Interval history noted  Denies n/v/d/f or untoward effects of antimicrobials  Complains of back pain, cough and wheezing  Physical Exam:  Vital Signs: BP (!) 140/74   Pulse 85   Temp 98.4 °F (36.9 °C) (Oral)   Resp 22   Ht 1.854 m (6' 1\")   Wt  WBC 3.1 (L) 4.0 - 10.5 K/CU MM    RBC 2.80 (L) 4.6 - 6.2 M/CU MM    Hemoglobin 8.4 (L) 13.5 - 18.0 GM/DL    Hematocrit 26.3 (L) 42 - 52 %    MCV 93.9 78 - 100 FL    MCH 30.0 27 - 31 PG    MCHC 31.9 (L) 32.0 - 36.0 %    RDW 13.1 11.7 - 14.9 %    Platelets 213 140 - 440 K/CU MM    MPV 9.4 7.5 - 11.1 FL    Differential Type AUTOMATED DIFFERENTIAL     Segs Relative 63.6 36 - 66 %    Lymphocytes % 23.5 (L) 24 - 44 %    Monocytes % 10.7 (H) 0 - 4 %    Eosinophils % 0.3 0 - 3 %    Basophils % 1.6 (H) 0 - 1 %    Segs Absolute 2.0 K/CU MM    Lymphocytes Absolute 0.7 K/CU MM    Monocytes Absolute 0.3 K/CU MM    Eosinophils Absolute 0.0 K/CU MM    Basophils Absolute 0.1 K/CU MM    Nucleated RBC % 0.0 %    Total Nucleated RBC 0.0 K/CU MM    Total Immature Neutrophil 0.01 K/CU MM    Immature Neutrophil % 0.3 0 - 0.43 %   Critical Care Panel    Collection Time: 02/03/24  3:50 AM   Result Value Ref Range    Sodium 133 (L) 135 - 145 MMOL/L    Potassium 4.2 3.5 - 5.1 MMOL/L    Chloride 98 (L) 99 - 110 mMol/L    CO2 23 21 - 32 MMOL/L    Anion Gap 12 7 - 16    Glucose 129 (H) 70 - 99 MG/DL    BUN 36 (H) 6 - 23 MG/DL    Creatinine 3.4 (H) 0.9 - 1.3 MG/DL    Est, Glom Filt Rate 22 (L) >60 mL/min/1.73m2    Calcium 8.1 (L) 8.3 - 10.6 MG/DL    Phosphorus 3.6 2.5 - 4.9 MG/DL    Magnesium 1.7 (L) 1.8 - 2.4 mg/dl   Triglyceride    Collection Time: 02/03/24  3:50 AM   Result Value Ref Range    Triglycerides 266 (H) <150 MG/DL   POCT Glucose    Collection Time: 02/03/24  8:24 AM   Result Value Ref Range    POC Glucose 140 (H) 70 - 99 MG/DL     CULTURE results: Invalid input(s): \"BLOOD CULTURE\", \"URINE CULTURE\", \"SURGICAL CULTURE\"    Diagnosis:  Patient Active Problem List   Diagnosis    Type 2 diabetes mellitus without complication, with long-term current use of insulin (HCC)    Gastroesophageal reflux disease    Former tobacco use    Acute osteomyelitis of phalanx of left foot (HCC)    Osteomyelitis of third toe of left foot (HCC)    Anxiety

## 2024-02-03 NOTE — PROGRESS NOTES
worsened from 01/20/2024. 2. Nonobstructive bowel gas pattern.     XR ABDOMEN (KUB) (SINGLE AP VIEW)    Result Date: 1/21/2024  EXAMINATION: ONE SUPINE XRAY VIEW(S) OF THE ABDOMEN; ONE XRAY VIEW OF THE CHEST 1/21/2024 2:12 pm COMPARISON: Chest radiograph 01/20/2024.  CT chest, abdomen, and pelvis 01/19/2024. HISTORY: ORDERING SYSTEM PROVIDED HISTORY: acute abdominal pain TECHNOLOGIST PROVIDED HISTORY: Reason for exam:->acute abdominal pain Reason for Exam: acute abdominal pain Additional signs and symptoms: acute abdominal pain Relevant Medical/Surgical History: acute abdominal pain; ORDERING SYSTEM PROVIDED HISTORY: SOB TECHNOLOGIST PROVIDED HISTORY: Reason for exam:->SOB Reason for Exam: SOB Additional signs and symptoms: SOB Relevant Medical/Surgical History: SOB FINDINGS: Right transjugular central venous catheter in place.  The cardiomediastinal silhouette is within normal limits.  Large right basilar consolidative opacity significantly worsened from 01/20/2024.  Nonobstructive bowel gas pattern.  No evidence of pneumoperitoneum.  No radiopaque foreign body.  No acute osseous abnormality.     1. Large right basilar consolidative opacity significantly worsened from 01/20/2024. 2. Nonobstructive bowel gas pattern.     US ABDOMEN LIMITED Specify organ? LIVER, SPLEEN, GALLBLADDER, PANCREAS    Result Date: 1/21/2024  EXAMINATION: RIGHT UPPER QUADRANT ULTRASOUND 1/21/2024 11:37 am COMPARISON: None. HISTORY: ORDERING SYSTEM PROVIDED HISTORY: transaminitis TECHNOLOGIST PROVIDED HISTORY: Reason for exam:->transaminitis Specify organ?->LIVER Specify organ?->SPLEEN Specify organ?->GALLBLADDER Specify organ?->PANCREAS FINDINGS: LIVER:  There is diffuse abnormal increased echogenicity throughout the liver.  No focal liver lesions are identified.  The liver is at the upper normal limits for size. BILIARY SYSTEM:  Gallbladder is unremarkable without evidence of pericholecystic fluid, wall thickening or stones.  Negative  intracranial abnormality. 2.  Focal area of encephalomalacia in the right frontal lobe may represent an old stroke.     XR CHEST PORTABLE    Result Date: 1/19/2024  EXAMINATION: ONE XRAY VIEW OF THE CHEST 1/19/2024 8:05 pm COMPARISON: 11/03/2021 HISTORY: ORDERING SYSTEM PROVIDED HISTORY: sob TECHNOLOGIST PROVIDED HISTORY: Reason for exam:->sob Reason for Exam: sob FINDINGS: Lung volumes are reduced resulting in crowding of central and basilar vascular markings.  There is dense consolidation within the right lung base. Patchy consolidation is noted throughout the left lung.  Heart size and vascularity are stable.  There is no pneumothorax or effusion     Expiratory film with bilateral atelectasis versus pneumonia.          Electronically signed by Melissa Camarena MD on 2/3/2024 at 1:09 PM

## 2024-02-04 ENCOUNTER — APPOINTMENT (OUTPATIENT)
Dept: CT IMAGING | Age: 44
DRG: 969 | End: 2024-02-04
Payer: MEDICARE

## 2024-02-04 LAB
ANION GAP SERPL CALCULATED.3IONS-SCNC: 10 MMOL/L (ref 7–16)
BASOPHILS ABSOLUTE: 0 K/CU MM
BASOPHILS RELATIVE PERCENT: 1.4 % (ref 0–1)
BUN SERPL-MCNC: 43 MG/DL (ref 6–23)
CALCIUM SERPL-MCNC: 8.1 MG/DL (ref 8.3–10.6)
CHLORIDE BLD-SCNC: 100 MMOL/L (ref 99–110)
CO2: 23 MMOL/L (ref 21–32)
CREAT SERPL-MCNC: 4.3 MG/DL (ref 0.9–1.3)
DIFFERENTIAL TYPE: ABNORMAL
EOSINOPHILS ABSOLUTE: 0 K/CU MM
EOSINOPHILS RELATIVE PERCENT: 1.4 % (ref 0–3)
GFR SERPL CREATININE-BSD FRML MDRD: 17 ML/MIN/1.73M2
GLUCOSE BLD-MCNC: 135 MG/DL (ref 70–99)
GLUCOSE BLD-MCNC: 174 MG/DL (ref 70–99)
GLUCOSE BLD-MCNC: 189 MG/DL (ref 70–99)
GLUCOSE BLD-MCNC: 274 MG/DL (ref 70–99)
GLUCOSE BLD-MCNC: 97 MG/DL (ref 70–99)
GLUCOSE SERPL-MCNC: 139 MG/DL (ref 70–99)
HCT VFR BLD CALC: 25.5 % (ref 42–52)
HEMOGLOBIN: 8 GM/DL (ref 13.5–18)
IMMATURE NEUTROPHIL %: 0.4 % (ref 0–0.43)
LYMPHOCYTES ABSOLUTE: 0.9 K/CU MM
LYMPHOCYTES RELATIVE PERCENT: 31.2 % (ref 24–44)
MAGNESIUM: 1.7 MG/DL (ref 1.8–2.4)
MCH RBC QN AUTO: 29.5 PG (ref 27–31)
MCHC RBC AUTO-ENTMCNC: 31.4 % (ref 32–36)
MCV RBC AUTO: 94.1 FL (ref 78–100)
MONOCYTES ABSOLUTE: 0.4 K/CU MM
MONOCYTES RELATIVE PERCENT: 12.7 % (ref 0–4)
NUCLEATED RBC %: 0 %
PDW BLD-RTO: 13.3 % (ref 11.7–14.9)
PHOSPHORUS: 4.3 MG/DL (ref 2.5–4.9)
PLATELET # BLD: 202 K/CU MM (ref 140–440)
PMV BLD AUTO: 9.4 FL (ref 7.5–11.1)
POTASSIUM SERPL-SCNC: 4.4 MMOL/L (ref 3.5–5.1)
RBC # BLD: 2.71 M/CU MM (ref 4.6–6.2)
SEGMENTED NEUTROPHILS ABSOLUTE COUNT: 1.5 K/CU MM
SEGMENTED NEUTROPHILS RELATIVE PERCENT: 52.9 % (ref 36–66)
SODIUM BLD-SCNC: 133 MMOL/L (ref 135–145)
TOTAL IMMATURE NEUTOROPHIL: 0.01 K/CU MM
TOTAL NUCLEATED RBC: 0 K/CU MM
WBC # BLD: 2.8 K/CU MM (ref 4–10.5)

## 2024-02-04 PROCEDURE — 6360000002 HC RX W HCPCS: Performed by: INTERNAL MEDICINE

## 2024-02-04 PROCEDURE — 6370000000 HC RX 637 (ALT 250 FOR IP): Performed by: INTERNAL MEDICINE

## 2024-02-04 PROCEDURE — 6370000000 HC RX 637 (ALT 250 FOR IP): Performed by: STUDENT IN AN ORGANIZED HEALTH CARE EDUCATION/TRAINING PROGRAM

## 2024-02-04 PROCEDURE — 99231 SBSQ HOSP IP/OBS SF/LOW 25: CPT | Performed by: INTERNAL MEDICINE

## 2024-02-04 PROCEDURE — 94761 N-INVAS EAR/PLS OXIMETRY MLT: CPT

## 2024-02-04 PROCEDURE — 6370000000 HC RX 637 (ALT 250 FOR IP): Performed by: PHYSICIAN ASSISTANT

## 2024-02-04 PROCEDURE — 72131 CT LUMBAR SPINE W/O DYE: CPT

## 2024-02-04 PROCEDURE — 2140000000 HC CCU INTERMEDIATE R&B

## 2024-02-04 PROCEDURE — 6360000002 HC RX W HCPCS: Performed by: STUDENT IN AN ORGANIZED HEALTH CARE EDUCATION/TRAINING PROGRAM

## 2024-02-04 PROCEDURE — 82962 GLUCOSE BLOOD TEST: CPT

## 2024-02-04 PROCEDURE — 2580000003 HC RX 258: Performed by: STUDENT IN AN ORGANIZED HEALTH CARE EDUCATION/TRAINING PROGRAM

## 2024-02-04 RX ADMIN — HYDROMORPHONE HYDROCHLORIDE 0.5 MG: 1 INJECTION, SOLUTION INTRAMUSCULAR; INTRAVENOUS; SUBCUTANEOUS at 15:19

## 2024-02-04 RX ADMIN — INSULIN LISPRO 10 UNITS: 100 INJECTION, SOLUTION INTRAVENOUS; SUBCUTANEOUS at 12:46

## 2024-02-04 RX ADMIN — ISOSORBIDE MONONITRATE 60 MG: 60 TABLET, EXTENDED RELEASE ORAL at 09:41

## 2024-02-04 RX ADMIN — LAMIVUDINE 100 MG: 100 TABLET, FILM COATED ORAL at 09:51

## 2024-02-04 RX ADMIN — CALCIUM CARBONATE 500 MG: 500 TABLET, CHEWABLE ORAL at 15:19

## 2024-02-04 RX ADMIN — NYSTATIN 500000 UNITS: 100000 SUSPENSION ORAL at 21:40

## 2024-02-04 RX ADMIN — HYDROMORPHONE HYDROCHLORIDE 0.5 MG: 1 INJECTION, SOLUTION INTRAMUSCULAR; INTRAVENOUS; SUBCUTANEOUS at 01:01

## 2024-02-04 RX ADMIN — CALCIUM CARBONATE 500 MG: 500 TABLET, CHEWABLE ORAL at 09:41

## 2024-02-04 RX ADMIN — HEPARIN SODIUM 5000 UNITS: 5000 INJECTION INTRAVENOUS; SUBCUTANEOUS at 21:42

## 2024-02-04 RX ADMIN — INSULIN LISPRO 4 UNITS: 100 INJECTION, SOLUTION INTRAVENOUS; SUBCUTANEOUS at 09:51

## 2024-02-04 RX ADMIN — ASPIRIN 81 MG 81 MG: 81 TABLET ORAL at 09:41

## 2024-02-04 RX ADMIN — BUSPIRONE HYDROCHLORIDE 10 MG: 5 TABLET ORAL at 15:19

## 2024-02-04 RX ADMIN — DOLUTEGRAVIR SODIUM 50 MG: 50 TABLET, FILM COATED ORAL at 09:51

## 2024-02-04 RX ADMIN — ATORVASTATIN CALCIUM 80 MG: 40 TABLET, FILM COATED ORAL at 21:42

## 2024-02-04 RX ADMIN — BUSPIRONE HYDROCHLORIDE 10 MG: 5 TABLET ORAL at 21:42

## 2024-02-04 RX ADMIN — INSULIN GLARGINE 20 UNITS: 100 INJECTION, SOLUTION SUBCUTANEOUS at 21:44

## 2024-02-04 RX ADMIN — DARUNAVIR ETHANOLATE AND COBICISTAT 1 EACH: 800; 150 TABLET, FILM COATED ORAL at 09:51

## 2024-02-04 RX ADMIN — CLONIDINE HYDROCHLORIDE 0.1 MG: 0.1 TABLET ORAL at 21:42

## 2024-02-04 RX ADMIN — METOPROLOL SUCCINATE 100 MG: 50 TABLET, EXTENDED RELEASE ORAL at 09:41

## 2024-02-04 RX ADMIN — Medication 400 MG: at 21:42

## 2024-02-04 RX ADMIN — SODIUM CHLORIDE, PRESERVATIVE FREE 10 ML: 5 INJECTION INTRAVENOUS at 21:43

## 2024-02-04 RX ADMIN — MIRTAZAPINE 15 MG: 15 TABLET, ORALLY DISINTEGRATING ORAL at 21:42

## 2024-02-04 RX ADMIN — HYDROMORPHONE HYDROCHLORIDE 0.5 MG: 1 INJECTION, SOLUTION INTRAMUSCULAR; INTRAVENOUS; SUBCUTANEOUS at 21:42

## 2024-02-04 RX ADMIN — HYDROMORPHONE HYDROCHLORIDE 0.5 MG: 1 INJECTION, SOLUTION INTRAMUSCULAR; INTRAVENOUS; SUBCUTANEOUS at 09:42

## 2024-02-04 RX ADMIN — HEPARIN SODIUM 5000 UNITS: 5000 INJECTION INTRAVENOUS; SUBCUTANEOUS at 15:19

## 2024-02-04 RX ADMIN — NYSTATIN 500000 UNITS: 100000 SUSPENSION ORAL at 12:46

## 2024-02-04 RX ADMIN — INSULIN LISPRO 10 UNITS: 100 INJECTION, SOLUTION INTRAVENOUS; SUBCUTANEOUS at 09:51

## 2024-02-04 RX ADMIN — ERGOCALCIFEROL 50000 UNITS: 1.25 CAPSULE ORAL at 09:50

## 2024-02-04 RX ADMIN — CLONIDINE HYDROCHLORIDE 0.1 MG: 0.1 TABLET ORAL at 09:41

## 2024-02-04 RX ADMIN — NYSTATIN 500000 UNITS: 100000 SUSPENSION ORAL at 09:42

## 2024-02-04 RX ADMIN — NYSTATIN 500000 UNITS: 100000 SUSPENSION ORAL at 18:14

## 2024-02-04 RX ADMIN — BUSPIRONE HYDROCHLORIDE 10 MG: 5 TABLET ORAL at 09:41

## 2024-02-04 RX ADMIN — FOLIC ACID 1 MG: 1 TABLET ORAL at 09:41

## 2024-02-04 RX ADMIN — EZETIMIBE 10 MG: 10 TABLET ORAL at 21:42

## 2024-02-04 RX ADMIN — SODIUM CHLORIDE, PRESERVATIVE FREE 10 ML: 5 INJECTION INTRAVENOUS at 09:42

## 2024-02-04 RX ADMIN — CALCITRIOL CAPSULES 0.25 MCG 0.5 MCG: 0.25 CAPSULE ORAL at 09:41

## 2024-02-04 RX ADMIN — OMEGA-3-ACID ETHYL ESTERS 2 G: 1 CAPSULE, LIQUID FILLED ORAL at 09:41

## 2024-02-04 RX ADMIN — OMEGA-3-ACID ETHYL ESTERS 2 G: 1 CAPSULE, LIQUID FILLED ORAL at 21:41

## 2024-02-04 RX ADMIN — AZITHROMYCIN DIHYDRATE 500 MG: 250 TABLET ORAL at 09:41

## 2024-02-04 RX ADMIN — CALCIUM CARBONATE 500 MG: 500 TABLET, CHEWABLE ORAL at 21:42

## 2024-02-04 RX ADMIN — PANTOPRAZOLE SODIUM 40 MG: 40 TABLET, DELAYED RELEASE ORAL at 05:18

## 2024-02-04 RX ADMIN — HYDROMORPHONE HYDROCHLORIDE 0.5 MG: 1 INJECTION, SOLUTION INTRAMUSCULAR; INTRAVENOUS; SUBCUTANEOUS at 05:19

## 2024-02-04 RX ADMIN — Medication 400 MG: at 09:44

## 2024-02-04 RX ADMIN — HEPARIN SODIUM 5000 UNITS: 5000 INJECTION INTRAVENOUS; SUBCUTANEOUS at 05:18

## 2024-02-04 ASSESSMENT — PAIN DESCRIPTION - FREQUENCY
FREQUENCY: CONTINUOUS

## 2024-02-04 ASSESSMENT — PAIN - FUNCTIONAL ASSESSMENT

## 2024-02-04 ASSESSMENT — PAIN DESCRIPTION - ORIENTATION
ORIENTATION: LOWER;MID
ORIENTATION: LOWER
ORIENTATION: LOWER;MID
ORIENTATION: LOWER;POSTERIOR
ORIENTATION: LOWER;POSTERIOR

## 2024-02-04 ASSESSMENT — PAIN DESCRIPTION - LOCATION
LOCATION: BACK
LOCATION: BACK;SHOULDER
LOCATION: BACK

## 2024-02-04 ASSESSMENT — PAIN SCALES - GENERAL
PAINLEVEL_OUTOF10: 8
PAINLEVEL_OUTOF10: 9
PAINLEVEL_OUTOF10: 7
PAINLEVEL_OUTOF10: 9
PAINLEVEL_OUTOF10: 10
PAINLEVEL_OUTOF10: 7
PAINLEVEL_OUTOF10: 7

## 2024-02-04 ASSESSMENT — PAIN DESCRIPTION - ONSET
ONSET: ON-GOING

## 2024-02-04 ASSESSMENT — PAIN DESCRIPTION - DESCRIPTORS
DESCRIPTORS: ACHING;THROBBING
DESCRIPTORS: ACHING

## 2024-02-04 ASSESSMENT — PAIN DESCRIPTION - PAIN TYPE
TYPE: ACUTE PAIN

## 2024-02-04 NOTE — PROGRESS NOTES
Nephrology Progress Note  2/4/2024 12:59 PM  Subjective:     Interval History: Dewey Sosa is a 43 y.o. male doing well in general ambulating moving around the room    Data:   Scheduled Meds:   mirtazapine  15 mg Oral Nightly    cloNIDine  0.1 mg Oral BID    azithromycin  500 mg Oral Daily    magnesium oxide  400 mg Oral BID    pantoprazole  40 mg Oral QAM AC    omega-3 acid ethyl esters  2 g Oral BID    insulin glargine  20 Units SubCUTAneous Nightly    nystatin  5 mL Oral 4x Daily    insulin lispro  10 Units SubCUTAneous TID WC    insulin lispro  0-8 Units SubCUTAneous TID WC    insulin lispro  0-8 Units SubCUTAneous 2 times per day    dolutegravir sodium  50 mg Oral Daily    darunavir-cobicistat  1 tablet Oral Daily    lamiVUDine  100 mg Oral Daily    entecavir  0.5 mg Oral Weekly - Monday    sulfamethoxazole-trimethoprim  1 tablet Oral Once per day on Mon Wed Fri    folic acid  1 mg Oral Daily    metoprolol succinate  100 mg Oral Daily    nicotine  1 patch TransDERmal Daily    calcitRIOL  0.5 mcg Oral Daily    vitamin D  50,000 Units Oral Weekly    calcium carbonate  500 mg Oral TID    sodium chloride flush  5-40 mL IntraVENous 2 times per day    heparin (porcine)  5,000 Units SubCUTAneous 3 times per day    aspirin  81 mg Oral Daily    [Held by provider] ticagrelor  90 mg Oral BID    ezetimibe  10 mg Oral Nightly    busPIRone  10 mg Oral TID    atorvastatin  80 mg Oral Nightly    isosorbide mononitrate  60 mg Oral Daily     Continuous Infusions:   sodium chloride      sodium chloride      sodium chloride      dextrose           CBC   Recent Labs     02/02/24  0435 02/03/24  0350 02/03/24  2356   WBC 4.0 3.1* 2.8*   HGB 7.9* 8.4* 8.0*   HCT 24.7* 26.3* 25.5*    213 202      BMP   Recent Labs     02/02/24  0435 02/03/24  0350 02/03/24  2356   * 133* 133*   K 4.4 4.2 4.4   CL 98* 98* 100   CO2 23 23 23   PHOS 4.5 3.6 4.3   BUN 52* 36* 43*   CREATININE 4.2* 3.4* 4.3*     Hepatic:   No results  for input(s): \"AST\", \"ALT\", \"ALB\", \"BILITOT\", \"ALKPHOS\" in the last 72 hours.      Troponin: No results for input(s): \"TROPONINI\" in the last 72 hours.  BNP: No results for input(s): \"BNP\" in the last 72 hours.  Lipids: No results for input(s): \"CHOL\", \"HDL\" in the last 72 hours.    Invalid input(s): \"LDLCALCU\"  ABGs: No results found for: \"PHART\", \"PO2ART\", \"HWM0HII\"  INR:   No results for input(s): \"INR\" in the last 72 hours.      Renal Labs  Albumin:    Lab Results   Component Value Date/Time    LABALBU 2.3 01/31/2024 03:14 AM    LABALBU 34 01/20/2024 10:16 AM     Calcium:    Lab Results   Component Value Date/Time    CALCIUM 8.1 02/03/2024 11:56 PM     Phosphorus:    Lab Results   Component Value Date/Time    PHOS 4.3 02/03/2024 11:56 PM     U/A:    Lab Results   Component Value Date/Time    NITRU NEGATIVE 01/19/2024 09:20 PM    COLORU YELLOW 01/19/2024 09:20 PM    WBCUA 1 01/19/2024 09:20 PM    RBCUA 39 01/19/2024 09:20 PM    MUCUS RARE 01/19/2024 09:20 PM    TRICHOMONAS NONE SEEN 01/19/2024 09:20 PM    BACTERIA NEGATIVE 01/19/2024 09:20 PM    CLARITYU CLEAR 01/19/2024 09:20 PM    SPECGRAV 1.020 01/19/2024 09:20 PM    UROBILINOGEN 0.2 01/19/2024 09:20 PM    BILIRUBINUR SMALL NUMBER OR AMOUNT OBSERVED 01/19/2024 09:20 PM    BLOODU LARGE NUMBER OR AMOUNT OBSERVED 01/19/2024 09:20 PM    KETUA TRACE 01/19/2024 09:20 PM    AMORPHOUS RARE 01/19/2024 09:20 PM     ABG:  No results found for: \"PHART\", \"BNW1QMD\", \"PO2ART\", \"REY0TZW\", \"BEART\", \"THGBART\", \"WUE2XLN\", \"Y2GANZPV\"  HgBA1c:    Lab Results   Component Value Date/Time    LABA1C 6.8 01/20/2024 05:54 AM     Microalbumen/Creatinine ratio:  No components found for: \"RUCREAT\"  TSH:  No results found for: \"TSH\"  IRON:    Lab Results   Component Value Date/Time    IRON 37 01/26/2024 02:30 PM     Iron Saturation:  No components found for: \"PERCENTFE\"  TIBC:    Lab Results   Component Value Date/Time    TIBC 146 01/26/2024 02:30 PM     FERRITIN:    Lab Results   Component

## 2024-02-04 NOTE — PROGRESS NOTES
Comprehensive Nutrition Assessment    Type and Reason for Visit:  Reassess    Nutrition Recommendations/Plan:   Continue current diet   Reordered supplements      Malnutrition Assessment:  Malnutrition Status:  At risk for malnutrition (Comment) (01/25/24 2649)    Context:  Acute Illness       Nutrition Assessment:    Pt with no PO intake recorded over the last several days. Will reorder nutrition supplements and continue monitoring per nutrition protocol.    Nutrition Related Findings:    Na 133, Mag 1.7 Wound Type: None       Current Nutrition Intake & Therapies:    Average Meal Intake: Unable to assess  Average Supplements Intake: None Ordered  ADULT DIET; Regular; Low Sodium (2 gm)  ADULT ORAL NUTRITION SUPPLEMENT; Lunch, Dinner; Standard High Calorie/High Protein Oral Supplement    Anthropometric Measures:  Height: 185.4 cm (6' 1\")  Ideal Body Weight (IBW): 184 lbs (84 kg)    Admission Body Weight: 99.3 kg (218 lb 14.7 oz)  Current Body Weight: 98.5 kg (217 lb 2.5 oz), 125.8 % IBW. Weight Source: Bed Scale  Current BMI (kg/m2): 28.7  Usual Body Weight: 105.3 kg (232 lb 2.3 oz) (10/18/23)  % Weight Change (Calculated): -0.3  Weight Adjustment For: No Adjustment                 BMI Categories: Overweight (BMI 25.0-29.9)    Estimated Daily Nutrient Needs:  Energy Requirements Based On: Kcal/kg  Weight Used for Energy Requirements: Ideal  Energy (kcal/day): 2,509-2,927 (30-35 kcal/kg IBW)  Weight Used for Protein Requirements: Ideal  Protein (g/day):  (1-1.2 g/kg)  Method Used for Fluid Requirements: Standard Renal  Fluid (ml/day): 2500 or per MD    Nutrition Diagnosis:   Inadequate protein-energy intake related to increase demand for energy/nutrients, cognitive or neurological impairment, acute injury/trauma, renal dysfunction as evidenced by intake 26-50%, dialysis    Nutrition Interventions:   Food and/or Nutrient Delivery: Continue Current Diet, Modify Oral Nutrition Supplement  Nutrition

## 2024-02-04 NOTE — PROGRESS NOTES
Heart Disease Father         Open Heart Surgery    Diabetes Father     Allergy (Severe) Brother                                                                                                No Known Allergies    No current facility-administered medications on file prior to encounter.     Current Outpatient Medications on File Prior to Encounter   Medication Sig Dispense Refill    ticagrelor (BRILINTA) 60 MG TABS tablet Take 1 tablet by mouth 2 times daily 60 tablet 5    ezetimibe (ZETIA) 10 MG tablet Take 1 tablet by mouth daily      sodium zirconium cyclosilicate (LOKELMA) 10 g PACK oral suspension Take 1 packet by mouth three times a week 45 packet 3    insulin glargine (LANTUS SOLOSTAR) 100 UNIT/ML injection pen INJECT 40 UNITS UNDER THE SKIN INJECT UNITS UNDER THE SKIN ONCE NIGHTLY 15 mL 2    tiZANidine (ZANAFLEX) 2 MG tablet TAKE ONE TABLET BY MOUTH THREE TIMES A DAY AS NEEDED FOR BACK PAIN 30 tablet 0    Insulin Pen Needle ([employer] PEN NEEDLES) 31G X 6 MM MISC Inject 1 each into the skin 3 times daily 100 each 5    gabapentin (NEURONTIN) 300 MG capsule Take 1 capsule by mouth 3 times daily for 90 days. 90 capsule 2    busPIRone (BUSPAR) 5 MG tablet Take 1 tablet by mouth 2 times daily 180 tablet 1    Finerenone (KERENDIA) 10 MG TABS Take 10 mg by mouth daily 90 tablet 3    dapagliflozin (FARXIGA) 10 MG tablet Take 1 tablet by mouth every morning 90 tablet 3    fenofibrate (TRIGLIDE) 160 MG tablet Take 1 tablet by mouth daily 90 tablet 1    dicyclomine (BENTYL) 10 MG capsule Take 1 capsule by mouth 4 times daily as needed (for abdominal cramping) 120 capsule 2    omeprazole (PRILOSEC) 20 MG delayed release capsule Take 1 capsule by mouth Daily 30 capsule 5    atorvastatin (LIPITOR) 80 MG tablet Take 1 tablet by mouth daily 30 tablet 5    ranolazine (RANEXA) 500 MG extended release tablet Take 1 tablet by mouth 2 times daily 60 tablet 5    traZODone (DESYREL) 50 MG tablet Take 1 tablet by mouth nightly as  significance.   3. Advanced atherosclerotic calcification of coronary arteries.  Cardiology   follow-up may be considered on a nonemergent outpatient basis.         CT HEAD WO CONTRAST   Final Result   1.  No acute intracranial abnormality.      2.  Focal area of encephalomalacia in the right frontal lobe may represent an   old stroke.         XR CHEST PORTABLE   Final Result   Expiratory film with bilateral atelectasis versus pneumonia.         IR TUNNELED CVC PLACE WO SQ PORT/PUMP > 5 YEARS    (Results Pending)   CT LUMBAR SPINE WO CONTRAST    (Results Pending)     Assessment/Plan:    #Acute on Chronic Anemia  -likely multifactorial with chronic disease, CKD/ARF, sepsis with +legionella. May consider GAMALIEL.   -Other bloodlines WNL  -no monoclonal gammopathy or hemolysis  -No s/s of active bleeding  -No nutritional def or hemolysis.  Folate is low-normal 3.7, added daily supplement.   -Hb declined to 6.7 1/29, good response to 8.3. RO bleeding.   -Given immunocompromised state ordered flow cytometry which is pending  -may consider further evaluation including BMB/aspiration.   -Jameel Cells noted on smear, likely r/t uremia. No schistocytes. Stable overall, Hgb 8 today    #Mass-like Opacity in RLL  -urine antigen +Legionella, so most likely pneumonia.  ID is ruling out other opportunistic infections.  If does not resolve after infection treatment can consider further workup.    Remainder of care per primary and consulting teams.  ID is following for new HIV diagnosis as well as legionella.  Nephrology is following for renal failure/HD. GI is following for liver dysfunction which is improving.    We will continue to follow as outpatient and monitor blood counts, as well as  consider further evaluation including BMB /aspiration if anemia worsening and no other cause identified.     Dayton Lizama MD

## 2024-02-04 NOTE — PROGRESS NOTES
[place]2.0  [facility] [place]      Name:  Dewey Sosa /Age/Sex: 1980  (43 y.o. male)   MRN & CSN:  [id number] & [id number] Encounter Date/Time: 2024 1:41 PM EST    [facility]  [facility] PCP: Frandy Parsons APRN - CNP       Hospital Day: 17    Assessment and Plan:   # Sepsis 2/2 Legionella Pneumonia  HIV Infection, ?AIDS  Presented with fever T max 101.6, tachycardia 110's, tachypnea 30's. 2/2 legionella pneumonia. CT chest showed right lung mass vs consolidation  Legionalla antigen +  S/p azithromycin 7 days in total and rocephin 5 days total, ID on board, CD4 count only 25, On bactrim prophylaxis and HAART, ID ordered cryptococcal ag/ab positive, IGG/IGM, HSV 1, 2, urine for gonacoccal, RPR and syphillis ab (Treponema pallidum) , QuantiFERON indeterminate, ID recommended to continue Bactrim, Entecavir, lamivudine, darunavir cobicistat, dolutegravir, continue to monitor renal function, trend CRP, CT of the L-spine, follow-up on AFB blood culture, PT/OT evaluated and recommended ARU    # ROSE MARY on CKD IV now ESRD.  Cr 4.3 (baseline ~2.5).   Temp vas cath placed  Started on HD.   Permanent HD cath placement on  continue HD per nephro    # Acute Normocytic Anemia   Hb down trending from 10's to 7.0. Received 1U PRBC   Patient with significant fatigue at rest and SOB with activity   Hb 8.5 post transfusion. Hb stable at 7.6  Transfuse if Hb < 7  GI for EGD on      # Hyponatremia. Likely 2/2 legionella pneumonia and poor oral intake/dehydration. P/w Na 117, improved 123 but then down to 118.   Today Na 127 as well ,  Nephro managing, on HD as below  - Plan for M/W/F HD     # Transaminitis, hyperbilirubinemia.  Was going up but today coming down.   Imaging including U/S and CT abd unremarkable.   CK from few days ago 20K down to 3600 yesterday  Could be legionella hepatitis vs rhabdomyolysis.    - GI on board   - Trend liver panel  - MRCP ordered but cancelled now given no clinical  Systems    See above    Objective:     Intake/Output Summary (Last 24 hours) at 2/4/2024 1102  Last data filed at 2/4/2024 0519  Gross per 24 hour   Intake --   Output 1350 ml   Net -1350 ml          Vitals:   Vitals:    02/04/24 0941   BP: (!) 143/86   Pulse: 93   Resp: 23   Temp:    SpO2:        Physical Exam:     General: Afebrile, no distress happy more alert  Eyes: EOMI  ENT: neck supple,   Cardiovascular: Regular rate and rhythm. No heaves, thrills.   Respiratory: Clear to auscultation. Tachypnea at times. No wheeze, rales or rhonchi.  Tunneled catheter on the right side of the chest  Gastrointestinal: Soft, no tenderness today   Genitourinary: no suprapubic tenderness  Musculoskeletal: No edema  Skin: warm, dry  Neuro: Alert.  Psych: Mood appropriate.     Medications:   Medications:    mirtazapine  15 mg Oral Nightly    cloNIDine  0.1 mg Oral BID    azithromycin  500 mg Oral Daily    magnesium oxide  400 mg Oral BID    pantoprazole  40 mg Oral QAM AC    omega-3 acid ethyl esters  2 g Oral BID    insulin glargine  20 Units SubCUTAneous Nightly    nystatin  5 mL Oral 4x Daily    insulin lispro  10 Units SubCUTAneous TID WC    insulin lispro  0-8 Units SubCUTAneous TID WC    insulin lispro  0-8 Units SubCUTAneous 2 times per day    dolutegravir sodium  50 mg Oral Daily    darunavir-cobicistat  1 tablet Oral Daily    lamiVUDine  100 mg Oral Daily    entecavir  0.5 mg Oral Weekly - Monday    sulfamethoxazole-trimethoprim  1 tablet Oral Once per day on Mon Wed Fri    folic acid  1 mg Oral Daily    metoprolol succinate  100 mg Oral Daily    nicotine  1 patch TransDERmal Daily    calcitRIOL  0.5 mcg Oral Daily    vitamin D  50,000 Units Oral Weekly    calcium carbonate  500 mg Oral TID    sodium chloride flush  5-40 mL IntraVENous 2 times per day    heparin (porcine)  5,000 Units SubCUTAneous 3 times per day    aspirin  81 mg Oral Daily    [Held by provider] ticagrelor  90 mg Oral BID    ezetimibe  10 mg Oral Nightly

## 2024-02-04 NOTE — PROGRESS NOTES
Progress Note( Dr. Cruz)  2/4/2024  Subjective:   Admit Date: 1/19/2024  PCP: Frandy Parsons APRN - CNP    Admitted For : Nausea vomiting and frequent fall was found to be hyponatremic with serum sodium 117     Consulted For: Better control of blood glucose     Interval History:     Feels feels much better t  also started eating better  Serum sodium getting better of 135     Patient had elevated liver enzymes also lipase suggesting pancreatitis or hepatitis  And liver function test trending down.  GI following him  Positive for Legionella  CT scan shows possibility of pneumonia and also has pancreatitis    Patient had EGD yesterday with no major finding except gastritis  Change the dialysis catheter    Denies any chest pains,   Yes  SOB .   Has some  nausea but no  vomiting. He is eating better  no new bowel or bladder symptoms.       Intake/Output Summary (Last 24 hours) at 2/4/2024 1142  Last data filed at 2/4/2024 0519  Gross per 24 hour   Intake --   Output 1350 ml   Net -1350 ml         DATA    CBC:   Recent Labs     02/02/24  0435 02/03/24  0350 02/03/24  2356   WBC 4.0 3.1* 2.8*   HGB 7.9* 8.4* 8.0*    213 202      CMP:  Recent Labs     02/02/24  0435 02/03/24  0350 02/03/24  2356   * 133* 133*   K 4.4 4.2 4.4   CL 98* 98* 100   CO2 23 23 23   BUN 52* 36* 43*   CREATININE 4.2* 3.4* 4.3*   CALCIUM 7.9* 8.1* 8.1*       Lipids:   Lab Results   Component Value Date/Time    CHOL 278 12/11/2023 12:28 PM    HDL 22 12/11/2023 12:28 PM    TRIG 266 02/03/2024 03:50 AM     Glucose:  Recent Labs     02/03/24  2309 02/04/24  0131 02/04/24  0931   POCGLU 160* 135* 274*       [id number]:  Lab Results   Component Value Date/Time    LABA1C 6.8 01/20/2024 05:54 AM     High Sensitivity TSH:   Lab Results   Component Value Date/Time    TSHHS 1.420 12/11/2023 12:19 PM     Free T3: No results found for: \"FT3\"  Free T4:No results found for: \"T4FREE\"    XR CHEST PORTABLE   Final Result   1. Persistent slightly  improved right lower lobe consolidation/pneumonia.   2. The right IJ temporary dialysis catheter has been converted to a right IJ   tunneled dialysis catheter.  The catheter is in good position.         IR TUNNELED CVC PLACE WO SQ PORT/PUMP > 5 YEARS   Final Result   Successful ultrasound and fluoroscopy guided Port-A-Cath/tunneled dialysis   access catheter placement.         XR LUMBAR SPINE (MIN 4 VIEWS)   Final Result   1. No acute abnormality or significant disc space narrowing.   2. Aortic atherosclerosis noted.         CT ABDOMEN W CONTRAST Additional Contrast? Radiologist Recommendation   Final Result   1.  Trace peripancreatic inflammatory stranding compatible with clinical   history of pancreatitis.  No evidence of peripancreatic collection or   necrosis.      2.  Trace ascites.      3.  Redemonstrated dense consolidative opacity of the right lower lobe likely   to represent pneumonia.  Neoplasm is not excluded and follow-up to resolution   is recommended.         XR CHEST PORTABLE   Final Result   1. Right basilar consolidation mildly improved from the previous study.   2. Pulmonary vascular congestion and possible small effusions.         XR CHEST PORTABLE   Final Result   1. Large right basilar consolidative opacity significantly worsened from   01/20/2024.   2. Nonobstructive bowel gas pattern.         XR ABDOMEN (KUB) (SINGLE AP VIEW)   Final Result   1. Large right basilar consolidative opacity significantly worsened from   01/20/2024.   2. Nonobstructive bowel gas pattern.         US ABDOMEN LIMITED Specify organ? LIVER, SPLEEN, GALLBLADDER, PANCREAS   Final Result   1. Hepatic steatosis.   2. Splenomegaly.   3. Increased echogenicity of the right kidney suggesting medical renal   disease.         XR CHEST PORTABLE   Final Result   1. Interval placement of right internal jugular Trialysis catheter, with tip   overlying the lower SVC, in satisfactory position, and no evidence of a   pneumothorax.   2.

## 2024-02-05 LAB
ANION GAP SERPL CALCULATED.3IONS-SCNC: 12 MMOL/L (ref 7–16)
BASOPHILS ABSOLUTE: 0 K/CU MM
BASOPHILS RELATIVE PERCENT: 1.4 % (ref 0–1)
BUN SERPL-MCNC: 52 MG/DL (ref 6–23)
CALCIUM SERPL-MCNC: 8.2 MG/DL (ref 8.3–10.6)
CHLORIDE BLD-SCNC: 99 MMOL/L (ref 99–110)
CO2: 22 MMOL/L (ref 21–32)
CREAT SERPL-MCNC: 5.1 MG/DL (ref 0.9–1.3)
CULTURE: ABNORMAL
CULTURE: ABNORMAL
DIFFERENTIAL TYPE: ABNORMAL
EOSINOPHILS ABSOLUTE: 0 K/CU MM
EOSINOPHILS RELATIVE PERCENT: 1.4 % (ref 0–3)
GFR SERPL CREATININE-BSD FRML MDRD: 14 ML/MIN/1.73M2
GLUCOSE BLD-MCNC: 130 MG/DL (ref 70–99)
GLUCOSE BLD-MCNC: 140 MG/DL (ref 70–99)
GLUCOSE BLD-MCNC: 158 MG/DL (ref 70–99)
GLUCOSE BLD-MCNC: 183 MG/DL (ref 70–99)
GLUCOSE BLD-MCNC: 260 MG/DL (ref 70–99)
GLUCOSE BLD-MCNC: 269 MG/DL (ref 70–99)
GLUCOSE SERPL-MCNC: 159 MG/DL (ref 70–99)
GRAM SMEAR: ABNORMAL
HCT VFR BLD CALC: 26 % (ref 42–52)
HEMOGLOBIN: 8.1 GM/DL (ref 13.5–18)
IMMATURE NEUTROPHIL %: 0.7 % (ref 0–0.43)
LYMPHOCYTES ABSOLUTE: 0.9 K/CU MM
LYMPHOCYTES RELATIVE PERCENT: 33.6 % (ref 24–44)
Lab: ABNORMAL
MAGNESIUM: 1.7 MG/DL (ref 1.8–2.4)
MCH RBC QN AUTO: 29.9 PG (ref 27–31)
MCHC RBC AUTO-ENTMCNC: 31.2 % (ref 32–36)
MCV RBC AUTO: 95.9 FL (ref 78–100)
MONOCYTES ABSOLUTE: 0.4 K/CU MM
MONOCYTES RELATIVE PERCENT: 13.4 % (ref 0–4)
NUCLEATED RBC %: 0 %
PDW BLD-RTO: 13.4 % (ref 11.7–14.9)
PHOSPHORUS: 5.5 MG/DL (ref 2.5–4.9)
PLATELET # BLD: 222 K/CU MM (ref 140–440)
PMV BLD AUTO: 9.4 FL (ref 7.5–11.1)
POTASSIUM SERPL-SCNC: 4.4 MMOL/L (ref 3.5–5.1)
RBC # BLD: 2.71 M/CU MM (ref 4.6–6.2)
SEGMENTED NEUTROPHILS ABSOLUTE COUNT: 1.4 K/CU MM
SEGMENTED NEUTROPHILS RELATIVE PERCENT: 49.5 % (ref 36–66)
SODIUM BLD-SCNC: 133 MMOL/L (ref 135–145)
SPECIMEN: ABNORMAL
TOTAL IMMATURE NEUTOROPHIL: 0.02 K/CU MM
TOTAL NUCLEATED RBC: 0 K/CU MM
WBC # BLD: 2.8 K/CU MM (ref 4–10.5)

## 2024-02-05 PROCEDURE — 6360000002 HC RX W HCPCS: Performed by: INTERNAL MEDICINE

## 2024-02-05 PROCEDURE — 6370000000 HC RX 637 (ALT 250 FOR IP): Performed by: INTERNAL MEDICINE

## 2024-02-05 PROCEDURE — 6370000000 HC RX 637 (ALT 250 FOR IP): Performed by: PHYSICIAN ASSISTANT

## 2024-02-05 PROCEDURE — 97116 GAIT TRAINING THERAPY: CPT

## 2024-02-05 PROCEDURE — 6370000000 HC RX 637 (ALT 250 FOR IP): Performed by: STUDENT IN AN ORGANIZED HEALTH CARE EDUCATION/TRAINING PROGRAM

## 2024-02-05 PROCEDURE — 97110 THERAPEUTIC EXERCISES: CPT

## 2024-02-05 PROCEDURE — 6370000000 HC RX 637 (ALT 250 FOR IP): Performed by: NURSE PRACTITIONER

## 2024-02-05 PROCEDURE — 2580000003 HC RX 258: Performed by: STUDENT IN AN ORGANIZED HEALTH CARE EDUCATION/TRAINING PROGRAM

## 2024-02-05 PROCEDURE — 84100 ASSAY OF PHOSPHORUS: CPT

## 2024-02-05 PROCEDURE — 82962 GLUCOSE BLOOD TEST: CPT

## 2024-02-05 PROCEDURE — 6370000000 HC RX 637 (ALT 250 FOR IP): Performed by: SPECIALIST

## 2024-02-05 PROCEDURE — 80048 BASIC METABOLIC PNL TOTAL CA: CPT

## 2024-02-05 PROCEDURE — 99232 SBSQ HOSP IP/OBS MODERATE 35: CPT | Performed by: INTERNAL MEDICINE

## 2024-02-05 PROCEDURE — 6360000002 HC RX W HCPCS: Performed by: STUDENT IN AN ORGANIZED HEALTH CARE EDUCATION/TRAINING PROGRAM

## 2024-02-05 PROCEDURE — 90935 HEMODIALYSIS ONE EVALUATION: CPT

## 2024-02-05 PROCEDURE — 2140000000 HC CCU INTERMEDIATE R&B

## 2024-02-05 PROCEDURE — 83735 ASSAY OF MAGNESIUM: CPT

## 2024-02-05 PROCEDURE — 94761 N-INVAS EAR/PLS OXIMETRY MLT: CPT

## 2024-02-05 PROCEDURE — 36415 COLL VENOUS BLD VENIPUNCTURE: CPT

## 2024-02-05 PROCEDURE — 85025 COMPLETE CBC W/AUTO DIFF WBC: CPT

## 2024-02-05 RX ADMIN — CALCIUM CARBONATE 500 MG: 500 TABLET, CHEWABLE ORAL at 14:52

## 2024-02-05 RX ADMIN — SULFAMETHOXAZOLE AND TRIMETHOPRIM 1 TABLET: 400; 80 TABLET ORAL at 20:35

## 2024-02-05 RX ADMIN — INSULIN GLARGINE 20 UNITS: 100 INJECTION, SOLUTION SUBCUTANEOUS at 20:58

## 2024-02-05 RX ADMIN — ENTECAVIR 0.5 MG: 0.5 TABLET, FILM COATED ORAL at 14:53

## 2024-02-05 RX ADMIN — BUSPIRONE HYDROCHLORIDE 10 MG: 5 TABLET ORAL at 20:35

## 2024-02-05 RX ADMIN — PANTOPRAZOLE SODIUM 40 MG: 40 TABLET, DELAYED RELEASE ORAL at 06:40

## 2024-02-05 RX ADMIN — AZITHROMYCIN DIHYDRATE 500 MG: 250 TABLET ORAL at 14:52

## 2024-02-05 RX ADMIN — CALCITRIOL CAPSULES 0.25 MCG 0.5 MCG: 0.25 CAPSULE ORAL at 14:52

## 2024-02-05 RX ADMIN — MIRTAZAPINE 15 MG: 15 TABLET, ORALLY DISINTEGRATING ORAL at 20:35

## 2024-02-05 RX ADMIN — HYDROMORPHONE HYDROCHLORIDE 0.5 MG: 1 INJECTION, SOLUTION INTRAMUSCULAR; INTRAVENOUS; SUBCUTANEOUS at 07:49

## 2024-02-05 RX ADMIN — HYDROMORPHONE HYDROCHLORIDE 0.5 MG: 1 INJECTION, SOLUTION INTRAMUSCULAR; INTRAVENOUS; SUBCUTANEOUS at 18:44

## 2024-02-05 RX ADMIN — CALCIUM CARBONATE 500 MG: 500 TABLET, CHEWABLE ORAL at 20:35

## 2024-02-05 RX ADMIN — OMEGA-3-ACID ETHYL ESTERS 2 G: 1 CAPSULE, LIQUID FILLED ORAL at 14:52

## 2024-02-05 RX ADMIN — HEPARIN SODIUM 5000 UNITS: 5000 INJECTION INTRAVENOUS; SUBCUTANEOUS at 20:35

## 2024-02-05 RX ADMIN — NYSTATIN 500000 UNITS: 100000 SUSPENSION ORAL at 14:53

## 2024-02-05 RX ADMIN — ISOSORBIDE MONONITRATE 60 MG: 60 TABLET, EXTENDED RELEASE ORAL at 14:52

## 2024-02-05 RX ADMIN — HEPARIN SODIUM 5000 UNITS: 5000 INJECTION INTRAVENOUS; SUBCUTANEOUS at 14:53

## 2024-02-05 RX ADMIN — ACETAMINOPHEN 650 MG: 325 TABLET ORAL at 17:36

## 2024-02-05 RX ADMIN — METOPROLOL SUCCINATE 100 MG: 50 TABLET, EXTENDED RELEASE ORAL at 14:53

## 2024-02-05 RX ADMIN — HYDROMORPHONE HYDROCHLORIDE 0.5 MG: 1 INJECTION, SOLUTION INTRAMUSCULAR; INTRAVENOUS; SUBCUTANEOUS at 14:54

## 2024-02-05 RX ADMIN — SODIUM CHLORIDE, PRESERVATIVE FREE 10 ML: 5 INJECTION INTRAVENOUS at 14:54

## 2024-02-05 RX ADMIN — DARUNAVIR ETHANOLATE AND COBICISTAT 1 EACH: 800; 150 TABLET, FILM COATED ORAL at 14:53

## 2024-02-05 RX ADMIN — HYDROMORPHONE HYDROCHLORIDE 0.5 MG: 1 INJECTION, SOLUTION INTRAMUSCULAR; INTRAVENOUS; SUBCUTANEOUS at 23:00

## 2024-02-05 RX ADMIN — IRON SUCROSE 50 MG: 20 INJECTION, SOLUTION INTRAVENOUS at 10:37

## 2024-02-05 RX ADMIN — EPOETIN ALFA-EPBX 8000 UNITS: 10000 INJECTION, SOLUTION INTRAVENOUS; SUBCUTANEOUS at 10:36

## 2024-02-05 RX ADMIN — HEPARIN SODIUM 5000 UNITS: 5000 INJECTION INTRAVENOUS; SUBCUTANEOUS at 06:38

## 2024-02-05 RX ADMIN — INSULIN LISPRO 4 UNITS: 100 INJECTION, SOLUTION INTRAVENOUS; SUBCUTANEOUS at 17:36

## 2024-02-05 RX ADMIN — NYSTATIN 500000 UNITS: 100000 SUSPENSION ORAL at 20:35

## 2024-02-05 RX ADMIN — OMEGA-3-ACID ETHYL ESTERS 2 G: 1 CAPSULE, LIQUID FILLED ORAL at 20:35

## 2024-02-05 RX ADMIN — Medication 400 MG: at 14:52

## 2024-02-05 RX ADMIN — ASPIRIN 81 MG 81 MG: 81 TABLET ORAL at 14:52

## 2024-02-05 RX ADMIN — NYSTATIN 500000 UNITS: 100000 SUSPENSION ORAL at 17:36

## 2024-02-05 RX ADMIN — ATORVASTATIN CALCIUM 80 MG: 40 TABLET, FILM COATED ORAL at 20:35

## 2024-02-05 RX ADMIN — DOLUTEGRAVIR SODIUM 50 MG: 50 TABLET, FILM COATED ORAL at 14:53

## 2024-02-05 RX ADMIN — BUSPIRONE HYDROCHLORIDE 10 MG: 5 TABLET ORAL at 14:53

## 2024-02-05 RX ADMIN — Medication 400 MG: at 20:35

## 2024-02-05 RX ADMIN — HYDROMORPHONE HYDROCHLORIDE 0.5 MG: 1 INJECTION, SOLUTION INTRAMUSCULAR; INTRAVENOUS; SUBCUTANEOUS at 02:12

## 2024-02-05 RX ADMIN — INSULIN LISPRO 10 UNITS: 100 INJECTION, SOLUTION INTRAVENOUS; SUBCUTANEOUS at 17:37

## 2024-02-05 RX ADMIN — FOLIC ACID 1 MG: 1 TABLET ORAL at 14:52

## 2024-02-05 RX ADMIN — EZETIMIBE 10 MG: 10 TABLET ORAL at 20:35

## 2024-02-05 RX ADMIN — CLONIDINE HYDROCHLORIDE 0.1 MG: 0.1 TABLET ORAL at 20:35

## 2024-02-05 RX ADMIN — CLONIDINE HYDROCHLORIDE 0.1 MG: 0.1 TABLET ORAL at 14:53

## 2024-02-05 RX ADMIN — LAMIVUDINE 100 MG: 100 TABLET, FILM COATED ORAL at 14:53

## 2024-02-05 ASSESSMENT — PAIN DESCRIPTION - FREQUENCY: FREQUENCY: CONTINUOUS

## 2024-02-05 ASSESSMENT — PAIN SCALES - GENERAL
PAINLEVEL_OUTOF10: 10
PAINLEVEL_OUTOF10: 8
PAINLEVEL_OUTOF10: 4
PAINLEVEL_OUTOF10: 9

## 2024-02-05 ASSESSMENT — PAIN DESCRIPTION - PAIN TYPE: TYPE: ACUTE PAIN

## 2024-02-05 ASSESSMENT — PAIN DESCRIPTION - DESCRIPTORS
DESCRIPTORS: ACHING;DISCOMFORT
DESCRIPTORS: ACHING
DESCRIPTORS: ACHING;THROBBING
DESCRIPTORS: ACHING;THROBBING
DESCRIPTORS: ACHING

## 2024-02-05 ASSESSMENT — PAIN DESCRIPTION - LOCATION
LOCATION: BACK

## 2024-02-05 ASSESSMENT — PAIN DESCRIPTION - DIRECTION: RADIATING_TOWARDS: UPPER BACK

## 2024-02-05 ASSESSMENT — PAIN DESCRIPTION - ONSET: ONSET: ON-GOING

## 2024-02-05 ASSESSMENT — PAIN DESCRIPTION - ORIENTATION
ORIENTATION: LOWER
ORIENTATION: LOWER;MID

## 2024-02-05 ASSESSMENT — PAIN - FUNCTIONAL ASSESSMENT
PAIN_FUNCTIONAL_ASSESSMENT: PREVENTS OR INTERFERES SOME ACTIVE ACTIVITIES AND ADLS
PAIN_FUNCTIONAL_ASSESSMENT: ACTIVITIES ARE NOT PREVENTED

## 2024-02-05 ASSESSMENT — PAIN SCALES - WONG BAKER: WONGBAKER_NUMERICALRESPONSE: 2

## 2024-02-05 NOTE — PROGRESS NOTES
Physical Therapy  Name: Dewey Sosa MRN: [id number] :   1980   Date:  2024   Admission Date: 2024 Room:  [address] [place] [address]   Restrictions/Precautions:        Fall Risk, General precautions    Communication with other providers: Ricardo RN states pt is ok to see for therapy.     Discharge recommendation: ARU    Subjective:  Patient states:  \"I decided the other day to start getting up more!\"  Pain:   Location, Type, Intensity (0/10 to 10/10):  Brian feet discomfort with ambulation    Objective:    Observation:  Patient is seated in recliner upon arrival.     Vitals : Patient is on room air   HR - 80's at rest, 98 with ambulation  BP - 138/94 upon arrival.     Treatment, including education/measures:    Tinetti Balance Test  Tinetti  Sitting Balance: Steady, safe  Arises: Able, uses arms to help  Attempts to Arise: Able to arise, one attempt  Immediate Standing Balance (First 5 Seconds): Steady but uses walker or other support  Standing Balance: Narrow stance without support  Nudged: Steady without walker or other support  Eyes Closed: Unsteady  Turned 360 Degrees: Steadiness: Steady  Turned 360 Degrees: Continuity of Steps: Discontinuous steps  Sitting Down: Uses arms or not a smooth motion  Balance Score: 11  Initiation of Gait: No hesitancy  Step Height: R Swing Foot: Right foot complete clears floor  Step Length: R Swing Foot: Does not pass left stance foot with step  Step Height: L Swing Foot: Left foot complete clears floor  Step Length: L Swing Foot: Does not pass right stance foot with step  Step Symmetry: Right and left step length not equal (estimate)  Step Continuity: Stopping or discontinuity between steps  Path: Mild/moderate deviation or uses walking aid  Trunk: Marked sway or uses walking aid  Walking Time: Heels apart  Gait Score: 4  Tinetti Total Score: 15     Risk Indicators:  Less than/equal to 18 = high risk  19-23 Moderate risk  Greater than/equal to 24 = low risk     Transfers with

## 2024-02-05 NOTE — CARE COORDINATION
Reviewed updated PT note.  ARU pre-cert is pending with [employer] at this time.  Pending ref#  [id number].  Will follow for determination.

## 2024-02-05 NOTE — PROCEDURES
3 HOUR HD TREATMENT COMPLETED  BLOOD RETURNED WITHOUT INCIDENT  1.0L NET FLUID REMOVAL  VENOFER AND RETACRIT GIVEN BY NURSE AS ORDERED    RIGHT TUNNELED CVC WAS USED FOR ACCESS  DRESSING CHANGE NOT INDICATED THIS TREATMENT  LUMENS FLUSHED AND CAPPED    PATIENT VOICED NO NEW NEEDS AT THIS TIME  REPORT CALLED TO PRIMARY NURSE  RETURNED TO ROOM WITH TRANSPORT    TREATMENT COMPLETED BY:  CELE MARLEY [facility]    Patient Name: Dewey Sosa  Patient : 1980  MRN: [id number]     Acct: [id number]  Date of Admission: 2024  Room/Bed: 3126/3126-A  Code Status:  Full Code  Allergies: No Known Allergies  Diagnosis:    Patient Active Problem List   Diagnosis    Type 2 diabetes mellitus without complication, with long-term current use of insulin (HCC)    Gastroesophageal reflux disease    Former tobacco use    Acute osteomyelitis of phalanx of left foot (HCC)    Osteomyelitis of third toe of left foot (HCC)    Anxiety    Persistent proteinuria    Acute on chronic anemia    Benign neoplasm of sigmoid colon    Vitamin D deficiency    ASCVD (arteriosclerotic cardiovascular disease)    Chronic systolic congestive heart failure (HCC)    Ischemic cardiomyopathy    Dyslipidemia    Stage 3a chronic kidney disease (HCC)    Other male erectile dysfunction    Atherosclerotic heart disease of native coronary artery with other forms of angina pectoris (HCC)    Type 2 diabetes mellitus with chronic kidney disease    Insomnia    Stage 3b chronic kidney disease (HCC)    Overweight    Chronic kidney disease, stage IV (severe) (HCC)    Hyperkalemia    Hyponatremia    Elevated LFTs    Elevated lipase    Splenomegaly    Nausea and vomiting    Traumatic rhabdomyolysis (HCC)    Legionella pneumonia (HCC)    AIDS (acquired immunodeficiency syndrome), CD4 <=200 (HCC)    CKD (chronic kidney disease) stage 5, GFR less than 15 ml/min (HCC)    Accident    Drop in hemoglobin         Treatment:  Hemodilaysis 2:1  Priority: Routine  Location: Acute

## 2024-02-05 NOTE — CONSULTS
24 hour central line rounding on Tunneled Hemodialysis Catheter completed. Sterile dressing change completed per protocol. Patient continues to meet the following criteria for central vascular access: Hemodialysis    Consult the Vascular Access Team for questions, concerns, or change in patient's condition.

## 2024-02-05 NOTE — PROGRESS NOTES
Lymphocytes Absolute 0.9 K/CU MM    Monocytes Absolute 0.4 K/CU MM    Eosinophils Absolute 0.0 K/CU MM    Basophils Absolute 0.0 K/CU MM    Nucleated RBC % 0.0 %    Total Nucleated RBC 0.0 K/CU MM    Total Immature Neutrophil 0.02 K/CU MM    Immature Neutrophil % 0.7 (H) 0 - 0.43 %   Critical Care Panel    Collection Time: 02/05/24  4:23 AM   Result Value Ref Range    Sodium 133 (L) 135 - 145 MMOL/L    Potassium 4.4 3.5 - 5.1 MMOL/L    Chloride 99 99 - 110 mMol/L    CO2 22 21 - 32 MMOL/L    Anion Gap 12 7 - 16    Glucose 159 (H) 70 - 99 MG/DL    BUN 52 (H) 6 - 23 MG/DL    Creatinine 5.1 (H) 0.9 - 1.3 MG/DL    Est, Glom Filt Rate 14 (L) >60 mL/min/1.73m2    Calcium 8.2 (L) 8.3 - 10.6 MG/DL    Phosphorus 5.5 (H) 2.5 - 4.9 MG/DL    Magnesium 1.7 (L) 1.8 - 2.4 mg/dl   POCT Glucose    Collection Time: 02/05/24  6:58 AM   Result Value Ref Range    POC Glucose 158 (H) 70 - 99 MG/DL     CULTURE results: Invalid input(s): \"BLOOD CULTURE\", \"URINE CULTURE\", \"SURGICAL CULTURE\"    Diagnosis:  Patient Active Problem List   Diagnosis    Type 2 diabetes mellitus without complication, with long-term current use of insulin ([facility])    Gastroesophageal reflux disease    Former tobacco use    Acute osteomyelitis of phalanx of left foot (HCC)    Osteomyelitis of third toe of left foot ([facility])    Anxiety    Persistent proteinuria    Acute on chronic anemia    Benign neoplasm of sigmoid colon    Vitamin D deficiency    ASCVD (arteriosclerotic cardiovascular disease)    Chronic systolic congestive heart failure (HCC)    Ischemic cardiomyopathy    Dyslipidemia    Stage 3a chronic kidney disease ([facility])    Other male erectile dysfunction    Atherosclerotic heart disease of native coronary artery with other forms of angina pectoris ([facility])    Type 2 diabetes mellitus with chronic kidney disease    Insomnia    Stage 3b chronic kidney disease (HCC)    Overweight    Chronic kidney disease, stage IV (severe) ([facility])    Hyperkalemia    Hyponatremia     Elevated LFTs    Elevated lipase    Splenomegaly    Nausea and vomiting    Traumatic rhabdomyolysis (HCC)    Legionella pneumonia (HCC)    AIDS (acquired immunodeficiency syndrome), CD4 <=200 (HCC)    CKD (chronic kidney disease) stage 5, GFR less than 15 ml/min (HCC)    Accident    Drop in hemoglobin       Active Problems  Principal Problem:    Hyponatremia  Active Problems:    Acute on chronic anemia    Elevated LFTs    Elevated lipase    Splenomegaly    Nausea and vomiting    Traumatic rhabdomyolysis (HCC)    Legionella pneumonia (HCC)    AIDS (acquired immunodeficiency syndrome), CD4 <=200 (HCC)    CKD (chronic kidney disease) stage 5, GFR less than 15 ml/min (HCC)    Accident    Drop in hemoglobin  Resolved Problems:    * No resolved hospital problems. *              Electronically signed by: Electronically signed by Raad Mcdaniel MD on 2/5/2024 at 7:11 AM

## 2024-02-05 NOTE — PROGRESS NOTES
V2.0  [facility] [place]      Name:  Dewey Sosa /Age/Sex: 1980  (43 y.o. male)   MRN & CSN:  [id number] & [id number] Encounter Date/Time: 2024 1:41 PM EST    [facility]  [facility] PCP: Frandy Parsons APRN - CNP       Hospital Day: 18    Assessment and Plan:   # Sepsis 2/2 Legionella Pneumonia  HIV Infection/AIDS  Presented with fever T max 101.6, tachycardia 110's, tachypnea 30's. 2/2 legionella pneumonia. CT chest showed right lung mass vs consolidation  Legionalla antigen +  S/p azithromycin 7 days in total and rocephin 5 days total, ID on board, CD4 count only 25, On bactrim prophylaxis and HAART, ID ordered cryptococcal ag/ab positive, IGG/IGM, HSV 1, 2, urine for gonacoccal, RPR and syphillis ab (Treponema pallidum) , QuantiFERON indeterminate, ID recommended to continue Bactrim, Entecavir, lamivudine, darunavir cobicistat, dolutegravir, continue to monitor renal function, trend CRP, CT of the L-spine WNL, follow-up on AFB blood culture and Resp Cx, PT/OT evaluated and recommended ARU, accepted and waiting for auth    # ROSE MARY on CKD IV now ESRD.  Cr 4.3 (baseline ~2.5).   Temp vas cath placed  Started on HD.   Permanent HD cath placement on  continue HD per nephro    # Acute Normocytic Anemia- stable  Hb down trending from 10's to 7.0. Received 1U PRBC   Patient with significant fatigue at rest and SOB with activity   Hb 8.5 post transfusion. Hb stable at 7.6  Transfuse if Hb < 7  GI for EGD on   Hematology consulted, Given immunocompromised state ordered flow cytometry which is pending      # Hyponatremia. Likely 2/2 legionella pneumonia and poor oral intake/dehydration. P/w Na 117, improved 123 but then down to 118.   Today Na 127 as well ,  Nephro managing, on HD as below  - Plan for M/W/F HD     # Transaminitis, hyperbilirubinemia.  Was going up but today coming down.   Imaging including U/S and CT abd unremarkable.   CK from few days ago 20K down to 3600  yesterday  Could be legionella hepatitis vs rhabdomyolysis.    - GI on board   - Trend liver panel  - MRCP ordered but cancelled now given no clinical evidence of Choledocholithiasis-patient did not tolerated    # Acute pancreatitis 2/2 Elevated TG. Was as high as 1748. Has been fluctuating.    CT abd, U/S no sign of pancreatitis.  Has been having abd pain unclear if chronic or acute  Triglycerides 1/22 noted 608 but trended down to 324  Lipase down trending currently 537    Hypocalcemia     NSVT  - Increased metoprolol to 100mg daily PO  this admission      Type II diabetes. On home insulin  - on lantus and sliding scale  - endo managing      Systolic and diastolic CHF. No sign of volume overload.  Last ECHO 8/2023 EF 50-55% (was 20-25% 10/2021), LV dilated , grade II diastolic  On home Toprol, Imdur    Comment: Please note this report has been produced using speech recognition software and may contain errors related to that system including errors in grammar, punctuation, and spelling, as well as words and phrases that may be inappropriate. If there are any questions or concerns please feel free to contact the dictating provider for clarification.     Subjective:     Chief Complaint:    Chief Complaint   Patient presents with    Fall     Complains he has fallen 6 times in the last few days, and has hit his head on ASA    Fatigue     Complaining of increasing weakness.           Pt seen and examined in the AM.  No acute overnight events. HD done today. Patient states he is fine denies any complaints at this time    Personally reviewed Lab Studies and Imaging     Monitor Brilinta with CBC   Monitor heparin with CBC   Monitor lantus and humalog with POCT glucose, watch for hypoglycemia  Monitor Bactrim with CBC and Cr       Diet ADULT DIET; Regular; Low Sodium (2 gm)  ADULT ORAL NUTRITION SUPPLEMENT; Lunch, Dinner; Standard High Calorie/High Protein Oral Supplement   DVT Prophylaxis [] Lovenox, [x]  Heparin, [] SCDs,

## 2024-02-05 NOTE — PROGRESS NOTES
[facility] Gastroenterology and Hepatology             MD Suzanne Blevins MD Carol Christensen, APRN-CNP             [address] [place] [address]             [phone] fax [phone]      Gastroenterology Progress note . 2/5/2024  Reason for consult:   Elevated LFTs anemia          Interval H/P  Patient underwent EGD with me on Friday with gastritis.  Biopsies negative for H. pylori.  No active bleeding.  Hemoglobin stable.   Currently noted to be sitting comfortably in bedside chair eating.     Physical Exam  Blood pressure (!) 150/86, pulse 89, temperature 97.8 °F (36.6 °C), temperature source Oral, resp. rate 25, height 1.854 m (6' 1\"), weight 93.9 kg (207 lb 1.6 oz), SpO2 97 %, peak flow 97 L/min.  Constitutional: Patient is in no distress.   Eyes: Pupils equal, round.  No icterus.  HENT: Oral mucosa is moist.   Pulmonary: No accessory muscle use. No localizing pulmonary findings.     Cardiovascular: Heart has a regular rate and rhythm, no JVD.   Gastrointestinal: Bowel sounds are present in all four quadrants. Abdomen is soft, nontender, nondistended. Rectal examination deferred.   Skin: No jaundice, spider angiomas, or palmar erythema. No purpura.  Neuro: Awake,alert, and oriented x3. No gross focal neurologic deficits.       Chart and labs reviewed.  My assessment and plan reveals   #1 elevated LFTs  Possible differentials include Legionella induced hepatitis versus  rhabdomyolysis - .  CK from admission noted to be significantly elevated to 20,000.  His pattern of LFT elevation certainly raises concern for that with AST significantly more than ALT as well as indirect and direct bilirubin.  -CK now down to 3000  -Initially planned to evaluate for any biliary dilation or associated choledocholithiasis with MRCP however patient not cooperating for MRI sequencing.   -No biliary dilation on prior imaging  -Recommend continuing monitoring

## 2024-02-05 NOTE — PROGRESS NOTES
Nephrology Progress Note  2/5/2024 1:36 PM  Subjective:     Interval History: Dewey Sosa is a 43 y.o. male  who appears to be doing better overall working on rehab option  Data:   Scheduled Meds:   mirtazapine  15 mg Oral Nightly    cloNIDine  0.1 mg Oral BID    azithromycin  500 mg Oral Daily    magnesium oxide  400 mg Oral BID    pantoprazole  40 mg Oral QAM AC    omega-3 acid ethyl esters  2 g Oral BID    insulin glargine  20 Units SubCUTAneous Nightly    nystatin  5 mL Oral 4x Daily    insulin lispro  10 Units SubCUTAneous TID WC    insulin lispro  0-8 Units SubCUTAneous TID WC    insulin lispro  0-8 Units SubCUTAneous 2 times per day    dolutegravir sodium  50 mg Oral Daily    darunavir-cobicistat  1 tablet Oral Daily    lamiVUDine  100 mg Oral Daily    entecavir  0.5 mg Oral Weekly - Monday    sulfamethoxazole-trimethoprim  1 tablet Oral Once per day on Mon Wed Fri    folic acid  1 mg Oral Daily    metoprolol succinate  100 mg Oral Daily    nicotine  1 patch TransDERmal Daily    calcitRIOL  0.5 mcg Oral Daily    vitamin D  50,000 Units Oral Weekly    calcium carbonate  500 mg Oral TID    sodium chloride flush  5-40 mL IntraVENous 2 times per day    heparin (porcine)  5,000 Units SubCUTAneous 3 times per day    aspirin  81 mg Oral Daily    [Held by provider] ticagrelor  90 mg Oral BID    ezetimibe  10 mg Oral Nightly    busPIRone  10 mg Oral TID    atorvastatin  80 mg Oral Nightly    isosorbide mononitrate  60 mg Oral Daily     Continuous Infusions:   sodium chloride      sodium chloride      sodium chloride      dextrose           CBC   Recent Labs     02/03/24  0350 02/03/24  2356 02/05/24  0423   WBC 3.1* 2.8* 2.8*   HGB 8.4* 8.0* 8.1*   HCT 26.3* 25.5* 26.0*    202 222      BMP   Recent Labs     02/03/24  0350 02/03/24  2356 02/05/24  0423   * 133* 133*   K 4.2 4.4 4.4   CL 98* 100 99   CO2 23 23 22   PHOS 3.6 4.3 5.5*   BUN 36* 43* 52*   CREATININE 3.4* 4.3* 5.1*     Hepatic:   No  results for input(s): \"AST\", \"ALT\", \"ALB\", \"BILITOT\", \"ALKPHOS\" in the last 72 hours.      Troponin: No results for input(s): \"TROPONINI\" in the last 72 hours.  BNP: No results for input(s): \"BNP\" in the last 72 hours.  Lipids: No results for input(s): \"CHOL\", \"HDL\" in the last 72 hours.    Invalid input(s): \"LDLCALCU\"  ABGs: No results found for: \"PHART\", \"PO2ART\", \"GFS9MFB\"  INR:   No results for input(s): \"INR\" in the last 72 hours.      Renal Labs  Albumin:    Lab Results   Component Value Date/Time    LABALBU 2.3 01/31/2024 03:14 AM    LABALBU 34 01/20/2024 10:16 AM     Calcium:    Lab Results   Component Value Date/Time    CALCIUM 8.2 02/05/2024 04:23 AM     Phosphorus:    Lab Results   Component Value Date/Time    PHOS 5.5 02/05/2024 04:23 AM     U/A:    Lab Results   Component Value Date/Time    NITRU NEGATIVE 01/19/2024 09:20 PM    COLORU YELLOW 01/19/2024 09:20 PM    WBCUA 1 01/19/2024 09:20 PM    RBCUA 39 01/19/2024 09:20 PM    MUCUS RARE 01/19/2024 09:20 PM    TRICHOMONAS NONE SEEN 01/19/2024 09:20 PM    BACTERIA NEGATIVE 01/19/2024 09:20 PM    CLARITYU CLEAR 01/19/2024 09:20 PM    SPECGRAV 1.020 01/19/2024 09:20 PM    UROBILINOGEN 0.2 01/19/2024 09:20 PM    BILIRUBINUR SMALL NUMBER OR AMOUNT OBSERVED 01/19/2024 09:20 PM    BLOODU LARGE NUMBER OR AMOUNT OBSERVED 01/19/2024 09:20 PM    KETUA TRACE 01/19/2024 09:20 PM    AMORPHOUS RARE 01/19/2024 09:20 PM     ABG:  No results found for: \"PHART\", \"ZPB9END\", \"PO2ART\", \"ZIM4WQY\", \"BEART\", \"THGBART\", \"DKP1UNP\", \"S6WHEFOF\"  HgBA1c:    Lab Results   Component Value Date/Time    LABA1C 6.8 01/20/2024 05:54 AM     Microalbumen/Creatinine ratio:  No components found for: \"RUCREAT\"  TSH:  No results found for: \"TSH\"  IRON:    Lab Results   Component Value Date/Time    IRON 37 01/26/2024 02:30 PM     Iron Saturation:  No components found for: \"PERCENTFE\"  TIBC:    Lab Results   Component Value Date/Time    TIBC 146 01/26/2024 02:30 PM     FERRITIN:    Lab Results

## 2024-02-05 NOTE — PROGRESS NOTES
Hematology Oncology Inpatient Progress Note     Patient Name:  Dewey Sosa  Patient :  1980  Patient MRN:  [id number]       Referring Provider: Frandy Parsons APRN - CNP     Date of Service: 2024      Reason for Consult: Anemia in setting of HIV+     Chief Complaint:    Chief Complaint   Patient presents with    Fall     Complains he has fallen 6 times in the last few days, and has hit his head on ASA    Fatigue     Complaining of increasing weakness.      Principal Problem:    Hyponatremia  Active Problems:    Acute on chronic anemia    Elevated LFTs    Elevated lipase    Splenomegaly    Nausea and vomiting    Traumatic rhabdomyolysis (HCC)    Legionella pneumonia (HCC)    AIDS (acquired immunodeficiency syndrome), CD4 <=200 (HCC)    CKD (chronic kidney disease) stage 5, GFR less than 15 ml/min (HCC)    Accident    Drop in hemoglobin  Resolved Problems:    * No resolved hospital problems. *      HPI:   Dewey Sosa is a 43 y.o. male with newly diagnosed HIV+ with low CD4 count (no AIDS defining event as of yet) hospitalized with multiple issues including rhabdomyolysis, legionella pneumonia, hyponatremia, hyperbilirubinemia, acute pancreatitis, ARF ([facility] on CKD IV, now on HD).  He is noted to have worsening anemia over admission from 10.3 =>7.0,  Did receive 1 uPRBC with appropriate response to 8.5 however 7.4 on repeat.  GI is already following.  MCV 88. MCHC 33, RDW 13. Overall Plt and WBC have remained WNL.  Reportedly he has been following with nephrology for CKD however had declined a large portion of the workup prior to this admission.  These diagnoses are all new to him and he admits he was not sure if he wanted to know.    On exam he is with his mother and is ok with her in room to discuss sensitive diagnosis. He feels improved since admission and is in better spirits. He states that he has felt weak over the last few months acutely worsening in the last few weeks.  Admits to  Lived in the Last Year: 1     Unstable Housing in the Last Year: No                                                                                    Family History   Problem Relation Age of Onset    Obesity Mother     Heart Disease Father         Open Heart Surgery    Diabetes Father     Allergy (Severe) Brother                                                                                                No Known Allergies    No current facility-administered medications on file prior to encounter.     Current Outpatient Medications on File Prior to Encounter   Medication Sig Dispense Refill    ticagrelor (BRILINTA) 60 MG TABS tablet Take 1 tablet by mouth 2 times daily 60 tablet 5    ezetimibe (ZETIA) 10 MG tablet Take 1 tablet by mouth daily      sodium zirconium cyclosilicate (LOKELMA) 10 g PACK oral suspension Take 1 packet by mouth three times a week 45 packet 3    insulin glargine (LANTUS SOLOSTAR) 100 UNIT/ML injection pen INJECT 40 UNITS UNDER THE SKIN INJECT UNITS UNDER THE SKIN ONCE NIGHTLY 15 mL 2    tiZANidine (ZANAFLEX) 2 MG tablet TAKE ONE TABLET BY MOUTH THREE TIMES A DAY AS NEEDED FOR BACK PAIN 30 tablet 0    Insulin Pen Needle ([employer] PEN NEEDLES) 31G X 6 MM MISC Inject 1 each into the skin 3 times daily 100 each 5    gabapentin (NEURONTIN) 300 MG capsule Take 1 capsule by mouth 3 times daily for 90 days. 90 capsule 2    busPIRone (BUSPAR) 5 MG tablet Take 1 tablet by mouth 2 times daily 180 tablet 1    Finerenone (KERENDIA) 10 MG TABS Take 10 mg by mouth daily 90 tablet 3    dapagliflozin (FARXIGA) 10 MG tablet Take 1 tablet by mouth every morning 90 tablet 3    fenofibrate (TRIGLIDE) 160 MG tablet Take 1 tablet by mouth daily 90 tablet 1    dicyclomine (BENTYL) 10 MG capsule Take 1 capsule by mouth 4 times daily as needed (for abdominal cramping) 120 capsule 2    omeprazole (PRILOSEC) 20 MG delayed release capsule Take 1 capsule by mouth Daily 30 capsule 5    atorvastatin (LIPITOR) 80 MG tablet

## 2024-02-06 LAB
25(OH)D3 SERPL-MCNC: 20.32 NG/ML
ALBUMIN SERPL-MCNC: 3 GM/DL (ref 3.4–5)
ALP BLD-CCNC: 151 IU/L (ref 40–129)
ALT SERPL-CCNC: 183 U/L (ref 10–40)
ANION GAP SERPL CALCULATED.3IONS-SCNC: 10 MMOL/L (ref 7–16)
AST SERPL-CCNC: 174 IU/L (ref 15–37)
BASOPHILS ABSOLUTE: 0 K/CU MM
BASOPHILS RELATIVE PERCENT: 1.2 % (ref 0–1)
BILIRUB SERPL-MCNC: 0.6 MG/DL (ref 0–1)
BILIRUBIN DIRECT: 0.3 MG/DL (ref 0–0.3)
BILIRUBIN, INDIRECT: 0.3 MG/DL (ref 0–0.7)
BUN SERPL-MCNC: 34 MG/DL (ref 6–23)
CALCIUM SERPL-MCNC: 8.3 MG/DL (ref 8.3–10.6)
CHLORIDE BLD-SCNC: 99 MMOL/L (ref 99–110)
CO2: 24 MMOL/L (ref 21–32)
CREAT SERPL-MCNC: 3.6 MG/DL (ref 0.9–1.3)
CULTURE: NORMAL
DIFFERENTIAL TYPE: ABNORMAL
EOSINOPHILS ABSOLUTE: 0.1 K/CU MM
EOSINOPHILS RELATIVE PERCENT: 3.1 % (ref 0–3)
GFR SERPL CREATININE-BSD FRML MDRD: 21 ML/MIN/1.73M2
GLUCOSE BLD-MCNC: 153 MG/DL (ref 70–99)
GLUCOSE BLD-MCNC: 158 MG/DL (ref 70–99)
GLUCOSE BLD-MCNC: 187 MG/DL (ref 70–99)
GLUCOSE BLD-MCNC: 199 MG/DL (ref 70–99)
GLUCOSE SERPL-MCNC: 142 MG/DL (ref 70–99)
HCT VFR BLD CALC: 25.1 % (ref 42–52)
HEMOGLOBIN: 7.8 GM/DL (ref 13.5–18)
IMMATURE NEUTROPHIL %: 0.8 % (ref 0–0.43)
LYMPHOCYTES ABSOLUTE: 0.8 K/CU MM
LYMPHOCYTES RELATIVE PERCENT: 32.3 % (ref 24–44)
Lab: NORMAL
MAGNESIUM: 1.7 MG/DL (ref 1.8–2.4)
MCH RBC QN AUTO: 29.4 PG (ref 27–31)
MCHC RBC AUTO-ENTMCNC: 31.1 % (ref 32–36)
MCV RBC AUTO: 94.7 FL (ref 78–100)
MONOCYTES ABSOLUTE: 0.3 K/CU MM
MONOCYTES RELATIVE PERCENT: 10.5 % (ref 0–4)
NUCLEATED RBC %: 0 %
PDW BLD-RTO: 13.7 % (ref 11.7–14.9)
PHOSPHORUS: 3.9 MG/DL (ref 2.5–4.9)
PLATELET # BLD: 215 K/CU MM (ref 140–440)
PMV BLD AUTO: 9.7 FL (ref 7.5–11.1)
POTASSIUM SERPL-SCNC: 4.4 MMOL/L (ref 3.5–5.1)
RBC # BLD: 2.65 M/CU MM (ref 4.6–6.2)
SEGMENTED NEUTROPHILS ABSOLUTE COUNT: 1.3 K/CU MM
SEGMENTED NEUTROPHILS RELATIVE PERCENT: 52.1 % (ref 36–66)
SODIUM BLD-SCNC: 133 MMOL/L (ref 135–145)
SPECIMEN: NORMAL
TOTAL IMMATURE NEUTOROPHIL: 0.02 K/CU MM
TOTAL NUCLEATED RBC: 0 K/CU MM
TOTAL PROTEIN: 6.9 GM/DL (ref 6.4–8.2)
TRIGL SERPL-MCNC: 252 MG/DL
WBC # BLD: 2.6 K/CU MM (ref 4–10.5)

## 2024-02-06 PROCEDURE — 80048 BASIC METABOLIC PNL TOTAL CA: CPT

## 2024-02-06 PROCEDURE — 2580000003 HC RX 258: Performed by: STUDENT IN AN ORGANIZED HEALTH CARE EDUCATION/TRAINING PROGRAM

## 2024-02-06 PROCEDURE — 6370000000 HC RX 637 (ALT 250 FOR IP): Performed by: INTERNAL MEDICINE

## 2024-02-06 PROCEDURE — 6360000002 HC RX W HCPCS: Performed by: STUDENT IN AN ORGANIZED HEALTH CARE EDUCATION/TRAINING PROGRAM

## 2024-02-06 PROCEDURE — 6370000000 HC RX 637 (ALT 250 FOR IP): Performed by: PHYSICIAN ASSISTANT

## 2024-02-06 PROCEDURE — 99232 SBSQ HOSP IP/OBS MODERATE 35: CPT | Performed by: INTERNAL MEDICINE

## 2024-02-06 PROCEDURE — 6360000002 HC RX W HCPCS: Performed by: INTERNAL MEDICINE

## 2024-02-06 PROCEDURE — 82962 GLUCOSE BLOOD TEST: CPT

## 2024-02-06 PROCEDURE — 85025 COMPLETE CBC W/AUTO DIFF WBC: CPT

## 2024-02-06 PROCEDURE — 83735 ASSAY OF MAGNESIUM: CPT

## 2024-02-06 PROCEDURE — 36415 COLL VENOUS BLD VENIPUNCTURE: CPT

## 2024-02-06 PROCEDURE — 84478 ASSAY OF TRIGLYCERIDES: CPT

## 2024-02-06 PROCEDURE — 80076 HEPATIC FUNCTION PANEL: CPT

## 2024-02-06 PROCEDURE — 99231 SBSQ HOSP IP/OBS SF/LOW 25: CPT | Performed by: PHYSICIAN ASSISTANT

## 2024-02-06 PROCEDURE — 94761 N-INVAS EAR/PLS OXIMETRY MLT: CPT

## 2024-02-06 PROCEDURE — 84100 ASSAY OF PHOSPHORUS: CPT

## 2024-02-06 PROCEDURE — 82306 VITAMIN D 25 HYDROXY: CPT

## 2024-02-06 PROCEDURE — 2140000000 HC CCU INTERMEDIATE R&B

## 2024-02-06 PROCEDURE — 6370000000 HC RX 637 (ALT 250 FOR IP): Performed by: STUDENT IN AN ORGANIZED HEALTH CARE EDUCATION/TRAINING PROGRAM

## 2024-02-06 PROCEDURE — 80053 COMPREHEN METABOLIC PANEL: CPT

## 2024-02-06 RX ORDER — AMOXICILLIN AND CLAVULANATE POTASSIUM 500; 125 MG/1; MG/1
1 TABLET, FILM COATED ORAL EVERY 12 HOURS SCHEDULED
Status: DISCONTINUED | OUTPATIENT
Start: 2024-02-06 | End: 2024-02-08 | Stop reason: HOSPADM

## 2024-02-06 RX ADMIN — ATORVASTATIN CALCIUM 80 MG: 40 TABLET, FILM COATED ORAL at 21:18

## 2024-02-06 RX ADMIN — Medication 400 MG: at 09:13

## 2024-02-06 RX ADMIN — HYDROMORPHONE HYDROCHLORIDE 0.5 MG: 1 INJECTION, SOLUTION INTRAMUSCULAR; INTRAVENOUS; SUBCUTANEOUS at 21:18

## 2024-02-06 RX ADMIN — AMOXICILLIN AND CLAVULANATE POTASSIUM 1 TABLET: 500; 125 TABLET, FILM COATED ORAL at 09:16

## 2024-02-06 RX ADMIN — AZITHROMYCIN DIHYDRATE 500 MG: 250 TABLET ORAL at 09:12

## 2024-02-06 RX ADMIN — INSULIN LISPRO 10 UNITS: 100 INJECTION, SOLUTION INTRAVENOUS; SUBCUTANEOUS at 09:13

## 2024-02-06 RX ADMIN — BUSPIRONE HYDROCHLORIDE 10 MG: 5 TABLET ORAL at 09:13

## 2024-02-06 RX ADMIN — CALCIUM CARBONATE 500 MG: 500 TABLET, CHEWABLE ORAL at 21:17

## 2024-02-06 RX ADMIN — CLONIDINE HYDROCHLORIDE 0.1 MG: 0.1 TABLET ORAL at 09:13

## 2024-02-06 RX ADMIN — METOPROLOL SUCCINATE 100 MG: 50 TABLET, EXTENDED RELEASE ORAL at 09:12

## 2024-02-06 RX ADMIN — NYSTATIN 500000 UNITS: 100000 SUSPENSION ORAL at 09:11

## 2024-02-06 RX ADMIN — DARUNAVIR ETHANOLATE AND COBICISTAT 1 EACH: 800; 150 TABLET, FILM COATED ORAL at 09:16

## 2024-02-06 RX ADMIN — INSULIN GLARGINE 20 UNITS: 100 INJECTION, SOLUTION SUBCUTANEOUS at 21:18

## 2024-02-06 RX ADMIN — HYDROMORPHONE HYDROCHLORIDE 0.5 MG: 1 INJECTION, SOLUTION INTRAMUSCULAR; INTRAVENOUS; SUBCUTANEOUS at 09:14

## 2024-02-06 RX ADMIN — CLONIDINE HYDROCHLORIDE 0.1 MG: 0.1 TABLET ORAL at 21:17

## 2024-02-06 RX ADMIN — OMEGA-3-ACID ETHYL ESTERS 2 G: 1 CAPSULE, LIQUID FILLED ORAL at 21:17

## 2024-02-06 RX ADMIN — INSULIN LISPRO 10 UNITS: 100 INJECTION, SOLUTION INTRAVENOUS; SUBCUTANEOUS at 17:29

## 2024-02-06 RX ADMIN — AMOXICILLIN AND CLAVULANATE POTASSIUM 1 TABLET: 500; 125 TABLET, FILM COATED ORAL at 21:17

## 2024-02-06 RX ADMIN — ASPIRIN 81 MG 81 MG: 81 TABLET ORAL at 09:13

## 2024-02-06 RX ADMIN — CALCIUM CARBONATE 500 MG: 500 TABLET, CHEWABLE ORAL at 16:10

## 2024-02-06 RX ADMIN — HYDROMORPHONE HYDROCHLORIDE 0.5 MG: 1 INJECTION, SOLUTION INTRAMUSCULAR; INTRAVENOUS; SUBCUTANEOUS at 17:30

## 2024-02-06 RX ADMIN — FOLIC ACID 1 MG: 1 TABLET ORAL at 09:13

## 2024-02-06 RX ADMIN — SODIUM CHLORIDE, PRESERVATIVE FREE 10 ML: 5 INJECTION INTRAVENOUS at 09:14

## 2024-02-06 RX ADMIN — HEPARIN SODIUM 5000 UNITS: 5000 INJECTION INTRAVENOUS; SUBCUTANEOUS at 21:19

## 2024-02-06 RX ADMIN — CALCIUM CARBONATE 500 MG: 500 TABLET, CHEWABLE ORAL at 09:12

## 2024-02-06 RX ADMIN — Medication 400 MG: at 21:17

## 2024-02-06 RX ADMIN — HYDROMORPHONE HYDROCHLORIDE 0.5 MG: 1 INJECTION, SOLUTION INTRAMUSCULAR; INTRAVENOUS; SUBCUTANEOUS at 13:12

## 2024-02-06 RX ADMIN — HYDROMORPHONE HYDROCHLORIDE 0.5 MG: 1 INJECTION, SOLUTION INTRAMUSCULAR; INTRAVENOUS; SUBCUTANEOUS at 04:12

## 2024-02-06 RX ADMIN — OMEGA-3-ACID ETHYL ESTERS 2 G: 1 CAPSULE, LIQUID FILLED ORAL at 09:13

## 2024-02-06 RX ADMIN — HEPARIN SODIUM 5000 UNITS: 5000 INJECTION INTRAVENOUS; SUBCUTANEOUS at 16:07

## 2024-02-06 RX ADMIN — ISOSORBIDE MONONITRATE 60 MG: 60 TABLET, EXTENDED RELEASE ORAL at 09:12

## 2024-02-06 RX ADMIN — EZETIMIBE 10 MG: 10 TABLET ORAL at 21:18

## 2024-02-06 RX ADMIN — MIRTAZAPINE 15 MG: 15 TABLET, ORALLY DISINTEGRATING ORAL at 21:20

## 2024-02-06 RX ADMIN — BUSPIRONE HYDROCHLORIDE 10 MG: 5 TABLET ORAL at 16:07

## 2024-02-06 RX ADMIN — HEPARIN SODIUM 5000 UNITS: 5000 INJECTION INTRAVENOUS; SUBCUTANEOUS at 06:17

## 2024-02-06 RX ADMIN — NYSTATIN 500000 UNITS: 100000 SUSPENSION ORAL at 21:18

## 2024-02-06 RX ADMIN — NYSTATIN 500000 UNITS: 100000 SUSPENSION ORAL at 13:12

## 2024-02-06 RX ADMIN — DOLUTEGRAVIR SODIUM 50 MG: 50 TABLET, FILM COATED ORAL at 09:16

## 2024-02-06 RX ADMIN — NYSTATIN 500000 UNITS: 100000 SUSPENSION ORAL at 17:29

## 2024-02-06 RX ADMIN — INSULIN LISPRO 10 UNITS: 100 INJECTION, SOLUTION INTRAVENOUS; SUBCUTANEOUS at 13:12

## 2024-02-06 RX ADMIN — PANTOPRAZOLE SODIUM 40 MG: 40 TABLET, DELAYED RELEASE ORAL at 09:23

## 2024-02-06 RX ADMIN — LAMIVUDINE 100 MG: 100 TABLET, FILM COATED ORAL at 09:16

## 2024-02-06 RX ADMIN — SODIUM CHLORIDE, PRESERVATIVE FREE 10 ML: 5 INJECTION INTRAVENOUS at 21:21

## 2024-02-06 RX ADMIN — CALCITRIOL CAPSULES 0.25 MCG 0.5 MCG: 0.25 CAPSULE ORAL at 09:12

## 2024-02-06 RX ADMIN — BUSPIRONE HYDROCHLORIDE 10 MG: 5 TABLET ORAL at 21:18

## 2024-02-06 ASSESSMENT — PAIN - FUNCTIONAL ASSESSMENT
PAIN_FUNCTIONAL_ASSESSMENT: ACTIVITIES ARE NOT PREVENTED

## 2024-02-06 ASSESSMENT — PAIN DESCRIPTION - DESCRIPTORS
DESCRIPTORS: ACHING

## 2024-02-06 ASSESSMENT — PAIN SCALES - GENERAL
PAINLEVEL_OUTOF10: 8
PAINLEVEL_OUTOF10: 8
PAINLEVEL_OUTOF10: 9
PAINLEVEL_OUTOF10: 8
PAINLEVEL_OUTOF10: 8
PAINLEVEL_OUTOF10: 9
PAINLEVEL_OUTOF10: 8
PAINLEVEL_OUTOF10: 0

## 2024-02-06 ASSESSMENT — PAIN DESCRIPTION - LOCATION
LOCATION: BACK

## 2024-02-06 ASSESSMENT — PAIN DESCRIPTION - ORIENTATION
ORIENTATION: LOWER

## 2024-02-06 ASSESSMENT — PAIN DESCRIPTION - PAIN TYPE: TYPE: CHRONIC PAIN

## 2024-02-06 ASSESSMENT — PAIN DESCRIPTION - ONSET: ONSET: ON-GOING

## 2024-02-06 ASSESSMENT — PAIN DESCRIPTION - FREQUENCY: FREQUENCY: CONTINUOUS

## 2024-02-06 ASSESSMENT — PAIN SCALES - WONG BAKER: WONGBAKER_NUMERICALRESPONSE: 4

## 2024-02-06 NOTE — CARE COORDINATION
Recevied call from [facility] [employer] offering a P2P prior to making a final determination.  Per [facility] Medicare must be completed by noon today by calling [phone] opt. 5.  This is optional.  If not pursued and patient is denied there is an expedited appeal option.     Notified MD of above via PS.

## 2024-02-06 NOTE — PROGRESS NOTES
Hematology Oncology Inpatient Progress Note     Patient Name:  Dewey Sosa  Patient :  1980  Patient MRN:  [id number]       Referring Provider: Frandy Pasrons APRN - CNP     Date of Service: 2024      Reason for Consult: Anemia in setting of HIV+     Chief Complaint:    Chief Complaint   Patient presents with    Fall     Complains he has fallen 6 times in the last few days, and has hit his head on ASA    Fatigue     Complaining of increasing weakness.      Principal Problem:    Hyponatremia  Active Problems:    Acute on chronic anemia    Elevated LFTs    Elevated lipase    Splenomegaly    Nausea and vomiting    Traumatic rhabdomyolysis (HCC)    Legionella pneumonia (HCC)    AIDS (acquired immunodeficiency syndrome), CD4 <=200 (HCC)    CKD (chronic kidney disease) stage 5, GFR less than 15 ml/min (HCC)    Accident    Drop in hemoglobin  Resolved Problems:    * No resolved hospital problems. *      HPI:   Dewey Sosa is a 43 y.o. male with newly diagnosed HIV+ with low CD4 count (no AIDS defining event as of yet) hospitalized with multiple issues including rhabdomyolysis, legionella pneumonia, hyponatremia, hyperbilirubinemia, acute pancreatitis, ARF ([facility] on CKD IV, now on HD).  He is noted to have worsening anemia over admission from 10.3 =>7.0,  Did receive 1 uPRBC with appropriate response to 8.5 however 7.4 on repeat.  GI is already following.  MCV 88. MCHC 33, RDW 13. Overall Plt and WBC have remained WNL.  Reportedly he has been following with nephrology for CKD however had declined a large portion of the workup prior to this admission.  These diagnoses are all new to him and he admits he was not sure if he wanted to know.    On exam he is with his mother and is ok with her in room to discuss sensitive diagnosis. He feels improved since admission and is in better spirits. He states that he has felt weak over the last few months acutely worsening in the last few weeks.  Admits to

## 2024-02-06 NOTE — PROGRESS NOTES
V2.0  [facility] Hospitalist Progress Note      Name:  Dewey Sosa /Age/Sex: 1980  (43 y.o. male)   MRN & CSN:  [id number] & [id number] Encounter Date/Time: 2024 1:41 PM EST    [facility]  [facility] PCP: Frandy Parsons APRN - CNP       Hospital Day: 19    Assessment and Plan:   # Sepsis 2/2 Legionella Pneumonia  HIV Infection/AIDS  Presented with fever T max 101.6, tachycardia 110's, tachypnea 30's. 2/2 legionella pneumonia. CT chest showed right lung mass vs consolidation  Legionalla antigen +  S/p azithromycin 7 days in total and rocephin 5 days total, ID on board, CD4 count only 25, On bactrim prophylaxis and HAART, ID ordered cryptococcal ag/ab positive, IGG/IGM, HSV 1, 2, urine for gonacoccal, RPR and syphillis ab (Treponema pallidum) , QuantiFERON indeterminate, ID recommended to continue Bactrim, Entecavir, lamivudine, darunavir cobicistat, dolutegravir, continue to monitor renal function, trend CRP, CT of the L-spine WNL, AFB blood culture neg,  Resp Cx growing Capnocytophaga species, patient started on Augmentin by ID  PT/OT evaluated and recommended ARU, accepted and waiting for auth    # ROSE MARY on CKD IV now ESRD.  Cr 4.3 (baseline ~2.5).   Temp vas cath placed  Started on HD.   Tunneled HD cath placement on  continue HD per nephro    # Acute Normocytic Anemia- stable  Hb down trending from 10's to 7.0. Received 1U PRBC   Patient with significant fatigue at rest and SOB with activity   Hb 8.5 post transfusion. Hb stable at 7.6  Transfuse if Hb < 7  GI for EGD on   Hematology consulted, Given immunocompromised state ordered flow cytometry which revealed left myeloid shift but otherwise normal   Further workup as outpatient     # Hyponatremia - resolved. Likely 2/2 legionella pneumonia and poor oral intake/dehydration. P/w Na 117,   -Nephro managing, on HD as below  - Plan for M/W/F HD     # Transaminitis, hyperbilirubinemia.  Was going up but today coming down.   Imaging including U/S  line placement.  There is a persistent mass-like opacity within the right lung base, which appears slightly improved in comparison with the study of 01/19/2024, suggestive of pneumonia.  The lungs are otherwise clear, without additional focus of airspace consolidation.  The heart size and mediastinal contours are within normal limits.  The skeletal structures are unremarkable.     1. Interval placement of right internal jugular Trialysis catheter, with tip overlying the lower SVC, in satisfactory position, and no evidence of a pneumothorax. 2. Slight interval improvement in appearance of focal mass-like consolidative opacity within the right lung base, favoring slight improvement of pneumonia. However, continued serial chest x-ray follow-up is recommended to ensure resolution of this mass-like right basilar opacity, as a true pulmonary mass is also a diagnostic consideration.     CT CHEST ABDOMEN PELVIS WO CONTRAST Additional Contrast? None    Result Date: 1/19/2024  EXAMINATION: CT OF THE CHEST, ABDOMEN, AND PELVIS WITHOUT CONTRAST 1/19/2024 10:12 pm TECHNIQUE: CT of the chest, abdomen and pelvis was performed without the administration of intravenous contrast. Multiplanar reformatted images are provided for review. Automated exposure control, iterative reconstruction, and/or weight based adjustment of the mA/kV was utilized to reduce the radiation dose to as low as reasonably achievable. COMPARISON: 10/17/2023 CT HISTORY: ORDERING SYSTEM PROVIDED HISTORY: further eval cxr, elevated lipase, n/v TECHNOLOGIST PROVIDED HISTORY: Reason for exam:->further eval cxr, elevated lipase, n/v Additional Contrast?->None Decision Support Exception - unselect if not a suspected or confirmed emergency medical condition->Emergency Medical Condition (MA) Reason for Exam: further eval cxr, elevated lipase, n/v FINDINGS: Chest: Mediastinum: Mild atherosclerotic calcification of coronary arteries that is advanced at age 43.  Aorta and

## 2024-02-06 NOTE — PROGRESS NOTES
Nephrology Progress Note  2/6/2024 12:02 PM  Subjective:     Interval History: Dewey Ssoa is a 43 y.o. male doing okay waiting on rehab options  Data:   Scheduled Meds:   amoxicillin-clavulanate  1 tablet Oral 2 times per day    mirtazapine  15 mg Oral Nightly    cloNIDine  0.1 mg Oral BID    azithromycin  500 mg Oral Daily    magnesium oxide  400 mg Oral BID    pantoprazole  40 mg Oral QAM AC    omega-3 acid ethyl esters  2 g Oral BID    insulin glargine  20 Units SubCUTAneous Nightly    nystatin  5 mL Oral 4x Daily    insulin lispro  10 Units SubCUTAneous TID WC    insulin lispro  0-8 Units SubCUTAneous TID WC    insulin lispro  0-8 Units SubCUTAneous 2 times per day    dolutegravir sodium  50 mg Oral Daily    darunavir-cobicistat  1 tablet Oral Daily    lamiVUDine  100 mg Oral Daily    entecavir  0.5 mg Oral Weekly - Monday    sulfamethoxazole-trimethoprim  1 tablet Oral Once per day on Mon Wed Fri    folic acid  1 mg Oral Daily    metoprolol succinate  100 mg Oral Daily    nicotine  1 patch TransDERmal Daily    calcitRIOL  0.5 mcg Oral Daily    vitamin D  50,000 Units Oral Weekly    calcium carbonate  500 mg Oral TID    sodium chloride flush  5-40 mL IntraVENous 2 times per day    heparin (porcine)  5,000 Units SubCUTAneous 3 times per day    aspirin  81 mg Oral Daily    [Held by provider] ticagrelor  90 mg Oral BID    ezetimibe  10 mg Oral Nightly    busPIRone  10 mg Oral TID    atorvastatin  80 mg Oral Nightly    isosorbide mononitrate  60 mg Oral Daily     Continuous Infusions:   sodium chloride      sodium chloride      sodium chloride      dextrose           CBC   Recent Labs     02/03/24 2356 02/05/24  0423 02/06/24  0447   WBC 2.8* 2.8* 2.6*   HGB 8.0* 8.1* 7.8*   HCT 25.5* 26.0* 25.1*    222 215      BMP   Recent Labs     02/03/24  2356 02/05/24  0423 02/06/24  0447   * 133* 133*   K 4.4 4.4 4.4    99 99   CO2 23 22 24   PHOS 4.3 5.5* 3.9   BUN 43* 52* 34*   CREATININE 4.3*

## 2024-02-06 NOTE — PROGRESS NOTES
Infectious Disease Progress Note  2024   Patient Name: Dewey Sosa : 1980     Assessment  HIV/AIDS  Legionella pneumonia  Capnocytophaga pneumonia  Resolved hepatitis B infection  Compensated liver disease on intermittent hemodialysis  History of CKD stage V, type 2 diabetes mellitus, coronary artery disease status post PCI, MSM \"presented with 1 week history of shortness of breath, weakness and falls.  Was diagnosed with Legionella pneumonia.  Newly diagnosed with HIV-1 viral load was 46,000, CD4 count was 25 on this admission.  Sexual contraction.  Tolerating oral ART  Cryptococcal antigen negative, gonococcal and chlamydia NAAT test negative, T. pallidum screen negative  Quantiferon TB test was indeterminate, likely due to low CD4 counts. Has low epidemiologic risk factors for TB  Completed a 1 week course of azithromycin.  Repeat chest x-ray on 2024  Respiratory cx: Capnocytophaga spp  Leukopenia and anemia,  Feeling better. He has a pet dog  Lumbar pain   X-ray is negative.  CT lumbar spine show degenerative disease and osteopenia, no fracture  Osteopenia  Vitamin D level is 20.32, just above lower cut off of 20 ng/ml  On Vitamin D and calcium supplementation  ROSE MARY on CKD stage V  ESRD on maintenance hemodialysis on  and Friday  Elevated lipase  On dwt, GI on consult  Type 2 diabetes mellitus  Peripheral arterial disease  Multiple comorbidity per PMHx:   Plan  Therapeutic:  Azithromycin 500 mg po daily for 10 days (2024)  Start Augmentin 500mg po q 12 hours for 7 days (EOT: 2024)  PJP prophylaxis: Bactrim single strength 1 tab p.o.  EOD (2024)  Entecavir 0.5 mg p.o. weekly (on Monday), lamivudine 100 mg p.o. daily, darunavir-cobicistat 800-150 mg p.o. daily, dolutegravir 50 mg p.o. daily (2024)  Continue to monitor renal function, if it worsens then Entecavir and lamivudine dosing will have to be adjusted  Diagnostic:  Trend CRP  F/u:  AFB blood culture on  1/27/2024 in process  Respiratory culture on 2/2/2024 susceptibility  Other:s.    Reason for visit: F/u HIV/AIDS, CKD stage V on hemodialysis  History:?Interval history noted  Denies n/v/d/f or untoward effects of antimicrobials  Complains of back pain, cough and wheezing  Physical Exam:  Vital Signs: /80   Pulse 72   Temp 98 °F (36.7 °C) (Oral)   Resp 14   Ht 1.854 m (6' 1\")   Wt 93.9 kg (207 lb 1.6 oz)   SpO2 97%   PF 97 L/min   BMI 27.32 kg/m²     Gen: alert and oriented X3, no distress  Skin: no stigmata of endocarditis  Wounds: C/D/I  HEMT: AT/NC Oropharynx pink, moist, and without lesions or exudates;  Eyes: PERRLA, EOMI, conjunctiva pink, sclera anicteric.   Neck: Supple. Trachea midline. No LAD.  Chest: no distress and CTA. Good air movement.  Heart: RRR and no MRG.   Abd: soft, non-distended, no tenderness, no hepatomegaly. Normoactive bowel sounds.  Ext: no clubbing, cyanosis, or edema  Catheter Site: without erythema or tenderness  LDA: CVC: Right neck trialysis catheter  Neuro: Mental status intact. CN 2-12 intact and no focal sensory or motor deficits     Radiologic / Imaging / TESTING  CT OF THE LUMBAR SPINE WITHOUT CONTRAST  2/4/2024     TECHNIQUE:  CT of the lumbar spine was performed without the administration of  intravenous contrast. Multiplanar reformatted images are provided for review.  Adjustment of mA and/or kV according to patient size was utilized.  Automated  exposure control, iterative reconstruction, and/or weight based adjustment of  the mA/kV was utilized to reduce the radiation dose to as low as reasonably  achievable.     COMPARISON:  01/23/2024 and 08/09/2021     HISTORY:  ORDERING SYSTEM PROVIDED HISTORY: back pain  TECHNOLOGIST PROVIDED HISTORY:  Reason for exam:->back pain  Reason for Exam: back pain     FINDINGS:  BONES/ALIGNMENT: Diffuse osteopenia.  There is normal alignment of the spine.  The vertebral body heights are maintained. No osseous destructive lesion

## 2024-02-06 NOTE — CARE COORDINATION
Obtained signed appeal letter.  Sent appeal letter and updated clinicals to appeals department.  [facility] expedited appeal pending at this time.  Will follow for determination.

## 2024-02-06 NOTE — PROGRESS NOTES
Progress Note( Dr. Cruz)  2/5/2024  Subjective:   Admit Date: 1/19/2024  PCP: Frandy Parsons APRN - CNP    Admitted For : Nausea vomiting and frequent fall was found to be hyponatremic with serum sodium 117     Consulted For: Better control of blood glucose     Interval History:     Feels feels much better t  also started eating better  Serum sodium getting better of 135     Patient had elevated liver enzymes also lipase suggesting pancreatitis or hepatitis  And liver function test trending down.  GI following him  Positive for Legionella  CT scan shows possibility of pneumonia and also has pancreatitis    Patient had EGD yesterday with no major finding except gastritis  Change the dialysis catheter    Denies any chest pains,   Yes  SOB .   Has some  nausea but no  vomiting. He is eating better  no new bowel or bladder symptoms.       Intake/Output Summary (Last 24 hours) at 2/5/2024 1913  Last data filed at 2/5/2024 1157  Gross per 24 hour   Intake 500 ml   Output 2600 ml   Net -2100 ml         DATA    CBC:   Recent Labs     02/03/24  0350 02/03/24  2356 02/05/24  0423   WBC 3.1* 2.8* 2.8*   HGB 8.4* 8.0* 8.1*    202 222      CMP:  Recent Labs     02/03/24  0350 02/03/24  2356 02/05/24  0423   * 133* 133*   K 4.2 4.4 4.4   CL 98* 100 99   CO2 23 23 22   BUN 36* 43* 52*   CREATININE 3.4* 4.3* 5.1*   CALCIUM 8.1* 8.1* 8.2*       Lipids:   Lab Results   Component Value Date/Time    CHOL 278 12/11/2023 12:28 PM    HDL 22 12/11/2023 12:28 PM    TRIG 266 02/03/2024 03:50 AM     Glucose:  Recent Labs     02/05/24  1232 02/05/24  1710 02/05/24  1849   POCGLU 140* 260* 269*       [id number]:  Lab Results   Component Value Date/Time    LABA1C 6.8 01/20/2024 05:54 AM     High Sensitivity TSH:   Lab Results   Component Value Date/Time    TSHHS 1.420 12/11/2023 12:19 PM     Free T3: No results found for: \"FT3\"  Free T4:No results found for: \"T4FREE\"    CT LUMBAR SPINE WO CONTRAST   Final Result   No acute  cardiomyopathy     Dyslipidemia     Stage 3a chronic kidney disease (HCC)     Other male erectile dysfunction     Atherosclerotic heart disease of native coronary artery with other forms of angina pectoris (HCC)     Type 2 diabetes mellitus with chronic kidney disease     Insomnia     Stage 3b chronic kidney disease (HCC)     Overweight     Chronic kidney disease, stage IV (severe) (HCC)     Hyperkalemia     Hyponatremia     Possible pancreatitis      Possible pneumonia      Plan:     Reviewed POC blood glucose . Labs and X ray results   Reviewed Current Medicines   On meal/ Correction bolus Humalog/ Basal Lantus Insulin regime  Monitor Blood glucose frequently   Modified  the dose of Insulin/ other medicines as needed   Reviewed notes from nephrology   Patient is now on scheduled hemodialysis 3 times a week  Will follow     .     CELE Cruz MD, MD

## 2024-02-06 NOTE — PROGRESS NOTES
Progress Note( Dr. Cruz)  2/6/2024  Subjective:   Admit Date: 1/19/2024  PCP: Frandy Parsons APRN - CNP    Admitted For : Nausea vomiting and frequent fall was found to be hyponatremic with serum sodium 117     Consulted For: Better control of blood glucose     Interval History:     Feels feels much better t  also started eating better  Serum sodium getting better of 135     Patient had elevated liver enzymes also lipase suggesting pancreatitis or hepatitis  And liver function test trending down.  GI following him  Positive for Legionella  CT scan shows possibility of pneumonia and also has pancreatitis    Patient had EGD yesterday with no major finding except gastritis  Change the dialysis catheter    Denies any chest pains,   Yes  SOB .   Has some  nausea but no  vomiting. He is eating better  no new bowel or bladder symptoms.       Intake/Output Summary (Last 24 hours) at 2/6/2024 0853  Last data filed at 2/5/2024 1157  Gross per 24 hour   Intake 500 ml   Output 1500 ml   Net -1000 ml         DATA    CBC:   Recent Labs     02/03/24  2356 02/05/24  0423 02/06/24  0447   WBC 2.8* 2.8* 2.6*   HGB 8.0* 8.1* 7.8*    222 215      CMP:  Recent Labs     02/03/24  2356 02/05/24  0423 02/06/24  0447   * 133* 133*   K 4.4 4.4 4.4    99 99   CO2 23 22 24   BUN 43* 52* 34*   CREATININE 4.3* 5.1* 3.6*   CALCIUM 8.1* 8.2* 8.3       Lipids:   Lab Results   Component Value Date/Time    CHOL 278 12/11/2023 12:28 PM    HDL 22 12/11/2023 12:28 PM    TRIG 266 02/03/2024 03:50 AM     Glucose:  Recent Labs     02/05/24  1849 02/05/24 2050 02/06/24  0613   POCGLU 269* 130* 153*       [id number]:  Lab Results   Component Value Date/Time    LABA1C 6.8 01/20/2024 05:54 AM     High Sensitivity TSH:   Lab Results   Component Value Date/Time    TSHHS 1.420 12/11/2023 12:19 PM     Free T3: No results found for: \"FT3\"  Free T4:No results found for: \"T4FREE\"    CT LUMBAR SPINE WO CONTRAST   Final Result   No acute

## 2024-02-06 NOTE — PROGRESS NOTES
Occupational Therapy  OT attempted to see pt this PM for follow up. Pt resting soundly in bed and did not arouse to name being called or tactile cue. Will reattempt to see pt as able.     Priscilla Whitten OTR/L [id number]  2/6/2024     3:10 PM

## 2024-02-07 LAB
ANION GAP SERPL CALCULATED.3IONS-SCNC: 11 MMOL/L (ref 7–16)
BASOPHILS ABSOLUTE: 0 K/CU MM
BASOPHILS RELATIVE PERCENT: 1.4 % (ref 0–1)
BUN SERPL-MCNC: 45 MG/DL (ref 6–23)
CALCIUM SERPL-MCNC: 8.5 MG/DL (ref 8.3–10.6)
CHLORIDE BLD-SCNC: 99 MMOL/L (ref 99–110)
CO2: 24 MMOL/L (ref 21–32)
CREAT SERPL-MCNC: 4.4 MG/DL (ref 0.9–1.3)
DIFFERENTIAL TYPE: ABNORMAL
EOSINOPHILS ABSOLUTE: 0.1 K/CU MM
EOSINOPHILS RELATIVE PERCENT: 2.8 % (ref 0–3)
GFR SERPL CREATININE-BSD FRML MDRD: 16 ML/MIN/1.73M2
GLUCOSE BLD-MCNC: 142 MG/DL (ref 70–99)
GLUCOSE BLD-MCNC: 158 MG/DL (ref 70–99)
GLUCOSE BLD-MCNC: 163 MG/DL (ref 70–99)
GLUCOSE BLD-MCNC: 168 MG/DL (ref 70–99)
GLUCOSE BLD-MCNC: 170 MG/DL (ref 70–99)
GLUCOSE SERPL-MCNC: 158 MG/DL (ref 70–99)
HCT VFR BLD CALC: 26.1 % (ref 42–52)
HEMOGLOBIN: 8.2 GM/DL (ref 13.5–18)
HIV-1 DRUG RESISTANCE BY NGS: NORMAL
IMMATURE NEUTROPHIL %: 1.4 % (ref 0–0.43)
LYMPHOCYTES ABSOLUTE: 1 K/CU MM
LYMPHOCYTES RELATIVE PERCENT: 35.6 % (ref 24–44)
MAGNESIUM: 1.8 MG/DL (ref 1.8–2.4)
MCH RBC QN AUTO: 29.9 PG (ref 27–31)
MCHC RBC AUTO-ENTMCNC: 31.4 % (ref 32–36)
MCV RBC AUTO: 95.3 FL (ref 78–100)
MONOCYTES ABSOLUTE: 0.3 K/CU MM
MONOCYTES RELATIVE PERCENT: 9.3 % (ref 0–4)
NUCLEATED RBC %: 0 %
PATHOLOGY STUDY: NORMAL
PDW BLD-RTO: 13.9 % (ref 11.7–14.9)
PHOSPHORUS: 4.4 MG/DL (ref 2.5–4.9)
PLATELET # BLD: 241 K/CU MM (ref 140–440)
PMV BLD AUTO: 9.9 FL (ref 7.5–11.1)
POTASSIUM SERPL-SCNC: 4.4 MMOL/L (ref 3.5–5.1)
RBC # BLD: 2.74 M/CU MM (ref 4.6–6.2)
SEGMENTED NEUTROPHILS ABSOLUTE COUNT: 1.4 K/CU MM
SEGMENTED NEUTROPHILS RELATIVE PERCENT: 49.5 % (ref 36–66)
SODIUM BLD-SCNC: 134 MMOL/L (ref 135–145)
TOTAL IMMATURE NEUTOROPHIL: 0.04 K/CU MM
TOTAL NUCLEATED RBC: 0 K/CU MM
WBC # BLD: 2.9 K/CU MM (ref 4–10.5)

## 2024-02-07 PROCEDURE — 6370000000 HC RX 637 (ALT 250 FOR IP): Performed by: PHYSICIAN ASSISTANT

## 2024-02-07 PROCEDURE — 99231 SBSQ HOSP IP/OBS SF/LOW 25: CPT | Performed by: INTERNAL MEDICINE

## 2024-02-07 PROCEDURE — 97530 THERAPEUTIC ACTIVITIES: CPT

## 2024-02-07 PROCEDURE — 2140000000 HC CCU INTERMEDIATE R&B

## 2024-02-07 PROCEDURE — 6370000000 HC RX 637 (ALT 250 FOR IP): Performed by: INTERNAL MEDICINE

## 2024-02-07 PROCEDURE — 6360000002 HC RX W HCPCS: Performed by: INTERNAL MEDICINE

## 2024-02-07 PROCEDURE — 90935 HEMODIALYSIS ONE EVALUATION: CPT

## 2024-02-07 PROCEDURE — 85025 COMPLETE CBC W/AUTO DIFF WBC: CPT

## 2024-02-07 PROCEDURE — 6370000000 HC RX 637 (ALT 250 FOR IP): Performed by: STUDENT IN AN ORGANIZED HEALTH CARE EDUCATION/TRAINING PROGRAM

## 2024-02-07 PROCEDURE — 6370000000 HC RX 637 (ALT 250 FOR IP): Performed by: NURSE PRACTITIONER

## 2024-02-07 PROCEDURE — 36415 COLL VENOUS BLD VENIPUNCTURE: CPT

## 2024-02-07 PROCEDURE — 83735 ASSAY OF MAGNESIUM: CPT

## 2024-02-07 PROCEDURE — 94761 N-INVAS EAR/PLS OXIMETRY MLT: CPT

## 2024-02-07 PROCEDURE — 2580000003 HC RX 258: Performed by: STUDENT IN AN ORGANIZED HEALTH CARE EDUCATION/TRAINING PROGRAM

## 2024-02-07 PROCEDURE — 80048 BASIC METABOLIC PNL TOTAL CA: CPT

## 2024-02-07 PROCEDURE — 6360000002 HC RX W HCPCS: Performed by: STUDENT IN AN ORGANIZED HEALTH CARE EDUCATION/TRAINING PROGRAM

## 2024-02-07 PROCEDURE — 82962 GLUCOSE BLOOD TEST: CPT

## 2024-02-07 PROCEDURE — 84100 ASSAY OF PHOSPHORUS: CPT

## 2024-02-07 RX ADMIN — SULFAMETHOXAZOLE AND TRIMETHOPRIM 1 TABLET: 400; 80 TABLET ORAL at 22:49

## 2024-02-07 RX ADMIN — DARUNAVIR ETHANOLATE AND COBICISTAT 1 EACH: 800; 150 TABLET, FILM COATED ORAL at 17:20

## 2024-02-07 RX ADMIN — NYSTATIN 500000 UNITS: 100000 SUSPENSION ORAL at 12:49

## 2024-02-07 RX ADMIN — LAMIVUDINE 100 MG: 100 TABLET, FILM COATED ORAL at 17:21

## 2024-02-07 RX ADMIN — HEPARIN SODIUM 5000 UNITS: 5000 INJECTION INTRAVENOUS; SUBCUTANEOUS at 17:19

## 2024-02-07 RX ADMIN — ASPIRIN 81 MG 81 MG: 81 TABLET ORAL at 17:21

## 2024-02-07 RX ADMIN — ATORVASTATIN CALCIUM 80 MG: 40 TABLET, FILM COATED ORAL at 22:41

## 2024-02-07 RX ADMIN — HYDROMORPHONE HYDROCHLORIDE 0.5 MG: 1 INJECTION, SOLUTION INTRAMUSCULAR; INTRAVENOUS; SUBCUTANEOUS at 06:23

## 2024-02-07 RX ADMIN — INSULIN LISPRO 10 UNITS: 100 INJECTION, SOLUTION INTRAVENOUS; SUBCUTANEOUS at 08:58

## 2024-02-07 RX ADMIN — OMEGA-3-ACID ETHYL ESTERS 2 G: 1 CAPSULE, LIQUID FILLED ORAL at 17:21

## 2024-02-07 RX ADMIN — CLONIDINE HYDROCHLORIDE 0.1 MG: 0.1 TABLET ORAL at 22:41

## 2024-02-07 RX ADMIN — EZETIMIBE 10 MG: 10 TABLET ORAL at 22:41

## 2024-02-07 RX ADMIN — HYDROMORPHONE HYDROCHLORIDE 0.5 MG: 1 INJECTION, SOLUTION INTRAMUSCULAR; INTRAVENOUS; SUBCUTANEOUS at 10:51

## 2024-02-07 RX ADMIN — PANTOPRAZOLE SODIUM 40 MG: 40 TABLET, DELAYED RELEASE ORAL at 06:23

## 2024-02-07 RX ADMIN — CALCITRIOL CAPSULES 0.25 MCG 0.5 MCG: 0.25 CAPSULE ORAL at 17:21

## 2024-02-07 RX ADMIN — NYSTATIN 500000 UNITS: 100000 SUSPENSION ORAL at 17:19

## 2024-02-07 RX ADMIN — AMOXICILLIN AND CLAVULANATE POTASSIUM 1 TABLET: 500; 125 TABLET, FILM COATED ORAL at 17:21

## 2024-02-07 RX ADMIN — CLONIDINE HYDROCHLORIDE 0.1 MG: 0.1 TABLET ORAL at 17:21

## 2024-02-07 RX ADMIN — HYDROMORPHONE HYDROCHLORIDE 0.5 MG: 1 INJECTION, SOLUTION INTRAMUSCULAR; INTRAVENOUS; SUBCUTANEOUS at 02:33

## 2024-02-07 RX ADMIN — BUSPIRONE HYDROCHLORIDE 10 MG: 5 TABLET ORAL at 22:41

## 2024-02-07 RX ADMIN — HEPARIN SODIUM 5000 UNITS: 5000 INJECTION INTRAVENOUS; SUBCUTANEOUS at 22:41

## 2024-02-07 RX ADMIN — AZITHROMYCIN DIHYDRATE 500 MG: 250 TABLET ORAL at 17:20

## 2024-02-07 RX ADMIN — METOPROLOL SUCCINATE 100 MG: 50 TABLET, EXTENDED RELEASE ORAL at 17:19

## 2024-02-07 RX ADMIN — HYDROMORPHONE HYDROCHLORIDE 0.5 MG: 1 INJECTION, SOLUTION INTRAMUSCULAR; INTRAVENOUS; SUBCUTANEOUS at 18:38

## 2024-02-07 RX ADMIN — SODIUM CHLORIDE, PRESERVATIVE FREE 10 ML: 5 INJECTION INTRAVENOUS at 22:40

## 2024-02-07 RX ADMIN — OMEGA-3-ACID ETHYL ESTERS 2 G: 1 CAPSULE, LIQUID FILLED ORAL at 22:41

## 2024-02-07 RX ADMIN — CALCIUM CARBONATE 500 MG: 500 TABLET, CHEWABLE ORAL at 08:58

## 2024-02-07 RX ADMIN — BUSPIRONE HYDROCHLORIDE 10 MG: 5 TABLET ORAL at 08:58

## 2024-02-07 RX ADMIN — NYSTATIN 500000 UNITS: 100000 SUSPENSION ORAL at 22:42

## 2024-02-07 RX ADMIN — ISOSORBIDE MONONITRATE 60 MG: 60 TABLET, EXTENDED RELEASE ORAL at 17:19

## 2024-02-07 RX ADMIN — FOLIC ACID 1 MG: 1 TABLET ORAL at 17:21

## 2024-02-07 RX ADMIN — SODIUM CHLORIDE, PRESERVATIVE FREE 10 ML: 5 INJECTION INTRAVENOUS at 08:59

## 2024-02-07 RX ADMIN — EPOETIN ALFA-EPBX 8000 UNITS: 2000 INJECTION, SOLUTION INTRAVENOUS; SUBCUTANEOUS at 16:00

## 2024-02-07 RX ADMIN — HEPARIN SODIUM 5000 UNITS: 5000 INJECTION INTRAVENOUS; SUBCUTANEOUS at 06:23

## 2024-02-07 RX ADMIN — AMOXICILLIN AND CLAVULANATE POTASSIUM 1 TABLET: 500; 125 TABLET, FILM COATED ORAL at 22:49

## 2024-02-07 RX ADMIN — DOLUTEGRAVIR SODIUM 50 MG: 50 TABLET, FILM COATED ORAL at 17:20

## 2024-02-07 RX ADMIN — CALCIUM CARBONATE 500 MG: 500 TABLET, CHEWABLE ORAL at 22:41

## 2024-02-07 RX ADMIN — IRON SUCROSE 50 MG: 20 INJECTION, SOLUTION INTRAVENOUS at 16:00

## 2024-02-07 RX ADMIN — INSULIN LISPRO 10 UNITS: 100 INJECTION, SOLUTION INTRAVENOUS; SUBCUTANEOUS at 17:19

## 2024-02-07 RX ADMIN — MIRTAZAPINE 15 MG: 15 TABLET, ORALLY DISINTEGRATING ORAL at 22:49

## 2024-02-07 RX ADMIN — NYSTATIN 500000 UNITS: 100000 SUSPENSION ORAL at 08:58

## 2024-02-07 RX ADMIN — INSULIN GLARGINE 20 UNITS: 100 INJECTION, SOLUTION SUBCUTANEOUS at 22:41

## 2024-02-07 RX ADMIN — HYDROMORPHONE HYDROCHLORIDE 0.5 MG: 1 INJECTION, SOLUTION INTRAMUSCULAR; INTRAVENOUS; SUBCUTANEOUS at 22:42

## 2024-02-07 RX ADMIN — Medication 400 MG: at 22:41

## 2024-02-07 RX ADMIN — Medication 400 MG: at 17:19

## 2024-02-07 ASSESSMENT — PAIN DESCRIPTION - LOCATION
LOCATION: BACK

## 2024-02-07 ASSESSMENT — PAIN DESCRIPTION - PAIN TYPE: TYPE: CHRONIC PAIN

## 2024-02-07 ASSESSMENT — PAIN SCALES - GENERAL
PAINLEVEL_OUTOF10: 0
PAINLEVEL_OUTOF10: 9
PAINLEVEL_OUTOF10: 8
PAINLEVEL_OUTOF10: 1
PAINLEVEL_OUTOF10: 6
PAINLEVEL_OUTOF10: 5
PAINLEVEL_OUTOF10: 0
PAINLEVEL_OUTOF10: 9

## 2024-02-07 ASSESSMENT — PAIN DESCRIPTION - DESCRIPTORS
DESCRIPTORS: ACHING;SPASM
DESCRIPTORS: ACHING
DESCRIPTORS: ACHING;DISCOMFORT;SORE

## 2024-02-07 ASSESSMENT — PAIN DESCRIPTION - ORIENTATION
ORIENTATION: LOWER
ORIENTATION: MID
ORIENTATION: LOWER

## 2024-02-07 ASSESSMENT — PAIN SCALES - WONG BAKER: WONGBAKER_NUMERICALRESPONSE: 0

## 2024-02-07 ASSESSMENT — PAIN - FUNCTIONAL ASSESSMENT
PAIN_FUNCTIONAL_ASSESSMENT: ACTIVITIES ARE NOT PREVENTED

## 2024-02-07 ASSESSMENT — PAIN DESCRIPTION - ONSET: ONSET: ON-GOING

## 2024-02-07 ASSESSMENT — PAIN DESCRIPTION - FREQUENCY: FREQUENCY: INTERMITTENT

## 2024-02-07 NOTE — PROGRESS NOTES
Progress Note( Dr. Cruz)  2/7/2024  Subjective:   Admit Date: 1/19/2024  PCP: Frandy Parsons APRN - CNP    Admitted For : Nausea vomiting and frequent fall was found to be hyponatremic with serum sodium 117     Consulted For: Better control of blood glucose     Interval History:     Feels feels much better t  also started eating better  Serum sodium getting better of 135     Patient had elevated liver enzymes also lipase suggesting pancreatitis or hepatitis  And liver function test trending down.  GI following him  Positive for Legionella  CT scan shows possibility of pneumonia and also has pancreatitis    Patient had EGD yesterday with no major finding except gastritis  Change the dialysis catheter    Denies any chest pains,   Yes  SOB .   Has some  nausea but no  vomiting. He is eating better  no new bowel or bladder symptoms.       Intake/Output Summary (Last 24 hours) at 2/7/2024 0854  Last data filed at 2/6/2024 1623  Gross per 24 hour   Intake 490 ml   Output --   Net 490 ml         DATA    CBC:   Recent Labs     02/05/24 0423 02/06/24 0447 02/07/24  0334   WBC 2.8* 2.6* 2.9*   HGB 8.1* 7.8* 8.2*    215 241      CMP:  Recent Labs     02/05/24 0423 02/06/24 0447 02/06/24  0918 02/07/24  0334   * 133*  --  134*   K 4.4 4.4  --  4.4   CL 99 99  --  99   CO2 22 24  --  24   BUN 52* 34*  --  45*   CREATININE 5.1* 3.6*  --  4.4*   CALCIUM 8.2* 8.3  --  8.5   PROT  --   --  6.9  --    LABALBU  --   --  3.0*  --    BILITOT  --   --  0.6  --    ALKPHOS  --   --  151*  --    AST  --   --  174*  --    ALT  --   --  183*  --        Lipids:   Lab Results   Component Value Date/Time    CHOL 278 12/11/2023 12:28 PM    HDL 22 12/11/2023 12:28 PM    TRIG 252 02/06/2024 09:18 AM     Glucose:  Recent Labs     02/06/24 2059 02/07/24  0232 02/07/24  0621   POCGLU 199* 163* 168*       [id number]:  Lab Results   Component Value Date/Time    LABA1C 6.8 01/20/2024 05:54 AM     High Sensitivity TSH:   Lab  (HCC)     Gastroesophageal reflux disease     Former tobacco use     Acute osteomyelitis of phalanx of left foot (HCC)     Osteomyelitis of third toe of left foot (HCC)     Anxiety     Persistent proteinuria     Benign neoplasm of sigmoid colon     Vitamin D deficiency     ASCVD (arteriosclerotic cardiovascular disease)     Chronic systolic congestive heart failure (HCC)     Ischemic cardiomyopathy     Dyslipidemia     Stage 3a chronic kidney disease (HCC)     Other male erectile dysfunction     Atherosclerotic heart disease of native coronary artery with other forms of angina pectoris (HCC)     Type 2 diabetes mellitus with chronic kidney disease     Insomnia     Stage 3b chronic kidney disease (HCC)     Overweight     Chronic kidney disease, stage IV (severe) (HCC)     Hyperkalemia     Hyponatremia     Possible pancreatitis      Possible pneumonia      Plan:     Reviewed POC blood glucose . Labs and X ray results   Reviewed Current Medicines   On meal/ Correction bolus Humalog/ Basal Lantus Insulin regime  Monitor Blood glucose frequently   Modified  the dose of Insulin/ other medicines as needed   Reviewed notes from nephrology   Patient is now on scheduled hemodialysis 3 times a week  Insurance company did not approve for him to go to [facility] is going to appeal their decisions  Will follow     .     CELE Cruz MD, MD

## 2024-02-07 NOTE — PROGRESS NOTES
V2.0  [facility] [place] Hospitalist Progress Note      Name:  Dewey Sosa /Age/Sex: 1980  (43 y.o. male)   MRN & CSN:  [id number] & [id number] Encounter Date/Time: 2024 1:41 PM EST    [facility]  [facility] PCP: Frandy Parsons APRN - CNP       Hospital Day: 20    Assessment and Plan:   # Sepsis 2/2 Legionella Pneumonia  HIV Infection/AIDS  Presented with fever T max 101.6, tachycardia 110's, tachypnea 30's. 2/2 legionella pneumonia. CT chest showed right lung mass vs consolidation  Legionalla antigen +  S/p azithromycin 7 days in total and rocephin 5 days total, ID on board, CD4 count only 25, On bactrim prophylaxis and HAART, ID ordered cryptococcal ag/ab positive, IGG/IGM, HSV 1, 2, urine for gonacoccal, RPR and syphillis ab (Treponema pallidum) , QuantiFERON indeterminate, ID recommended to continue Bactrim, Entecavir, lamivudine, darunavir cobicistat, dolutegravir, azithromycin, continue to monitor renal function, trend CRP, CT of the L-spine WNL, AFB blood culture neg,  Resp Cx growing Capnocytophaga species, patient started on Augmentin by ID.   PT/OT evaluated and recommended ARU, accepted and waiting for auth    # ROSE MARY on CKD IV now ESRD.  Cr 4.3 (baseline ~2.5).   Temp vas cath placed  Started on HD.   Tunneled HD cath placement on  continue HD per nephro    # Acute Normocytic Anemia- stable  Hb down trending from 10's to 7.0. Received 1U PRBC   Patient with significant fatigue at rest and SOB with activity   Hb 8.5 post transfusion. Hb stable at 7.6  Transfuse if Hb < 7  GI for EGD on   Hematology consulted, Given immunocompromised state ordered flow cytometry which revealed left myeloid shift but otherwise normal   Further workup as outpatient     # Hyponatremia - resolved. Likely 2/2 legionella pneumonia and poor oral intake/dehydration. P/w Na 117,   -Nephro managing, on HD as below  - Plan for M/W/F HD     # Transaminitis, hyperbilirubinemia.  Was going up but today coming down.   Imaging  left lung.  Heart size and vascularity are stable.  There is no pneumothorax or effusion     Expiratory film with bilateral atelectasis versus pneumonia.          Electronically signed by Darrel Matias MD on 2/7/2024 at 8:44 AM

## 2024-02-07 NOTE — PROGRESS NOTES
Infectious Disease Progress Note  2024   Patient Name: Dewey Sosa : 1980     Assessment  HIV/AIDS  Legionella pneumonia  Capnocytophaga pneumonia  Resolved hepatitis B infection  Compensated liver disease on intermittent hemodialysis  History of CKD stage V, type 2 diabetes mellitus, coronary artery disease status post PCI, MSM \"presented with 1 week history of shortness of breath, weakness and falls.  Was diagnosed with Legionella pneumonia.  Newly diagnosed with HIV-1 viral load was 46,000, CD4 count was 25 on this admission.  Sexual contraction.  Tolerating oral ART  Cryptococcal antigen negative, gonococcal and chlamydia NAAT test negative, T. pallidum screen negative  Quantiferon TB test was indeterminate, likely due to low CD4 counts. Has low epidemiologic risk factors for TB  Completed a 1 week course of azithromycin.  Repeat chest x-ray on 2024  Respiratory cx: Capnocytophaga spp, no further work up. Has a pet dog  Leukopenia and anemia. fEels better  Lumbar pain   X-ray is negative.  CT lumbar spine show degenerative disease and osteopenia, no fracture  Osteopenia  Vitamin D level is 20.32, just above lower cut off of 20 ng/ml  On Vitamin D and calcium supplementation  ROSE MARY on CKD stage V  ESRD on maintenance hemodialysis on  and Friday  Elevated lipase  On dwt, GI on consult  Type 2 diabetes mellitus  Peripheral arterial disease  Multiple comorbidity per PMHx:   Plan  Therapeutic:  Continue Azithromycin 500 mg po daily for 10 days (2024)  continue Augmentin 500mg po q 12 hours for 7 days (EOT: 2024)  PJP prophylaxis: Bactrim single strength 1 tab p.o.  EOD (2024)  Entecavir 0.5 mg p.o. weekly (on Monday), lamivudine 100 mg p.o. daily, darunavir-cobicistat 800-150 mg p.o. daily, dolutegravir 50 mg p.o. daily (2024)  Continue to monitor renal function, if it worsens then Entecavir and lamivudine dosing will have to be adjusted  Diagnostic:  Trend

## 2024-02-07 NOTE — CARE COORDINATION
CM updated by Gretchen/CASSIDY who are denying Pt at this time due to too high level for ARU.     Pt to be discharged on oral antibiotics per Dr. Mcdaniel.      No discharge needs identified at this time, discharge plan is home.     CM following

## 2024-02-07 NOTE — PROGRESS NOTES
Physical Therapy    Physical Therapy Treatment Note  Name: Dewey Sosa MRN: [id number] :   1980   Date:  2024   Admission Date: 2024 Room:  [address] [place], [place] [address]A   Restrictions/Precautions:          gen prec, fall risk  Communication with other providers:  cotx /c OT  Subjective:  Patient states:  agreeable to PT  Pain:   Location, Type, Intensity (0/10 to 10/10):  does not rate    Objective:    Observation:  in bed  Treatment, including education/measures:  SBA/S for all bed mob, STS and SPT. Instructed pt in safe techs for STS and functional mobility to ensure balance is achieved upon standing before amb.  Gait:  Pt amb with FWW x~200', no AD x~200' /c CGA/SBA. Vc for safe techs. Decreased step length and heel off during gait cycle.  Safety  Patient left safely in the EOB, with call light/phone in reach with no alarm, waiver signed prev. Gait belt was used for transfers and gait.      Assessment / Impression:    Pt has had improvements in all areas   Patient's tolerance of treatment:  goo d  Adverse Reaction: na  Significant change in status and impact:  na  Barriers to improvement:  weakness and endurance  Plan for Next Session:    Cont gait progression  Time in:  933  Time out:  951  Timed treatment minutes:  18  Total treatment time:  18    Previously filed items:  Social/Functional History  Lives With: Significant other  Type of Home: House  Home Layout: Two level, Bed/Bath upstairs  Home Access: Stairs to enter without rails  Entrance Stairs - Number of Steps: 2  Bathroom Shower/Tub: Walk-in shower  Bathroom Equipment: Tub transfer bench  Home Equipment: Cane, Walker, rolling, Wheelchair-manual, Rollator  Has the patient had two or more falls in the past year or any fall with injury in the past year?: Yes  Receives Help From: Family  ADL Assistance: Independent  Homemaking Assistance: Independent  Ambulation Assistance: Independent  Transfer Assistance: Independent  Active : Yes

## 2024-02-07 NOTE — CARE COORDINATION
Reviewed updated therapy notes.  Patient presenting too high level for ARU now.  Patient ambulated 200ft sba with RW and then 200ft sba no AD.     Discussed with Walker TRAYLOR.

## 2024-02-07 NOTE — PROGRESS NOTES
Occupational Therapy    Occupational Therapy Treatment Note    Name: Dewey Sosa MRN: [id number] :   1980   Date:  2024   Admission Date: 2024 Room:  [facility]/3126-A     Discharge Recommendation: ARU    Primary Problem:  Hyponatremia    Restrictions/Precautions:  General, fall risk     Communication with other providers: RN, co-tx w/ PTA Hayes for safety     Subjective:  Patient states:  \"I thought I scared you guys off!\"  Pain: Denied    Objective:    Observation: Pt supine in bed, agreeable to therapy.  Objective Measures:  On room air, tele, vitals remained stable    Treatment, including education:  Therapeutic Activity Training:   Therapeutic activity training was instructed today.  Cues were given for safety, sequence, UE/LE placement, awareness, and balance.    Activities performed today included bed mobility training, sup-sit, sit-stand, functional mobility    Pt received supine in bed, agreeable to therapy. Pt provided w/ re-education on the importance of therapy and updated on current plan of care. Pt performed sup to sit SBA. Pt stood from EOB SBA. Pt sat to EOB and donned socks using modified figure 4 SBA. Pt stood to RW SBA. Pt performed approx 200ft in watts w/ RW and an additional 200ft w/o AD. Pt returned to room and sat to EOB SBA. Provided education on patient on pacing and safety awareness to prevent falls. Pt left seated EOB, reports signing bed alarm waiver, gait belt used, RN aware.     Assessment / Impression:    Patient's tolerance of treatment: Well  Adverse Reaction: None  Significant change in status and assessment: Improved from initial evaluation  Barriers to improvement: None noted    Plan for Next Session:    Cont per OT POC    Time in:  933  Time out:  951  Timed treatment minutes:  18  Total treatment time:  18 minutes     Electronically signed by:    Priscilla GUAJARDO/KATELIN [id number]  2024     11:37 AM

## 2024-02-07 NOTE — PROGRESS NOTES
Hematology Oncology Inpatient Progress Note     Patient Name:  Dewey Sosa  Patient :  1980  Patient MRN:  [id number]       Referring Provider: Frandy Parsons APRN - CNP     Date of Service: 2024      Reason for Consult: Anemia in setting of HIV+     Chief Complaint:    Chief Complaint   Patient presents with    Fall     Complains he has fallen 6 times in the last few days, and has hit his head on ASA    Fatigue     Complaining of increasing weakness.      Principal Problem:    Hyponatremia  Active Problems:    Acute on chronic anemia    Elevated LFTs    Elevated lipase    Splenomegaly    Nausea and vomiting    Traumatic rhabdomyolysis (HCC)    Legionella pneumonia (HCC)    AIDS (acquired immunodeficiency syndrome), CD4 <=200 (HCC)    CKD (chronic kidney disease) stage 5, GFR less than 15 ml/min (HCC)    Accident    Drop in hemoglobin  Resolved Problems:    * No resolved hospital problems. *      HPI:   Dewey Sosa is a 43 y.o. male with newly diagnosed HIV+ with low CD4 count (no AIDS defining event as of yet) hospitalized with multiple issues including rhabdomyolysis, legionella pneumonia, hyponatremia, hyperbilirubinemia, acute pancreatitis, ARF (ROSE MARY on CKD IV, now on HD).  He is noted to have worsening anemia over admission from 10.3 =>7.0,  Did receive 1 uPRBC with appropriate response to 8.5 however 7.4 on repeat.  GI is already following.  MCV 88. MCHC 33, RDW 13. Overall Plt and WBC have remained WNL.  Reportedly he has been following with nephrology for CKD however had declined a large portion of the workup prior to this admission.  These diagnoses are all new to him and he admits he was not sure if he wanted to know.    On exam he is with his mother and is ok with her in room to discuss sensitive diagnosis. He feels improved since admission and is in better spirits. He states that he has felt weak over the last few months acutely worsening in the last few weeks.  Admits to  improvement in appearance of focal mass-like consolidative   opacity within the right lung base, favoring slight improvement of pneumonia.   However, continued serial chest x-ray follow-up is recommended to ensure   resolution of this mass-like right basilar opacity, as a true pulmonary mass   is also a diagnostic consideration.         CT CHEST ABDOMEN PELVIS WO CONTRAST Additional Contrast? None   Final Result   1. Masslike opacity in the right lower lobe.  If there is evidence of   infection, pneumonia may be considered.  A pulmonary mass cannot be excluded.   2. Splenomegaly of uncertain significance.   3. Advanced atherosclerotic calcification of coronary arteries.  Cardiology   follow-up may be considered on a nonemergent outpatient basis.         CT HEAD WO CONTRAST   Final Result   1.  No acute intracranial abnormality.      2.  Focal area of encephalomalacia in the right frontal lobe may represent an   old stroke.         XR CHEST PORTABLE   Final Result   Expiratory film with bilateral atelectasis versus pneumonia.         IR TUNNELED CVC PLACE WO SQ PORT/PUMP > 5 YEARS    (Results Pending)     Assessment/Plan:    #Acute on Chronic Anemia  -likely multifactorial with chronic disease, CKD/ARF, sepsis with +legionella. Continue  GAMAILEL  -Other bloodlines WNL  -no monoclonal gammopathy or hemolysis  -No s/s of active bleeding  -No nutritional def or hemolysis.  Folate is low-normal 3.7, added daily supplement.   -Hb declined to 6.7 1/29, good response to 8.3.   -Given immunocompromised state ordered flow cytometry which revealed left myeloid shift but otherwise normal  -EGD with gastritis.   -Also discussed further evaluation including bone marrow biopsy and aspiration as outpatient.  -Jameel Cells noted on smear, likely r/t uremia. No schistocytes.   -Hemoglobin remained stable at 8.2 today.  Transfuse PRBCs if hemoglobin less than 7    #Mass-like Opacity in RLL  -urine antigen +Legionella, so most likely

## 2024-02-07 NOTE — PROGRESS NOTES
02/06/24  0447 02/07/24  0334   * 133* 134*   K 4.4 4.4 4.4   CL 99 99 99   CO2 22 24 24   PHOS 5.5* 3.9 4.4   BUN 52* 34* 45*   CREATININE 5.1* 3.6* 4.4*     Hepatic:   Recent Labs     02/06/24  0918   *   *   BILITOT 0.6   ALKPHOS 151*         Troponin: No results for input(s): \"TROPONINI\" in the last 72 hours.  BNP: No results for input(s): \"BNP\" in the last 72 hours.  Lipids: No results for input(s): \"CHOL\", \"HDL\" in the last 72 hours.    Invalid input(s): \"LDLCALCU\"  ABGs: No results found for: \"PHART\", \"PO2ART\", \"QSJ2RLZ\"  INR:   No results for input(s): \"INR\" in the last 72 hours.      Renal Labs  Albumin:    Lab Results   Component Value Date/Time    LABALBU 3.0 02/06/2024 09:18 AM    LABALBU 34 01/20/2024 10:16 AM     Calcium:    Lab Results   Component Value Date/Time    CALCIUM 8.5 02/07/2024 03:34 AM     Phosphorus:    Lab Results   Component Value Date/Time    PHOS 4.4 02/07/2024 03:34 AM     U/A:    Lab Results   Component Value Date/Time    NITRU NEGATIVE 01/19/2024 09:20 PM    COLORU YELLOW 01/19/2024 09:20 PM    WBCUA 1 01/19/2024 09:20 PM    RBCUA 39 01/19/2024 09:20 PM    MUCUS RARE 01/19/2024 09:20 PM    TRICHOMONAS NONE SEEN 01/19/2024 09:20 PM    BACTERIA NEGATIVE 01/19/2024 09:20 PM    CLARITYU CLEAR 01/19/2024 09:20 PM    SPECGRAV 1.020 01/19/2024 09:20 PM    UROBILINOGEN 0.2 01/19/2024 09:20 PM    BILIRUBINUR SMALL NUMBER OR AMOUNT OBSERVED 01/19/2024 09:20 PM    BLOODU LARGE NUMBER OR AMOUNT OBSERVED 01/19/2024 09:20 PM    KETUA TRACE 01/19/2024 09:20 PM    AMORPHOUS RARE 01/19/2024 09:20 PM     ABG:  No results found for: \"PHART\", \"BMM5MAC\", \"PO2ART\", \"GLY6TXD\", \"BEART\", \"THGBART\", \"BNJ6JMP\", \"Y4WFVOUF\"  HgBA1c:    Lab Results   Component Value Date/Time    LABA1C 6.8 01/20/2024 05:54 AM     Microalbumen/Creatinine ratio:  No components found for: \"RUCREAT\"  TSH:  No results found for: \"TSH\"  IRON:    Lab Results   Component Value Date/Time    IRON 37 01/26/2024 02:30  at [facility] Monday Wednesday Friday   will follow               FARTUN SHAHID MD, MD

## 2024-02-07 NOTE — PROCEDURES
Number: 3  Time On: 1357  Time Off: 1630  Treatment Goal: 3 HOUR, 1.5L  Weight - Scale: 94.2 kg (207 lb 10.8 oz) (02/07/24 0600)  1st check: less than 0.1 ppm at: 1030 hours  2nd check: less than 0.1 ppm at: 1425 hours  3rd check: Not Applicable  (if greater than 0.1 ppm, then check every 30 minutes from secondary)    Access Flows and Pressures  Patient Vitals for the past 8 hrs:   Blood Flow Rate (ml/min) Ultrafiltration Rate (ml/hr) Ultrafiltration Removed (ml) Arterial Pressure (mmHg) Venous Pressure (mmHg) TMP DFR Comments Access Visible   02/07/24 1347 300 ml/min 500 ml/hr 0 ml -50 mmHg 50 50 600 TX STARTED, PRIME GIVEN, LINES SECURE AND CALL LIGHT WITHIN REACH Yes   02/07/24 1400 300 ml/min 500 ml/hr 134 ml -70 mmHg 60 50 600 RESTING WITH EYES CLOSED Yes   02/07/24 1415 300 ml/min 500 ml/hr 239 ml -70 mmHg 60 50 600 resting Yes   02/07/24 1430 300 ml/min 500 ml/hr 370 ml -70 mmHg 70 50 600 NO CHANGE, RESTING Yes   02/07/24 1445 350 ml/min 500 ml/hr 494 ml -70 mmHg 60 40 600 resting with eyes closed Yes   02/07/24 1500 350 ml/min 500 ml/hr 683 ml -80 mmHg 80 40 600 requesting urinal. assited with repositioning Yes   02/07/24 1515 350 ml/min 500 ml/hr 771 ml -80 mmHg 80 40 600 resting with eyes closed Yes   02/07/24 1530 350 ml/min 500 ml/hr 856 ml -80 mmHg 90 40 600 WARM BLANKET GIVEN Yes   02/07/24 1545 350 ml/min 500 ml/hr 1042 ml -80 mmHg 250 50 600 on phone Yes   02/07/24 1600 350 ml/min 500 ml/hr 1105 ml -80 mmHg 320 50 600 alert, calm Yes   02/07/24 1615 250 ml/min 500 ml/hr 1266 ml -80 mmHg 280 80 600 on phone Yes   02/07/24 1630 -- -- 1304 ml -- -- -- -- tx ended Yes     Vital Signs  Patient Vitals for the past 8 hrs:   BP Temp Pulse Resp SpO2   02/07/24 1121 -- -- -- 15 --   02/07/24 1252 (!) 161/98 98.2 °F (36.8 °C) 79 23 100 %   02/07/24 1339 (!) 161/98 97.7 °F (36.5 °C) 72 24 100 %   02/07/24 1347 (!) 172/89 -- 72 25 --   02/07/24 1400 (!) 156/85 -- 65 22 --   02/07/24 1415 (!) 142/79 -- 71 26 --    02/07/24 1430 (!) 145/80 -- 60 17 --   02/07/24 1445 136/80 -- 66 19 --   02/07/24 1500 (!) 157/104 -- 76 22 --   02/07/24 1515 (!) 156/97 -- 68 23 --   02/07/24 1530 (!) 156/91 -- 71 17 --   02/07/24 1545 (!) 142/89 -- 67 21 --   02/07/24 1600 (!) 159/88 -- 78 20 --   02/07/24 1615 (!) 163/95 -- 77 16 --   02/07/24 1630 (!) 184/94 -- 81 20 --   02/07/24 1640 (!) 165/88 97.7 °F (36.5 °C) 79 16 100 %   02/07/24 1715 (!) 157/92 98.1 °F (36.7 °C) 85 19 98 %     Post-Dialysis  Arterial Catheter Locking Solution:  NORMAL SALINE  Venous Catheter Locking Solution:  NORMAL SALINE  Post-Treatment Procedures: Blood returned, Catheter Capped, clamped with Saline x2 ports  Machine Disinfection Process: Acid/Vinegar Clean, Heat Disinfect, Exterior Machine Disinfection  Rinseback Volume (ml): 300 ml  Blood Volume Processed (Liters): 58 L  Dialyzer Clearance: Clotted  Duration of Treatment (minutes): 180 minutes     Hemodialysis Intake (ml): 500 ml  Hemodialysis Output (ml): 1304 ml     Tolerated Treatment: Good  Patient Response to Treatment: well          Provider Notification        Handoff complete and report given to Primary RN at 1649 hours.  Primary RN (First Initial, Last Name, Title):  CELE SOMERS RN     Education  Person Educated: Patient   Knowledge Base: Substantial  Barriers to Learning?: None  Preferred method of Learning: Oral  Topic(s): Access Care and Procedural   Teaching Tools: Explanation   Response to Education: Verbalized Understanding     Electronically signed by Melody Irving RN on 2/7/2024 at 5:48 PM

## 2024-02-08 VITALS
SYSTOLIC BLOOD PRESSURE: 126 MMHG | OXYGEN SATURATION: 98 % | TEMPERATURE: 98 F | BODY MASS INDEX: 27.52 KG/M2 | DIASTOLIC BLOOD PRESSURE: 86 MMHG | HEIGHT: 73 IN | HEART RATE: 66 BPM | WEIGHT: 207.67 LBS | RESPIRATION RATE: 20 BRPM

## 2024-02-08 LAB
ANION GAP SERPL CALCULATED.3IONS-SCNC: 9 MMOL/L (ref 7–16)
BASOPHILS ABSOLUTE: 0 K/CU MM
BASOPHILS RELATIVE PERCENT: 1.3 % (ref 0–1)
BUN SERPL-MCNC: 37 MG/DL (ref 6–23)
CALCIUM SERPL-MCNC: 8.3 MG/DL (ref 8.3–10.6)
CHLORIDE BLD-SCNC: 96 MMOL/L (ref 99–110)
CO2: 24 MMOL/L (ref 21–32)
CREAT SERPL-MCNC: 3.7 MG/DL (ref 0.9–1.3)
DIFFERENTIAL TYPE: ABNORMAL
EOSINOPHILS ABSOLUTE: 0.1 K/CU MM
EOSINOPHILS RELATIVE PERCENT: 2.6 % (ref 0–3)
GFR SERPL CREATININE-BSD FRML MDRD: 20 ML/MIN/1.73M2
GLUCOSE BLD-MCNC: 170 MG/DL (ref 70–99)
GLUCOSE BLD-MCNC: 212 MG/DL (ref 70–99)
GLUCOSE BLD-MCNC: 220 MG/DL (ref 70–99)
GLUCOSE SERPL-MCNC: 203 MG/DL (ref 70–99)
HCT VFR BLD CALC: 24.9 % (ref 42–52)
HEMOGLOBIN: 7.8 GM/DL (ref 13.5–18)
IMMATURE NEUTROPHIL %: 2.3 % (ref 0–0.43)
LYMPHOCYTES ABSOLUTE: 1.1 K/CU MM
LYMPHOCYTES RELATIVE PERCENT: 35.2 % (ref 24–44)
MAGNESIUM: 1.7 MG/DL (ref 1.8–2.4)
MCH RBC QN AUTO: 29.8 PG (ref 27–31)
MCHC RBC AUTO-ENTMCNC: 31.3 % (ref 32–36)
MCV RBC AUTO: 95 FL (ref 78–100)
MONOCYTES ABSOLUTE: 0.3 K/CU MM
MONOCYTES RELATIVE PERCENT: 9.4 % (ref 0–4)
NUCLEATED RBC %: 0.7 %
PDW BLD-RTO: 13.8 % (ref 11.7–14.9)
PHOSPHORUS: 3.9 MG/DL (ref 2.5–4.9)
PLATELET # BLD: 205 K/CU MM (ref 140–440)
PMV BLD AUTO: 9.9 FL (ref 7.5–11.1)
POTASSIUM SERPL-SCNC: 4 MMOL/L (ref 3.5–5.1)
RBC # BLD: 2.62 M/CU MM (ref 4.6–6.2)
SEGMENTED NEUTROPHILS ABSOLUTE COUNT: 1.5 K/CU MM
SEGMENTED NEUTROPHILS RELATIVE PERCENT: 49.2 % (ref 36–66)
SODIUM BLD-SCNC: 129 MMOL/L (ref 135–145)
TOTAL IMMATURE NEUTOROPHIL: 0.07 K/CU MM
TOTAL NUCLEATED RBC: 0 K/CU MM
WBC # BLD: 3.1 K/CU MM (ref 4–10.5)

## 2024-02-08 PROCEDURE — 6370000000 HC RX 637 (ALT 250 FOR IP): Performed by: INTERNAL MEDICINE

## 2024-02-08 PROCEDURE — 36415 COLL VENOUS BLD VENIPUNCTURE: CPT

## 2024-02-08 PROCEDURE — 94761 N-INVAS EAR/PLS OXIMETRY MLT: CPT

## 2024-02-08 PROCEDURE — 6360000002 HC RX W HCPCS: Performed by: INTERNAL MEDICINE

## 2024-02-08 PROCEDURE — 6370000000 HC RX 637 (ALT 250 FOR IP): Performed by: PHYSICIAN ASSISTANT

## 2024-02-08 PROCEDURE — 6370000000 HC RX 637 (ALT 250 FOR IP): Performed by: STUDENT IN AN ORGANIZED HEALTH CARE EDUCATION/TRAINING PROGRAM

## 2024-02-08 PROCEDURE — 2580000003 HC RX 258: Performed by: STUDENT IN AN ORGANIZED HEALTH CARE EDUCATION/TRAINING PROGRAM

## 2024-02-08 PROCEDURE — 85025 COMPLETE CBC W/AUTO DIFF WBC: CPT

## 2024-02-08 PROCEDURE — 83735 ASSAY OF MAGNESIUM: CPT

## 2024-02-08 PROCEDURE — 99231 SBSQ HOSP IP/OBS SF/LOW 25: CPT | Performed by: PHYSICIAN ASSISTANT

## 2024-02-08 PROCEDURE — 80048 BASIC METABOLIC PNL TOTAL CA: CPT

## 2024-02-08 PROCEDURE — 6360000002 HC RX W HCPCS: Performed by: STUDENT IN AN ORGANIZED HEALTH CARE EDUCATION/TRAINING PROGRAM

## 2024-02-08 PROCEDURE — 84100 ASSAY OF PHOSPHORUS: CPT

## 2024-02-08 PROCEDURE — 82962 GLUCOSE BLOOD TEST: CPT

## 2024-02-08 RX ORDER — MAGNESIUM SULFATE IN WATER 40 MG/ML
2000 INJECTION, SOLUTION INTRAVENOUS PRN
Status: DISCONTINUED | OUTPATIENT
Start: 2024-02-08 | End: 2024-02-08

## 2024-02-08 RX ORDER — LANOLIN ALCOHOL/MO/W.PET/CERES
400 CREAM (GRAM) TOPICAL 2 TIMES DAILY
Qty: 30 TABLET | Refills: 0 | Status: SHIPPED | OUTPATIENT
Start: 2024-02-08

## 2024-02-08 RX ORDER — MAGNESIUM SULFATE IN WATER 40 MG/ML
2000 INJECTION, SOLUTION INTRAVENOUS ONCE
Status: COMPLETED | OUTPATIENT
Start: 2024-02-08 | End: 2024-02-08

## 2024-02-08 RX ORDER — AZITHROMYCIN 500 MG/1
500 TABLET, FILM COATED ORAL DAILY
Qty: 3 TABLET | Refills: 0 | Status: SHIPPED | OUTPATIENT
Start: 2024-02-09 | End: 2024-02-12

## 2024-02-08 RX ORDER — POLYETHYLENE GLYCOL 3350 17 G/17G
17 POWDER, FOR SOLUTION ORAL DAILY PRN
Qty: 30 PACKET | Refills: 0 | Status: SHIPPED | OUTPATIENT
Start: 2024-02-08 | End: 2024-03-09

## 2024-02-08 RX ORDER — MIRTAZAPINE 15 MG/1
15 TABLET, ORALLY DISINTEGRATING ORAL NIGHTLY
Qty: 30 TABLET | Refills: 3 | Status: SHIPPED | OUTPATIENT
Start: 2024-02-08

## 2024-02-08 RX ORDER — AMOXICILLIN AND CLAVULANATE POTASSIUM 500; 125 MG/1; MG/1
1 TABLET, FILM COATED ORAL EVERY 12 HOURS SCHEDULED
Qty: 9 TABLET | Refills: 0 | Status: SHIPPED | OUTPATIENT
Start: 2024-02-08 | End: 2024-02-13

## 2024-02-08 RX ORDER — PANTOPRAZOLE SODIUM 40 MG/1
40 TABLET, DELAYED RELEASE ORAL
Qty: 30 TABLET | Refills: 3 | Status: CANCELLED | OUTPATIENT
Start: 2024-02-09

## 2024-02-08 RX ORDER — FOLIC ACID 1 MG/1
1 TABLET ORAL DAILY
Qty: 30 TABLET | Refills: 3 | Status: SHIPPED | OUTPATIENT
Start: 2024-02-09

## 2024-02-08 RX ORDER — LAMIVUDINE 100 MG/1
100 TABLET, FILM COATED ORAL DAILY
Qty: 60 TABLET | Refills: 3 | Status: SHIPPED | OUTPATIENT
Start: 2024-02-09

## 2024-02-08 RX ORDER — OMEGA-3-ACID ETHYL ESTERS 1 G/1
2 CAPSULE, LIQUID FILLED ORAL 2 TIMES DAILY
Qty: 60 CAPSULE | Refills: 3 | Status: SHIPPED | OUTPATIENT
Start: 2024-02-08

## 2024-02-08 RX ORDER — ENTECAVIR 0.5 MG/1
0.5 TABLET, FILM COATED ORAL WEEKLY
Qty: 30 TABLET | Refills: 3 | Status: SHIPPED | OUTPATIENT
Start: 2024-02-08

## 2024-02-08 RX ORDER — SULFAMETHOXAZOLE AND TRIMETHOPRIM 400; 80 MG/1; MG/1
1 TABLET ORAL
Qty: 12 TABLET | Refills: 0 | Status: SHIPPED | OUTPATIENT
Start: 2024-02-09 | End: 2024-02-14 | Stop reason: SDUPTHER

## 2024-02-08 RX ORDER — NICOTINE 21 MG/24HR
1 PATCH, TRANSDERMAL 24 HOURS TRANSDERMAL DAILY
Qty: 30 PATCH | Refills: 3 | Status: SHIPPED | OUTPATIENT
Start: 2024-02-08 | End: 2024-02-13 | Stop reason: CLARIF

## 2024-02-08 RX ORDER — ERGOCALCIFEROL 1.25 MG/1
50000 CAPSULE ORAL WEEKLY
Qty: 5 CAPSULE | Refills: 0 | Status: SHIPPED | OUTPATIENT
Start: 2024-02-11

## 2024-02-08 RX ADMIN — SODIUM CHLORIDE, PRESERVATIVE FREE 10 ML: 5 INJECTION INTRAVENOUS at 08:45

## 2024-02-08 RX ADMIN — CALCITRIOL CAPSULES 0.25 MCG 0.5 MCG: 0.25 CAPSULE ORAL at 08:43

## 2024-02-08 RX ADMIN — MAGNESIUM SULFATE HEPTAHYDRATE 2000 MG: 40 INJECTION, SOLUTION INTRAVENOUS at 11:01

## 2024-02-08 RX ADMIN — DARUNAVIR ETHANOLATE AND COBICISTAT 1 EACH: 800; 150 TABLET, FILM COATED ORAL at 08:43

## 2024-02-08 RX ADMIN — PANTOPRAZOLE SODIUM 40 MG: 40 TABLET, DELAYED RELEASE ORAL at 06:42

## 2024-02-08 RX ADMIN — OMEGA-3-ACID ETHYL ESTERS 2 G: 1 CAPSULE, LIQUID FILLED ORAL at 08:43

## 2024-02-08 RX ADMIN — LAMIVUDINE 100 MG: 100 TABLET, FILM COATED ORAL at 08:43

## 2024-02-08 RX ADMIN — METOPROLOL SUCCINATE 100 MG: 50 TABLET, EXTENDED RELEASE ORAL at 08:43

## 2024-02-08 RX ADMIN — AZITHROMYCIN DIHYDRATE 500 MG: 250 TABLET ORAL at 08:44

## 2024-02-08 RX ADMIN — CALCIUM CARBONATE 500 MG: 500 TABLET, CHEWABLE ORAL at 08:43

## 2024-02-08 RX ADMIN — ISOSORBIDE MONONITRATE 60 MG: 60 TABLET, EXTENDED RELEASE ORAL at 08:43

## 2024-02-08 RX ADMIN — Medication 400 MG: at 08:44

## 2024-02-08 RX ADMIN — HYDROMORPHONE HYDROCHLORIDE 0.5 MG: 1 INJECTION, SOLUTION INTRAMUSCULAR; INTRAVENOUS; SUBCUTANEOUS at 02:55

## 2024-02-08 RX ADMIN — ASPIRIN 81 MG 81 MG: 81 TABLET ORAL at 08:44

## 2024-02-08 RX ADMIN — FOLIC ACID 1 MG: 1 TABLET ORAL at 08:44

## 2024-02-08 RX ADMIN — INSULIN LISPRO 10 UNITS: 100 INJECTION, SOLUTION INTRAVENOUS; SUBCUTANEOUS at 08:42

## 2024-02-08 RX ADMIN — HYDROMORPHONE HYDROCHLORIDE 0.5 MG: 1 INJECTION, SOLUTION INTRAMUSCULAR; INTRAVENOUS; SUBCUTANEOUS at 06:42

## 2024-02-08 RX ADMIN — NYSTATIN 500000 UNITS: 100000 SUSPENSION ORAL at 08:44

## 2024-02-08 RX ADMIN — INSULIN LISPRO 10 UNITS: 100 INJECTION, SOLUTION INTRAVENOUS; SUBCUTANEOUS at 12:33

## 2024-02-08 RX ADMIN — CLONIDINE HYDROCHLORIDE 0.1 MG: 0.1 TABLET ORAL at 08:44

## 2024-02-08 RX ADMIN — INSULIN LISPRO 2 UNITS: 100 INJECTION, SOLUTION INTRAVENOUS; SUBCUTANEOUS at 08:42

## 2024-02-08 RX ADMIN — AMOXICILLIN AND CLAVULANATE POTASSIUM 1 TABLET: 500; 125 TABLET, FILM COATED ORAL at 08:42

## 2024-02-08 RX ADMIN — HYDROMORPHONE HYDROCHLORIDE 0.5 MG: 1 INJECTION, SOLUTION INTRAMUSCULAR; INTRAVENOUS; SUBCUTANEOUS at 11:07

## 2024-02-08 RX ADMIN — DOLUTEGRAVIR SODIUM 50 MG: 50 TABLET, FILM COATED ORAL at 08:43

## 2024-02-08 RX ADMIN — BUSPIRONE HYDROCHLORIDE 10 MG: 5 TABLET ORAL at 08:43

## 2024-02-08 RX ADMIN — HEPARIN SODIUM 5000 UNITS: 5000 INJECTION INTRAVENOUS; SUBCUTANEOUS at 06:42

## 2024-02-08 ASSESSMENT — PAIN DESCRIPTION - DESCRIPTORS
DESCRIPTORS: ACHING;BURNING;SORE;SHOOTING
DESCRIPTORS: ACHING;DISCOMFORT
DESCRIPTORS: THROBBING

## 2024-02-08 ASSESSMENT — PAIN - FUNCTIONAL ASSESSMENT: PAIN_FUNCTIONAL_ASSESSMENT: PREVENTS OR INTERFERES SOME ACTIVE ACTIVITIES AND ADLS

## 2024-02-08 ASSESSMENT — PAIN DESCRIPTION - ORIENTATION
ORIENTATION: RIGHT;LOWER;LEFT
ORIENTATION: LOWER
ORIENTATION: LEFT;RIGHT;LOWER

## 2024-02-08 ASSESSMENT — PAIN DESCRIPTION - LOCATION
LOCATION: BACK

## 2024-02-08 ASSESSMENT — PAIN SCALES - GENERAL
PAINLEVEL_OUTOF10: 9
PAINLEVEL_OUTOF10: 8
PAINLEVEL_OUTOF10: 9

## 2024-02-08 ASSESSMENT — PAIN SCALES - WONG BAKER: WONGBAKER_NUMERICALRESPONSE: 0

## 2024-02-08 NOTE — DISCHARGE INSTRUCTIONS
outpatient dialysis Monday Wednesday Friday at [facility]   be there at 3 PM for 3-hour treatment   address is  near to the [address].  [place] [id number]   phone #[phone]   start dialysis on Monday, February 12th and if any concerns call Dr. reese

## 2024-02-08 NOTE — PROGRESS NOTES
Progress Note( Dr. Cruz)  2/8/2024  Subjective:   Admit Date: 1/19/2024  PCP: Frandy Parsons APRN - CNP    Admitted For : Nausea vomiting and frequent fall was found to be hyponatremic with serum sodium 117     Consulted For: Better control of blood glucose     Interval History:     Feels feels much better t  also started eating better  Serum sodium getting better of 135     Patient had elevated liver enzymes also lipase suggesting pancreatitis or hepatitis  And liver function test trending down.  GI following him  Positive for Legionella  CT scan shows possibility of pneumonia and also has pancreatitis    Patient had EGD yesterday with no major finding except gastritis  Change the dialysis catheter    Denies any chest pains,   Yes  SOB .   Has some  nausea but no  vomiting. He is eating better  no new bowel or bladder symptoms.       Intake/Output Summary (Last 24 hours) at 2/8/2024 0856  Last data filed at 2/7/2024 1630  Gross per 24 hour   Intake 500 ml   Output 1304 ml   Net -804 ml         DATA    CBC:   Recent Labs     02/06/24 0447 02/07/24  0334 02/08/24  0254   WBC 2.6* 2.9* 3.1*   HGB 7.8* 8.2* 7.8*    241 205      CMP:  Recent Labs     02/06/24 0447 02/06/24  0918 02/07/24  0334 02/08/24  0254   *  --  134* 129*   K 4.4  --  4.4 4.0   CL 99  --  99 96*   CO2 24  --  24 24   BUN 34*  --  45* 37*   CREATININE 3.6*  --  4.4* 3.7*   CALCIUM 8.3  --  8.5 8.3   PROT  --  6.9  --   --    LABALBU  --  3.0*  --   --    BILITOT  --  0.6  --   --    ALKPHOS  --  151*  --   --    AST  --  174*  --   --    ALT  --  183*  --   --        Lipids:   Lab Results   Component Value Date/Time    CHOL 278 12/11/2023 12:28 PM    HDL 22 12/11/2023 12:28 PM    TRIG 252 02/06/2024 09:18 AM     Glucose:  Recent Labs     02/07/24 2239 02/08/24  0254 02/08/24  0747   POCGLU 170* 220* 212*       [id number]:  Lab Results   Component Value Date/Time    LABA1C 6.8 01/20/2024 05:54 AM     High Sensitivity TSH:

## 2024-02-08 NOTE — PROGRESS NOTES
Ok to dc home with hhc  With boyfriend  Outpt dialysis at [facility] at 3pm and will place address on dc info

## 2024-02-08 NOTE — PROGRESS NOTES
alert, NAD  EYES: EOM grossly intact, +conjunctival pallor, no scleral icterus  LUNGS: CTAB, unlabored on RA  CARDIOVASCULAR:RRR, no mumur  ABDOMEN: Obese body habitus, mildly TTP at epigastrium, non-distended, NABS  NEUROLOGIC: awake, alert, oriented to name, place and time. Motor skills grossly intact.   SKIN: warm and dry, no jaundice, no bruising or petechiae.  EXTREMITIES:trace BLE edema, no clubbing or cyanosis     Labs:    Lab Results   Component Value Date    WBC 3.1 (L) 02/08/2024    HGB 7.8 (L) 02/08/2024    HCT 24.9 (L) 02/08/2024    MCV 95.0 02/08/2024     02/08/2024    LYMPHOPCT 35.2 02/08/2024    RBC 2.62 (L) 02/08/2024    MCH 29.8 02/08/2024    MCHC 31.3 (L) 02/08/2024    RDW 13.8 02/08/2024           Lab Results   Component Value Date    INR 1.1 01/29/2024    PROTIME 14.0 01/29/2024     Lab Results   Component Value Date     (L) 02/08/2024    K 4.0 02/08/2024    CL 96 (L) 02/08/2024    CO2 24 02/08/2024    BUN 37 (H) 02/08/2024    CREATININE 3.7 (H) 02/08/2024    GLUCOSE 203 (H) 02/08/2024    CALCIUM 8.3 02/08/2024    PHOS 3.9 02/08/2024    MG 1.7 (L) 02/08/2024    PROT 6.9 02/06/2024    LABALBU 3.0 (L) 02/06/2024    BILITOT 0.6 02/06/2024    ALKPHOS 151 (H) 02/06/2024     (H) 02/06/2024     (H) 02/06/2024    LABGLOM 20 (L) 02/08/2024    GFRAA 50 (L) 09/29/2022    AGRATIO 1.0 (L) 06/17/2021    GLOB 4.0 06/17/2021    POCGLU 212 (H) 02/08/2024     Lab Results   Component Value Date    ALKPHOS 151 (H) 02/06/2024     (H) 02/06/2024     (H) 02/06/2024    BILITOT 0.6 02/06/2024    PROT 6.9 02/06/2024     No results found for: \"URICACID\"  Lab Results   Component Value Date     (H) 01/25/2024     Lab Results   Component Value Date    IRON 37 (L) 01/26/2024    TIBC 146 (L) 01/26/2024    FERRITIN 2,343 (H) 01/26/2024       Imaging:  CT LUMBAR SPINE WO CONTRAST   Final Result   No acute lumbar spine fracture.  Minimal multilevel disc disease.         XR CHEST  7    #Mass-like Opacity in RLL  -urine antigen +Legionella, so most likely pneumonia.  Is being followed by ID.  Recommend follow-up imaging as outpatient    Remainder of care per primary and consulting teams.  ID is following for new HIV diagnosis as well as legionella.  Nephrology is following for renal failure/HD.  GI is on board.    We will continue to follow as outpatient and monitor blood counts, as well as  consider further evaluation including BMB /aspiration if anemia worsening. Planning to DC today.    Patient was seen independently with attending physician Dr Zavala available for consult.    Sivan Orozco PA-C

## 2024-02-08 NOTE — PLAN OF CARE
Problem: Discharge Planning  Goal: Discharge to home or other facility with appropriate resources  Outcome: Adequate for Discharge     Problem: Pain  Goal: Verbalizes/displays adequate comfort level or baseline comfort level  Outcome: Adequate for Discharge     Problem: Skin/Tissue Integrity  Goal: Absence of new skin breakdown  Description: 1.  Monitor for areas of redness and/or skin breakdown  2.  Assess vascular access sites hourly  3.  Every 4-6 hours minimum:  Change oxygen saturation probe site  4.  Every 4-6 hours:  If on nasal continuous positive airway pressure, respiratory therapy assess nares and determine need for appliance change or resting period.  Outcome: Adequate for Discharge     Problem: Safety - Adult  Goal: Free from fall injury  Outcome: Adequate for Discharge     Problem: Chronic Conditions and Co-morbidities  Goal: Patient's chronic conditions and co-morbidity symptoms are monitored and maintained or improved  Outcome: Adequate for Discharge     Problem: Nutrition Deficit:  Goal: Optimize nutritional status  Outcome: Adequate for Discharge      General:

## 2024-02-08 NOTE — DISCHARGE SUMMARY
Follow up appointments: PCP, oncology, nephrology  Primary care physician: Frandy Parsons APRN - CNP within 2 weeks  Diet: cardiac diet   Activity: activity as tolerated  Disposition: Discharged to:   [x]Home, []C, []SNF, []Acute Rehab, []Hospice   Condition on discharge: Stable  Labs and Tests to be Followed up as an outpatient by PCP or Specialist:     Discharge Medications:        Medication List        START taking these medications      amoxicillin-clavulanate 500-125 MG per tablet  Commonly known as: AUGMENTIN  Take 1 tablet by mouth every 12 hours for 9 doses     azithromycin 500 MG tablet  Commonly known as: ZITHROMAX  Take 1 tablet by mouth daily for 3 doses  Start taking on: February 9, 2024     darunavir-cobicistat 800-150 MG Tabs tablet  Take 1 tablet by mouth daily  Start taking on: February 9, 2024     dolutegravir sodium 50 MG tablet  Commonly known as: TIVICAY  Take 1 tablet by mouth daily  Start taking on: February 9, 2024     entecavir 0.5 MG tablet  Commonly known as: BARACLUDE  Take 1 tablet by mouth once a week Every monday     folic acid 1 MG tablet  Commonly known as: FOLVITE  Take 1 tablet by mouth daily  Start taking on: February 9, 2024     lamiVUDine 100 MG tablet  Commonly known as: EPIVIR  Take 1 tablet by mouth daily  Start taking on: February 9, 2024     magnesium oxide 400 (240 Mg) MG tablet  Commonly known as: MAG-OX  Take 1 tablet by mouth 2 times daily     mirtazapine 15 MG disintegrating tablet  Commonly known as: REMERON SOL-TAB  Take 1 tablet by mouth nightly     nicotine 21 MG/24HR  Commonly known as: NICODERM CQ  Place 1 patch onto the skin daily     omega-3 acid ethyl esters 1 g capsule  Commonly known as: LOVAZA  Take 2 capsules by mouth 2 times daily     polyethylene glycol 17 g packet  Commonly known as: GLYCOLAX  Take 1 packet by mouth daily as needed for Constipation     sulfamethoxazole-trimethoprim 400-80 MG per tablet  Commonly known as: BACTRIM;SEPTRA  Take 1  Medical Condition (MA) Reason for Exam: further eval cxr, elevated lipase, n/v FINDINGS: Chest: Mediastinum: Mild atherosclerotic calcification of coronary arteries that is advanced at age 43.  Aorta and pulmonary arteries are normal.  Thyroid and esophagus normal. Lungs/pleura: The airways are patent.  Masslike focus of dense opacification in the right lower lobe measuring 10.3 x 7.3 cm.  There is peripheral ill-defined airspace opacification.  Central ground-glass opacities are otherwise noted bilaterally. Soft Tissues/Bones: No skeletal abnormalities are appreciated within the chest.  Bilateral gynecomastia. Abdomen/Pelvis: Organs: The liver, gallbladder, biliary ducts and pancreas are normal. Splenomegaly with no focal lesion.  Normal adrenal glands.  Moderate perinephric stranding around both kidneys that appears chronic.  No focal lesion or evidence of obstruction. GI/Bowel: The stomach, duodenum and small bowel are normal. A normal appendix is visualized. The colon is normal. Pelvis: The bladder is unremarkable. Normal prostate. Peritoneum/Retroperitoneum: The aorta tapers normally.  No lymphadenopathy. No free air or fluid. Bones/Soft Tissues: No skeletal abnormality.  Soft tissue nodule previously described in the right groin not seen on current exam.     1. Masslike opacity in the right lower lobe.  If there is evidence of infection, pneumonia may be considered.  A pulmonary mass cannot be excluded. 2. Splenomegaly of uncertain significance. 3. Advanced atherosclerotic calcification of coronary arteries.  Cardiology follow-up may be considered on a nonemergent outpatient basis.     CT HEAD WO CONTRAST    Result Date: 1/19/2024  EXAMINATION: CT OF THE HEAD WITHOUT CONTRAST  1/19/2024 8:18 pm TECHNIQUE: CT of the head was performed without the administration of intravenous contrast. Automated exposure control, iterative reconstruction, and/or weight based adjustment of the mA/kV was utilized to reduce the

## 2024-02-08 NOTE — PROGRESS NOTES
Outpatient Pharmacy Progress Note for Meds-to-Beds    Total number of Prescriptions Filled:  14    Additional Documentation:  The OP pharmacy does not have adequate inventory of the anti-viral medications (Entecavir, Lamivudine, Tivicay, and Prezcobix). Patient will return tomorrow morning to pick these medications up once we procure them.      Thank you for letting us serve your patients.  [place] [id number]    Phone: [phone]    Fax: [phone]

## 2024-02-08 NOTE — PROGRESS NOTES
Nephrology Progress Note  2/8/2024 1:12 PM  Subjective:     Interval History: Dewey Sosa is a 43 y.o. male    doing well in general was denied for ARU  Data:   Scheduled Meds:   amoxicillin-clavulanate  1 tablet Oral 2 times per day    mirtazapine  15 mg Oral Nightly    cloNIDine  0.1 mg Oral BID    azithromycin  500 mg Oral Daily    magnesium oxide  400 mg Oral BID    pantoprazole  40 mg Oral QAM AC    omega-3 acid ethyl esters  2 g Oral BID    insulin glargine  20 Units SubCUTAneous Nightly    nystatin  5 mL Oral 4x Daily    insulin lispro  10 Units SubCUTAneous TID WC    insulin lispro  0-8 Units SubCUTAneous TID WC    insulin lispro  0-8 Units SubCUTAneous 2 times per day    dolutegravir sodium  50 mg Oral Daily    darunavir-cobicistat  1 tablet Oral Daily    lamiVUDine  100 mg Oral Daily    entecavir  0.5 mg Oral Weekly - Monday    sulfamethoxazole-trimethoprim  1 tablet Oral Once per day on Mon Wed Fri    folic acid  1 mg Oral Daily    metoprolol succinate  100 mg Oral Daily    nicotine  1 patch TransDERmal Daily    calcitRIOL  0.5 mcg Oral Daily    vitamin D  50,000 Units Oral Weekly    calcium carbonate  500 mg Oral TID    sodium chloride flush  5-40 mL IntraVENous 2 times per day    heparin (porcine)  5,000 Units SubCUTAneous 3 times per day    aspirin  81 mg Oral Daily    ticagrelor  90 mg Oral BID    ezetimibe  10 mg Oral Nightly    busPIRone  10 mg Oral TID    atorvastatin  80 mg Oral Nightly    isosorbide mononitrate  60 mg Oral Daily     Continuous Infusions:   sodium chloride      sodium chloride      sodium chloride      dextrose           CBC   Recent Labs     02/06/24  0447 02/07/24  0334 02/08/24  0254   WBC 2.6* 2.9* 3.1*   HGB 7.8* 8.2* 7.8*   HCT 25.1* 26.1* 24.9*    241 205      BMP   Recent Labs     02/06/24  0447 02/07/24  0334 02/08/24  0254   * 134* 129*   K 4.4 4.4 4.0   CL 99 99 96*   CO2 24 24 24   PHOS 3.9 4.4 3.9   BUN 34* 45* 37*   CREATININE 3.6* 4.4* 3.7*    Lab Results   Component Value Date/Time    FERRITIN 2,343 01/26/2024 02:30 PM     RPR:  No results found for: \"RPR\"  CHIDI:    Lab Results   Component Value Date/Time    CHIDI None Detected 09/15/2021 08:32 AM     24 Hour Urine for Creatinine Clearance:  No components found for: \"CREAT4\", \"UHRS10\", \"UTV10\"      Objective:   I/O: 02/07 0701 - 02/08 0700  In: 500   Out: 1304   I/O last 3 completed shifts:  In: 500   Out: 1304   No intake/output data recorded.  Vitals: /86   Pulse 66   Temp 98 °F (36.7 °C) (Oral)   Resp 20   Ht 1.854 m (6' 1\")   Wt 94.2 kg (207 lb 10.8 oz)   SpO2 98%   PF 97 L/min   BMI 27.40 kg/m²  {  General appearance: awake weak  HEENT: Head: Normal, normocephalic, atraumatic.  Neck: supple, symmetrical, trachea midline  Lungs: diminished breath sounds bilaterally  Heart: S1, S2 normal  Abdomen: abnormal findings:  soft nt  Extremities: edema trace positive  tunneled HD catheter  Neurologic: Mental status: alertness:   interactive      Assessment and Plan:        IMP:   #1 end-stage renal disease HD Monday Wednesday Friday   #2  HIV positive with positive urine Legionella    #3 hyponatremia   #4 hypertension   #5 delirium/ metabolic encephalopathy   #6 type 2 diabetes with proteinuria  #7 anemia    Plan    #1 unable to go to ARU will discharge to home with home care   #2 maintain dialysis 3 times a week at [facility] dialysis starting Monday   #3 maintain HIV meds that are currently on board   #4 monitor electrolytes in setting of dialysis   #5 blood pressure overall improved affect improved   #6 monitor glucose   No. 7 maintain  Epogen on dialysis   okay for discharge today from renal     outpatient dialysis Monday Wednesday Friday at [facility]   be there at 3 PM for 3-hour treatment   address is  near to the [address] [place] [address]   phone #[phone]   start dialysis on Monday, February 12th and if any concerns call Dr. reese

## 2024-02-09 ENCOUNTER — TELEPHONE (OUTPATIENT)
Dept: INTERNAL MEDICINE CLINIC | Age: 44
End: 2024-02-09

## 2024-02-09 ENCOUNTER — CARE COORDINATION (OUTPATIENT)
Dept: CASE MANAGEMENT | Age: 44
End: 2024-02-09

## 2024-02-09 RX ORDER — TIZANIDINE 2 MG/1
TABLET ORAL
Qty: 30 TABLET | Refills: 0 | Status: SHIPPED | OUTPATIENT
Start: 2024-02-09

## 2024-02-09 NOTE — TELEPHONE ENCOUNTER
Care Transitions Initial Follow Up Call    Outreach made within 2 business days of discharge: Yes    Patient: Dewey Sosa Patient : 1980   MRN: [id number]  Reason for Admission: There are no discharge diagnoses documented for the most recent discharge.  Discharge Date: 24       Spoke with: Dewey Sosa    Discharge department/facility: [facility]    TCM Interactive Patient Contact:  Was patient able to fill all prescriptions: Yes  Was patient instructed to bring all medications to the follow-up visit: Yes  Is patient taking all medications as directed in the discharge summary? Yes  Does patient understand their discharge instructions: Yes  Does patient have questions or concerns that need addressed prior to 7-14 day follow up office visit: no    Scheduled appointment with PCP within 7-14 days    Follow Up  Future Appointments   Date Time Provider Department Center   2024  2:15 PM Frandy Parsons APRN - CNP [facility] E S IM [facility]   2024  8:15 AM Raad Mcdaniel MD [facility] ID [facility]   2/15/2024 11:45 AM Beka Nevarez MD [id number] NE[facility]   2024 11:00 AM Frandy Parsons APRN - CNP [facility]       Renetta Hudson

## 2024-02-09 NOTE — CARE COORDINATION
Care Transitions Outreach Attempt    Call within 2 business days of discharge: Yes     Patient: Dewey Sosa Patient : 1980 MRN: [id number]    Last Discharge Facility       Date Complaint Diagnosis Description Type Department Provider    24 Fall; Fatigue Hyponatremia ... ED to Hosp-Admission (Discharged) (ADMITTED) [facility] 3N Darrel Matias MD; Linda Montgomery MD;...        Noted following upcoming appointments from discharge chart review:   [facility] follow up appointment(s):   Future Appointments   Date Time Provider Department Center   2024 11:00 AM Frandy Parsons APRN - CNP [facility] IM [facility]       Challenges to be reviewed by the provider   Additional needs identified to be addressed with provider:    Dx: Sepsis 2/2 Legionella Pneumonia  HIV Infection/AIDS   Facility: [facility] 24-24    Please contact for scheduling of 7 day hospital follow up/TCM appt due by 2/15/24         Method of communication with provider : chart routing    1st attempt to reach for Care Transition discharge call unsuccessful. HIPAA compliant message left requesting call back. No approved EC per HIPAA form on file. UTR letter sent via [employer].

## 2024-02-11 NOTE — PROGRESS NOTES
2/14/2024     Diagnosis Orders   1. AIDS (acquired immunodeficiency syndrome), CD4 <=200 (HCC)  CD4 Percent and Absolute    CBC with Auto Differential    Basic Metabolic Panel    Hepatic Function Panel    HIV-1 RNA, quantitative, PCR      2. Cigarette smoker          35 minutes was spent in the encounter; more than 50% of the face-to-face time was spent with the patient providing counseling and coordination of care.    DISCUSSION  HIV: Pt is stable/tolerating ART well; currently on Entecavir, lamivudine 100 mg p.o. daily, dolutegravir 50 mg p.o. daily Prezcobix 800/150 mg daily.  Continue to monitor renal function.  He has CKD stage V and is on thrice weekly hemodialysis but still retains renal function.  Has been referred to [facility] for renal transplant evaluation.  Health maintenance was discussed.  Encouraged to have a balanced diet, aerobic and muscle training exercise and adequate sleep.  Greater than 5 minutes of cigarette cessation counseling.  He and his partner motivated to quit.  Encouraged to stay up-to-date with his vaccination.  Will reevaluate vaccination status after HIV virus is controlled and CD4 count is 200 or more.  Here is with Frandy his partner is seronegative.   No components found for: \"CD4H\" No components found for: \"HIVRN\"   Chronic medical problems as above/stable  Lipid recommendation: As per PCP  Labs to be done every 3-6 months: CBC, CD4, VL, BMP, LFTs  Labs to be done once every 12months (last January 2024): HCV, Lipids, T-SPOT, RPR, UA  Vaccines: PCV13/PCV15 and PPSV23 OR single dose PCV20, HPV (through 26 years of age), Varicella, Meningococcus (MSM), ShingrixTdap, HAV, HBV, yearly inactivated influenza recommended   Total and free testosterone in men  Colorectal cancer screen: due at age 45; repeat every 10 yrs (if normal)  Prostate cancer screen: due at age 50; repeat every 2 yrs (PSA < 2.5 ng/mL)  Dexa scan: due at age 50; repeat every 3 yrs (if normal)    Future Appointments   Date

## 2024-02-12 ENCOUNTER — CARE COORDINATION (OUTPATIENT)
Dept: CASE MANAGEMENT | Age: 44
End: 2024-02-12

## 2024-02-12 NOTE — PROGRESS NOTES
Physician Progress Note      PATIENT:               SERA NDIAYE  CSN #:                  [id number]  :                       1980  ADMIT DATE:       2024 5:59 PM  DISCH DATE:        2024 2:02 PM  RESPONDING  PROVIDER #:        Darrel Matias MD          QUERY TEXT:    Pt admitted with Legionnaires Pneumonia. Pt noted to have sepsis. If possible,   please document in the progress notes and discharge summary if you are   evaluating and /or treating any of the following:    The medical record reflects the following:  Risk Factors: Legionnaires Pneumonia  Clinical Indicators: Fever T max 101.6, tachycardia 110's, tachypnea 30's. 2/2   legionella pneumonia noted in the H&P, Metabolic encephalopathy, DKA, ROSE MARY,   Sepsis 2/2 Legionella Pneumonia  HIV Infection/AIDS,  Resp Cx growing   Capnocytophaga species  Treatment:  On rocephin, azithromycin, ID consult,  Bactrim, Entecavir,   lamivudine, darunavir cobicistat, dolutegravir, patient started on Augmentin   by ID.    Thank you, Gia Gómez RN, [facility] [phone]  Options provided:  -- Sepsis, severe, present on admission  -- Sepsis, severe developed following admission  -- Other - I will add my own diagnosis  -- Disagree - Not applicable / Not valid  -- Disagree - Clinically unable to determine / Unknown  -- Refer to Clinical Documentation Reviewer    PROVIDER RESPONSE TEXT:    This patient has sepsis which was present on admission.    Query created by: Gia Gómez on 2024 7:19 AM      Electronically signed by:  Darrel Matias MD 2024 8:23 AM

## 2024-02-12 NOTE — CARE COORDINATION
Care Transitions Outreach Attempt    Call within 2 business days of discharge: Yes     Patient: Dewey Sosa Patient : 1980 MRN: [id number]    Last Discharge Facility       Date Complaint Diagnosis Description Type Department Provider    24 Fall; Fatigue Hyponatremia ... ED to Hosp-Admission (Discharged) (ADMITTED) [facility] 3Darrel Farley MD; Linda Montgomery MD;...        Noted following upcoming appointments from discharge chart review:   [facility] follow up appointment(s):   Future Appointments   Date Time Provider Department Center   2024  2:15 PM Frandy Parsons APRN - CNP [facility] E S IM [facility]   2024  8:15 AM Raad Mcdaniel MD [facility] ID [facility]   2/15/2024 11:45 AM Beka Nevarez MD [id number]   2024 11:00 AM Frandy Parsons APRN - CNP [facility]     2nd unsuccessful attempt to reach for Care Transition discharge call, message left requesting call back. CT program closed per protocol, no further outreach scheduled.

## 2024-02-13 ENCOUNTER — OFFICE VISIT (OUTPATIENT)
Dept: INTERNAL MEDICINE CLINIC | Age: 44
End: 2024-02-13
Payer: MEDICARE

## 2024-02-13 VITALS
HEIGHT: 73 IN | RESPIRATION RATE: 22 BRPM | DIASTOLIC BLOOD PRESSURE: 72 MMHG | HEART RATE: 103 BPM | WEIGHT: 205 LBS | BODY MASS INDEX: 27.17 KG/M2 | SYSTOLIC BLOOD PRESSURE: 122 MMHG | OXYGEN SATURATION: 98 %

## 2024-02-13 DIAGNOSIS — F41.9 ANXIETY: ICD-10-CM

## 2024-02-13 DIAGNOSIS — D64.9 ANEMIA, UNSPECIFIED TYPE: ICD-10-CM

## 2024-02-13 DIAGNOSIS — Z79.4 TYPE 2 DIABETES MELLITUS WITHOUT COMPLICATION, WITH LONG-TERM CURRENT USE OF INSULIN (HCC): ICD-10-CM

## 2024-02-13 DIAGNOSIS — E87.1 HYPONATREMIA: ICD-10-CM

## 2024-02-13 DIAGNOSIS — E11.9 TYPE 2 DIABETES MELLITUS WITHOUT COMPLICATION, WITH LONG-TERM CURRENT USE OF INSULIN (HCC): ICD-10-CM

## 2024-02-13 DIAGNOSIS — B20 AIDS (ACQUIRED IMMUNODEFICIENCY SYNDROME), CD4 <=200 (HCC): ICD-10-CM

## 2024-02-13 DIAGNOSIS — N18.6 ESRD (END STAGE RENAL DISEASE) (HCC): ICD-10-CM

## 2024-02-13 DIAGNOSIS — E78.00 ELEVATED LDL CHOLESTEROL LEVEL: ICD-10-CM

## 2024-02-13 DIAGNOSIS — Z09 HOSPITAL DISCHARGE FOLLOW-UP: Primary | ICD-10-CM

## 2024-02-13 LAB
COMMENT: NORMAL
CULTURE: NORMAL
Lab: NORMAL
SPECIMEN: NORMAL

## 2024-02-13 PROCEDURE — 1111F DSCHRG MED/CURRENT MED MERGE: CPT | Performed by: NURSE PRACTITIONER

## 2024-02-13 PROCEDURE — G8482 FLU IMMUNIZE ORDER/ADMIN: HCPCS | Performed by: NURSE PRACTITIONER

## 2024-02-13 PROCEDURE — G8417 CALC BMI ABV UP PARAM F/U: HCPCS | Performed by: NURSE PRACTITIONER

## 2024-02-13 PROCEDURE — 2022F DILAT RTA XM EVC RTNOPTHY: CPT | Performed by: NURSE PRACTITIONER

## 2024-02-13 PROCEDURE — 4004F PT TOBACCO SCREEN RCVD TLK: CPT | Performed by: NURSE PRACTITIONER

## 2024-02-13 PROCEDURE — G8427 DOCREV CUR MEDS BY ELIG CLIN: HCPCS | Performed by: NURSE PRACTITIONER

## 2024-02-13 PROCEDURE — 99215 OFFICE O/P EST HI 40 MIN: CPT | Performed by: NURSE PRACTITIONER

## 2024-02-13 PROCEDURE — 3044F HG A1C LEVEL LT 7.0%: CPT | Performed by: NURSE PRACTITIONER

## 2024-02-13 RX ORDER — BUSPIRONE HYDROCHLORIDE 10 MG/1
10 TABLET ORAL 2 TIMES DAILY
Qty: 60 TABLET | Refills: 3 | Status: SHIPPED | OUTPATIENT
Start: 2024-02-13 | End: 2024-03-14

## 2024-02-13 RX ORDER — EZETIMIBE 10 MG/1
10 TABLET ORAL DAILY
Qty: 30 TABLET | OUTPATIENT
Start: 2024-02-13

## 2024-02-13 NOTE — PROGRESS NOTES
CONTINUE taking these medications      amoxicillin-clavulanate 500-125 MG per tablet  Commonly known as: AUGMENTIN  Take 1 tablet by mouth every 12 hours for 9 doses     aspirin EC 81 MG EC tablet  Take 1 tablet by mouth daily     atorvastatin 80 MG tablet  Commonly known as: LIPITOR  Take 1 tablet by mouth daily     blood glucose test strips  Test 3 times a day & as needed for symptoms of irregular blood glucose.     dapagliflozin 10 MG tablet  Commonly known as: Farxiga  Take 1 tablet by mouth every morning     darunavir-cobicistat 800-150 MG Tabs tablet  Take 1 tablet by mouth daily     dicyclomine 10 MG capsule  Commonly known as: Bentyl  Take 1 capsule by mouth 4 times daily as needed (for abdominal cramping)     dolutegravir sodium 50 MG tablet  Commonly known as: TIVICAY  Take 1 tablet by mouth daily     entecavir 0.5 MG tablet  Commonly known as: BARACLUDE  Take 1 tablet by mouth once a week Every monday     ezetimibe 10 MG tablet  Commonly known as: ZETIA     folic acid 1 MG tablet  Commonly known as: FOLVITE  Take 1 tablet by mouth daily     gabapentin 300 MG capsule  Commonly known as: NEURONTIN  Take 1 capsule by mouth 3 times daily for 90 days.     insulin lispro 100 UNIT/ML Soln injection vial  Commonly known as: HUMALOG  Inject 0-15 Units into the skin 3 times daily (before meals)     * INSULIN SYRINGE .5CC/29G 29G X 1/2\" 0.5 ML Misc  Inject 1 each into the skin 3 times daily     * B-D INS SYR MICROFINE 1CC/28G 28G X 1/2\" 1 ML Misc  Generic drug: INS SYRINGE/NEEDLE 1CC/28G  USE ONE DAILY     isosorbide mononitrate 60 MG extended release tablet  Commonly known as: IMDUR     Kerendia 10 MG Tabs  Generic drug: Finerenone  Take 10 mg by mouth daily     [employer] Pen Needles 31G X 6 MM Misc  Generic drug: Insulin Pen Needle  Inject 1 each into the skin 3 times daily     lamiVUDine 100 MG tablet  Commonly known as: EPIVIR  Take 1 tablet by mouth daily     Lancets Misc  1 each by Does not apply route 3

## 2024-02-13 NOTE — PROGRESS NOTES
Physician Progress Note      PATIENT:               SERA NDIAYE  CSN #:                  [id number]  :                       1980  ADMIT DATE:       2024 5:59 PM  DISCH DATE:        2024 2:02 PM  RESPONDING  PROVIDER #:        Darrel Matias MD          QUERY TEXT:    Patient admitted with Sepsis.  Pt noted to have diabetes Type i and Diabetes   Type 2 specified. Please document in progress notes and discharge summary the   type of DM:    The medical record reflects the following:  Risk Factors: Diabetes, DKA  Clinical Indicators: \"The patient is a 43 y.o. male with significant past   medical history of type 1 diabetes mellitus\", per Dr Cruz, and Type 2   diabetes mellitus in the PN and active problem list  Treatment: Endocrine consult,  on lantus and sliding scale    Thank you, Gia Gómez RN, CDS [phone]  Options provided:  -- DM type I  -- DM type II  -- Other - I will add my own diagnosis  -- Disagree - Not applicable / Not valid  -- Disagree - Clinically unable to determine / Unknown  -- Refer to Clinical Documentation Reviewer    PROVIDER RESPONSE TEXT:    This patient has DM type I.    Query created by: Gia Gómez on 2024 3:42 PM      Electronically signed by:  Darrel Matias MD 2024 11:40 AM

## 2024-02-14 ENCOUNTER — OFFICE VISIT (OUTPATIENT)
Dept: INFECTIOUS DISEASES | Age: 44
End: 2024-02-14
Payer: MEDICARE

## 2024-02-14 VITALS
HEART RATE: 94 BPM | WEIGHT: 205 LBS | SYSTOLIC BLOOD PRESSURE: 157 MMHG | BODY MASS INDEX: 27.05 KG/M2 | DIASTOLIC BLOOD PRESSURE: 99 MMHG

## 2024-02-14 DIAGNOSIS — B20 AIDS (ACQUIRED IMMUNODEFICIENCY SYNDROME), CD4 <=200 (HCC): Primary | ICD-10-CM

## 2024-02-14 DIAGNOSIS — F17.210 CIGARETTE SMOKER: ICD-10-CM

## 2024-02-14 PROCEDURE — G8427 DOCREV CUR MEDS BY ELIG CLIN: HCPCS | Performed by: INTERNAL MEDICINE

## 2024-02-14 PROCEDURE — G8417 CALC BMI ABV UP PARAM F/U: HCPCS | Performed by: INTERNAL MEDICINE

## 2024-02-14 PROCEDURE — 4004F PT TOBACCO SCREEN RCVD TLK: CPT | Performed by: INTERNAL MEDICINE

## 2024-02-14 PROCEDURE — 99214 OFFICE O/P EST MOD 30 MIN: CPT | Performed by: INTERNAL MEDICINE

## 2024-02-14 PROCEDURE — G8482 FLU IMMUNIZE ORDER/ADMIN: HCPCS | Performed by: INTERNAL MEDICINE

## 2024-02-14 PROCEDURE — 1111F DSCHRG MED/CURRENT MED MERGE: CPT | Performed by: INTERNAL MEDICINE

## 2024-02-14 RX ORDER — SULFAMETHOXAZOLE AND TRIMETHOPRIM 400; 80 MG/1; MG/1
30 TABLET ORAL
Qty: 360 TABLET | Refills: 2 | Status: SHIPPED | OUTPATIENT
Start: 2024-02-14 | End: 2024-05-08

## 2024-02-14 NOTE — PATIENT INSTRUCTIONS
Have your labs drawn on 2/28/2024. Continue to take your medication as prescribed. Weigh yourself three times a week  and keep a log.    Drain Removal    Discharge Instructions  - You have had a drain removed.  - Keep the area clean and dry.  - You may resume your normal diet.  - You may resume your normal medications.  - It is normal to experience some pain over the site for the next few days. You may take apply ice to the area (20 minutes on, 20 minutes off) and take Tylenol for that pain. Do not take more frequently than every 6 hours and do not exceed more than 3000mg of Tylenol in a 24 hour period.    Notify your primary physician and/or Interventional Radiology IMMEDIATELY if you experience any of the following       - Fever of 100.4F  or 38C       - Chills or Rigors/ Shakes       - Swelling and/or Redness in the area of the puncture site       - Worsening Pain       - Blood soaked bandages or worsening bleeding       - Lightheadedness and/or dizziness upon standing       - Chest Pain/ Tightness       - Shortness of Breath       - Difficulty walking    If you have a problem that you believe requires IMMEDIATE attention, please go to your NEAREST Emergency Room. If you believe your problem can safely wait until you speak to a physician, please call Interventional Radiology for any concerns.    During Normal Weekday Business Hours- You can contact the Interventional Radiology department during normal business hours via telephone.  During Evenings and Weekends- If you need to contact Interventional Radiology during off hours, do so by calling the hospital and requesting to be connected to the Interventional Radiologist on call.

## 2024-02-15 ENCOUNTER — TELEPHONE (OUTPATIENT)
Dept: ONCOLOGY | Age: 44
End: 2024-02-15

## 2024-02-20 LAB
CULTURE: NORMAL
Lab: NORMAL
SPECIMEN: NORMAL

## 2024-02-22 ENCOUNTER — OFFICE VISIT (OUTPATIENT)
Dept: SURGERY | Age: 44
End: 2024-02-22

## 2024-02-22 VITALS
HEART RATE: 72 BPM | SYSTOLIC BLOOD PRESSURE: 138 MMHG | DIASTOLIC BLOOD PRESSURE: 74 MMHG | WEIGHT: 208.7 LBS | BODY MASS INDEX: 27.54 KG/M2 | OXYGEN SATURATION: 99 %

## 2024-02-22 DIAGNOSIS — N18.31 STAGE 3A CHRONIC KIDNEY DISEASE (HCC): Primary | ICD-10-CM

## 2024-02-22 RX ORDER — TIZANIDINE 2 MG/1
TABLET ORAL
Qty: 30 TABLET | Refills: 0 | Status: SHIPPED | OUTPATIENT
Start: 2024-02-22

## 2024-02-22 ASSESSMENT — ENCOUNTER SYMPTOMS
ANAL BLEEDING: 0
COLOR CHANGE: 0
RECTAL PAIN: 0
EYE REDNESS: 0
SORE THROAT: 0
CHOKING: 0
STRIDOR: 0
CONSTIPATION: 0
PHOTOPHOBIA: 0
APNEA: 0
EYE ITCHING: 0
BACK PAIN: 0

## 2024-02-22 NOTE — PROGRESS NOTES
skin 3 times daily (before meals) 3 each 3    metoprolol succinate (TOPROL XL) 50 MG extended release tablet Take 1 tablet by mouth daily 90 tablet 3    aspirin EC 81 MG EC tablet Take 1 tablet by mouth daily 90 tablet 3    nitroGLYCERIN (NITROSTAT) 0.4 MG SL tablet Place 1 tablet under the tongue every 5 minutes as needed for Chest pain 25 tablet 3    Lancets MISC 1 each by Does not apply route 3 times daily 600 each 1    blood glucose monitor strips Test 3 times a day & as needed for symptoms of irregular blood glucose. 270 strip 2    magnesium oxide (MAG-OX) 400 (240 Mg) MG tablet Take 1 tablet by mouth 2 times daily (Patient not taking: Reported on 2/22/2024) 30 tablet 0    polyethylene glycol (GLYCOLAX) 17 g packet Take 1 packet by mouth daily as needed for Constipation (Patient not taking: Reported on 2/14/2024) 30 packet 0    dicyclomine (BENTYL) 10 MG capsule Take 1 capsule by mouth 4 times daily as needed (for abdominal cramping) (Patient not taking: Reported on 2/22/2024) 120 capsule 2    Blood Glucose Monitoring Suppl (ONE TOUCH ULTRA 2) w/Device KIT 1 kit by Does not apply route once for 1 dose 1 kit 0     No current facility-administered medications for this visit.      No Known Allergies    Review of Systems:         Review of Systems   Constitutional:  Negative for chills and fever.   HENT:  Negative for ear pain, mouth sores, sore throat and tinnitus.    Eyes:  Negative for photophobia, redness and itching.   Respiratory:  Negative for apnea, choking and stridor.    Cardiovascular:  Negative for chest pain and palpitations.   Gastrointestinal:  Negative for anal bleeding, constipation and rectal pain.   Endocrine: Negative for polydipsia.   Genitourinary:  Negative for enuresis, flank pain and hematuria.   Musculoskeletal:  Negative for back pain, joint swelling and myalgias.   Skin:  Negative for color change and pallor.   Allergic/Immunologic: Negative for environmental allergies.   Neurological:

## 2024-02-24 DIAGNOSIS — E11.9 TYPE 2 DIABETES MELLITUS WITHOUT COMPLICATION, WITH LONG-TERM CURRENT USE OF INSULIN (HCC): ICD-10-CM

## 2024-02-24 DIAGNOSIS — Z79.4 TYPE 2 DIABETES MELLITUS WITHOUT COMPLICATION, WITH LONG-TERM CURRENT USE OF INSULIN (HCC): ICD-10-CM

## 2024-02-26 ENCOUNTER — TELEPHONE (OUTPATIENT)
Dept: BARIATRICS/WEIGHT MGMT | Age: 44
End: 2024-02-26

## 2024-02-26 RX ORDER — GABAPENTIN 300 MG/1
300 CAPSULE ORAL 3 TIMES DAILY
Qty: 90 CAPSULE | Refills: 2 | Status: SHIPPED | OUTPATIENT
Start: 2024-02-26 | End: 2024-03-27

## 2024-02-26 NOTE — TELEPHONE ENCOUNTER
LEFT MESSAGE FOR Dewey Sosa REGARDING SURGERY pd cath placement SCHEDULED @ [facility]. NOTIFIED OF DATES, TIMES AND LOCATION    PHONE ASSESSMENT   SURGERY - 3/8/24 1215  P/O -3/18/24 10 am     NPO AFTER MIDNIGHT   Do not hold asa 81

## 2024-02-27 LAB
CULTURE: NORMAL
Lab: NORMAL
SPECIMEN: NORMAL

## 2024-02-29 RX ORDER — ATORVASTATIN CALCIUM 80 MG/1
80 TABLET, FILM COATED ORAL DAILY
Qty: 30 TABLET | Refills: 5 | OUTPATIENT
Start: 2024-02-29

## 2024-02-29 NOTE — PROGRESS NOTES
.Surgery @ [facility] on 3/8/24 you will be called 3/7/24 with times    NOTHING TO EAT OR DRINK AFTER MIDNIGHT DAY OF SURGERY    1. Enter thru the hospital main entrance on day of surgery, check in at the Information Desk. If you arrive prior to 6:00am, enter thru the ER entrance.    2. Follow the directions as prescribed by the doctor for your procedure and medications.         Morning of surgery take: Bactrim, Ranexa, Prilosec, Metoprolol, Epivir, Imdur, Buspar, Darunivir, Tivicay, Kerendia with sips of water           HOLD Farixiga  3/7/2024         Stop vitamins, supplements and NSAIDS:  3/1/2024     3. Check with your Doctor regarding stopping blood thinners and follow their instructions.    4. Do not smoke, vape or use chewing tobacco morning of surgery. Do not drink any alcoholic beverages 24 hours prior to surgery.       This includes NA Beer. No street drugs 7 days prior to surgery.    5. If you have dentures, contacts of glasses they will be removed before going to the OR; please bring a case.    6. Please bring picture ID, insurance card, paperwork from the doctor’s office (H & P, Consent, & card for implantable devices).    7. Take a shower with an antibacterial soap the night before surgery and the morning of surgery. Do not put anything on your skin      After your morning shower.    8. You will need a responsible adult to drive you home and check on you after surgery.

## 2024-03-01 DIAGNOSIS — F41.9 ANXIETY: ICD-10-CM

## 2024-03-01 RX ORDER — BUSPIRONE HYDROCHLORIDE 10 MG/1
10 TABLET ORAL 2 TIMES DAILY
Qty: 180 TABLET | Refills: 0 | Status: SHIPPED | OUTPATIENT
Start: 2024-03-01 | End: 2024-05-30

## 2024-03-04 ENCOUNTER — HOSPITAL ENCOUNTER (OUTPATIENT)
Age: 44
Discharge: HOME | End: 2024-03-04
Payer: MEDICARE

## 2024-03-04 DIAGNOSIS — B20 AIDS (ACQUIRED IMMUNODEFICIENCY SYNDROME), CD4 <=200 (HCC): ICD-10-CM

## 2024-03-04 LAB
ALBUMIN SERPL-MCNC: 3.8 GM/DL (ref 3.4–5)
ALP BLD-CCNC: 101 IU/L (ref 40–129)
ALT SERPL-CCNC: 20 U/L (ref 10–40)
ANION GAP SERPL CALCULATED.3IONS-SCNC: 13 MMOL/L (ref 7–16)
AST SERPL-CCNC: 21 IU/L (ref 15–37)
BASOPHILS ABSOLUTE: 0 K/CU MM
BASOPHILS RELATIVE PERCENT: 0.8 % (ref 0–1)
BILIRUB SERPL-MCNC: 0.2 MG/DL (ref 0–1)
BILIRUBIN DIRECT: 0.2 MG/DL (ref 0–0.3)
BILIRUBIN, INDIRECT: 0 MG/DL (ref 0–0.7)
BUN SERPL-MCNC: 43 MG/DL (ref 6–23)
CALCIUM SERPL-MCNC: 9.2 MG/DL (ref 8.3–10.6)
CHLORIDE BLD-SCNC: 102 MMOL/L (ref 99–110)
CO2: 16 MMOL/L (ref 21–32)
CREAT SERPL-MCNC: 4.3 MG/DL (ref 0.9–1.3)
DIFFERENTIAL TYPE: ABNORMAL
EOSINOPHILS ABSOLUTE: 0.2 K/CU MM
EOSINOPHILS RELATIVE PERCENT: 4.7 % (ref 0–3)
GFR SERPL CREATININE-BSD FRML MDRD: 17 ML/MIN/1.73M2
GLUCOSE SERPL-MCNC: 309 MG/DL (ref 70–99)
HCT VFR BLD CALC: 20.1 % (ref 42–52)
HEMOGLOBIN: 6.5 GM/DL (ref 13.5–18)
IMMATURE NEUTROPHIL %: 1.1 % (ref 0–0.43)
LYMPHOCYTES ABSOLUTE: 1 K/CU MM
LYMPHOCYTES RELATIVE PERCENT: 28.3 % (ref 24–44)
MCH RBC QN AUTO: 32.7 PG (ref 27–31)
MCHC RBC AUTO-ENTMCNC: 32.3 % (ref 32–36)
MCV RBC AUTO: 101 FL (ref 78–100)
MONOCYTES ABSOLUTE: 0.3 K/CU MM
MONOCYTES RELATIVE PERCENT: 7.2 % (ref 0–4)
NUCLEATED RBC %: 0 %
PDW BLD-RTO: 18.6 % (ref 11.7–14.9)
PLATELET # BLD: 164 K/CU MM (ref 140–440)
PMV BLD AUTO: 10.1 FL (ref 7.5–11.1)
POTASSIUM SERPL-SCNC: 5.1 MMOL/L (ref 3.5–5.1)
RBC # BLD: 1.99 M/CU MM (ref 4.6–6.2)
SEGMENTED NEUTROPHILS ABSOLUTE COUNT: 2.1 K/CU MM
SEGMENTED NEUTROPHILS RELATIVE PERCENT: 57.9 % (ref 36–66)
SODIUM BLD-SCNC: 131 MMOL/L (ref 135–145)
TOTAL IMMATURE NEUTOROPHIL: 0.04 K/CU MM
TOTAL NUCLEATED RBC: 0 K/CU MM
TOTAL PROTEIN: 7.4 GM/DL (ref 6.4–8.2)
WBC # BLD: 3.6 K/CU MM (ref 4–10.5)

## 2024-03-04 PROCEDURE — 82248 BILIRUBIN DIRECT: CPT

## 2024-03-04 PROCEDURE — 36415 COLL VENOUS BLD VENIPUNCTURE: CPT

## 2024-03-04 PROCEDURE — 88185 FLOWCYTOMETRY/TC ADD-ON: CPT

## 2024-03-04 PROCEDURE — 88184 FLOWCYTOMETRY/ TC 1 MARKER: CPT

## 2024-03-04 PROCEDURE — 80053 COMPREHEN METABOLIC PANEL: CPT

## 2024-03-04 PROCEDURE — 87536 HIV-1 QUANT&REVRSE TRNSCRPJ: CPT

## 2024-03-04 PROCEDURE — 85025 COMPLETE CBC W/AUTO DIFF WBC: CPT

## 2024-03-05 DIAGNOSIS — N18.5 CKD (CHRONIC KIDNEY DISEASE) STAGE 5, GFR LESS THAN 15 ML/MIN (HCC): Primary | ICD-10-CM

## 2024-03-05 LAB
CULTURE: NORMAL
Lab: NORMAL
SPECIMEN: NORMAL

## 2024-03-06 LAB
CD3 CELLS # BLD: 897 CELLS/UL (ref 570–2400)
CD3 CELLS NFR SPEC: 87 % (ref 62–87)
CD3+CD4+ CELLS # BLD: 137 CELLS/UL (ref 430–1800)
CD3+CD4+ CELLS NFR BLD: 13 % (ref 32–64)
CD3+CD4+ CELLS/CD3+CD8+ CLL BLD: 0.18 RATIO (ref 0.8–3.9)
CD3+CD8+ CELLS # BLD: 735 CELLS/UL (ref 210–1200)
CD3+CD8+ CELLS NFR SPEC: 71 % (ref 15–46)

## 2024-03-07 ENCOUNTER — ANESTHESIA EVENT (OUTPATIENT)
Dept: OPERATING ROOM | Age: 44
End: 2024-03-07
Payer: MEDICARE

## 2024-03-07 LAB
HIV1 RNA # SPEC NAA+PROBE: ABNORMAL CPY/ML
HIV1 RNA SER-IMP: DETECTED
HIV1 RNA SPEC NAA+PROBE-LOG#: <1.47 LOG CPY/ML

## 2024-03-07 ASSESSMENT — ENCOUNTER SYMPTOMS: SHORTNESS OF BREATH: 1

## 2024-03-07 NOTE — PROGRESS NOTES
Patient notified of surgery time at [facility] 3/8/2024 1145 arrival 0945, NPO instructions and DOS meds reviewed, understanding verbalized

## 2024-03-07 NOTE — ANESTHESIA PRE PROCEDURE
2022     Years since quittin.1   • Smokeless tobacco: Never   Substance Use Topics   • Alcohol use: Not Currently     Comment: caffeine: 1-3 cups of cpffee daily, 1 diegt pepsi max a day.                                Ready to quit: Not Answered  Counseling given: Not Answered      Vital Signs (Current):   Vitals:    24 1430   Weight: 94.3 kg (208 lb)   Height: 1.854 m (6' 1\")                                              BP Readings from Last 3 Encounters:   24 138/74   24 (!) 157/99   24 122/72       NPO Status:                                             8 hrs                                    BMI:   Wt Readings from Last 3 Encounters:   24 94.7 kg (208 lb 11.2 oz)   24 93 kg (205 lb)   24 93 kg (205 lb)     Body mass index is 27.44 kg/m².    CBC:   Lab Results   Component Value Date/Time    WBC 3.6 2024 08:14 AM    RBC 1.99 2024 08:14 AM    HGB 6.5 2024 08:14 AM    HCT 20.1 2024 08:14 AM    .0 2024 08:14 AM    RDW 18.6 2024 08:14 AM     2024 08:14 AM       CMP:   Lab Results   Component Value Date/Time     2024 08:14 AM    K 5.1 2024 08:14 AM     2024 08:14 AM    CO2 16 2024 08:14 AM    BUN 43 2024 08:14 AM    CREATININE 4.3 2024 08:14 AM    GFRAA 50 2022 09:49 AM    AGRATIO 1.0 2021 08:45 AM    LABGLOM 17 2024 08:14 AM    GLUCOSE 309 2024 08:14 AM    PROT 7.4 2024 08:14 AM    CALCIUM 9.2 2024 08:14 AM    BILITOT 0.2 2024 08:14 AM    ALKPHOS 101 2024 08:14 AM    AST 21 2024 08:14 AM    ALT 20 2024 08:14 AM       POC Tests:   No results for input(s): \"POCGLU\", \"POCNA\", \"POCK\", \"POCCL\", \"POCBUN\", \"POCHEMO\", \"POCHCT\" in the last 72 hours.      Coags:   Lab Results   Component Value Date/Time    PROTIME 14.0 2024 04:34 AM    INR 1.1 2024 04:34 AM    APTT 34.2 2024 06:15 AM       HCG (If  intended.  Anesthetic plan and risks discussed with patient.      Plan discussed with CRNA.    Attending anesthesiologist reviewed and agrees with Preprocedure content        Chart Review      BARBARA Garcia - INES   3/7/2024        Pre Anesthesia Evaluation complete. Anesthesia plan, risks, benefits, alternatives, and personnel discussed with patient and/or legal guardian. Patient and/or legal guardian verbalized an understanding and agreed to proceed. Anesthesia plan discussed with care team members and agreed upon.  BARBARA Garcia CRNA  3/7/2024

## 2024-03-08 ENCOUNTER — ANESTHESIA (OUTPATIENT)
Dept: OPERATING ROOM | Age: 44
End: 2024-03-08
Payer: MEDICARE

## 2024-03-08 ENCOUNTER — HOSPITAL ENCOUNTER (OUTPATIENT)
Age: 44
Setting detail: OUTPATIENT SURGERY
Discharge: HOME | End: 2024-03-08
Attending: SURGERY | Admitting: SURGERY
Payer: MEDICARE

## 2024-03-08 VITALS
DIASTOLIC BLOOD PRESSURE: 74 MMHG | HEIGHT: 73 IN | HEART RATE: 84 BPM | TEMPERATURE: 97.1 F | SYSTOLIC BLOOD PRESSURE: 125 MMHG | BODY MASS INDEX: 27.57 KG/M2 | WEIGHT: 208 LBS | OXYGEN SATURATION: 97 % | RESPIRATION RATE: 16 BRPM

## 2024-03-08 DIAGNOSIS — N18.5 CKD (CHRONIC KIDNEY DISEASE) STAGE 5, GFR LESS THAN 15 ML/MIN (HCC): Primary | ICD-10-CM

## 2024-03-08 DIAGNOSIS — N18.5 CKD (CHRONIC KIDNEY DISEASE) STAGE 5, GFR LESS THAN 15 ML/MIN (HCC): ICD-10-CM

## 2024-03-08 LAB
ANION GAP SERPL CALCULATED.3IONS-SCNC: 14 MMOL/L (ref 7–16)
BASOPHILS ABSOLUTE: 0 K/CU MM
BASOPHILS RELATIVE PERCENT: 0.8 % (ref 0–1)
BUN SERPL-MCNC: 15 MG/DL (ref 6–23)
CALCIUM SERPL-MCNC: 8.9 MG/DL (ref 8.3–10.6)
CHLORIDE BLD-SCNC: 94 MMOL/L (ref 99–110)
CO2: 24 MMOL/L (ref 21–32)
CREAT SERPL-MCNC: 2.2 MG/DL (ref 0.9–1.3)
DIFFERENTIAL TYPE: ABNORMAL
EOSINOPHILS ABSOLUTE: 0.1 K/CU MM
EOSINOPHILS RELATIVE PERCENT: 3.3 % (ref 0–3)
GFR SERPL CREATININE-BSD FRML MDRD: 37 ML/MIN/1.73M2
GLUCOSE BLD-MCNC: 222 MG/DL (ref 70–99)
GLUCOSE BLD-MCNC: 223 MG/DL (ref 70–99)
GLUCOSE BLD-MCNC: 296 MG/DL (ref 70–99)
GLUCOSE SERPL-MCNC: 266 MG/DL (ref 70–99)
HCT VFR BLD CALC: 21.4 % (ref 42–52)
HEMOGLOBIN: 7.2 GM/DL (ref 13.5–18)
IMMATURE NEUTROPHIL %: 1.8 % (ref 0–0.43)
LYMPHOCYTES ABSOLUTE: 1.3 K/CU MM
LYMPHOCYTES RELATIVE PERCENT: 32.3 % (ref 24–44)
MCH RBC QN AUTO: 33 PG (ref 27–31)
MCHC RBC AUTO-ENTMCNC: 33.6 % (ref 32–36)
MCV RBC AUTO: 98.2 FL (ref 78–100)
MONOCYTES ABSOLUTE: 0.3 K/CU MM
MONOCYTES RELATIVE PERCENT: 7.5 % (ref 0–4)
NUCLEATED RBC %: 0 %
PDW BLD-RTO: 20 % (ref 11.7–14.9)
PLATELET # BLD: 198 K/CU MM (ref 140–440)
PMV BLD AUTO: 10.2 FL (ref 7.5–11.1)
POTASSIUM SERPL-SCNC: 4.4 MMOL/L (ref 3.5–5.1)
RBC # BLD: 2.18 M/CU MM (ref 4.6–6.2)
SEGMENTED NEUTROPHILS ABSOLUTE COUNT: 2.2 K/CU MM
SEGMENTED NEUTROPHILS RELATIVE PERCENT: 54.3 % (ref 36–66)
SODIUM BLD-SCNC: 132 MMOL/L (ref 135–145)
TOTAL IMMATURE NEUTOROPHIL: 0.07 K/CU MM
TOTAL NUCLEATED RBC: 0 K/CU MM
WBC # BLD: 4 K/CU MM (ref 4–10.5)

## 2024-03-08 PROCEDURE — 2580000003 HC RX 258: Performed by: PHYSICIAN ASSISTANT

## 2024-03-08 PROCEDURE — 3700000001 HC ADD 15 MINUTES (ANESTHESIA): Performed by: SURGERY

## 2024-03-08 PROCEDURE — 6360000002 HC RX W HCPCS: Performed by: ANESTHESIOLOGY

## 2024-03-08 PROCEDURE — 7100000011 HC PHASE II RECOVERY - ADDTL 15 MIN: Performed by: SURGERY

## 2024-03-08 PROCEDURE — 6360000002 HC RX W HCPCS: Performed by: PHYSICIAN ASSISTANT

## 2024-03-08 PROCEDURE — 3600000004 HC SURGERY LEVEL 4 BASE: Performed by: SURGERY

## 2024-03-08 PROCEDURE — 6370000000 HC RX 637 (ALT 250 FOR IP): Performed by: ANESTHESIOLOGY

## 2024-03-08 PROCEDURE — 3600000014 HC SURGERY LEVEL 4 ADDTL 15MIN: Performed by: SURGERY

## 2024-03-08 PROCEDURE — 82962 GLUCOSE BLOOD TEST: CPT

## 2024-03-08 PROCEDURE — C1750 CATH, HEMODIALYSIS,LONG-TERM: HCPCS | Performed by: SURGERY

## 2024-03-08 PROCEDURE — 49324 LAP INSERT TUNNEL IP CATH: CPT | Performed by: SURGERY

## 2024-03-08 PROCEDURE — 7100000010 HC PHASE II RECOVERY - FIRST 15 MIN: Performed by: SURGERY

## 2024-03-08 PROCEDURE — 6360000002 HC RX W HCPCS: Performed by: SURGERY

## 2024-03-08 PROCEDURE — 2500000003 HC RX 250 WO HCPCS

## 2024-03-08 PROCEDURE — 7100000000 HC PACU RECOVERY - FIRST 15 MIN: Performed by: SURGERY

## 2024-03-08 PROCEDURE — 2709999900 HC NON-CHARGEABLE SUPPLY: Performed by: SURGERY

## 2024-03-08 PROCEDURE — 80048 BASIC METABOLIC PNL TOTAL CA: CPT

## 2024-03-08 PROCEDURE — 3700000000 HC ANESTHESIA ATTENDED CARE: Performed by: SURGERY

## 2024-03-08 PROCEDURE — 7100000001 HC PACU RECOVERY - ADDTL 15 MIN: Performed by: SURGERY

## 2024-03-08 PROCEDURE — 2580000003 HC RX 258: Performed by: ANESTHESIOLOGY

## 2024-03-08 PROCEDURE — APPNB30 APP NON BILLABLE TIME 0-30 MINS: Performed by: PHYSICIAN ASSISTANT

## 2024-03-08 PROCEDURE — C2628 CATHETER, OCCLUSION: HCPCS | Performed by: SURGERY

## 2024-03-08 PROCEDURE — 6360000002 HC RX W HCPCS

## 2024-03-08 PROCEDURE — 85025 COMPLETE CBC W/AUTO DIFF WBC: CPT

## 2024-03-08 RX ORDER — LIDOCAINE HYDROCHLORIDE 20 MG/ML
INJECTION, SOLUTION INTRAVENOUS PRN
Status: DISCONTINUED | OUTPATIENT
Start: 2024-03-08 | End: 2024-03-08 | Stop reason: SDUPTHER

## 2024-03-08 RX ORDER — FENTANYL CITRATE 50 UG/ML
INJECTION, SOLUTION INTRAMUSCULAR; INTRAVENOUS PRN
Status: DISCONTINUED | OUTPATIENT
Start: 2024-03-08 | End: 2024-03-08 | Stop reason: SDUPTHER

## 2024-03-08 RX ORDER — DEXTROSE MONOHYDRATE 100 MG/ML
INJECTION, SOLUTION INTRAVENOUS CONTINUOUS PRN
Status: CANCELLED | OUTPATIENT
Start: 2024-03-08

## 2024-03-08 RX ORDER — SODIUM CHLORIDE 0.9 % (FLUSH) 0.9 %
5-40 SYRINGE (ML) INJECTION PRN
Status: DISCONTINUED | OUTPATIENT
Start: 2024-03-08 | End: 2024-03-08 | Stop reason: HOSPADM

## 2024-03-08 RX ORDER — FENTANYL CITRATE 50 UG/ML
25 INJECTION, SOLUTION INTRAMUSCULAR; INTRAVENOUS EVERY 5 MIN PRN
Status: DISCONTINUED | OUTPATIENT
Start: 2024-03-08 | End: 2024-03-08 | Stop reason: HOSPADM

## 2024-03-08 RX ORDER — PROPOFOL 10 MG/ML
INJECTION, EMULSION INTRAVENOUS PRN
Status: DISCONTINUED | OUTPATIENT
Start: 2024-03-08 | End: 2024-03-08 | Stop reason: SDUPTHER

## 2024-03-08 RX ORDER — SODIUM CHLORIDE, SODIUM LACTATE, POTASSIUM CHLORIDE, CALCIUM CHLORIDE 600; 310; 30; 20 MG/100ML; MG/100ML; MG/100ML; MG/100ML
INJECTION, SOLUTION INTRAVENOUS CONTINUOUS
Status: DISCONTINUED | OUTPATIENT
Start: 2024-03-08 | End: 2024-03-08 | Stop reason: HOSPADM

## 2024-03-08 RX ORDER — LABETALOL HYDROCHLORIDE 5 MG/ML
10 INJECTION, SOLUTION INTRAVENOUS
Status: DISCONTINUED | OUTPATIENT
Start: 2024-03-08 | End: 2024-03-08 | Stop reason: HOSPADM

## 2024-03-08 RX ORDER — SODIUM CHLORIDE 0.9 % (FLUSH) 0.9 %
5-40 SYRINGE (ML) INJECTION EVERY 12 HOURS SCHEDULED
Status: DISCONTINUED | OUTPATIENT
Start: 2024-03-08 | End: 2024-03-08 | Stop reason: HOSPADM

## 2024-03-08 RX ORDER — PHENYLEPHRINE HCL IN 0.9% NACL 1 MG/10 ML
SYRINGE (ML) INTRAVENOUS PRN
Status: DISCONTINUED | OUTPATIENT
Start: 2024-03-08 | End: 2024-03-08 | Stop reason: SDUPTHER

## 2024-03-08 RX ORDER — HYDRALAZINE HYDROCHLORIDE 20 MG/ML
10 INJECTION INTRAMUSCULAR; INTRAVENOUS
Status: DISCONTINUED | OUTPATIENT
Start: 2024-03-08 | End: 2024-03-08 | Stop reason: HOSPADM

## 2024-03-08 RX ORDER — EPHEDRINE SULFATE 50 MG/ML
INJECTION INTRAVENOUS PRN
Status: DISCONTINUED | OUTPATIENT
Start: 2024-03-08 | End: 2024-03-08 | Stop reason: SDUPTHER

## 2024-03-08 RX ORDER — GLUCAGON 1 MG/ML
1 KIT INJECTION PRN
Status: CANCELLED | OUTPATIENT
Start: 2024-03-08

## 2024-03-08 RX ORDER — DEXAMETHASONE SODIUM PHOSPHATE 4 MG/ML
INJECTION, SOLUTION INTRA-ARTICULAR; INTRALESIONAL; INTRAMUSCULAR; INTRAVENOUS; SOFT TISSUE PRN
Status: DISCONTINUED | OUTPATIENT
Start: 2024-03-08 | End: 2024-03-08

## 2024-03-08 RX ORDER — ROCURONIUM BROMIDE 10 MG/ML
INJECTION, SOLUTION INTRAVENOUS PRN
Status: DISCONTINUED | OUTPATIENT
Start: 2024-03-08 | End: 2024-03-08 | Stop reason: SDUPTHER

## 2024-03-08 RX ORDER — HYDROCODONE BITARTRATE AND ACETAMINOPHEN 5; 325 MG/1; MG/1
1 TABLET ORAL EVERY 6 HOURS PRN
Qty: 10 TABLET | Refills: 0 | Status: SHIPPED | OUTPATIENT
Start: 2024-03-08 | End: 2024-03-11

## 2024-03-08 RX ORDER — ONDANSETRON 2 MG/ML
4 INJECTION INTRAMUSCULAR; INTRAVENOUS
Status: DISCONTINUED | OUTPATIENT
Start: 2024-03-08 | End: 2024-03-08 | Stop reason: HOSPADM

## 2024-03-08 RX ORDER — SODIUM CHLORIDE 9 MG/ML
INJECTION, SOLUTION INTRAVENOUS PRN
Status: DISCONTINUED | OUTPATIENT
Start: 2024-03-08 | End: 2024-03-08 | Stop reason: HOSPADM

## 2024-03-08 RX ORDER — ONDANSETRON 2 MG/ML
INJECTION INTRAMUSCULAR; INTRAVENOUS PRN
Status: DISCONTINUED | OUTPATIENT
Start: 2024-03-08 | End: 2024-03-08 | Stop reason: SDUPTHER

## 2024-03-08 RX ORDER — LIDOCAINE HYDROCHLORIDE 10 MG/ML
INJECTION, SOLUTION EPIDURAL; INFILTRATION; INTRACAUDAL; PERINEURAL
Status: DISCONTINUED
Start: 2024-03-08 | End: 2024-03-08 | Stop reason: WASHOUT

## 2024-03-08 RX ORDER — BUPIVACAINE HYDROCHLORIDE 5 MG/ML
INJECTION, SOLUTION EPIDURAL; INTRACAUDAL
Status: COMPLETED | OUTPATIENT
Start: 2024-03-08 | End: 2024-03-08

## 2024-03-08 RX ORDER — HEPARIN 100 UNIT/ML
SYRINGE INTRAVENOUS
Status: COMPLETED | OUTPATIENT
Start: 2024-03-08 | End: 2024-03-08

## 2024-03-08 RX ORDER — FENTANYL CITRATE 50 UG/ML
50 INJECTION, SOLUTION INTRAMUSCULAR; INTRAVENOUS EVERY 5 MIN PRN
Status: DISCONTINUED | OUTPATIENT
Start: 2024-03-08 | End: 2024-03-08 | Stop reason: HOSPADM

## 2024-03-08 RX ORDER — NALOXONE HYDROCHLORIDE 0.4 MG/ML
INJECTION, SOLUTION INTRAMUSCULAR; INTRAVENOUS; SUBCUTANEOUS PRN
Status: DISCONTINUED | OUTPATIENT
Start: 2024-03-08 | End: 2024-03-08 | Stop reason: HOSPADM

## 2024-03-08 RX ADMIN — CEFAZOLIN 1000 MG: 1 INJECTION, POWDER, FOR SOLUTION INTRAMUSCULAR; INTRAVENOUS; PARENTERAL at 11:29

## 2024-03-08 RX ADMIN — SODIUM CHLORIDE, POTASSIUM CHLORIDE, SODIUM LACTATE AND CALCIUM CHLORIDE: 600; 310; 30; 20 INJECTION, SOLUTION INTRAVENOUS at 11:18

## 2024-03-08 RX ADMIN — LIDOCAINE HYDROCHLORIDE 60 MG: 20 INJECTION, SOLUTION INTRAVENOUS at 11:25

## 2024-03-08 RX ADMIN — Medication 0.1 MG: at 11:30

## 2024-03-08 RX ADMIN — Medication 0.1 MG: at 11:33

## 2024-03-08 RX ADMIN — FENTANYL CITRATE 25 MCG: 50 INJECTION INTRAMUSCULAR; INTRAVENOUS at 13:32

## 2024-03-08 RX ADMIN — FENTANYL CITRATE 25 MCG: 50 INJECTION, SOLUTION INTRAMUSCULAR; INTRAVENOUS at 12:24

## 2024-03-08 RX ADMIN — Medication 0.05 MG: at 11:59

## 2024-03-08 RX ADMIN — Medication 0.05 MG: at 12:02

## 2024-03-08 RX ADMIN — ONDANSETRON 4 MG: 2 INJECTION INTRAMUSCULAR; INTRAVENOUS at 11:28

## 2024-03-08 RX ADMIN — PROPOFOL 200 MG: 10 INJECTION, EMULSION INTRAVENOUS at 11:25

## 2024-03-08 RX ADMIN — INSULIN HUMAN 10 UNITS: 100 INJECTION, SOLUTION PARENTERAL at 11:01

## 2024-03-08 RX ADMIN — FENTANYL CITRATE 50 MCG: 50 INJECTION, SOLUTION INTRAMUSCULAR; INTRAVENOUS at 11:25

## 2024-03-08 RX ADMIN — ROCURONIUM BROMIDE 50 MG: 10 INJECTION, SOLUTION INTRAVENOUS at 11:25

## 2024-03-08 RX ADMIN — FENTANYL CITRATE 25 MCG: 50 INJECTION, SOLUTION INTRAMUSCULAR; INTRAVENOUS at 12:27

## 2024-03-08 RX ADMIN — EPHEDRINE SULFATE 50 MG: 50 INJECTION INTRAVENOUS at 11:41

## 2024-03-08 ASSESSMENT — PAIN DESCRIPTION - DESCRIPTORS
DESCRIPTORS: BURNING
DESCRIPTORS: TENDER
DESCRIPTORS: TENDER
DESCRIPTORS: BURNING

## 2024-03-08 ASSESSMENT — PAIN DESCRIPTION - ORIENTATION
ORIENTATION: LEFT
ORIENTATION: LEFT

## 2024-03-08 ASSESSMENT — PAIN DESCRIPTION - LOCATION
LOCATION: ABDOMEN
LOCATION: ABDOMEN

## 2024-03-08 ASSESSMENT — PAIN DESCRIPTION - ONSET
ONSET: ON-GOING
ONSET: ON-GOING

## 2024-03-08 ASSESSMENT — PAIN - FUNCTIONAL ASSESSMENT
PAIN_FUNCTIONAL_ASSESSMENT: 0-10
PAIN_FUNCTIONAL_ASSESSMENT: PREVENTS OR INTERFERES SOME ACTIVE ACTIVITIES AND ADLS
PAIN_FUNCTIONAL_ASSESSMENT: 0-10

## 2024-03-08 ASSESSMENT — PAIN SCALES - GENERAL
PAINLEVEL_OUTOF10: 6
PAINLEVEL_OUTOF10: 0
PAINLEVEL_OUTOF10: 4

## 2024-03-08 ASSESSMENT — PAIN DESCRIPTION - FREQUENCY
FREQUENCY: CONTINUOUS
FREQUENCY: CONTINUOUS

## 2024-03-08 ASSESSMENT — PAIN DESCRIPTION - PAIN TYPE
TYPE: SURGICAL PAIN
TYPE: SURGICAL PAIN

## 2024-03-08 NOTE — DISCHARGE INSTRUCTIONS
Patient Discharge Instructions  Dr. Sherine Newby PA-C  [phone]    Discharge Date:  2/8/2022    Discharged To:  Home      RESUME ACTIVITY:      BATHING:   Recommend sponge bath daily but no bath tub or submerging incision under water    Dressing:    Your incisions are covered with glue that will fall off with time. You may leave these incisions uncovered with any additional dressings    Drain:                          Your PD catheter has an inact dressing. Please leave this dressing intact per dialysis instructions. You should sponge bathe until cleared by your dialysis nurses.    DRIVING:   3-5 days. No driving until off narcotic pain medications and walking comfortably    RETURN TO WORK: when cleared after your follow up office visit    WALKING:    As tolerated     STAIRS:    As tolerated    LIFTING:   Less than 10 pounds for one week    DIET:    Regular diet as tolerated.     SPECIAL INSTRUCTIONS:     Call the office at [phone]  if you have a fever greater than or equal to 101 oF or if your incision becomes red, tender, or has drainage of pus.      If follow up appointment was not given to you, call the Surgical Clinic at [phone] for follow up appointment with Dr. Joaquin Mijares in:  1-2 weeks.            [facility]  [phone]    Do not drive, work around machines or use equipment.  Do not drink any alcoholic beverages.  Do not smoke while alone.  Avoid making important decisions.  Plan to spend a quiet, relaxed evening @ home.  Resume normal activities as you begin to feel better.  Eat lightly for your first meal, then gradually increase your diet to what is normal for you.  In case of nausea, avoid food and drink only clear liquids.  Resume food as nausea ceases.  Notify your surgeon if you experience fever, chills, large amount of bleeding, difficulty breathing, persistent nausea and vomiting or any other disturbing problem.  Call for a follow-up  appointment with your surgeon.

## 2024-03-08 NOTE — ANESTHESIA POSTPROCEDURE EVALUATION
Department of Anesthesiology  Postprocedure Note    Patient: Dewey Sosa  MRN: [id number]  YOB: 1980  Date of evaluation: 3/8/2024    Procedure Summary       Date: 03/08/24 Room / Location: [address]    Anesthesia Start: 1118 Anesthesia Stop: 1232    Procedure: CATHETER INSERTION PERITONEAL DIALYSIS LAPAROSCOPIC (Abdomen) Diagnosis:       Stage 3a chronic kidney disease (HCC)      (Stage 3a chronic kidney disease (HCC) [N18.31])    Surgeons: Sherine Nuñez MD Responsible Provider: Jj Rivers MD    Anesthesia Type: General ASA Status: 4            Anesthesia Type: General    Fernanda Phase I: Fernanda Score: 10    Fernanda Phase II:      Anesthesia Post Evaluation    Patient location during evaluation: PACU  Patient participation: complete - patient participated  Level of consciousness: awake and alert  Airway patency: patent  Nausea & Vomiting: no nausea and no vomiting  Cardiovascular status: hemodynamically stable  Respiratory status: spontaneous ventilation and room air  Hydration status: stable  Pain management: adequate    No notable events documented.

## 2024-03-08 NOTE — PROGRESS NOTES
1355 pt returned from pacu,  1420 rec'd report from Sun CEDILLO RN.  Pt is alert and awake, vss, boyfriend and mother at bedside, call light in reach, c/o pain 7/10 but tolerable, 1438 physician at bedside to check sutures.  1438 pt ready to get dressed for d/c., iv removed, d/c instructions given to boyfriend Frandy and pts mother.  1450 pt getting dressed for d/c.  1455 pt d/c to home with boyfriend via w/c per nurse.  Pt stable at d/c.

## 2024-03-08 NOTE — OP NOTE
Operative Report:    Patient ID:  Dewey Sosa  [id number]  43 y.o.  1980    Indications: ESRD    Pre-operative Diagnosis: ESRD    Post-operative Diagnosis: same    Procedure:   1. Laparoscopic PD cath placement       Surgeon: RAE BRADLEY MD , MD    Findings:  same    Estimated Blood Loss:  Minimal           Total IV Fluids: 500 ml            Complications:  None; patient tolerated the procedure well.           Disposition: PACU - hemodynamically stable.           Condition: stable      Procedure Details:  The patient was seen again in the Holding Room. The risks, benefits, complications, treatment options, and expected outcomes were discussed with the patient. The possibilities of reaction to medication, pulmonary aspiration, perforation of viscus, bleeding, recurrent infection, the need for additional procedures, and development of a complication requiring transfusion or further operation were discussed with the patient and/or family. There was concurrence with the proposed plan, and informed consent was obtained.  The PD catheter exit site was marked in the preoperative area. The patient was taken to the Operating Room, identified as Dewey Sosa, and the procedure verified. A Time Out was held and the above information confirmed.      DESCRIPTION OF PROCEDURE: The patient was brought into the operating room and placed supine. The abdomen was prepped and draped in the usual sterile fashion. Using a 5-mm optical trocar, the right upper quadrant was entered without incidence. CO2 insufflation was tolerated, and the patient was positioned in Trendelenburg. A small incision was made just lateral to the umbilicus over the rectus abdominis muscle. The incision was deepened through the subcutaneous tissue, and the anterior rectus sheath was incised, opened and using the tunneler and the PD catheter combination, the PD catheter was tunneled under direct vision into the posterior peritoneal wall through the

## 2024-03-08 NOTE — PROGRESS NOTES
1229: patient arrived in PACU s/p dialysis catheter insertion. Received report from Rey WEAVER and JODI CRNA. Patient arrived wearing simple oxygen mask with an o2 sat of 100%. Patient has 3 surgical sites and peritoneal catheter present on left lower abdomen.  1238: Patient more awake and alert, following commands. 98% on room air. Denies pain, denies nausea.   1250: Patient glucose 222.
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
## 2024-03-08 NOTE — PROGRESS NOTES
Dr Nuñez made aware of patient took Brilinta on 3/7/2024.  Dr Rivers and Dr Nuñez made aware of poct 296, Insulin ordered.   99

## 2024-03-08 NOTE — PROGRESS NOTES
1250 Report rec'd from Isabella WEAVER  1300 Dressing saturated and site actively bleeding. Pressure held and Dr nuñez notified  1305 Dr. Collins, Dr. Nuñez and Katy at beside  1320 Dressing C/D/I  1332 Pt medicated for pain  1340 pt verbalizes improvement in pain  1352 Pt transported to [facility] by this RN. Report given to Sun WEAVER

## 2024-03-11 ENCOUNTER — TELEPHONE (OUTPATIENT)
Dept: SURGERY | Age: 44
End: 2024-03-11

## 2024-03-11 NOTE — TELEPHONE ENCOUNTER
Post-op Phone Call Follow-up  Dr Joaquin Sosa is 3 days s/p PD Cath Placement.     I called and left VM to see how they were doing post-operatively.    Stiven Love MA

## 2024-03-11 NOTE — TELEPHONE ENCOUNTER
Post-op Phone Call Follow-up  Dr. Joaquin Sosa is 3 days s/p  PD Cath Placement. .     I called the pt today to see how they were doing post-operatively.  The pt's pain is  well controlled with current pain control regimen.   Pt's incisions are doing well. Denies erythema, swelling or drainage.   Pt is tolerating eating without N/V, and is  having bowel movements.   I reviewed the post-operative instructions, addressed any concerns and answered the patient's questions.     I confirmed the patient's post-op appointment on 3/18/2024 @ 10:00        Walker Mendiola MA

## 2024-03-12 LAB
CULTURE: NORMAL
Lab: NORMAL
SPECIMEN: NORMAL

## 2024-03-13 RX ORDER — METOPROLOL SUCCINATE 50 MG/1
50 TABLET, EXTENDED RELEASE ORAL DAILY
Qty: 90 TABLET | Refills: 3 | Status: SHIPPED | OUTPATIENT
Start: 2024-03-13

## 2024-03-13 RX ORDER — TIZANIDINE 2 MG/1
TABLET ORAL
Qty: 30 TABLET | Refills: 0 | Status: SHIPPED | OUTPATIENT
Start: 2024-03-13

## 2024-03-13 NOTE — TELEPHONE ENCOUNTER
Refill sent, however, he seems to be going through these pretty quickly. These shouldn't be a routine med and if so, we may want to re-evaluate his pain issues soon.

## 2024-03-14 ENCOUNTER — HOSPITAL ENCOUNTER (OUTPATIENT)
Dept: INFUSION THERAPY | Age: 44
Discharge: HOME | End: 2024-03-14
Payer: MEDICARE

## 2024-03-14 ENCOUNTER — OFFICE VISIT (OUTPATIENT)
Dept: ONCOLOGY | Age: 44
End: 2024-03-14
Payer: MEDICARE

## 2024-03-14 VITALS
HEIGHT: 73 IN | WEIGHT: 212 LBS | TEMPERATURE: 97.6 F | SYSTOLIC BLOOD PRESSURE: 145 MMHG | OXYGEN SATURATION: 100 % | BODY MASS INDEX: 28.1 KG/M2 | HEART RATE: 84 BPM | DIASTOLIC BLOOD PRESSURE: 73 MMHG

## 2024-03-14 DIAGNOSIS — D64.9 ANEMIA, UNSPECIFIED TYPE: Primary | ICD-10-CM

## 2024-03-14 PROCEDURE — 99211 OFF/OP EST MAY X REQ PHY/QHP: CPT

## 2024-03-14 PROCEDURE — 99204 OFFICE O/P NEW MOD 45 MIN: CPT | Performed by: INTERNAL MEDICINE

## 2024-03-14 PROCEDURE — G8417 CALC BMI ABV UP PARAM F/U: HCPCS | Performed by: INTERNAL MEDICINE

## 2024-03-14 PROCEDURE — 4004F PT TOBACCO SCREEN RCVD TLK: CPT | Performed by: INTERNAL MEDICINE

## 2024-03-14 PROCEDURE — G8428 CUR MEDS NOT DOCUMENT: HCPCS | Performed by: INTERNAL MEDICINE

## 2024-03-14 PROCEDURE — G8482 FLU IMMUNIZE ORDER/ADMIN: HCPCS | Performed by: INTERNAL MEDICINE

## 2024-03-14 NOTE — PROGRESS NOTES
MA Rooming Questions  Patient: Dewey Sosa  MRN: [id number]    Date: 3/14/2024      NEW PATIENT HOSPITAL FOLLOW UP   Wt Readings from Last 3 Encounters:   03/14/24 96.2 kg (212 lb)   02/29/24 94.3 kg (208 lb)   02/22/24 94.7 kg (208 lb 11.2 oz)        5. Did the patient have a depression screening completed today? No    No data recorded     PHQ-9 Given to (if applicable):               PHQ-9 Score (if applicable):                     [] Positive     []  Negative              Does question #9 need addressed (if applicable)                     [] Yes    []  No               Medina Gabriel, [facility]    
each 3    aspirin EC 81 MG EC tablet Take 1 tablet by mouth daily 90 tablet 3    nitroGLYCERIN (NITROSTAT) 0.4 MG SL tablet Place 1 tablet under the tongue every 5 minutes as needed for Chest pain 25 tablet 3    Lancets MISC 1 each by Does not apply route 3 times daily 600 each 1    Blood Glucose Monitoring Suppl (ONE TOUCH ULTRA 2) w/Device KIT 1 kit by Does not apply route once for 1 dose 1 kit 0    blood glucose monitor strips Test 3 times a day & as needed for symptoms of irregular blood glucose. 270 strip 2     No current facility-administered medications on file prior to visit.        Review of Systems:    Constitutional:  No weight loss, No fever, No chills, No night sweats.  Energy level poor  Eyes:  No diplopia, No transient or permanent loss of vision, No scotomata.  ENT / Mouth:  No epistaxis, No dysphagia, No hoarseness, No oral ulcers, No gingival bleeding.  No sore throat, No postnasal drip, No nasal drip, No mouth pain, No sinus pain, No tinnitus, Normal hearing.  Cardiovascular:  No chest pain, No palpitations, No syncope, No upper extremity edema, No lower extremity edema, No calf discomfort.  Respiratory:  No cough.  No hemoptysis, No pleurisy, No wheezing, No dyspnea.  Gastrointestinal:  No abdominal pain, No abdominal cramping, No nausea, No vomiting, No constipation, No diarrhea, No hematochezia, No melena, No jaundice, No dyspepsia, No dysphagia.  Musculoskeletal:  No muscle pain, No swollen joints, No joint redness, No bone pain, No spine tenderness.  Skin:  No rash, No nodules, No pruritus, No lesions.  Neurologic:  No confusion, No seizures, No syncope, No tremor, No speech change, No headache, No hiccups, No abnormal gait, No sensory changes, No weakness.  Hematologic:  No epistaxis, No gingival bleeding, No petechiae, No ecchymosis.  Lymphatic:  No lymphadenopathy, No lymphedema.  Allergy / Immunologic:  No eczema, No frequent mucous infections, No frequent respiratory infections, No

## 2024-03-15 RX ORDER — RANOLAZINE 500 MG/1
500 TABLET, EXTENDED RELEASE ORAL 2 TIMES DAILY
Qty: 60 TABLET | Refills: 5 | OUTPATIENT
Start: 2024-03-15

## 2024-03-18 ENCOUNTER — OFFICE VISIT (OUTPATIENT)
Dept: SURGERY | Age: 44
End: 2024-03-18

## 2024-03-18 VITALS
BODY MASS INDEX: 29.14 KG/M2 | HEART RATE: 76 BPM | SYSTOLIC BLOOD PRESSURE: 120 MMHG | WEIGHT: 219.9 LBS | HEIGHT: 73 IN | DIASTOLIC BLOOD PRESSURE: 80 MMHG | OXYGEN SATURATION: 100 %

## 2024-03-18 DIAGNOSIS — N18.31 STAGE 3A CHRONIC KIDNEY DISEASE (HCC): Primary | ICD-10-CM

## 2024-03-18 RX ORDER — SULFAMETHOXAZOLE AND TRIMETHOPRIM 400; 80 MG/1; MG/1
30 TABLET ORAL
Qty: 360 TABLET | Refills: 2 | OUTPATIENT
Start: 2024-03-18 | End: 2024-06-10

## 2024-03-19 ENCOUNTER — TELEPHONE (OUTPATIENT)
Dept: INFECTIOUS DISEASES | Age: 44
End: 2024-03-19

## 2024-03-19 NOTE — TELEPHONE ENCOUNTER
Called to [facility] [phone] bactrim 400/80 mg tablets, 1 by mouth 3 times per week Monday, Wednesday, and Friday, #30 X2

## 2024-03-20 RX ORDER — ATORVASTATIN CALCIUM 80 MG/1
80 TABLET, FILM COATED ORAL DAILY
Qty: 90 TABLET | OUTPATIENT
Start: 2024-03-20

## 2024-03-21 ENCOUNTER — TELEPHONE (OUTPATIENT)
Dept: SURGERY | Age: 44
End: 2024-03-21

## 2024-03-21 NOTE — PROGRESS NOTES
IR Procedure at [facility]: Left message for patient to arrive at 1030 at [facility] on 3/22/2024 for his procedure at 1200. Also went over below instructions and told patient to take medications as scheduled.But to check with his Dr About  naeem.     NPO at Midnight     (Paracentesis, Thoracentesis, Thyroid Bx and Injections may have a light breakfast)  2.   Follow your directions as prescribed by the doctor for your procedure and medications.  3.   Consult your provider as to when to stop blood thinner  4.   Do not take any pain medication within 6 hours of your procedure  5.   Do not drink any alcoholic beverages or use any street drugs 24 hours before procedure.  6.   Please wear simple, loose fitting clothing to the hospital.  Do not bring valuables (money,             credit cards, checkbooks, etc.)     7.   If you  have a Living Will and Durable Power of  for Healthcare, please bring in a copy.  8.   Please bring picture ID,  insurance card, paperwork from the doctors office            (H & P, Consent,  & card for implantable devices).  9.   Report to the information desk on the ground floor.  10. Take a shower the night before or morning of your procedure, do not apply any lotion, oil or powder.  11. If you are going to be sedated for the procedure, you will need a responsible adult to drive you home.

## 2024-03-22 ENCOUNTER — HOSPITAL ENCOUNTER (OUTPATIENT)
Dept: INTERVENTIONAL RADIOLOGY/VASCULAR | Age: 44
Discharge: HOME | End: 2024-03-22

## 2024-03-22 NOTE — PROGRESS NOTES
IR Procedure at [facility]: Left message for patient to arrive at 1200 at [facility] on 3/25/2024 for his procedure at 1330. Also went over below instructions and told patient to take his medications as scheduled , but to check with his Dr about his brilinta.    NPO at Midnight     (Paracentesis, Thoracentesis, Thyroid Bx and Injections may have a light breakfast)  2.   Follow your directions as prescribed by the doctor for your procedure and medications.  3.   Consult your provider as to when to stop blood thinner  4.   Do not take any pain medication within 6 hours of your procedure  5.   Do not drink any alcoholic beverages or use any street drugs 24 hours before procedure.  6.   Please wear simple, loose fitting clothing to the hospital.  Do not bring valuables (money,             credit cards, checkbooks, etc.)     7.   If you  have a Living Will and Durable Power of  for Healthcare, please bring in a copy.  8.   Please bring picture ID,  insurance card, paperwork from the doctors office            (H & P, Consent,  & card for implantable devices).  9.   Report to the information desk on the ground floor.  10. Take a shower the night before or morning of your procedure, do not apply any lotion, oil or powder.  11. If you are going to be sedated for the procedure, you will need a responsible adult to drive you home.

## 2024-03-25 ENCOUNTER — HOSPITAL ENCOUNTER (OUTPATIENT)
Dept: INTERVENTIONAL RADIOLOGY/VASCULAR | Age: 44
Discharge: HOME | End: 2024-03-25
Payer: MEDICARE

## 2024-03-25 VITALS — OXYGEN SATURATION: 100 % | HEART RATE: 62 BPM | DIASTOLIC BLOOD PRESSURE: 80 MMHG | SYSTOLIC BLOOD PRESSURE: 126 MMHG

## 2024-03-25 DIAGNOSIS — Z45.2 ENCOUNTER FOR CARE RELATED TO VASCULAR ACCESS PORT: ICD-10-CM

## 2024-03-25 PROCEDURE — 6360000004 HC RX CONTRAST MEDICATION

## 2024-03-25 PROCEDURE — 76000 FLUOROSCOPY <1 HR PHYS/QHP: CPT

## 2024-03-25 ASSESSMENT — PAIN - FUNCTIONAL ASSESSMENT: PAIN_FUNCTIONAL_ASSESSMENT: NONE - DENIES PAIN

## 2024-04-01 ENCOUNTER — OFFICE VISIT (OUTPATIENT)
Dept: SURGERY | Age: 44
End: 2024-04-01

## 2024-04-01 ENCOUNTER — TELEPHONE (OUTPATIENT)
Dept: SURGERY | Age: 44
End: 2024-04-01

## 2024-04-01 VITALS
BODY MASS INDEX: 29.86 KG/M2 | SYSTOLIC BLOOD PRESSURE: 132 MMHG | DIASTOLIC BLOOD PRESSURE: 82 MMHG | OXYGEN SATURATION: 97 % | HEIGHT: 73 IN | WEIGHT: 225.3 LBS | HEART RATE: 62 BPM

## 2024-04-01 DIAGNOSIS — N18.31 STAGE 3A CHRONIC KIDNEY DISEASE (HCC): Primary | ICD-10-CM

## 2024-04-01 PROCEDURE — 99024 POSTOP FOLLOW-UP VISIT: CPT | Performed by: SURGERY

## 2024-04-01 NOTE — TELEPHONE ENCOUNTER
SPOKE TO Dewey Sosa REGARDING SURGERY (PD Cath Revision) SCHEDULED @ [facility]. NOTIFIED OF DATES, TIMES AND LOCATION    PAT - PHONE ASSESSMENT  SURGERY - 4/5/24 @ 08:45 AM, 07:15 AM arrival  P/O -  4/18/24 at 11:00 AM    NPO AFTER MIDNIGHT  Prep: N/A  HOLD BLOOD THINNERS - Not on thinners  Spoke with Dewey in office, gave dates/times after today's visit.
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
## 2024-04-02 ENCOUNTER — HOSPITAL ENCOUNTER (OUTPATIENT)
Age: 44
Discharge: HOME | End: 2024-04-02
Payer: MEDICARE

## 2024-04-02 ENCOUNTER — HOSPITAL ENCOUNTER (OUTPATIENT)
Dept: GENERAL RADIOLOGY | Age: 44
Discharge: HOME | End: 2024-04-02
Payer: MEDICARE

## 2024-04-02 DIAGNOSIS — N18.31 STAGE 3A CHRONIC KIDNEY DISEASE (HCC): ICD-10-CM

## 2024-04-02 PROCEDURE — 74019 RADEX ABDOMEN 2 VIEWS: CPT

## 2024-04-03 NOTE — PROGRESS NOTES
4/3/24 - .LM with my call-back # concerning  surgery @ [facility] on  4/5/24.  Please call the PAT Nurse for a phone assessment and surgery instructions.

## 2024-04-04 ENCOUNTER — ANESTHESIA EVENT (OUTPATIENT)
Dept: OPERATING ROOM | Age: 44
End: 2024-04-04
Payer: MEDICARE

## 2024-04-04 ASSESSMENT — LIFESTYLE VARIABLES: SMOKING_STATUS: 1

## 2024-04-04 ASSESSMENT — ENCOUNTER SYMPTOMS: SHORTNESS OF BREATH: 1

## 2024-04-04 NOTE — PROGRESS NOTES
4/4/24 - 2nd message: ELIZABETH with my call-back # concerning  surgery @ [facility] on  4/5/24.  Please call the PAT Nurse for a phone assessment and surgery instructions.

## 2024-04-04 NOTE — PROGRESS NOTES
4/4/24 - Patient keeps returning my calls but will not answer when I call him back. Unable to give surgery instructions or times.     1400 - Called back again, left the following times and instructions:    .Surgery @ [facility] on 4/5/24 at 0830, arrival 0630    NOTHING TO EAT OR DRINK AFTER MIDNIGHT DAY OF SURGERY    1. Enter thru the hospital main entrance on day of surgery, check in at the Information Desk. If you arrive prior to 6:00am, enter thru the ER entrance.    2. Follow the directions as prescribed by the doctor for your procedure and medications.         Morning of surgery take: Isosorbide, Metoprolol, Ranexa, Omeprazole, Bentyl & Buspar with sips of water         Stop vitamins, supplements and NSAIDS: NOW         Do not take Farxiga -(4/4/24 or 4/5/24)     3. Check with your Doctor regarding stopping blood thinners and follow their instructions. Last dose Brilinta: 4/1/24    4. Do not smoke, vape or use chewing tobacco morning of surgery. Do not drink any alcoholic beverages 24 hours prior to surgery.       This includes NA Beer. No street drugs 7 days prior to surgery.    5. If you have dentures, contacts of glasses they will be removed before going to the OR; please bring a case.    6. Please bring picture ID, insurance card, paperwork from the doctor’s office (H & P, Consent, & card for implantable devices).    7. Take a shower with an antibacterial soap the night before surgery and the morning of surgery. Do not put anything on your skin      After your morning shower.    8. You will need a responsible adult to drive you home and check on you after surgery.

## 2024-04-04 NOTE — ANESTHESIA PRE PROCEDURE
GI/Hepatic/Renal:   (+) GERD:, renal disease: kidney stones, dialysis and ESRD         ROS comment: Dialysis wed .   Endo/Other:    (+) DiabetesType II DM, poorly controlled, using insulin, blood dyscrasia: anemia:., electrolyte abnormalities.                  ROS comment:       AIDS     Bs 288 5 u insulin per dr long  Abdominal:   (+) obese          Vascular: negative vascular ROS.         Other Findings:         Anesthesia Plan      general     ASA 4       Induction: intravenous.    MIPS: Postoperative opioids intended and Prophylactic antiemetics administered.  Anesthetic plan and risks discussed with patient.    Use of blood products discussed with patient whom consented to blood products.    Plan discussed with CRNA.    Attending anesthesiologist reviewed and agrees with Preprocedure content        Chart Review      BARBARA Miller CRNA   4/4/2024        Pre Anesthesia Evaluation complete. Anesthesia plan, risks, benefits, alternatives, and personnel discussed with patient and/or legal guardian. Patient and/or legal guardian verbalized an understanding and agreed to proceed. Anesthesia plan discussed with care team members and agreed upon.  BARBARA Miller CRNA  4/4/2024

## 2024-04-05 ENCOUNTER — HOSPITAL ENCOUNTER (OUTPATIENT)
Age: 44
Setting detail: OUTPATIENT SURGERY
Discharge: HOME | End: 2024-04-05
Attending: SURGERY | Admitting: SURGERY
Payer: MEDICARE

## 2024-04-05 ENCOUNTER — ANESTHESIA (OUTPATIENT)
Dept: OPERATING ROOM | Age: 44
End: 2024-04-05
Payer: MEDICARE

## 2024-04-05 VITALS
WEIGHT: 225 LBS | DIASTOLIC BLOOD PRESSURE: 73 MMHG | RESPIRATION RATE: 18 BRPM | TEMPERATURE: 97 F | HEIGHT: 73 IN | SYSTOLIC BLOOD PRESSURE: 130 MMHG | OXYGEN SATURATION: 100 % | HEART RATE: 62 BPM | BODY MASS INDEX: 29.82 KG/M2

## 2024-04-05 DIAGNOSIS — N18.5 CKD (CHRONIC KIDNEY DISEASE) STAGE 5, GFR LESS THAN 15 ML/MIN (HCC): Primary | ICD-10-CM

## 2024-04-05 LAB
ANION GAP SERPL CALCULATED.3IONS-SCNC: 17 MMOL/L (ref 7–16)
BASOPHILS ABSOLUTE: 0 K/CU MM
BASOPHILS RELATIVE PERCENT: 1 % (ref 0–1)
BUN SERPL-MCNC: 42 MG/DL (ref 6–23)
CALCIUM SERPL-MCNC: 9.2 MG/DL (ref 8.3–10.6)
CHLORIDE BLD-SCNC: 94 MMOL/L (ref 99–110)
CO2: 21 MMOL/L (ref 21–32)
CREAT SERPL-MCNC: 4.2 MG/DL (ref 0.9–1.3)
DIFFERENTIAL TYPE: ABNORMAL
EOSINOPHILS ABSOLUTE: 0.1 K/CU MM
EOSINOPHILS RELATIVE PERCENT: 2.8 % (ref 0–3)
GFR SERPL CREATININE-BSD FRML MDRD: 17 ML/MIN/1.73M2
GLUCOSE BLD-MCNC: 288 MG/DL (ref 70–99)
GLUCOSE SERPL-MCNC: 268 MG/DL (ref 70–99)
HCT VFR BLD CALC: 24.8 % (ref 42–52)
HEMOGLOBIN: 8.3 GM/DL (ref 13.5–18)
IMMATURE NEUTROPHIL %: 1 % (ref 0–0.43)
LYMPHOCYTES ABSOLUTE: 1.4 K/CU MM
LYMPHOCYTES RELATIVE PERCENT: 34.3 % (ref 24–44)
MCH RBC QN AUTO: 35.2 PG (ref 27–31)
MCHC RBC AUTO-ENTMCNC: 33.5 % (ref 32–36)
MCV RBC AUTO: 105.1 FL (ref 78–100)
MONOCYTES ABSOLUTE: 0.4 K/CU MM
MONOCYTES RELATIVE PERCENT: 10.4 % (ref 0–4)
NUCLEATED RBC %: 0 %
PDW BLD-RTO: 15 % (ref 11.7–14.9)
PLATELET # BLD: 208 K/CU MM (ref 140–440)
PMV BLD AUTO: 9.9 FL (ref 7.5–11.1)
POTASSIUM SERPL-SCNC: 5.2 MMOL/L (ref 3.5–5.1)
RBC # BLD: 2.36 M/CU MM (ref 4.6–6.2)
SEGMENTED NEUTROPHILS ABSOLUTE COUNT: 2 K/CU MM
SEGMENTED NEUTROPHILS RELATIVE PERCENT: 50.5 % (ref 36–66)
SODIUM BLD-SCNC: 132 MMOL/L (ref 135–145)
TOTAL IMMATURE NEUTOROPHIL: 0.04 K/CU MM
TOTAL NUCLEATED RBC: 0 K/CU MM
WBC # BLD: 4 K/CU MM (ref 4–10.5)

## 2024-04-05 PROCEDURE — 3600000014 HC SURGERY LEVEL 4 ADDTL 15MIN: Performed by: SURGERY

## 2024-04-05 PROCEDURE — 7100000000 HC PACU RECOVERY - FIRST 15 MIN: Performed by: SURGERY

## 2024-04-05 PROCEDURE — C1750 CATH, HEMODIALYSIS,LONG-TERM: HCPCS | Performed by: SURGERY

## 2024-04-05 PROCEDURE — 3600000004 HC SURGERY LEVEL 4 BASE: Performed by: SURGERY

## 2024-04-05 PROCEDURE — 6370000000 HC RX 637 (ALT 250 FOR IP): Performed by: ANESTHESIOLOGY

## 2024-04-05 PROCEDURE — 7100000010 HC PHASE II RECOVERY - FIRST 15 MIN: Performed by: SURGERY

## 2024-04-05 PROCEDURE — 2500000003 HC RX 250 WO HCPCS: Performed by: NURSE ANESTHETIST, CERTIFIED REGISTERED

## 2024-04-05 PROCEDURE — 2709999900 HC NON-CHARGEABLE SUPPLY: Performed by: SURGERY

## 2024-04-05 PROCEDURE — 2580000003 HC RX 258: Performed by: PHYSICIAN ASSISTANT

## 2024-04-05 PROCEDURE — 3700000001 HC ADD 15 MINUTES (ANESTHESIA): Performed by: SURGERY

## 2024-04-05 PROCEDURE — 6360000002 HC RX W HCPCS: Performed by: PHYSICIAN ASSISTANT

## 2024-04-05 PROCEDURE — 7100000011 HC PHASE II RECOVERY - ADDTL 15 MIN: Performed by: SURGERY

## 2024-04-05 PROCEDURE — 6360000002 HC RX W HCPCS: Performed by: SURGERY

## 2024-04-05 PROCEDURE — 82962 GLUCOSE BLOOD TEST: CPT

## 2024-04-05 PROCEDURE — 7100000001 HC PACU RECOVERY - ADDTL 15 MIN: Performed by: SURGERY

## 2024-04-05 PROCEDURE — 2580000003 HC RX 258: Performed by: ANESTHESIOLOGY

## 2024-04-05 PROCEDURE — 80048 BASIC METABOLIC PNL TOTAL CA: CPT

## 2024-04-05 PROCEDURE — 3700000000 HC ANESTHESIA ATTENDED CARE: Performed by: SURGERY

## 2024-04-05 PROCEDURE — 49325 LAP REVISION PERM IP CATH: CPT | Performed by: PHYSICIAN ASSISTANT

## 2024-04-05 PROCEDURE — 49325 LAP REVISION PERM IP CATH: CPT | Performed by: SURGERY

## 2024-04-05 PROCEDURE — C2628 CATHETER, OCCLUSION: HCPCS | Performed by: SURGERY

## 2024-04-05 PROCEDURE — 85025 COMPLETE CBC W/AUTO DIFF WBC: CPT

## 2024-04-05 PROCEDURE — 6360000002 HC RX W HCPCS: Performed by: NURSE ANESTHETIST, CERTIFIED REGISTERED

## 2024-04-05 RX ORDER — HYDRALAZINE HYDROCHLORIDE 20 MG/ML
10 INJECTION INTRAMUSCULAR; INTRAVENOUS
Status: DISCONTINUED | OUTPATIENT
Start: 2024-04-05 | End: 2024-04-05 | Stop reason: HOSPADM

## 2024-04-05 RX ORDER — FENTANYL CITRATE 50 UG/ML
50 INJECTION, SOLUTION INTRAMUSCULAR; INTRAVENOUS EVERY 5 MIN PRN
Status: DISCONTINUED | OUTPATIENT
Start: 2024-04-05 | End: 2024-04-05 | Stop reason: HOSPADM

## 2024-04-05 RX ORDER — LIDOCAINE HYDROCHLORIDE 20 MG/ML
INJECTION, SOLUTION INTRAVENOUS PRN
Status: DISCONTINUED | OUTPATIENT
Start: 2024-04-05 | End: 2024-04-05 | Stop reason: SDUPTHER

## 2024-04-05 RX ORDER — SODIUM CHLORIDE 0.9 % (FLUSH) 0.9 %
5-40 SYRINGE (ML) INJECTION EVERY 12 HOURS SCHEDULED
Status: DISCONTINUED | OUTPATIENT
Start: 2024-04-05 | End: 2024-04-05 | Stop reason: HOSPADM

## 2024-04-05 RX ORDER — SODIUM CHLORIDE 9 MG/ML
INJECTION, SOLUTION INTRAVENOUS PRN
Status: DISCONTINUED | OUTPATIENT
Start: 2024-04-05 | End: 2024-04-05 | Stop reason: HOSPADM

## 2024-04-05 RX ORDER — FENTANYL CITRATE 50 UG/ML
INJECTION, SOLUTION INTRAMUSCULAR; INTRAVENOUS PRN
Status: DISCONTINUED | OUTPATIENT
Start: 2024-04-05 | End: 2024-04-05 | Stop reason: SDUPTHER

## 2024-04-05 RX ORDER — NALOXONE HYDROCHLORIDE 0.4 MG/ML
INJECTION, SOLUTION INTRAMUSCULAR; INTRAVENOUS; SUBCUTANEOUS PRN
Status: DISCONTINUED | OUTPATIENT
Start: 2024-04-05 | End: 2024-04-05 | Stop reason: HOSPADM

## 2024-04-05 RX ORDER — HEPARIN 100 UNIT/ML
SYRINGE INTRAVENOUS
Status: COMPLETED | OUTPATIENT
Start: 2024-04-05 | End: 2024-04-05

## 2024-04-05 RX ORDER — BUPIVACAINE HYDROCHLORIDE 5 MG/ML
INJECTION, SOLUTION EPIDURAL; INTRACAUDAL
Status: COMPLETED | OUTPATIENT
Start: 2024-04-05 | End: 2024-04-05

## 2024-04-05 RX ORDER — DROPERIDOL 2.5 MG/ML
0.62 INJECTION, SOLUTION INTRAMUSCULAR; INTRAVENOUS
Status: DISCONTINUED | OUTPATIENT
Start: 2024-04-05 | End: 2024-04-05 | Stop reason: HOSPADM

## 2024-04-05 RX ORDER — EPHEDRINE SULFATE 50 MG/ML
INJECTION INTRAVENOUS PRN
Status: DISCONTINUED | OUTPATIENT
Start: 2024-04-05 | End: 2024-04-05 | Stop reason: SDUPTHER

## 2024-04-05 RX ORDER — PROPOFOL 10 MG/ML
INJECTION, EMULSION INTRAVENOUS PRN
Status: DISCONTINUED | OUTPATIENT
Start: 2024-04-05 | End: 2024-04-05 | Stop reason: SDUPTHER

## 2024-04-05 RX ORDER — SODIUM CHLORIDE, SODIUM LACTATE, POTASSIUM CHLORIDE, CALCIUM CHLORIDE 600; 310; 30; 20 MG/100ML; MG/100ML; MG/100ML; MG/100ML
INJECTION, SOLUTION INTRAVENOUS CONTINUOUS
Status: DISCONTINUED | OUTPATIENT
Start: 2024-04-05 | End: 2024-04-05 | Stop reason: HOSPADM

## 2024-04-05 RX ORDER — OXYCODONE HYDROCHLORIDE 5 MG/1
5 TABLET ORAL
Status: DISCONTINUED | OUTPATIENT
Start: 2024-04-05 | End: 2024-04-05 | Stop reason: HOSPADM

## 2024-04-05 RX ORDER — ROCURONIUM BROMIDE 10 MG/ML
INJECTION, SOLUTION INTRAVENOUS PRN
Status: DISCONTINUED | OUTPATIENT
Start: 2024-04-05 | End: 2024-04-05 | Stop reason: SDUPTHER

## 2024-04-05 RX ORDER — LABETALOL HYDROCHLORIDE 5 MG/ML
10 INJECTION, SOLUTION INTRAVENOUS
Status: DISCONTINUED | OUTPATIENT
Start: 2024-04-05 | End: 2024-04-05 | Stop reason: HOSPADM

## 2024-04-05 RX ORDER — SODIUM CHLORIDE 0.9 % (FLUSH) 0.9 %
5-40 SYRINGE (ML) INJECTION PRN
Status: DISCONTINUED | OUTPATIENT
Start: 2024-04-05 | End: 2024-04-05 | Stop reason: HOSPADM

## 2024-04-05 RX ORDER — ONDANSETRON 2 MG/ML
4 INJECTION INTRAMUSCULAR; INTRAVENOUS
Status: DISCONTINUED | OUTPATIENT
Start: 2024-04-05 | End: 2024-04-05 | Stop reason: HOSPADM

## 2024-04-05 RX ORDER — HYDROCODONE BITARTRATE AND ACETAMINOPHEN 5; 325 MG/1; MG/1
1 TABLET ORAL EVERY 6 HOURS PRN
Qty: 12 TABLET | Refills: 0 | Status: SHIPPED | OUTPATIENT
Start: 2024-04-05 | End: 2024-04-08

## 2024-04-05 RX ADMIN — FENTANYL CITRATE 100 MCG: 50 INJECTION, SOLUTION INTRAMUSCULAR; INTRAVENOUS at 08:50

## 2024-04-05 RX ADMIN — INSULIN HUMAN 5 UNITS: 100 INJECTION, SOLUTION PARENTERAL at 08:09

## 2024-04-05 RX ADMIN — SODIUM CHLORIDE: 9 INJECTION, SOLUTION INTRAVENOUS at 08:45

## 2024-04-05 RX ADMIN — EPHEDRINE SULFATE 10 MG: 50 INJECTION INTRAVENOUS at 09:02

## 2024-04-05 RX ADMIN — SUGAMMADEX 50 MG: 100 INJECTION, SOLUTION INTRAVENOUS at 09:29

## 2024-04-05 RX ADMIN — CEFAZOLIN 1000 MG: 1 INJECTION, POWDER, FOR SOLUTION INTRAMUSCULAR; INTRAVENOUS at 08:42

## 2024-04-05 RX ADMIN — LIDOCAINE HYDROCHLORIDE 50 MG: 20 INJECTION, SOLUTION INTRAVENOUS at 08:50

## 2024-04-05 RX ADMIN — EPHEDRINE SULFATE 10 MG: 50 INJECTION INTRAVENOUS at 09:04

## 2024-04-05 RX ADMIN — SODIUM CHLORIDE, POTASSIUM CHLORIDE, SODIUM LACTATE AND CALCIUM CHLORIDE: 600; 310; 30; 20 INJECTION, SOLUTION INTRAVENOUS at 07:33

## 2024-04-05 RX ADMIN — CEFAZOLIN 1000 MG: 1 INJECTION, POWDER, FOR SOLUTION INTRAMUSCULAR; INTRAVENOUS at 08:45

## 2024-04-05 RX ADMIN — ROCURONIUM BROMIDE 50 MG: 10 INJECTION INTRAVENOUS at 08:51

## 2024-04-05 RX ADMIN — SUGAMMADEX 50 MG: 100 INJECTION, SOLUTION INTRAVENOUS at 09:33

## 2024-04-05 RX ADMIN — PROPOFOL 100 MG: 10 INJECTION, EMULSION INTRAVENOUS at 08:50

## 2024-04-05 ASSESSMENT — PAIN SCALES - GENERAL
PAINLEVEL_OUTOF10: 0

## 2024-04-05 ASSESSMENT — ENCOUNTER SYMPTOMS
EYE REDNESS: 0
STRIDOR: 0
CONSTIPATION: 0
SORE THROAT: 0
CHOKING: 0
PHOTOPHOBIA: 0
RECTAL PAIN: 0
EYE ITCHING: 0
BACK PAIN: 0
ANAL BLEEDING: 0
COLOR CHANGE: 0
APNEA: 0

## 2024-04-05 NOTE — ANESTHESIA POSTPROCEDURE EVALUATION
Department of Anesthesiology  Postprocedure Note    Patient: Dewey Sosa  MRN: [id number]  YOB: 1980  Date of evaluation: 4/5/2024    Procedure Summary       Date: 04/05/24 Room / Location: [address]    Anesthesia Start: 0845 Anesthesia Stop: 0952    Procedure: CATHETER PERITONEAL DIALYSIS LAPAROSCOPIC REVISION AND PLACEMENT (Abdomen) Diagnosis:       Stage 3a chronic kidney disease (HCC)      (Stage 3a chronic kidney disease (HCC) [N18.31])    Surgeons: Sherine Nuñez MD Responsible Provider: Robin Brand MD    Anesthesia Type: general ASA Status: 4            Anesthesia Type: No value filed.    Fernanda Phase I: Fernanda Score: 10    Fernanda Phase II:      Anesthesia Post Evaluation    Patient location during evaluation: PACU  Patient participation: complete - patient participated  Level of consciousness: awake  Pain score: 1  Airway patency: patent  Nausea & Vomiting: no nausea and no vomiting  Cardiovascular status: hemodynamically stable  Respiratory status: nasal cannula  Hydration status: euvolemic  Multimodal analgesia pain management approach    No notable events documented.

## 2024-04-05 NOTE — PROGRESS NOTES
1027 - patient received from pacu, report received from Bryan WEAVER, patient A&O, denies pain or nausea, given water, coffee, and crackers, family at bedside, call light at bedside, dressings dry  1110 - discharge instructions given to patient, mother, and partner, understanding voiced without any further questions at this time  1115 - VSS, patient A&O, denies pain or nausea, dressings dry  1124 - iv removed  1127 patient dressing  1135 - patient left sds via wheelchair accompanied by britta

## 2024-04-05 NOTE — DISCHARGE INSTRUCTIONS
Patient Discharge Instructions  Dr. Sherine Nuñez  [address] [place] [address]  [phone]    Discharge Date:  4/5/2024    Discharged To:  Home      RESUME ACTIVITY:      BATHING:   OK to shower but no bath tub or submerging incision under water    Wound:    Keep wound dry and clean.  May shower as instructed above.    Dressing:    Change outer dressing daily after shower or if soiled.     DRIVING:   3-5 days. No driving until off narcotic pain medications and     walking comfortably    RETURN TO WORK: when cleared after your follow up office visit    WALKING:    As tolerated     STAIRS:    As tolerated    LIFTING:   Less than 10 pounds for one week    DIET:    [place] diet on day of surgery then regular diet    SPECIAL INSTRUCTIONS:     Call the office at [phone]  if you have a fever greater than or equal to 101 oF or if your incision becomes red, tender, or has drainage of pus.      If follow up appointment was not given to you, call the Surgical Clinic at [phone] for follow up appointment with Dr. Nuñez in:  1-2 weeks.            PERITONEAL DIALYSIS CATHETER AFTERCARE INSTRUCTIONS FOR PATIENTS      You will need to call the clinic to make an appointment for dressing changes one week after the surgery date.    Keep the dressing clean and dry.    Do not lift over 10 pounds.    Dressing needs to be changed if it becomes soiled, wet, or has drainage larger than a quarter.  If any of these happen, contact the dialysis clinic.  PLEASE DO NOT CHANGE YOUR OWN DRESSING.    KEEP DRESSING CLEAN AND DRY    You may reinforce the dressing with tape if the edges are coming loose.  If your dressing comes off, please contact the peritoneal dialysis nurse.      If you feel that your pain control is not adequate, please contact your surgeon's office.    If you become constipated, please contact the peritoneal dialysis nurse for instructions.    HOW TO CONTACT A PERITONEAL DIALYSIS NURSE:   Monday -

## 2024-04-05 NOTE — H&P
BARBARA Martinez CNP   atorvastatin (LIPITOR) 80 MG tablet Take 1 tablet by mouth daily 23   Brie Mcneil APRN - CNP   ranolazine (RANEXA) 500 MG extended release tablet Take 1 tablet by mouth 2 times daily 23   Brie Mcneil APRN - CNP   traZODone (DESYREL) 50 MG tablet Take 1 tablet by mouth nightly as needed for Sleep 23   Frandy Parsons APRN - CNP   isosorbide mononitrate (IMDUR) 60 MG extended release tablet Take by mouth    Provider, MD Kera   INS SYRINGE/NEEDLE 1CC/28G (B-D INS SYR MICROFINE 1CC/28G) 28G X 1/2\" 1 ML MISC USE ONE DAILY 23   Shaun Bear MD   INSULIN SYRINGE .5CC/29G 29G X 1/2\" 0.5 ML MISC Inject 1 each into the skin 3 times daily 23   Shaun Bear MD   insulin lispro (HUMALOG) 100 UNIT/ML SOLN injection vial Inject 0-15 Units into the skin 3 times daily (before meals) 23   Frandy Parsons APRN - CNP   aspirin EC 81 MG EC tablet Take 1 tablet by mouth daily 22   Emili Jimenes MD   nitroGLYCERIN (NITROSTAT) 0.4 MG SL tablet Place 1 tablet under the tongue every 5 minutes as needed for Chest pain 10/25/21   Emili Jimenes MD   Lancets MISC 1 each by Does not apply route 3 times daily 6/15/20   Frandy Parsons APRN - CNP   Blood Glucose Monitoring Suppl (ONE TOUCH ULTRA 2) w/Device KIT 1 kit by Does not apply route once for 1 dose 20  Frandy Parsons APRN - CNP   blood glucose monitor strips Test 3 times a day & as needed for symptoms of irregular blood glucose. 20   Frandy Parsons APRN - CNP     No Known Allergies   Social History     Tobacco Use    Smoking status: Some Days     Current packs/day: 0.00     Average packs/day: 0.3 packs/day for 27.1 years (6.8 ttl pk-yrs)     Types: Cigarettes     Start date:      Last attempt to quit: 2022     Years since quittin.1    Smokeless tobacco: Never   Substance Use Topics    Alcohol use: Not Currently     Comment: caffeine: 1-3 cups of  10:20 AM     BMP:   Lab Results   Component Value Date/Time    GLUCOSE 266 03/08/2024 10:20 AM    CO2 24 03/08/2024 10:20 AM    BUN 15 03/08/2024 10:20 AM    CREATININE 2.2 03/08/2024 10:20 AM    CALCIUM 8.9 03/08/2024 10:20 AM       Assessment:     Malfunctioning PD catheter  ESRD    Plan:     Will proceed with peritoneal catheter removal and replacement.     Maryana Newby PA-C

## 2024-04-09 ENCOUNTER — HOSPITAL ENCOUNTER (OUTPATIENT)
Age: 44
Discharge: HOME | End: 2024-04-09
Payer: MEDICARE

## 2024-04-09 DIAGNOSIS — D64.9 ANEMIA, UNSPECIFIED TYPE: ICD-10-CM

## 2024-04-09 LAB
FOLATE SERPL-MCNC: >20 NG/ML (ref 3.1–17.5)
VITAMIN B-12: 545.8 PG/ML (ref 211–911)

## 2024-04-09 PROCEDURE — 82607 VITAMIN B-12: CPT

## 2024-04-09 PROCEDURE — 82746 ASSAY OF FOLIC ACID SERUM: CPT

## 2024-04-09 PROCEDURE — 36415 COLL VENOUS BLD VENIPUNCTURE: CPT

## 2024-04-10 RX ORDER — OMEPRAZOLE 20 MG/1
20 CAPSULE, DELAYED RELEASE ORAL DAILY
Qty: 30 CAPSULE | Refills: 5 | Status: SHIPPED | OUTPATIENT
Start: 2024-04-10

## 2024-04-11 ENCOUNTER — CLINICAL DOCUMENTATION (OUTPATIENT)
Dept: ONCOLOGY | Age: 44
End: 2024-04-11

## 2024-04-11 DIAGNOSIS — D64.9 ANEMIA, UNSPECIFIED TYPE: Primary | ICD-10-CM

## 2024-04-11 NOTE — PROGRESS NOTES
Reviewed Hereditary Cancer Genetic screening form, with information provided she does not meet criteria but indicate a desire for counseling. Aware they may be an associated out of pocket cost.

## 2024-04-18 ENCOUNTER — OFFICE VISIT (OUTPATIENT)
Dept: SURGERY | Age: 44
End: 2024-04-18
Payer: MEDICARE

## 2024-04-18 VITALS
SYSTOLIC BLOOD PRESSURE: 128 MMHG | OXYGEN SATURATION: 99 % | HEART RATE: 79 BPM | DIASTOLIC BLOOD PRESSURE: 76 MMHG | HEIGHT: 73 IN | WEIGHT: 213.3 LBS | BODY MASS INDEX: 28.27 KG/M2

## 2024-04-18 DIAGNOSIS — N18.31 STAGE 3A CHRONIC KIDNEY DISEASE (HCC): Primary | ICD-10-CM

## 2024-04-18 PROCEDURE — G8417 CALC BMI ABV UP PARAM F/U: HCPCS | Performed by: PHYSICIAN ASSISTANT

## 2024-04-18 PROCEDURE — 99212 OFFICE O/P EST SF 10 MIN: CPT | Performed by: PHYSICIAN ASSISTANT

## 2024-04-18 PROCEDURE — 4004F PT TOBACCO SCREEN RCVD TLK: CPT | Performed by: PHYSICIAN ASSISTANT

## 2024-04-18 PROCEDURE — G8427 DOCREV CUR MEDS BY ELIG CLIN: HCPCS | Performed by: PHYSICIAN ASSISTANT

## 2024-04-18 ASSESSMENT — ENCOUNTER SYMPTOMS
CONSTIPATION: 0
RECTAL PAIN: 0
ANAL BLEEDING: 0
EYE ITCHING: 0
EYE REDNESS: 0
BACK PAIN: 0
NAUSEA: 0
COLOR CHANGE: 0
SORE THROAT: 0
APNEA: 0
STRIDOR: 0
PHOTOPHOBIA: 0
VOMITING: 0
CHOKING: 0
ABDOMINAL PAIN: 0

## 2024-04-18 ASSESSMENT — PATIENT HEALTH QUESTIONNAIRE - PHQ9
SUM OF ALL RESPONSES TO PHQ9 QUESTIONS 1 & 2: 0
SUM OF ALL RESPONSES TO PHQ QUESTIONS 1-9: 0
SUM OF ALL RESPONSES TO PHQ QUESTIONS 1-9: 0
1. LITTLE INTEREST OR PLEASURE IN DOING THINGS: NOT AT ALL
2. FEELING DOWN, DEPRESSED OR HOPELESS: NOT AT ALL
SUM OF ALL RESPONSES TO PHQ QUESTIONS 1-9: 0
SUM OF ALL RESPONSES TO PHQ QUESTIONS 1-9: 0

## 2024-04-18 NOTE — PROGRESS NOTES
Chief Complaint   Patient presents with    Post-Op Check     1st PO Revision PD Cath @ [facility] 4/5/24         SUBJECTIVE:  Patient presents today for his 1st post op visit following PD cath evaluation laparoscopic.  Pt reports that pain is none.  PD catheter is working well, both flushing and draining normally    Incisions: minbruising and no discharge. Some glue intact.    Review of Systems   Constitutional:  Negative for chills and fever.   HENT:  Negative for ear pain, mouth sores, sore throat and tinnitus.    Eyes:  Negative for photophobia, redness and itching.   Respiratory:  Negative for apnea, choking and stridor.    Cardiovascular:  Negative for chest pain and palpitations.   Gastrointestinal:  Negative for abdominal pain, anal bleeding, constipation, nausea, rectal pain and vomiting.   Endocrine: Negative for polydipsia.   Genitourinary:  Negative for enuresis, flank pain and hematuria.   Musculoskeletal:  Negative for back pain, joint swelling and myalgias.   Skin:  Negative for color change and pallor.   Allergic/Immunologic: Negative for environmental allergies.   Neurological:  Negative for syncope and speech difficulty.   Psychiatric/Behavioral:  Negative for confusion and hallucinations.          Past Surgical History:   Procedure Laterality Date    COLONOSCOPY N/A 9/21/2021    COLONOSCOPY POLYPECTOMY SNARE/COLD BIOPSY performed by Arden Chavez MD at [facility] ASC OR    DIALYSIS CATHETER INSERTION N/A 3/8/2024    CATHETER INSERTION PERITONEAL DIALYSIS LAPAROSCOPIC performed by Sherine Nuñez MD at [facility] OR    DIALYSIS CATHETER INSERTION N/A 4/5/2024    CATHETER PERITONEAL DIALYSIS LAPAROSCOPIC REVISION AND PLACEMENT performed by Sherine Nuñez MD at [facility] OR    IR TUNNELED CATHETER PLACEMENT GREATER THAN 5 YEARS  2/2/2024    IR TUNNELED CATHETER PLACEMENT GREATER THAN 5 YEARS 2/2/2024 [facility] SPECIAL PROCEDURES    OTHER SURGICAL HISTORY  12/16/2017    I & D Perineal Abscess    OTHER SURGICAL HISTORY Right

## 2024-04-24 ENCOUNTER — OFFICE VISIT (OUTPATIENT)
Dept: INFECTIOUS DISEASES | Age: 44
End: 2024-04-24
Payer: MEDICARE

## 2024-04-24 VITALS
SYSTOLIC BLOOD PRESSURE: 120 MMHG | HEART RATE: 56 BPM | DIASTOLIC BLOOD PRESSURE: 76 MMHG | BODY MASS INDEX: 28.97 KG/M2 | WEIGHT: 219.6 LBS | RESPIRATION RATE: 18 BRPM

## 2024-04-24 DIAGNOSIS — N18.5 CKD (CHRONIC KIDNEY DISEASE) STAGE 5, GFR LESS THAN 15 ML/MIN (HCC): ICD-10-CM

## 2024-04-24 DIAGNOSIS — B20 AIDS (ACQUIRED IMMUNODEFICIENCY SYNDROME), CD4 <=200 (HCC): Primary | ICD-10-CM

## 2024-04-24 PROCEDURE — 4004F PT TOBACCO SCREEN RCVD TLK: CPT | Performed by: INTERNAL MEDICINE

## 2024-04-24 PROCEDURE — G8417 CALC BMI ABV UP PARAM F/U: HCPCS | Performed by: INTERNAL MEDICINE

## 2024-04-24 PROCEDURE — 99215 OFFICE O/P EST HI 40 MIN: CPT | Performed by: INTERNAL MEDICINE

## 2024-04-24 PROCEDURE — G8427 DOCREV CUR MEDS BY ELIG CLIN: HCPCS | Performed by: INTERNAL MEDICINE

## 2024-04-24 NOTE — PATIENT INSTRUCTIONS
Please have your labs drawn 4 weeks after you have been on Biktarvy. The ordered labs are drawn at the [facility] laboratory facilities on Monday through Thursday.    Take Biktarvy after dialysis on dialysis days.

## 2024-04-24 NOTE — PROGRESS NOTES
4/24/2024     Diagnosis Orders   1. AIDS (acquired immunodeficiency syndrome), CD4 <=200 (HCC)  CD4 Percent and Absolute    CBC with Auto Differential    Basic Metabolic Panel    Hepatic Function Panel    HIV-1 RNA, quantitative, PCR    bictegravir-emtricitab-tenofovir alafenamide (BIKTARVY) -25 MG TABS per tablet      2. CKD (chronic kidney disease) stage 5, GFR less than 15 ml/min (HCC)          35 minutes was spent in the encounter; more than 50% of the face-to-face time was spent with the patient providing counseling and coordination of care.    DISCUSSION  HIV: 3/4/2024:  HIV viral load <30, CD4: 137, continue Entecavir, lamivudine 100 mg p.o. daily, dolutegravir 50 mg p.o. daily Prezcobix 800/150 mg daily. and Bactrim . Will change medication to Biktarvy. Prescription sent to [facility] as the patient no longer has medical insurance. Expect the [employer] act to cover his medications.   ESRD on dialysis MWF  Greater than 5 minutes of cigarette cessation counseling.  He and his partner motivated to quit.  Encouraged to stay up-to-date with his vaccination.  Will reevaluate vaccination status after HIV virus is controlled and CD4 count is 200 or more.  Here is with Frandy his partner is seronegative.   No components found for: \"CD4H\" No components found for: \"HIVRN\"   Chronic medical problems as above/stable  Lipid recommendation: As per PCP  Labs to be done every 3-6 months: CBC, CD4, VL, BMP, LFTs  Labs to be done once every 12months (last January 2024): HCV, Lipids, T-SPOT, RPR, UA  Vaccines: PCV13/PCV15 and PPSV23 OR single dose PCV20, HPV (through 26 years of age), Varicella, Meningococcus (MSM), ShingrixTdap, HAV, HBV, yearly inactivated influenza recommended   Total and free testosterone in men  Colorectal cancer screen: due at age 45; repeat every 10 yrs (if normal)  Prostate cancer screen: due at age 50; repeat every 2 yrs (PSA < 2.5 ng/mL)  Dexa scan: due at age 50; repeat every 3 yrs (if

## 2024-05-07 RX ORDER — TIZANIDINE 2 MG/1
TABLET ORAL
Qty: 30 TABLET | Refills: 0 | Status: SHIPPED | OUTPATIENT
Start: 2024-05-07

## 2024-05-07 RX ORDER — TRAZODONE HYDROCHLORIDE 50 MG/1
50 TABLET ORAL NIGHTLY PRN
Qty: 30 TABLET | Refills: 2 | Status: SHIPPED | OUTPATIENT
Start: 2024-05-07

## 2024-06-06 ENCOUNTER — HOSPITAL ENCOUNTER (OUTPATIENT)
Age: 44
Discharge: HOME | End: 2024-06-06

## 2024-06-06 DIAGNOSIS — B20 AIDS (ACQUIRED IMMUNODEFICIENCY SYNDROME), CD4 <=200 (HCC): ICD-10-CM

## 2024-06-06 LAB
ALBUMIN SERPL-MCNC: 4.1 GM/DL (ref 3.4–5)
ALP BLD-CCNC: 133 IU/L (ref 40–129)
ALT SERPL-CCNC: 14 U/L (ref 10–40)
ANION GAP SERPL CALCULATED.3IONS-SCNC: 12 MMOL/L (ref 7–16)
AST SERPL-CCNC: 17 IU/L (ref 15–37)
BASOPHILS ABSOLUTE: 0.1 K/CU MM
BASOPHILS RELATIVE PERCENT: 2.2 % (ref 0–1)
BILIRUB SERPL-MCNC: 0.3 MG/DL (ref 0–1)
BILIRUBIN DIRECT: 0.2 MG/DL (ref 0–0.3)
BILIRUBIN, INDIRECT: 0.1 MG/DL (ref 0–0.7)
BUN SERPL-MCNC: 24 MG/DL (ref 6–23)
CALCIUM SERPL-MCNC: 8.8 MG/DL (ref 8.3–10.6)
CHLORIDE BLD-SCNC: 101 MMOL/L (ref 99–110)
CO2: 22 MMOL/L (ref 21–32)
CREAT SERPL-MCNC: 3.3 MG/DL (ref 0.9–1.3)
DIFFERENTIAL TYPE: ABNORMAL
EOSINOPHILS ABSOLUTE: 0.1 K/CU MM
EOSINOPHILS RELATIVE PERCENT: 3.3 % (ref 0–3)
GFR, ESTIMATED: 23 ML/MIN/1.73M2
GLUCOSE SERPL-MCNC: 200 MG/DL (ref 70–99)
HCT VFR BLD CALC: 38.7 % (ref 42–52)
HEMOGLOBIN: 13.3 GM/DL (ref 13.5–18)
IMMATURE NEUTROPHIL %: 0.3 % (ref 0–0.43)
LYMPHOCYTES ABSOLUTE: 1.2 K/CU MM
LYMPHOCYTES RELATIVE PERCENT: 34 % (ref 24–44)
MCH RBC QN AUTO: 33.1 PG (ref 27–31)
MCHC RBC AUTO-ENTMCNC: 34.4 % (ref 32–36)
MCV RBC AUTO: 96.3 FL (ref 78–100)
MONOCYTES ABSOLUTE: 0.3 K/CU MM
MONOCYTES RELATIVE PERCENT: 7 % (ref 0–4)
NEUTROPHILS ABSOLUTE: 1.9 K/CU MM
NEUTROPHILS RELATIVE PERCENT: 53.2 % (ref 36–66)
NUCLEATED RBC %: 0 %
PDW BLD-RTO: 11.9 % (ref 11.7–14.9)
PLATELET # BLD: 219 K/CU MM (ref 140–440)
PMV BLD AUTO: 10.3 FL (ref 7.5–11.1)
POTASSIUM SERPL-SCNC: 3.8 MMOL/L (ref 3.5–5.1)
RBC # BLD: 4.02 M/CU MM (ref 4.6–6.2)
SODIUM BLD-SCNC: 135 MMOL/L (ref 135–145)
TOTAL IMMATURE NEUTOROPHIL: 0.01 K/CU MM
TOTAL NUCLEATED RBC: 0 K/CU MM
TOTAL PROTEIN: 7.3 GM/DL (ref 6.4–8.2)
WBC # BLD: 3.6 K/CU MM (ref 4–10.5)

## 2024-06-06 PROCEDURE — 82248 BILIRUBIN DIRECT: CPT

## 2024-06-06 PROCEDURE — 80053 COMPREHEN METABOLIC PANEL: CPT

## 2024-06-06 PROCEDURE — 85025 COMPLETE CBC W/AUTO DIFF WBC: CPT

## 2024-06-06 PROCEDURE — 88184 FLOWCYTOMETRY/ TC 1 MARKER: CPT

## 2024-06-06 PROCEDURE — 36415 COLL VENOUS BLD VENIPUNCTURE: CPT

## 2024-06-06 PROCEDURE — 88185 FLOWCYTOMETRY/TC ADD-ON: CPT

## 2024-06-06 PROCEDURE — 87536 HIV-1 QUANT&REVRSE TRNSCRPJ: CPT

## 2024-06-07 LAB
CD3 CELLS # BLD: 1391 CELLS/UL (ref 570–2400)
CD3 CELLS NFR SPEC: 89 % (ref 62–87)
CD3+CD4+ CELLS # BLD: 255 CELLS/UL (ref 430–1800)
CD3+CD4+ CELLS NFR BLD: 16 % (ref 32–64)
CD3+CD4+ CELLS/CD3+CD8+ CLL BLD: 0.22 RATIO (ref 0.8–3.9)
CD3+CD8+ CELLS # BLD: 1125 CELLS/UL (ref 210–1200)
CD3+CD8+ CELLS NFR SPEC: 72 % (ref 15–46)

## 2024-06-08 LAB
HIV1 RNA # SPEC NAA+PROBE: NOT DETECTED CPY/ML
HIV1 RNA SER-IMP: NOT DETECTED
HIV1 RNA SPEC NAA+PROBE-LOG#: NOT DETECTED LOG CPY/ML

## 2024-06-20 ENCOUNTER — OFFICE VISIT (OUTPATIENT)
Dept: INFECTIOUS DISEASES | Age: 44
End: 2024-06-20

## 2024-06-20 VITALS
SYSTOLIC BLOOD PRESSURE: 165 MMHG | WEIGHT: 218.8 LBS | BODY MASS INDEX: 28.87 KG/M2 | HEART RATE: 51 BPM | DIASTOLIC BLOOD PRESSURE: 100 MMHG

## 2024-06-20 DIAGNOSIS — B20 AIDS (ACQUIRED IMMUNODEFICIENCY SYNDROME), CD4 <=200 (HCC): ICD-10-CM

## 2024-06-20 DIAGNOSIS — Z21 ASYMPTOMATIC HIV INFECTION, WITH NO HISTORY OF HIV-RELATED ILLNESS (HCC): Primary | ICD-10-CM

## 2024-06-20 NOTE — PROGRESS NOTES
6/20/2024     Diagnosis Orders   1. Asymptomatic HIV infection, with no history of HIV-related illness (HCC)  Basic Metabolic Panel    CBC with Auto Differential    CD4 Percent and Absolute    Hepatic Function Panel    HIV-1 RNA, quantitative, PCR      2. AIDS (acquired immunodeficiency syndrome), CD4 <=200 (HCC)  bictegravir-emtricitab-tenofovir alafenamide (BIKTARVY) -25 MG TABS per tablet          35 minutes was spent in the encounter; more than 50% of the face-to-face time was spent with the patient providing counseling and coordination of care.    DISCUSSION  HIV: 6/6/2024 HIV viral load: Not detected, CD4 255, leukopenia. D/c Bactrim. Case reports of CAPD patients on Biktarvy suggest no adverse consequences. Will continue to monitor clinically.   ESRD on CAPD (changed from HD on 5/13/2024)  Encouraged to stay up-to-date with his vaccination.  Will reevaluate vaccination status at next visit  Frandy his partner is seronegative.   No components found for: \"CD4H\" No components found for: \"HIVRN\"   Chronic medical problems as above/stable  Lipid recommendation: As per PCP  Labs to be done every 3-6 months: CBC, CD4, VL, BMP, LFTs  Labs to be done once every 12months (last January 2024): HCV, Lipids, T-SPOT, RPR, UA  Vaccines: PCV13/PCV15 and PPSV23 OR single dose PCV20, HPV (through 26 years of age), Varicella, Meningococcus (MSM), ShingrixTdap, HAV, HBV, yearly inactivated influenza recommended   Colorectal cancer screen: due at age 45; repeat every 10 yrs (if normal)  Prostate cancer screen: due at age 50; repeat every 2 yrs (PSA < 2.5 ng/mL)  Dexa scan: due at age 50; repeat every 3 yrs (if normal)    Future Appointments   Date Time Provider Department Center   9/19/2024  9:45 AM Raad Mcdaniel MD [id number]           Dewey Sosa is a 44 y.o.  male who was seen during his hospital admission on 1/19/2024 where he had presented with generalized weakness, falls and 1 week history of

## 2024-06-20 NOTE — PATIENT INSTRUCTIONS
Please have your labs drawn 2 weeks before your next scheduled follow-up appointment. The ordered labs are drawn at the [place] laboratory facilities on Monday through Thursday.    Stop the Bactrim

## 2024-06-23 ENCOUNTER — APPOINTMENT (OUTPATIENT)
Dept: CT IMAGING | Age: 44
End: 2024-06-23
Payer: MEDICARE

## 2024-06-23 ENCOUNTER — APPOINTMENT (OUTPATIENT)
Dept: GENERAL RADIOLOGY | Age: 44
End: 2024-06-23
Payer: MEDICARE

## 2024-06-23 ENCOUNTER — HOSPITAL ENCOUNTER (EMERGENCY)
Age: 44
Discharge: HOME | End: 2024-06-23
Attending: STUDENT IN AN ORGANIZED HEALTH CARE EDUCATION/TRAINING PROGRAM
Payer: MEDICARE

## 2024-06-23 VITALS
HEART RATE: 47 BPM | OXYGEN SATURATION: 99 % | TEMPERATURE: 98.3 F | RESPIRATION RATE: 14 BRPM | DIASTOLIC BLOOD PRESSURE: 79 MMHG | SYSTOLIC BLOOD PRESSURE: 130 MMHG

## 2024-06-23 DIAGNOSIS — R10.9 ABDOMINAL PAIN, UNSPECIFIED ABDOMINAL LOCATION: ICD-10-CM
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
## 2024-06-23 LAB
ALBUMIN SERPL-MCNC: 4 GM/DL (ref 3.4–5)
ALP BLD-CCNC: 132 IU/L (ref 40–129)
ALT SERPL-CCNC: 17 U/L (ref 10–40)
ANION GAP SERPL CALCULATED.3IONS-SCNC: 13 MMOL/L (ref 7–16)
AST SERPL-CCNC: 22 IU/L (ref 15–37)
BASOPHILS ABSOLUTE: 0.1 K/CU MM
BASOPHILS RELATIVE PERCENT: 1.2 % (ref 0–1)
BILIRUB SERPL-MCNC: 0.4 MG/DL (ref 0–1)
BUN SERPL-MCNC: 24 MG/DL (ref 6–23)
CALCIUM SERPL-MCNC: 9.7 MG/DL (ref 8.3–10.6)
CHLORIDE BLD-SCNC: 98 MMOL/L (ref 99–110)
CO2: 21 MMOL/L (ref 21–32)
CREAT SERPL-MCNC: 2.8 MG/DL (ref 0.9–1.3)
DIFFERENTIAL TYPE: ABNORMAL
EOSINOPHILS ABSOLUTE: 0.1 K/CU MM
EOSINOPHILS RELATIVE PERCENT: 2.3 % (ref 0–3)
GFR, ESTIMATED: 28 ML/MIN/1.73M2
GLUCOSE SERPL-MCNC: 223 MG/DL (ref 70–99)
HCT VFR BLD CALC: 39.9 % (ref 42–52)
HEMOGLOBIN: 13.9 GM/DL (ref 13.5–18)
IMMATURE NEUTROPHIL %: 0.3 % (ref 0–0.43)
LYMPHOCYTES ABSOLUTE: 1 K/CU MM
LYMPHOCYTES RELATIVE PERCENT: 17.1 % (ref 24–44)
MCH RBC QN AUTO: 32 PG (ref 27–31)
MCHC RBC AUTO-ENTMCNC: 34.8 % (ref 32–36)
MCV RBC AUTO: 91.7 FL (ref 78–100)
MONOCYTES ABSOLUTE: 0.3 K/CU MM
MONOCYTES RELATIVE PERCENT: 4.7 % (ref 0–4)
NEUTROPHILS ABSOLUTE: 4.4 K/CU MM
NEUTROPHILS RELATIVE PERCENT: 74.4 % (ref 36–66)
NUCLEATED RBC %: 0 %
PDW BLD-RTO: 12.1 % (ref 11.7–14.9)
PLATELET # BLD: 202 K/CU MM (ref 140–440)
PMV BLD AUTO: 10.6 FL (ref 7.5–11.1)
POTASSIUM SERPL-SCNC: 4 MMOL/L (ref 3.5–5.1)
RBC # BLD: 4.35 M/CU MM (ref 4.6–6.2)
SODIUM BLD-SCNC: 132 MMOL/L (ref 135–145)
TOTAL IMMATURE NEUTOROPHIL: 0.02 K/CU MM
TOTAL NUCLEATED RBC: 0 K/CU MM
TOTAL PROTEIN: 8.2 GM/DL (ref 6.4–8.2)
WBC # BLD: 6 K/CU MM (ref 4–10.5)

## 2024-06-23 PROCEDURE — 99285 EMERGENCY DEPT VISIT HI MDM: CPT

## 2024-06-23 PROCEDURE — 85025 COMPLETE CBC W/AUTO DIFF WBC: CPT

## 2024-06-23 PROCEDURE — 80053 COMPREHEN METABOLIC PANEL: CPT

## 2024-06-23 PROCEDURE — 93005 ELECTROCARDIOGRAM TRACING: CPT | Performed by: STUDENT IN AN ORGANIZED HEALTH CARE EDUCATION/TRAINING PROGRAM

## 2024-06-23 PROCEDURE — 74176 CT ABD & PELVIS W/O CONTRAST: CPT

## 2024-06-23 PROCEDURE — 71045 X-RAY EXAM CHEST 1 VIEW: CPT

## 2024-06-23 ASSESSMENT — PAIN SCALES - GENERAL: PAINLEVEL_OUTOF10: 0

## 2024-06-23 ASSESSMENT — PAIN DESCRIPTION - LOCATION: LOCATION: ABDOMEN

## 2024-06-23 ASSESSMENT — PAIN DESCRIPTION - DESCRIPTORS: DESCRIPTORS: SORE
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
## 2024-06-25 LAB
EKG ATRIAL RATE: 48 BPM
EKG DIAGNOSIS: NORMAL
EKG P AXIS: 32 DEGREES
EKG P-R INTERVAL: 188 MS
EKG Q-T INTERVAL: 494 MS
EKG QRS DURATION: 104 MS
EKG QTC CALCULATION (BAZETT): 441 MS
EKG R AXIS: -9 DEGREES
EKG T AXIS: 107 DEGREES
EKG VENTRICULAR RATE: 48 BPM

## 2024-06-25 PROCEDURE — 93010 ELECTROCARDIOGRAM REPORT: CPT | Performed by: INTERNAL MEDICINE

## 2024-07-02 DIAGNOSIS — E11.9 TYPE 2 DIABETES MELLITUS WITHOUT COMPLICATION, WITH LONG-TERM CURRENT USE OF INSULIN (HCC): ICD-10-CM

## 2024-07-02 DIAGNOSIS — Z79.4 TYPE 2 DIABETES MELLITUS WITHOUT COMPLICATION, WITH LONG-TERM CURRENT USE OF INSULIN (HCC): ICD-10-CM

## 2024-07-02 RX ORDER — INSULIN GLARGINE 100 [IU]/ML
INJECTION, SOLUTION SUBCUTANEOUS
Qty: 15 ML | Refills: 2 | Status: SHIPPED | OUTPATIENT
Start: 2024-07-02

## 2024-07-02 RX ORDER — IBUPROFEN 200 MG
1 TABLET ORAL 3 TIMES DAILY
Qty: 90 EACH | Refills: 2 | Status: SHIPPED | OUTPATIENT
Start: 2024-07-02

## 2024-07-02 RX ORDER — TIZANIDINE 2 MG/1
TABLET ORAL
Qty: 30 TABLET | Refills: 0 | Status: SHIPPED | OUTPATIENT
Start: 2024-07-02

## 2024-07-02 RX ORDER — TRAZODONE HYDROCHLORIDE 50 MG/1
50 TABLET ORAL NIGHTLY PRN
Qty: 30 TABLET | Refills: 2 | Status: SHIPPED | OUTPATIENT
Start: 2024-07-02

## 2024-07-02 RX ORDER — RANOLAZINE 500 MG/1
500 TABLET, EXTENDED RELEASE ORAL 2 TIMES DAILY
Qty: 60 TABLET | Refills: 5 | OUTPATIENT
Start: 2024-07-02

## 2024-07-02 RX ORDER — ATORVASTATIN CALCIUM 80 MG/1
80 TABLET, FILM COATED ORAL DAILY
Qty: 30 TABLET | Refills: 5 | OUTPATIENT
Start: 2024-07-02

## 2024-07-02 RX ORDER — PEN NEEDLE, DIABETIC 31 G X1/4"
1 NEEDLE, DISPOSABLE MISCELLANEOUS 3 TIMES DAILY
Qty: 100 EACH | Refills: 5 | Status: SHIPPED | OUTPATIENT
Start: 2024-07-02

## 2024-07-08 RX ORDER — SYRINGE AND NEEDLE,INSULIN,1ML 30 G X1/2"
SYRINGE, EMPTY DISPOSABLE MISCELLANEOUS
Qty: 100 EACH | Refills: 2 | Status: SHIPPED | OUTPATIENT
Start: 2024-07-08

## 2024-07-08 RX ORDER — METOPROLOL SUCCINATE 50 MG/1
50 TABLET, EXTENDED RELEASE ORAL DAILY
Qty: 90 TABLET | Refills: 3 | OUTPATIENT
Start: 2024-07-08

## 2024-07-09 ENCOUNTER — TELEMEDICINE (OUTPATIENT)
Dept: INTERNAL MEDICINE CLINIC | Age: 44
End: 2024-07-09
Payer: MEDICARE

## 2024-07-09 ENCOUNTER — OFFICE VISIT (OUTPATIENT)
Dept: INTERNAL MEDICINE CLINIC | Age: 44
End: 2024-07-09
Payer: MEDICARE

## 2024-07-09 VITALS
DIASTOLIC BLOOD PRESSURE: 84 MMHG | OXYGEN SATURATION: 98 % | RESPIRATION RATE: 22 BRPM | BODY MASS INDEX: 28.89 KG/M2 | SYSTOLIC BLOOD PRESSURE: 130 MMHG | WEIGHT: 218 LBS | HEART RATE: 56 BPM | HEIGHT: 73 IN

## 2024-07-09 DIAGNOSIS — N18.6 ESRD (END STAGE RENAL DISEASE) (HCC): ICD-10-CM

## 2024-07-09 DIAGNOSIS — E11.9 TYPE 2 DIABETES MELLITUS WITHOUT COMPLICATION, WITH LONG-TERM CURRENT USE OF INSULIN (HCC): Primary | ICD-10-CM

## 2024-07-09 DIAGNOSIS — K21.9 GASTROESOPHAGEAL REFLUX DISEASE WITHOUT ESOPHAGITIS: ICD-10-CM

## 2024-07-09 DIAGNOSIS — Z79.4 TYPE 2 DIABETES MELLITUS WITHOUT COMPLICATION, WITH LONG-TERM CURRENT USE OF INSULIN (HCC): Primary | ICD-10-CM

## 2024-07-09 DIAGNOSIS — Z00.00 INITIAL MEDICARE ANNUAL WELLNESS VISIT: Primary | ICD-10-CM

## 2024-07-09 DIAGNOSIS — G47.00 INSOMNIA, UNSPECIFIED TYPE: ICD-10-CM

## 2024-07-09 DIAGNOSIS — I25.118 ATHEROSCLEROTIC HEART DISEASE OF NATIVE CORONARY ARTERY WITH OTHER FORMS OF ANGINA PECTORIS (HCC): ICD-10-CM

## 2024-07-09 DIAGNOSIS — F41.9 ANXIETY: ICD-10-CM

## 2024-07-09 DIAGNOSIS — E78.00 ELEVATED LDL CHOLESTEROL LEVEL: ICD-10-CM

## 2024-07-09 PROCEDURE — 3044F HG A1C LEVEL LT 7.0%: CPT | Performed by: NURSE PRACTITIONER

## 2024-07-09 PROCEDURE — 99214 OFFICE O/P EST MOD 30 MIN: CPT | Performed by: NURSE PRACTITIONER

## 2024-07-09 PROCEDURE — 2022F DILAT RTA XM EVC RTNOPTHY: CPT | Performed by: NURSE PRACTITIONER

## 2024-07-09 PROCEDURE — G8427 DOCREV CUR MEDS BY ELIG CLIN: HCPCS | Performed by: NURSE PRACTITIONER

## 2024-07-09 PROCEDURE — G0438 PPPS, INITIAL VISIT: HCPCS | Performed by: NURSE PRACTITIONER

## 2024-07-09 PROCEDURE — 4004F PT TOBACCO SCREEN RCVD TLK: CPT | Performed by: NURSE PRACTITIONER

## 2024-07-09 PROCEDURE — G8417 CALC BMI ABV UP PARAM F/U: HCPCS | Performed by: NURSE PRACTITIONER

## 2024-07-09 RX ORDER — EZETIMIBE 10 MG/1
10 TABLET ORAL DAILY
Qty: 30 TABLET | OUTPATIENT
Start: 2024-07-09

## 2024-07-09 RX ORDER — BUSPIRONE HYDROCHLORIDE 10 MG/1
10 TABLET ORAL 2 TIMES DAILY
Qty: 180 TABLET | Refills: 1 | Status: SHIPPED | OUTPATIENT
Start: 2024-07-09 | End: 2024-10-07

## 2024-07-09 RX ORDER — TRAZODONE HYDROCHLORIDE 50 MG/1
50 TABLET ORAL NIGHTLY PRN
Qty: 90 TABLET | Refills: 1 | Status: SHIPPED | OUTPATIENT
Start: 2024-07-09

## 2024-07-09 ASSESSMENT — PATIENT HEALTH QUESTIONNAIRE - PHQ9
SUM OF ALL RESPONSES TO PHQ9 QUESTIONS 1 & 2: 0
1. LITTLE INTEREST OR PLEASURE IN DOING THINGS: NOT AT ALL
SUM OF ALL RESPONSES TO PHQ QUESTIONS 1-9: 0
DEPRESSION UNABLE TO ASSESS: FUNCTIONAL CAPACITY MOTIVATION LIMITS ACCURACY
SUM OF ALL RESPONSES TO PHQ QUESTIONS 1-9: 0
SUM OF ALL RESPONSES TO PHQ QUESTIONS 1-9: 0
2. FEELING DOWN, DEPRESSED OR HOPELESS: NOT AT ALL
SUM OF ALL RESPONSES TO PHQ QUESTIONS 1-9: 0

## 2024-07-09 NOTE — PROGRESS NOTES
Medicare Annual Wellness Visit    Dewey Sosa is here for Medicare AWV    Assessment & Plan   Initial Medicare annual wellness visit  Recommendations for Preventive Services Due: see orders and patient instructions/AVS.  Recommended screening schedule for the next 5-10 years is provided to the patient in written form: see Patient Instructions/AVS.     No follow-ups on file.     Subjective     Patient's complete Health Risk Assessment and screening values have been reviewed and are found in Flowsheets. The following problems were reviewed today and where indicated follow up appointments were made and/or referrals ordered.    Positive Risk Factor Screenings with Interventions:        Depression:  Depression Unable to Assess: Functional capacity motivation limits accuracy  PHQ-2 Score: 0  PHQ-9 Total Score: 0    Interpretation:  5-9 mild   10-14 moderate   15-19 moderately severe   20-27 severe        Drug Use:   Substance and Sexual Activity   Drug Use Yes    Types: Marijuana (Weed)    Comment: occasionally     Interventions:  No issues identified/pt denies use         Activity, Diet, and Weight:  On average, how many days per week do you engage in moderate to strenuous exercise (like a brisk walk)?: 0 days  On average, how many minutes do you engage in exercise at this level?: 0 min    Do you eat balanced/healthy meals regularly?: Yes    There is no height or weight on file to calculate BMI.      Inactivity Interventions:  Recommendations: patient agrees to exercise for at least 150 minutes/week      Dentist Screen:  Have you seen the dentist within the past year?: (!) No    Intervention:  Advised to schedule with their dentist     Vision Screen:  Do you have difficulty driving, watching TV, or doing any of your daily activities because of your eyesight?: No  Have you had an eye exam within the past year?: (!) No  No results found.    Interventions:   Patient encouraged to make appointment with their eye

## 2024-07-09 NOTE — PROGRESS NOTES
Nicolle Blanchard from Dr. Lore Irby office calling to get patient in sooner than 5/23/19 new patient appt. mellitus without complication, with long-term current use of insulin (HCC)- recheck labs; has been w/o basal insulin but now back on previous regiment and sugars doing much better  -     CBC with Auto Differential; Future  -     Comprehensive Metabolic Panel; Future  -     Lipid, Fasting; Future  -     Hemoglobin A1C; Future    Anxiety- much improved with buspar increase; will cont w/o change.   -     busPIRone (BUSPAR) 10 MG tablet; Take 1 tablet by mouth 2 times daily    Elevated LDL cholesterol level- diet measures discussed; cont statin/zetia; recheck labs.   -     CBC with Auto Differential; Future  -     Comprehensive Metabolic Panel; Future  -     Lipid, Fasting; Future  -     Hemoglobin A1C; Future    ESRD (end stage renal disease) ([facility])- now on PD; appears to be going well; continue and per nephrology.   -     CBC with Auto Differential; Future  -     Comprehensive Metabolic Panel; Future    Atherosclerotic heart disease of native coronary artery with other forms of angina pectoris ([facility])    Gastroesophageal reflux disease without esophagitis - well controlled; no changes in management at this time. Cont ppi w/o change.     Insomnia, unspecified type  -     traZODone (DESYREL) 50 MG tablet; Take 1 tablet by mouth nightly as needed for Sleep             No data to display                Return in about 3 months (around 10/9/2024).    Leah note this reports has been produced speech recognition software and may contain errors related to that system including errors in grammar, punctuation, and spelling, as well as words and phrases that may be appropriate. If there are any questions or concerns please feel free to contact the dictating provider for clarification.

## 2024-07-09 NOTE — PATIENT INSTRUCTIONS

## 2024-07-10 ASSESSMENT — ENCOUNTER SYMPTOMS
SHORTNESS OF BREATH: 0
SINUS PRESSURE: 0
ABDOMINAL PAIN: 0
COUGH: 0
CONSTIPATION: 0
DIARRHEA: 0
SORE THROAT: 0
ABDOMINAL DISTENTION: 0
WHEEZING: 0
NAUSEA: 0
EYES NEGATIVE: 1
COLOR CHANGE: 0
SINUS PAIN: 0
CHEST TIGHTNESS: 0

## 2024-07-17 ENCOUNTER — HOSPITAL ENCOUNTER (INPATIENT)
Age: 44
LOS: 3 days | Discharge: OTHER INSTITUTION | DRG: 308 | End: 2024-07-20
Attending: STUDENT IN AN ORGANIZED HEALTH CARE EDUCATION/TRAINING PROGRAM | Admitting: STUDENT IN AN ORGANIZED HEALTH CARE EDUCATION/TRAINING PROGRAM
Payer: MEDICARE

## 2024-07-17 ENCOUNTER — APPOINTMENT (OUTPATIENT)
Dept: CT IMAGING | Age: 44
DRG: 308 | End: 2024-07-17
Payer: MEDICARE

## 2024-07-17 ENCOUNTER — APPOINTMENT (OUTPATIENT)
Dept: GENERAL RADIOLOGY | Age: 44
DRG: 308 | End: 2024-07-17
Payer: MEDICARE

## 2024-07-17 ENCOUNTER — APPOINTMENT (OUTPATIENT)
Dept: NON INVASIVE DIAGNOSTICS | Age: 44
DRG: 308 | End: 2024-07-17
Attending: STUDENT IN AN ORGANIZED HEALTH CARE EDUCATION/TRAINING PROGRAM
Payer: MEDICARE

## 2024-07-17 ENCOUNTER — HOSPITAL ENCOUNTER (EMERGENCY)
Age: 44
Discharge: STILL ADMITTED OR ADMINISTRATIVE | DRG: 308 | End: 2024-07-17
Payer: MEDICARE

## 2024-07-17 DIAGNOSIS — N18.6 ESRD (END STAGE RENAL DISEASE) ON DIALYSIS (HCC): ICD-10-CM

## 2024-07-17 DIAGNOSIS — I46.9 CARDIAC ARREST (HCC): Primary | ICD-10-CM

## 2024-07-17 DIAGNOSIS — Z99.2 ESRD (END STAGE RENAL DISEASE) ON DIALYSIS (HCC): ICD-10-CM

## 2024-07-17 DIAGNOSIS — R94.31 ABNORMAL EKG: ICD-10-CM

## 2024-07-17 LAB
ALBUMIN SERPL-MCNC: 3.7 GM/DL (ref 3.4–5)
ALP BLD-CCNC: 148 IU/L (ref 40–129)
ALT SERPL-CCNC: 85 U/L (ref 10–40)
ANION GAP SERPL CALCULATED.3IONS-SCNC: 16 MMOL/L (ref 7–16)
ANION GAP SERPL CALCULATED.3IONS-SCNC: 20 MMOL/L (ref 7–16)
ANION GAP SERPL CALCULATED.3IONS-SCNC: 25 MMOL/L (ref 7–16)
APTT: 29.5 SECONDS (ref 25.1–37.1)
APTT: 38.3 SECONDS (ref 25.1–37.1)
AST SERPL-CCNC: 237 IU/L (ref 15–37)
B PARAP IS1001 DNA NPH QL NAA+NON-PROBE: NOT DETECTED
B PERT.PT PRMT NPH QL NAA+NON-PROBE: NOT DETECTED
BASE EXCESS MIXED: 3.6 (ref 0–3)
BASE EXCESS MIXED: ABNORMAL (ref 0–2)
BASE EXCESS: 19.7 (ref 0–2)
BASE EXCESS: 7 (ref 0–3)
BASE EXCESS: ABNORMAL (ref 0–3)
BASOPHILS ABSOLUTE: 0.1 K/CU MM
BASOPHILS ABSOLUTE: 0.1 K/CU MM
BASOPHILS RELATIVE PERCENT: 0.6 % (ref 0–1)
BASOPHILS RELATIVE PERCENT: 0.8 % (ref 0–1)
BILIRUB SERPL-MCNC: 0.4 MG/DL (ref 0–1)
BILIRUBIN DIRECT: 0.2 MG/DL (ref 0–0.3)
BILIRUBIN, INDIRECT: 0.2 MG/DL (ref 0–0.7)
BUN SERPL-MCNC: 29 MG/DL (ref 6–23)
BUN SERPL-MCNC: 32 MG/DL (ref 6–23)
BUN SERPL-MCNC: 37 MG/DL (ref 6–23)
C PNEUM DNA NPH QL NAA+NON-PROBE: NOT DETECTED
CALCIUM IONIZED: ABNORMAL MMOL/L (ref 1.12–1.32)
CALCIUM IONIZED: ABNORMAL MMOL/L (ref 1.12–1.32)
CALCIUM SERPL-MCNC: 8.2 MG/DL (ref 8.3–10.6)
CALCIUM SERPL-MCNC: 9 MG/DL (ref 8.3–10.6)
CALCIUM SERPL-MCNC: 9.2 MG/DL (ref 8.3–10.6)
CARBON MONOXIDE, BLOOD: 1 % (ref 0–5)
CHLORIDE BLD-SCNC: 106 MMOL/L (ref 99–110)
CHLORIDE BLD-SCNC: 98 MMOL/L (ref 99–110)
CHLORIDE BLD-SCNC: 98 MMOL/L (ref 99–110)
CO2 CONTENT: 18.2 MMOL/L (ref 21–32)
CO2: 14 MMOL/L (ref 21–32)
CO2: 16 MMOL/L (ref 21–32)
CO2: 17 MMOL/L (ref 21–32)
CO2: 18 MMOL/L (ref 21–32)
CO2: 21 MMOL/L (ref 21–32)
COMMENT: ABNORMAL
CREAT SERPL-MCNC: 3.1 MG/DL (ref 0.9–1.3)
CREAT SERPL-MCNC: 3.2 MG/DL (ref 0.9–1.3)
CREAT SERPL-MCNC: 3.3 MG/DL (ref 0.9–1.3)
D-DIMER QUANTITATIVE: >20 UG/ML (FEU)
DIFFERENTIAL TYPE: ABNORMAL
DIFFERENTIAL TYPE: ABNORMAL
ECHO AO ROOT DIAM: 2.9 CM
ECHO AO ROOT INDEX: 1.3 CM/M2
ECHO AV AREA PEAK VELOCITY: 2.4 CM2
ECHO AV AREA VTI: 2.4 CM2
ECHO AV AREA/BSA PEAK VELOCITY: 1.1 CM2/M2
ECHO AV AREA/BSA VTI: 1.1 CM2/M2
ECHO AV MEAN GRADIENT: 4 MMHG
ECHO AV MEAN VELOCITY: 0.9 M/S
ECHO AV PEAK GRADIENT: 7 MMHG
ECHO AV PEAK VELOCITY: 1.3 M/S
ECHO AV VELOCITY RATIO: 0.62
ECHO AV VTI: 22.5 CM
ECHO BSA: 2.26 M2
ECHO EST RA PRESSURE: 15 MMHG
ECHO IVC PROX: 2.2 CM
ECHO LA AREA 4C: 17.6 CM2
ECHO LA DIAMETER INDEX: 1.97 CM/M2
ECHO LA DIAMETER: 4.4 CM
ECHO LA MAJOR AXIS: 5.5 CM
ECHO LA TO AORTIC ROOT RATIO: 1.52
ECHO LA VOL MOD A4C: 47 ML (ref 18–58)
ECHO LA VOLUME INDEX MOD A4C: 21 ML/M2 (ref 16–34)
ECHO LV E' LATERAL VELOCITY: 7 CM/S
ECHO LV E' SEPTAL VELOCITY: 4 CM/S
ECHO LV EDV A4C: 125 ML
ECHO LV EDV INDEX A4C: 56 ML/M2
ECHO LV EJECTION FRACTION A4C: 31 %
ECHO LV ESV A4C: 86 ML
ECHO LV ESV INDEX A4C: 39 ML/M2
ECHO LV FRACTIONAL SHORTENING: 17 % (ref 28–44)
ECHO LV INTERNAL DIMENSION DIASTOLE INDEX: 2.6 CM/M2
ECHO LV INTERNAL DIMENSION DIASTOLIC: 5.8 CM (ref 4.2–5.9)
ECHO LV INTERNAL DIMENSION SYSTOLIC INDEX: 2.15 CM/M2
ECHO LV INTERNAL DIMENSION SYSTOLIC: 4.8 CM
ECHO LV IVSD: 1.2 CM (ref 0.6–1)
ECHO LV MASS 2D: 280.4 G (ref 88–224)
ECHO LV MASS INDEX 2D: 125.8 G/M2 (ref 49–115)
ECHO LV POSTERIOR WALL DIASTOLIC: 1.1 CM (ref 0.6–1)
ECHO LV RELATIVE WALL THICKNESS RATIO: 0.38
ECHO LVOT AREA: 3.8 CM2
ECHO LVOT AV VTI INDEX: 0.62
ECHO LVOT DIAM: 2.2 CM
ECHO LVOT MEAN GRADIENT: 1 MMHG
ECHO LVOT PEAK GRADIENT: 3 MMHG
ECHO LVOT PEAK VELOCITY: 0.8 M/S
ECHO LVOT STROKE VOLUME INDEX: 23.7 ML/M2
ECHO LVOT SV: 52.8 ML
ECHO LVOT VTI: 13.9 CM
ECHO MV A VELOCITY: 0.79 M/S
ECHO MV E VELOCITY: 0.75 M/S
ECHO MV E/A RATIO: 0.95
ECHO MV E/E' LATERAL: 10.71
ECHO MV E/E' RATIO (AVERAGED): 14.73
ECHO MV E/E' SEPTAL: 18.75
ECHO RIGHT VENTRICULAR SYSTOLIC PRESSURE (RVSP): 38 MMHG
ECHO RV MID DIMENSION: 3.1 CM
ECHO TV REGURGITANT MAX VELOCITY: 2.42 M/S
ECHO TV REGURGITANT PEAK GRADIENT: 23 MMHG
EGFR, POC: 25 ML/MIN/1.73M2
EOSINOPHILS ABSOLUTE: 0.1 K/CU MM
EOSINOPHILS ABSOLUTE: 0.1 K/CU MM
EOSINOPHILS RELATIVE PERCENT: 0.5 % (ref 0–3)
EOSINOPHILS RELATIVE PERCENT: 1.6 % (ref 0–3)
ESTIMATED AVERAGE GLUCOSE: 163 MG/DL
FIBRINOGEN: 431 MG/DL (ref 170–540)
FIBRINOGEN: 537 MG/DL (ref 170–540)
FLUAV H1 2009 PAN RNA NPH NAA+NON-PROBE: NOT DETECTED
FLUAV H1 RNA NPH QL NAA+NON-PROBE: NOT DETECTED
FLUAV H3 RNA NPH QL NAA+NON-PROBE: NOT DETECTED
FLUAV RNA NPH QL NAA+NON-PROBE: NOT DETECTED
FLUBV RNA NPH QL NAA+NON-PROBE: NOT DETECTED
GFR, ESTIMATED: 23 ML/MIN/1.73M2
GFR, ESTIMATED: 24 ML/MIN/1.73M2
GFR, ESTIMATED: 24 ML/MIN/1.73M2
GLUCOSE BLD-MCNC: 255 MG/DL (ref 70–99)
GLUCOSE BLD-MCNC: 272 MG/DL (ref 70–99)
GLUCOSE BLD-MCNC: 336 MG/DL (ref 70–99)
GLUCOSE BLD-MCNC: 430 MG/DL (ref 70–99)
GLUCOSE BLD-MCNC: 490 MG/DL (ref 70–99)
GLUCOSE BLD-MCNC: 490 MG/DL (ref 70–99)
GLUCOSE BLD-MCNC: 500 MG/DL (ref 70–99)
GLUCOSE BLD-MCNC: 510 MG/DL (ref 70–99)
GLUCOSE BLD-MCNC: 584 MG/DL (ref 70–99)
GLUCOSE BLD-MCNC: 609 MG/DL (ref 70–99)
GLUCOSE SERPL-MCNC: 444 MG/DL (ref 70–99)
GLUCOSE SERPL-MCNC: 497 MG/DL (ref 70–99)
GLUCOSE SERPL-MCNC: 563 MG/DL (ref 70–99)
HADV DNA NPH QL NAA+NON-PROBE: NOT DETECTED
HBA1C MFR BLD: 7.3 % (ref 4.2–6.3)
HCO3 ARTERIAL: 15.1 MMOL/L (ref 22–26)
HCO3 ARTERIAL: 17.3 MMOL/L (ref 21–28)
HCO3 ARTERIAL: 19.8 MMOL/L (ref 21–28)
HCOV 229E RNA NPH QL NAA+NON-PROBE: NOT DETECTED
HCOV HKU1 RNA NPH QL NAA+NON-PROBE: NOT DETECTED
HCOV NL63 RNA NPH QL NAA+NON-PROBE: NOT DETECTED
HCOV OC43 RNA NPH QL NAA+NON-PROBE: NOT DETECTED
HCT VFR BLD CALC: 37.8 % (ref 42–52)
HCT VFR BLD CALC: 40.9 % (ref 42–52)
HCT VFR BLD CALC: 41 % (ref 42–52)
HCT VFR BLD CALC: 45 % (ref 38–51)
HEMOGLOBIN: 12.1 GM/DL (ref 13.5–18)
HEMOGLOBIN: 14 GM/DL (ref 13.5–18)
HEMOGLOBIN: 14.3 GM/DL (ref 13.5–18)
HEMOGLOBIN: 15.4 GM/DL (ref 12–17)
HMPV RNA NPH QL NAA+NON-PROBE: NOT DETECTED
HPIV1 RNA NPH QL NAA+NON-PROBE: NOT DETECTED
HPIV2 RNA NPH QL NAA+NON-PROBE: NOT DETECTED
HPIV3 RNA NPH QL NAA+NON-PROBE: NOT DETECTED
HPIV4 RNA NPH QL NAA+NON-PROBE: NOT DETECTED
IMMATURE NEUTROPHIL %: 1.6 % (ref 0–0.43)
IMMATURE NEUTROPHIL %: 3.8 % (ref 0–0.43)
INR BLD: 0.9 INDEX
INR BLD: 1 INDEX
LACTATE: 5 MMOL/L (ref 0.5–1.9)
LACTATE: 6 MMOL/L (ref 0.5–1.9)
LYMPHOCYTES ABSOLUTE: 1.2 K/CU MM
LYMPHOCYTES ABSOLUTE: 4.5 K/CU MM
LYMPHOCYTES RELATIVE PERCENT: 49.7 % (ref 24–44)
LYMPHOCYTES RELATIVE PERCENT: 9.8 % (ref 24–44)
M PNEUMO DNA NPH QL NAA+NON-PROBE: NOT DETECTED
MAGNESIUM: 1.9 MG/DL (ref 1.8–2.4)
MAGNESIUM: 2.2 MG/DL (ref 1.8–2.4)
MAGNESIUM: 2.6 MG/DL (ref 1.8–2.4)
MCH RBC QN AUTO: 31.3 PG (ref 27–31)
MCH RBC QN AUTO: 32.3 PG (ref 27–31)
MCHC RBC AUTO-ENTMCNC: 32 % (ref 32–36)
MCHC RBC AUTO-ENTMCNC: 35 % (ref 32–36)
MCV RBC AUTO: 100.8 FL (ref 78–100)
MCV RBC AUTO: 89.5 FL (ref 78–100)
METHEMOGLOBIN ARTERIAL: 1.4 %
MONOCYTES ABSOLUTE: 0.3 K/CU MM
MONOCYTES ABSOLUTE: 0.4 K/CU MM
MONOCYTES RELATIVE PERCENT: 3.3 % (ref 0–4)
MONOCYTES RELATIVE PERCENT: 3.8 % (ref 0–4)
MRSA, DNA, NASAL: NEGATIVE
NEUTROPHILS ABSOLUTE: 10.2 K/CU MM
NEUTROPHILS ABSOLUTE: 3.6 K/CU MM
NEUTROPHILS RELATIVE PERCENT: 40.3 % (ref 36–66)
NEUTROPHILS RELATIVE PERCENT: 84.2 % (ref 36–66)
NUCLEATED RBC %: 0 %
NUCLEATED RBC %: 0.2 %
O2 SATURATION: 60.1 % (ref 94–100)
O2 SATURATION: 95.9 % (ref 94–98)
O2 SATURATION: 98.4 % (ref 94–98)
PCO2 ARTERIAL: 30 MMHG (ref 35–48)
PCO2 ARTERIAL: 31.2 MMHG (ref 35–48)
PCO2 ARTERIAL: 83.5 MMHG (ref 32–45)
PDW BLD-RTO: 12.5 % (ref 11.7–14.9)
PDW BLD-RTO: 12.6 % (ref 11.7–14.9)
PH BLOOD: 6.87 (ref 7.35–7.45)
PH BLOOD: 7.37 (ref 7.35–7.45)
PH BLOOD: 7.41 (ref 7.35–7.45)
PHOSPHORUS: 10.2 MG/DL (ref 2.5–4.9)
PHOSPHORUS: 2.9 MG/DL (ref 2.5–4.9)
PHOSPHORUS: 6.3 MG/DL (ref 2.5–4.9)
PLATELET # BLD: 259 K/CU MM (ref 140–440)
PLATELET # BLD: 263 K/CU MM (ref 140–440)
PMV BLD AUTO: 10.3 FL (ref 7.5–11.1)
PMV BLD AUTO: 10.4 FL (ref 7.5–11.1)
PO2 ARTERIAL: 109.7 MMHG (ref 83–108)
PO2 ARTERIAL: 171 MMHG (ref 83–108)
PO2 ARTERIAL: 54.8 MMHG (ref 80–90)
POC CALCIUM: 1.11 MMOL/L (ref 1.15–1.33)
POC CALCIUM: 1.15 MMOL/L (ref 1.15–1.33)
POC CHLORIDE: 103 MMOL/L (ref 99–110)
POC CREATININE: 3 MG/DL (ref 0.5–1.2)
POTASSIUM SERPL-SCNC: 3.1 MMOL/L (ref 3.5–5.1)
POTASSIUM SERPL-SCNC: 3.4 MMOL/L (ref 3.5–5.1)
POTASSIUM SERPL-SCNC: 4 MMOL/L (ref 3.5–5.1)
POTASSIUM SERPL-SCNC: 5 MMOL/L (ref 3.5–5.1)
POTASSIUM SERPL-SCNC: 5.8 MMOL/L (ref 3.5–5.1)
PRO-BNP: 909 PG/ML
PROCALCITONIN SERPL-MCNC: 0.19 NG/ML
PROTHROMBIN TIME: 12.4 SECONDS (ref 11.7–14.5)
PROTHROMBIN TIME: 13.8 SECONDS (ref 11.7–14.5)
RBC # BLD: 3.75 M/CU MM (ref 4.6–6.2)
RBC # BLD: 4.57 M/CU MM (ref 4.6–6.2)
REASON FOR REJECTION: NORMAL
REJECTED TEST: NORMAL
RSV RNA NPH QL NAA+NON-PROBE: NOT DETECTED
RV+EV RNA NPH QL NAA+NON-PROBE: NOT DETECTED
SARS-COV-2 RNA NPH QL NAA+NON-PROBE: NOT DETECTED
SODIUM BLD-SCNC: 134 MMOL/L (ref 135–145)
SODIUM BLD-SCNC: 137 MMOL/L (ref 135–145)
SODIUM BLD-SCNC: 137 MMOL/L (ref 135–145)
SODIUM BLD-SCNC: 139 MMOL/L (ref 135–145)
SODIUM BLD-SCNC: 139 MMOL/L (ref 135–145)
SOURCE, BLOOD GAS: ABNORMAL
SOURCE, BLOOD GAS: ABNORMAL
SPECIMEN DESCRIPTION: NORMAL
TOTAL IMMATURE NEUTOROPHIL: 0.19 K/CU MM
TOTAL IMMATURE NEUTOROPHIL: 0.34 K/CU MM
TOTAL NUCLEATED RBC: 0 K/CU MM
TOTAL NUCLEATED RBC: 0 K/CU MM
TOTAL PROTEIN: 7.4 GM/DL (ref 6.4–8.2)
TROPONIN, HIGH SENSITIVITY: 12 NG/L (ref 0–22)
TROPONIN, HIGH SENSITIVITY: 85 NG/L (ref 0–22)
WBC # BLD: 12.1 K/CU MM (ref 4–10.5)
WBC # BLD: 9 K/CU MM (ref 4–10.5)

## 2024-07-17 PROCEDURE — 71045 X-RAY EXAM CHEST 1 VIEW: CPT

## 2024-07-17 PROCEDURE — 87389 HIV-1 AG W/HIV-1&-2 AB AG IA: CPT

## 2024-07-17 PROCEDURE — 3E1M39Z IRRIGATION OF PERITONEAL CAVITY USING DIALYSATE, PERCUTANEOUS APPROACH: ICD-10-PCS | Performed by: INTERNAL MEDICINE

## 2024-07-17 PROCEDURE — 80048 BASIC METABOLIC PNL TOTAL CA: CPT

## 2024-07-17 PROCEDURE — 85610 PROTHROMBIN TIME: CPT

## 2024-07-17 PROCEDURE — 04HY32Z INSERTION OF MONITORING DEVICE INTO LOWER ARTERY, PERCUTANEOUS APPROACH: ICD-10-PCS | Performed by: STUDENT IN AN ORGANIZED HEALTH CARE EDUCATION/TRAINING PROGRAM

## 2024-07-17 PROCEDURE — 96365 THER/PROPH/DIAG IV INF INIT: CPT

## 2024-07-17 PROCEDURE — 94640 AIRWAY INHALATION TREATMENT: CPT

## 2024-07-17 PROCEDURE — 6360000002 HC RX W HCPCS: Performed by: STUDENT IN AN ORGANIZED HEALTH CARE EDUCATION/TRAINING PROGRAM

## 2024-07-17 PROCEDURE — 70450 CT HEAD/BRAIN W/O DYE: CPT

## 2024-07-17 PROCEDURE — 99283 EMERGENCY DEPT VISIT LOW MDM: CPT

## 2024-07-17 PROCEDURE — 85014 HEMATOCRIT: CPT

## 2024-07-17 PROCEDURE — 87040 BLOOD CULTURE FOR BACTERIA: CPT

## 2024-07-17 PROCEDURE — 82565 ASSAY OF CREATININE: CPT

## 2024-07-17 PROCEDURE — 82962 GLUCOSE BLOOD TEST: CPT

## 2024-07-17 PROCEDURE — 85384 FIBRINOGEN ACTIVITY: CPT

## 2024-07-17 PROCEDURE — 83605 ASSAY OF LACTIC ACID: CPT

## 2024-07-17 PROCEDURE — 83880 ASSAY OF NATRIURETIC PEPTIDE: CPT

## 2024-07-17 PROCEDURE — 82330 ASSAY OF CALCIUM: CPT

## 2024-07-17 PROCEDURE — 2580000003 HC RX 258: Performed by: STUDENT IN AN ORGANIZED HEALTH CARE EDUCATION/TRAINING PROGRAM

## 2024-07-17 PROCEDURE — 0202U NFCT DS 22 TRGT SARS-COV-2: CPT

## 2024-07-17 PROCEDURE — 5A1945Z RESPIRATORY VENTILATION, 24-96 CONSECUTIVE HOURS: ICD-10-PCS | Performed by: STUDENT IN AN ORGANIZED HEALTH CARE EDUCATION/TRAINING PROGRAM

## 2024-07-17 PROCEDURE — 82248 BILIRUBIN DIRECT: CPT

## 2024-07-17 PROCEDURE — 6370000000 HC RX 637 (ALT 250 FOR IP): Performed by: STUDENT IN AN ORGANIZED HEALTH CARE EDUCATION/TRAINING PROGRAM

## 2024-07-17 PROCEDURE — 94002 VENT MGMT INPAT INIT DAY: CPT

## 2024-07-17 PROCEDURE — 74177 CT ABD & PELVIS W/CONTRAST: CPT

## 2024-07-17 PROCEDURE — 6360000004 HC RX CONTRAST MEDICATION: Performed by: STUDENT IN AN ORGANIZED HEALTH CARE EDUCATION/TRAINING PROGRAM

## 2024-07-17 PROCEDURE — 85730 THROMBOPLASTIN TIME PARTIAL: CPT

## 2024-07-17 PROCEDURE — 36620 INSERTION CATHETER ARTERY: CPT

## 2024-07-17 PROCEDURE — 88184 FLOWCYTOMETRY/ TC 1 MARKER: CPT

## 2024-07-17 PROCEDURE — 93005 ELECTROCARDIOGRAM TRACING: CPT | Performed by: STUDENT IN AN ORGANIZED HEALTH CARE EDUCATION/TRAINING PROGRAM

## 2024-07-17 PROCEDURE — 36592 COLLECT BLOOD FROM PICC: CPT

## 2024-07-17 PROCEDURE — 6360000002 HC RX W HCPCS

## 2024-07-17 PROCEDURE — 85025 COMPLETE CBC W/AUTO DIFF WBC: CPT

## 2024-07-17 PROCEDURE — 88185 FLOWCYTOMETRY/TC ADD-ON: CPT

## 2024-07-17 PROCEDURE — 6360000002 HC RX W HCPCS: Performed by: INTERNAL MEDICINE

## 2024-07-17 PROCEDURE — 2500000003 HC RX 250 WO HCPCS

## 2024-07-17 PROCEDURE — 2580000003 HC RX 258: Performed by: INTERNAL MEDICINE

## 2024-07-17 PROCEDURE — 95819 EEG AWAKE AND ASLEEP: CPT

## 2024-07-17 PROCEDURE — 37799 UNLISTED PX VASCULAR SURGERY: CPT

## 2024-07-17 PROCEDURE — 51702 INSERT TEMP BLADDER CATH: CPT

## 2024-07-17 PROCEDURE — 83036 HEMOGLOBIN GLYCOSYLATED A1C: CPT

## 2024-07-17 PROCEDURE — 84145 PROCALCITONIN (PCT): CPT

## 2024-07-17 PROCEDURE — 3E033XZ INTRODUCTION OF VASOPRESSOR INTO PERIPHERAL VEIN, PERCUTANEOUS APPROACH: ICD-10-PCS | Performed by: STUDENT IN AN ORGANIZED HEALTH CARE EDUCATION/TRAINING PROGRAM

## 2024-07-17 PROCEDURE — 85018 HEMOGLOBIN: CPT

## 2024-07-17 PROCEDURE — 84484 ASSAY OF TROPONIN QUANT: CPT

## 2024-07-17 PROCEDURE — 0BH17EZ INSERTION OF ENDOTRACHEAL AIRWAY INTO TRACHEA, VIA NATURAL OR ARTIFICIAL OPENING: ICD-10-PCS | Performed by: STUDENT IN AN ORGANIZED HEALTH CARE EDUCATION/TRAINING PROGRAM

## 2024-07-17 PROCEDURE — 2700000000 HC OXYGEN THERAPY PER DAY

## 2024-07-17 PROCEDURE — C9254 INJECTION, LACOSAMIDE: HCPCS | Performed by: STUDENT IN AN ORGANIZED HEALTH CARE EDUCATION/TRAINING PROGRAM

## 2024-07-17 PROCEDURE — 31500 INSERT EMERGENCY AIRWAY: CPT

## 2024-07-17 PROCEDURE — 02HV33Z INSERTION OF INFUSION DEVICE INTO SUPERIOR VENA CAVA, PERCUTANEOUS APPROACH: ICD-10-PCS | Performed by: STUDENT IN AN ORGANIZED HEALTH CARE EDUCATION/TRAINING PROGRAM

## 2024-07-17 PROCEDURE — 71275 CT ANGIOGRAPHY CHEST: CPT

## 2024-07-17 PROCEDURE — 2000000000 HC ICU R&B

## 2024-07-17 PROCEDURE — 90945 DIALYSIS ONE EVALUATION: CPT

## 2024-07-17 PROCEDURE — 85379 FIBRIN DEGRADATION QUANT: CPT

## 2024-07-17 PROCEDURE — 2720000010 HC SURG SUPPLY STERILE

## 2024-07-17 PROCEDURE — 72125 CT NECK SPINE W/O DYE: CPT

## 2024-07-17 PROCEDURE — 82803 BLOOD GASES ANY COMBINATION: CPT

## 2024-07-17 PROCEDURE — 80053 COMPREHEN METABOLIC PANEL: CPT

## 2024-07-17 PROCEDURE — 87641 MR-STAPH DNA AMP PROBE: CPT

## 2024-07-17 PROCEDURE — 95700 EEG CONT REC W/VID EEG TECH: CPT

## 2024-07-17 PROCEDURE — 89220 SPUTUM SPECIMEN COLLECTION: CPT

## 2024-07-17 PROCEDURE — 82805 BLOOD GASES W/O2 SATURATION: CPT

## 2024-07-17 PROCEDURE — 2500000003 HC RX 250 WO HCPCS: Performed by: STUDENT IN AN ORGANIZED HEALTH CARE EDUCATION/TRAINING PROGRAM

## 2024-07-17 PROCEDURE — 87536 HIV-1 QUANT&REVRSE TRNSCRPJ: CPT

## 2024-07-17 PROCEDURE — 93306 TTE W/DOPPLER COMPLETE: CPT | Performed by: INTERNAL MEDICINE

## 2024-07-17 PROCEDURE — 83735 ASSAY OF MAGNESIUM: CPT

## 2024-07-17 PROCEDURE — 94761 N-INVAS EAR/PLS OXIMETRY MLT: CPT

## 2024-07-17 PROCEDURE — 80051 ELECTROLYTE PANEL: CPT

## 2024-07-17 PROCEDURE — 2500000003 HC RX 250 WO HCPCS: Performed by: INTERNAL MEDICINE

## 2024-07-17 PROCEDURE — 36556 INSERT NON-TUNNEL CV CATH: CPT

## 2024-07-17 PROCEDURE — 5A1221J PERFORMANCE OF CARDIAC OUTPUT, CONTINUOUS, AUTOMATED: ICD-10-PCS | Performed by: STUDENT IN AN ORGANIZED HEALTH CARE EDUCATION/TRAINING PROGRAM

## 2024-07-17 PROCEDURE — 93306 TTE W/DOPPLER COMPLETE: CPT

## 2024-07-17 PROCEDURE — 95822 EEG COMA OR SLEEP ONLY: CPT | Performed by: STUDENT IN AN ORGANIZED HEALTH CARE EDUCATION/TRAINING PROGRAM

## 2024-07-17 PROCEDURE — 95716 VEEG EA 12-26HR CONT MNTR: CPT

## 2024-07-17 PROCEDURE — 74018 RADEX ABDOMEN 1 VIEW: CPT

## 2024-07-17 PROCEDURE — 96375 TX/PRO/DX INJ NEW DRUG ADDON: CPT

## 2024-07-17 PROCEDURE — 84100 ASSAY OF PHOSPHORUS: CPT

## 2024-07-17 PROCEDURE — 99285 EMERGENCY DEPT VISIT HI MDM: CPT

## 2024-07-17 RX ORDER — FENTANYL CITRATE-0.9 % NACL/PF 10 MCG/ML
25-200 PLASTIC BAG, INJECTION (ML) INTRAVENOUS CONTINUOUS
Status: DISCONTINUED | OUTPATIENT
Start: 2024-07-17 | End: 2024-07-17

## 2024-07-17 RX ORDER — MAGNESIUM SULFATE IN WATER 40 MG/ML
2000 INJECTION, SOLUTION INTRAVENOUS PRN
Status: DISCONTINUED | OUTPATIENT
Start: 2024-07-17 | End: 2024-07-20 | Stop reason: HOSPADM

## 2024-07-17 RX ORDER — FENTANYL CITRATE-0.9 % NACL/PF 10 MCG/ML
25-200 PLASTIC BAG, INJECTION (ML) INTRAVENOUS CONTINUOUS
Status: DISCONTINUED | OUTPATIENT
Start: 2024-07-17 | End: 2024-07-18

## 2024-07-17 RX ORDER — CALCIUM GLUCONATE 20 MG/ML
2000 INJECTION, SOLUTION INTRAVENOUS PRN
Status: DISCONTINUED | OUTPATIENT
Start: 2024-07-17 | End: 2024-07-19

## 2024-07-17 RX ORDER — POTASSIUM CHLORIDE 29.8 MG/ML
20 INJECTION INTRAVENOUS PRN
Status: DISCONTINUED | OUTPATIENT
Start: 2024-07-17 | End: 2024-07-17

## 2024-07-17 RX ORDER — GLUCAGON 1 MG/ML
1 KIT INJECTION PRN
Status: DISCONTINUED | OUTPATIENT
Start: 2024-07-17 | End: 2024-07-20 | Stop reason: HOSPADM

## 2024-07-17 RX ORDER — PROPOFOL 10 MG/ML
5-50 INJECTION, EMULSION INTRAVENOUS CONTINUOUS
Status: DISCONTINUED | OUTPATIENT
Start: 2024-07-17 | End: 2024-07-18

## 2024-07-17 RX ORDER — CHLORHEXIDINE GLUCONATE ORAL RINSE 1.2 MG/ML
15 SOLUTION DENTAL 2 TIMES DAILY
Status: DISCONTINUED | OUTPATIENT
Start: 2024-07-17 | End: 2024-07-20 | Stop reason: HOSPADM

## 2024-07-17 RX ORDER — SODIUM CHLORIDE 0.9 % (FLUSH) 0.9 %
1.1-1.9 SYRINGE (ML) INJECTION PRN
Status: DISCONTINUED | OUTPATIENT
Start: 2024-07-17 | End: 2024-07-20 | Stop reason: HOSPADM

## 2024-07-17 RX ORDER — FENTANYL CITRATE 50 UG/ML
50 INJECTION, SOLUTION INTRAMUSCULAR; INTRAVENOUS
Status: DISCONTINUED | OUTPATIENT
Start: 2024-07-17 | End: 2024-07-20 | Stop reason: HOSPADM

## 2024-07-17 RX ORDER — ACETAMINOPHEN 160 MG/5ML
650 LIQUID ORAL EVERY 6 HOURS SCHEDULED
Status: DISCONTINUED | OUTPATIENT
Start: 2024-07-17 | End: 2024-07-20 | Stop reason: HOSPADM

## 2024-07-17 RX ORDER — ONDANSETRON 2 MG/ML
4 INJECTION INTRAMUSCULAR; INTRAVENOUS EVERY 6 HOURS PRN
Status: DISCONTINUED | OUTPATIENT
Start: 2024-07-17 | End: 2024-07-20 | Stop reason: HOSPADM

## 2024-07-17 RX ORDER — ACETAMINOPHEN 160 MG/5ML
650 SUSPENSION ORAL EVERY 6 HOURS SCHEDULED
Status: DISCONTINUED | OUTPATIENT
Start: 2024-07-17 | End: 2024-07-17

## 2024-07-17 RX ORDER — METOPROLOL TARTRATE 50 MG/1
50 TABLET, FILM COATED ORAL 2 TIMES DAILY
Status: DISCONTINUED | OUTPATIENT
Start: 2024-07-17 | End: 2024-07-18

## 2024-07-17 RX ORDER — VITAMIN B COMPLEX
1 CAPSULE ORAL DAILY
Status: DISCONTINUED | OUTPATIENT
Start: 2024-07-17 | End: 2024-07-20 | Stop reason: HOSPADM

## 2024-07-17 RX ORDER — THIAMINE HYDROCHLORIDE 100 MG/ML
100 INJECTION, SOLUTION INTRAMUSCULAR; INTRAVENOUS DAILY
Status: DISCONTINUED | OUTPATIENT
Start: 2024-07-17 | End: 2024-07-20 | Stop reason: HOSPADM

## 2024-07-17 RX ORDER — ATORVASTATIN CALCIUM 40 MG/1
80 TABLET, FILM COATED ORAL NIGHTLY
Status: DISCONTINUED | OUTPATIENT
Start: 2024-07-17 | End: 2024-07-20 | Stop reason: HOSPADM

## 2024-07-17 RX ORDER — LEVETIRACETAM 500 MG/5ML
2000 INJECTION, SOLUTION, CONCENTRATE INTRAVENOUS ONCE
Status: COMPLETED | OUTPATIENT
Start: 2024-07-17 | End: 2024-07-17

## 2024-07-17 RX ORDER — LEVETIRACETAM 500 MG/5ML
500 INJECTION, SOLUTION, CONCENTRATE INTRAVENOUS EVERY 12 HOURS
Status: DISCONTINUED | OUTPATIENT
Start: 2024-07-18 | End: 2024-07-18

## 2024-07-17 RX ORDER — DEXTROSE MONOHYDRATE 100 MG/ML
INJECTION, SOLUTION INTRAVENOUS CONTINUOUS PRN
Status: DISCONTINUED | OUTPATIENT
Start: 2024-07-17 | End: 2024-07-20 | Stop reason: HOSPADM

## 2024-07-17 RX ORDER — SODIUM CHLORIDE 9 MG/ML
INJECTION, SOLUTION INTRAVENOUS PRN
Status: DISCONTINUED | OUTPATIENT
Start: 2024-07-17 | End: 2024-07-20 | Stop reason: HOSPADM

## 2024-07-17 RX ORDER — SODIUM CHLORIDE 9 MG/ML
INJECTION, SOLUTION INTRAVENOUS CONTINUOUS
Status: DISCONTINUED | OUTPATIENT
Start: 2024-07-17 | End: 2024-07-18

## 2024-07-17 RX ORDER — MINERAL OIL AND WHITE PETROLATUM 150; 830 MG/G; MG/G
OINTMENT OPHTHALMIC EVERY 4 HOURS
Status: DISCONTINUED | OUTPATIENT
Start: 2024-07-17 | End: 2024-07-18

## 2024-07-17 RX ORDER — MAGNESIUM SULFATE 1 G/100ML
1000 INJECTION INTRAVENOUS PRN
Status: DISCONTINUED | OUTPATIENT
Start: 2024-07-17 | End: 2024-07-20 | Stop reason: HOSPADM

## 2024-07-17 RX ORDER — ACETAMINOPHEN 650 MG/1
650 SUPPOSITORY RECTAL EVERY 6 HOURS PRN
Status: DISCONTINUED | OUTPATIENT
Start: 2024-07-17 | End: 2024-07-17

## 2024-07-17 RX ORDER — LABETALOL HYDROCHLORIDE 5 MG/ML
INJECTION, SOLUTION INTRAVENOUS
Status: COMPLETED
Start: 2024-07-17 | End: 2024-07-17

## 2024-07-17 RX ORDER — LORAZEPAM 2 MG/ML
INJECTION INTRAMUSCULAR
Status: COMPLETED
Start: 2024-07-17 | End: 2024-07-17

## 2024-07-17 RX ORDER — INSULIN LISPRO 100 [IU]/ML
0-4 INJECTION, SOLUTION INTRAVENOUS; SUBCUTANEOUS EVERY 4 HOURS
Status: DISCONTINUED | OUTPATIENT
Start: 2024-07-17 | End: 2024-07-17

## 2024-07-17 RX ORDER — ACETAMINOPHEN 160 MG/5ML
650 SUSPENSION ORAL EVERY 6 HOURS SCHEDULED
Status: DISCONTINUED | OUTPATIENT
Start: 2024-07-17 | End: 2024-07-17 | Stop reason: SDUPTHER

## 2024-07-17 RX ORDER — 0.9 % SODIUM CHLORIDE 0.9 %
1000 INTRAVENOUS SOLUTION INTRAVENOUS ONCE
Status: COMPLETED | OUTPATIENT
Start: 2024-07-17 | End: 2024-07-17

## 2024-07-17 RX ORDER — PROPOFOL 10 MG/ML
INJECTION, EMULSION INTRAVENOUS
Status: COMPLETED
Start: 2024-07-17 | End: 2024-07-17

## 2024-07-17 RX ORDER — FENTANYL CITRATE 50 UG/ML
50 INJECTION, SOLUTION INTRAMUSCULAR; INTRAVENOUS ONCE
Status: COMPLETED | OUTPATIENT
Start: 2024-07-17 | End: 2024-07-17

## 2024-07-17 RX ORDER — SODIUM CHLORIDE 0.9 % (FLUSH) 0.9 %
5-40 SYRINGE (ML) INJECTION EVERY 12 HOURS SCHEDULED
Status: DISCONTINUED | OUTPATIENT
Start: 2024-07-17 | End: 2024-07-20 | Stop reason: HOSPADM

## 2024-07-17 RX ORDER — CALCIUM GLUCONATE 20 MG/ML
1000 INJECTION, SOLUTION INTRAVENOUS PRN
Status: DISCONTINUED | OUTPATIENT
Start: 2024-07-17 | End: 2024-07-19

## 2024-07-17 RX ORDER — POTASSIUM CHLORIDE 29.8 MG/ML
20 INJECTION INTRAVENOUS ONCE
Status: DISCONTINUED | OUTPATIENT
Start: 2024-07-17 | End: 2024-07-20 | Stop reason: HOSPADM

## 2024-07-17 RX ORDER — LORAZEPAM 1 MG/1
2 TABLET ORAL
Status: DISCONTINUED | OUTPATIENT
Start: 2024-07-17 | End: 2024-07-20 | Stop reason: HOSPADM

## 2024-07-17 RX ORDER — SODIUM CHLORIDE 0.9 % (FLUSH) 0.9 %
5-40 SYRINGE (ML) INJECTION PRN
Status: DISCONTINUED | OUTPATIENT
Start: 2024-07-17 | End: 2024-07-20 | Stop reason: HOSPADM

## 2024-07-17 RX ORDER — POLYETHYLENE GLYCOL 3350 17 G/17G
17 POWDER, FOR SOLUTION ORAL DAILY PRN
Status: DISCONTINUED | OUTPATIENT
Start: 2024-07-17 | End: 2024-07-20 | Stop reason: HOSPADM

## 2024-07-17 RX ORDER — POTASSIUM CHLORIDE 7.45 MG/ML
10 INJECTION INTRAVENOUS PRN
Status: DISCONTINUED | OUTPATIENT
Start: 2024-07-17 | End: 2024-07-17

## 2024-07-17 RX ORDER — PANTOPRAZOLE SODIUM 40 MG/10ML
40 INJECTION, POWDER, LYOPHILIZED, FOR SOLUTION INTRAVENOUS DAILY
Status: DISCONTINUED | OUTPATIENT
Start: 2024-07-17 | End: 2024-07-20 | Stop reason: HOSPADM

## 2024-07-17 RX ORDER — ACETAMINOPHEN 325 MG/1
650 TABLET ORAL EVERY 6 HOURS PRN
Status: DISCONTINUED | OUTPATIENT
Start: 2024-07-17 | End: 2024-07-17

## 2024-07-17 RX ORDER — IPRATROPIUM BROMIDE AND ALBUTEROL SULFATE 2.5; .5 MG/3ML; MG/3ML
1 SOLUTION RESPIRATORY (INHALATION)
Status: DISCONTINUED | OUTPATIENT
Start: 2024-07-17 | End: 2024-07-20 | Stop reason: HOSPADM

## 2024-07-17 RX ORDER — FOLIC ACID 1 MG/1
1 TABLET ORAL DAILY
Status: DISCONTINUED | OUTPATIENT
Start: 2024-07-17 | End: 2024-07-20 | Stop reason: HOSPADM

## 2024-07-17 RX ORDER — HEPARIN SODIUM 5000 [USP'U]/ML
5000 INJECTION, SOLUTION INTRAVENOUS; SUBCUTANEOUS EVERY 8 HOURS SCHEDULED
Status: DISCONTINUED | OUTPATIENT
Start: 2024-07-17 | End: 2024-07-20 | Stop reason: HOSPADM

## 2024-07-17 RX ORDER — NOREPINEPHRINE BITARTRATE 0.06 MG/ML
1-100 INJECTION, SOLUTION INTRAVENOUS CONTINUOUS
Status: DISCONTINUED | OUTPATIENT
Start: 2024-07-17 | End: 2024-07-17

## 2024-07-17 RX ORDER — ASPIRIN 81 MG/1
81 TABLET, CHEWABLE ORAL DAILY
Status: DISCONTINUED | OUTPATIENT
Start: 2024-07-17 | End: 2024-07-20 | Stop reason: HOSPADM

## 2024-07-17 RX ORDER — ZINC SULFATE 50(220)MG
50 CAPSULE ORAL DAILY
Status: DISCONTINUED | OUTPATIENT
Start: 2024-07-17 | End: 2024-07-20 | Stop reason: HOSPADM

## 2024-07-17 RX ORDER — POLYVINYL ALCOHOL 14 MG/ML
1 SOLUTION/ DROPS OPHTHALMIC EVERY 4 HOURS
Status: DISCONTINUED | OUTPATIENT
Start: 2024-07-17 | End: 2024-07-18

## 2024-07-17 RX ORDER — POTASSIUM CHLORIDE 29.8 MG/ML
20 INJECTION INTRAVENOUS PRN
Status: DISCONTINUED | OUTPATIENT
Start: 2024-07-17 | End: 2024-07-19

## 2024-07-17 RX ORDER — ONDANSETRON 4 MG/1
4 TABLET, ORALLY DISINTEGRATING ORAL EVERY 8 HOURS PRN
Status: DISCONTINUED | OUTPATIENT
Start: 2024-07-17 | End: 2024-07-20 | Stop reason: HOSPADM

## 2024-07-17 RX ADMIN — FOLIC ACID 1 MG: 1 TABLET ORAL at 13:38

## 2024-07-17 RX ADMIN — TICAGRELOR 60 MG: 60 TABLET ORAL at 16:40

## 2024-07-17 RX ADMIN — AMIODARONE HYDROCHLORIDE 150 MG: 50 INJECTION, SOLUTION INTRAVENOUS at 10:38

## 2024-07-17 RX ADMIN — FENTANYL CITRATE 50 MCG: 50 INJECTION INTRAMUSCULAR; INTRAVENOUS at 11:21

## 2024-07-17 RX ADMIN — IPRATROPIUM BROMIDE AND ALBUTEROL SULFATE 1 DOSE: 2.5; .5 SOLUTION RESPIRATORY (INHALATION) at 19:32

## 2024-07-17 RX ADMIN — SODIUM CHLORIDE 5 MG/HR: 9 INJECTION, SOLUTION INTRAVENOUS at 12:39

## 2024-07-17 RX ADMIN — Medication 50 MCG/HR: at 17:02

## 2024-07-17 RX ADMIN — PROPOFOL 30 MCG/KG/MIN: 10 INJECTION, EMULSION INTRAVENOUS at 15:29

## 2024-07-17 RX ADMIN — AMIODARONE HYDROCHLORIDE 0.5 MG/MIN: 50 INJECTION, SOLUTION INTRAVENOUS at 17:30

## 2024-07-17 RX ADMIN — LEVETIRACETAM 2000 MG: 500 INJECTION INTRAVENOUS at 15:06

## 2024-07-17 RX ADMIN — Medication 5 MG: at 11:15

## 2024-07-17 RX ADMIN — BICTEGRAVIR SODIUM, EMTRICITABINE, AND TENOFOVIR ALAFENAMIDE FUMARATE 1 TABLET: 50; 200; 25 TABLET ORAL at 22:59

## 2024-07-17 RX ADMIN — FENTANYL CITRATE 50 MCG: 50 INJECTION INTRAMUSCULAR; INTRAVENOUS at 15:45

## 2024-07-17 RX ADMIN — IPRATROPIUM BROMIDE AND ALBUTEROL SULFATE 1 DOSE: 2.5; .5 SOLUTION RESPIRATORY (INHALATION) at 16:05

## 2024-07-17 RX ADMIN — MINERAL OIL AND WHITE PETROLATUM: 30; 940 OINTMENT OPHTHALMIC at 22:28

## 2024-07-17 RX ADMIN — SODIUM CHLORIDE, PRESERVATIVE FREE 10 ML: 5 INJECTION INTRAVENOUS at 13:02

## 2024-07-17 RX ADMIN — Medication: at 21:15

## 2024-07-17 RX ADMIN — PROPOFOL 30 MCG/KG/MIN: 10 INJECTION, EMULSION INTRAVENOUS at 11:24

## 2024-07-17 RX ADMIN — SODIUM CHLORIDE: 9 INJECTION, SOLUTION INTRAVENOUS at 15:24

## 2024-07-17 RX ADMIN — AMIODARONE HYDROCHLORIDE 1 MG/MIN: 50 INJECTION, SOLUTION INTRAVENOUS at 10:57

## 2024-07-17 RX ADMIN — LACOSAMIDE 400 MG: 10 INJECTION INTRAVENOUS at 15:38

## 2024-07-17 RX ADMIN — VANCOMYCIN HYDROCHLORIDE 2250 MG: 1 INJECTION, POWDER, LYOPHILIZED, FOR SOLUTION INTRAVENOUS at 17:32

## 2024-07-17 RX ADMIN — MINERAL OIL AND WHITE PETROLATUM: 30; 940 OINTMENT OPHTHALMIC at 16:40

## 2024-07-17 RX ADMIN — LABETALOL HYDROCHLORIDE 20 MG: 5 INJECTION, SOLUTION INTRAVENOUS at 11:28

## 2024-07-17 RX ADMIN — POTASSIUM CHLORIDE 20 MEQ: 29.8 INJECTION, SOLUTION INTRAVENOUS at 22:16

## 2024-07-17 RX ADMIN — LORAZEPAM 2 MG: 2 INJECTION INTRAMUSCULAR; INTRAVENOUS at 15:07

## 2024-07-17 RX ADMIN — ZINC SULFATE 220 MG (50 MG) CAPSULE 50 MG: CAPSULE at 13:38

## 2024-07-17 RX ADMIN — SODIUM CHLORIDE: 9 INJECTION, SOLUTION INTRAVENOUS at 18:14

## 2024-07-17 RX ADMIN — SODIUM CHLORIDE, PRESERVATIVE FREE 10 ML: 5 INJECTION INTRAVENOUS at 21:05

## 2024-07-17 RX ADMIN — IOPAMIDOL 80 ML: 755 INJECTION, SOLUTION INTRAVENOUS at 11:47

## 2024-07-17 RX ADMIN — SODIUM CHLORIDE: 9 INJECTION, SOLUTION INTRAVENOUS at 20:14

## 2024-07-17 RX ADMIN — POTASSIUM CHLORIDE 20 MEQ: 29.8 INJECTION, SOLUTION INTRAVENOUS at 20:51

## 2024-07-17 RX ADMIN — HEPARIN SODIUM 5000 UNITS: 5000 INJECTION INTRAVENOUS; SUBCUTANEOUS at 13:38

## 2024-07-17 RX ADMIN — PIPERACILLIN AND TAZOBACTAM 4500 MG: 4; .5 INJECTION, POWDER, FOR SOLUTION INTRAVENOUS at 17:41

## 2024-07-17 RX ADMIN — FENTANYL CITRATE 50 MCG: 50 INJECTION, SOLUTION INTRAMUSCULAR; INTRAVENOUS at 11:46

## 2024-07-17 RX ADMIN — METOPROLOL TARTRATE 50 MG: 50 TABLET, FILM COATED ORAL at 13:38

## 2024-07-17 RX ADMIN — IPRATROPIUM BROMIDE AND ALBUTEROL SULFATE 1 DOSE: 2.5; .5 SOLUTION RESPIRATORY (INHALATION) at 23:12

## 2024-07-17 RX ADMIN — PANTOPRAZOLE SODIUM 40 MG: 40 INJECTION, POWDER, FOR SOLUTION INTRAVENOUS at 13:38

## 2024-07-17 RX ADMIN — INSULIN HUMAN 16 UNITS: 100 INJECTION, SOLUTION PARENTERAL at 17:24

## 2024-07-17 RX ADMIN — ASPIRIN 81 MG: 81 TABLET, CHEWABLE ORAL at 13:42

## 2024-07-17 RX ADMIN — CHLORHEXIDINE GLUCONATE 0.12% ORAL RINSE 15 ML: 1.2 LIQUID ORAL at 13:38

## 2024-07-17 RX ADMIN — THIAMINE HYDROCHLORIDE 100 MG: 100 INJECTION, SOLUTION INTRAMUSCULAR; INTRAVENOUS at 13:39

## 2024-07-17 RX ADMIN — TICAGRELOR 60 MG: 60 TABLET ORAL at 23:21

## 2024-07-17 RX ADMIN — VITAMIN B COMPLEX 1 CAPSULE: at 16:40

## 2024-07-17 RX ADMIN — PROPOFOL 30 MCG/KG/MIN: 10 INJECTION, EMULSION INTRAVENOUS at 19:37

## 2024-07-17 RX ADMIN — CHLORHEXIDINE GLUCONATE 0.12% ORAL RINSE 15 ML: 1.2 LIQUID ORAL at 20:54

## 2024-07-17 RX ADMIN — SODIUM CHLORIDE 1000 ML: 9 INJECTION, SOLUTION INTRAVENOUS at 18:17

## 2024-07-17 RX ADMIN — HEPARIN SODIUM 5000 UNITS: 5000 INJECTION INTRAVENOUS; SUBCUTANEOUS at 22:27

## 2024-07-17 RX ADMIN — Medication 10 MCG/MIN: at 10:51

## 2024-07-17 RX ADMIN — ACETAMINOPHEN ORAL SOLUTION 650 MG: 650 SOLUTION ORAL at 21:01

## 2024-07-17 RX ADMIN — ACETAMINOPHEN ORAL SOLUTION 650 MG: 650 SOLUTION ORAL at 15:33

## 2024-07-17 RX ADMIN — CALCIUM GLUCONATE 1000 MG: 20 INJECTION, SOLUTION INTRAVENOUS at 23:19

## 2024-07-17 RX ADMIN — AMIODARONE HYDROCHLORIDE 1 MG/MIN: 50 INJECTION, SOLUTION INTRAVENOUS at 17:11

## 2024-07-17 RX ADMIN — NOREPINEPHRINE BITARTRATE 10 MCG/MIN: 0.06 INJECTION, SOLUTION INTRAVENOUS at 10:46

## 2024-07-17 RX ADMIN — FENTANYL CITRATE 50 MCG: 50 INJECTION INTRAMUSCULAR; INTRAVENOUS at 18:10

## 2024-07-17 RX ADMIN — ATORVASTATIN CALCIUM 80 MG: 40 TABLET, FILM COATED ORAL at 21:01

## 2024-07-17 ASSESSMENT — PULMONARY FUNCTION TESTS
PIF_VALUE: 17
PIF_VALUE: 23
PIF_VALUE: 19
PIF_VALUE: 18
PIF_VALUE: 23
PIF_VALUE: 18
PIF_VALUE: 19
PIF_VALUE: 26
PIF_VALUE: 24
PIF_VALUE: 20
PIF_VALUE: 19
PIF_VALUE: 17
PIF_VALUE: 18
PIF_VALUE: 12
PIF_VALUE: 19
PIF_VALUE: 18
PIF_VALUE: 20
PIF_VALUE: 19
PIF_VALUE: 24
PIF_VALUE: 20
PIF_VALUE: 18
PIF_VALUE: 20
PIF_VALUE: 19
PIF_VALUE: 25
PIF_VALUE: 18
PIF_VALUE: 20
PIF_VALUE: 12
PIF_VALUE: 11
PIF_VALUE: 17
PIF_VALUE: 18
PIF_VALUE: 17
PIF_VALUE: 19
PIF_VALUE: 18
PIF_VALUE: 19

## 2024-07-17 NOTE — PROCEDURES
PROCEDURE NOTE  Date: 7/17/2024   Name: Dewey Sosa  YOB: 1980    Central Venous Catheter Insertion Procedure Note   Procedure: Insertion of Central Venous Catheter   Procedure Clinician: Linda Montgomery MD   Procedure Assistant: None   Indications: access   Size and location: 7Fr TLC 20 cm. Right femoral vein   Attempts: 1   Procedure Details:   Emergent - NOT STERILE    Under semi sterile conditions the skin above the right femoral vein was prepped with chlorhexidine and covered with a sterile drape. Local anesthesia was applied to the skin and subcutaneous tissues. Ultrasound was used to localize the vein and an 18-gauge needle was then inserted into the vein.  A guide wire was then passed easily through the catheter. There were no arrhythmias. Ultrasound was used to identify the guidewire once in the vein. The catheter was then withdrawn. A 7Fr TLC, 20 cm cathter was then inserted into the vessel over the guide wire. The guidewire was then removed with the tip intact, and witnessed by bedside nurse. The catheter was sutured into place.     Findings:   There were no changes to vital signs. Catheter was flushed with 20 mL NS. Patient tolerated the procedure well.

## 2024-07-17 NOTE — ED PROVIDER NOTES
I was asked to manage airway / intubate for the cardiac arrest.    Please see Dr Lutz's note for further details.      Intubation    Date/Time: 7/17/2024 10:51 AM    Performed by: Stiven Collado DO  Authorized by: Stiven Collado DO    Consent:     Consent obtained:  Emergent situation  Pre-procedure details:     Indications: cardio/pulmonary arrest      Patient status:  Unresponsive    Look externally: large tongue      Mouth opening - incisor distance:  3 or more finger widths    Hyoid-mental distance: 2 finger widths      Hyoid-thyroid distance: 1 finger width      Mallampati score:  II    Pharmacologic strategy: none      Induction agents:  None    Paralytics:  None  Procedure details:     Preoxygenation:  Supraglottic device    CPR in progress: no      Number of attempts:  2  Successful intubation attempt details:     Intubation method:  Oral    Intubation technique: video assisted      Laryngoscope blade:  Mac 3    Bougie used: yes      Tube size (mm):  8.0    Tube type:  Cuffed    Tube visualized through cords: yes    First unsuccessful intubation attempt details:     Intubation method:  Oral    Intubation technique:  Over wire    Laryngoscope blade:  Mac 3    Bougie used: no      Grade view: IV      Tube size (mm):  8.0    Tube type:  Cuffed    Ventilation between 1st and 2nd attempt: yes with mask      Tube visualized through cords: yes    Placement assessment:     Tube secured with:  ETT lovell    Breath sounds:  Equal    Placement verification: chest rise, colorimetric ETCO2 and direct visualization      Chest x-ray findings: pending.  Post-procedure details:     Procedure completion:  Tolerated well, no immediate complications  Comments:      First unsuccessful attempt was using a bougie through the LMA.  Upon placing the endotracheal tube I went to confirm with a glide scope and was found to be esophageal intubated.  It was removed was given 1 breath and then intubated.  No hypoxia  inbetween attempts.           Stiven Collado, DO  07/17/24 1053
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
## 2024-07-17 NOTE — H&P
BARBARA Wise CNP   bictegravir-emtricitab-tenofovir alafenamide (BIKTARVY) -25 MG TABS per tablet Take 1 tablet by mouth daily 7/24/24 10/22/24  Raad Mcdaniel MD   omeprazole (PRILOSEC) 20 MG delayed release capsule Take 1 capsule by mouth Daily 4/10/24   Kim Kirkland APRN - CNP   metoprolol succinate (TOPROL XL) 50 MG extended release tablet Take 1 tablet by mouth daily 3/13/24   Brie Mcneil APRN - CNP   gabapentin (NEURONTIN) 300 MG capsule Take 1 capsule by mouth in the morning, at noon, and at bedtime for 30 days.  Patient taking differently: Take 1 capsule by mouth in the morning, at noon, and at bedtime. Takes HS only 2/26/24 4/24/99  Frandy Parsons APRN - CNP   magnesium oxide (MAG-OX) 400 (240 Mg) MG tablet Take 1 tablet by mouth 2 times daily 2/8/24   Darrel Matias MD   mirtazapine (REMERON SOL-TAB) 15 MG disintegrating tablet Take 1 tablet by mouth nightly 2/8/24   Darrel Matias MD   omega-3 acid ethyl esters (LOVAZA) 1 g capsule Take 2 capsules by mouth 2 times daily 2/8/24   Darrel Matias MD   Ergocalciferol (VITAMIN D) 06797 units CAPS Take 50,000 Units by mouth once a week 2/11/24   Darrel Matias MD   ticagrelor (BRILINTA) 60 MG TABS tablet Take 1 tablet by mouth 2 times daily 1/15/24   Brie Mcneil APRN - CNP   ezetimibe (ZETIA) 10 MG tablet Take 1 tablet by mouth daily 12/16/23   Kera Fuller MD   dicyclomine (BENTYL) 10 MG capsule Take 1 capsule by mouth 4 times daily as needed (for abdominal cramping) 10/4/23   Kim iKrkland APRN - CNP   atorvastatin (LIPITOR) 80 MG tablet Take 1 tablet by mouth daily 9/18/23   Brie Mcneil APRN - CNP   ranolazine (RANEXA) 500 MG extended release tablet Take 1 tablet by mouth 2 times daily 9/18/23   Brie Mcneil, APRN - CNP   isosorbide mononitrate (IMDUR) 60 MG extended release tablet Take by mouth    Provider, MD Kera   insulin lispro (HUMALOG) 100 UNIT/ML SOLN injection vial Inject 0-15 Units into the  impending:  [x] Neurological monitoring and treatment  [x] Respiratory failure -assessment of ventilator support, adjustment, ventilator weaning  [x] Hemodynamic and volume assessment with volume resuscitation  [x] Mechanical and/or chemical support of the circulation,  [x] Frequent vasoactive agent adjustment,  [x] Renal replacement therapy,  [x] For rapid decompensation,  [x] Electrolyte instability  [] Suctioning every 2 hours  [] Every hour neuro checks  [] Every hour neurovascular checks  [x] Frequent evaluation of patient's response to treatment and titration of therapies,  [x] Interpretation of laboratory and radiological data,  [x] Application and interpretation of advanced monitoring technologies,  [x] Extensive interpretation of multiple databases,  [x] Development of treatment plan with patient, surrogate, or consultants.  [] Others:       Electronically signed by Linda Montgomery MD on 7/17/2024 at 11:16 AM

## 2024-07-17 NOTE — PROGRESS NOTES
4 Eyes Skin Assessment     NAME:  Dewey Sosa  YOB: 1980  MEDICAL RECORD NUMBER:  [id number]    The patient is being assessed for  Admission    I agree that at least one RN has performed a thorough Head to Toe Skin Assessment on the patient. ALL assessment sites listed below have been assessed.      Areas assessed by both nurses:    Head, Face, Ears, Shoulders, Back, Chest, Arms, Elbows, Hands, Sacrum. Buttock, Coccyx, Ischium, and Legs. Feet and Heels        Does the Patient have a Wound? No noted wound(s) Left knee small abrasion        Bryon Prevention initiated by RN: Yes  Wound Care Orders initiated by RN: No    Pressure Injury (Stage 3,4, Unstageable, DTI, NWPT, and Complex wounds) if present, place Wound referral order by RN under : No    New Ostomies, if present place, Ostomy referral order under : No     Nurse 1 eSignature: Electronically signed by Meseret Hopkins RN on 7/17/24 at 7:58 PM EDT    **SHARE this note so that the co-signing nurse can place an eSignature**    Nurse 2 eSignature: Electronically signed by Reuben Garcia RN on 7/17/24 at 8:02 PM EDT

## 2024-07-17 NOTE — PROGRESS NOTES
cEEG initiated.  Set-up completed utilizing MRI / CT compatible leads and 10/20 paste.  Hand-off to remote [employer] monitoring technologist, CHAO, completed.  Epileptologist, Felipa Washburn, notified via [employer].     Electronically signed by Stefanie Tejeda MA on 7/17/2024 at 3:05 PM

## 2024-07-17 NOTE — PROGRESS NOTES
STAT inpatient EEG completed.  Epileptologist, Felipa Washburn, notified via [employer].       Electronically signed by Stefanie Tejeda MA on 7/17/2024 at 2:30 PM

## 2024-07-17 NOTE — CONSULTS
Comprehensive Nutrition Assessment    Type and Reason for Visit:  Initial, Consult (TF order/management; vent)    Nutrition Recommendations/Plan:   Start TF- Vital AF 1.2 (peptide based formula) @ 70mL/hr continuous w/ 25mL FWF Q1H to provide 2016kcal, 126g PRO, and just under 2000mL fluids per day   Free water flush may be adjusted by MD/Nephrology if needed  Monitor glucose, weights, GI status, renal fx, lytes, HOB, POC     Malnutrition Assessment:  Malnutrition Status:  Insufficient data (07/17/24 1419)    Context:  Acute Illness       Nutrition Assessment:    Admitted w/ cardiac arrest, history of HIV, ESRD, HTN, DM. Intubated, sedated, no pressors at this time, . Receiving care at visit. Weights stable over at least the past year per chart review. Will order TF and follow at high risk.    Nutrition Related Findings:    +propofol; lactate 6, BUN 29, Cr 3.3, GFR 23, P 10.2, mg 2.6, glcuose 497 Wound Type: None       Current Nutrition Intake & Therapies:    Average Meal Intake: NPO  Average Supplements Intake: NPO  Diet NPO  Additional Calorie Sources:  470kcal from propofol @ 17.8    Anthropometric Measures:  Height: 185.4 cm (6' 1\")  Ideal Body Weight (IBW): 184 lbs (84 kg)    Admission Body Weight: 98.9 kg (218 lb 0.6 oz)  Current Body Weight: 98.9 kg (218 lb 0.6 oz),   IBW. Weight Source: Not Specified  Current BMI (kg/m2): 28.8  Usual Body Weight: 99.7 kg (219 lb 12.8 oz) (1/20/24)  % Weight Change (Calculated): -0.8  Weight Adjustment For: No Adjustment                 BMI Categories: Overweight (BMI 25.0-29.9)    Estimated Daily Nutrient Needs:  Energy Requirements Based On: Kcal/kg  Weight Used for Energy Requirements: Current  Energy (kcal/day): 8092-9114 (20-25kcal/kg)  Weight Used for Protein Requirements: Current  Protein (g/day): 119-198 (1.2-2.0g/kg CBW)  Method Used for Fluid Requirements: 1 ml/kcal  Fluid (ml/day): 2200 or per nephrology    Nutrition Diagnosis:   Inadequate oral intake

## 2024-07-17 NOTE — PROGRESS NOTES
Patient was transported from ER to CT scanner while on zoll ventilator. No respiratory complications during scan.   yes

## 2024-07-17 NOTE — PROGRESS NOTES
Patient was transported from CT scanner to ICU room 2109 on Zoll ventilator on previously documented settings. He is now on PB ventilator on documented settings. MICHELLE Caal notified of patient's arrival.

## 2024-07-17 NOTE — PROCEDURES
PROCEDURE NOTE  Date: 7/17/2024   Name: Dewey Sosa  YOB: 1980    Arterial Catheter Insertion Procedure Note   Procedure: Insertion of Arterial Line   Procedure Clinician: Linda Montgomery MD   Procedure Assistant: None   Indications: BP monitoring, frequent ABGs   Size and location: 20g, right femoral artery   Attempts: 1   Procedure Details:   Emergent, SEMI STERILE    Under sterile conditions the skin above the Right femoral artery was prepped with chlorhexidine. Local anesthesia was applied to the skin and subcutaneous tissues. An Arrow kit was used to insert a 20-gauge needle into the artery. A guide wire was then passed easily through the needle without resistance. A 20 gauge catheter was then inserted into the vessel over the guide wire. The guidewire was then removed with the tip intact. The catheter was then secured into place.   Findings:   There were no changes to vital signs. Catheter was flushed with 20 mL NS. Patient tolerated the procedure well.

## 2024-07-17 NOTE — PROCEDURES
PROCEDURE NOTE  Date: 7/17/2024   Name: Dewey Sosa  YOB: 1980    Central Venous Catheter Insertion Procedure Note   Procedure: Insertion of Central Venous Catheter   Procedure Clinician: Linda Montgomery MD   Procedure Assistant: None   Indications: CRRT  Size and location: Left IJ, 13 Danish, 20 cm trialysis  Attempts: 1  Procedure Details:   Informed consent was obtained for the procedure, including sedation. Risks of lung perforation, hemorrhage, arrhythmia, and adverse drug reaction were discussed.     Under sterile conditions the skin above the left IJ was prepped with chlorhexidine and covered with a sterile drape. Local anesthesia was applied to the skin and subcutaneous tissues. Ultrasound was used to localize the vein and an 18-gauge needle was then inserted into the vein. A drop test was performed and was negative for any evidence of arterial flow. A guide wire was then passed easily through the catheter. There were no arrhythmias. Ultrasound was used to identify the guidewire once in the vein. The catheter was then withdrawn. A 13 Danish 20 cm trialysis was then inserted into the vessel over the guide wire. The guidewire was then removed with the tip intact, and witnessed by bedside nurse. The catheter was sutured into place.     Findings:   There were no changes to vital signs. Catheter was flushed with 20 mL NS. Patient tolerated the procedure well.     Lung ultrasound:   Pleural sliding -yes  Lung point -no  A lines -yes  B lines -yes  Consolidation? Not assessed   Pleural effusion? Not assessed     Recommendations:   CXR ordered to verify placement.

## 2024-07-17 NOTE — CONSULTS
PHARMACY VANCOMYCIN MONITORING SERVICE  Pharmacy consulted by Dr. Montgomery for monitoring and adjustment.    Indication for treatment: Vancomycin indication: HAP  Goal trough: Trough Goal: 15-20 mcg/mL  AUC/TOM: 400-600    Risk Factors for MRSA Identified:   Patient on hemodialysis    Pertinent Laboratory Values:   Temp Readings from Last 3 Encounters:   06/23/24 98.3 °F (36.8 °C) (Oral)   04/05/24 97 °F (36.1 °C) (Temporal)   03/14/24 97.6 °F (36.4 °C) (Temporal)     Recent Labs     07/17/24  1045   WBC 9.0     Recent Labs     07/17/24  1045   BUN 29*   CREATININE 3.3*     Estimated Creatinine Clearance: 35 mL/min (A) (based on SCr of 3.3 mg/dL (H)).  No intake or output data in the 24 hours ending 07/17/24 1241  Last Encounter Weight:  Wt Readings from Last 3 Encounters:   07/17/24 98.9 kg (218 lb 0.6 oz)   07/09/24 98.9 kg (218 lb)   06/20/24 99.2 kg (218 lb 12.8 oz)       Pertinent Cultures:   Date    Source    Results  7/17   Blood    ordered  7/17   Sputum/Respiratory  ordered  7/17   MRSA Nasal   ordered    Vancomycin level:   TROUGH:  No results for input(s): \"VANCOTROUGH\" in the last 72 hours.  RANDOM:  No results for input(s): \"VANCORANDOM\" in the last 72 hours.    Assessment:  HPI: Pt is 44 yom s/p cardiac arrest found down. PMH ESRD on iHD  SCr, BUN, and urine output: ESRD on iHD- awaiting nephro notes  Day(s) of therapy: 1  Vancomycin concentration: tbd    Plan:  Will give vanco 2250mg IV x 1 then dose by levels pending HD plans. Level tomorrow AM planned incase dialysis plans change or CRRT is needed.  Pharmacy will continue to monitor patient and adjust therapy as indicated    VANCOMYCIN CONCENTRATION SCHEDULED FOR 7/18 @0600    Thank you for the consult.  Keiry Alexander [facility]  7/17/2024 12:41 PM

## 2024-07-17 NOTE — PROCEDURES
PROCEDURE NOTE  Date: 7/17/2024   Name: Dewey Sosa  YOB: 1980    Central Venous Catheter Insertion Procedure Note   Procedure: Insertion of Central Venous Catheter   Procedure Clinician: Linda Montgomery MD   Procedure Assistant: None   Indications: Access, vesicant agent  Size and location: 7 Greek triple-lumen catheter, left IJ  Attempts: 1  Procedure Details:   Informed consent was obtained for the procedure, including sedation. Risks of lung perforation, hemorrhage, arrhythmia, and adverse drug reaction were discussed.     Under sterile conditions the skin above the left IJ was prepped with chlorhexidine and covered with a sterile drape. Local anesthesia was applied to the skin and subcutaneous tissues. Ultrasound was used to localize the vein and an 18-gauge needle was then inserted into the vein. A drop test was performed and was negative for any evidence of arterial flow. A guide wire was then passed easily through the catheter. There were no arrhythmias. Ultrasound was used to identify the guidewire once in the vein. The catheter was then withdrawn. A 7 Greek 20 cm triple-lumen catheter was then inserted into the vessel over the guide wire. The guidewire was then removed with the tip intact, and witnessed by bedside nurse. The catheter was sutured into place.     Findings:   There were no changes to vital signs. Catheter was flushed with 20 mL NS. Patient tolerated the procedure well.     Lung ultrasound:   Pleural sliding -yes  Lung point -no  A lines -yes  B lines-yes  Consolidation? Not assessed   Pleural effusion? Not assessed     Recommendations:   CXR ordered to verify placement.

## 2024-07-17 NOTE — PROGRESS NOTES
Patient was intubated with a 8.0C ET, secured at 24cm at the lips. ET tube placement verified by visualization of tube passing through the cords and positive color change on color metric device. Patient is now on Zoll ventilator ACVC 530/20/+5/100%.

## 2024-07-17 NOTE — PROCEDURES
ROUTINE ELECTROENCEPHALOGRAM    Identifying Information:  Name: Dewey Sosa  MRN: [id number]  : 1980  Interpreting Physician: Felipa Bragg DO  Referring Provider: Linda Montgomery MD  Date of EE24  Procedure Location: Inpatient [facility]     Clinical History:  Dewey Sosa is a 44 y.o. male with concern for seizures     Current Medications:    sodium chloride flush  5-40 mL IntraVENous 2 times per day    ipratropium 0.5 mg-albuterol 2.5 mg  1 Dose Inhalation Q4H RT    chlorhexidine  15 mL Mouth/Throat BID    polyvinyl alcohol  1 drop Both Eyes Q4H    Or    artificial tears   Both Eyes Q4H    pantoprazole  40 mg IntraVENous Daily    heparin (porcine)  5,000 Units SubCUTAneous 3 times per day    thiamine  100 mg IntraVENous Daily    folic acid  1 mg Oral Daily    zinc sulfate  50 mg Oral Daily    b complex vitamins  1 capsule Oral Daily    insulin lispro  0-4 Units SubCUTAneous Q4H    piperacillin-tazobactam  4,500 mg IntraVENous Q12H    vancomycin  25 mg/kg (Adjusted) IntraVENous Once    vancomycin (VANCOCIN) intermittent dosing (placeholder)   Other RX Placeholder    aspirin  81 mg Oral Daily    ticagrelor  60 mg Oral BID    metoprolol tartrate  50 mg Oral BID    bictegravir-emtricitab-tenofovir alafenamide  1 tablet Oral Daily    atorvastatin  80 mg Oral Nightly    potassium chloride  20 mEq IntraVENous Once    acetaminophen  650 mg Oral 4 times per day        Indication:  Rule out seizure/seizure disorder     Technical Summary:  28 channels of EEG were recorded in a digital format on a patient who is reported to be intubated and unresponsive during the recording. The patient was not sleep deprived prior to the EEG.     The background consists of burst suppression pattern consisting of 1 second burst of sharply contoured 5-6Hz theta activity. The burst is associated with eye opening myoclonus. This is followed by 30-60 seconds of suppression. Posterior dominant rhythm (PDR) is absent and the

## 2024-07-17 NOTE — ED PROVIDER NOTES
Emergency Department Encounter    Patient: Dewey Sosa  MRN: [id number]  : 1980  Date of Evaluation: 2024  ED Provider:  Vladimir Lutz DO    Triage Chief Complaint:   Cardiac Arrest    Kake:  Dewey Sosa is a 44 y.o. male that presents postcardiac arrest with ROSC via EMS.  Per EMS patient was found down by family at home.  Approximate downtime was 10 minutes.  Bystander CPR started.  On arrival EMS reports patient with PEA rhythm but in route did have V-fib twice and received shocked twice.  LMA inserted in the field.  Patient received 6 epi, 2 bicarb, 1 calcium gluconate prior to arrival.  He is a dialysis patient.  Reportedly has known cardiac history but unknown to the exact details.    MDM:    History from : EMS    On arrival patient in sinus rhythm with intact pulses.  Initially was quite hypotensive.  ET tube inserted by my colleague.  Initial twelve-lead concerning for STEMI with a left main pattern possibly.  I did discuss this with on-call cardiologist.  Patient initial pH was 6.85 with potassium of 5.9 on VBG.  We gave additional 3 A of sodium bicarbonate.  Given V-fib in the field and no amiodarone given we did give 300 mg bolus of amiodarone.  He was also hypotensive.  We started Levophed and epinephrine infusions.  Critical care attending at bedside during resuscitation and inserted right femoral CVC and arterial line.  Blood pressure improved with vasopressors, will titrate down since patient is now hypertensive.  Will plan for x-ray to confirm ET tube CT scans of the head chest abdomen pelvis.  Disposition to ICU.    ED Course as of 24 1110      1104 EKG interpreted without cardiology.  Sinus rhythm with ST elevation in aVR with diffuse depressions in various other leads.  Abnormal EKG. [LA]      ED Course User Index  [LA] Vladimir Lutz DO       Patient was given the following medications:  Medications   EPINEPHrine infusion (has no administration in  Exam:  Triage VS:      ED Triage Vitals [07/17/24 1041]   Enc Vitals Group      BP (!) 62/39      Pulse 80      Respirations (!) 7      Temp       Temp src       SpO2 100 %      Weight       Height       Head Circumference       Peak Flow       Pain Score       Pain Loc       Pain Edu?       Excl. in GC?           Physical Exam  Constitutional:       Appearance: He is ill-appearing.   HENT:      Head: Atraumatic.   Eyes:      Comments: Dilated, unreactive   Cardiovascular:      Rate and Rhythm: Tachycardia present.      Pulses:           Radial pulses are 1+ on the right side and 1+ on the left side.   Pulmonary:      Comments: LMA in place  Abdominal:      General: There is no distension.      Palpations: Abdomen is soft.   Musculoskeletal:      Right lower leg: No edema.      Left lower leg: No edema.   Neurological:      Mental Status: He is unresponsive.      Comments: No spontaneous movement         I have reviewed and interpreted all of the currently available lab results from this visit (if applicable):  No results found for this visit on 07/17/24.   Radiographs (if obtained):  Radiologist's Report Reviewed:  No results found.      Clinical Impression:  1. Cardiac arrest (HCC)    2. ESRD (end stage renal disease) on dialysis (HCC)    3. Abnormal EKG      Disposition referral (if applicable):  No follow-up provider specified.  Disposition medications (if applicable):  New Prescriptions    No medications on file     ED Provider Disposition Time  DISPOSITION Decision To Admit 07/17/2024 11:04:04 AM      Comment: Please note this report has been produced using speech recognition software and may contain errors related to that system including errors in grammar, punctuation, and spelling, as well as words and phrases that may be inappropriate.  Efforts were made to edit the dictations.       Vladimir Lutz,   07/17/24 1110

## 2024-07-18 ENCOUNTER — APPOINTMENT (OUTPATIENT)
Dept: GENERAL RADIOLOGY | Age: 44
DRG: 308 | End: 2024-07-18
Payer: MEDICARE

## 2024-07-18 LAB
ALBUMIN SERPL-MCNC: 2.7 GM/DL (ref 3.4–5)
ALP BLD-CCNC: 84 IU/L (ref 40–129)
ALT SERPL-CCNC: 73 U/L (ref 10–40)
ANION GAP SERPL CALCULATED.3IONS-SCNC: 12 MMOL/L (ref 7–16)
ANION GAP SERPL CALCULATED.3IONS-SCNC: 13 MMOL/L (ref 7–16)
ANION GAP SERPL CALCULATED.3IONS-SCNC: 14 MMOL/L (ref 7–16)
ANION GAP SERPL CALCULATED.3IONS-SCNC: 14 MMOL/L (ref 7–16)
APTT: 29.8 SECONDS (ref 25.1–37.1)
AST SERPL-CCNC: 246 IU/L (ref 15–37)
BASE EXCESS: 1 (ref 0–3)
BASE EXCESS: 1 (ref 0–3)
BASE EXCESS: 4 (ref 0–3)
BASE EXCESS: 4 (ref 0–3)
BASOPHILS ABSOLUTE: 0 K/CU MM
BASOPHILS RELATIVE PERCENT: 0.1 % (ref 0–1)
BILIRUB SERPL-MCNC: 0.4 MG/DL (ref 0–1)
BILIRUBIN DIRECT: 0.2 MG/DL (ref 0–0.3)
BILIRUBIN, INDIRECT: 0.2 MG/DL (ref 0–0.7)
BUN SERPL-MCNC: 15 MG/DL (ref 6–23)
BUN SERPL-MCNC: 20 MG/DL (ref 6–23)
BUN SERPL-MCNC: 24 MG/DL (ref 6–23)
BUN SERPL-MCNC: 32 MG/DL (ref 6–23)
CALCIUM IONIZED: ABNORMAL MMOL/L (ref 1.12–1.32)
CALCIUM SERPL-MCNC: 6.3 MG/DL (ref 8.3–10.6)
CALCIUM SERPL-MCNC: 8.1 MG/DL (ref 8.3–10.6)
CALCIUM SERPL-MCNC: 8.2 MG/DL (ref 8.3–10.6)
CALCIUM SERPL-MCNC: 8.4 MG/DL (ref 8.3–10.6)
CARBON MONOXIDE, BLOOD: 0.8 % (ref 0–5)
CARBON MONOXIDE, BLOOD: 0.9 % (ref 0–5)
CARBON MONOXIDE, BLOOD: 0.9 % (ref 0–5)
CARBON MONOXIDE, BLOOD: 1.1 % (ref 0–5)
CD3 CELLS # BLD: 895 CELLS/UL (ref 570–2400)
CD3 CELLS NFR SPEC: 73 % (ref 62–87)
CD3+CD4+ CELLS # BLD: 107 CELLS/UL (ref 430–1800)
CD3+CD4+ CELLS NFR BLD: 9 % (ref 32–64)
CD3+CD4+ CELLS/CD3+CD8+ CLL BLD: 0.15 RATIO (ref 0.8–3.9)
CD3+CD8+ CELLS # BLD: 764 CELLS/UL (ref 210–1200)
CD3+CD8+ CELLS NFR SPEC: 62 % (ref 15–46)
CHLORIDE BLD-SCNC: 104 MMOL/L (ref 99–110)
CHLORIDE BLD-SCNC: 105 MMOL/L (ref 99–110)
CHLORIDE BLD-SCNC: 106 MMOL/L (ref 99–110)
CHLORIDE BLD-SCNC: 113 MMOL/L (ref 99–110)
CHOLEST SERPL-MCNC: 197 MG/DL
CO2 CONTENT: 18.9 MMOL/L (ref 21–32)
CO2 CONTENT: 21 MMOL/L (ref 21–32)
CO2 CONTENT: 21.8 MMOL/L (ref 21–32)
CO2 CONTENT: 23.2 MMOL/L (ref 21–32)
CO2: 16 MMOL/L (ref 21–32)
CO2: 19 MMOL/L (ref 21–32)
CO2: 21 MMOL/L (ref 21–32)
CO2: 21 MMOL/L (ref 21–32)
COMMENT: ABNORMAL
CREAT SERPL-MCNC: 1.3 MG/DL (ref 0.9–1.3)
CREAT SERPL-MCNC: 1.8 MG/DL (ref 0.9–1.3)
CREAT SERPL-MCNC: 2.1 MG/DL (ref 0.9–1.3)
CREAT SERPL-MCNC: 2.6 MG/DL (ref 0.9–1.3)
DIFFERENTIAL TYPE: ABNORMAL
DOSE AMOUNT: NORMAL
DOSE TIME: NORMAL
EOSINOPHILS ABSOLUTE: 0 K/CU MM
EOSINOPHILS RELATIVE PERCENT: 0.1 % (ref 0–3)
GFR, ESTIMATED: 30 ML/MIN/1.73M2
GFR, ESTIMATED: 39 ML/MIN/1.73M2
GFR, ESTIMATED: 47 ML/MIN/1.73M2
GFR, ESTIMATED: 69 ML/MIN/1.73M2
GLUCOSE BLD-MCNC: 125 MG/DL (ref 70–99)
GLUCOSE BLD-MCNC: 140 MG/DL (ref 70–99)
GLUCOSE BLD-MCNC: 145 MG/DL (ref 70–99)
GLUCOSE BLD-MCNC: 145 MG/DL (ref 70–99)
GLUCOSE BLD-MCNC: 151 MG/DL (ref 70–99)
GLUCOSE BLD-MCNC: 152 MG/DL (ref 70–99)
GLUCOSE BLD-MCNC: 153 MG/DL (ref 70–99)
GLUCOSE BLD-MCNC: 157 MG/DL (ref 70–99)
GLUCOSE BLD-MCNC: 162 MG/DL (ref 70–99)
GLUCOSE BLD-MCNC: 164 MG/DL (ref 70–99)
GLUCOSE BLD-MCNC: 170 MG/DL (ref 70–99)
GLUCOSE BLD-MCNC: 176 MG/DL (ref 70–99)
GLUCOSE BLD-MCNC: 192 MG/DL (ref 70–99)
GLUCOSE BLD-MCNC: 204 MG/DL (ref 70–99)
GLUCOSE BLD-MCNC: 207 MG/DL (ref 70–99)
GLUCOSE BLD-MCNC: 220 MG/DL (ref 70–99)
GLUCOSE BLD-MCNC: 235 MG/DL (ref 70–99)
GLUCOSE BLD-MCNC: 236 MG/DL (ref 70–99)
GLUCOSE BLD-MCNC: 239 MG/DL (ref 70–99)
GLUCOSE BLD-MCNC: 266 MG/DL (ref 70–99)
GLUCOSE SERPL-MCNC: 150 MG/DL (ref 70–99)
GLUCOSE SERPL-MCNC: 152 MG/DL (ref 70–99)
GLUCOSE SERPL-MCNC: 180 MG/DL (ref 70–99)
GLUCOSE SERPL-MCNC: 260 MG/DL (ref 70–99)
HCO3 ARTERIAL: 18.1 MMOL/L (ref 21–28)
HCO3 ARTERIAL: 20 MMOL/L (ref 21–28)
HCO3 ARTERIAL: 20.9 MMOL/L (ref 21–28)
HCO3 ARTERIAL: 22.2 MMOL/L (ref 21–28)
HCT VFR BLD CALC: 35.4 % (ref 42–52)
HCT VFR BLD CALC: 36.2 % (ref 42–52)
HDLC SERPL-MCNC: 28 MG/DL
HEMOGLOBIN: 12.2 GM/DL (ref 13.5–18)
HEMOGLOBIN: 12.7 GM/DL (ref 13.5–18)
IMMATURE NEUTROPHIL %: 1.1 % (ref 0–0.43)
INR BLD: 1 INDEX
LACTATE: 1.8 MMOL/L (ref 0.5–1.9)
LACTATE: 2.3 MMOL/L (ref 0.5–1.9)
LACTATE: 2.5 MMOL/L (ref 0.5–1.9)
LACTATE: 3.6 MMOL/L (ref 0.5–1.9)
LDLC SERPL CALC-MCNC: 91 MG/DL
LYMPHOCYTES ABSOLUTE: 0.8 K/CU MM
LYMPHOCYTES RELATIVE PERCENT: 6.6 % (ref 24–44)
MAGNESIUM: 1.5 MG/DL (ref 1.8–2.4)
MAGNESIUM: 2.1 MG/DL (ref 1.8–2.4)
MCH RBC QN AUTO: 31.6 PG (ref 27–31)
MCH RBC QN AUTO: 31.9 PG (ref 27–31)
MCHC RBC AUTO-ENTMCNC: 34.5 % (ref 32–36)
MCHC RBC AUTO-ENTMCNC: 35.1 % (ref 32–36)
MCV RBC AUTO: 90 FL (ref 78–100)
MCV RBC AUTO: 92.4 FL (ref 78–100)
METHEMOGLOBIN ARTERIAL: 1.1 %
METHEMOGLOBIN ARTERIAL: 1.3 %
MONOCYTES ABSOLUTE: 0.4 K/CU MM
MONOCYTES RELATIVE PERCENT: 3.6 % (ref 0–4)
NEUTROPHILS ABSOLUTE: 10.6 K/CU MM
NEUTROPHILS RELATIVE PERCENT: 88.5 % (ref 36–66)
NUCLEATED RBC %: 0 %
O2 SATURATION: 95.7 % (ref 94–98)
O2 SATURATION: 95.8 % (ref 94–98)
O2 SATURATION: 95.9 % (ref 94–98)
O2 SATURATION: 95.9 % (ref 94–98)
PCO2 ARTERIAL: 26 MMHG (ref 35–48)
PCO2 ARTERIAL: 28 MMHG (ref 35–48)
PCO2 ARTERIAL: 32 MMHG (ref 35–48)
PCO2 ARTERIAL: 33 MMHG (ref 35–48)
PDW BLD-RTO: 12.8 % (ref 11.7–14.9)
PDW BLD-RTO: 13.2 % (ref 11.7–14.9)
PH BLOOD: 7.39 (ref 7.35–7.45)
PH BLOOD: 7.45 (ref 7.35–7.45)
PH BLOOD: 7.45 (ref 7.35–7.45)
PH BLOOD: 7.48 (ref 7.35–7.45)
PHOSPHORUS: 2 MG/DL (ref 2.5–4.9)
PHOSPHORUS: 2.4 MG/DL (ref 2.5–4.9)
PHOSPHORUS: 2.6 MG/DL (ref 2.5–4.9)
PHOSPHORUS: 2.8 MG/DL (ref 2.5–4.9)
PLATELET # BLD: 211 K/CU MM (ref 140–440)
PLATELET # BLD: 226 K/CU MM (ref 140–440)
PMV BLD AUTO: 10.1 FL (ref 7.5–11.1)
PMV BLD AUTO: 10.4 FL (ref 7.5–11.1)
PO2 ARTERIAL: 126 MMHG (ref 83–108)
PO2 ARTERIAL: 127 MMHG (ref 83–108)
PO2 ARTERIAL: 130 MMHG (ref 83–108)
PO2 ARTERIAL: 178 MMHG (ref 83–108)
POTASSIUM SERPL-SCNC: 2.9 MMOL/L (ref 3.5–5.1)
POTASSIUM SERPL-SCNC: 3.6 MMOL/L (ref 3.5–5.1)
POTASSIUM SERPL-SCNC: 3.7 MMOL/L (ref 3.5–5.1)
POTASSIUM SERPL-SCNC: 3.7 MMOL/L (ref 3.5–5.1)
PROTHROMBIN TIME: 13.5 SECONDS (ref 11.7–14.5)
RBC # BLD: 3.83 M/CU MM (ref 4.6–6.2)
RBC # BLD: 4.02 M/CU MM (ref 4.6–6.2)
SODIUM BLD-SCNC: 138 MMOL/L (ref 135–145)
SODIUM BLD-SCNC: 139 MMOL/L (ref 135–145)
SODIUM BLD-SCNC: 140 MMOL/L (ref 135–145)
SODIUM BLD-SCNC: 141 MMOL/L (ref 135–145)
TOTAL IMMATURE NEUTOROPHIL: 0.13 K/CU MM
TOTAL NUCLEATED RBC: 0 K/CU MM
TOTAL PROTEIN: 4.7 GM/DL (ref 6.4–8.2)
TRIGL SERPL-MCNC: 391 MG/DL
VANCOMYCIN RANDOM: 15.2 UG/ML
WBC # BLD: 12 K/CU MM (ref 4–10.5)
WBC # BLD: 12.3 K/CU MM (ref 4–10.5)

## 2024-07-18 PROCEDURE — 94640 AIRWAY INHALATION TREATMENT: CPT

## 2024-07-18 PROCEDURE — 2580000003 HC RX 258: Performed by: STUDENT IN AN ORGANIZED HEALTH CARE EDUCATION/TRAINING PROGRAM

## 2024-07-18 PROCEDURE — C9254 INJECTION, LACOSAMIDE: HCPCS | Performed by: STUDENT IN AN ORGANIZED HEALTH CARE EDUCATION/TRAINING PROGRAM

## 2024-07-18 PROCEDURE — 94761 N-INVAS EAR/PLS OXIMETRY MLT: CPT

## 2024-07-18 PROCEDURE — 85025 COMPLETE CBC W/AUTO DIFF WBC: CPT

## 2024-07-18 PROCEDURE — 82248 BILIRUBIN DIRECT: CPT

## 2024-07-18 PROCEDURE — 37799 UNLISTED PX VASCULAR SURGERY: CPT

## 2024-07-18 PROCEDURE — 2500000003 HC RX 250 WO HCPCS: Performed by: INTERNAL MEDICINE

## 2024-07-18 PROCEDURE — 6360000002 HC RX W HCPCS: Performed by: STUDENT IN AN ORGANIZED HEALTH CARE EDUCATION/TRAINING PROGRAM

## 2024-07-18 PROCEDURE — 6360000002 HC RX W HCPCS: Performed by: INTERNAL MEDICINE

## 2024-07-18 PROCEDURE — 82330 ASSAY OF CALCIUM: CPT

## 2024-07-18 PROCEDURE — 6370000000 HC RX 637 (ALT 250 FOR IP): Performed by: STUDENT IN AN ORGANIZED HEALTH CARE EDUCATION/TRAINING PROGRAM

## 2024-07-18 PROCEDURE — 6370000000 HC RX 637 (ALT 250 FOR IP): Performed by: PHYSICIAN ASSISTANT

## 2024-07-18 PROCEDURE — 85610 PROTHROMBIN TIME: CPT

## 2024-07-18 PROCEDURE — 2700000000 HC OXYGEN THERAPY PER DAY

## 2024-07-18 PROCEDURE — 80202 ASSAY OF VANCOMYCIN: CPT

## 2024-07-18 PROCEDURE — 83735 ASSAY OF MAGNESIUM: CPT

## 2024-07-18 PROCEDURE — 71045 X-RAY EXAM CHEST 1 VIEW: CPT

## 2024-07-18 PROCEDURE — 6360000002 HC RX W HCPCS: Performed by: PHYSICIAN ASSISTANT

## 2024-07-18 PROCEDURE — 85027 COMPLETE CBC AUTOMATED: CPT

## 2024-07-18 PROCEDURE — 89220 SPUTUM SPECIMEN COLLECTION: CPT

## 2024-07-18 PROCEDURE — 83605 ASSAY OF LACTIC ACID: CPT

## 2024-07-18 PROCEDURE — 80048 BASIC METABOLIC PNL TOTAL CA: CPT

## 2024-07-18 PROCEDURE — 87070 CULTURE OTHR SPECIMN AEROBIC: CPT

## 2024-07-18 PROCEDURE — 2500000003 HC RX 250 WO HCPCS

## 2024-07-18 PROCEDURE — 90945 DIALYSIS ONE EVALUATION: CPT

## 2024-07-18 PROCEDURE — 84100 ASSAY OF PHOSPHORUS: CPT

## 2024-07-18 PROCEDURE — 2000000000 HC ICU R&B

## 2024-07-18 PROCEDURE — 80061 LIPID PANEL: CPT

## 2024-07-18 PROCEDURE — 80076 HEPATIC FUNCTION PANEL: CPT

## 2024-07-18 PROCEDURE — 2580000003 HC RX 258: Performed by: INTERNAL MEDICINE

## 2024-07-18 PROCEDURE — 95716 VEEG EA 12-26HR CONT MNTR: CPT

## 2024-07-18 PROCEDURE — 80053 COMPREHEN METABOLIC PANEL: CPT

## 2024-07-18 PROCEDURE — 82962 GLUCOSE BLOOD TEST: CPT

## 2024-07-18 PROCEDURE — 85730 THROMBOPLASTIN TIME PARTIAL: CPT

## 2024-07-18 PROCEDURE — 82803 BLOOD GASES ANY COMBINATION: CPT

## 2024-07-18 PROCEDURE — 87205 SMEAR GRAM STAIN: CPT

## 2024-07-18 RX ORDER — HYDRALAZINE HYDROCHLORIDE 50 MG/1
50 TABLET, FILM COATED ORAL EVERY 8 HOURS SCHEDULED
Status: DISCONTINUED | OUTPATIENT
Start: 2024-07-18 | End: 2024-07-19

## 2024-07-18 RX ORDER — MIDAZOLAM HYDROCHLORIDE 2 MG/2ML
2 INJECTION, SOLUTION INTRAMUSCULAR; INTRAVENOUS EVERY 4 HOURS PRN
Status: DISCONTINUED | OUTPATIENT
Start: 2024-07-18 | End: 2024-07-18

## 2024-07-18 RX ORDER — LEVETIRACETAM 500 MG/5ML
500 INJECTION, SOLUTION, CONCENTRATE INTRAVENOUS ONCE
Status: COMPLETED | OUTPATIENT
Start: 2024-07-18 | End: 2024-07-18

## 2024-07-18 RX ORDER — CARBOXYMETHYLCELLULOSE SODIUM 10 MG/ML
1 GEL OPHTHALMIC EVERY 4 HOURS
Status: DISCONTINUED | OUTPATIENT
Start: 2024-07-18 | End: 2024-07-20 | Stop reason: HOSPADM

## 2024-07-18 RX ORDER — MIDAZOLAM HYDROCHLORIDE 2 MG/2ML
2 INJECTION, SOLUTION INTRAMUSCULAR; INTRAVENOUS ONCE
Status: COMPLETED | OUTPATIENT
Start: 2024-07-18 | End: 2024-07-18

## 2024-07-18 RX ORDER — NOREPINEPHRINE BITARTRATE 0.06 MG/ML
1-100 INJECTION, SOLUTION INTRAVENOUS CONTINUOUS
Status: DISCONTINUED | OUTPATIENT
Start: 2024-07-18 | End: 2024-07-20 | Stop reason: HOSPADM

## 2024-07-18 RX ORDER — FENTANYL CITRATE-0.9 % NACL/PF 10 MCG/ML
25-200 PLASTIC BAG, INJECTION (ML) INTRAVENOUS CONTINUOUS
Status: DISCONTINUED | OUTPATIENT
Start: 2024-07-18 | End: 2024-07-18

## 2024-07-18 RX ORDER — AMLODIPINE BESYLATE 10 MG/1
10 TABLET ORAL DAILY
Status: DISCONTINUED | OUTPATIENT
Start: 2024-07-18 | End: 2024-07-19

## 2024-07-18 RX ORDER — FENTANYL CITRATE 50 UG/ML
100 INJECTION, SOLUTION INTRAMUSCULAR; INTRAVENOUS ONCE
Status: COMPLETED | OUTPATIENT
Start: 2024-07-18 | End: 2024-07-18

## 2024-07-18 RX ORDER — CARVEDILOL 25 MG/1
25 TABLET ORAL 2 TIMES DAILY WITH MEALS
Status: DISCONTINUED | OUTPATIENT
Start: 2024-07-18 | End: 2024-07-19

## 2024-07-18 RX ORDER — DIAZEPAM 5 MG/1
5 TABLET ORAL EVERY 8 HOURS
Status: DISCONTINUED | OUTPATIENT
Start: 2024-07-18 | End: 2024-07-19

## 2024-07-18 RX ORDER — ISOSORBIDE DINITRATE 20 MG/1
20 TABLET ORAL 3 TIMES DAILY
Status: DISCONTINUED | OUTPATIENT
Start: 2024-07-18 | End: 2024-07-19

## 2024-07-18 RX ORDER — NOREPINEPHRINE BITARTRATE 0.06 MG/ML
INJECTION, SOLUTION INTRAVENOUS
Status: COMPLETED
Start: 2024-07-18 | End: 2024-07-18

## 2024-07-18 RX ORDER — PROPOFOL 10 MG/ML
5-50 INJECTION, EMULSION INTRAVENOUS CONTINUOUS
Status: DISCONTINUED | OUTPATIENT
Start: 2024-07-18 | End: 2024-07-19

## 2024-07-18 RX ORDER — MINERAL OIL AND WHITE PETROLATUM 150; 830 MG/G; MG/G
OINTMENT OPHTHALMIC EVERY 4 HOURS
Status: DISCONTINUED | OUTPATIENT
Start: 2024-07-18 | End: 2024-07-20 | Stop reason: HOSPADM

## 2024-07-18 RX ORDER — FENTANYL CITRATE-0.9 % NACL/PF 10 MCG/ML
25-200 PLASTIC BAG, INJECTION (ML) INTRAVENOUS CONTINUOUS
Status: ACTIVE | OUTPATIENT
Start: 2024-07-18 | End: 2024-07-18

## 2024-07-18 RX ORDER — HYDRALAZINE HYDROCHLORIDE 20 MG/ML
10 INJECTION INTRAMUSCULAR; INTRAVENOUS ONCE
Status: COMPLETED | OUTPATIENT
Start: 2024-07-18 | End: 2024-07-18

## 2024-07-18 RX ORDER — LEVETIRACETAM 500 MG/5ML
1000 INJECTION, SOLUTION, CONCENTRATE INTRAVENOUS EVERY 12 HOURS
Status: DISCONTINUED | OUTPATIENT
Start: 2024-07-18 | End: 2024-07-19

## 2024-07-18 RX ORDER — MIDAZOLAM HYDROCHLORIDE 2 MG/2ML
2 INJECTION, SOLUTION INTRAMUSCULAR; INTRAVENOUS
Status: DISCONTINUED | OUTPATIENT
Start: 2024-07-18 | End: 2024-07-20 | Stop reason: HOSPADM

## 2024-07-18 RX ADMIN — MINERAL OIL AND WHITE PETROLATUM: 30; 940 OINTMENT OPHTHALMIC at 02:03

## 2024-07-18 RX ADMIN — PANTOPRAZOLE SODIUM 40 MG: 40 INJECTION, POWDER, FOR SOLUTION INTRAVENOUS at 09:32

## 2024-07-18 RX ADMIN — IPRATROPIUM BROMIDE AND ALBUTEROL SULFATE 1 DOSE: 2.5; .5 SOLUTION RESPIRATORY (INHALATION) at 23:22

## 2024-07-18 RX ADMIN — Medication: at 04:07

## 2024-07-18 RX ADMIN — SODIUM CHLORIDE, PRESERVATIVE FREE 10 ML: 5 INJECTION INTRAVENOUS at 11:12

## 2024-07-18 RX ADMIN — CHLORHEXIDINE GLUCONATE 0.12% ORAL RINSE 15 ML: 1.2 LIQUID ORAL at 21:50

## 2024-07-18 RX ADMIN — PROPOFOL 30 MCG/KG/MIN: 10 INJECTION, EMULSION INTRAVENOUS at 01:06

## 2024-07-18 RX ADMIN — ACETAMINOPHEN ORAL SOLUTION 650 MG: 650 SOLUTION ORAL at 21:50

## 2024-07-18 RX ADMIN — ACETAMINOPHEN ORAL SOLUTION 650 MG: 650 SOLUTION ORAL at 14:57

## 2024-07-18 RX ADMIN — FOLIC ACID 1 MG: 1 TABLET ORAL at 09:29

## 2024-07-18 RX ADMIN — TICAGRELOR 60 MG: 60 TABLET ORAL at 09:30

## 2024-07-18 RX ADMIN — PIPERACILLIN AND TAZOBACTAM 4500 MG: 4; .5 INJECTION, POWDER, FOR SOLUTION INTRAVENOUS at 17:34

## 2024-07-18 RX ADMIN — FENTANYL CITRATE 100 MCG: 50 INJECTION INTRAMUSCULAR; INTRAVENOUS at 12:40

## 2024-07-18 RX ADMIN — ZINC SULFATE 220 MG (50 MG) CAPSULE 50 MG: CAPSULE at 09:28

## 2024-07-18 RX ADMIN — Medication: at 11:09

## 2024-07-18 RX ADMIN — ISOSORBIDE DINITRATE 20 MG: 20 TABLET ORAL at 21:50

## 2024-07-18 RX ADMIN — MIDAZOLAM 2 MG: 1 INJECTION INTRAMUSCULAR; INTRAVENOUS at 13:34

## 2024-07-18 RX ADMIN — AMIODARONE HYDROCHLORIDE 0.5 MG/MIN: 50 INJECTION, SOLUTION INTRAVENOUS at 06:25

## 2024-07-18 RX ADMIN — ACETAMINOPHEN ORAL SOLUTION 650 MG: 650 SOLUTION ORAL at 03:06

## 2024-07-18 RX ADMIN — LACOSAMIDE 200 MG: 10 INJECTION INTRAVENOUS at 21:44

## 2024-07-18 RX ADMIN — Medication: at 02:27

## 2024-07-18 RX ADMIN — CHLORHEXIDINE GLUCONATE 0.12% ORAL RINSE 15 ML: 1.2 LIQUID ORAL at 11:11

## 2024-07-18 RX ADMIN — METOPROLOL TARTRATE 50 MG: 50 TABLET, FILM COATED ORAL at 09:28

## 2024-07-18 RX ADMIN — SODIUM CHLORIDE, PRESERVATIVE FREE 10 ML: 5 INJECTION INTRAVENOUS at 21:52

## 2024-07-18 RX ADMIN — Medication: at 07:41

## 2024-07-18 RX ADMIN — LACOSAMIDE 200 MG: 10 INJECTION INTRAVENOUS at 10:43

## 2024-07-18 RX ADMIN — CARBOXYMETHYLCELLULOSE SODIUM 1 DROP: 10 GEL OPHTHALMIC at 11:11

## 2024-07-18 RX ADMIN — CARBOXYMETHYLCELLULOSE SODIUM 1 DROP: 10 GEL OPHTHALMIC at 14:57

## 2024-07-18 RX ADMIN — POTASSIUM CHLORIDE 20 MEQ: 29.8 INJECTION, SOLUTION INTRAVENOUS at 20:42

## 2024-07-18 RX ADMIN — Medication: at 07:40

## 2024-07-18 RX ADMIN — PROPOFOL 20 MCG/KG/MIN: 10 INJECTION, EMULSION INTRAVENOUS at 10:23

## 2024-07-18 RX ADMIN — MINERAL OIL AND WHITE PETROLATUM: 30; 940 OINTMENT OPHTHALMIC at 06:26

## 2024-07-18 RX ADMIN — Medication: at 18:46

## 2024-07-18 RX ADMIN — ATORVASTATIN CALCIUM 80 MG: 40 TABLET, FILM COATED ORAL at 21:51

## 2024-07-18 RX ADMIN — CALCIUM GLUCONATE 1000 MG: 20 INJECTION, SOLUTION INTRAVENOUS at 10:11

## 2024-07-18 RX ADMIN — SODIUM PHOSPHATE, MONOBASIC, MONOHYDRATE AND SODIUM PHOSPHATE, DIBASIC, ANHYDROUS 6 MMOL: 142; 276 INJECTION, SOLUTION INTRAVENOUS at 05:37

## 2024-07-18 RX ADMIN — ISOSORBIDE DINITRATE 20 MG: 20 TABLET ORAL at 09:31

## 2024-07-18 RX ADMIN — IPRATROPIUM BROMIDE AND ALBUTEROL SULFATE 1 DOSE: 2.5; .5 SOLUTION RESPIRATORY (INHALATION) at 19:25

## 2024-07-18 RX ADMIN — LEVETIRACETAM 500 MG: 500 INJECTION INTRAVENOUS at 03:05

## 2024-07-18 RX ADMIN — LEVETIRACETAM 500 MG: 500 INJECTION INTRAVENOUS at 11:07

## 2024-07-18 RX ADMIN — CARBOXYMETHYLCELLULOSE SODIUM 1 DROP: 10 GEL OPHTHALMIC at 08:15

## 2024-07-18 RX ADMIN — CARBOXYMETHYLCELLULOSE SODIUM 1 DROP: 10 GEL OPHTHALMIC at 19:00

## 2024-07-18 RX ADMIN — CALCIUM GLUCONATE 1000 MG: 20 INJECTION, SOLUTION INTRAVENOUS at 14:56

## 2024-07-18 RX ADMIN — MINERAL OIL AND WHITE PETROLATUM: 30; 940 OINTMENT OPHTHALMIC at 23:45

## 2024-07-18 RX ADMIN — HYDRALAZINE HYDROCHLORIDE 50 MG: 50 TABLET ORAL at 13:14

## 2024-07-18 RX ADMIN — DIAZEPAM 5 MG: 5 TABLET ORAL at 16:29

## 2024-07-18 RX ADMIN — SODIUM CHLORIDE, PRESERVATIVE FREE 10 ML: 5 INJECTION INTRAVENOUS at 16:25

## 2024-07-18 RX ADMIN — ASPIRIN 81 MG: 81 TABLET, CHEWABLE ORAL at 09:29

## 2024-07-18 RX ADMIN — PIPERACILLIN AND TAZOBACTAM 4500 MG: 4; .5 INJECTION, POWDER, FOR SOLUTION INTRAVENOUS at 10:18

## 2024-07-18 RX ADMIN — HEPARIN SODIUM 5000 UNITS: 5000 INJECTION INTRAVENOUS; SUBCUTANEOUS at 13:14

## 2024-07-18 RX ADMIN — IPRATROPIUM BROMIDE AND ALBUTEROL SULFATE 1 DOSE: 2.5; .5 SOLUTION RESPIRATORY (INHALATION) at 07:14

## 2024-07-18 RX ADMIN — LEVETIRACETAM 1000 MG: 500 INJECTION INTRAVENOUS at 18:29

## 2024-07-18 RX ADMIN — HYDRALAZINE HYDROCHLORIDE 10 MG: 20 INJECTION INTRAMUSCULAR; INTRAVENOUS at 11:06

## 2024-07-18 RX ADMIN — Medication 50 MCG/HR: at 10:21

## 2024-07-18 RX ADMIN — NOREPINEPHRINE BITARTRATE 30 MCG/MIN: 0.06 INJECTION, SOLUTION INTRAVENOUS at 19:58

## 2024-07-18 RX ADMIN — Medication: at 14:51

## 2024-07-18 RX ADMIN — IPRATROPIUM BROMIDE AND ALBUTEROL SULFATE 1 DOSE: 2.5; .5 SOLUTION RESPIRATORY (INHALATION) at 03:36

## 2024-07-18 RX ADMIN — TICAGRELOR 60 MG: 60 TABLET ORAL at 21:50

## 2024-07-18 RX ADMIN — HEPARIN SODIUM 5000 UNITS: 5000 INJECTION INTRAVENOUS; SUBCUTANEOUS at 06:07

## 2024-07-18 RX ADMIN — POTASSIUM CHLORIDE 20 MEQ: 29.8 INJECTION, SOLUTION INTRAVENOUS at 19:56

## 2024-07-18 RX ADMIN — SODIUM CHLORIDE, PRESERVATIVE FREE 10 ML: 5 INJECTION INTRAVENOUS at 09:32

## 2024-07-18 RX ADMIN — CALCIUM GLUCONATE 1000 MG: 20 INJECTION, SOLUTION INTRAVENOUS at 02:08

## 2024-07-18 RX ADMIN — Medication: at 13:18

## 2024-07-18 RX ADMIN — IPRATROPIUM BROMIDE AND ALBUTEROL SULFATE 1 DOSE: 2.5; .5 SOLUTION RESPIRATORY (INHALATION) at 15:50

## 2024-07-18 RX ADMIN — POTASSIUM CHLORIDE 20 MEQ: 29.8 INJECTION, SOLUTION INTRAVENOUS at 21:41

## 2024-07-18 RX ADMIN — MIDAZOLAM 2 MG: 1 INJECTION INTRAMUSCULAR; INTRAVENOUS at 18:29

## 2024-07-18 RX ADMIN — SODIUM CHLORIDE 5 MG/HR: 9 INJECTION, SOLUTION INTRAVENOUS at 18:03

## 2024-07-18 RX ADMIN — ISOSORBIDE DINITRATE 20 MG: 20 TABLET ORAL at 13:14

## 2024-07-18 RX ADMIN — BICTEGRAVIR SODIUM, EMTRICITABINE, AND TENOFOVIR ALAFENAMIDE FUMARATE 1 TABLET: 50; 200; 25 TABLET ORAL at 09:30

## 2024-07-18 RX ADMIN — THIAMINE HYDROCHLORIDE 100 MG: 100 INJECTION, SOLUTION INTRAMUSCULAR; INTRAVENOUS at 09:31

## 2024-07-18 RX ADMIN — SODIUM CHLORIDE: 9 INJECTION, SOLUTION INTRAVENOUS at 16:22

## 2024-07-18 RX ADMIN — PIPERACILLIN AND TAZOBACTAM 4500 MG: 4; .5 INJECTION, POWDER, FOR SOLUTION INTRAVENOUS at 00:17

## 2024-07-18 RX ADMIN — SODIUM CHLORIDE: 9 INJECTION, SOLUTION INTRAVENOUS at 06:15

## 2024-07-18 RX ADMIN — PROPOFOL 20 MCG/KG/MIN: 10 INJECTION, EMULSION INTRAVENOUS at 19:09

## 2024-07-18 RX ADMIN — IPRATROPIUM BROMIDE AND ALBUTEROL SULFATE 1 DOSE: 2.5; .5 SOLUTION RESPIRATORY (INHALATION) at 11:12

## 2024-07-18 RX ADMIN — HEPARIN SODIUM 5000 UNITS: 5000 INJECTION INTRAVENOUS; SUBCUTANEOUS at 21:51

## 2024-07-18 RX ADMIN — AMLODIPINE BESYLATE 10 MG: 10 TABLET ORAL at 09:29

## 2024-07-18 RX ADMIN — ACETAMINOPHEN ORAL SOLUTION 650 MG: 650 SOLUTION ORAL at 09:33

## 2024-07-18 RX ADMIN — MIDAZOLAM 2 MG: 1 INJECTION INTRAMUSCULAR; INTRAVENOUS at 16:22

## 2024-07-18 RX ADMIN — Medication: at 00:43

## 2024-07-18 RX ADMIN — VITAMIN B COMPLEX 1 CAPSULE: at 09:30

## 2024-07-18 ASSESSMENT — PULMONARY FUNCTION TESTS
PIF_VALUE: 19
PIF_VALUE: 17
PIF_VALUE: 18
PIF_VALUE: 19
PIF_VALUE: 19
PIF_VALUE: 23
PIF_VALUE: 17
PIF_VALUE: 21
PIF_VALUE: 19
PIF_VALUE: 18
PIF_VALUE: 19
PIF_VALUE: 16
PIF_VALUE: 25
PIF_VALUE: 21
PIF_VALUE: 18
PIF_VALUE: 16
PIF_VALUE: 19
PIF_VALUE: 19
PIF_VALUE: 16
PIF_VALUE: 19
PIF_VALUE: 15
PIF_VALUE: 18
PIF_VALUE: 24
PIF_VALUE: 19
PIF_VALUE: 16
PIF_VALUE: 22
PIF_VALUE: 16
PIF_VALUE: 20
PIF_VALUE: 19
PIF_VALUE: 16
PIF_VALUE: 18
PIF_VALUE: 20
PIF_VALUE: 23
PIF_VALUE: 22
PIF_VALUE: 20
PIF_VALUE: 17
PIF_VALUE: 19
PIF_VALUE: 19
PIF_VALUE: 20
PIF_VALUE: 18
PIF_VALUE: 18
PIF_VALUE: 20
PIF_VALUE: 23
PIF_VALUE: 24
PIF_VALUE: 18
PIF_VALUE: 19
PIF_VALUE: 21
PIF_VALUE: 19
PIF_VALUE: 23
PIF_VALUE: 17
PIF_VALUE: 23
PIF_VALUE: 18
PIF_VALUE: 19
PIF_VALUE: 21
PIF_VALUE: 20
PIF_VALUE: 19
PIF_VALUE: 22
PIF_VALUE: 19
PIF_VALUE: 22
PIF_VALUE: 18
PIF_VALUE: 19
PIF_VALUE: 17
PIF_VALUE: 23
PIF_VALUE: 17
PIF_VALUE: 16
PIF_VALUE: 17
PIF_VALUE: 21
PIF_VALUE: 19
PIF_VALUE: 22
PIF_VALUE: 24
PIF_VALUE: 16
PIF_VALUE: 17
PIF_VALUE: 19
PIF_VALUE: 22
PIF_VALUE: 17
PIF_VALUE: 19
PIF_VALUE: 17
PIF_VALUE: 19
PIF_VALUE: 19
PIF_VALUE: 22
PIF_VALUE: 22
PIF_VALUE: 19
PIF_VALUE: 26
PIF_VALUE: 16
PIF_VALUE: 19
PIF_VALUE: 18
PIF_VALUE: 21
PIF_VALUE: 19
PIF_VALUE: 19
PIF_VALUE: 18
PIF_VALUE: 17

## 2024-07-18 ASSESSMENT — PAIN DESCRIPTION - DESCRIPTORS
DESCRIPTORS: OTHER (COMMENT)
DESCRIPTORS: PATIENT UNABLE TO DESCRIBE
DESCRIPTORS: OTHER (COMMENT)

## 2024-07-18 ASSESSMENT — PAIN SCALES - GENERAL
PAINLEVEL_OUTOF10: 0

## 2024-07-18 ASSESSMENT — PAIN DESCRIPTION - ORIENTATION
ORIENTATION: OTHER (COMMENT)

## 2024-07-18 ASSESSMENT — PAIN DESCRIPTION - LOCATION
LOCATION: OTHER (COMMENT)

## 2024-07-18 ASSESSMENT — PAIN - FUNCTIONAL ASSESSMENT
PAIN_FUNCTIONAL_ASSESSMENT: ACTIVITIES ARE NOT PREVENTED
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
## 2024-07-18 NOTE — CARE COORDINATION
Pt admitted with cardiac arrest. Pt is currently intubated. Pt has history of ESRD on PD. Has PCP and insurance to assist with medical cost. Cm to follow for post extubation needs.     1110 Met with pt's mother and father at bedside. Introduced self and role of case management. Whiteboard updated with cm name and number. Emotional support offered.     07/18/24 0828   Service Assessment   Patient Orientation Unable to Assess  (Intubated)   Cognition Other (see comment)  (Intubated)   History Provided By Medical Record   Primary Caregiver Self   Accompanied By/Relationship N/A   Support Systems Spouse/Significant Other;Parent   Patient's Healthcare Decision Maker is: Legal Next of Kin   PCP Verified by CM Yes   Last Visit to PCP Within last 3 months  (Unknown)   Prior Functional Level Independent in ADLs/IADLs   Current Functional Level Assistance with the following:  (Dependent for all care)   Can patient return to prior living arrangement Unknown at present   Ability to make needs known: Unable  (Intubated)   Family able to assist with home care needs: Yes   Would you like for me to discuss the discharge plan with any other family members/significant others, and if so, who? Yes  (Legal next of kin)   Financial Resources Medicaid;Medicare   Community Resources None   CM/SW Referral Other (see comment)  (Discharge planning)

## 2024-07-18 NOTE — PROGRESS NOTES
Nephrology Progress Note  7/18/2024 8:08 AM  Subjective:     Interval History: Dewey Sosa is a 44 y.o. male with sedated on vent and on crrt and TF and tolerating for now has mild pupillary reflexes today no new seizure activity discussed with patient's mom at bedside and left message for boyfriend        Data:   Scheduled Meds:   carboxymethylcellulose  1 drop Both Eyes Q4H    Or    artificial tears   Both Eyes Q4H    sodium chloride flush  5-40 mL IntraVENous 2 times per day    ipratropium 0.5 mg-albuterol 2.5 mg  1 Dose Inhalation Q4H RT    chlorhexidine  15 mL Mouth/Throat BID    pantoprazole  40 mg IntraVENous Daily    heparin (porcine)  5,000 Units SubCUTAneous 3 times per day    thiamine  100 mg IntraVENous Daily    folic acid  1 mg Oral Daily    zinc sulfate  50 mg Oral Daily    b complex vitamins  1 capsule Oral Daily    piperacillin-tazobactam  4,500 mg IntraVENous Q12H    vancomycin (VANCOCIN) intermittent dosing (placeholder)   Other RX Placeholder    aspirin  81 mg Oral Daily    ticagrelor  60 mg Oral BID    metoprolol tartrate  50 mg Oral BID    bictegravir-emtricitab-tenofovir alafenamide  1 tablet Oral Daily    atorvastatin  80 mg Oral Nightly    potassium chloride  20 mEq IntraVENous Once    acetaminophen  650 mg Oral 4 times per day    levETIRAcetam  500 mg IntraVENous Q12H    lacosamide (VIMPAT) 200 mg in sodium chloride 0.9 % 70 mL IVPB  200 mg IntraVENous BID     Continuous Infusions:   sodium chloride      amiodarone 0.5 mg/min (07/18/24 0625)    propofol 20 mcg/kg/min (07/18/24 0234)    niCARdipine Stopped (07/17/24 1610)    dextrose      prismaSATE BGK 4/2.5 1,500 mL/hr at 07/18/24 0741    prismaSATE BGK 4/2.5 500 mL/hr at 07/18/24 0740    prismaSATE BGK 4/2.5 1,000 mL/hr at 07/18/24 0227    sodium chloride 100 mL/hr at 07/18/24 0615    insulin 6.8 Units/hr (07/18/24 0738)    fentaNYL 75 mcg/hr (07/17/24 1934)         CBC   Recent Labs     07/17/24  1045 07/17/24  1244 07/18/24  0730    Component Value Date/Time    LABA1C 7.3 07/17/2024 12:44 PM     Microalbumen/Creatinine ratio:  No components found for: \"RUCREAT\"  TSH:  No results found for: \"TSH\"  IRON:    Lab Results   Component Value Date/Time    IRON 37 01/26/2024 02:30 PM     Iron Saturation:  No components found for: \"PERCENTFE\"  TIBC:    Lab Results   Component Value Date/Time    TIBC 146 01/26/2024 02:30 PM     FERRITIN:    Lab Results   Component Value Date/Time    FERRITIN 2,343 01/26/2024 02:30 PM     RPR:  No results found for: \"RPR\"  CHIDI:    Lab Results   Component Value Date/Time    CHIDI None Detected 09/15/2021 08:32 AM     24 Hour Urine for Creatinine Clearance:  No components found for: \"CREAT4\", \"UHRS10\", \"UTV10\"      Objective:   I/O: 07/17 0701 - 07/18 0700  In: 4265.9 [I.V.:2102.2]  Out: 5050 [Urine:1815]  I/O last 3 completed shifts:  In: 4265.9 [I.V.:2102.2; NG/GT:280; IV Piggyback:1883.7]  Out: 5050 [Urine:1815; Emesis/NG output:300]  I/O this shift:  In: 9.1 [I.V.:9.1]  Out: -   Vitals: /84   Pulse 72   Temp 97.9 °F (36.6 °C) (Bladder)   Resp 24   Ht 1.854 m (6' 1\")   Wt 103.1 kg (227 lb 4.7 oz)   SpO2 100%   BMI 29.99 kg/m²  {  General appearance: Sedated ET tube  HEENT: Head: Normal, normocephalic, atraumatic.  Neck: supple, symmetrical, trachea midline  Lungs: diminished breath sounds bilaterally  Heart: S1, S2 normal  Abdomen: abnormal findings:  soft nt  Extremities: edema trace  Neurologic: Mental status: alertness: Sedated        Assessment and Plan:      IMP:  #1 end-stage renal disease on peritoneal dialysis   #2 cardiac arrest with PEA/arrhythmia   #3 metabolic acidosis with hyperkalemia   #4 fluid overload   #5 hypertension   #6 diabetes poorly controlled   #7 HIV positive    Plan  #1 holding on using PD dialysis has temporary HD line and tolerating CRRT at this time fluid removal as needed volume improved and resume back on PD when medically stable  #2 postcardiac arrest follow-up with neurology
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
## 2024-07-18 NOTE — CONSULTS
PHARMACY RENAL DOSING MONITORING SERVICE FOR CONTINUOUS RENAL REPLACEMENT THERAPY  Pharmacy consulted by Dr. Nevarez for monitoring and adjustment.    Patient is currently managed on CRRT:  CVVHD-F    BFR: 200 ml/min  Dialysate: 1500 ml/hr  Replacement fluid PRE-filter: 500 mL/hr  Replacement fluid POST- filter: 1000 mL/hr  Fluid removal:net negative, goal 100mL/hr  Effluent rate: >3Lml/hr  ~30ml/kg/hr    RN reported alarms or CRRT downtimes: none     Pertinent Laboratory Values:   Recent Labs     07/17/24  1244 07/17/24  1930 07/18/24  0110 07/18/24  0730   * 139 138 140   K 5.0 3.1* 3.7 3.6   PHOS 6.3* 2.9 2.4* 2.6   CO2 16* 17* 19* 21   CAION 1.08  4.32  * 1.10  4.40  * 1.09  4.36  * 1.05  4.20  *   MG 2.2 1.9 2.1 2.1   LACTATE 6.0* 5.0* 3.6* 2.5*   BUN 32* 37* 32* 24*   CREATININE 3.1* 3.2* 2.6* 2.1*       Intake/Output Summary (Last 24 hours) at 7/18/2024 0858  Last data filed at 7/18/2024 0800  Gross per 24 hour   Intake 4311.02 ml   Output 5448 ml   Net -1136.98 ml     In: 4311   Out: 5448 [Urine:1833]     Medication requiring adjustment for CRRT: (Include Vd, MW, PB%)  Zosyn adjusted to 4.5gm q8  Vimpat- initial dose adjustment not necessary unless effluent flow >4L/hr, then higher than normal dosing required  Keppra- typical dosing 750mg to 1.25gm q12h ( higher effluent rates >4L/h may require up to 4 gm/d)  Keppra dose adjusted from 500 mg q12 to 1000mg IV q12h    Medications requiring monitoring while on CRRT  Biktarvy- drug clearance expected with CRRT, but no recommended adjustments  Sedation with propofol and fentanyl    Thank you for the consult.  Keiry Alexander RPH  7/18/2024 8:58 AM

## 2024-07-18 NOTE — CONSULTS
CARDIOLOGY CONSULT NOTE         Reason for consultation: Cardiac arrest      Primary care physician: Frandy Parsons APRN - CNP      Chief Complaints :  Chief Complaint   Patient presents with    Cardiac Arrest        History of present illness:Dewey is a 44 y.o.year old male who is admitted with out of hospital cardiac arrest.  He has prior history of coronary disease with stenting to his left circumflex and LAD.  Distal OM has a .  His heart function is known to be borderline low.  He also has incisional disease on peritoneal dialysis.  Patient was found down at home.  It is unclear what was a downtime.  When EMS got there initial rhythm was PEA.  With resuscitation he had V-fib and required shocks.  Upon my evaluation patient is intubated and sedated.  His initial EKG was reviewed and showed EKG consistent with global ischemia.  This was likely in the setting of prolonged resuscitation.  His subsequent EKGs showed improvement and normalization.  There was no evidence of ST elevation on any ECG.  Review of Systems:     All systems negative except as marked.        Physical Examination:    Vitals:    07/18/24 1345   BP: 129/77   Pulse: 96   Resp: 28   Temp: 98.8 °F (37.1 °C)   SpO2: 99%        General Appearance:  No distress, conversant    Constitutional:  No acute distress, non-toxic appearance.    HENT:  Normocephalic, Atraumatic,   Eyes:  PERRL, EOMI, Conjunctiva normal, No discharge.   Respiratory:  No respiratory distress, No wheezing  Cardiovascular: S1, S2, no murmurs, gallops. JVD wnl  Abdomen /GI:   Soft, No tenderness   Genitourinary: No costovertebral angle tenderness   Musculoskeletal:  No edema, no tenderness, no deformities.   Integument:  Well hydrated, no rash   Neurologic:  Alert & oriented x 3, no focal deficits noted       Medical decision making and Data review:    Lab Review   Recent Labs     07/18/24  0730   WBC 12.0*   HGB 12.7*   HCT 36.2*         Recent Labs     07/18/24  0730

## 2024-07-18 NOTE — PROGRESS NOTES
Pt in status epilepticus per Epileptologist. Ulises MONTGOMERY notified. Orders placed by MD. Awaiting medications from pharmacy and assistance in accessing [employer].

## 2024-07-18 NOTE — PROGRESS NOTES
Inpatient Progress Note 7/18/2024        Dewey Sosa  1980  [id number]      Assessment/Plan:  Dewey Sosa is a 44 y.o. male with a history of HIV, ESRD, HTN, DM, who presented to [facility] 7/17/2024 with unwitnessed cardiac arrest with prolonged down time. 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 cEEG now show burst suppression pattern  Neurology consutled, recs appreciated. Remains intubated, sedated, titrate to RASS goal 0 to -1. Pain control with current multimodal regimen, adjust as needed. Radiographic imaging of the head reviewed show some evidence of anoxia. Repeat CT vs MRI in next 24 to 48 hours.   Cardio:  Cardiac arrest V.fib, found down, unknown down time, Shock in the field x2. Known hx of NSVT on metoprolol at home. EKG show global ST depression with noted ST elevated in aVR. Repeat EKG normalized. Echo show reduced EF with noted hypokinetic lateral wall. EF grossly 35-40%. Pending official read. Troponin elevated although patient has a hx of ESRD. Cardiology consulted, recommend non urgent intervention and medical management. HDS off pressors at this time. Monitor vitals closely and adjust as needed. TTM modality fever prevention, arctic sun pads on for fever control. Was note a candidate for hypothermia due to hemodynamic instability on presentation.   Resp: acute respiratory failure requiring mechanical ventilatory support. Adjust vent to optimize acid base status. Continue inhalers and nebs. Continue aggressive pulm toileting. Radiographic imaging of the chest show pulmonary edema.   GI: NPO. NGT. TF. Gi ppx  : ESRD-HD, MWF, on CRRT, Net 0. 4K bath.  Nephrology consulted for dialysis management. Monitor electrolytes  ms    QTc Calculation (Bazett) 546 ms    R Axis -44 degrees    T Axis 83 degrees    Diagnosis       Sinus tachycardia with frequent premature ventricular complexes  Left axis deviation  Left bundle branch block  Abnormal ECG  When compared with ECG of 23-JUN-2024 20:09,  premature ventricular complexes are now present  Vent. rate has increased BY  75 BPM  Left bundle branch block is now present  Criteria for Septal infarct are no longer present     CBC with Auto Differential    Collection Time: 07/17/24 12:44 PM   Result Value Ref Range    WBC 12.1 (H) 4.0 - 10.5 K/CU MM    RBC 4.57 (L) 4.6 - 6.2 M/CU MM    Hemoglobin 14.3 13.5 - 18.0 GM/DL    Hematocrit 40.9 (L) 42 - 52 %    MCV 89.5 78 - 100 FL    MCH 31.3 (H) 27 - 31 PG    MCHC 35.0 32.0 - 36.0 %    RDW 12.6 11.7 - 14.9 %    Platelets 259 140 - 440 K/CU MM    MPV 10.3 7.5 - 11.1 FL    Differential Type AUTOMATED DIFFERENTIAL     Neutrophils % 84.2 (H) 36 - 66 %    Lymphocytes % 9.8 (L) 24 - 44 %    Monocytes % 3.3 0 - 4 %    Eosinophils % 0.5 0 - 3 %    Basophils % 0.6 0 - 1 %    Neutrophils Absolute 10.2 K/CU MM    Lymphocytes Absolute 1.2 K/CU MM    Monocytes Absolute 0.4 K/CU MM    Eosinophils Absolute 0.1 K/CU MM    Basophils Absolute 0.1 K/CU MM    Nucleated RBC % 0.0 %    Total Nucleated RBC 0.0 K/CU MM    Total Immature Neutrophil 0.19 K/CU MM    Immature Neutrophil % 1.6 (H) 0 - 0.43 %   Calcium, Ionized    Collection Time: 07/17/24 12:44 PM   Result Value Ref Range    Calcium, Ionized 1.08  4.32   (L) 1.12 - 1.32 mMOL/L   Critical Care Panel    Collection Time: 07/17/24 12:44 PM   Result Value Ref Range    Sodium 134 (L) 135 - 145 MMOL/L    Potassium 5.0 3.5 - 5.1 MMOL/L    Chloride 98 (L) 99 - 110 mMol/L    CO2 16 (L) 21 - 32 MMOL/L    Anion Gap 20 (H) 7 - 16    Glucose 563 (HH) 70 - 99 MG/DL    BUN 32 (H) 6 - 23 MG/DL    Creatinine 3.1 (H) 0.9 - 1.3 MG/DL    Est, Glom Filt Rate 24 (L) >60 mL/min/1.73m2    Calcium 9.0 8.3 - 10.6 MG/DL    Phosphorus 6.3

## 2024-07-18 NOTE — CONSULTS
cardiomyopathy. Abnormal stress test.    HIV (human immunodeficiency virus infection) ([facility])     Hx of Doppler echocardiogram 10/29/2021    Left ventricular function is severely abnormal , EF is estimated at 20-25%. Grade I diastolic dysfunction. Mild mitral , tricuspid and moderate pulmonic regurgitation is present. No evidence of pericardial effusion.    Hyperlipidemia     Hypertension     Kidney stones     Passed Kidney Stones In     Marijuana use     \"Maybe Twice A Year\"    NSTEMI (non-ST elevated myocardial infarction) ([facility]) 2021    Precordial pain 10/25/2021    Shortness of breath on exertion     Teeth missing     Upper And Lower    [place] teeth extracted     4 [place] Teeth Extracted In Past     PSHX:  has a past surgical history that includes other surgical history (2017); [place] tooth extraction; other surgical history (Right, 2018); Toe amputation (Left, 3/27/2020); Colonoscopy (N/A, 2021); Upper gastrointestinal endoscopy (N/A, 2021); Upper gastrointestinal endoscopy (N/A, 2024); IR TUNNELED CVC PLACE WO SQ PORT/PUMP > 5 YEARS (2024); Dialysis Catheter Insertion (N/A, 3/8/2024); and Dialysis Catheter Insertion (N/A, 2024).  Allergies: No Known Allergies  Fam HX: family history includes Allergy (Severe) in his brother; Coronary Art Dis in his father; Diabetes in his father; Heart Disease in his father; Obesity in his mother.  Soc HX:   Social History     Socioeconomic History    Marital status: Single   Tobacco Use    Smoking status: Some Days     Current packs/day: 0.00     Average packs/day: 0.3 packs/day for 27.1 years (6.8 ttl pk-yrs)     Types: Cigarettes     Start date:      Last attempt to quit: 2022     Years since quittin.4    Smokeless tobacco: Never   Vaping Use    Vaping Use: Never used   Substance and Sexual Activity    Alcohol use: Not Currently     Comment: caffeine: 1-3 cups of cpffee daily, 1 diegt pepsi max a day.    Drug use: Yes  5.5 01/19/2024 09:20 PM    WBCUA 1 01/19/2024 09:20 PM    RBCUA 39 01/19/2024 09:20 PM    MUCUS RARE 01/19/2024 09:20 PM    TRICHOMONAS NONE SEEN 01/19/2024 09:20 PM    BACTERIA NEGATIVE 01/19/2024 09:20 PM    CLARITYU CLEAR 01/19/2024 09:20 PM    LEUKOCYTESUR NEGATIVE 01/19/2024 09:20 PM    UROBILINOGEN 0.2 01/19/2024 09:20 PM    BILIRUBINUR SMALL NUMBER OR AMOUNT OBSERVED 01/19/2024 09:20 PM    BLOODU LARGE NUMBER OR AMOUNT OBSERVED 01/19/2024 09:20 PM    GLUCOSEU >1,000 01/19/2024 09:20 PM    KETUA TRACE 01/19/2024 09:20 PM    AMORPHOUS RARE 01/19/2024 09:20 PM     Urine Cultures: No results found for: \"LABURIN\"  Blood Cultures: No results found for: \"BC\"  No results found for: \"BLOODCULT2\"  Organism:   Lab Results   Component Value Date/Time    ORG Staphylococcus aureus 03/23/2020 03:35 PM       Electronically signed by Jaida Soto MD on 7/18/2024 at 2:29 PM

## 2024-07-19 ENCOUNTER — APPOINTMENT (OUTPATIENT)
Dept: MRI IMAGING | Age: 44
DRG: 308 | End: 2024-07-19
Payer: MEDICARE

## 2024-07-19 PROBLEM — G40.89 OTHER SEIZURES (HCC): Status: ACTIVE | Noted: 2024-07-19

## 2024-07-19 PROBLEM — G93.1 SEVERE ANOXIC-ISCHEMIC ENCEPHALOPATHY (HCC): Status: ACTIVE | Noted: 2024-07-19

## 2024-07-19 PROBLEM — I67.82 SEVERE ANOXIC-ISCHEMIC ENCEPHALOPATHY (HCC): Status: ACTIVE | Noted: 2024-07-19

## 2024-07-19 LAB
ALBUMIN SERPL-MCNC: 2.7 GM/DL (ref 3.4–5)
ALBUMIN SERPL-MCNC: 2.9 GM/DL (ref 3.4–5)
ALP BLD-CCNC: 86 IU/L (ref 40–129)
ALP BLD-CCNC: 88 IU/L (ref 40–129)
ALT SERPL-CCNC: 53 U/L (ref 10–40)
ALT SERPL-CCNC: 69 U/L (ref 10–40)
ANION GAP SERPL CALCULATED.3IONS-SCNC: 11 MMOL/L (ref 7–16)
ANION GAP SERPL CALCULATED.3IONS-SCNC: 13 MMOL/L (ref 7–16)
ANION GAP SERPL CALCULATED.3IONS-SCNC: 13 MMOL/L (ref 7–16)
ANION GAP SERPL CALCULATED.3IONS-SCNC: 16 MMOL/L (ref 7–16)
APTT: 30.4 SECONDS (ref 25.1–37.1)
APTT: 30.5 SECONDS (ref 25.1–37.1)
AST SERPL-CCNC: 109 IU/L (ref 15–37)
AST SERPL-CCNC: 188 IU/L (ref 15–37)
BASE EXCESS MIXED: 1.5 (ref 0–3)
BASE EXCESS MIXED: 2 (ref 0–3)
BASE EXCESS: 1 (ref 0–3)
BASE EXCESS: 2 (ref 0–3)
BASE EXCESS: 3 (ref 0–3)
BASE EXCESS: 3 (ref 0–3)
BASE EXCESS: 4 (ref 0–3)
BASE EXCESS: ABNORMAL (ref 0–3)
BASE EXCESS: ABNORMAL (ref 0–3)
BASOPHILS ABSOLUTE: 0 K/CU MM
BASOPHILS RELATIVE PERCENT: 0.3 % (ref 0–1)
BILIRUB SERPL-MCNC: 0.4 MG/DL (ref 0–1)
BILIRUB SERPL-MCNC: 0.6 MG/DL (ref 0–1)
BILIRUBIN DIRECT: 0.2 MG/DL (ref 0–0.3)
BILIRUBIN DIRECT: 0.4 MG/DL (ref 0–0.3)
BILIRUBIN, INDIRECT: 0.2 MG/DL (ref 0–0.7)
BILIRUBIN, INDIRECT: 0.2 MG/DL (ref 0–0.7)
BUN SERPL-MCNC: 23 MG/DL (ref 6–23)
BUN SERPL-MCNC: 24 MG/DL (ref 6–23)
BUN SERPL-MCNC: 26 MG/DL (ref 6–23)
BUN SERPL-MCNC: 26 MG/DL (ref 6–23)
CALCIUM IONIZED: ABNORMAL MMOL/L (ref 1.12–1.32)
CALCIUM IONIZED: NORMAL MMOL/L (ref 1.12–1.32)
CALCIUM SERPL-MCNC: 7.8 MG/DL (ref 8.3–10.6)
CALCIUM SERPL-MCNC: 7.8 MG/DL (ref 8.3–10.6)
CALCIUM SERPL-MCNC: 7.9 MG/DL (ref 8.3–10.6)
CALCIUM SERPL-MCNC: 8.1 MG/DL (ref 8.3–10.6)
CARBON MONOXIDE, BLOOD: 1.1 % (ref 0–5)
CARBON MONOXIDE, BLOOD: 1.2 % (ref 0–5)
CHLORIDE BLD-SCNC: 107 MMOL/L (ref 99–110)
CHLORIDE BLD-SCNC: 108 MMOL/L (ref 99–110)
CHLORIDE BLD-SCNC: 114 MMOL/L (ref 99–110)
CHLORIDE BLD-SCNC: 123 MMOL/L (ref 99–110)
CO2 CONTENT: 22.1 MMOL/L (ref 21–32)
CO2 CONTENT: 22.2 MMOL/L (ref 21–32)
CO2 CONTENT: 22.3 MMOL/L (ref 21–32)
CO2 CONTENT: 22.7 MMOL/L (ref 21–32)
CO2: 19 MMOL/L (ref 21–32)
CO2: 19 MMOL/L (ref 21–32)
CO2: 20 MMOL/L (ref 21–32)
CO2: 20 MMOL/L (ref 21–32)
CO2: 22 MMOL/L (ref 21–32)
CO2: 23 MMOL/L (ref 21–32)
COMMENT: ABNORMAL
CREAT SERPL-MCNC: 2.3 MG/DL (ref 0.9–1.3)
CREAT SERPL-MCNC: 2.6 MG/DL (ref 0.9–1.3)
CREAT SERPL-MCNC: 3 MG/DL (ref 0.9–1.3)
CREAT SERPL-MCNC: 3.1 MG/DL (ref 0.9–1.3)
DIFFERENTIAL TYPE: ABNORMAL
EGFR, POC: 33 ML/MIN/1.73M2
EGFR, POC: 39 ML/MIN/1.73M2
EKG ATRIAL RATE: 123 BPM
EKG ATRIAL RATE: 83 BPM
EKG ATRIAL RATE: 83 BPM
EKG ATRIAL RATE: 95 BPM
EKG DIAGNOSIS: NORMAL
EKG P AXIS: 72 DEGREES
EKG P-R INTERVAL: 136 MS
EKG P-R INTERVAL: 204 MS
EKG Q-T INTERVAL: 382 MS
EKG Q-T INTERVAL: 392 MS
EKG Q-T INTERVAL: 392 MS
EKG Q-T INTERVAL: 410 MS
EKG QRS DURATION: 120 MS
EKG QRS DURATION: 122 MS
EKG QRS DURATION: 122 MS
EKG QRS DURATION: 162 MS
EKG QTC CALCULATION (BAZETT): 460 MS
EKG QTC CALCULATION (BAZETT): 460 MS
EKG QTC CALCULATION (BAZETT): 515 MS
EKG QTC CALCULATION (BAZETT): 546 MS
EKG R AXIS: -13 DEGREES
EKG R AXIS: -13 DEGREES
EKG R AXIS: -17 DEGREES
EKG R AXIS: -44 DEGREES
EKG T AXIS: 264 DEGREES
EKG T AXIS: 264 DEGREES
EKG T AXIS: 83 DEGREES
EKG T AXIS: 88 DEGREES
EKG VENTRICULAR RATE: 123 BPM
EKG VENTRICULAR RATE: 83 BPM
EKG VENTRICULAR RATE: 83 BPM
EKG VENTRICULAR RATE: 95 BPM
EOSINOPHILS ABSOLUTE: 0 K/CU MM
EOSINOPHILS RELATIVE PERCENT: 0.3 % (ref 0–3)
GFR, ESTIMATED: 24 ML/MIN/1.73M2
GFR, ESTIMATED: 25 ML/MIN/1.73M2
GFR, ESTIMATED: 30 ML/MIN/1.73M2
GFR, ESTIMATED: 35 ML/MIN/1.73M2
GLUCOSE BLD-MCNC: 104 MG/DL (ref 70–99)
GLUCOSE BLD-MCNC: 118 MG/DL (ref 70–99)
GLUCOSE BLD-MCNC: 120 MG/DL (ref 70–99)
GLUCOSE BLD-MCNC: 136 MG/DL (ref 70–99)
GLUCOSE BLD-MCNC: 142 MG/DL (ref 70–99)
GLUCOSE BLD-MCNC: 152 MG/DL (ref 70–99)
GLUCOSE BLD-MCNC: 153 MG/DL (ref 70–99)
GLUCOSE BLD-MCNC: 156 MG/DL (ref 70–99)
GLUCOSE BLD-MCNC: 157 MG/DL (ref 70–99)
GLUCOSE BLD-MCNC: 161 MG/DL (ref 70–99)
GLUCOSE BLD-MCNC: 162 MG/DL (ref 70–99)
GLUCOSE BLD-MCNC: 167 MG/DL (ref 70–99)
GLUCOSE BLD-MCNC: 169 MG/DL (ref 70–99)
GLUCOSE BLD-MCNC: 173 MG/DL (ref 70–99)
GLUCOSE BLD-MCNC: 177 MG/DL (ref 70–99)
GLUCOSE BLD-MCNC: 178 MG/DL (ref 70–99)
GLUCOSE BLD-MCNC: 179 MG/DL (ref 70–99)
GLUCOSE BLD-MCNC: 181 MG/DL (ref 70–99)
GLUCOSE BLD-MCNC: 182 MG/DL (ref 70–99)
GLUCOSE BLD-MCNC: 189 MG/DL (ref 70–99)
GLUCOSE BLD-MCNC: 195 MG/DL (ref 70–99)
GLUCOSE SERPL-MCNC: 107 MG/DL (ref 70–99)
GLUCOSE SERPL-MCNC: 167 MG/DL (ref 70–99)
GLUCOSE SERPL-MCNC: 170 MG/DL (ref 70–99)
GLUCOSE SERPL-MCNC: 175 MG/DL (ref 70–99)
HCO3 ARTERIAL: 21 MMOL/L (ref 21–28)
HCO3 ARTERIAL: 21.1 MMOL/L (ref 21–28)
HCO3 ARTERIAL: 21.1 MMOL/L (ref 21–28)
HCO3 ARTERIAL: 21.2 MMOL/L (ref 21–28)
HCO3 ARTERIAL: 21.6 MMOL/L (ref 21–28)
HCO3 ARTERIAL: 21.8 MMOL/L (ref 21–28)
HCO3 VENOUS: 21.9 MMOL/L (ref 22–29)
HCT VFR BLD CALC: 32.7 % (ref 42–52)
HCT VFR BLD CALC: 33 % (ref 38–51)
HCT VFR BLD CALC: 33 % (ref 38–51)
HEMOGLOBIN: 11.1 GM/DL (ref 13.5–18)
HEMOGLOBIN: 11.2 GM/DL (ref 12–17)
HEMOGLOBIN: 11.2 GM/DL (ref 12–17)
HIV1 RNA # SPEC NAA+PROBE: NOT DETECTED CPY/ML
HIV1 RNA SER-IMP: NOT DETECTED
HIV1 RNA SPEC NAA+PROBE-LOG#: NOT DETECTED LOG CPY/ML
IMMATURE NEUTROPHIL %: 0.3 % (ref 0–0.43)
INR BLD: 1 INDEX
INR BLD: 1 INDEX
LACTATE: 1.3 MMOL/L (ref 0.5–1.9)
LACTATE: 1.3 MMOL/L (ref 0.5–1.9)
LACTATE: 1.4 MMOL/L (ref 0.5–1.9)
LACTATE: 1.9 MMOL/L (ref 0.5–1.9)
LYMPHOCYTES ABSOLUTE: 1.9 K/CU MM
LYMPHOCYTES RELATIVE PERCENT: 17.2 % (ref 24–44)
MAGNESIUM: 2.1 MG/DL (ref 1.8–2.4)
MAGNESIUM: 2.2 MG/DL (ref 1.8–2.4)
MCH RBC QN AUTO: 31.5 PG (ref 27–31)
MCHC RBC AUTO-ENTMCNC: 33.9 % (ref 32–36)
MCV RBC AUTO: 92.9 FL (ref 78–100)
METHEMOGLOBIN ARTERIAL: 0.7 %
METHEMOGLOBIN ARTERIAL: 0.7 %
METHEMOGLOBIN ARTERIAL: 0.9 %
METHEMOGLOBIN ARTERIAL: 1 %
MONOCYTES ABSOLUTE: 0.7 K/CU MM
MONOCYTES RELATIVE PERCENT: 6.2 % (ref 0–4)
NEUTROPHILS ABSOLUTE: 8.2 K/CU MM
NEUTROPHILS RELATIVE PERCENT: 75.7 % (ref 36–66)
NUCLEATED RBC %: 0 %
O2 SAT, VEN: 90.2 % (ref 50–70)
O2 SATURATION: 95.5 % (ref 94–98)
O2 SATURATION: 95.5 % (ref 94–98)
O2 SATURATION: 95.6 % (ref 94–98)
O2 SATURATION: 95.7 % (ref 94–98)
O2 SATURATION: 96.2 % (ref 94–98)
O2 SATURATION: 98.9 % (ref 94–98)
PCO2 ARTERIAL: 29.9 MMHG (ref 35–48)
PCO2 ARTERIAL: 30 MMHG (ref 35–48)
PCO2 ARTERIAL: 30.3 MMHG (ref 35–48)
PCO2 ARTERIAL: 31 MMHG (ref 35–48)
PCO2 ARTERIAL: 35 MMHG (ref 35–48)
PCO2 ARTERIAL: 39 MMHG (ref 35–48)
PCO2 VENOUS: 37 MMHG (ref 41–51)
PDW BLD-RTO: 13.4 % (ref 11.7–14.9)
PH BLOOD: 7.34 (ref 7.35–7.45)
PH BLOOD: 7.39 (ref 7.35–7.45)
PH BLOOD: 7.44 (ref 7.35–7.45)
PH BLOOD: 7.46 (ref 7.35–7.45)
PH BLOOD: 7.46 (ref 7.35–7.45)
PH BLOOD: 7.47 (ref 7.35–7.45)
PH VENOUS: 7.38 (ref 7.32–7.43)
PHOSPHORUS: 3.2 MG/DL (ref 2.5–4.9)
PHOSPHORUS: 3.4 MG/DL (ref 2.5–4.9)
PHOSPHORUS: 3.6 MG/DL (ref 2.5–4.9)
PHOSPHORUS: 3.7 MG/DL (ref 2.5–4.9)
PLATELET # BLD: 216 K/CU MM (ref 140–440)
PMV BLD AUTO: 10.3 FL (ref 7.5–11.1)
PO2 ARTERIAL: 108 MMHG (ref 83–108)
PO2 ARTERIAL: 113 MMHG (ref 83–108)
PO2 ARTERIAL: 118 MMHG (ref 83–108)
PO2 ARTERIAL: 119 MMHG (ref 83–108)
PO2 ARTERIAL: 125 MMHG (ref 83–108)
PO2 ARTERIAL: 73.1 MMHG (ref 83–108)
PO2 VENOUS: 64 MMHG (ref 28–48)
POC CALCIUM: 1.1 MMOL/L (ref 1.15–1.33)
POC CALCIUM: 1.14 MMOL/L (ref 1.15–1.33)
POC CHLORIDE: 108 MMOL/L (ref 99–110)
POC CHLORIDE: 109 MMOL/L (ref 99–110)
POC CREATININE: 2.1 MG/DL (ref 0.5–1.2)
POC CREATININE: 2.4 MG/DL (ref 0.5–1.2)
POTASSIUM SERPL-SCNC: 3.2 MMOL/L (ref 3.5–5.1)
POTASSIUM SERPL-SCNC: 3.5 MMOL/L (ref 3.5–5.1)
POTASSIUM SERPL-SCNC: 3.6 MMOL/L (ref 3.5–5.1)
POTASSIUM SERPL-SCNC: 3.7 MMOL/L (ref 3.5–5.1)
POTASSIUM SERPL-SCNC: 4.1 MMOL/L (ref 3.5–5.1)
POTASSIUM SERPL-SCNC: 4.6 MMOL/L (ref 3.5–5.1)
PROTHROMBIN TIME: 13.5 SECONDS (ref 11.7–14.5)
PROTHROMBIN TIME: 13.9 SECONDS (ref 11.7–14.5)
RBC # BLD: 3.52 M/CU MM (ref 4.6–6.2)
SODIUM BLD-SCNC: 140 MMOL/L (ref 135–145)
SODIUM BLD-SCNC: 140 MMOL/L (ref 135–145)
SODIUM BLD-SCNC: 141 MMOL/L (ref 135–145)
SODIUM BLD-SCNC: 142 MMOL/L (ref 135–145)
SODIUM BLD-SCNC: 143 MMOL/L (ref 135–145)
SODIUM BLD-SCNC: 144 MMOL/L (ref 135–145)
SODIUM BLD-SCNC: 158 MMOL/L (ref 135–145)
SOURCE, BLOOD GAS: ABNORMAL
SOURCE, BLOOD GAS: ABNORMAL
TOTAL IMMATURE NEUTOROPHIL: 0.03 K/CU MM
TOTAL NUCLEATED RBC: 0 K/CU MM
TOTAL PROTEIN: 5.1 GM/DL (ref 6.4–8.2)
TOTAL PROTEIN: 5.4 GM/DL (ref 6.4–8.2)
WBC # BLD: 10.8 K/CU MM (ref 4–10.5)

## 2024-07-19 PROCEDURE — 80076 HEPATIC FUNCTION PANEL: CPT

## 2024-07-19 PROCEDURE — 93010 ELECTROCARDIOGRAM REPORT: CPT | Performed by: INTERNAL MEDICINE

## 2024-07-19 PROCEDURE — 2700000000 HC OXYGEN THERAPY PER DAY

## 2024-07-19 PROCEDURE — 2580000003 HC RX 258: Performed by: STUDENT IN AN ORGANIZED HEALTH CARE EDUCATION/TRAINING PROGRAM

## 2024-07-19 PROCEDURE — 37799 UNLISTED PX VASCULAR SURGERY: CPT

## 2024-07-19 PROCEDURE — 85014 HEMATOCRIT: CPT

## 2024-07-19 PROCEDURE — 85610 PROTHROMBIN TIME: CPT

## 2024-07-19 PROCEDURE — 94640 AIRWAY INHALATION TREATMENT: CPT

## 2024-07-19 PROCEDURE — 94003 VENT MGMT INPAT SUBQ DAY: CPT

## 2024-07-19 PROCEDURE — 90945 DIALYSIS ONE EVALUATION: CPT

## 2024-07-19 PROCEDURE — 82330 ASSAY OF CALCIUM: CPT

## 2024-07-19 PROCEDURE — 6370000000 HC RX 637 (ALT 250 FOR IP): Performed by: STUDENT IN AN ORGANIZED HEALTH CARE EDUCATION/TRAINING PROGRAM

## 2024-07-19 PROCEDURE — 82803 BLOOD GASES ANY COMBINATION: CPT

## 2024-07-19 PROCEDURE — 85730 THROMBOPLASTIN TIME PARTIAL: CPT

## 2024-07-19 PROCEDURE — 82565 ASSAY OF CREATININE: CPT

## 2024-07-19 PROCEDURE — 80051 ELECTROLYTE PANEL: CPT

## 2024-07-19 PROCEDURE — 2000000000 HC ICU R&B

## 2024-07-19 PROCEDURE — 6360000002 HC RX W HCPCS: Performed by: STUDENT IN AN ORGANIZED HEALTH CARE EDUCATION/TRAINING PROGRAM

## 2024-07-19 PROCEDURE — 70551 MRI BRAIN STEM W/O DYE: CPT

## 2024-07-19 PROCEDURE — 82962 GLUCOSE BLOOD TEST: CPT

## 2024-07-19 PROCEDURE — 2500000003 HC RX 250 WO HCPCS: Performed by: INTERNAL MEDICINE

## 2024-07-19 PROCEDURE — 82248 BILIRUBIN DIRECT: CPT

## 2024-07-19 PROCEDURE — 99233 SBSQ HOSP IP/OBS HIGH 50: CPT | Performed by: STUDENT IN AN ORGANIZED HEALTH CARE EDUCATION/TRAINING PROGRAM

## 2024-07-19 PROCEDURE — C9254 INJECTION, LACOSAMIDE: HCPCS | Performed by: STUDENT IN AN ORGANIZED HEALTH CARE EDUCATION/TRAINING PROGRAM

## 2024-07-19 PROCEDURE — 83735 ASSAY OF MAGNESIUM: CPT

## 2024-07-19 PROCEDURE — 82805 BLOOD GASES W/O2 SATURATION: CPT

## 2024-07-19 PROCEDURE — 95718 EEG PHYS/QHP 2-12 HR W/VEEG: CPT | Performed by: STUDENT IN AN ORGANIZED HEALTH CARE EDUCATION/TRAINING PROGRAM

## 2024-07-19 PROCEDURE — 84295 ASSAY OF SERUM SODIUM: CPT

## 2024-07-19 PROCEDURE — 85025 COMPLETE CBC W/AUTO DIFF WBC: CPT

## 2024-07-19 PROCEDURE — 95713 VEEG 2-12 HR CONT MNTR: CPT

## 2024-07-19 PROCEDURE — 89220 SPUTUM SPECIMEN COLLECTION: CPT

## 2024-07-19 PROCEDURE — 83605 ASSAY OF LACTIC ACID: CPT

## 2024-07-19 PROCEDURE — 80048 BASIC METABOLIC PNL TOTAL CA: CPT

## 2024-07-19 PROCEDURE — 85018 HEMOGLOBIN: CPT

## 2024-07-19 PROCEDURE — 84100 ASSAY OF PHOSPHORUS: CPT

## 2024-07-19 PROCEDURE — 94761 N-INVAS EAR/PLS OXIMETRY MLT: CPT

## 2024-07-19 PROCEDURE — 80053 COMPREHEN METABOLIC PANEL: CPT

## 2024-07-19 RX ORDER — LEVETIRACETAM 500 MG/5ML
500 INJECTION, SOLUTION, CONCENTRATE INTRAVENOUS EVERY 24 HOURS
Status: DISCONTINUED | OUTPATIENT
Start: 2024-07-20 | End: 2024-07-20 | Stop reason: HOSPADM

## 2024-07-19 RX ORDER — GENTAMICIN SULFATE 1 MG/G
CREAM TOPICAL DAILY
Status: DISCONTINUED | OUTPATIENT
Start: 2024-07-19 | End: 2024-07-20 | Stop reason: HOSPADM

## 2024-07-19 RX ORDER — SULFAMETHOXAZOLE AND TRIMETHOPRIM 800; 160 MG/1; MG/1
1 TABLET ORAL
Status: DISCONTINUED | OUTPATIENT
Start: 2024-07-19 | End: 2024-07-19

## 2024-07-19 RX ORDER — DOBUTAMINE HYDROCHLORIDE 200 MG/100ML
5 INJECTION INTRAVENOUS CONTINUOUS
Status: DISCONTINUED | OUTPATIENT
Start: 2024-07-19 | End: 2024-07-20 | Stop reason: HOSPADM

## 2024-07-19 RX ORDER — SODIUM CHLORIDE, SODIUM LACTATE, CALCIUM CHLORIDE, MAGNESIUM CHLORIDE AND DEXTROSE 1.5; 538; 448; 18.3; 5.08 G/100ML; MG/100ML; MG/100ML; MG/100ML; MG/100ML
2000 INJECTION, SOLUTION INTRAPERITONEAL CONTINUOUS
Status: DISCONTINUED | OUTPATIENT
Start: 2024-07-19 | End: 2024-07-20 | Stop reason: HOSPADM

## 2024-07-19 RX ORDER — SULFAMETHOXAZOLE AND TRIMETHOPRIM 400; 80 MG/1; MG/1
1 TABLET ORAL
Status: DISCONTINUED | OUTPATIENT
Start: 2024-07-22 | End: 2024-07-20 | Stop reason: HOSPADM

## 2024-07-19 RX ORDER — METOCLOPRAMIDE HYDROCHLORIDE 5 MG/ML
5 INJECTION INTRAMUSCULAR; INTRAVENOUS EVERY 6 HOURS
Status: DISCONTINUED | OUTPATIENT
Start: 2024-07-19 | End: 2024-07-20 | Stop reason: HOSPADM

## 2024-07-19 RX ADMIN — BICTEGRAVIR SODIUM, EMTRICITABINE, AND TENOFOVIR ALAFENAMIDE FUMARATE 1 TABLET: 50; 200; 25 TABLET ORAL at 08:52

## 2024-07-19 RX ADMIN — CALCIUM GLUCONATE 1000 MG: 20 INJECTION, SOLUTION INTRAVENOUS at 09:42

## 2024-07-19 RX ADMIN — HEPARIN SODIUM 5000 UNITS: 5000 INJECTION INTRAVENOUS; SUBCUTANEOUS at 14:51

## 2024-07-19 RX ADMIN — SODIUM CHLORIDE, PRESERVATIVE FREE 10 ML: 5 INJECTION INTRAVENOUS at 20:36

## 2024-07-19 RX ADMIN — CARBOXYMETHYLCELLULOSE SODIUM 1 DROP: 10 GEL OPHTHALMIC at 09:33

## 2024-07-19 RX ADMIN — LEVETIRACETAM 1000 MG: 500 INJECTION INTRAVENOUS at 05:30

## 2024-07-19 RX ADMIN — ACETAMINOPHEN ORAL SOLUTION 650 MG: 650 SOLUTION ORAL at 03:20

## 2024-07-19 RX ADMIN — CARBOXYMETHYLCELLULOSE SODIUM 1 DROP: 10 GEL OPHTHALMIC at 17:16

## 2024-07-19 RX ADMIN — CARBOXYMETHYLCELLULOSE SODIUM 1 DROP: 10 GEL OPHTHALMIC at 02:47

## 2024-07-19 RX ADMIN — LACOSAMIDE 200 MG: 10 INJECTION INTRAVENOUS at 20:56

## 2024-07-19 RX ADMIN — HEPARIN SODIUM 5000 UNITS: 5000 INJECTION INTRAVENOUS; SUBCUTANEOUS at 20:35

## 2024-07-19 RX ADMIN — IPRATROPIUM BROMIDE AND ALBUTEROL SULFATE 1 DOSE: 2.5; .5 SOLUTION RESPIRATORY (INHALATION) at 07:14

## 2024-07-19 RX ADMIN — PIPERACILLIN AND TAZOBACTAM 4500 MG: 4; .5 INJECTION, POWDER, FOR SOLUTION INTRAVENOUS at 01:18

## 2024-07-19 RX ADMIN — IPRATROPIUM BROMIDE AND ALBUTEROL SULFATE 1 DOSE: 2.5; .5 SOLUTION RESPIRATORY (INHALATION) at 11:24

## 2024-07-19 RX ADMIN — SULFAMETHOXAZOLE AND TRIMETHOPRIM 1 TABLET: 800; 160 TABLET ORAL at 09:19

## 2024-07-19 RX ADMIN — IPRATROPIUM BROMIDE AND ALBUTEROL SULFATE 1 DOSE: 2.5; .5 SOLUTION RESPIRATORY (INHALATION) at 03:17

## 2024-07-19 RX ADMIN — PANTOPRAZOLE SODIUM 40 MG: 40 INJECTION, POWDER, FOR SOLUTION INTRAVENOUS at 08:52

## 2024-07-19 RX ADMIN — DOBUTAMINE IN DEXTROSE 5 MCG/KG/MIN: 200 INJECTION, SOLUTION INTRAVENOUS at 09:26

## 2024-07-19 RX ADMIN — CARBOXYMETHYLCELLULOSE SODIUM 1 DROP: 10 GEL OPHTHALMIC at 06:02

## 2024-07-19 RX ADMIN — MINERAL OIL AND WHITE PETROLATUM: 30; 940 OINTMENT OPHTHALMIC at 23:27

## 2024-07-19 RX ADMIN — PIPERACILLIN AND TAZOBACTAM 4500 MG: 4; .5 INJECTION, POWDER, FOR SOLUTION INTRAVENOUS at 20:35

## 2024-07-19 RX ADMIN — ACETAMINOPHEN ORAL SOLUTION 650 MG: 650 SOLUTION ORAL at 08:51

## 2024-07-19 RX ADMIN — IPRATROPIUM BROMIDE AND ALBUTEROL SULFATE 1 DOSE: 2.5; .5 SOLUTION RESPIRATORY (INHALATION) at 23:39

## 2024-07-19 RX ADMIN — TICAGRELOR 60 MG: 60 TABLET ORAL at 20:37

## 2024-07-19 RX ADMIN — PIPERACILLIN AND TAZOBACTAM 4500 MG: 4; .5 INJECTION, POWDER, FOR SOLUTION INTRAVENOUS at 09:30

## 2024-07-19 RX ADMIN — IPRATROPIUM BROMIDE AND ALBUTEROL SULFATE 1 DOSE: 2.5; .5 SOLUTION RESPIRATORY (INHALATION) at 20:00

## 2024-07-19 RX ADMIN — ACETAMINOPHEN ORAL SOLUTION 650 MG: 650 SOLUTION ORAL at 20:35

## 2024-07-19 RX ADMIN — METOCLOPRAMIDE 5 MG: 5 INJECTION, SOLUTION INTRAMUSCULAR; INTRAVENOUS at 11:14

## 2024-07-19 RX ADMIN — METOCLOPRAMIDE 5 MG: 5 INJECTION, SOLUTION INTRAMUSCULAR; INTRAVENOUS at 17:16

## 2024-07-19 RX ADMIN — IPRATROPIUM BROMIDE AND ALBUTEROL SULFATE 1 DOSE: 2.5; .5 SOLUTION RESPIRATORY (INHALATION) at 16:29

## 2024-07-19 RX ADMIN — FOLIC ACID 1 MG: 1 TABLET ORAL at 08:51

## 2024-07-19 RX ADMIN — MINERAL OIL AND WHITE PETROLATUM: 30; 940 OINTMENT OPHTHALMIC at 20:36

## 2024-07-19 RX ADMIN — HEPARIN SODIUM 5000 UNITS: 5000 INJECTION INTRAVENOUS; SUBCUTANEOUS at 05:31

## 2024-07-19 RX ADMIN — ZINC SULFATE 220 MG (50 MG) CAPSULE 50 MG: CAPSULE at 08:51

## 2024-07-19 RX ADMIN — PROPOFOL 20 MCG/KG/MIN: 10 INJECTION, EMULSION INTRAVENOUS at 02:47

## 2024-07-19 RX ADMIN — CALCIUM GLUCONATE 1000 MG: 20 INJECTION, SOLUTION INTRAVENOUS at 02:17

## 2024-07-19 RX ADMIN — ATORVASTATIN CALCIUM 80 MG: 40 TABLET, FILM COATED ORAL at 20:36

## 2024-07-19 RX ADMIN — CHLORHEXIDINE GLUCONATE 0.12% ORAL RINSE 15 ML: 1.2 LIQUID ORAL at 11:23

## 2024-07-19 RX ADMIN — METOCLOPRAMIDE 5 MG: 5 INJECTION, SOLUTION INTRAMUSCULAR; INTRAVENOUS at 20:35

## 2024-07-19 RX ADMIN — SODIUM CHLORIDE, PRESERVATIVE FREE 10 ML: 5 INJECTION INTRAVENOUS at 09:33

## 2024-07-19 RX ADMIN — CHLORHEXIDINE GLUCONATE 0.12% ORAL RINSE 15 ML: 1.2 LIQUID ORAL at 20:35

## 2024-07-19 RX ADMIN — NOREPINEPHRINE BITARTRATE 9 MCG/MIN: 0.06 INJECTION, SOLUTION INTRAVENOUS at 15:55

## 2024-07-19 RX ADMIN — ASPIRIN 81 MG: 81 TABLET, CHEWABLE ORAL at 08:51

## 2024-07-19 RX ADMIN — TICAGRELOR 60 MG: 60 TABLET ORAL at 08:51

## 2024-07-19 RX ADMIN — ACETAMINOPHEN ORAL SOLUTION 650 MG: 650 SOLUTION ORAL at 17:15

## 2024-07-19 RX ADMIN — LACOSAMIDE 200 MG: 10 INJECTION INTRAVENOUS at 09:37

## 2024-07-19 RX ADMIN — THIAMINE HYDROCHLORIDE 100 MG: 100 INJECTION, SOLUTION INTRAMUSCULAR; INTRAVENOUS at 08:51

## 2024-07-19 ASSESSMENT — PULMONARY FUNCTION TESTS
PIF_VALUE: 18
PIF_VALUE: 19
PIF_VALUE: 16
PIF_VALUE: 19
PIF_VALUE: 18
PIF_VALUE: 20
PIF_VALUE: 18
PIF_VALUE: 17
PIF_VALUE: 19
PIF_VALUE: 18
PIF_VALUE: 18
PIF_VALUE: 9
PIF_VALUE: 18
PIF_VALUE: 18
PIF_VALUE: 17
PIF_VALUE: 20
PIF_VALUE: 17
PIF_VALUE: 21
PIF_VALUE: 18
PIF_VALUE: 19
PIF_VALUE: 17
PIF_VALUE: 19
PIF_VALUE: 19
PIF_VALUE: 18
PIF_VALUE: 20
PIF_VALUE: 20
PIF_VALUE: 18
PIF_VALUE: 19
PIF_VALUE: 19
PIF_VALUE: 18
PIF_VALUE: 19
PIF_VALUE: 19
PIF_VALUE: 17
PIF_VALUE: 20
PIF_VALUE: 18
PIF_VALUE: 19
PIF_VALUE: 17
PIF_VALUE: 20
PIF_VALUE: 18
PIF_VALUE: 9
PIF_VALUE: 18
PIF_VALUE: 19
PIF_VALUE: 17
PIF_VALUE: 21
PIF_VALUE: 19
PIF_VALUE: 19
PIF_VALUE: 18
PIF_VALUE: 17
PIF_VALUE: 18
PIF_VALUE: 22
PIF_VALUE: 17
PIF_VALUE: 18
PIF_VALUE: 19
PIF_VALUE: 18
PIF_VALUE: 18
PIF_VALUE: 20
PIF_VALUE: 18
PIF_VALUE: 18
PIF_VALUE: 23
PIF_VALUE: 17
PIF_VALUE: 19
PIF_VALUE: 21
PIF_VALUE: 19
PIF_VALUE: 21
PIF_VALUE: 19
PIF_VALUE: 18
PIF_VALUE: 18
PIF_VALUE: 20
PIF_VALUE: 19
PIF_VALUE: 19
PIF_VALUE: 17
PIF_VALUE: 19
PIF_VALUE: 21
PIF_VALUE: 19
PIF_VALUE: 17
PIF_VALUE: 16
PIF_VALUE: 19
PIF_VALUE: 17
PIF_VALUE: 18
PIF_VALUE: 17
PIF_VALUE: 16
PIF_VALUE: 18
PIF_VALUE: 19
PIF_VALUE: 18
PIF_VALUE: 21
PIF_VALUE: 19
PIF_VALUE: 21
PIF_VALUE: 19
PIF_VALUE: 17
PIF_VALUE: 19
PIF_VALUE: 17
PIF_VALUE: 21
PIF_VALUE: 17
PIF_VALUE: 17
PIF_VALUE: 18
PIF_VALUE: 16
PIF_VALUE: 19
PIF_VALUE: 16

## 2024-07-19 ASSESSMENT — PAIN SCALES - GENERAL
PAINLEVEL_OUTOF10: 0

## 2024-07-19 ASSESSMENT — PAIN DESCRIPTION - LOCATION
LOCATION: OTHER (COMMENT)
LOCATION: GENERALIZED

## 2024-07-19 ASSESSMENT — PAIN DESCRIPTION - DESCRIPTORS
DESCRIPTORS: OTHER (COMMENT)
DESCRIPTORS: OTHER (COMMENT)

## 2024-07-19 ASSESSMENT — PAIN DESCRIPTION - ORIENTATION
ORIENTATION: OTHER (COMMENT)
ORIENTATION: OTHER (COMMENT)

## 2024-07-19 NOTE — PROGRESS NOTES
Nephrology Progress Note  7/19/2024 11:56 AM  Subjective:     Interval History: Dewey Sosa is a 44 y.o. male sedated on vent and last night with drop bp and stopped crrt and since no improved neuro status and dw mom in room        Data:   Scheduled Meds:   metoclopramide  5 mg IntraVENous Q6H    [START ON 7/22/2024] sulfamethoxazole-trimethoprim  1 tablet Oral Once per day on Mon Wed Fri    piperacillin-tazobactam  4,500 mg IntraVENous Q12H    [START ON 7/20/2024] levETIRAcetam  500 mg IntraVENous Q24H    carboxymethylcellulose  1 drop Both Eyes Q4H    Or    artificial tears   Both Eyes Q4H    hydrALAZINE  50 mg Oral 3 times per day    isosorbide dinitrate  20 mg Oral TID    amLODIPine  10 mg Oral Daily    carvedilol  25 mg Oral BID WC    [Held by provider] diazePAM  5 mg Oral Q8H    sodium chloride flush  5-40 mL IntraVENous 2 times per day    ipratropium 0.5 mg-albuterol 2.5 mg  1 Dose Inhalation Q4H RT    chlorhexidine  15 mL Mouth/Throat BID    pantoprazole  40 mg IntraVENous Daily    heparin (porcine)  5,000 Units SubCUTAneous 3 times per day    thiamine  100 mg IntraVENous Daily    folic acid  1 mg Oral Daily    zinc sulfate  50 mg Oral Daily    b complex vitamins  1 capsule Oral Daily    aspirin  81 mg Oral Daily    ticagrelor  60 mg Oral BID    bictegravir-emtricitab-tenofovir alafenamide  1 tablet Oral Daily    atorvastatin  80 mg Oral Nightly    potassium chloride  20 mEq IntraVENous Once    acetaminophen  650 mg Oral 4 times per day    lacosamide (VIMPAT) 200 mg in sodium chloride 0.9 % 70 mL IVPB  200 mg IntraVENous BID     Continuous Infusions:   DOBUTamine 5 mcg/kg/min (07/19/24 0926)    propofol 20 mcg/kg/min (07/19/24 0247)    norepinephrine 13 mcg/min (07/19/24 0812)    sodium chloride      amiodarone Stopped (07/18/24 1930)    dextrose      insulin 3.7 Units/hr (07/19/24 1033)         CBC   Recent Labs     07/18/24  0730 07/18/24  1950 07/19/24  0006 07/19/24  0605   WBC 12.0* 12.3*  --

## 2024-07-19 NOTE — PLAN OF CARE
Problem: Discharge Planning  Goal: Discharge to home or other facility with appropriate resources  7/18/2024 2233 by Alyssa Winn, RN  Outcome: Progressing  7/18/2024 2024 by Flavia Lala RN  Outcome: Progressing  7/18/2024 1808 by Quiana Weir RN  Outcome: Progressing  7/18/2024 1807 by Quiana Weir, RN  Outcome: Progressing  Flowsheets  Taken 7/18/2024 1600  Discharge to home or other facility with appropriate resources:   Identify barriers to discharge with patient and caregiver   Identify discharge learning needs (meds, wound care, etc)   Arrange for needed discharge resources and transportation as appropriate  Taken 7/18/2024 1200  Discharge to home or other facility with appropriate resources:   Identify barriers to discharge with patient and caregiver   Arrange for needed discharge resources and transportation as appropriate   Identify discharge learning needs (meds, wound care, etc)   Refer to discharge planning if patient needs post-hospital services based on physician order or complex needs related to functional status, cognitive ability or social support system     Problem: Nutrition Deficit:  Goal: Optimize nutritional status  7/18/2024 2233 by Alysas Winn, RN  Outcome: Progressing  7/18/2024 2024 by Flavia Lala RN  Outcome: Progressing  7/18/2024 1808 by Quiana Weir RN  Outcome: Progressing  7/18/2024 1807 by Quiana Weir, RN  Outcome: Progressing     Problem: Pain  Goal: Verbalizes/displays adequate comfort level or baseline comfort level  7/18/2024 2233 by Alyssa Winn, RN  Outcome: Progressing  7/18/2024 2024 by Flavia Lala RN  Outcome: Progressing  7/18/2024 1808 by Quiana Weir RN  Outcome: Progressing  7/18/2024 1807 by Quiana Weir, RN  Outcome: Progressing     Problem: Safety - Adult  Goal: Free from fall injury  7/18/2024 2233 by Alyssa Winn, RN  Outcome: Progressing  7/18/2024 2024 by Flavia Lala RN  Outcome:
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
## 2024-07-19 NOTE — PLAN OF CARE
Problem: Discharge Planning  Goal: Discharge to home or other facility with appropriate resources  7/18/2024 2024 by Flavia Lala RN  Outcome: Progressing  7/18/2024 1808 by Quiana Wier RN  Outcome: Progressing  7/18/2024 1807 by Quiana Weir RN  Outcome: Progressing  Flowsheets  Taken 7/18/2024 1600  Discharge to home or other facility with appropriate resources:   Identify barriers to discharge with patient and caregiver   Identify discharge learning needs (meds, wound care, etc)   Arrange for needed discharge resources and transportation as appropriate  Taken 7/18/2024 1200  Discharge to home or other facility with appropriate resources:   Identify barriers to discharge with patient and caregiver   Arrange for needed discharge resources and transportation as appropriate   Identify discharge learning needs (meds, wound care, etc)   Refer to discharge planning if patient needs post-hospital services based on physician order or complex needs related to functional status, cognitive ability or social support system     Problem: Nutrition Deficit:  Goal: Optimize nutritional status  7/18/2024 2024 by Flavia Lala RN  Outcome: Progressing  7/18/2024 1808 by Quiana Weir RN  Outcome: Progressing  7/18/2024 1807 by Quiana Weir RN  Outcome: Progressing     Problem: Pain  Goal: Verbalizes/displays adequate comfort level or baseline comfort level  7/18/2024 2024 by Flavia Lala RN  Outcome: Progressing  7/18/2024 1808 by Quiana Weir RN  Outcome: Progressing  7/18/2024 1807 by Quiana Weir RN  Outcome: Progressing     Problem: Safety - Adult  Goal: Free from fall injury  7/18/2024 2024 by Flavia Lala RN  Outcome: Progressing  7/18/2024 1808 by Quiana Weir RN  Outcome: Progressing  7/18/2024 1807 by Quiana Weir RN  Outcome: Progressing     Problem: Skin/Tissue Integrity  Goal: Absence of new skin breakdown  Description: 1.  Monitor for areas of redness and/or skin

## 2024-07-19 NOTE — PROGRESS NOTES
GI: NPO. NGT. TF hold due to high residuals. LIWS. Gi ppx  : ESRD-HD, MWF, off CRRT overnight due to hypotension. Transition to PD dialysis today. Nephrology consulted for dialysis management. Monitor electrolytes and replenish as needed  Heme:  HGB 11.1, plts 216. Monitor for bleeding. DVT ppx: heparin  ID: WBC 10.8, on broad spectrum antibiotics. F/u cultures and sensitivities and de-esclate as able. MRSA nares negative, Resp PCR negative. Known hx of HIV on biktarvi. HIV RNA, CD4 107. Bactrim for ppx.   Endo: Known diabetic.on insulin drip for BG control.  Maintain euglycemia, avoid hypoglycemia.   MSK: DTI prevention. Rib fractures right 2-> 8 anterior and left 2-> 6 rib frature with displacement of the left anterior fifth rib fracture, likely due to Mechanical CPR device.      Tubes/Lines/Drains:  ETT, NGT, CVC, [place], chowdhury     Code Status: CCA    Family updated at bedside. Explained in details that he is likely to have a poor prognosis in the setting of worsening neurological exam. GOC discussion held, mutual decision made to change code status to DNR CCA. Continue supportive care start neuro-prognostication tomorrow.     Subjective:    Patient was seen and evaluated at bedside, overnight noted to be hypotensive, requiring Epi push and initiation of vasoactive agents. Remains on cEEG.     Physical Exam:            /71   Pulse 86   Temp 98.6 °F (37 °C) (Bladder)   Resp 15   Ht 1.854 m (6' 1\")   Wt 103.1 kg (227 lb 4.7 oz)   SpO2 96%   BMI 29.99 kg/m²     General: Ill-appearing  Eyes: Pupils fixed and dilated  ENT: neck supple, ET tube  Cardiovascular: Regular rate. POCUS show moderately reduced EF, 35 to 40% with noted lateral wall motion hypokinesis.  Respiratory: Diminished breath sounds bilaterally  Gastrointestinal: Soft, non tender  Genitourinary: no suprapubic tenderness  Musculoskeletal: No edema  Skin: warm, dry  Neuro: GCS 3 T, pupils fixed and dilated, no response to painful stimuli,    POCT Glucose    Collection Time: 07/19/24  5:10 AM   Result Value Ref Range    POC Glucose 136 (H) 70 - 99 MG/DL   POCT Glucose    Collection Time: 07/19/24  6:00 AM   Result Value Ref Range    POC Glucose 162 (H) 70 - 99 MG/DL   CBC with Auto Differential    Collection Time: 07/19/24  6:05 AM   Result Value Ref Range    WBC 10.8 (H) 4.0 - 10.5 K/CU MM    RBC 3.52 (L) 4.6 - 6.2 M/CU MM    Hemoglobin 11.1 (L) 13.5 - 18.0 GM/DL    Hematocrit 32.7 (L) 42 - 52 %    MCV 92.9 78 - 100 FL    MCH 31.5 (H) 27 - 31 PG    MCHC 33.9 32.0 - 36.0 %    RDW 13.4 11.7 - 14.9 %    Platelets 216 140 - 440 K/CU MM    MPV 10.3 7.5 - 11.1 FL    Differential Type AUTOMATED DIFFERENTIAL     Neutrophils % 75.7 (H) 36 - 66 %    Lymphocytes % 17.2 (L) 24 - 44 %    Monocytes % 6.2 (H) 0 - 4 %    Eosinophils % 0.3 0 - 3 %    Basophils % 0.3 0 - 1 %    Neutrophils Absolute 8.2 K/CU MM    Lymphocytes Absolute 1.9 K/CU MM    Monocytes Absolute 0.7 K/CU MM    Eosinophils Absolute 0.0 K/CU MM    Basophils Absolute 0.0 K/CU MM    Nucleated RBC % 0.0 %    Total Nucleated RBC 0.0 K/CU MM    Total Immature Neutrophil 0.03 K/CU MM    Immature Neutrophil % 0.3 0 - 0.43 %   Hepatic Function Panel    Collection Time: 07/19/24  6:05 AM   Result Value Ref Range    Albumin 2.9 (L) 3.4 - 5.0 GM/DL    Total Bilirubin 0.4 0.0 - 1.0 MG/DL    Bilirubin, Direct 0.2 0.0 - 0.3 MG/DL    Bilirubin, Indirect 0.2 0 - 0.7 MG/DL    Alkaline Phosphatase 88 40 - 129 IU/L     (H) 15 - 37 IU/L    ALT 69 (H) 10 - 40 U/L    Total Protein 5.4 (L) 6.4 - 8.2 GM/DL   Protime/INR & PTT    Collection Time: 07/19/24  6:05 AM   Result Value Ref Range    Protime 13.5 11.7 - 14.5 SECONDS    INR 1.0 INDEX    aPTT 30.5 25.1 - 37.1 SECONDS   Lactic Acid    Collection Time: 07/19/24  6:05 AM   Result Value Ref Range    Lactate 1.4 0.5 - 1.9 mMOL/L   Critical Care Panel    Collection Time: 07/19/24  6:05 AM   Result Value Ref Range    Sodium 141 135 - 145 MMOL/L    Potassium 3.7 3.5 -

## 2024-07-19 NOTE — PROGRESS NOTES
Comprehensive Nutrition Assessment    Type and Reason for Visit:  Reassess    Nutrition Recommendations/Plan:   Trial trophic EN if within POC. Vital AF (peptide based) @ 15ml/hr provides 432kcal and 27g PRO. Once more stable and tolerating well, may slowly advance to goal of 70ml/hr to meet needs with current propofol.   Free water flush may be adjusted by MD/Nephrology if needed   Monitor glucose, weights, GI status, renal fx, lytes, HOB, POC      Malnutrition Assessment:  Malnutrition Status:  Insufficient data (07/17/24 1419)    Context:  Acute Illness       Nutrition Assessment:    remains intubated, weaning off propofol, on 2 pressors now, MAP 78. TF off, per RN at bedside, pt had gastric residuals of >1700ml overnight, meds reportedly \"coming back up\". Started on reglan. If able and within POC, restart TF at trophic rate only at this time. Code status changed to DNR-CCA in light of concern for anoxic brain injury. Will continue to follow pt at high nutrition risk.    Nutrition Related Findings:    off CRRT plan for PD tonight. +dobutamine, levo, propofol, insulin gtts. POC glucose 153-181, BUN 24, Cr 2.6, GFR 30 Wound Type: None       Current Nutrition Intake & Therapies:    Average Meal Intake: NPO  Average Supplements Intake: NPO  Diet NPO  ADULT TUBE FEEDING; Nasogastric; Peptide Based; Continuous; 10; Yes; 10; Q 6 hours; 70; 25; Q 1 hour  Additional Calorie Sources:  314kcal from propofol @ 11.9ml    Anthropometric Measures:  Height: 185.4 cm (6' 1\")  Ideal Body Weight (IBW): 184 lbs (84 kg)    Admission Body Weight: 98.9 kg (218 lb 0.6 oz)  Current Body Weight: 103.1 kg (227 lb 4.7 oz),   IBW. Weight Source: Not Specified  Current BMI (kg/m2): 30  Usual Body Weight: 99.7 kg (219 lb 12.8 oz) (1/20/24)  % Weight Change (Calculated): -0.8  Weight Adjustment For: No Adjustment                 BMI Categories: Overweight (BMI 25.0-29.9)    Estimated Daily Nutrient Needs:  Energy Requirements Based On:  Kcal/kg  Weight Used for Energy Requirements: Usual  Energy (kcal/day): 9351-1262 (20-25kcal/kg)  Weight Used for Protein Requirements: Usual  Protein (g/day): 119-198 (1.2-2.0g/kg CBW)  Method Used for Fluid Requirements: 1 ml/kcal  Fluid (ml/day): 2200 or per nephrology    Nutrition Diagnosis:   Inadequate oral intake related to acute injury/trauma, impaired respiratory function as evidenced by intubation, NPO or clear liquid status due to medical condition    Nutrition Interventions:   Food and/or Nutrient Delivery: Continue NPO, Start Tube Feeding  Nutrition Education/Counseling: No recommendation at this time  Coordination of Nutrition Care: Continue to monitor while inpatient, Coordination of Care  Plan of Care discussed with: RN    Goals:  Previous Goal Met: Progressing toward Goal(s)  Goals: Initiate nutrition support, by next RD assessment       Nutrition Monitoring and Evaluation:   Behavioral-Environmental Outcomes: None Identified  Food/Nutrient Intake Outcomes: Enteral Nutrition Intake/Tolerance  Physical Signs/Symptoms Outcomes: Weight, Biochemical Data, Nutrition Focused Physical Findings, Hemodynamic Status, Fluid Status or Edema, GI Status    Discharge Planning:    Too soon to determine     Shana Crandall RD  Contact: [phone]

## 2024-07-19 NOTE — PROGRESS NOTES
Pt connected to cycler per order. Dressing changed. Small amt of redness around insertion site. No drainage noted. Gentamicin cream applied.

## 2024-07-19 NOTE — PROGRESS NOTES
Dr. Samaniego notified of patients increase in Na on labs, 158. Orders to repeat lab at this time.     Na order placed.     Gold top drawn off of ART line and sent to lab.       LEIA SheffieldN RN

## 2024-07-19 NOTE — PROGRESS NOTES
[place]  [facility] [place]      Name:  Dewey Sosa /Age/Sex: 1980  (44 y.o. male)   MRN & CSN:  [id number] & [id number] Encounter Date/Time: 2024 1:13 PM EDT    Location:  -A PCP: Frandy Parsons APRN - CNP       Hospital Day: 3    Assessment and Plan:   Dewey Sosa is a 44 y.o. 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  HIV: continue biktarvy  HLD: continue statin    Code status has been changed to DNRCCA after discussion with critical care and neurology by patient's mom    Diet Diet NPO  ADULT TUBE FEEDING; Nasogastric; Peptide Based; Continuous; 10; Yes; 10; Q 6 hours; 70; 25; Q 1 hour   DVT Prophylaxis [] Lovenox, []  Heparin, [] SCDs, [] Ambulation,  [] Eliquis, [] Xarelto  [] Coumadin   Code Status DNR-CCA   Disposition From: Home  Expected Disposition: TBD  Estimated Date of Discharge: TBD  Patient requires continued admission due to Cardiac arrest   Surrogate Decision Maker/ POA Mother     Subjective:     Chief Complaint: Cardiac Arrest       Dewey Sosa is a 44 y.o. male who presents with cardiac arrest. Seen and examined at bedside remains intubated and sedated with propofol.         Review of Systems:    Review of Systems  Could not be obtained  Objective:  Time: 07/19/24  8:08 AM   Result Value Ref Range    POC Glucose 161 (H) 70 - 99 MG/DL   Blood Gas, Venous    Collection Time: 07/19/24  8:30 AM   Result Value Ref Range    pH, Chris 7.38 7.32 - 7.43    pCO2, Chris 37 (L) 41 - 51 mmHG    PO2, Chris 64 (H) 28 - 48 mmHG    Base Excess 3 0 - 3.0    HCO3, Venous 21.9 (L) 22 - 29 MMOL/L    O2 Sat, Chris 90.2 (H) 50 - 70 %    Comment VBG    POCT Glucose    Collection Time: 07/19/24  9:30 AM   Result Value Ref Range    POC Glucose 156 (H) 70 - 99 MG/DL   POCT Glucose    Collection Time: 07/19/24 10:27 AM   Result Value Ref Range    POC Glucose 153 (H) 70 - 99 MG/DL   POCT Glucose    Collection Time: 07/19/24 12:13 PM   Result Value Ref Range    POC Glucose 181 (H) 70 - 99 MG/DL        Imaging/Diagnostics Last 24 Hours   XR CHEST 1 VIEW    Result Date: 7/18/2024  Chest X-ray INDICATION: Hypotension COMPARISON:  None TECHNIQUE: AP/PA view of the chest was obtained. FINDINGS: Markedly limited exam due to lines external lines overlying the patient's chest endotracheal tube distal tip cannot be seen nasogastric tube appears in good position.     Repeat study is recommended with the patient is external lines off of the patient's chest Electronically signed by Ninoska Last    XR CHEST PORTABLE    Result Date: 7/17/2024  EXAMINATION: XR CHEST PORTABLE, 7/17/2024 6:13 PM HISTORY: line placement confirm COMPARISON:  No comparisons available.  Technique: Single view. Findings: Moderate pulmonary venous congestion. Bilateral infiltrates. No pneumothorax. Mild cardiomegaly. Mediastinal and hilar contours are within normal limits. Bony thorax no acute abnormality. ET tube terminates 2 cm above the johnathan, nasogastric tube in the stomach, left line in the SVC.     Probable CHF. Electronically signed by Dereje Keenan    Echo (TTE) complete (PRN contrast/bubble/strain/3D)    Result Date: 7/17/2024    Left Ventricle: Reduced left ventricular systolic function with a visually estimated EF of 35 -

## 2024-07-19 NOTE — CARE COORDINATION
Chart reviewed. Patient remains sedated on Vent. Mother and father are updated on plan of care. CM is following.

## 2024-07-19 NOTE — PROGRESS NOTES
SBP has been in 160's to 180 most of day, Dr. Montgomery notified and new orders noted, SBP continued to be elevated, PRN meds given and eventually cardene was started with , this medication was effective but then titrated down to 2.5 as ordered, bp continued to decrease, Dr. aSmaniego notified and came to bedside meds ordered and given and bp stabilized, see MAR.  Pt's mother at bedside and updated.

## 2024-07-19 NOTE — SIGNIFICANT EVENT
History    Not on file   Tobacco Use    Smoking status: Some Days     Current packs/day: 0.00     Average packs/day: 0.3 packs/day for 27.1 years (6.8 ttl pk-yrs)     Types: Cigarettes     Start date:      Last attempt to quit: 2022     Years since quittin.4    Smokeless tobacco: Never   Vaping Use    Vaping Use: Never used   Substance and Sexual Activity    Alcohol use: Not Currently     Comment: caffeine: 1-3 cups of cpffee daily, 1 diegt pepsi max a day.    Drug use: Yes     Types: Marijuana (Weed)     Comment: occasionally    Sexual activity: Yes     Partners: Male   Other Topics Concern    Not on file   Social History Narrative    Not on file     Social Determinants of Health     Financial Resource Strain: Low Risk  (2024)    Overall Financial Resource Strain (CARDIA)     Difficulty of Paying Living Expenses: Not hard at all   Food Insecurity: Patient Unable To Answer (2024)    Hunger Vital Sign     Worried About Running Out of Food in the Last Year: Patient unable to answer     Ran Out of Food in the Last Year: Patient unable to answer   Transportation Needs: Patient Unable To Answer (2024)    PRAPARE - Transportation     Lack of Transportation (Medical): Patient unable to answer     Lack of Transportation (Non-Medical): Patient unable to answer   Physical Activity: Inactive (2024)    Exercise Vital Sign     Days of Exercise per Week: 0 days     Minutes of Exercise per Session: 0 min   Stress: Not on file   Social Connections: Patient Unable To Answer (2024)    Social Connections ([facility] HRSN)     If for any reason you need help with day-to-day activities such as bathing, preparing meals, shopping, managing finances, etc., do you get the help you need?: Not on file   Intimate Partner Violence: Not on file   Housing Stability: Patient Unable To Answer (2024)    Housing Stability Vital Sign     Unable to Pay for Housing in the Last Year: Patient unable to answer     Number  hour neuro checks  [] Every hour neurovascular checks  [] Frequent evaluation of patient's response to treatment and titration of therapies,  [] Interpretation of laboratory and radiological data,  [] Application and interpretation of advanced monitoring technologies,  [] Extensive interpretation of multiple databases,  [] Development of treatment plan with patient, surrogate, or consultants.  [] Others:       Electronically signed by Dale Samaniego DO on 7/18/2024 at 9:51 PM

## 2024-07-19 NOTE — PROGRESS NOTES
Pt had over 1700 in residual volume this morning at 1000.  Dr. Montgomery is aware and placed verbal order to hold tube feed with NG tube to low intermittent suction. Clamp for 30 minutes after medications.

## 2024-07-19 NOTE — PLAN OF CARE
Problem: Discharge Planning  Goal: Discharge to home or other facility with appropriate resources  7/19/2024 1030 by Flavia Lala RN  Outcome: Progressing  Flowsheets (Taken 7/19/2024 0800)  Discharge to home or other facility with appropriate resources: Identify barriers to discharge with patient and caregiver  7/18/2024 2233 by Alyssa Winn, RN  Outcome: Progressing     Problem: Nutrition Deficit:  Goal: Optimize nutritional status  7/19/2024 1030 by Flavia Lala RN  Outcome: Progressing  7/18/2024 2233 by Alyssa Winn, RN  Outcome: Progressing     Problem: Pain  Goal: Verbalizes/displays adequate comfort level or baseline comfort level  7/19/2024 1030 by Flavia Lala RN  Outcome: Progressing  7/18/2024 2233 by Alyssa Winn, RN  Outcome: Progressing     Problem: Safety - Adult  Goal: Free from fall injury  7/19/2024 1030 by Flavia Lala RN  Outcome: Progressing  7/18/2024 2233 by Alyssa Winn RN  Outcome: Progressing     Problem: Skin/Tissue Integrity  Goal: Absence of new skin breakdown  Description: 1.  Monitor for areas of redness and/or skin breakdown  2.  Assess vascular access sites hourly  3.  Every 4-6 hours minimum:  Change oxygen saturation probe site  4.  Every 4-6 hours:  If on nasal continuous positive airway pressure, respiratory therapy assess nares and determine need for appliance change or resting period.  7/19/2024 1030 by Flavia Lala RN  Outcome: Progressing  7/18/2024 2233 by Alyssa Winn, RN  Outcome: Progressing     Problem: Chronic Conditions and Co-morbidities  Goal: Patient's chronic conditions and co-morbidity symptoms are monitored and maintained or improved  7/19/2024 1030 by Flavia Lala RN  Outcome: Progressing  Flowsheets (Taken 7/19/2024 0800)  Care Plan - Patient's Chronic Conditions and Co-Morbidity Symptoms are Monitored and Maintained or Improved: Monitor and assess patient's chronic conditions and

## 2024-07-19 NOTE — PROGRESS NOTES
07/19/24 1051   Encounter Summary   Encounter Overview/Reason Initial Encounter   Service Provided For Patient   Referral/Consult From [facility]   Support System Parent;Friends/neighbors   Last Encounter  07/19/24  (Patient was intubated and his mother was crying at the bedside. This  offered pastoral presence, emotional support and prayer.)   Complexity of Encounter Low   Begin Time 1045   End Time  1100   Total Time Calculated 15 min   Crisis   Type Family Care   Spiritual/Emotional needs   Type Spiritual Support   Grief, Loss, and Adjustments   Type Adjustment to illness;Life Adjustments   Assessment/Intervention/Outcome   Assessment Unable to assess   Intervention Active listening;Empowerment;Prayer (assurance of)/[place]  (to family)   Outcome Expressed Gratitude  (Patient's mother)   Plan and Referrals   Plan/Referrals Continue Support (comment)  (as needed)

## 2024-07-19 NOTE — PROGRESS NOTES
Neurology Progress Note  [facility]  Patient Name: Dewey Sosa     : 1980      Subjective:   C C: myoclonic status epilepticus post cardiac arrest.   The patient was seen and examined.  Chart reviewed in detail. Patient remains intubated and sedated with propofol. Fentanyl has been stopped. He became hypotensive overnight. Pupils are now fixed and dilated this morning. EEG with diffuse suppression. Nursing reports decerebrate posturing when patient was being pulled up in bed. Did not appreciate this on exam.  Mom at bedside. Dr. Montgomery and myself spoke with mom in regards to exam findings and grim prognosis. Plan is to repeat brain imaging today and wean off propofol.     Objective:   Scheduled Meds:   sulfamethoxazole-trimethoprim  1 tablet Oral Once per day on     metoclopramide  5 mg IntraVENous Q6H    carboxymethylcellulose  1 drop Both Eyes Q4H    Or    artificial tears   Both Eyes Q4H    hydrALAZINE  50 mg Oral 3 times per day    isosorbide dinitrate  20 mg Oral TID    amLODIPine  10 mg Oral Daily    piperacillin-tazobactam  4,500 mg IntraVENous Q8H    levETIRAcetam  1,000 mg IntraVENous Q12H    carvedilol  25 mg Oral BID WC    [Held by provider] diazePAM  5 mg Oral Q8H    sodium chloride flush  5-40 mL IntraVENous 2 times per day    ipratropium 0.5 mg-albuterol 2.5 mg  1 Dose Inhalation Q4H RT    chlorhexidine  15 mL Mouth/Throat BID    pantoprazole  40 mg IntraVENous Daily    heparin (porcine)  5,000 Units SubCUTAneous 3 times per day    thiamine  100 mg IntraVENous Daily    folic acid  1 mg Oral Daily    zinc sulfate  50 mg Oral Daily    b complex vitamins  1 capsule Oral Daily    aspirin  81 mg Oral Daily    ticagrelor  60 mg Oral BID    bictegravir-emtricitab-tenofovir alafenamide  1 tablet Oral Daily    atorvastatin  80 mg Oral Nightly    potassium chloride  20 mEq IntraVENous Once    acetaminophen  650 mg Oral 4 times per day    lacosamide (VIMPAT) 200 mg  in sodium chloride 0.9 % 70 mL IVPB  200 mg IntraVENous BID     Continuous Infusions:   DOBUTamine 5 mcg/kg/min (07/19/24 0926)    propofol 20 mcg/kg/min (07/19/24 0247)    norepinephrine 13 mcg/min (07/19/24 0812)    sodium chloride      amiodarone Stopped (07/18/24 1930)    dextrose      prismaSATE BGK 4/2.5 1,500 mL/hr at 07/18/24 1451    prismaSATE BGK 4/2.5 500 mL/hr at 07/18/24 1846    prismaSATE BGK 4/2.5 1,000 mL/hr at 07/18/24 1318    insulin 3.7 Units/hr (07/19/24 0943)     PRN Meds:.midazolam, sodium chloride flush, sodium chloride, magnesium sulfate, ondansetron **OR** ondansetron, polyethylene glycol, fentanNYL, ipratropium 0.5 mg-albuterol 2.5 mg, glucose, dextrose bolus **OR** dextrose bolus, glucagon (rDNA), dextrose, sodium chloride flush **AND** sodium chloride flush, potassium chloride, magnesium sulfate, calcium gluconate **OR** calcium gluconate **OR** calcium gluconate **OR** calcium gluconate, sodium phosphate 6 mmol in sodium chloride 0.9 % 250 mL IVPB **OR** sodium phosphate 12 mmol in sodium chloride 0.9 % 250 mL IVPB **OR** sodium phosphate 18 mmol in sodium chloride 0.9 % 500 mL IVPB **OR** sodium phosphate 24 mmol in sodium chloride 0.9 % 500 mL IVPB, LORazepam    Vital Signs:  Vitals:    07/19/24 0700 07/19/24 0714 07/19/24 0754 07/19/24 0800   BP: 104/71   (!) 82/54   Pulse: 87 85 86 85   Resp: 24 24 15 (!) 8   Temp:    99 °F (37.2 °C)   TempSrc:    Bladder   SpO2: 98% 98% 96% 95%   Weight:       Height:             General: intubated, sedated, unresponsive  HEENT: NC/AT, oculocephalic reflex absent, pupils 6 mm non reactive, mmm, neck supple  Extremities: no edema,     Neurological Exam:         Mental Status:  intubated, sedated, unresponsive     Cranial Nerves:  CN II-XII: pupils 6 mm non reactive, absent oculocephalic reflex, absent corneal reflex, absent cough, absent gag, no initiation of breathes over vent     Sensation: no response     Motor: no response     Reflexes: trace
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
## 2024-07-20 ENCOUNTER — HOSPITAL ENCOUNTER (INPATIENT)
Age: 44
LOS: 1 days | End: 2024-07-20
Attending: GENERAL PRACTICE | Admitting: GENERAL PRACTICE

## 2024-07-20 VITALS
WEIGHT: 227.29 LBS | OXYGEN SATURATION: 97 % | HEIGHT: 73 IN | DIASTOLIC BLOOD PRESSURE: 64 MMHG | SYSTOLIC BLOOD PRESSURE: 107 MMHG | TEMPERATURE: 98.4 F | RESPIRATION RATE: 24 BRPM | BODY MASS INDEX: 30.12 KG/M2 | HEART RATE: 113 BPM

## 2024-07-20 VITALS — OXYGEN SATURATION: 97 %

## 2024-07-20 LAB
ALBUMIN SERPL-MCNC: 2.8 GM/DL (ref 3.4–5)
ALP BLD-CCNC: 91 IU/L (ref 40–129)
ALT SERPL-CCNC: 47 U/L (ref 10–40)
ANION GAP SERPL CALCULATED.3IONS-SCNC: 13 MMOL/L (ref 7–16)
ANION GAP SERPL CALCULATED.3IONS-SCNC: 14 MMOL/L (ref 7–16)
ANION GAP SERPL CALCULATED.3IONS-SCNC: 14 MMOL/L (ref 7–16)
APTT: 35 SECONDS (ref 25.1–37.1)
AST SERPL-CCNC: 77 IU/L (ref 15–37)
BASE EXCESS: 4 (ref 0–3)
BASE EXCESS: 5 (ref 0–3)
BASOPHILS ABSOLUTE: 0 K/CU MM
BASOPHILS RELATIVE PERCENT: 0.5 % (ref 0–1)
BILIRUB SERPL-MCNC: 0.9 MG/DL (ref 0–1)
BILIRUBIN DIRECT: 0.6 MG/DL (ref 0–0.3)
BILIRUBIN, INDIRECT: 0.3 MG/DL (ref 0–0.7)
BUN SERPL-MCNC: 26 MG/DL (ref 6–23)
BUN SERPL-MCNC: 27 MG/DL (ref 6–23)
BUN SERPL-MCNC: 28 MG/DL (ref 6–23)
CALCIUM IONIZED: NORMAL MMOL/L (ref 1.12–1.32)
CALCIUM SERPL-MCNC: 8.1 MG/DL (ref 8.3–10.6)
CALCIUM SERPL-MCNC: 8.4 MG/DL (ref 8.3–10.6)
CALCIUM SERPL-MCNC: 8.8 MG/DL (ref 8.3–10.6)
CARBON MONOXIDE, BLOOD: 1.5 % (ref 0–5)
CARBON MONOXIDE, BLOOD: 3.2 % (ref 0–5)
CHLORIDE BLD-SCNC: 111 MMOL/L (ref 99–110)
CHLORIDE BLD-SCNC: 113 MMOL/L (ref 99–110)
CHLORIDE BLD-SCNC: 113 MMOL/L (ref 99–110)
CO2 CONTENT: 21.4 MMOL/L (ref 21–32)
CO2 CONTENT: 21.6 MMOL/L (ref 21–32)
CO2: 18 MMOL/L (ref 21–32)
CO2: 20 MMOL/L (ref 21–32)
CO2: 20 MMOL/L (ref 21–32)
COMMENT: ABNORMAL
COMMENT: ABNORMAL
CREAT SERPL-MCNC: 3.3 MG/DL (ref 0.9–1.3)
CREAT SERPL-MCNC: 3.8 MG/DL (ref 0.9–1.3)
CREAT SERPL-MCNC: 3.9 MG/DL (ref 0.9–1.3)
DIFFERENTIAL TYPE: ABNORMAL
EOSINOPHILS ABSOLUTE: 0.1 K/CU MM
EOSINOPHILS RELATIVE PERCENT: 1 % (ref 0–3)
GFR, ESTIMATED: 19 ML/MIN/1.73M2
GFR, ESTIMATED: 19 ML/MIN/1.73M2
GFR, ESTIMATED: 23 ML/MIN/1.73M2
GLUCOSE BLD-MCNC: 143 MG/DL (ref 70–99)
GLUCOSE BLD-MCNC: 145 MG/DL (ref 70–99)
GLUCOSE BLD-MCNC: 145 MG/DL (ref 70–99)
GLUCOSE BLD-MCNC: 146 MG/DL (ref 70–99)
GLUCOSE BLD-MCNC: 151 MG/DL (ref 70–99)
GLUCOSE BLD-MCNC: 152 MG/DL (ref 70–99)
GLUCOSE BLD-MCNC: 167 MG/DL (ref 70–99)
GLUCOSE BLD-MCNC: 174 MG/DL (ref 70–99)
GLUCOSE BLD-MCNC: 183 MG/DL (ref 70–99)
GLUCOSE BLD-MCNC: 186 MG/DL (ref 70–99)
GLUCOSE BLD-MCNC: 188 MG/DL (ref 70–99)
GLUCOSE BLD-MCNC: 190 MG/DL (ref 70–99)
GLUCOSE BLD-MCNC: 191 MG/DL (ref 70–99)
GLUCOSE BLD-MCNC: 191 MG/DL (ref 70–99)
GLUCOSE BLD-MCNC: 199 MG/DL (ref 70–99)
GLUCOSE SERPL-MCNC: 157 MG/DL (ref 70–99)
GLUCOSE SERPL-MCNC: 187 MG/DL (ref 70–99)
GLUCOSE SERPL-MCNC: 208 MG/DL (ref 70–99)
HCO3 ARTERIAL: 20.3 MMOL/L (ref 21–28)
HCO3 ARTERIAL: 20.6 MMOL/L (ref 21–28)
HCT VFR BLD CALC: 31.5 % (ref 42–52)
HEMOGLOBIN: 10.1 GM/DL (ref 13.5–18)
IMMATURE NEUTROPHIL %: 1 % (ref 0–0.43)
INR BLD: 1.1 INDEX
LACTATE: 1.6 MMOL/L (ref 0.5–1.9)
LYMPHOCYTES ABSOLUTE: 0.7 K/CU MM
LYMPHOCYTES RELATIVE PERCENT: 12 % (ref 24–44)
MAGNESIUM: 1.8 MG/DL (ref 1.8–2.4)
MAGNESIUM: 2.1 MG/DL (ref 1.8–2.4)
MAGNESIUM: 2.1 MG/DL (ref 1.8–2.4)
MCH RBC QN AUTO: 31.4 PG (ref 27–31)
MCHC RBC AUTO-ENTMCNC: 32.1 % (ref 32–36)
MCV RBC AUTO: 97.8 FL (ref 78–100)
METHEMOGLOBIN ARTERIAL: 0.4 %
METHEMOGLOBIN ARTERIAL: 0.8 %
MONOCYTES ABSOLUTE: 0.4 K/CU MM
MONOCYTES RELATIVE PERCENT: 5.7 % (ref 0–4)
NEUTROPHILS ABSOLUTE: 4.9 K/CU MM
NEUTROPHILS RELATIVE PERCENT: 79.8 % (ref 36–66)
NUCLEATED RBC %: 0 %
O2 SATURATION: 94.4 % (ref 94–98)
O2 SATURATION: 96.7 % (ref 94–98)
PCO2 ARTERIAL: 34 MMHG (ref 35–48)
PCO2 ARTERIAL: 36 MMHG (ref 35–48)
PDW BLD-RTO: 13.8 % (ref 11.7–14.9)
PH BLOOD: 7.36 (ref 7.35–7.45)
PH BLOOD: 7.39 (ref 7.35–7.45)
PHOSPHORUS: 1.5 MG/DL (ref 2.5–4.9)
PHOSPHORUS: 2 MG/DL (ref 2.5–4.9)
PHOSPHORUS: 2.1 MG/DL (ref 2.5–4.9)
PLATELET # BLD: 145 K/CU MM (ref 140–440)
PMV BLD AUTO: 10.2 FL (ref 7.5–11.1)
PO2 ARTERIAL: 184 MMHG (ref 83–108)
PO2 ARTERIAL: 91 MMHG (ref 83–108)
POTASSIUM SERPL-SCNC: 2.4 MMOL/L (ref 3.5–5.1)
POTASSIUM SERPL-SCNC: 2.5 MMOL/L (ref 3.5–5.1)
POTASSIUM SERPL-SCNC: 3 MMOL/L (ref 3.5–5.1)
PROTHROMBIN TIME: 14.3 SECONDS (ref 11.7–14.5)
RBC # BLD: 3.22 M/CU MM (ref 4.6–6.2)
SODIUM BLD-SCNC: 145 MMOL/L (ref 135–145)
SODIUM BLD-SCNC: 145 MMOL/L (ref 135–145)
SODIUM BLD-SCNC: 146 MMOL/L (ref 135–145)
TOTAL IMMATURE NEUTOROPHIL: 0.06 K/CU MM
TOTAL NUCLEATED RBC: 0 K/CU MM
TOTAL PROTEIN: 5.8 GM/DL (ref 6.4–8.2)
WBC # BLD: 6.2 K/CU MM (ref 4–10.5)

## 2024-07-20 PROCEDURE — 2580000003 HC RX 258: Performed by: STUDENT IN AN ORGANIZED HEALTH CARE EDUCATION/TRAINING PROGRAM

## 2024-07-20 PROCEDURE — 94640 AIRWAY INHALATION TREATMENT: CPT

## 2024-07-20 PROCEDURE — 94761 N-INVAS EAR/PLS OXIMETRY MLT: CPT

## 2024-07-20 PROCEDURE — 85610 PROTHROMBIN TIME: CPT

## 2024-07-20 PROCEDURE — 82803 BLOOD GASES ANY COMBINATION: CPT

## 2024-07-20 PROCEDURE — 6360000002 HC RX W HCPCS: Performed by: GENERAL PRACTICE

## 2024-07-20 PROCEDURE — 1250000000 HC SEMI PRIVATE HOSPICE R&B

## 2024-07-20 PROCEDURE — 6370000000 HC RX 637 (ALT 250 FOR IP): Performed by: STUDENT IN AN ORGANIZED HEALTH CARE EDUCATION/TRAINING PROGRAM

## 2024-07-20 PROCEDURE — 80076 HEPATIC FUNCTION PANEL: CPT

## 2024-07-20 PROCEDURE — 84100 ASSAY OF PHOSPHORUS: CPT

## 2024-07-20 PROCEDURE — 99232 SBSQ HOSP IP/OBS MODERATE 35: CPT

## 2024-07-20 PROCEDURE — 85025 COMPLETE CBC W/AUTO DIFF WBC: CPT

## 2024-07-20 PROCEDURE — 89220 SPUTUM SPECIMEN COLLECTION: CPT

## 2024-07-20 PROCEDURE — 6360000002 HC RX W HCPCS: Performed by: STUDENT IN AN ORGANIZED HEALTH CARE EDUCATION/TRAINING PROGRAM

## 2024-07-20 PROCEDURE — 82962 GLUCOSE BLOOD TEST: CPT

## 2024-07-20 PROCEDURE — 2500000003 HC RX 250 WO HCPCS: Performed by: INTERNAL MEDICINE

## 2024-07-20 PROCEDURE — 2700000000 HC OXYGEN THERAPY PER DAY

## 2024-07-20 PROCEDURE — C9254 INJECTION, LACOSAMIDE: HCPCS | Performed by: STUDENT IN AN ORGANIZED HEALTH CARE EDUCATION/TRAINING PROGRAM

## 2024-07-20 PROCEDURE — 80048 BASIC METABOLIC PNL TOTAL CA: CPT

## 2024-07-20 PROCEDURE — 6360000002 HC RX W HCPCS: Performed by: NURSE PRACTITIONER

## 2024-07-20 PROCEDURE — 83605 ASSAY OF LACTIC ACID: CPT

## 2024-07-20 PROCEDURE — 94003 VENT MGMT INPAT SUBQ DAY: CPT

## 2024-07-20 PROCEDURE — 85730 THROMBOPLASTIN TIME PARTIAL: CPT

## 2024-07-20 PROCEDURE — 83735 ASSAY OF MAGNESIUM: CPT

## 2024-07-20 PROCEDURE — 82330 ASSAY OF CALCIUM: CPT

## 2024-07-20 RX ORDER — ACETAMINOPHEN 650 MG/1
650 SUPPOSITORY RECTAL EVERY 4 HOURS PRN
Status: CANCELLED | OUTPATIENT
Start: 2024-07-20

## 2024-07-20 RX ORDER — MORPHINE SULFATE 4 MG/ML
4 INJECTION, SOLUTION INTRAMUSCULAR; INTRAVENOUS
Status: DISCONTINUED | OUTPATIENT
Start: 2024-07-20 | End: 2024-07-21 | Stop reason: HOSPADM

## 2024-07-20 RX ORDER — LORAZEPAM 2 MG/ML
2 INJECTION INTRAMUSCULAR
Status: DISCONTINUED | OUTPATIENT
Start: 2024-07-20 | End: 2024-07-21 | Stop reason: HOSPADM

## 2024-07-20 RX ORDER — POTASSIUM CHLORIDE 29.8 MG/ML
20 INJECTION INTRAVENOUS
Status: DISPENSED | OUTPATIENT
Start: 2024-07-20 | End: 2024-07-20

## 2024-07-20 RX ORDER — POTASSIUM CHLORIDE 29.8 MG/ML
20 INJECTION INTRAVENOUS ONCE
Status: COMPLETED | OUTPATIENT
Start: 2024-07-20 | End: 2024-07-20

## 2024-07-20 RX ORDER — GLYCOPYRROLATE 0.2 MG/ML
0.1 INJECTION INTRAMUSCULAR; INTRAVENOUS
Status: CANCELLED | OUTPATIENT
Start: 2024-07-20

## 2024-07-20 RX ORDER — MORPHINE SULFATE 4 MG/ML
4 INJECTION, SOLUTION INTRAMUSCULAR; INTRAVENOUS
Status: CANCELLED | OUTPATIENT
Start: 2024-07-20

## 2024-07-20 RX ORDER — ACETAMINOPHEN 650 MG/1
650 SUPPOSITORY RECTAL EVERY 4 HOURS PRN
Status: DISCONTINUED | OUTPATIENT
Start: 2024-07-20 | End: 2024-07-21 | Stop reason: HOSPADM

## 2024-07-20 RX ORDER — GLYCOPYRROLATE 0.2 MG/ML
0.1 INJECTION INTRAMUSCULAR; INTRAVENOUS
Status: DISCONTINUED | OUTPATIENT
Start: 2024-07-20 | End: 2024-07-21 | Stop reason: HOSPADM

## 2024-07-20 RX ORDER — LORAZEPAM 2 MG/ML
2 INJECTION INTRAMUSCULAR
Status: CANCELLED | OUTPATIENT
Start: 2024-07-20

## 2024-07-20 RX ADMIN — CHLORHEXIDINE GLUCONATE 0.12% ORAL RINSE 15 ML: 1.2 LIQUID ORAL at 09:05

## 2024-07-20 RX ADMIN — BICTEGRAVIR SODIUM, EMTRICITABINE, AND TENOFOVIR ALAFENAMIDE FUMARATE 1 TABLET: 50; 200; 25 TABLET ORAL at 09:06

## 2024-07-20 RX ADMIN — LEVETIRACETAM 500 MG: 500 INJECTION INTRAVENOUS at 09:04

## 2024-07-20 RX ADMIN — FOLIC ACID 1 MG: 1 TABLET ORAL at 09:04

## 2024-07-20 RX ADMIN — DOBUTAMINE IN DEXTROSE 5 MCG/KG/MIN: 200 INJECTION, SOLUTION INTRAVENOUS at 00:21

## 2024-07-20 RX ADMIN — ACETAMINOPHEN ORAL SOLUTION 650 MG: 650 SOLUTION ORAL at 14:58

## 2024-07-20 RX ADMIN — PIPERACILLIN AND TAZOBACTAM 4500 MG: 4; .5 INJECTION, POWDER, FOR SOLUTION INTRAVENOUS at 10:01

## 2024-07-20 RX ADMIN — CARBOXYMETHYLCELLULOSE SODIUM 1 DROP: 10 GEL OPHTHALMIC at 06:22

## 2024-07-20 RX ADMIN — THIAMINE HYDROCHLORIDE 100 MG: 100 INJECTION, SOLUTION INTRAMUSCULAR; INTRAVENOUS at 09:05

## 2024-07-20 RX ADMIN — POTASSIUM CHLORIDE 20 MEQ: 29.8 INJECTION, SOLUTION INTRAVENOUS at 15:01

## 2024-07-20 RX ADMIN — ACETAMINOPHEN ORAL SOLUTION 650 MG: 650 SOLUTION ORAL at 09:03

## 2024-07-20 RX ADMIN — ZINC SULFATE 220 MG (50 MG) CAPSULE 50 MG: CAPSULE at 09:04

## 2024-07-20 RX ADMIN — GLYCOPYRROLATE 0.1 MG: 0.2 INJECTION INTRAMUSCULAR; INTRAVENOUS at 21:17

## 2024-07-20 RX ADMIN — IPRATROPIUM BROMIDE AND ALBUTEROL SULFATE 1 DOSE: 2.5; .5 SOLUTION RESPIRATORY (INHALATION) at 15:20

## 2024-07-20 RX ADMIN — VITAMIN B COMPLEX 1 CAPSULE: at 09:05

## 2024-07-20 RX ADMIN — POTASSIUM CHLORIDE 20 MEQ: 29.8 INJECTION, SOLUTION INTRAVENOUS at 10:02

## 2024-07-20 RX ADMIN — METOCLOPRAMIDE 5 MG: 5 INJECTION, SOLUTION INTRAMUSCULAR; INTRAVENOUS at 04:36

## 2024-07-20 RX ADMIN — HEPARIN SODIUM 5000 UNITS: 5000 INJECTION INTRAVENOUS; SUBCUTANEOUS at 06:22

## 2024-07-20 RX ADMIN — CARBOXYMETHYLCELLULOSE SODIUM 1 DROP: 10 GEL OPHTHALMIC at 14:58

## 2024-07-20 RX ADMIN — IPRATROPIUM BROMIDE AND ALBUTEROL SULFATE 1 DOSE: 2.5; .5 SOLUTION RESPIRATORY (INHALATION) at 03:22

## 2024-07-20 RX ADMIN — PANTOPRAZOLE SODIUM 40 MG: 40 INJECTION, POWDER, FOR SOLUTION INTRAVENOUS at 09:05

## 2024-07-20 RX ADMIN — MORPHINE SULFATE 4 MG: 4 INJECTION, SOLUTION INTRAMUSCULAR; INTRAVENOUS at 21:20

## 2024-07-20 RX ADMIN — METOCLOPRAMIDE 5 MG: 5 INJECTION, SOLUTION INTRAMUSCULAR; INTRAVENOUS at 14:58

## 2024-07-20 RX ADMIN — MORPHINE SULFATE 4 MG: 4 INJECTION, SOLUTION INTRAMUSCULAR; INTRAVENOUS at 21:18

## 2024-07-20 RX ADMIN — NOREPINEPHRINE BITARTRATE 14 MCG/MIN: 0.06 INJECTION, SOLUTION INTRAVENOUS at 13:14

## 2024-07-20 RX ADMIN — SODIUM CHLORIDE, PRESERVATIVE FREE 10 ML: 5 INJECTION INTRAVENOUS at 10:03

## 2024-07-20 RX ADMIN — ACETAMINOPHEN ORAL SOLUTION 650 MG: 650 SOLUTION ORAL at 03:34

## 2024-07-20 RX ADMIN — MINERAL OIL AND WHITE PETROLATUM: 30; 940 OINTMENT OPHTHALMIC at 03:34

## 2024-07-20 RX ADMIN — TICAGRELOR 60 MG: 60 TABLET ORAL at 09:05

## 2024-07-20 RX ADMIN — LORAZEPAM 2 MG: 2 INJECTION INTRAMUSCULAR; INTRAVENOUS at 21:20

## 2024-07-20 RX ADMIN — LACOSAMIDE 200 MG: 10 INJECTION INTRAVENOUS at 10:03

## 2024-07-20 RX ADMIN — IPRATROPIUM BROMIDE AND ALBUTEROL SULFATE 1 DOSE: 2.5; .5 SOLUTION RESPIRATORY (INHALATION) at 07:44

## 2024-07-20 RX ADMIN — METOCLOPRAMIDE 5 MG: 5 INJECTION, SOLUTION INTRAMUSCULAR; INTRAVENOUS at 09:04

## 2024-07-20 RX ADMIN — IPRATROPIUM BROMIDE AND ALBUTEROL SULFATE 1 DOSE: 2.5; .5 SOLUTION RESPIRATORY (INHALATION) at 11:30

## 2024-07-20 RX ADMIN — ASPIRIN 81 MG: 81 TABLET, CHEWABLE ORAL at 09:04

## 2024-07-20 RX ADMIN — HEPARIN SODIUM 5000 UNITS: 5000 INJECTION INTRAVENOUS; SUBCUTANEOUS at 14:58

## 2024-07-20 ASSESSMENT — PULMONARY FUNCTION TESTS
PIF_VALUE: 19
PIF_VALUE: 21
PIF_VALUE: 19
PIF_VALUE: 21
PIF_VALUE: 20
PIF_VALUE: 19
PIF_VALUE: 20
PIF_VALUE: 21
PIF_VALUE: 20
PIF_VALUE: 22
PIF_VALUE: 20
PIF_VALUE: 20
PIF_VALUE: 21
PIF_VALUE: 21
PIF_VALUE: 20
PIF_VALUE: 21
PIF_VALUE: 21
PIF_VALUE: 22
PIF_VALUE: 20
PIF_VALUE: 18
PIF_VALUE: 21
PIF_VALUE: 20
PIF_VALUE: 24
PIF_VALUE: 20
PIF_VALUE: 21
PIF_VALUE: 20

## 2024-07-20 ASSESSMENT — PAIN DESCRIPTION - ORIENTATION: ORIENTATION: OTHER (COMMENT)

## 2024-07-20 ASSESSMENT — PAIN DESCRIPTION - LOCATION: LOCATION: OTHER (COMMENT)

## 2024-07-20 ASSESSMENT — PAIN DESCRIPTION - DESCRIPTORS: DESCRIPTORS: OTHER (COMMENT)

## 2024-07-20 NOTE — PROGRESS NOTES
Inpatient Progress Note 7/20/2024        Dewey Sosa  1980  [id number]      Assessment/Plan:  Dewey Sosa is a 44 y.o. male with a history of HIV, ESRD, HTN, DM, who presented to [facility] 7/17/2024 with unwitnessed cardiac arrest with prolonged down time. Admitted to the ICU for further evaluation and care       Problem list  Encephalopathy  Paroxysmal sympathetic hyperactivity  Non convulsive status  Cardiac arrest  Hx of NSVT  Shock Cardiogenic?   CHF HFrEF  ESRD on CRRT  HIV  DM  Anemia  Rib fracture      Neuro: encephalopathic, Myoclonic status epilepticus on Keppra and Vimpat. cEEG now show burst suppression pattern  MRI today show evidence of global anoxia. On exam show posturing, persistently areflexic, no cough, gag or corneal reflexes present. Neurology following, recs appreciated.  Remains intubated, off sedation.  Pain control with current multimodal regimen, adjust as needed.   Cardio:  Cardiac arrest V.fib, found down, unknown down time, Shock in the field x2. Known hx of NSVT on metoprolol at home - hold. EKG show global ST depression with noted ST elevated in aVR. Repeat EKG normalized. Echo show reduced EF with noted hypokinetic lateral wall. EF grossly 35-40%., anterior and lateral wall segment akinetic. Troponinemia, likely Type 2 MI.  Cardiology consulted, recommend non urgent intervention and medical management.   hypotensive requiring vasoactive and inotropic agents agents, titrate to MAP > 65 mmHg. Monitor vitals closely and adjust as needed. TTM modality fever prevention, arctic sun pads on for fever control. Was note a candidate for hypothermia due to hemodynamic instability on presentation.   Resp: acute respiratory failure requiring mechanical ventilatory support. Adjust vent to optimize acid base status. Continue inhalers and nebs. Continue aggressive pulm toileting. Radiographic imaging of the chest show pulmonary edema.   GI: NPO. NGT. TF hold due to high residuals. LIWS.  Gi ppx  : ESRD-HD, MWF, off CRRT overnight due to hypotension. On PD dialysis. Nephrology consulted for dialysis management. Monitor electrolytes and replenish as needed  Heme:  HGB 10.1, plts 145. Monitor for bleeding. DVT ppx: heparin  ID: WBC 6.2, on broad spectrum antibiotics. F/u cultures and sensitivities and de-esclate as able. MRSA nares negative, Resp PCR negative. Known hx of HIV on biktarvi. HIV RNA, CD4 107. Bactrim for ppx.   Endo: Known diabetic.on insulin drip for BG control.  Maintain euglycemia, avoid hypoglycemia.   MSK: DTI prevention. Rib fractures right 2-> 8 anterior and left 2-> 6 rib frature with displacement of the left anterior fifth rib fracture, likely due to Mechanical CPR device.      Tubes/Lines/Drains:  ETT, NGT, CVC, Kyung, chowdhury     Code Status: CCA    Family updated at bedside. Transition to Comfort care today.     Subjective:    Patient was seen and evaluated at bedside, overnight no acute issues.     Physical Exam:            BP (!) 109/55   Pulse (!) 117   Temp 98.1 °F (36.7 °C) (Bladder)   Resp 24   Ht 1.854 m (6' 1\")   Wt 103.1 kg (227 lb 4.7 oz)   SpO2 98%   BMI 29.99 kg/m²     General: Ill-appearing  Eyes: Pupils fixed and dilated  ENT: neck supple, ET tube  Cardiovascular: Regular rate. POCUS show moderately reduced EF, 35 to 40% with noted lateral wall motion hypokinesis.  Respiratory: Diminished breath sounds bilaterally  Gastrointestinal: Soft, non tender  Genitourinary: no suprapubic tenderness  Musculoskeletal: No edema  Skin: warm, dry  Neuro: GCS 3 T, pupils fixed and dilated, no cough, gag, or corneal reflex.  no response to painful stimuli, centrally or peripherally. Noted to have decorticate posturing.     Current Medications:   metoclopramide  5 mg IntraVENous Q6H    [START ON 7/22/2024] sulfamethoxazole-trimethoprim  1 tablet Oral Once per day on Mon Wed Fri    piperacillin-tazobactam  4,500 mg IntraVENous Q12H    levETIRAcetam  500 mg IntraVENous Q24H

## 2024-07-20 NOTE — PROGRESS NOTES
Pt disconnected from cycler per orders. Effluent is clear and yellow.  UF 159ml. Report given to GISELE Umanzor RN

## 2024-07-20 NOTE — CARE COORDINATION
Order for hospice consult received. Spoke with Dr Montgomery who advises that family is planning for compassionate extubation. Cm met with mother. Support offered. Discussed hospice role in compassionate extubation and providing comfort care post extubation. Reviewed hospice agencies that are able to provide inpt hospice care here at [facility] and there affiliation with [facility]. Family prefers [facility] as one of their family members goes to [facility]( Dr Tarango's office). Cm contacted [facility], spoke with Zuly and completed referral. Hospice RN advises that she likely will not be able to meet with family until at least 1800 as she has a couple other pt's she will be meeting with today as well. Zuly will call pt's mother. Intensivist NP updated.

## 2024-07-20 NOTE — PROGRESS NOTES
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 Was able to wean sedation yesterday. No meaningful improvement in patient symptoms. Mother remains supportive at bedside. States patients aunt and brother are on their way from out of town.     Objective:   Scheduled Meds:   potassium chloride  20 mEq IntraVENous Once    metoclopramide  5 mg IntraVENous Q6H    [START ON 2024] sulfamethoxazole-trimethoprim  1 tablet Oral Once per day on     piperacillin-tazobactam  4,500 mg IntraVENous Q12H    levETIRAcetam  500 mg IntraVENous Q24H    gentamicin   Topical Daily    carboxymethylcellulose  1 drop Both Eyes Q4H    Or    artificial tears   Both Eyes Q4H    sodium chloride flush  5-40 mL IntraVENous 2 times per day    ipratropium 0.5 mg-albuterol 2.5 mg  1 Dose Inhalation Q4H RT    chlorhexidine  15 mL Mouth/Throat BID    pantoprazole  40 mg IntraVENous Daily    heparin (porcine)  5,000 Units SubCUTAneous 3 times per day    thiamine  100 mg IntraVENous Daily    folic acid  1 mg Oral Daily    zinc sulfate  50 mg Oral Daily    b complex vitamins  1 capsule Oral Daily    aspirin  81 mg Oral Daily    ticagrelor  60 mg Oral BID    bictegravir-emtricitab-tenofovir alafenamide  1 tablet Oral Daily    atorvastatin  80 mg Oral Nightly    potassium chloride  20 mEq IntraVENous Once    acetaminophen  650 mg Oral 4 times per day    lacosamide (VIMPAT) 200 mg in sodium chloride 0.9 % 70 mL IVPB  200 mg IntraVENous BID     Continuous Infusions:   DOBUTamine 5 mcg/kg/min (24 0021)    dianeal lo-taty 1.5%      norepinephrine 14 mcg/min (24 0343)    sodium chloride      amiodarone Stopped (24 1930)    dextrose      insulin 15.9 Units/hr (24 0853)     PRN Meds:.midazolam, sodium chloride flush, sodium

## 2024-07-20 NOTE — PROGRESS NOTES
[facility] at bedside. Family has decided to go comfort care. Will extubate and initiate hospice orders when family is ready.

## 2024-07-20 NOTE — PROGRESS NOTES
Panel    Collection Time: 07/19/24  1:25 PM   Result Value Ref Range    Sodium 140 135 - 145 MMOL/L    Potassium 3.5 3.5 - 5.1 MMOL/L    Chloride 107 99 - 110 mMol/L    CO2 20 (L) 21 - 32 MMOL/L    Anion Gap 13 7 - 16    Glucose 175 (H) 70 - 99 MG/DL    BUN 26 (H) 6 - 23 MG/DL    Creatinine 3.0 (H) 0.9 - 1.3 MG/DL    Est, Glom Filt Rate 25 (L) >60 mL/min/1.73m2    Calcium 7.9 (L) 8.3 - 10.6 MG/DL    Phosphorus 3.4 2.5 - 4.9 MG/DL    Magnesium 2.1 1.8 - 2.4 mg/dl   Calcium, Ionized    Collection Time: 07/19/24  1:25 PM   Result Value Ref Range    Calcium, Ionized 1.11  4.44   (L) 1.12 - 1.32 mMOL/L   POCT Glucose    Collection Time: 07/19/24  2:44 PM   Result Value Ref Range    POC Glucose 157 (H) 70 - 99 MG/DL   POCT Glucose    Collection Time: 07/19/24  5:05 PM   Result Value Ref Range    POC Glucose 173 (H) 70 - 99 MG/DL   POCT Glucose    Collection Time: 07/19/24  6:11 PM   Result Value Ref Range    POC Glucose 177 (H) 70 - 99 MG/DL   Lactic Acid    Collection Time: 07/19/24  6:30 PM   Result Value Ref Range    Lactate 1.3 0.5 - 1.9 mMOL/L   Protime/INR & PTT    Collection Time: 07/19/24  6:57 PM   Result Value Ref Range    Protime 13.9 11.7 - 14.5 SECONDS    INR 1.0 INDEX    aPTT 30.4 25.1 - 37.1 SECONDS   Critical Care Panel    Collection Time: 07/19/24  6:57 PM   Result Value Ref Range    Sodium 144 135 - 145 MMOL/L    Potassium 3.2 (L) 3.5 - 5.1 MMOL/L    Chloride 114 (H) 99 - 110 mMol/L    CO2 19 (L) 21 - 32 MMOL/L    Anion Gap 11 7 - 16    Glucose 167 (H) 70 - 99 MG/DL    BUN 26 (H) 6 - 23 MG/DL    Creatinine 3.1 (H) 0.9 - 1.3 MG/DL    Est, Glom Filt Rate 24 (L) >60 mL/min/1.73m2    Calcium 7.8 (L) 8.3 - 10.6 MG/DL    Phosphorus 3.7 2.5 - 4.9 MG/DL    Magnesium 2.1 1.8 - 2.4 mg/dl   Calcium, Ionized    Collection Time: 07/19/24  6:57 PM   Result Value Ref Range    Calcium, Ionized 1.12  4.48   1.12 - 1.32 mMOL/L   Hepatic Function Panel    Collection Time: 07/19/24  6:57 PM   Result Value Ref Range     %    Total Nucleated RBC 0.0 K/CU MM    Total Immature Neutrophil 0.06 K/CU MM    Immature Neutrophil % 1.0 (H) 0 - 0.43 %   Hepatic Function Panel    Collection Time: 07/20/24  6:00 AM   Result Value Ref Range    Albumin 2.8 (L) 3.4 - 5.0 GM/DL    Total Bilirubin 0.9 0.0 - 1.0 MG/DL    Bilirubin, Direct 0.6 (H) 0.0 - 0.3 MG/DL    Bilirubin, Indirect 0.3 0 - 0.7 MG/DL    Alkaline Phosphatase 91 40 - 129 IU/L    AST 77 (H) 15 - 37 IU/L    ALT 47 (H) 10 - 40 U/L    Total Protein 5.8 (L) 6.4 - 8.2 GM/DL   Protime/INR & PTT    Collection Time: 07/20/24  6:00 AM   Result Value Ref Range    Protime 14.3 11.7 - 14.5 SECONDS    INR 1.1 INDEX    aPTT 35.0 25.1 - 37.1 SECONDS   Critical Care Panel    Collection Time: 07/20/24  6:00 AM   Result Value Ref Range    Sodium 145 135 - 145 MMOL/L    Potassium 2.5 (LL) 3.5 - 5.1 MMOL/L    Chloride 113 (H) 99 - 110 mMol/L    CO2 18 (L) 21 - 32 MMOL/L    Anion Gap 14 7 - 16    Glucose 187 (H) 70 - 99 MG/DL    BUN 26 (H) 6 - 23 MG/DL    Creatinine 3.3 (H) 0.9 - 1.3 MG/DL    Est, Glom Filt Rate 23 (L) >60 mL/min/1.73m2    Calcium 8.1 (L) 8.3 - 10.6 MG/DL    Phosphorus 1.5 (L) 2.5 - 4.9 MG/DL    Magnesium 1.8 1.8 - 2.4 mg/dl   Calcium, Ionized    Collection Time: 07/20/24  6:00 AM   Result Value Ref Range    Calcium, Ionized 1.22  4.88   1.12 - 1.32 mMOL/L   POCT Glucose    Collection Time: 07/20/24  6:10 AM   Result Value Ref Range    POC Glucose 191 (H) 70 - 99 MG/DL   Blood gas, arterial    Collection Time: 07/20/24  7:00 AM   Result Value Ref Range    pH, Bld 7.39 7.35 - 7.45    pCO2, Arterial 34.0 (L) 35 - 48 MMHG    pO2, Arterial 184 (H) 83 - 108 MMHG    Base Excess 4 (H) 0 - 3.0    HCO3, Arterial 20.6 (L) 21 - 28 MMOL/L    CO2 Content 21.6 21 - 32 MMOL/L    O2 Sat 96.7 94 - 98 %    Carbon Monoxide, Blood 3.2 0 - 5 %    Methemoglobin, Arterial 0.4 <1.5 %    Comment AC 24,450,PEP 5,.30    POCT Glucose    Collection Time: 07/20/24  7:43 AM   Result Value Ref Range    POC Glucose 174

## 2024-07-20 NOTE — PROGRESS NOTES
07/20/24 1530   Patient Observation   Pulse (!) 115   Respirations 24   SpO2 97 %   Vent Information   Vent Mode AC/PRVC   Ventilator Settings   FiO2  30 %   Insp Time (sec) 0.84 sec   Vt (Set, mL) 450 mL   Resp Rate (Set) 24 bpm   PEEP/CPAP (cmH2O) 5   Vent Patient Data (Readings)   Vt (Measured) 460 mL   Peak Inspiratory Pressure (cmH2O) 20 cmH2O   Rate Measured 24 br/min   Minute Volume (L/min) 11.1 Liters   Mean Airway Pressure (cmH2O) 10 cmH20   Plateau Pressure (cm H2O) 17 cm H2O   Driving Pressure 12   I:E Ratio 1:2.00   I Time/ I Time % 0 s   Backup Apnea On   Backup Rate 24 Breaths Per Minute   Backup Vt 450   Backup I Time 1   Vent Alarm Settings   High Pressure (cmH2O) 40 cmH2O   Low Minute Volume (lpm) 2.5 L/min   High Minute Volume (lpm) 20 L/min   Low Exhaled Vt (ml) 250 mL   High Exhaled Vt (ml) 1000 mL   RR High (bpm) 35 br/min   Apnea (secs) 20 secs   Additional Respiratoray Assessments   Humidification Source HME   Circuit Condensation Drained   Ambu Bag With Mask At Bedside Yes   Airway Clearance   Sputum Method Obtained Endotracheal   Sputum Amount Scant   $Obtained Sample $Induced Sputum (charge not used for Bronchoscopy)   ETT    Placement Date: 07/17/24   Placed By: In ED  Placement Verified By: Colorimetric ETCO2 device;Direct visualization  Airway Type: Cuffed  Airway Tube Size: 8 mm  Location: Oral   Secured At 24 cm   Measured From Lips   ETT Placement Left   Secured By Commercial tube lovell   Site Assessment Dry   B: Both Spontaneous Awakening and Breathing Trials   Did Patient Receive Sedative and/or Opioid IV Medications in the Last 24 Hours Continuously infused meds for sedation   Safety Screening Spontaneous Awakening Trial (SAT) RASS goal less than or equal to -4   Was Patient Receiving Mechanical Ventilation Yes   Safety Screening Spontaneous Breathing Trial (SBT) Lack of inspiratory effort   Weaning Parameters   Spontaneous Breathing Trial Complete No (comment)  (patient does

## 2024-07-21 LAB
CULTURE: NORMAL
CULTURE: NORMAL
Lab: NORMAL
Lab: NORMAL
SPECIMEN: NORMAL
SPECIMEN: NORMAL

## 2024-07-21 NOTE — DISCHARGE SUMMARY
Boo Tarango MD, Internal Medicine    [address] [place] [address]   [phone]   Patient ID  Dewey Sosa   1980  [id number]          Admit date: 2024   Discharge date: 2024      Admitting Physician: Boo Tarango MD   Discharge Physician: Boo Tarango MD MD    Discharge Diagnoses:   Type 2 diabetes mellitus without complication, with long-term current use of insulin (HCC)      Atherosclerotic heart disease of native coronary artery with other forms of angina pectoris (HCC)     Type 2 diabetes mellitus with chronic kidney disease      Chronic kidney disease, stage IV (severe) (HCC)      Cardiac arrest (HCC)     Other seizures (HCC)     Severe anoxic-ischemic encephalopathy (HCC)    Discharged Condition: Pt .     Hospital Course: Pt was admitted to hospice service with above diagnoses, Patient was kept comfortably and passed away on 2024 at 21.38    Signed: Boo Tarango MD
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
## 2024-07-21 NOTE — PROGRESS NOTES
Time of death pronounced by this RN and Cas RN at .     [facility] called with cardiac time of death at , patient is not eligible for donation and is okay to be released to the  home when ready.

## 2024-07-21 NOTE — DISCHARGE SUMMARY
V2.0  Discharge Summary    Name:  Dewey Sosa /Age/Sex: 1980 (44 y.o. male)   Admit Date: 2024  Discharge Date: 24    MRN & CSN:  [id number] & [id number] Encounter Date and Time 24 3:16 PM EDT    Attending:  No att. providers found Discharging Provider: BARBARA Roberts - NP       Hospital Course:     Brief HPI: Dewey Sosa is a 44 y.o. male with a history of HIV, ESRD, HTN, DM, who presented to [facility] 2024 with unwitnessed cardiac arrest with prolonged down time. Admitted to the ICU for further evaluation and care     Brief Problem Based Course:   Encephalopathy  Cardiac arrest - Vfib  CHF HFrEF   Hx of NSVT  Troponemia  ESRD  HIV/AIDS?  DM  Anemia  Rib Fracture    Patient was treated in the intensive care unit status postcardiac arrest with prolonged unwitnessed time down.  Patient arrived unresponsive with probable anoxic injury.  MRI with evidence of global anoxia.  Patient hemodynamically unstable requiring multiple vasopressor agents for blood pressure support.  Patient with ESRD, on HD.  Received PD dialysis at bedside.  Was treated with broad-spectrum antibiotics.  Patient with known diagnosis of HIV on Biktarvy, inevitably due to patient's devastating outcome of anoxic injury family wish to focus on comfort care and de-escalate level of care.  Patient was compassionately extubated and will discharge to hospice.    The patient expressed appropriate understanding of, and agreement with the discharge recommendations, medications, and plan.     Consults this admission:  IP CONSULT TO DIETITIAN  IP CONSULT TO CARDIOLOGY  IP CONSULT TO NEPHROLOGY  IP CONSULT TO NEUROLOGY  IP CONSULT TO HOSPICE    Discharge Diagnosis:   Cardiac arrest (HCC)        Discharge Instruction:   Discharged to hospice with management of care by hospice physician    Discharge Medications:       Objective Findings at Discharge:   /64   Pulse (!) 113   Temp 98.4 °F (36.9 °C) (Bladder)

## 2024-07-21 NOTE — PROGRESS NOTES
Dr Tarango called of passing of patient @ 2138 pm family at bedside. Patient to  go to [facility] in [facility], family requests [facility] .

## 2024-07-22 LAB
CULTURE: NORMAL
CULTURE: NORMAL
Lab: NORMAL
Lab: NORMAL
SPECIMEN: NORMAL
SPECIMEN: NORMAL

## 2024-07-26 NOTE — PROGRESS NOTES
Physician Progress Note      PATIENT:               SERA NDIAYE  [facility] #:                  [id number]  :                       1980  ADMIT DATE:       2024 10:41 AM  DISCH DATE:        2024 8:17 PM  RESPONDING  PROVIDER #:        Linda Luong MD          QUERY TEXT:    Patient admitted with cardiac arrest.  Noted documentation of type II MI in   2024 MD progress note. In order to support the diagnosis of type II MI,   please refer to 4th universal definition of MI below and include additional   clinical indicators in your documentation.  Or please document if the   diagnosis of type II MI has been ruled out after study.  ?  The medical record reflects the following:  Risk Factors: Cardiac arrest  Clinical Indicators: In 2024  troponemia is noted in H&P.In 2024 MD   progress note states that Troponinemia, likely Type 2 MI (EKG show global ST   depression with noted ST elevated in aVR. Repeat EKG normalized. Echo show   reduced EF with noted hypokinetic lateral wall. EF grossly 35-40%., anterior   and lateral wall segment akinetic. Troponinemia, likely Type 2 MI), One   troponin of high sensitivity of 85, no repeat  Troponinemia, likely Type 2 MI.  Cardiology consulted, recommend non urgent   intervention and medical management.  Treatment: heparin (porcine) injection SQ 5,000 Units, Labs, CARDS consult    Thank you, Gia Gómez RN, CDS [phone]    Fourth Universal Definition of Myocardial Infarction:  Clearly separates MI   from myocardial injury. Patients with elevated blood troponin levels but   without clinical evidence of ischemia are said to have had a myocardial   injury.? To have a myocardial infarction requires both an elevated troponin   blood test along with at least one of the following:  - Symptoms of acute myocardial ischemia (Types 1 - 5 MI)  - Clinical evidence of ischemia, as evidenced in an electrocardiogram (EKG)   showing new ischemic changes (Type 1, Type

## (undated) DEVICE — TOWEL,OR,DSP,ST,BLUE,STD,6/PK,12PK/CS: Brand: MEDLINE

## (undated) DEVICE — SYRINGE MED 30ML STD CLR PLAS LUERLOCK TIP N CTRL DISP

## (undated) DEVICE — TROCAR ENDOSCP FALLER S STL

## (undated) DEVICE — CATHETER PERITONEAL DLYS 2 CUF STD AD 62 CM 6 CM 6 CC ARC

## (undated) DEVICE — YANKAUER,FLEXIBLE HANDLE,REGLR CAPACITY: Brand: MEDLINE INDUSTRIES, INC.

## (undated) DEVICE — STYLET CATH ADOL AD 62 CM COILED FLEXNECK CATH IMPLANTATION

## (undated) DEVICE — GLOVE SURG SZ 6 THK91MIL LTX FREE SYN POLYISOPRENE ANTI

## (undated) DEVICE — GOWN,SURGICAL,AURORA,SLEEVE: Brand: MEDLINE

## (undated) DEVICE — DRESSING TRNSPAR W6XL8IN FLM SURESITE 123

## (undated) DEVICE — Z DISCONTINUED (USE MFG CAT MVABO)  TUBING GAS SAMPLING STD 6.5 FT FEMALE CONN SMRT CAPNOLINE

## (undated) DEVICE — NEEDLE HYPO 20GA L1.5IN YEL POLYPR HUB S STL REG BVL STR

## (undated) DEVICE — GLOVE ORANGE PI 7 1/2   MSG9075

## (undated) DEVICE — Z DISC USE 2764362 SEAL ENDOSCP INSTR DIA5-8MM UNIV FOR CANN DA VINCI XI

## (undated) DEVICE — BANDAGE,GAUZE,BULKEE II,4.5"X4.1YD,STRL: Brand: MEDLINE

## (undated) DEVICE — LINER,SEMI-RIGID,3000CC,50EA/CS: Brand: MEDLINE

## (undated) DEVICE — SUTURE VICRYL COAT SZ 4-0 L18IN ABSRB UD L19MM PS-2 1/2 CIR J496G

## (undated) DEVICE — DRAIN SURG 15FR SIL SMOOTH RND 1/8IN END PERF W/ TRCR RADPQ

## (undated) DEVICE — ADHESIVE SKIN CLSR 0.7ML TOP DERMBND ADV

## (undated) DEVICE — TRAY PREP DRY W/ PREM GLV 2 APPL 6 SPNG 2 UNDPD 1 OVERWRAP

## (undated) DEVICE — ENDOSCOPY KIT: Brand: MEDLINE INDUSTRIES, INC.

## (undated) DEVICE — SOLUTION IV IRRIG WATER 1000ML POUR BRL 2F7114

## (undated) DEVICE — COUNTER NDL 30 COUNT FOAM STRP SGL MAG

## (undated) DEVICE — THIS SET CONSISTS OF A FEMALE LOCKING CONNECTOR/ON-OFF CLAMP ASSEMBLY, TUBING AND DOUBLE SEALING MALE LUER LOCK CONNECTOR. THIS SET IS TO BE USED WITH THE BAXTER LOCKING TITANIUM ADAPTER FOR PERITONEAL DIALYSIS CATHETER IN DISCONNECT APPLICATIONS AND IN CYCLER APPLICATIONS WHERE ASEPTIC CONNECTIONS AND DISCONNECTIONS ARE PERFORMED AT THE TRANSFER SET/CYCLER SET JUNCTURE.: Brand: MINICAP

## (undated) DEVICE — DRAPE,EXTREMITY,89X128,STERILE: Brand: MEDLINE

## (undated) DEVICE — 3M™ STERI-STRIP™ COMPOUND BENZOIN TINCTURE 40 BAGS/CARTON 4 CARTONS/CASE C1544: Brand: 3M™ STERI-STRIP™

## (undated) DEVICE — DRESSING,GAUZE,XEROFORM,CURAD,1"X8",ST: Brand: CURAD

## (undated) DEVICE — IMPLANTATION KIT PD CATH CONN STYL

## (undated) DEVICE — GAUZE,SPONGE,4"X4",16PLY,XRAY,STRL,LF: Brand: MEDLINE

## (undated) DEVICE — RESERVOIR,SUCTION,100CC,SILICONE: Brand: MEDLINE

## (undated) DEVICE — THE ULTRASET PRODUCTS ARE SINGLE USE DEVICES THAT ARE INTENDED FOR THE DRAINAGE AND INFUSION OF PERITONEAL DIALYSIS SOLUTION.: Brand: ULTRASET CAPD DISPOSABLE DISCONNECT Y-SET

## (undated) DEVICE — SOLUTION IV IRRIG POUR BRL 0.9% SODIUM CHL 2F7124

## (undated) DEVICE — GOWN,ECLIPSE,POLYRNF,BRTHSLV,L,30/CS: Brand: MEDLINE

## (undated) DEVICE — TUBING INSUFFLATOR HEAT HI FLO SET PNEUMOCLEAR

## (undated) DEVICE — CATHETER PERITONEAL DLYS 35X53 MMX62 CM FLEX-NECK ARC

## (undated) DEVICE — Z INACTIVE NO ACTIVE SUPPLIER APPLICATOR MEDICATED 26 CC TINT HI-LITE ORNG STRL CHLORAPREP

## (undated) DEVICE — Z DISCONTINUED NO SUB IDED TUBING ETCO2 AD L6.5FT NSL ORAL CVD PRNG NONFLARED TIP OVR

## (undated) DEVICE — CONNECTOR CATH TWO PART AD FOR PERITONEAL DLYS FLX NK

## (undated) DEVICE — ELECTRODE ES AD CRDLSS PT RET REM POLYHESIVE

## (undated) DEVICE — GOWN,ECLIPSE,POLYRNF,BRTHSLV,XL,30/CS: Brand: MEDLINE

## (undated) DEVICE — PACK SURG LAP CHOLE

## (undated) DEVICE — SUTURE PERMAHAND SZ 2-0 L17X18IN NONABSORBABLE BLK SILK SA65H

## (undated) DEVICE — TROCAR: Brand: KII® SLEEVE

## (undated) DEVICE — DIANEAL PD-2 1.5% DEX 2L/3L(SYS 2)

## (undated) DEVICE — TUBING, SUCTION, 9/32" X 10', STRAIGHT: Brand: MEDLINE

## (undated) DEVICE — GLOVE SURG SZ 65 CRM LTX FREE POLYISOPRENE POLYMER BEAD ANTI

## (undated) DEVICE — SET TBNG DISP TIP FOR AHTO

## (undated) DEVICE — PACK,BASIC,IX: Brand: MEDLINE

## (undated) DEVICE — TROCAR: Brand: KII FIOS FIRST ENTRY

## (undated) DEVICE — MARKER SURG SKIN UTIL REGULAR/FINE 2 TIP W/ RUL AND 9 LBL

## (undated) DEVICE — GOWN,SIRUS,POLYRNF,BRTHSLV,XLN/XL,20/CS: Brand: MEDLINE

## (undated) DEVICE — CANISTER VAC 500ML TBNG RESVR FOR INFOVAC W O GEL CLMP CONN

## (undated) DEVICE — THIS DEVICE IS A PLASTIC DISCONNECT CAP FOR PERITONEAL DIALYSIS AND CONTAINS POVIDONE-IODINE INTENDED TO PROTECT THE FEMALE LUER CONNECTOR OF THE BAXTER TRANSFER SET.: Brand: MINICAP WITH POVIDONE-IODINE SOLUTION

## (undated) DEVICE — INTENDED FOR TISSUE SEPARATION, AND OTHER PROCEDURES THAT REQUIRE A SHARP SURGICAL BLADE TO PUNCTURE OR CUT.: Brand: BARD-PARKER ® STAINLESS STEEL BLADES

## (undated) DEVICE — FORCEPS BX L240CM JAW DIA2.8MM L CAP W/ NDL MIC MESH TOOTH

## (undated) DEVICE — BLADELESS OBTURATOR, LONG: Brand: WECK VISTA

## (undated) DEVICE — Z DUP USE 2218338 DRESSING GRMCDL 6 12FR D1N CNTR HOLE 4MM ANTMCRBL PRTCTVE DI

## (undated) DEVICE — GLOVE SURG SZ 7 CRM LTX FREE POLYISOPRENE POLYMER BEAD ANTI

## (undated) DEVICE — SUTURE VCRL SZ 3-0 L27IN ABSRB UD L26MM CT-2 1/2 CIR J232H

## (undated) DEVICE — SUTURE 3-0 VCRL CTD SH-1 J219H

## (undated) DEVICE — PENCIL ES CRD L10FT HND SWCHING ROCK SWCH W/ EDGE COAT BLDE

## (undated) DEVICE — SUTURE VCRL SZ 4-0 L18IN ABSRB UD L19MM PS-2 3/8 CIR PRIM J496H

## (undated) DEVICE — SUTURE NONABSORBABLE MONOFILAMENT 4-0 FS-2 18 IN ETHILON 662H

## (undated) DEVICE — GLOVE ORANGE PI 8   MSG9080

## (undated) DEVICE — DRAPE SHEET ULTRAGARD: Brand: MEDLINE

## (undated) DEVICE — SUTURE PDS II SZ 3-0 L27IN ABSRB VLT L26MM SH 1/2 CIR Z316H

## (undated) DEVICE — DRESSING NEG PRSS M W18XH3.3XL12.5CM BLK POLYUR FOAM WND

## (undated) DEVICE — SPONGE LAP W18XL18IN WHT COT 4 PLY FLD STRUNG RADPQ DISP ST

## (undated) DEVICE — SUTURE VICRYL SZ 3-0 L27IN ABSRB UD L26MM CT-2 1/2 CIR J232H

## (undated) DEVICE — SYRINGE IRRIG 60ML SFT PLIABLE BLB EZ TO GRP 1 HND USE W/

## (undated) DEVICE — SOLUTION IV 1000ML 0.9% SOD CHL FOR IRRIG PLAS CONT

## (undated) DEVICE — SNARE VASC L240CM LOOP W10MM SHTH DIA2.4MM RND STIFF CLD

## (undated) DEVICE — SUTURE VCRL SZ 3-0 L27IN ABSRB UD L26MM SH 1/2 CIR J416H

## (undated) DEVICE — SUTURE VICRYL SZ 4-0 L18IN ABSRB UD L19MM PS-2 3/8 CIR PRIM J496H